# Patient Record
Sex: FEMALE | Race: BLACK OR AFRICAN AMERICAN | Employment: OTHER | URBAN - NONMETROPOLITAN AREA
[De-identification: names, ages, dates, MRNs, and addresses within clinical notes are randomized per-mention and may not be internally consistent; named-entity substitution may affect disease eponyms.]

---

## 2018-06-12 ENCOUNTER — TELEPHONE (OUTPATIENT)
Dept: FAMILY MEDICINE CLINIC | Age: 83
End: 2018-06-12

## 2018-06-18 ENCOUNTER — NURSE TRIAGE (OUTPATIENT)
Dept: ADMINISTRATIVE | Age: 83
End: 2018-06-18

## 2018-06-18 ENCOUNTER — OFFICE VISIT (OUTPATIENT)
Dept: FAMILY MEDICINE CLINIC | Age: 83
End: 2018-06-18
Payer: MEDICARE

## 2018-06-18 ENCOUNTER — TELEPHONE (OUTPATIENT)
Dept: FAMILY MEDICINE CLINIC | Age: 83
End: 2018-06-18

## 2018-06-18 VITALS
HEIGHT: 66 IN | RESPIRATION RATE: 12 BRPM | SYSTOLIC BLOOD PRESSURE: 134 MMHG | DIASTOLIC BLOOD PRESSURE: 78 MMHG | TEMPERATURE: 98.1 F | WEIGHT: 203 LBS | BODY MASS INDEX: 32.62 KG/M2 | HEART RATE: 88 BPM

## 2018-06-18 DIAGNOSIS — R60.0 PEDAL EDEMA: ICD-10-CM

## 2018-06-18 DIAGNOSIS — I48.20 CHRONIC ATRIAL FIBRILLATION (HCC): Primary | ICD-10-CM

## 2018-06-18 DIAGNOSIS — J30.1 CHRONIC ALLERGIC RHINITIS DUE TO POLLEN, UNSPECIFIED SEASONALITY: ICD-10-CM

## 2018-06-18 PROCEDURE — 99203 OFFICE O/P NEW LOW 30 MIN: CPT | Performed by: NURSE PRACTITIONER

## 2018-06-18 RX ORDER — LORATADINE 10 MG/1
10 TABLET ORAL DAILY
COMMUNITY
End: 2019-07-24

## 2018-06-18 RX ORDER — WARFARIN SODIUM 5 MG/1
TABLET ORAL EVERY EVENING
COMMUNITY
End: 2019-07-08 | Stop reason: SDUPTHER

## 2018-06-18 RX ORDER — FLUTICASONE PROPIONATE 50 MCG
1 SPRAY, SUSPENSION (ML) NASAL DAILY
COMMUNITY
End: 2019-03-19

## 2018-06-18 RX ORDER — AMLODIPINE BESYLATE 10 MG/1
10 TABLET ORAL DAILY
COMMUNITY
End: 2019-07-08 | Stop reason: SDUPTHER

## 2018-06-18 RX ORDER — MECLIZINE HCL 12.5 MG/1
12.5 TABLET ORAL 3 TIMES DAILY PRN
COMMUNITY
End: 2019-12-03 | Stop reason: SDUPTHER

## 2018-06-18 RX ORDER — LOSARTAN POTASSIUM AND HYDROCHLOROTHIAZIDE 12.5; 5 MG/1; MG/1
1 TABLET ORAL DAILY
COMMUNITY
End: 2019-01-07 | Stop reason: SDUPTHER

## 2018-06-18 ASSESSMENT — PATIENT HEALTH QUESTIONNAIRE - PHQ9
1. LITTLE INTEREST OR PLEASURE IN DOING THINGS: 0
2. FEELING DOWN, DEPRESSED OR HOPELESS: 0
SUM OF ALL RESPONSES TO PHQ9 QUESTIONS 1 & 2: 0
SUM OF ALL RESPONSES TO PHQ QUESTIONS 1-9: 0

## 2018-06-18 ASSESSMENT — ENCOUNTER SYMPTOMS
ABDOMINAL DISTENTION: 0
BACK PAIN: 0
SINUS PRESSURE: 0
ABDOMINAL PAIN: 0
SINUS PAIN: 0
RESPIRATORY NEGATIVE: 1
RHINORRHEA: 0

## 2018-06-19 ENCOUNTER — HOSPITAL ENCOUNTER (OUTPATIENT)
Age: 83
Discharge: HOME | End: 2018-06-19
Payer: MEDICARE

## 2018-06-19 ENCOUNTER — TELEPHONE (OUTPATIENT)
Dept: FAMILY MEDICINE CLINIC | Age: 83
End: 2018-06-19

## 2018-06-19 DIAGNOSIS — R71.8 MICROCYTIC ERYTHROCYTES: Primary | ICD-10-CM

## 2018-06-19 DIAGNOSIS — I48.20 CHRONIC ATRIAL FIBRILLATION (HCC): ICD-10-CM

## 2018-06-19 DIAGNOSIS — R73.01 ELEVATED FASTING BLOOD SUGAR: ICD-10-CM

## 2018-06-19 LAB
ALBUMIN SERPL-MCNC: 4 G/DL (ref 3.5–5.1)
ALP BLD-CCNC: 107 U/L (ref 38–126)
ALT SERPL-CCNC: 16 U/L (ref 11–66)
ANION GAP SERPL CALCULATED.3IONS-SCNC: 16 MEQ/L (ref 8–16)
ANISOCYTOSIS: ABNORMAL
AST SERPL-CCNC: 21 U/L (ref 5–40)
BASOPHILS # BLD: 0.4 %
BASOPHILS ABSOLUTE: 0 THOU/MM3 (ref 0–0.1)
BILIRUB SERPL-MCNC: 0.5 MG/DL (ref 0.3–1.2)
BUN BLDV-MCNC: 11 MG/DL (ref 7–22)
CALCIUM SERPL-MCNC: 10 MG/DL (ref 8.5–10.5)
CHLORIDE BLD-SCNC: 100 MEQ/L (ref 98–111)
CO2: 27 MEQ/L (ref 23–33)
CREAT SERPL-MCNC: 0.7 MG/DL (ref 0.4–1.2)
EOSINOPHIL # BLD: 4 %
EOSINOPHILS ABSOLUTE: 0.2 THOU/MM3 (ref 0–0.4)
GFR SERPL CREATININE-BSD FRML MDRD: > 90 ML/MIN/1.73M2
GLUCOSE BLD-MCNC: 158 MG/DL (ref 70–108)
HCT VFR BLD CALC: 38.9 % (ref 37–47)
HEMOGLOBIN: 12.4 GM/DL (ref 12–16)
HYPOCHROMIA: ABNORMAL
INR BLD: 3.41 (ref 0.85–1.13)
LYMPHOCYTES # BLD: 55.8 %
LYMPHOCYTES ABSOLUTE: 2.4 THOU/MM3 (ref 1–4.8)
MCH RBC QN AUTO: 23.1 PG (ref 27–31)
MCHC RBC AUTO-ENTMCNC: 32 GM/DL (ref 33–37)
MCV RBC AUTO: 72.2 FL (ref 81–99)
MICROCYTES: ABNORMAL
MONOCYTES # BLD: 8.5 %
MONOCYTES ABSOLUTE: 0.4 THOU/MM3 (ref 0.4–1.3)
NUCLEATED RED BLOOD CELLS: 0 /100 WBC
PDW BLD-RTO: 14.9 % (ref 11.5–14.5)
PLATELET # BLD: 229 THOU/MM3 (ref 130–400)
PLATELET ESTIMATE: ADEQUATE
PMV BLD AUTO: 8.2 FL (ref 7.4–10.4)
POTASSIUM SERPL-SCNC: 3.5 MEQ/L (ref 3.5–5.2)
RBC # BLD: 5.39 MILL/MM3 (ref 4.2–5.4)
SCAN OF BLOOD SMEAR: NORMAL
SEG NEUTROPHILS: 31.3 %
SEGMENTED NEUTROPHILS ABSOLUTE COUNT: 1.3 THOU/MM3 (ref 1.8–7.7)
SODIUM BLD-SCNC: 143 MEQ/L (ref 135–145)
TOTAL PROTEIN: 7.5 G/DL (ref 6.1–8)
WBC # BLD: 4.3 THOU/MM3 (ref 4.8–10.8)

## 2018-06-19 PROCEDURE — 85610 PROTHROMBIN TIME: CPT

## 2018-06-19 PROCEDURE — 36415 COLL VENOUS BLD VENIPUNCTURE: CPT

## 2018-06-19 PROCEDURE — 80053 COMPREHEN METABOLIC PANEL: CPT

## 2018-06-19 PROCEDURE — 85025 COMPLETE CBC W/AUTO DIFF WBC: CPT

## 2018-06-21 ENCOUNTER — TELEPHONE (OUTPATIENT)
Dept: FAMILY MEDICINE CLINIC | Age: 83
End: 2018-06-21

## 2018-06-21 ENCOUNTER — TELEPHONE (OUTPATIENT)
Dept: PHARMACY | Age: 83
End: 2018-06-21

## 2018-06-21 ENCOUNTER — HOSPITAL ENCOUNTER (OUTPATIENT)
Age: 83
Setting detail: SPECIMEN
Discharge: HOME | End: 2018-06-21
Payer: MEDICARE

## 2018-06-21 DIAGNOSIS — R71.8 MICROCYTIC ERYTHROCYTES: ICD-10-CM

## 2018-06-21 LAB
AVERAGE GLUCOSE: 165 MG/DL (ref 70–126)
HBA1C MFR BLD: 7.5 % (ref 4.4–6.4)
HEMOCCULT STL QL: NEGATIVE

## 2018-06-21 PROCEDURE — 82272 OCCULT BLD FECES 1-3 TESTS: CPT

## 2018-06-22 ENCOUNTER — HOSPITAL ENCOUNTER (OUTPATIENT)
Dept: PHARMACY | Age: 83
Setting detail: THERAPIES SERIES
Discharge: HOME | End: 2018-06-22
Payer: MEDICARE

## 2018-06-22 DIAGNOSIS — I48.20 CHRONIC ATRIAL FIBRILLATION (HCC): ICD-10-CM

## 2018-06-22 LAB — POC INR: 4.3 (ref 0.8–1.2)

## 2018-06-22 PROCEDURE — 85610 PROTHROMBIN TIME: CPT | Performed by: PHARMACIST

## 2018-06-22 PROCEDURE — 36416 COLLJ CAPILLARY BLOOD SPEC: CPT | Performed by: PHARMACIST

## 2018-06-28 ENCOUNTER — HOSPITAL ENCOUNTER (OUTPATIENT)
Dept: PHARMACY | Age: 83
Setting detail: THERAPIES SERIES
Discharge: HOME | End: 2018-06-28
Payer: MEDICARE

## 2018-06-28 DIAGNOSIS — I48.19 PERSISTENT ATRIAL FIBRILLATION (HCC): ICD-10-CM

## 2018-06-28 DIAGNOSIS — I48.20 CHRONIC ATRIAL FIBRILLATION (HCC): ICD-10-CM

## 2018-06-28 LAB — POC INR: 3.3 (ref 0.8–1.2)

## 2018-06-28 PROCEDURE — 99211 OFF/OP EST MAY X REQ PHY/QHP: CPT

## 2018-06-28 PROCEDURE — 85610 PROTHROMBIN TIME: CPT

## 2018-06-28 PROCEDURE — 36416 COLLJ CAPILLARY BLOOD SPEC: CPT

## 2018-07-05 PROBLEM — Z79.01 ANTICOAGULATED ON COUMADIN: Status: ACTIVE | Noted: 2018-07-05

## 2018-07-09 ENCOUNTER — HOSPITAL ENCOUNTER (OUTPATIENT)
Dept: PHARMACY | Age: 83
Setting detail: THERAPIES SERIES
Discharge: HOME | End: 2018-07-09
Payer: MEDICARE

## 2018-07-09 DIAGNOSIS — I48.19 PERSISTENT ATRIAL FIBRILLATION (HCC): ICD-10-CM

## 2018-07-09 LAB — POC INR: 2.7 (ref 0.8–1.2)

## 2018-07-09 PROCEDURE — 99211 OFF/OP EST MAY X REQ PHY/QHP: CPT

## 2018-07-09 PROCEDURE — 36416 COLLJ CAPILLARY BLOOD SPEC: CPT

## 2018-07-09 PROCEDURE — 85610 PROTHROMBIN TIME: CPT

## 2018-07-28 ENCOUNTER — NURSE TRIAGE (OUTPATIENT)
Dept: ADMINISTRATIVE | Age: 83
End: 2018-07-28

## 2018-07-28 NOTE — TELEPHONE ENCOUNTER
Yury is wondering about eating kidney beans in chili with atrial fib. Actually she is on Coumadin that is limiting her vitamin K intake. Information that I found on clinical reference and [place] clinc. I see nothing about beans. She was reading from a brochure that she got in the hospital about limiting amounts of equal value each week and included beans. Discussed that that would not involve that many in a bowl of chili, so should be fine to eat with good sense. Voiced understanding.

## 2018-07-30 ENCOUNTER — HOSPITAL ENCOUNTER (OUTPATIENT)
Dept: PHARMACY | Age: 83
Setting detail: THERAPIES SERIES
Discharge: HOME | End: 2018-07-30
Payer: MEDICARE

## 2018-07-30 DIAGNOSIS — I48.19 PERSISTENT ATRIAL FIBRILLATION (HCC): ICD-10-CM

## 2018-07-30 LAB — POC INR: 2.4 (ref 0.8–1.2)

## 2018-07-30 PROCEDURE — 99211 OFF/OP EST MAY X REQ PHY/QHP: CPT

## 2018-07-30 PROCEDURE — 36416 COLLJ CAPILLARY BLOOD SPEC: CPT

## 2018-07-30 PROCEDURE — 85610 PROTHROMBIN TIME: CPT

## 2018-07-30 NOTE — PROGRESS NOTES
Medication Management [facility]  Anticoagulation Clinic  [phone] (phone)  [phone] (fax)      Ms. Abbey Tyler is a 82 y.o.  female with history of persistent atrial fib. , per Dr. Katarina Santos referral, who presents today for Warfarin monitoring and adjustment (3 week visit). Patient verifies current tablet strength. Followed printed instructions for dose. No missed or extra doses. Patient denies bleeding/bruising/SOB/chest pain. Has usual slight bilateral ankle swelling. No blood in urine or stool. No dietary changes. No changes in medication/OTC agents/herbals. No change in alcohol use or tobacco use. No change in activity level. Patient denies headaches/dizziness/lightheadedness/falls. No vomiting/diarrhea or acute illness. No procedures scheduled in the future at this time. Assessment:   Lab Results   Component Value Date    INR 2.40 (H) 07/30/2018    INR 2.70 (H) 07/09/2018    INR 3.30 (H) 06/28/2018     INR therapeutic  - goal 2-3. Recent Labs      07/30/18   1043   INR  2.40*       Plan:  POCT INR ordered/performed/result reviewed. Continue PO Coumadin 2.5 mg MWF, 5 mg TThSS. Recheck INR in 4 weeks. Patient reminded to call the Anticoagulation Clinic with any signs or symptoms of bleeding or with any medication changes. Patient given instructions utilizing the teach back method. Discharged ambulatory in no apparent distress, with male significant other. After visit summary printed and reviewed with patient. Medications reviewed and updated on home medication list.     Influenza vaccine: does not take.     [] given    [] declined   [] received previously   [] plans to receive at a later time   [] refused    [] documented in [facility]

## 2018-08-03 ENCOUNTER — TELEPHONE (OUTPATIENT)
Dept: PHARMACY | Age: 83
End: 2018-08-03

## 2018-08-03 NOTE — TELEPHONE ENCOUNTER
Myrna Mosquera called stating that you a bruise about the size of a 50 cent piece just below the knee on bottom side. Not painful or warm to the touch. Instructed pt per Lynn to go to the ER if the bruise grows, becomes painful or warm to the touch. I also let the pt know that some bruising can be expected with Coumadin but to call the clinic with new symptoms. Pt voice understanding.

## 2018-08-27 ENCOUNTER — HOSPITAL ENCOUNTER (OUTPATIENT)
Dept: PHARMACY | Age: 83
Setting detail: THERAPIES SERIES
Discharge: HOME | End: 2018-08-27
Payer: MEDICARE

## 2018-08-27 DIAGNOSIS — I48.19 PERSISTENT ATRIAL FIBRILLATION (HCC): ICD-10-CM

## 2018-08-27 LAB — POC INR: 2.8 (ref 0.8–1.2)

## 2018-08-27 PROCEDURE — 99211 OFF/OP EST MAY X REQ PHY/QHP: CPT

## 2018-08-27 PROCEDURE — 36416 COLLJ CAPILLARY BLOOD SPEC: CPT

## 2018-08-27 PROCEDURE — 85610 PROTHROMBIN TIME: CPT

## 2018-09-18 ENCOUNTER — OFFICE VISIT (OUTPATIENT)
Dept: FAMILY MEDICINE CLINIC | Age: 83
End: 2018-09-18
Payer: MEDICARE

## 2018-09-18 VITALS
HEIGHT: 66 IN | TEMPERATURE: 98.1 F | BODY MASS INDEX: 33.37 KG/M2 | SYSTOLIC BLOOD PRESSURE: 136 MMHG | WEIGHT: 207.6 LBS | DIASTOLIC BLOOD PRESSURE: 88 MMHG | RESPIRATION RATE: 14 BRPM | HEART RATE: 76 BPM

## 2018-09-18 DIAGNOSIS — L91.8 SKIN TAG: ICD-10-CM

## 2018-09-18 DIAGNOSIS — I48.19 PERSISTENT ATRIAL FIBRILLATION (HCC): ICD-10-CM

## 2018-09-18 DIAGNOSIS — I10 ESSENTIAL HYPERTENSION: ICD-10-CM

## 2018-09-18 DIAGNOSIS — Z79.01 ANTICOAGULATED ON COUMADIN: Primary | ICD-10-CM

## 2018-09-18 PROCEDURE — 99213 OFFICE O/P EST LOW 20 MIN: CPT | Performed by: NURSE PRACTITIONER

## 2018-09-18 ASSESSMENT — ENCOUNTER SYMPTOMS: RESPIRATORY NEGATIVE: 1

## 2018-09-18 NOTE — PROGRESS NOTES
[address] MESSINA II.[facility] [address]  Dept: [phone]  Dept Fax: [phone]  Loc: [phone]    Darby Martinez is a 82 y.o. female who presents today for her medical conditions/complaints as noted below. Darby Martinez is c/o of Follow-up (Pt presents for a 3 month f/u. Pt states that she is doing well. ) and Nevus (Pt is c/o a nevus on her back. She states that it has been there for a long time and isn't bothering her, but she feels like it might get snaged on her bra. )      HPI:     Pt here for a check up. Pt continues to take her coumadin, she is current with coumadin clinic. I have reviewed her chart. She takes her allergy medications when needed. She continues to take her bp meds. She denies chest pain or sob or pedal edema. She denies a rapid heart rate. She has a skin tag she would like taken off. It is located on her posterior  Lateral back. It gets caught on her bra. She is afraid she is going to tear it off. Current Outpatient Prescriptions   Medication Sig Dispense Refill    fluticasone (FLONASE) 50 MCG/ACT nasal spray 1 spray by Nasal route daily       amLODIPine (NORVASC) 10 MG tablet Take 10 mg by mouth daily       warfarin (COUMADIN) 5 MG tablet Take by mouth every evening Dosed by [facility].  losartan-hydrochlorothiazide (HYZAAR) 50-12.5 MG per tablet Take 1 tablet by mouth daily       metoprolol tartrate (LOPRESSOR) 25 MG tablet Take 25 mg by mouth 2 times daily       meclizine (ANTIVERT) 12.5 MG tablet Take 12.5 mg by mouth 3 times daily as needed for Dizziness or Nausea       loratadine (CLARITIN) 10 MG tablet Take 10 mg by mouth daily        No current facility-administered medications for this visit. Past Medical History:   Diagnosis Date    Atrial fibrillation ([facility] [address]     Hypertension       History reviewed. No pertinent surgical history.   Family History

## 2018-09-20 ENCOUNTER — OFFICE VISIT (OUTPATIENT)
Dept: FAMILY MEDICINE CLINIC | Age: 83
End: 2018-09-20
Payer: MEDICARE

## 2018-09-20 VITALS
HEART RATE: 79 BPM | HEIGHT: 65 IN | SYSTOLIC BLOOD PRESSURE: 116 MMHG | TEMPERATURE: 98.7 F | DIASTOLIC BLOOD PRESSURE: 70 MMHG | BODY MASS INDEX: 34.66 KG/M2 | RESPIRATION RATE: 18 BRPM | WEIGHT: 208 LBS

## 2018-09-20 DIAGNOSIS — L91.8 SKIN TAG: Primary | ICD-10-CM

## 2018-09-20 DIAGNOSIS — Z79.01 ANTICOAGULATED ON COUMADIN: ICD-10-CM

## 2018-09-20 PROCEDURE — 99213 OFFICE O/P EST LOW 20 MIN: CPT | Performed by: NURSE PRACTITIONER

## 2018-09-20 PROCEDURE — 11200 RMVL SKIN TAGS UP TO&INC 15: CPT | Performed by: NURSE PRACTITIONER

## 2018-09-20 RX ORDER — TRAMADOL HYDROCHLORIDE 50 MG/1
50 TABLET ORAL 2 TIMES DAILY
Qty: 10 TABLET | Refills: 0 | Status: SHIPPED | OUTPATIENT
Start: 2018-09-20 | End: 2018-09-25

## 2018-09-20 ASSESSMENT — ENCOUNTER SYMPTOMS: RESPIRATORY NEGATIVE: 1

## 2018-09-20 NOTE — PROGRESS NOTES
Visit Information    Have you changed or started any medications since your last visit including any over-the-counter medicines, vitamins, or herbal medicines? no   Are you having any side effects from any of your medications? -  no  Have you stopped taking any of your medications? Is so, why? -  no    Have you seen any other physician or provider since your last visit? No  Have you had any other diagnostic tests since your last visit? No  Have you been seen in the emergency room and/or had an admission to a hospital since we last saw you? No  Have you had your routine dental cleaning in the past 6 months? no    Have you activated your [employer] account? If not, what are your barriers?  No     Patient Care Team:  JOSH Mckeon CNP as PCP - General (Family Nurse Practitioner)  JOSH Mckeon CNP as PCP - [facility] Attributed Provider        Health Maintenance   Topic Date Due    DTaP/Tdap/Td vaccine (1 - Tdap) 01/23/1954    Shingles Vaccine (1 of 2 - 2 Dose Series) 01/23/1987    Pneumococcal low/med risk (1 of 2 - PCV13) 01/23/2002    Flu vaccine (1) 09/01/2018    Potassium monitoring  06/19/2019    Creatinine monitoring  06/19/2019    DEXA (modify frequency per FRAX score)  Completed

## 2018-09-20 NOTE — PATIENT INSTRUCTIONS
Keep pressure dressing on for 24 hours. Take off tomorrow and apply topical atb ointment and gauze with band-aid. May shower in 3 days. If bleeding occurs through the gauze contact the office  May take tylenol or ultram for pain but not both within four hours of each other. If increased pain or swelling or foul drainage develops contact the office.

## 2018-09-24 ENCOUNTER — TELEPHONE (OUTPATIENT)
Dept: FAMILY MEDICINE CLINIC | Age: 83
End: 2018-09-24

## 2018-09-24 ENCOUNTER — HOSPITAL ENCOUNTER (OUTPATIENT)
Dept: PHARMACY | Age: 83
Setting detail: THERAPIES SERIES
Discharge: HOME | End: 2018-09-24
Payer: MEDICARE

## 2018-09-24 DIAGNOSIS — I48.19 PERSISTENT ATRIAL FIBRILLATION (HCC): ICD-10-CM

## 2018-09-24 LAB — POC INR: 2.5 (ref 0.8–1.2)

## 2018-09-24 PROCEDURE — 36416 COLLJ CAPILLARY BLOOD SPEC: CPT

## 2018-09-24 PROCEDURE — 99211 OFF/OP EST MAY X REQ PHY/QHP: CPT

## 2018-09-24 PROCEDURE — 85610 PROTHROMBIN TIME: CPT

## 2018-10-05 ENCOUNTER — CARE COORDINATION (OUTPATIENT)
Dept: CARE COORDINATION | Age: 83
End: 2018-10-05

## 2018-10-22 ENCOUNTER — HOSPITAL ENCOUNTER (OUTPATIENT)
Dept: PHARMACY | Age: 83
Setting detail: THERAPIES SERIES
Discharge: HOME | End: 2018-10-22
Payer: MEDICARE

## 2018-10-22 DIAGNOSIS — I48.19 PERSISTENT ATRIAL FIBRILLATION (HCC): ICD-10-CM

## 2018-10-22 LAB — POC INR: 2.2 (ref 0.8–1.2)

## 2018-10-22 PROCEDURE — 36416 COLLJ CAPILLARY BLOOD SPEC: CPT

## 2018-10-22 PROCEDURE — 85610 PROTHROMBIN TIME: CPT

## 2018-10-22 PROCEDURE — 99211 OFF/OP EST MAY X REQ PHY/QHP: CPT

## 2018-10-22 NOTE — PROGRESS NOTES
Medication Management [facility]  Anticoagulation Clinic  [phone] (phone)  [phone] (fax)    Ms. Samia Dowling is a 82 y.o.  female with history of persistent Afib who presents today for anticoagulation monitoring and adjustment. Son verifies current tablet strength. Neither son nor patient can recite coumadin dose, but follows dosing calendar. No missed or extra doses. Patient denies s/s bleeding/swelling/SOB/chest pain. Has a fading bruise on right calf. No blood in urine or stool. No dietary changes. No changes in medication/OTC agents/Herbals. No change in alcohol use or tobacco use. No change in activity level. Patient denies headaches/dizziness/lightheadedness/falls. No vomiting/diarrhea or acute illness. No procedures scheduled in the future at this time. Needs new dentures. May need to have bone removed from roof of her mouth. No date set yet. Using teach back method instructed to call when has a date and if needs to be off coumadin. Patient and son voice understanding. Assessment:   Lab Results   Component Value Date    INR 2.20 (H) 10/22/2018    INR 2.50 (H) 09/24/2018    INR 2.80 (H) 08/27/2018     INR therapeutic   Recent Labs      10/22/18   1110   INR  2.20*     Plan: POCT INR ordered and result reviewed. Continue Coumadin 2.5 mg po MWF, 5 mg po TTHSS. Recheck INR in 5 weeks. Patient reminded to call the Anticoagulation Clinic with any signs or symptoms of bleeding or with any medication changes. Patient given instructions utilizing the teach back method. Discharged ambulatory in no apparent distress. After visit summary printed and reviewed with patient.       Medications reviewed and updated on home medication list Yes    Influenza vaccine:     [] given    [x] declined   [] received previously   [] plans to receive at a later time   [] refused    [x] documented in EPIC

## 2018-11-06 ENCOUNTER — TELEPHONE (OUTPATIENT)
Dept: PHARMACY | Age: 83
End: 2018-11-06

## 2018-11-08 ENCOUNTER — HOSPITAL ENCOUNTER (OUTPATIENT)
Dept: PHARMACY | Age: 83
Setting detail: THERAPIES SERIES
Discharge: HOME | End: 2018-11-08
Payer: MEDICARE

## 2018-11-08 DIAGNOSIS — I48.19 PERSISTENT ATRIAL FIBRILLATION (HCC): ICD-10-CM

## 2018-11-08 LAB — POC INR: 2.1 (ref 0.8–1.2)

## 2018-11-08 PROCEDURE — 85610 PROTHROMBIN TIME: CPT

## 2018-11-08 PROCEDURE — 36416 COLLJ CAPILLARY BLOOD SPEC: CPT

## 2018-11-08 PROCEDURE — 99211 OFF/OP EST MAY X REQ PHY/QHP: CPT

## 2018-11-08 NOTE — PROGRESS NOTES
summary printed and reviewed with patient.       Medications reviewed and updated on home medication list Yes    Influenza vaccine:     [] given    [x] declined   [] received previously   [] plans to receive at a later time   [x] refused    [] documented in EPIC

## 2018-11-12 ENCOUNTER — NURSE TRIAGE (OUTPATIENT)
Dept: ADMINISTRATIVE | Age: 83
End: 2018-11-12

## 2018-11-14 ENCOUNTER — TELEPHONE (OUTPATIENT)
Dept: FAMILY MEDICINE CLINIC | Age: 83
End: 2018-11-14

## 2018-11-14 NOTE — TELEPHONE ENCOUNTER
The oral surgeon wants med records before he can precede with tx. The lady I spoke to is faxing a SHOBHA here. I informed Amena Rutledge we are taking care of this. SHOBHA received and requested info was faxed.

## 2018-12-05 ENCOUNTER — HOSPITAL ENCOUNTER (OUTPATIENT)
Dept: PHARMACY | Age: 83
Setting detail: THERAPIES SERIES
Discharge: HOME | End: 2018-12-05
Payer: MEDICARE

## 2018-12-05 DIAGNOSIS — I48.19 PERSISTENT ATRIAL FIBRILLATION (HCC): ICD-10-CM

## 2018-12-05 LAB — POC INR: 2.4 (ref 0.8–1.2)

## 2018-12-05 PROCEDURE — 85610 PROTHROMBIN TIME: CPT

## 2018-12-05 PROCEDURE — 36416 COLLJ CAPILLARY BLOOD SPEC: CPT

## 2018-12-05 PROCEDURE — 99211 OFF/OP EST MAY X REQ PHY/QHP: CPT

## 2018-12-05 RX ORDER — ALBUTEROL SULFATE 90 UG/1
AEROSOL, METERED RESPIRATORY (INHALATION) EVERY 6 HOURS PRN
COMMUNITY
End: 2019-07-24

## 2018-12-06 ENCOUNTER — TELEPHONE (OUTPATIENT)
Dept: PHARMACY | Age: 83
End: 2018-12-06

## 2018-12-09 ENCOUNTER — APPOINTMENT (OUTPATIENT)
Dept: CT IMAGING | Age: 83
End: 2018-12-09
Payer: MEDICARE

## 2018-12-09 ENCOUNTER — APPOINTMENT (OUTPATIENT)
Dept: GENERAL RADIOLOGY | Age: 83
End: 2018-12-09
Payer: MEDICARE

## 2018-12-09 ENCOUNTER — HOSPITAL ENCOUNTER (EMERGENCY)
Age: 83
Discharge: HOME | End: 2018-12-09
Attending: FAMILY MEDICINE
Payer: MEDICARE

## 2018-12-09 VITALS
OXYGEN SATURATION: 98 % | RESPIRATION RATE: 17 BRPM | HEART RATE: 86 BPM | TEMPERATURE: 97.3 F | SYSTOLIC BLOOD PRESSURE: 140 MMHG | WEIGHT: 198 LBS | BODY MASS INDEX: 31.82 KG/M2 | DIASTOLIC BLOOD PRESSURE: 88 MMHG | HEIGHT: 66 IN

## 2018-12-09 DIAGNOSIS — S43.491A OTHER SPRAIN OF RIGHT SHOULDER JOINT, INITIAL ENCOUNTER: ICD-10-CM

## 2018-12-09 DIAGNOSIS — S09.90XA CLOSED HEAD INJURY, INITIAL ENCOUNTER: Primary | ICD-10-CM
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
## 2018-12-09 PROCEDURE — 73030 X-RAY EXAM OF SHOULDER: CPT

## 2018-12-09 PROCEDURE — 6370000000 HC RX 637 (ALT 250 FOR IP): Performed by: FAMILY MEDICINE

## 2018-12-09 PROCEDURE — 70450 CT HEAD/BRAIN W/O DYE: CPT

## 2018-12-09 PROCEDURE — 6360000002 HC RX W HCPCS: Performed by: FAMILY MEDICINE

## 2018-12-09 PROCEDURE — 99284 EMERGENCY DEPT VISIT MOD MDM: CPT

## 2018-12-09 PROCEDURE — 72125 CT NECK SPINE W/O DYE: CPT

## 2018-12-09 PROCEDURE — 96372 THER/PROPH/DIAG INJ SC/IM: CPT

## 2018-12-09 RX ORDER — KETOROLAC TROMETHAMINE 30 MG/ML
15 INJECTION, SOLUTION INTRAMUSCULAR; INTRAVENOUS ONCE
Status: COMPLETED | OUTPATIENT
Start: 2018-12-09 | End: 2018-12-09

## 2018-12-09 RX ORDER — ACETAMINOPHEN AND CODEINE PHOSPHATE 300; 30 MG/1; MG/1
1 TABLET ORAL
Qty: 18 TABLET | Refills: 0 | Status: SHIPPED | OUTPATIENT
Start: 2018-12-09 | End: 2018-12-12

## 2018-12-09 RX ORDER — HYDROCODONE BITARTRATE AND ACETAMINOPHEN 5; 325 MG/1; MG/1
1 TABLET ORAL ONCE
Status: COMPLETED | OUTPATIENT
Start: 2018-12-09 | End: 2018-12-09

## 2018-12-09 RX ORDER — KETOROLAC TROMETHAMINE 30 MG/ML
15 INJECTION, SOLUTION INTRAMUSCULAR; INTRAVENOUS ONCE
Status: DISCONTINUED | OUTPATIENT
Start: 2018-12-09 | End: 2018-12-09

## 2018-12-09 RX ORDER — ALBUTEROL SULFATE 90 UG/1
2 AEROSOL, METERED RESPIRATORY (INHALATION) EVERY 6 HOURS PRN
Status: DISCONTINUED | OUTPATIENT
Start: 2018-12-09 | End: 2018-12-10 | Stop reason: HOSPADM

## 2018-12-09 RX ADMIN — HYDROCODONE BITARTRATE AND ACETAMINOPHEN 1 TABLET: 5; 325 TABLET ORAL at 21:35

## 2018-12-09 RX ADMIN — KETOROLAC TROMETHAMINE 15 MG: 30 INJECTION, SOLUTION INTRAMUSCULAR; INTRAVENOUS at 21:41

## 2018-12-09 RX ADMIN — ALBUTEROL SULFATE 2 PUFF: 90 AEROSOL, METERED RESPIRATORY (INHALATION) at 21:51

## 2018-12-09 ASSESSMENT — ENCOUNTER SYMPTOMS
VOMITING: 0
WHEEZING: 0
DIARRHEA: 0
EYE PAIN: 0
BACK PAIN: 0
ABDOMINAL PAIN: 0
EYE DISCHARGE: 0
NAUSEA: 0
SORE THROAT: 0
SHORTNESS OF BREATH: 0
COUGH: 0
RHINORRHEA: 0

## 2018-12-09 ASSESSMENT — PAIN DESCRIPTION - ORIENTATION
ORIENTATION: RIGHT
ORIENTATION: RIGHT

## 2018-12-09 ASSESSMENT — PAIN SCALES - GENERAL
PAINLEVEL_OUTOF10: 8
PAINLEVEL_OUTOF10: 7

## 2018-12-09 ASSESSMENT — PAIN DESCRIPTION - PAIN TYPE
TYPE: ACUTE PAIN
TYPE: ACUTE PAIN

## 2018-12-09 ASSESSMENT — PAIN DESCRIPTION - DESCRIPTORS: DESCRIPTORS: THROBBING

## 2019-01-02 ENCOUNTER — HOSPITAL ENCOUNTER (OUTPATIENT)
Dept: PHARMACY | Age: 84
Setting detail: THERAPIES SERIES
Discharge: HOME | End: 2019-01-02
Payer: MEDICARE

## 2019-01-02 DIAGNOSIS — I48.19 PERSISTENT ATRIAL FIBRILLATION (HCC): ICD-10-CM

## 2019-01-02 LAB — POC INR: 2 (ref 0.8–1.2)

## 2019-01-02 PROCEDURE — 99211 OFF/OP EST MAY X REQ PHY/QHP: CPT

## 2019-01-02 PROCEDURE — 36416 COLLJ CAPILLARY BLOOD SPEC: CPT

## 2019-01-02 PROCEDURE — 85610 PROTHROMBIN TIME: CPT

## 2019-01-02 RX ORDER — MONTELUKAST SODIUM 10 MG/1
10 TABLET ORAL NIGHTLY
COMMUNITY
End: 2019-03-19

## 2019-01-07 ENCOUNTER — TELEPHONE (OUTPATIENT)
Dept: PHARMACY | Age: 84
End: 2019-01-07

## 2019-01-30 ENCOUNTER — APPOINTMENT (OUTPATIENT)
Dept: PHARMACY | Age: 84
End: 2019-01-30
Payer: MEDICARE

## 2019-02-04 ENCOUNTER — HOSPITAL ENCOUNTER (OUTPATIENT)
Dept: PHARMACY | Age: 84
Setting detail: THERAPIES SERIES
Discharge: HOME | End: 2019-02-04
Payer: MEDICARE

## 2019-02-04 ENCOUNTER — HOSPITAL ENCOUNTER (OUTPATIENT)
Age: 84
Discharge: HOME | End: 2019-02-04
Payer: MEDICARE

## 2019-02-04 DIAGNOSIS — I48.19 PERSISTENT ATRIAL FIBRILLATION (HCC): ICD-10-CM

## 2019-02-04 DIAGNOSIS — Z79.01 ANTICOAGULATED ON COUMADIN: ICD-10-CM

## 2019-02-04 DIAGNOSIS — Z79.01 ANTICOAGULATED ON COUMADIN: Primary | ICD-10-CM

## 2019-02-04 LAB
HCT VFR BLD CALC: 39 % (ref 37–47)
HEMOGLOBIN: 12.2 GM/DL (ref 12–16)
POC INR: 1.8 (ref 0.8–1.2)

## 2019-02-04 PROCEDURE — 36415 COLL VENOUS BLD VENIPUNCTURE: CPT

## 2019-02-04 PROCEDURE — 36416 COLLJ CAPILLARY BLOOD SPEC: CPT

## 2019-02-04 PROCEDURE — 85018 HEMOGLOBIN: CPT

## 2019-02-04 PROCEDURE — 85610 PROTHROMBIN TIME: CPT

## 2019-02-04 PROCEDURE — 85014 HEMATOCRIT: CPT

## 2019-02-04 PROCEDURE — 99211 OFF/OP EST MAY X REQ PHY/QHP: CPT

## 2019-02-25 ENCOUNTER — HOSPITAL ENCOUNTER (OUTPATIENT)
Age: 84
Discharge: HOME | End: 2019-02-25
Payer: MEDICARE

## 2019-02-25 ENCOUNTER — HOSPITAL ENCOUNTER (OUTPATIENT)
Dept: PHARMACY | Age: 84
Setting detail: THERAPIES SERIES
Discharge: HOME | End: 2019-02-25
Payer: MEDICARE

## 2019-02-25 DIAGNOSIS — I48.19 PERSISTENT ATRIAL FIBRILLATION (HCC): ICD-10-CM

## 2019-02-25 LAB — POC INR: 2.8 (ref 0.8–1.2)

## 2019-02-25 PROCEDURE — 36416 COLLJ CAPILLARY BLOOD SPEC: CPT

## 2019-02-25 PROCEDURE — 86005 ALLG SPEC IGE MULTIALLG SCR: CPT

## 2019-02-25 PROCEDURE — 99211 OFF/OP EST MAY X REQ PHY/QHP: CPT

## 2019-02-25 PROCEDURE — 36415 COLL VENOUS BLD VENIPUNCTURE: CPT

## 2019-02-25 PROCEDURE — 86003 ALLG SPEC IGE CRUDE XTRC EA: CPT

## 2019-02-25 PROCEDURE — 85610 PROTHROMBIN TIME: CPT

## 2019-02-28 LAB — RAST MISCELLANEOUS: NORMAL

## 2019-03-19 ENCOUNTER — OFFICE VISIT (OUTPATIENT)
Dept: FAMILY MEDICINE CLINIC | Age: 84
End: 2019-03-19
Payer: MEDICARE

## 2019-03-19 VITALS
HEART RATE: 80 BPM | RESPIRATION RATE: 14 BRPM | HEIGHT: 65 IN | DIASTOLIC BLOOD PRESSURE: 70 MMHG | WEIGHT: 210 LBS | TEMPERATURE: 98.5 F | SYSTOLIC BLOOD PRESSURE: 114 MMHG | BODY MASS INDEX: 34.99 KG/M2

## 2019-03-19 DIAGNOSIS — Z79.01 CHRONIC ANTICOAGULATION: ICD-10-CM

## 2019-03-19 DIAGNOSIS — I48.19 PERSISTENT ATRIAL FIBRILLATION (HCC): Primary | ICD-10-CM

## 2019-03-19 PROCEDURE — 99214 OFFICE O/P EST MOD 30 MIN: CPT | Performed by: NURSE PRACTITIONER

## 2019-03-19 RX ORDER — BUDESONIDE AND FORMOTEROL FUMARATE DIHYDRATE 160; 4.5 UG/1; UG/1
2 AEROSOL RESPIRATORY (INHALATION) 2 TIMES DAILY
COMMUNITY
End: 2020-01-06

## 2019-03-19 ASSESSMENT — ENCOUNTER SYMPTOMS
ABDOMINAL DISTENTION: 0
BACK PAIN: 0
RESPIRATORY NEGATIVE: 1
ABDOMINAL PAIN: 0

## 2019-03-19 ASSESSMENT — PATIENT HEALTH QUESTIONNAIRE - PHQ9
SUM OF ALL RESPONSES TO PHQ QUESTIONS 1-9: 0
SUM OF ALL RESPONSES TO PHQ QUESTIONS 1-9: 0
2. FEELING DOWN, DEPRESSED OR HOPELESS: 0
SUM OF ALL RESPONSES TO PHQ9 QUESTIONS 1 & 2: 0
1. LITTLE INTEREST OR PLEASURE IN DOING THINGS: 0

## 2019-03-26 ENCOUNTER — APPOINTMENT (OUTPATIENT)
Dept: PHARMACY | Age: 84
End: 2019-03-26
Payer: MEDICARE

## 2019-03-28 ENCOUNTER — HOSPITAL ENCOUNTER (OUTPATIENT)
Dept: PHARMACY | Age: 84
Setting detail: THERAPIES SERIES
Discharge: HOME | End: 2019-03-28
Payer: MEDICARE

## 2019-03-28 DIAGNOSIS — I48.19 PERSISTENT ATRIAL FIBRILLATION (HCC): ICD-10-CM

## 2019-03-28 LAB — POC INR: 2.7 (ref 0.8–1.2)

## 2019-03-28 PROCEDURE — 36416 COLLJ CAPILLARY BLOOD SPEC: CPT | Performed by: PHARMACIST

## 2019-03-28 PROCEDURE — 85610 PROTHROMBIN TIME: CPT | Performed by: PHARMACIST

## 2019-03-28 PROCEDURE — 99211 OFF/OP EST MAY X REQ PHY/QHP: CPT | Performed by: PHARMACIST

## 2019-03-28 RX ORDER — MONTELUKAST SODIUM 10 MG/1
10 TABLET ORAL NIGHTLY
COMMUNITY
End: 2019-05-22 | Stop reason: ALTCHOICE

## 2019-04-24 ENCOUNTER — HOSPITAL ENCOUNTER (OUTPATIENT)
Dept: PHARMACY | Age: 84
Setting detail: THERAPIES SERIES
Discharge: HOME | End: 2019-04-24
Payer: MEDICARE

## 2019-04-24 DIAGNOSIS — I48.19 PERSISTENT ATRIAL FIBRILLATION (HCC): ICD-10-CM

## 2019-04-24 LAB — POC INR: 2.2 (ref 0.8–1.2)

## 2019-04-24 PROCEDURE — 85610 PROTHROMBIN TIME: CPT

## 2019-04-24 PROCEDURE — 99211 OFF/OP EST MAY X REQ PHY/QHP: CPT

## 2019-04-24 PROCEDURE — 36416 COLLJ CAPILLARY BLOOD SPEC: CPT

## 2019-05-22 ENCOUNTER — HOSPITAL ENCOUNTER (OUTPATIENT)
Dept: PHARMACY | Age: 84
Setting detail: THERAPIES SERIES
Discharge: HOME | End: 2019-05-22
Payer: MEDICARE

## 2019-05-22 DIAGNOSIS — I48.19 PERSISTENT ATRIAL FIBRILLATION (HCC): ICD-10-CM

## 2019-05-22 LAB — POC INR: 2.4 (ref 0.8–1.2)

## 2019-05-22 PROCEDURE — 36416 COLLJ CAPILLARY BLOOD SPEC: CPT | Performed by: PHARMACIST

## 2019-05-22 PROCEDURE — 85610 PROTHROMBIN TIME: CPT | Performed by: PHARMACIST

## 2019-05-22 PROCEDURE — 99211 OFF/OP EST MAY X REQ PHY/QHP: CPT | Performed by: PHARMACIST

## 2019-05-22 RX ORDER — MONTELUKAST SODIUM 10 MG/1
10 TABLET ORAL NIGHTLY
COMMUNITY
End: 2020-01-06

## 2019-06-13 ENCOUNTER — TELEPHONE (OUTPATIENT)
Dept: PHARMACY | Age: 84
End: 2019-06-13

## 2019-06-13 ENCOUNTER — TELEPHONE (OUTPATIENT)
Dept: FAMILY MEDICINE CLINIC | Age: 84
End: 2019-06-13

## 2019-06-13 DIAGNOSIS — I48.19 PERSISTENT ATRIAL FIBRILLATION (HCC): Primary | ICD-10-CM

## 2019-06-13 NOTE — TELEPHONE ENCOUNTER
Left message on voicemail at PCP's office asking for electronic referral to be entered by physician.

## 2019-06-14 NOTE — TELEPHONE ENCOUNTER
[facility] can you please order an electronic referral for this pt for coumadin clinnc. She has the diagnosis of persistent atrial fibrillation. Thank you!  this is per CC request.

## 2019-06-14 NOTE — TELEPHONE ENCOUNTER
[id number] [facility] Coumadin clinic and left detailed msg on their answering mach that referral placed.

## 2019-06-19 ENCOUNTER — HOSPITAL ENCOUNTER (OUTPATIENT)
Dept: PHARMACY | Age: 84
Setting detail: THERAPIES SERIES
Discharge: HOME | End: 2019-06-19
Payer: MEDICARE

## 2019-06-19 LAB — POC INR: 2.3 (ref 0.8–1.2)

## 2019-06-19 PROCEDURE — 99211 OFF/OP EST MAY X REQ PHY/QHP: CPT

## 2019-06-19 PROCEDURE — 36416 COLLJ CAPILLARY BLOOD SPEC: CPT

## 2019-06-19 PROCEDURE — 85610 PROTHROMBIN TIME: CPT

## 2019-06-19 NOTE — PROGRESS NOTES
Medication Management [facility]  Anticoagulation Clinic  [phone] (phone)  [phone] (fax)      Ms. Melinda Ortiz is a 82 y.o.  female with history of Afib who presents today for anticoagulation monitoring and adjustment. Patient verifies current dosing regimen and tablet strength. No missed or extra doses. Patient denies s/s bleeding/bruising/SOB/chest pain. Patient is having swelling in her ankles in the morning that resolves by nighttime. No blood in urine or stool. No dietary changes. No changes in medication/OTC agents/Herbals. No change in alcohol use or tobacco use. No change in activity level. Patient denies headaches/dizziness/lightheadedness/falls. No vomiting/diarrhea or acute illness. No Procedures scheduled in the future at this time. Assessment:   Lab Results   Component Value Date    INR 2.30 (H) 06/19/2019    INR 2.40 (H) 05/22/2019    INR 2.20 (H) 04/24/2019     INR therapeutic   Recent Labs     06/19/19  1125   INR 2.30*         Plan:  Continue Coumadin 2.5 mg MWF, 5 mg TThSS. Recheck INR in 5 weeks. Patient reminded to call the Anticoagulation Clinic with any signs or symptoms of bleeding or with any medication changes. Patient given instructions utilizing the teach back method. Discharged ambulatory in no apparent distress. After visit summary printed and reviewed with patient.       Medications reviewed and updated on home medication list Yes    Influenza vaccine:     [] given    [x] declined   [] received previously   [] plans to receive at a later time   [] refused    [] documented in Colby Coto, PharmD, BCPS  6/19/2019 11:30 AM

## 2019-07-05 RX ORDER — LOSARTAN POTASSIUM AND HYDROCHLOROTHIAZIDE 12.5; 5 MG/1; MG/1
TABLET ORAL
Qty: 30 TABLET | Refills: 5 | Status: SHIPPED | OUTPATIENT
Start: 2019-07-05 | End: 2020-02-10

## 2019-07-08 ENCOUNTER — TELEPHONE (OUTPATIENT)
Dept: PHARMACY | Age: 84
End: 2019-07-08

## 2019-07-08 RX ORDER — AMLODIPINE BESYLATE 10 MG/1
10 TABLET ORAL DAILY
Qty: 90 TABLET | Refills: 3 | Status: SHIPPED | OUTPATIENT
Start: 2019-07-08 | End: 2020-06-29

## 2019-07-08 RX ORDER — WARFARIN SODIUM 5 MG/1
TABLET ORAL
Qty: 30 TABLET | Refills: 5 | Status: SHIPPED | OUTPATIENT
Start: 2019-07-08 | End: 2019-12-17 | Stop reason: SDUPTHER

## 2019-07-22 DIAGNOSIS — Z79.01 ANTICOAGULATED ON COUMADIN: Primary | ICD-10-CM

## 2019-07-22 DIAGNOSIS — I48.19 PERSISTENT ATRIAL FIBRILLATION (HCC): ICD-10-CM

## 2019-07-24 ENCOUNTER — HOSPITAL ENCOUNTER (OUTPATIENT)
Dept: PHARMACY | Age: 84
Setting detail: THERAPIES SERIES
Discharge: HOME | End: 2019-07-24
Payer: MEDICARE

## 2019-07-24 DIAGNOSIS — I48.19 PERSISTENT ATRIAL FIBRILLATION (HCC): ICD-10-CM

## 2019-07-24 LAB — POC INR: 2.4 (ref 0.8–1.2)

## 2019-07-24 PROCEDURE — 99211 OFF/OP EST MAY X REQ PHY/QHP: CPT

## 2019-07-24 PROCEDURE — 36416 COLLJ CAPILLARY BLOOD SPEC: CPT

## 2019-07-24 PROCEDURE — 85610 PROTHROMBIN TIME: CPT

## 2019-07-24 RX ORDER — LEVOCETIRIZINE DIHYDROCHLORIDE 5 MG/1
TABLET, FILM COATED ORAL
Refills: 12 | COMMUNITY
Start: 2019-06-30 | End: 2020-07-06

## 2019-07-24 RX ORDER — AZELASTINE 1 MG/ML
SPRAY, METERED NASAL
Refills: 12 | COMMUNITY
Start: 2019-06-12 | End: 2019-09-30

## 2019-07-24 NOTE — PROGRESS NOTES
Medication Management [facility]  Anticoagulation Clinic  [phone] (phone)  [phone] (fax)      Ms. Brenna Funes is a 82 y.o.  female with history of persistent atrial fib. , per Dr. Cassie Rodgers referral, who presents today for Warfarin monitoring and adjustment (5 week visit). Followed printed instructions for dose. No missed or extra doses. Patient denies bleeding/bruising/SOB/chest pain. Having more swelling of ankles, but usually down by morning. No blood in urine or stool. No dietary changes. Changes in medication/OTC agents/herbals: now on Xyzal (brought pills to visit). Has also been taking OTC Claritin. Advised they are in same family of medicines - doctor would probably not want both taken. Goes to allergist's office in 2 days for shot - she will check with them. No change in alcohol use or tobacco use. No change in activity level. Patient denies dizziness/lightheadedness/falls. Sometimes gets headache from coughing a lot - takes  nothing. No vomiting/diarrhea or acute illness. Can't get rid of cough/sneezing. No procedures scheduled in the future at this time. Assessment:   Lab Results   Component Value Date    INR 2.40 (H) 07/24/2019    INR 2.30 (H) 06/19/2019    INR 2.40 (H) 05/22/2019     INR therapeutic - goal 2-3. Recent Labs     07/24/19  1120   INR 2.40*       Plan:  POCT INR ordered/performed/result reviewed. Continue PO Coumadin 2.5 mg MWF, 5 mg TThSS. Recheck INR in 5 weeks. Patient reminded to call the Anticoagulation Clinic with any signs or symptoms of bleeding or with any medication changes. Patient given instructions utilizing the teach back method. Given routine H&H order - due early August.  Discharged ambulatory in no apparent distress. After visit summary printed and reviewed with patient.       Medications reviewed and updated on home medication list.    Influenza vaccine:     [] given    [] declined   [] received

## 2019-08-29 ENCOUNTER — HOSPITAL ENCOUNTER (OUTPATIENT)
Age: 84
Discharge: HOME | End: 2019-08-29
Payer: MEDICARE

## 2019-08-29 ENCOUNTER — HOSPITAL ENCOUNTER (OUTPATIENT)
Dept: PHARMACY | Age: 84
Setting detail: THERAPIES SERIES
Discharge: HOME | End: 2019-08-29
Payer: MEDICARE

## 2019-08-29 DIAGNOSIS — I48.19 PERSISTENT ATRIAL FIBRILLATION (HCC): ICD-10-CM

## 2019-08-29 DIAGNOSIS — Z79.01 ANTICOAGULATED ON COUMADIN: ICD-10-CM

## 2019-08-29 LAB
HCT VFR BLD CALC: 42 % (ref 37–47)
HEMOGLOBIN: 12.7 GM/DL (ref 12–16)
POC INR: 2.7 (ref 0.8–1.2)

## 2019-08-29 PROCEDURE — 36415 COLL VENOUS BLD VENIPUNCTURE: CPT

## 2019-08-29 PROCEDURE — 99211 OFF/OP EST MAY X REQ PHY/QHP: CPT

## 2019-08-29 PROCEDURE — 36416 COLLJ CAPILLARY BLOOD SPEC: CPT

## 2019-08-29 PROCEDURE — 85018 HEMOGLOBIN: CPT

## 2019-08-29 PROCEDURE — 85014 HEMATOCRIT: CPT

## 2019-08-29 PROCEDURE — 85610 PROTHROMBIN TIME: CPT

## 2019-08-29 NOTE — PROGRESS NOTES
Medication Management [facility]  Anticoagulation Clinic  [phone] (phone)  [phone] (fax)      Ms. Bhavna Patterson is a 82 y.o.  female with history of persistent Afib who presents today for anticoagulation monitoring and adjustment. Patient verifies current dosing regimen and tablet strength. No extra doses. Missed coumadin on 8-14-19. She thought she had to take it at the exact same time every day and got home 3 hours later than her usual time to take coumadin. Advised her it's ok to take later than usual time as long as it's the same day. Voices understanding. Patient denies s/s bleeding/bruising/swelling/SOB/chest pain. Has a little ankle swelling. States is better in am. Advised to discuss with her PCP. Has appt with Jasbir Prior 9-16-19. No blood in urine or stool. No dietary changes. No changes in medication/OTC agents/Herbals. No change in alcohol use or tobacco use. No change in activity level. Patient denies headaches/dizziness/lightheadedness/falls. No vomiting/diarrhea or acute illness. No procedures scheduled in the future at this time. Assessment:   Lab Results   Component Value Date    INR 2.70 (H) 08/29/2019    INR 2.40 (H) 07/24/2019    INR 2.30 (H) 06/19/2019     INR therapeutic   Recent Labs     08/29/19  1143   INR 2.70*     Plan: POCT INR ordered and result reviewed. Continue Coumadin 2.5 mg po MWF, 5 mg po TTHSS. Recheck INR in 6 weeks. Patient reminded to call the Anticoagulation Clinic with any signs or symptoms of bleeding or with any medication changes. Patient given instructions utilizing the teach back method. Discharged ambulatory in no apparent distress. After visit summary printed and reviewed with patient.       Medications reviewed and updated on home medication list Yes    Influenza vaccine:     [] given    [x] declined   [] received previously   [] plans to receive at a later time   [x] refused    [x] documented in EPIC

## 2019-09-16 ENCOUNTER — NURSE ONLY (OUTPATIENT)
Dept: LAB | Age: 84
End: 2019-09-16

## 2019-09-16 ENCOUNTER — OFFICE VISIT (OUTPATIENT)
Dept: FAMILY MEDICINE CLINIC | Age: 84
End: 2019-09-16
Payer: MEDICARE

## 2019-09-16 VITALS
DIASTOLIC BLOOD PRESSURE: 76 MMHG | HEIGHT: 65 IN | TEMPERATURE: 98.7 F | SYSTOLIC BLOOD PRESSURE: 128 MMHG | WEIGHT: 207.2 LBS | RESPIRATION RATE: 14 BRPM | HEART RATE: 90 BPM | BODY MASS INDEX: 34.52 KG/M2

## 2019-09-16 DIAGNOSIS — Z71.89 ACP (ADVANCE CARE PLANNING): ICD-10-CM

## 2019-09-16 DIAGNOSIS — Z13.220 SCREENING FOR HYPERLIPIDEMIA: ICD-10-CM

## 2019-09-16 DIAGNOSIS — Z13.6 SCREENING FOR CARDIOVASCULAR CONDITION: ICD-10-CM

## 2019-09-16 DIAGNOSIS — Z00.00 ROUTINE GENERAL MEDICAL EXAMINATION AT A HEALTH CARE FACILITY: ICD-10-CM

## 2019-09-16 DIAGNOSIS — R60.0 PEDAL EDEMA: ICD-10-CM

## 2019-09-16 DIAGNOSIS — Z12.39 SCREENING FOR BREAST CANCER: ICD-10-CM

## 2019-09-16 DIAGNOSIS — R73.01 ELEVATED FASTING BLOOD SUGAR: ICD-10-CM

## 2019-09-16 DIAGNOSIS — Z78.0 ASYMPTOMATIC MENOPAUSAL STATE: ICD-10-CM

## 2019-09-16 DIAGNOSIS — R73.01 ELEVATED FASTING BLOOD SUGAR: Primary | ICD-10-CM

## 2019-09-16 LAB
ANION GAP SERPL CALCULATED.3IONS-SCNC: 17 MEQ/L (ref 8–16)
AVERAGE GLUCOSE: 249 MG/DL (ref 70–126)
BASOPHILS # BLD: 0.2 %
BASOPHILS ABSOLUTE: 0 THOU/MM3 (ref 0–0.1)
BUN BLDV-MCNC: 8 MG/DL (ref 7–22)
CALCIUM SERPL-MCNC: 10.1 MG/DL (ref 8.5–10.5)
CHLORIDE BLD-SCNC: 96 MEQ/L (ref 98–111)
CO2: 27 MEQ/L (ref 23–33)
CREAT SERPL-MCNC: 0.7 MG/DL (ref 0.4–1.2)
EOSINOPHIL # BLD: 2.8 %
EOSINOPHILS ABSOLUTE: 0.1 THOU/MM3 (ref 0–0.4)
ERYTHROCYTE [DISTWIDTH] IN BLOOD BY AUTOMATED COUNT: 15.4 % (ref 11.5–14.5)
ERYTHROCYTE [DISTWIDTH] IN BLOOD BY AUTOMATED COUNT: 40.9 FL (ref 35–45)
GLUCOSE BLD-MCNC: 267 MG/DL (ref 70–108)
HBA1C MFR BLD: 10.3 % (ref 4.4–6.4)
HCT VFR BLD CALC: 41.8 % (ref 37–47)
HEMOGLOBIN: 12.3 GM/DL (ref 12–16)
IMMATURE GRANS (ABS): 0.01 THOU/MM3 (ref 0–0.07)
IMMATURE GRANULOCYTES: 0 %
LYMPHOCYTES # BLD: 55.6 %
LYMPHOCYTES ABSOLUTE: 2.6 THOU/MM3 (ref 1–4.8)
MCH RBC QN AUTO: 22.4 PG (ref 26–33)
MCHC RBC AUTO-ENTMCNC: 29.4 GM/DL (ref 32.2–35.5)
MCV RBC AUTO: 76.3 FL (ref 81–99)
MONOCYTES # BLD: 9.2 %
MONOCYTES ABSOLUTE: 0.4 THOU/MM3 (ref 0.4–1.3)
NUCLEATED RED BLOOD CELLS: 0 /100 WBC
PLATELET # BLD: 218 THOU/MM3 (ref 130–400)
PMV BLD AUTO: 10.1 FL (ref 9.4–12.4)
POTASSIUM SERPL-SCNC: 3.2 MEQ/L (ref 3.5–5.2)
RBC # BLD: 5.48 MILL/MM3 (ref 4.2–5.4)
SEG NEUTROPHILS: 32 %
SEGMENTED NEUTROPHILS ABSOLUTE COUNT: 1.5 THOU/MM3 (ref 1.8–7.7)
SODIUM BLD-SCNC: 140 MEQ/L (ref 135–145)
WBC # BLD: 4.7 THOU/MM3 (ref 4.8–10.8)

## 2019-09-16 PROCEDURE — 99497 ADVNCD CARE PLAN 30 MIN: CPT | Performed by: NURSE PRACTITIONER

## 2019-09-16 PROCEDURE — G0438 PPPS, INITIAL VISIT: HCPCS | Performed by: NURSE PRACTITIONER

## 2019-09-16 RX ORDER — HYDROCHLOROTHIAZIDE 12.5 MG/1
12.5 CAPSULE, GELATIN COATED ORAL EVERY MORNING
Qty: 90 CAPSULE | Refills: 1 | Status: SHIPPED | OUTPATIENT
Start: 2019-09-16 | End: 2020-06-09

## 2019-09-16 ASSESSMENT — LIFESTYLE VARIABLES
HOW MANY STANDARD DRINKS CONTAINING ALCOHOL DO YOU HAVE ON A TYPICAL DAY: 0
HOW OFTEN DO YOU HAVE SIX OR MORE DRINKS ON ONE OCCASION: 0
HOW OFTEN DO YOU HAVE A DRINK CONTAINING ALCOHOL: 2
AUDIT-C TOTAL SCORE: 2

## 2019-09-16 ASSESSMENT — PATIENT HEALTH QUESTIONNAIRE - PHQ9
SUM OF ALL RESPONSES TO PHQ QUESTIONS 1-9: 0
SUM OF ALL RESPONSES TO PHQ QUESTIONS 1-9: 0

## 2019-09-16 NOTE — PATIENT INSTRUCTIONS
Advance Directives: Care Instructions  Your Care Instructions  An advance directive is a legal way to state your wishes at the end of your life. It tells your family and your doctor what to do if you can no longer say what you want. There are two main types of advance directives. You can change them any time that your wishes change. · A living will tells your family and your doctor your wishes about life support and other treatment. · A durable power of  for health care lets you name a person to make treatment decisions for you when you can't speak for yourself. This person is called a health care agent. If you do not have an advance directive, decisions about your medical care may be made by a doctor or a  who doesn't know you. It may help to think of an advance directive as a gift to the people who care for you. If you have one, they won't have to make tough decisions by themselves. Follow-up care is a key part of your treatment and safety. Be sure to make and go to all appointments, and call your doctor if you are having problems. It's also a good idea to know your test results and keep a list of the medicines you take. How can you care for yourself at home? · Discuss your wishes with your loved ones and your doctor. This way, there are no surprises. · Many states have a unique form. Or you might use a universal form that has been approved by many states. This kind of form can sometimes be completed and stored online. Your electronic copy will then be available wherever you have a connection to the Internet. In most cases, doctors will respect your wishes even if you have a form from a different state. · You don't need a  to do an advance directive. But you may want to get legal advice. · Think about these questions when you prepare an advance directive:  ? Who do you want to make decisions about your medical care if you are not able to?  Many people choose a family member or See if you can find some ways to change your diet to make it more healthy. Start small  · Do not try to make dramatic changes to your diet all at once. You might feel that you are missing out on your favorite foods and then be more likely to fail. · Start slowly, and gradually change your habits. Try some of the following:  ? Use whole wheat bread instead of white bread. ? Use nonfat or low-fat milk instead of whole milk. ? Eat brown rice instead of white rice, and eat whole wheat pasta instead of white-flour pasta. ? Try low-fat cheeses and low-fat yogurt. ? Add more fruits and vegetables to meals and have them for snacks. ? Add lettuce, tomato, cucumber, and onion to sandwiches. ? Add fruit to yogurt and cereal.  Enjoy food  · You can still eat your favorite foods. You just may need to eat less of them. If your favorite foods are high in fat, salt, and sugar, limit how often you eat them, but do not cut them out entirely. · Eat a wide variety of foods. Make healthy choices when eating out  · The type of restaurant you choose can help you make healthy choices. Even fast-food chains are now offering more low-fat or healthier choices on the menu. · Choose smaller portions, or take half of your meal home. · When eating out, try:  ? A veggie pizza with a whole wheat crust or grilled chicken (instead of sausage or pepperoni). ? Pasta with roasted vegetables, grilled chicken, or marinara sauce instead of cream sauce. ? A vegetable wrap or grilled chicken wrap. ? Broiled or poached food instead of fried or breaded items. Make healthy choices easy  · Buy packaged, prewashed, ready-to-eat fresh vegetables and fruits, such as baby carrots, salad mixes, and chopped or shredded broccoli and cauliflower. · Buy packaged, presliced fruits, such as melon or pineapple. · Choose 100% fruit or vegetable juice instead of soda. Limit juice intake to 4 to 6 oz (½ to ¾ cup) a day.   · Blend low-fat yogurt, fruit juice, and canned or frozen fruit to make a smoothie for breakfast or a snack. Where can you learn more? Go to https://chpepiceweb.[employer]. org and sign in to your Peek account. Enter Y047 in the [facility] box to learn more about \"Eating Healthy Foods: Care Instructions. \"     If you do not have an account, please click on the \"Sign Up Now\" link. Current as of: November 7, 2018  Content Version: 12.1  © 3232-9119 Healthwise, [employer]. Care instructions adapted under license by [facility] ([facility]). If you have questions about a medical condition or this instruction, always ask your healthcare professional. [address] any warranty or liability for your use of this information. Personalized Preventive Plan for Candido Meckel - 9/16/2019  Medicare offers a range of preventive health benefits. Some of the tests and screenings are paid in full while other may be subject to a deductible, co-insurance, and/or copay. Some of these benefits include a comprehensive review of your medical history including lifestyle, illnesses that may run in your family, and various assessments and screenings as appropriate. After reviewing your medical record and screening and assessments performed today your provider may have ordered immunizations, labs, imaging, and/or referrals for you. A list of these orders (if applicable) as well as your Preventive Care list are included within your After Visit Summary for your review. Other Preventive Recommendations:    · A preventive eye exam performed by an eye specialist is recommended every 1-2 years to screen for glaucoma; cataracts, macular degeneration, and other eye disorders. · A preventive dental visit is recommended every 6 months. · Try to get at least 150 minutes of exercise per week or 10,000 steps per day on a pedometer .   · Order or download the FREE \"Exercise & Physical Activity: Your Everyday Guide\" from [facility]

## 2019-09-16 NOTE — PROGRESS NOTES
mg by mouth 3 times daily as needed for Dizziness or Nausea  Yes Historical Provider, MD       Past Medical History:   Diagnosis Date    Atrial fibrillation ([address]     Hypertension      No past surgical history on file. Family History   Problem Relation Age of Onset    Cancer Mother         breast cancer    Diabetes Mother        CareTeam (Including outside providers/suppliers regularly involved in providing care):   Patient Care Team:  Shelvia Cogan, APRN - CNP as PCP - General (Family Nurse Practitioner)  Shelvia Cogan, APRN - CNP as PCP - [facility] Empaneled Provider    Wt Readings from Last 3 Encounters:   09/16/19 207 lb 3.2 oz (94 kg)   03/19/19 210 lb (95.3 kg)   12/09/18 198 lb (89.8 kg)     Vitals:    09/16/19 1053   BP: 128/76   Pulse: 90   Resp: 14   Temp: 98.7 °F (37.1 °C)   TempSrc: Oral   Weight: 207 lb 3.2 oz (94 kg)   Height: 5' 5.35\" (1.66 m)     Body mass index is 34.11 kg/m². Based upon direct observation of the patient, evaluation of cognition reveals recent and remote memory intact. Neck: neck supple and non tender without mass, no thyromegaly or thyroid nodules, no cervical lymphadenopathy   Pulmonary/Chest: clear to auscultation bilaterally- no wheezes, rales or rhonchi, normal air movement, no respiratory distress  Cardiovascular: normal rate, normal S1 and S2, no gallops, intact distal pulses and no carotid bruits  Abdomen: soft, non-tender, non-distended, normal bowel sounds, no masses or organomegaly  Extremities: no cyanosis, no clubbing and 1 + edema-  bilateral     Patient's complete Health Risk Assessment and screening values have been reviewed and are found in Flowsheets. The following problems were reviewed today and where indicated follow up appointments were made and/or referrals ordered.     Positive Risk Factor Screenings with Interventions:     General Health:  General  In general, how would you say your health is?: Good  In the past 7 days, have you experienced any of the morning  Increase hctz to 25 mg daily    Welcome to [facility] wellness visit          Advance Care Planning   Advanced Care Planning: Discussed the patients choices for care and treatment in case of a health event that adversely affects decision-making abilities. Also discussed the patients long-term treatment options. Reviewed with the patient the [address] [place] [address] Declaration forms  Reviewed the process of designating a competent adult as an Agent (or -in-fact) that could take make health care decisions for the patient if incompetent. Patient was asked to complete the declaration forms, either acknowledge the forms by a public notary or an eligible witness and provide a signed copy to the practice office.   Time spent (minutes):  20 minutes

## 2019-09-17 ENCOUNTER — TELEPHONE (OUTPATIENT)
Dept: FAMILY MEDICINE CLINIC | Age: 84
End: 2019-09-17

## 2019-09-17 DIAGNOSIS — E11.59 TYPE 2 DIABETES MELLITUS WITH OTHER CIRCULATORY COMPLICATION, WITHOUT LONG-TERM CURRENT USE OF INSULIN (HCC): ICD-10-CM

## 2019-09-17 DIAGNOSIS — E87.6 HYPOKALEMIA: Primary | ICD-10-CM

## 2019-09-17 RX ORDER — POTASSIUM CHLORIDE 750 MG/1
10 TABLET, EXTENDED RELEASE ORAL DAILY
Qty: 90 TABLET | Refills: 1 | Status: SHIPPED | OUTPATIENT
Start: 2019-09-17 | End: 2020-03-18 | Stop reason: SDUPTHER

## 2019-09-18 ENCOUNTER — TELEPHONE (OUTPATIENT)
Dept: FAMILY MEDICINE CLINIC | Age: 84
End: 2019-09-18

## 2019-09-18 DIAGNOSIS — E11.59 TYPE 2 DIABETES MELLITUS WITH OTHER CIRCULATORY COMPLICATION, WITHOUT LONG-TERM CURRENT USE OF INSULIN (HCC): ICD-10-CM

## 2019-09-19 ENCOUNTER — TELEPHONE (OUTPATIENT)
Dept: FAMILY MEDICINE CLINIC | Age: 84
End: 2019-09-19

## 2019-09-19 DIAGNOSIS — E11.59 TYPE 2 DIABETES MELLITUS WITH OTHER CIRCULATORY COMPLICATION, WITHOUT LONG-TERM CURRENT USE OF INSULIN (HCC): Primary | ICD-10-CM

## 2019-09-19 RX ORDER — LANCETS 30 GAUGE
1 EACH MISCELLANEOUS DAILY
Qty: 90 EACH | Refills: 3 | Status: SHIPPED | OUTPATIENT
Start: 2019-09-19 | End: 2020-07-21 | Stop reason: SDUPTHER

## 2019-09-19 RX ORDER — GLUCOSAMINE HCL/CHONDROITIN SU 500-400 MG
CAPSULE ORAL
Qty: 90 STRIP | Refills: 3 | Status: SHIPPED | OUTPATIENT
Start: 2019-09-19 | End: 2020-07-21 | Stop reason: SDUPTHER

## 2019-09-24 ENCOUNTER — HOSPITAL ENCOUNTER (OUTPATIENT)
Dept: WOMENS IMAGING | Age: 84
Discharge: HOME | End: 2019-09-24
Payer: MEDICARE

## 2019-09-24 DIAGNOSIS — Z12.39 SCREENING FOR BREAST CANCER: ICD-10-CM

## 2019-09-24 PROCEDURE — 77063 BREAST TOMOSYNTHESIS BI: CPT

## 2019-09-30 ENCOUNTER — OFFICE VISIT (OUTPATIENT)
Dept: FAMILY MEDICINE CLINIC | Age: 84
End: 2019-09-30
Payer: MEDICARE

## 2019-09-30 VITALS
SYSTOLIC BLOOD PRESSURE: 124 MMHG | HEIGHT: 65 IN | HEART RATE: 80 BPM | BODY MASS INDEX: 33.99 KG/M2 | TEMPERATURE: 98.4 F | DIASTOLIC BLOOD PRESSURE: 68 MMHG | WEIGHT: 204 LBS | RESPIRATION RATE: 12 BRPM

## 2019-09-30 DIAGNOSIS — R73.9 HYPERGLYCEMIA: ICD-10-CM

## 2019-09-30 DIAGNOSIS — E11.59 TYPE 2 DIABETES MELLITUS WITH OTHER CIRCULATORY COMPLICATION, WITHOUT LONG-TERM CURRENT USE OF INSULIN (HCC): Primary | ICD-10-CM

## 2019-09-30 DIAGNOSIS — R60.0 PEDAL EDEMA: ICD-10-CM

## 2019-09-30 PROCEDURE — 99214 OFFICE O/P EST MOD 30 MIN: CPT | Performed by: NURSE PRACTITIONER

## 2019-09-30 ASSESSMENT — ENCOUNTER SYMPTOMS: RESPIRATORY NEGATIVE: 1

## 2019-10-01 ENCOUNTER — TELEPHONE (OUTPATIENT)
Dept: FAMILY MEDICINE CLINIC | Age: 84
End: 2019-10-01

## 2019-10-04 ENCOUNTER — TELEPHONE (OUTPATIENT)
Dept: FAMILY MEDICINE CLINIC | Age: 84
End: 2019-10-04

## 2019-10-04 DIAGNOSIS — I10 ESSENTIAL HYPERTENSION: Primary | ICD-10-CM

## 2019-10-04 RX ORDER — HYDROCHLOROTHIAZIDE 12.5 MG/1
12.5 CAPSULE, GELATIN COATED ORAL EVERY MORNING
Qty: 90 CAPSULE | Refills: 1 | Status: SHIPPED | OUTPATIENT
Start: 2019-10-04 | End: 2020-01-06 | Stop reason: SDUPTHER

## 2019-10-04 RX ORDER — LOSARTAN POTASSIUM 50 MG/1
50 TABLET ORAL DAILY
Qty: 90 TABLET | Refills: 1 | Status: SHIPPED | OUTPATIENT
Start: 2019-10-04 | End: 2020-04-03

## 2019-10-08 ENCOUNTER — TELEPHONE (OUTPATIENT)
Dept: FAMILY MEDICINE CLINIC | Age: 84
End: 2019-10-08

## 2019-10-10 ENCOUNTER — APPOINTMENT (OUTPATIENT)
Dept: PHARMACY | Age: 84
End: 2019-10-10
Payer: MEDICARE

## 2019-10-14 ENCOUNTER — HOSPITAL ENCOUNTER (OUTPATIENT)
Dept: PHARMACY | Age: 84
Setting detail: THERAPIES SERIES
Discharge: HOME | End: 2019-10-14
Payer: MEDICARE

## 2019-10-14 DIAGNOSIS — I48.19 PERSISTENT ATRIAL FIBRILLATION (HCC): ICD-10-CM

## 2019-10-14 LAB
GFR SERPL CREATININE-BSD FRML MDRD: > 90 ML/MIN/1.73M2
POC INR: 2.1 (ref 0.8–1.2)

## 2019-10-14 PROCEDURE — 99211 OFF/OP EST MAY X REQ PHY/QHP: CPT

## 2019-10-14 PROCEDURE — 85610 PROTHROMBIN TIME: CPT

## 2019-10-14 PROCEDURE — 36416 COLLJ CAPILLARY BLOOD SPEC: CPT

## 2019-10-14 RX ORDER — AZELASTINE 1 MG/ML
1 SPRAY, METERED NASAL 2 TIMES DAILY
COMMUNITY
Start: 2019-06-12 | End: 2020-05-13

## 2019-10-28 ENCOUNTER — OFFICE VISIT (OUTPATIENT)
Dept: INTERNAL MEDICINE CLINIC | Age: 84
End: 2019-10-28
Payer: MEDICARE

## 2019-10-28 VITALS — BODY MASS INDEX: 33.36 KG/M2 | WEIGHT: 202 LBS

## 2019-10-28 DIAGNOSIS — E11.59 TYPE 2 DIABETES MELLITUS WITH OTHER CIRCULATORY COMPLICATION, WITHOUT LONG-TERM CURRENT USE OF INSULIN (HCC): ICD-10-CM

## 2019-10-28 DIAGNOSIS — R73.9 HYPERGLYCEMIA: Primary | ICD-10-CM

## 2019-10-28 PROCEDURE — 97802 MEDICAL NUTRITION INDIV IN: CPT | Performed by: DIETITIAN, REGISTERED

## 2019-11-11 ENCOUNTER — OFFICE VISIT (OUTPATIENT)
Dept: INTERNAL MEDICINE CLINIC | Age: 84
End: 2019-11-11
Payer: MEDICARE

## 2019-11-11 VITALS — WEIGHT: 204.8 LBS | BODY MASS INDEX: 33.82 KG/M2

## 2019-11-11 DIAGNOSIS — E11.59 TYPE 2 DIABETES MELLITUS WITH OTHER CIRCULATORY COMPLICATION, WITHOUT LONG-TERM CURRENT USE OF INSULIN (HCC): Primary | ICD-10-CM

## 2019-11-11 DIAGNOSIS — R73.9 HYPERGLYCEMIA: ICD-10-CM

## 2019-11-11 PROCEDURE — 97803 MED NUTRITION INDIV SUBSEQ: CPT | Performed by: DIETITIAN, REGISTERED

## 2019-11-18 ENCOUNTER — TELEPHONE (OUTPATIENT)
Dept: INTERNAL MEDICINE CLINIC | Age: 84
End: 2019-11-18

## 2019-11-18 NOTE — TELEPHONE ENCOUNTER
Returning call to Leggettmyra Jorgensen from message left for RDs on 11-14-19. No answer. Left RD contact information to call RD back.

## 2019-11-25 ENCOUNTER — HOSPITAL ENCOUNTER (OUTPATIENT)
Dept: PHARMACY | Age: 84
Setting detail: THERAPIES SERIES
Discharge: HOME | End: 2019-11-25
Payer: MEDICARE

## 2019-11-25 ENCOUNTER — OFFICE VISIT (OUTPATIENT)
Dept: INTERNAL MEDICINE CLINIC | Age: 84
End: 2019-11-25
Payer: MEDICARE

## 2019-11-25 DIAGNOSIS — I48.19 PERSISTENT ATRIAL FIBRILLATION (HCC): ICD-10-CM

## 2019-11-25 DIAGNOSIS — E11.59 TYPE 2 DIABETES MELLITUS WITH OTHER CIRCULATORY COMPLICATION, WITHOUT LONG-TERM CURRENT USE OF INSULIN (HCC): Primary | ICD-10-CM

## 2019-11-25 LAB — POC INR: 2.1 (ref 0.8–1.2)

## 2019-11-25 PROCEDURE — 85610 PROTHROMBIN TIME: CPT

## 2019-11-25 PROCEDURE — 99211 OFF/OP EST MAY X REQ PHY/QHP: CPT

## 2019-11-25 PROCEDURE — 36416 COLLJ CAPILLARY BLOOD SPEC: CPT

## 2019-11-25 PROCEDURE — 97803 MED NUTRITION INDIV SUBSEQ: CPT | Performed by: DIETITIAN, REGISTERED

## 2019-11-30 ENCOUNTER — HOSPITAL ENCOUNTER (EMERGENCY)
Age: 84
Discharge: HOME | End: 2019-11-30
Payer: MEDICARE

## 2019-11-30 VITALS
TEMPERATURE: 98.3 F | OXYGEN SATURATION: 100 % | HEART RATE: 77 BPM | DIASTOLIC BLOOD PRESSURE: 84 MMHG | WEIGHT: 206 LBS | SYSTOLIC BLOOD PRESSURE: 144 MMHG | BODY MASS INDEX: 33.11 KG/M2 | RESPIRATION RATE: 18 BRPM | HEIGHT: 66 IN

## 2019-11-30 DIAGNOSIS — H65.03 BILATERAL ACUTE SEROUS OTITIS MEDIA, RECURRENCE NOT SPECIFIED: Primary | ICD-10-CM

## 2019-11-30 PROCEDURE — 99282 EMERGENCY DEPT VISIT SF MDM: CPT

## 2019-11-30 PROCEDURE — 6370000000 HC RX 637 (ALT 250 FOR IP): Performed by: STUDENT IN AN ORGANIZED HEALTH CARE EDUCATION/TRAINING PROGRAM

## 2019-11-30 RX ORDER — CETIRIZINE HYDROCHLORIDE 10 MG/1
5 TABLET ORAL DAILY
Status: DISCONTINUED | OUTPATIENT
Start: 2019-11-30 | End: 2019-11-30 | Stop reason: HOSPADM

## 2019-11-30 RX ADMIN — CETIRIZINE HYDROCHLORIDE 5 MG: 10 TABLET, FILM COATED ORAL at 12:45

## 2019-11-30 ASSESSMENT — PAIN DESCRIPTION - ONSET: ONSET: GRADUAL

## 2019-11-30 ASSESSMENT — ENCOUNTER SYMPTOMS
ABDOMINAL PAIN: 0
WHEEZING: 0
RHINORRHEA: 0
DIARRHEA: 0
EYE DISCHARGE: 0
NAUSEA: 0
VOMITING: 0
BACK PAIN: 0
SORE THROAT: 0
COUGH: 0
EYE PAIN: 0
SHORTNESS OF BREATH: 0

## 2019-11-30 ASSESSMENT — PAIN DESCRIPTION - ORIENTATION: ORIENTATION: RIGHT;LEFT

## 2019-11-30 ASSESSMENT — PAIN DESCRIPTION - PAIN TYPE: TYPE: ACUTE PAIN

## 2019-11-30 ASSESSMENT — PAIN DESCRIPTION - PROGRESSION: CLINICAL_PROGRESSION: NOT CHANGED

## 2019-11-30 ASSESSMENT — PAIN DESCRIPTION - FREQUENCY: FREQUENCY: INTERMITTENT

## 2019-11-30 ASSESSMENT — PAIN DESCRIPTION - LOCATION: LOCATION: EAR

## 2019-11-30 ASSESSMENT — PAIN SCALES - GENERAL: PAINLEVEL_OUTOF10: 8

## 2019-11-30 ASSESSMENT — PAIN DESCRIPTION - DESCRIPTORS: DESCRIPTORS: ACHING;THROBBING

## 2019-12-02 ENCOUNTER — CARE COORDINATION (OUTPATIENT)
Dept: CARE COORDINATION | Age: 84
End: 2019-12-02

## 2019-12-03 RX ORDER — MECLIZINE HCL 12.5 MG/1
12.5 TABLET ORAL 3 TIMES DAILY PRN
Qty: 60 TABLET | Refills: 0 | Status: SHIPPED | OUTPATIENT
Start: 2019-12-03 | End: 2020-04-21

## 2019-12-12 ENCOUNTER — CARE COORDINATION (OUTPATIENT)
Dept: CARE COORDINATION | Age: 84
End: 2019-12-12

## 2019-12-17 DIAGNOSIS — I48.19 PERSISTENT ATRIAL FIBRILLATION (HCC): Primary | ICD-10-CM

## 2019-12-17 DIAGNOSIS — Z79.01 ANTICOAGULATED ON COUMADIN: ICD-10-CM

## 2019-12-19 ENCOUNTER — CARE COORDINATION (OUTPATIENT)
Dept: CARE COORDINATION | Age: 84
End: 2019-12-19

## 2019-12-19 ENCOUNTER — OFFICE VISIT (OUTPATIENT)
Dept: FAMILY MEDICINE CLINIC | Age: 84
End: 2019-12-19
Payer: MEDICARE

## 2019-12-19 VITALS
SYSTOLIC BLOOD PRESSURE: 101 MMHG | WEIGHT: 206 LBS | RESPIRATION RATE: 14 BRPM | DIASTOLIC BLOOD PRESSURE: 75 MMHG | TEMPERATURE: 97.8 F | BODY MASS INDEX: 34.32 KG/M2 | HEART RATE: 73 BPM | HEIGHT: 65 IN | OXYGEN SATURATION: 98 %

## 2019-12-19 DIAGNOSIS — R42 VERTIGO: ICD-10-CM

## 2019-12-19 DIAGNOSIS — L85.3 DRY SKIN: ICD-10-CM

## 2019-12-19 DIAGNOSIS — H93.8X1 SENSATION OF FULLNESS IN RIGHT EAR: Primary | ICD-10-CM

## 2019-12-19 PROCEDURE — 99213 OFFICE O/P EST LOW 20 MIN: CPT | Performed by: NURSE PRACTITIONER

## 2019-12-19 RX ORDER — PETROLATUM 42 G/100G
OINTMENT TOPICAL
Qty: 454 G | Refills: 1 | Status: SHIPPED | OUTPATIENT
Start: 2019-12-19 | End: 2020-03-10

## 2019-12-19 RX ORDER — WARFARIN SODIUM 5 MG/1
TABLET ORAL
Qty: 90 TABLET | Refills: 1 | Status: SHIPPED | OUTPATIENT
Start: 2019-12-19 | End: 2020-07-21 | Stop reason: SDUPTHER

## 2019-12-19 SDOH — SOCIAL STABILITY: SOCIAL NETWORK: HOW OFTEN DO YOU ATTEND CHURCH OR RELIGIOUS SERVICES?: MORE THAN 4 TIMES PER YEAR

## 2019-12-19 SDOH — ECONOMIC STABILITY: FOOD INSECURITY: WITHIN THE PAST 12 MONTHS, YOU WORRIED THAT YOUR FOOD WOULD RUN OUT BEFORE YOU GOT MONEY TO BUY MORE.: NEVER TRUE

## 2019-12-19 SDOH — HEALTH STABILITY: PHYSICAL HEALTH: ON AVERAGE, HOW MANY MINUTES DO YOU ENGAGE IN EXERCISE AT THIS LEVEL?: 0 MIN

## 2019-12-19 SDOH — SOCIAL STABILITY: SOCIAL NETWORK: HOW OFTEN DO YOU ATTENT MEETINGS OF THE CLUB OR ORGANIZATION YOU BELONG TO?: NEVER

## 2019-12-19 SDOH — ECONOMIC STABILITY: INCOME INSECURITY: HOW HARD IS IT FOR YOU TO PAY FOR THE VERY BASICS LIKE FOOD, HOUSING, MEDICAL CARE, AND HEATING?: NOT HARD AT ALL

## 2019-12-19 SDOH — ECONOMIC STABILITY: TRANSPORTATION INSECURITY
IN THE PAST 12 MONTHS, HAS LACK OF TRANSPORTATION KEPT YOU FROM MEETINGS, WORK, OR FROM GETTING THINGS NEEDED FOR DAILY LIVING?: NO

## 2019-12-19 SDOH — ECONOMIC STABILITY: FOOD INSECURITY: WITHIN THE PAST 12 MONTHS, THE FOOD YOU BOUGHT JUST DIDN'T LAST AND YOU DIDN'T HAVE MONEY TO GET MORE.: NEVER TRUE

## 2019-12-19 SDOH — SOCIAL STABILITY: SOCIAL NETWORK
DO YOU BELONG TO ANY CLUBS OR ORGANIZATIONS SUCH AS CHURCH GROUPS UNIONS, FRATERNAL OR ATHLETIC GROUPS, OR SCHOOL GROUPS?: NO

## 2019-12-19 SDOH — SOCIAL STABILITY: SOCIAL NETWORK
IN A TYPICAL WEEK, HOW MANY TIMES DO YOU TALK ON THE PHONE WITH FAMILY, FRIENDS, OR NEIGHBORS?: MORE THAN THREE TIMES A WEEK

## 2019-12-19 SDOH — HEALTH STABILITY: PHYSICAL HEALTH: ON AVERAGE, HOW MANY DAYS PER WEEK DO YOU ENGAGE IN MODERATE TO STRENUOUS EXERCISE (LIKE A BRISK WALK)?: 0 DAYS

## 2019-12-19 SDOH — SOCIAL STABILITY: SOCIAL NETWORK: HOW OFTEN DO YOU GET TOGETHER WITH FRIENDS OR RELATIVES?: MORE THAN THREE TIMES A WEEK

## 2019-12-19 SDOH — ECONOMIC STABILITY: TRANSPORTATION INSECURITY
IN THE PAST 12 MONTHS, HAS THE LACK OF TRANSPORTATION KEPT YOU FROM MEDICAL APPOINTMENTS OR FROM GETTING MEDICATIONS?: NO

## 2019-12-19 SDOH — HEALTH STABILITY: MENTAL HEALTH
STRESS IS WHEN SOMEONE FEELS TENSE, NERVOUS, ANXIOUS, OR CAN'T SLEEP AT NIGHT BECAUSE THEIR MIND IS TROUBLED. HOW STRESSED ARE YOU?: NOT AT ALL

## 2019-12-19 ASSESSMENT — ENCOUNTER SYMPTOMS
COLOR CHANGE: 1
RHINORRHEA: 0
RESPIRATORY NEGATIVE: 1
TROUBLE SWALLOWING: 0
SORE THROAT: 0

## 2019-12-24 ENCOUNTER — TELEPHONE (OUTPATIENT)
Dept: FAMILY MEDICINE CLINIC | Age: 84
End: 2019-12-24

## 2019-12-24 RX ORDER — PREDNISONE 20 MG/1
20 TABLET ORAL 2 TIMES DAILY
Qty: 10 TABLET | Refills: 0 | Status: SHIPPED | OUTPATIENT
Start: 2019-12-24 | End: 2020-01-02 | Stop reason: SDUPTHER

## 2019-12-27 ENCOUNTER — CARE COORDINATION (OUTPATIENT)
Dept: CARE COORDINATION | Age: 84
End: 2019-12-27

## 2020-01-02 ENCOUNTER — TELEPHONE (OUTPATIENT)
Dept: FAMILY MEDICINE CLINIC | Age: 85
End: 2020-01-02

## 2020-01-02 ENCOUNTER — CARE COORDINATION (OUTPATIENT)
Dept: CARE COORDINATION | Age: 85
End: 2020-01-02

## 2020-01-02 RX ORDER — PREDNISONE 20 MG/1
20 TABLET ORAL 2 TIMES DAILY
Qty: 10 TABLET | Refills: 0 | Status: SHIPPED | OUTPATIENT
Start: 2020-01-02 | End: 2020-01-07

## 2020-01-02 NOTE — TELEPHONE ENCOUNTER
Patient is calling in wanting to know if you can prescribe her some more Prednisone. She is continuing to have itching and a non visible rash from her neck down. It did help her before but now if has came back.   Pharmacy is [facility]

## 2020-01-06 ENCOUNTER — HOSPITAL ENCOUNTER (OUTPATIENT)
Dept: PHARMACY | Age: 85
Setting detail: THERAPIES SERIES
Discharge: HOME | End: 2020-01-06
Payer: MEDICARE

## 2020-01-06 ENCOUNTER — TELEPHONE (OUTPATIENT)
Dept: FAMILY MEDICINE CLINIC | Age: 85
End: 2020-01-06

## 2020-01-06 ENCOUNTER — OFFICE VISIT (OUTPATIENT)
Dept: FAMILY MEDICINE CLINIC | Age: 85
End: 2020-01-06
Payer: MEDICARE

## 2020-01-06 VITALS
RESPIRATION RATE: 12 BRPM | HEIGHT: 65 IN | DIASTOLIC BLOOD PRESSURE: 64 MMHG | BODY MASS INDEX: 33.82 KG/M2 | SYSTOLIC BLOOD PRESSURE: 122 MMHG | WEIGHT: 203 LBS | HEART RATE: 80 BPM | TEMPERATURE: 97.7 F

## 2020-01-06 PROBLEM — E11.9 DIABETES MELLITUS (HCC): Status: ACTIVE | Noted: 2020-01-06

## 2020-01-06 LAB
HBA1C MFR BLD: 7.4 %
POC INR: 1.9 (ref 0.8–1.2)

## 2020-01-06 PROCEDURE — 99214 OFFICE O/P EST MOD 30 MIN: CPT | Performed by: NURSE PRACTITIONER

## 2020-01-06 PROCEDURE — 99211 OFF/OP EST MAY X REQ PHY/QHP: CPT

## 2020-01-06 PROCEDURE — G8510 SCR DEP NEG, NO PLAN REQD: HCPCS | Performed by: NURSE PRACTITIONER

## 2020-01-06 PROCEDURE — 83036 HEMOGLOBIN GLYCOSYLATED A1C: CPT | Performed by: NURSE PRACTITIONER

## 2020-01-06 PROCEDURE — 36416 COLLJ CAPILLARY BLOOD SPEC: CPT

## 2020-01-06 PROCEDURE — 85610 PROTHROMBIN TIME: CPT

## 2020-01-06 ASSESSMENT — PATIENT HEALTH QUESTIONNAIRE - PHQ9
1. LITTLE INTEREST OR PLEASURE IN DOING THINGS: 0
2. FEELING DOWN, DEPRESSED OR HOPELESS: 0
SUM OF ALL RESPONSES TO PHQ QUESTIONS 1-9: 0
SUM OF ALL RESPONSES TO PHQ9 QUESTIONS 1 & 2: 0
SUM OF ALL RESPONSES TO PHQ QUESTIONS 1-9: 0

## 2020-01-06 NOTE — PROGRESS NOTES
Kasey Samuel is a 82 y.o. female for Diabetes (6  month f/u)    Pt requesting a prescription for [place] Stairlift device. She states her legs are weak and hurt an dit is hard for her to go up and down stairs, her knee also will hurt and ache form time to time. She has had a shoulder x-ray which identifies degenerative changes. Diabetes Type 2    Glucose control:   Does patient check blood glucoses at home? No  Report of hypoglycemia: no  Lab Results   Component Value Date    LABA1C 7.4 01/06/2020     No results found for: EAG    Symptoms  Polyuria, Polydipsia or Polyphagia? No  Chest Pain, SOB, or Palpitations? -  No  New Vision complaints? No  Paresthesias of the extremities? No    Medications  Current medication were reviewed. Compliant with medications? yes  Medication side effects? No  On ACE-I or ARB? Yes  On antiplatelet therapy? Yes  On Statin? No    Last Diabetic Eye Exam: unknown. She is also being treated with coumadin for atrial fibrillation. She denies any chest pain or heart pounding. Exercise  Exercise? Yes - she is active  Wt Readings from Last 3 Encounters:   01/06/20 203 lb (92.1 kg)   12/19/19 206 lb (93.4 kg)   11/30/19 206 lb (93.4 kg)       Diet discipline?:  Low salt, fat, sugar diet? No    Will recheck labs, she had some microcytosis of her RBC's, she denies fatigue. Her potassium had been low, will recheck she has also been having some issues with itching, her mars and legs have been itching, she has bene scratching them and does have a red rash, she is on her second round of prednisone, states this does help. Denies nay new soaps or detergents.       Blood pressure control:  BP Readings from Last 3 Encounters:   01/06/20 122/64   12/19/19 101/75   11/30/19 (!) 144/84       No results found for: LABMICR, [id number]    No results found for: LDLCALC, LDLCHOLESTEROL, LDLDIRECT    Physical Exam    /64   Pulse 80   Temp 97.7 °F (36.5 °C) (Oral)   Resp 12   Ht 5' 5\" (1.651 m)   Wt 203 lb (92.1 kg)   BMI 33.78 kg/m²   Appearance: alert, well appearing, and in no distress, normal appearing weight and well hydrated. Neck exam - supple, no significant adenopathy. CVS exam: normal rate, regular rhythm, normal S1, S2, no murmurs, rubs, clicks or gallops. Lungs: clear to auscultation, no wheezes, rales or rhonchi, symmetric air entry. Abdomen:  BS normal, soft, non tender, non distended, no rebound or guarding  Mental Status: normal mood, affect behavior, speech, dress,  and thought processes. Neuro:  Alert, muscle tone grossly normal  Skin exam: normal coloration and turgor, no rashes, no suspicious skin lesions noted. Does have a light red flat rash on bilateral lower arms. Pt with gait alteration, bilateral pedal push and pull equal and weak. Foot exam:  No pedal edema, no erythematous lesions noted b/l, sensation wnl b/l, PT and TP pulses wnl b/l    ASSESSMENT & PLAN  Anil Hampton was seen today for diabetes. Diagnoses and all orders for this visit:    Type 2 diabetes mellitus with other circulatory complication, without long-term current use of insulin ([facility] [address]  -     Basic Metabolic Panel; Future  -     POCT glycosylated hemoglobin (Hb A1C)-7.3 improved from over 10. Microcytic anemia  -     CBC Auto Differential; Future    Hyperlipidemia, unspecified hyperlipidemia type  -     Lipid Panel; Future    Hx of skin pruritus    Contact office if itching comes back  May need to consider cutting out HCTZ. Return in about 6 months (around 7/6/2020).

## 2020-01-06 NOTE — PROGRESS NOTES
Medication Management [facility]  Anticoagulation Clinic  [phone] (phone)  [phone] (fax)      Ms. Jessika Pham is a 82 y.o.  female with history of persistent Afib who presents today for anticoagulation monitoring and adjustment. Patient verifies current dosing regimen and tablet strength. No missed or extra doses. Patient denies s/s bleeding/bruising/SOB/chest pain. Usual ankle swelling. No blood in urine or stool. No dietary changes. No changes in OTC agents/Herbals. Had 2 rounds of prednisone 20 mg twice a day from 12/24-1/2/20, and 1/2-1/7/20 for itching. No change in alcohol use or tobacco use. No change in activity level. Patient denies headaches/dizziness/lightheadedness/falls. No vomiting/diarrhea or acute illness. No procedures scheduled in the future at this time. Assessment:   Lab Results   Component Value Date    INR 1.90 (H) 01/06/2020    INR 2.10 (H) 11/25/2019    INR 2.10 (H) 10/14/2019     INR subtherapeutic   Recent Labs     01/06/20  1104   INR 1.90*     Plan: POCT INR ordered and result reviewed with Fuad, Eliud. D. Order received and verified to take coumadin 5 mg po today, then continue Coumadin 2.5 mg po MWF, 5 mg po TTHSS. Recheck INR in 5 weeks. Patient reminded to call the Anticoagulation Clinic with any signs or symptoms of bleeding or with any medication changes. Patient given instructions utilizing the teach back method. Discharged ambulatory in no apparent distress. After visit summary printed and reviewed with patient.       Medications reviewed and updated on home medication list Yes    Influenza vaccine:     [] given    [x] declined   [] received previously   [] plans to receive at a later time   [x] refused    [x] documented in EPIC

## 2020-01-08 ENCOUNTER — TELEPHONE (OUTPATIENT)
Dept: FAMILY MEDICINE CLINIC | Age: 85
End: 2020-01-08

## 2020-01-12 ENCOUNTER — HOSPITAL ENCOUNTER (EMERGENCY)
Age: 85
Discharge: HOME | End: 2020-01-12
Payer: MEDICARE

## 2020-01-12 VITALS
TEMPERATURE: 97.6 F | BODY MASS INDEX: 34.28 KG/M2 | OXYGEN SATURATION: 100 % | DIASTOLIC BLOOD PRESSURE: 88 MMHG | SYSTOLIC BLOOD PRESSURE: 157 MMHG | WEIGHT: 206 LBS | RESPIRATION RATE: 20 BRPM | HEART RATE: 99 BPM

## 2020-01-12 LAB
ANION GAP SERPL CALCULATED.3IONS-SCNC: 15 MEQ/L (ref 8–16)
BASOPHILS # BLD: 0.2 %
BASOPHILS ABSOLUTE: 0 THOU/MM3 (ref 0–0.1)
BUN BLDV-MCNC: 10 MG/DL (ref 7–22)
CALCIUM SERPL-MCNC: 9.8 MG/DL (ref 8.5–10.5)
CHLORIDE BLD-SCNC: 98 MEQ/L (ref 98–111)
CO2: 24 MEQ/L (ref 23–33)
CREAT SERPL-MCNC: 0.6 MG/DL (ref 0.4–1.2)
EOSINOPHIL # BLD: 10.2 %
EOSINOPHILS ABSOLUTE: 0.5 THOU/MM3 (ref 0–0.4)
ERYTHROCYTE [DISTWIDTH] IN BLOOD BY AUTOMATED COUNT: 15.4 % (ref 11.5–14.5)
ERYTHROCYTE [DISTWIDTH] IN BLOOD BY AUTOMATED COUNT: 39.4 FL (ref 35–45)
GFR SERPL CREATININE-BSD FRML MDRD: > 90 ML/MIN/1.73M2
GLUCOSE BLD-MCNC: 164 MG/DL (ref 70–108)
GLUCOSE BLD-MCNC: 179 MG/DL (ref 70–108)
HCT VFR BLD CALC: 40.4 % (ref 37–47)
HEMOGLOBIN: 12.7 GM/DL (ref 12–16)
IMMATURE GRANS (ABS): 0.02 THOU/MM3 (ref 0–0.07)
IMMATURE GRANULOCYTES: 0.4 %
INR BLD: 1.81 (ref 0.85–1.13)
LYMPHOCYTES # BLD: 44.2 %
LYMPHOCYTES ABSOLUTE: 2.1 THOU/MM3 (ref 1–4.8)
MCH RBC QN AUTO: 23.3 PG (ref 26–33)
MCHC RBC AUTO-ENTMCNC: 31.4 GM/DL (ref 32.2–35.5)
MCV RBC AUTO: 74 FL (ref 81–99)
MONOCYTES # BLD: 9 %
MONOCYTES ABSOLUTE: 0.4 THOU/MM3 (ref 0.4–1.3)
NUCLEATED RED BLOOD CELLS: 0 /100 WBC
OSMOLALITY CALCULATION: 277.3 MOSMOL/KG (ref 275–300)
PLATELET # BLD: 246 THOU/MM3 (ref 130–400)
PMV BLD AUTO: 9.2 FL (ref 9.4–12.4)
POTASSIUM SERPL-SCNC: 3.4 MEQ/L (ref 3.5–5.2)
RBC # BLD: 5.46 MILL/MM3 (ref 4.2–5.4)
SEG NEUTROPHILS: 36 %
SEGMENTED NEUTROPHILS ABSOLUTE COUNT: 1.7 THOU/MM3 (ref 1.8–7.7)
SODIUM BLD-SCNC: 137 MEQ/L (ref 135–145)
WBC # BLD: 4.8 THOU/MM3 (ref 4.8–10.8)

## 2020-01-12 PROCEDURE — 80048 BASIC METABOLIC PNL TOTAL CA: CPT

## 2020-01-12 PROCEDURE — 36415 COLL VENOUS BLD VENIPUNCTURE: CPT

## 2020-01-12 PROCEDURE — 6370000000 HC RX 637 (ALT 250 FOR IP): Performed by: PHYSICIAN ASSISTANT

## 2020-01-12 PROCEDURE — 82948 REAGENT STRIP/BLOOD GLUCOSE: CPT

## 2020-01-12 PROCEDURE — 85610 PROTHROMBIN TIME: CPT

## 2020-01-12 PROCEDURE — 99283 EMERGENCY DEPT VISIT LOW MDM: CPT

## 2020-01-12 PROCEDURE — 85025 COMPLETE CBC W/AUTO DIFF WBC: CPT

## 2020-01-12 RX ORDER — PREDNISONE 20 MG/1
60 TABLET ORAL ONCE
Status: COMPLETED | OUTPATIENT
Start: 2020-01-12 | End: 2020-01-12

## 2020-01-12 RX ORDER — CETIRIZINE HYDROCHLORIDE 10 MG/1
10 TABLET ORAL DAILY
Qty: 30 TABLET | Refills: 0 | Status: SHIPPED | OUTPATIENT
Start: 2020-01-12 | End: 2020-02-11

## 2020-01-12 RX ORDER — PREDNISONE 20 MG/1
TABLET ORAL
Qty: 19 TABLET | Refills: 0 | Status: SHIPPED | OUTPATIENT
Start: 2020-01-12 | End: 2020-02-10 | Stop reason: ALTCHOICE

## 2020-01-12 RX ORDER — FAMOTIDINE 20 MG/1
20 TABLET, FILM COATED ORAL 2 TIMES DAILY
Qty: 60 TABLET | Refills: 0 | Status: SHIPPED | OUTPATIENT
Start: 2020-01-12 | End: 2020-07-06

## 2020-01-12 RX ADMIN — PREDNISONE 60 MG: 20 TABLET ORAL at 10:03

## 2020-01-12 ASSESSMENT — ENCOUNTER SYMPTOMS
TROUBLE SWALLOWING: 0
SHORTNESS OF BREATH: 0
WHEEZING: 0

## 2020-01-12 NOTE — ED PROVIDER NOTES
[facility]  eMERGENCY dEPARTMENT eNCOUnter          [address] [facility]       Chief Complaint   Patient presents with    Rash       Nurses Notes reviewed and I agree except as notedin the HPI. HISTORY OF PRESENT ILLNESS    Aidan Portillo is a 82 y.o. femalewith a past medical history of a-fib, HTN, and DM. The patient presents to the ED for evaluation of a rash. Patient states that she has had the rash for one week. Patient initially completed a 5 day course of steroids for the rash per her PCP which improved but returned once she finished the course of steroids. Patient states that the rash is present to her arms, legs, and abdomen. She denies any known exposure or new contacts. No additional complaints or concerns at the time of initial evaluation. Sera Sharp REVIEW OF SYSTEMS     Review of Systems   Constitutional: Negative for chills and fever. HENT: Negative for trouble swallowing. Respiratory: Negative for shortness of breath and wheezing. Cardiovascular: Negative for leg swelling. Musculoskeletal: Negative for joint swelling. Skin: Positive for rash. PAST MEDICAL HISTORY    has a past medical history of Atrial fibrillation ([address] Diabetes mellitus ([facility] [address] and Hypertension. SURGICAL HISTORY      has no past surgical history on file. CURRENT MEDICATIONS     Discharge Medication List as of 1/12/2020  9:30 AM      CONTINUE these medications which have NOT CHANGED    Details   warfarin (COUMADIN) 5 MG tablet TAKE AS DIRECTED BY  [facility] COUM[facility] #30 TABS = 30 DAY SUPPLY, Disp-90 tablet, R-1Normal      mineral oil-hydrophilic petrolatum (HYDROPHOR) ointment Apply topically as needed. , Disp-454 g, R-1, Normal      meclizine (ANTIVERT) 12.5 MG tablet Take 1 tablet by mouth 3 times daily as needed for Dizziness or Nausea, Disp-60 tablet, R-0Normal      azelastine (ASTELIN) 0.1 % nasal spray 1 spray by Nasal route 2 times daily Indications: Seasonal Allergy Historical Med STEPHANIA.    Signed by: Chun Herrmann,01/12/20 11:14 AM    Provider:  I personally performed the services described in the documentation, reviewed and edited the documentation which wasdictated to the scribe in my presence, and it accurately records my wordsand actions.     Blanka Wang PA-C 1/12/20 11:14 AM             TANGELA Tan  01/12/20 [id number]

## 2020-01-12 NOTE — ED NOTES
Pt presents to ED for a rash. Pt states she has a rash on her arms, abdomen, and legs. Pt states the rash has been presents for about a week now.       Shayna Ag  01/12/20 0813

## 2020-01-13 ENCOUNTER — TELEPHONE (OUTPATIENT)
Dept: FAMILY MEDICINE CLINIC | Age: 85
End: 2020-01-13

## 2020-01-13 ENCOUNTER — OFFICE VISIT (OUTPATIENT)
Dept: INTERNAL MEDICINE CLINIC | Age: 85
End: 2020-01-13
Payer: MEDICARE

## 2020-01-13 ENCOUNTER — CARE COORDINATION (OUTPATIENT)
Dept: CARE COORDINATION | Age: 85
End: 2020-01-13

## 2020-01-13 VITALS — WEIGHT: 202 LBS | BODY MASS INDEX: 33.61 KG/M2

## 2020-01-13 PROCEDURE — 97803 MED NUTRITION INDIV SUBSEQ: CPT | Performed by: DIETITIAN, REGISTERED

## 2020-01-13 NOTE — PROGRESS NOTES
glucose monitor kit and supplies Test one time a day . Dispense 30 strips a month with 5 refills Please dispense meter and suppliesthat insurance covers . 1 kit 0    potassium chloride (KLOR-CON M) 10 MEQ extended release tablet Take 1 tablet by mouth daily 90 tablet 1    metFORMIN (GLUCOPHAGE) 500 MG tablet Take 1 tablet by mouth 2 times daily (with meals) 180 tablet 1    hydrochlorothiazide (MICROZIDE) 12.5 MG capsule Take 1 capsule by mouth every morning 90 capsule 1    metoprolol tartrate (LOPRESSOR) 25 MG tablet TAKE 1 TABLET BY MOUTH TWICE A  tablet 2    levocetirizine (XYZAL) 5 MG tablet TAKE 1 TABLET BY MOUTH EVERYDAY AT BEDTIME  12    UNABLE TO FIND Allergy shot every Friday at allergist's office.  amLODIPine (NORVASC) 10 MG tablet Take 1 tablet by mouth daily Indications: Raised Blood Pressure 90 tablet 3    losartan-hydrochlorothiazide (HYZAAR) 50-12.5 MG per tablet TAKE 1 TABLET BY MOUTH EVERY DAY (Patient not taking: Reported on 1/6/2020) 30 tablet 5     No current facility-administered medications on file prior to visit. Vitals from current and previous visits: Wt 202 lb (91.6 kg)   BMI 33.61 kg/m²     -Body mass index is 33.61 kg/m². 30-34.9 - Obesity Grade I.      Nutrition Diagnosis:   Food and nutrition-related knowledge deficit related to Lack of previous MNT/currently undergoing MNT as evidenced by New diagnosis of Diabetes. Intervention:  -Impression:  Dietary Intake: appetite has been poor per pt. Raman Crowell has not been feeling well with vertigo and ear pain/itching and all over body itching (started Predisone yesterday). Blood sugar was 300mg/dL 6 hr pp breakfast. Raman Crowell called Justyn's office at time of visit to inform them of elevated blood sugar. Discussed eating low carb food to assist in controlling bld sugars.      - Pt instruction were copied from last appt with RD since illness kept her from working on dietary changes.    Patient Instructions   1.) No sugars cereals at breakfast.  · Try a low sugar and higher fiber cereal: plain cheerios, bran flakes, unfrosted mini wheat's, plain chex,  · Nathan with 1/2 pkt Splenda      2.) Encourage an afternoon snack and very small snack in the evening!      3.) Healthy snacks in the evening: cheese and crackers, humus and veggies, dried fruit and nuts, fruit and cottage cheese, humus and veggies.      4.) Meal Ideas: soup, salad and small amount crackers.     5.) [place] patties and Salad     6.) Loaded [place] Baked or Sweet potato (top with, cheese, sour cream, grd meat, black beans, lettuce)     7.) Omelet and Toast      8.) Oven Baked Fish      9.) Continuing checking your blood sugars! Call your family Doctor (Dimas Alejandra's office) inform them that your blood sugar was 300 and the last you had ate was this morning. Comprehension verified using teachback method. Monitoring/Evaluation:   -Followup visit: 2 weeks with dietitian.   -Receptiveness to education/goals: Agreeable.  -Evaluation of education: Indicates understanding.  -Readiness to change: action - ready to set action plan and implement dietary changes. -Expected compliance: fair. Thank you for your referral of this patient. Total time involved in direct patient education: 30 minutes for follow-up MNT visit.

## 2020-01-13 NOTE — PATIENT INSTRUCTIONS
1.) No sugars cereals at breakfast.  · Try a low sugar and higher fiber cereal: plain cheerios, bran flakes, unfrosted mini wheat's, plain chex,  · Nathan with 1/2 pkt Splenda      2.) Encourage an afternoon snack and very small snack in the evening!      3.) Healthy snacks in the evening: cheese and crackers, humus and veggies, dried fruit and nuts, fruit and cottage cheese, humus and veggies.      4.) Meal Ideas: soup, salad and small amount crackers.     5.) [place] patties and Salad     6.) Loaded [place] Baked or Sweet potato (top with, cheese, sour cream, grd meat, black beans, lettuce)     7.) Omelet and Toast      8.) Oven Baked Fish      9.) Continuing checking your blood sugars! Call your family Doctor (Dimas Alejandra's office) inform them that your blood sugar was 300 and the last you had ate was this morning.

## 2020-01-13 NOTE — TELEPHONE ENCOUNTER
It is going to be high while she is on prednisone. She can take an extra metformin while she is still on prednisone. Have her call us in  2 days with her new sugar numbers.

## 2020-01-14 ENCOUNTER — OFFICE VISIT (OUTPATIENT)
Dept: ENT CLINIC | Age: 85
End: 2020-01-14
Payer: MEDICARE

## 2020-01-14 VITALS
SYSTOLIC BLOOD PRESSURE: 136 MMHG | HEART RATE: 78 BPM | BODY MASS INDEX: 33.15 KG/M2 | HEIGHT: 65 IN | WEIGHT: 199 LBS | RESPIRATION RATE: 16 BRPM | DIASTOLIC BLOOD PRESSURE: 84 MMHG

## 2020-01-14 PROCEDURE — 99203 OFFICE O/P NEW LOW 30 MIN: CPT | Performed by: PHYSICIAN ASSISTANT

## 2020-01-14 RX ORDER — MONTELUKAST SODIUM 10 MG/1
10 TABLET ORAL DAILY
COMMUNITY
Start: 2020-01-06 | End: 2021-01-26 | Stop reason: SDUPTHER

## 2020-01-14 ASSESSMENT — ENCOUNTER SYMPTOMS
ABDOMINAL PAIN: 0
SINUS PRESSURE: 0
STRIDOR: 0
CHOKING: 0
VOMITING: 0
VOICE CHANGE: 0
SHORTNESS OF BREATH: 0
SORE THROAT: 0
WHEEZING: 0
COUGH: 0
CHEST TIGHTNESS: 0
NAUSEA: 0
APNEA: 0
COLOR CHANGE: 0
FACIAL SWELLING: 0
DIARRHEA: 0
TROUBLE SWALLOWING: 0
RHINORRHEA: 0

## 2020-01-14 NOTE — PROGRESS NOTES
[facility]  [address] [place] [address]  [address] [facility] [place] [address]  Dept: [phone]  Dept Fax: [phone]  Loc: [phone]    Ana Irving is a 82 y.o. female who was referred by JOSH Castañeda - * for:  Chief Complaint   Patient presents with    New Patient     New Pt. presents for sensation of fullness in the R-ear. Apryl Patel HPI:     The patient presents for evaluation of right ear fullness and tinnitus. The patient reports that about 2-3 weeks ago she started to hear a clock ticking and/or crickets chirping in her right ear. The patient reports that she had left ear pain around that time as well, but that resolved. The patient reports that she had an episode of vertigo with the pain and tinnitus. The patient reports that she was otherwise feeling normal when the symptoms started. She denies fevers, chills, otorrhea, acute changes to hearing. The tinnitus is constant, but it waxes and wanes in severity. A few days ago she was brushing her teeth and she noticed that tinnitus was gradually increasing in severity to the point that it seemed like it was roaring and the patient immediately became dizzy and the room was spinning. The patient reports that her hearing remains stable. The patient is being treated for seasonal allergies with Xyzal and Singulair. The patient was also using Astelin, but reports it didn't seem to do anything so she never requested a refill. The patient denies a history of recurrent ear infections and tubes. Subjective:        Review of Systems   Constitutional: Negative for activity change, appetite change, chills, diaphoresis, fatigue, fever and unexpected weight change. HENT: Positive for tinnitus (Clock ticking noise).  Negative for congestion, dental problem, ear discharge, ear pain, facial swelling, hearing loss, mouth sores, nosebleeds, postnasal drip, rhinorrhea, sinus pressure, sneezing, sore throat, trouble swallowing and voice change. Eyes: Negative for visual disturbance. Respiratory: Negative for apnea, cough, choking, chest tightness, shortness of breath, wheezing and stridor. Cardiovascular: Negative for chest pain, palpitations and leg swelling. Gastrointestinal: Negative for abdominal pain, diarrhea, nausea and vomiting. Endocrine: Negative for cold intolerance, heat intolerance, polydipsia and polyuria. Genitourinary: Negative for difficulty urinating, dysuria, enuresis, hematuria and urgency. Musculoskeletal: Negative for arthralgias, gait problem, neck pain and neck stiffness. Skin: Negative for color change and rash. Allergic/Immunologic: Negative for environmental allergies and immunocompromised state. Neurological: Positive for dizziness. Negative for syncope, facial asymmetry, speech difficulty, light-headedness and headaches. Hematological: Negative for adenopathy. Does not bruise/bleed easily. Psychiatric/Behavioral: Negative for confusion and sleep disturbance. The patient is not nervous/anxious. Social History:    TOBACCO:   reports that she quit smoking about 41 years ago. Her smoking use included cigarettes. She smoked 0.50 packs per day. She has never used smokeless tobacco.  ETOH:   reports current alcohol use of about 1.0 standard drinks of alcohol per week. DRUGS:   reports no history of drug use. Family History:       Problem Relation Age of Onset    Cancer Mother         breast cancer    Diabetes Mother        Surgical History:  History reviewed. No pertinent surgical history. Objective: This is a 82 y.o. female. Patient is alert and oriented to person, place and time. Patient appears well developed, well nourished. Mood is happy with normal affect. Not obviously hearing impaired.     /84 (Site: Left Upper Arm, Position: Sitting)   Pulse 78   Resp 16   Ht 5' 5\" (1.651 m)   Wt 199 lb (90.3 kg)   BMI 33.12 kg/m²

## 2020-01-22 ENCOUNTER — CARE COORDINATION (OUTPATIENT)
Dept: CARE COORDINATION | Age: 85
End: 2020-01-22

## 2020-01-22 NOTE — CARE COORDINATION
Ambulatory Care Coordination Note  1/22/2020  CM Risk Score: 5  Charlson 10 Year Mortality Risk Score: 79%     ACC: Amanda Lombardi, RN    Summary Note: Spoke with Ashley Drake. States she seen ENT and there is a plan in place for ticking noise in ear. States she has some follow up testing per ENT that she is waiting for ENT office to schedule. Discussed her blood sugars which are trending higher right now because she is taking a short term course of steroids. States her blood sugars are running 189-200 which is higher for her. Her last dose of steroids is tomorrow and I encouraged her to keep track of them and let me know if they continue to trend higher once off steroids. She states her rash is almost healed and does not itch anymore. No new barriers noted at this time. Plan  -reinforce education completed and complete additional education to help manage chronic conditions  -encouraged blood sugar monitoring and tracking for higher trends after steroid completed  -encouraged follow up with ENT and testing ordered to help with ticking in ear  -reinforce low carb diet to help manage blood sugars  Diabetes Assessment    Medic Alert ID:  No  Meal Planning:  Avoidance of concentrated sweets   How often do you test your blood sugar?:  Daily   Do you have barriers with adherence to non-pharmacologic self-management interventions?  (Nutrition/Exercise/Self-Monitoring):  No   Have you ever had to go to the ED for symptoms of low blood sugar?:  No       Do you have hyperglycemia symptoms?:  No   Do you have hypoglycemia symptoms?:  No   Last Blood Sugar Value:  189   Blood Sugar Monitoring Regimen:  Once a Day   Blood Sugar Trends:  Fluctuating (Comment: fluctuating because of steroid use for rash)                Care Coordination Interventions    Program Enrollment:  Rising Risk  Referral from Primary Care Provider:  No  Suggested Interventions and Community Resources  Diabetes Education:  Completed (Comment: working with tablets by mouth once daily for 3 days, 2 tabs daily for 3 days, 1 tab daily for 3 days, then 1/2 tab daily for 2 days. Then stop 1/12/20  Yes TANGELA Trevino   cetirizine (ZYRTEC) 10 MG tablet Take 1 tablet by mouth daily 1/12/20 2/11/20 Yes TANGELA Trevino   famotidine (PEPCID) 20 MG tablet Take 1 tablet by mouth 2 times daily 1/12/20 2/11/20 Yes TANGELA Trevino   warfarin (COUMADIN) 5 MG tablet TAKE AS DIRECTED BY [facility] COUMAD[facility] #30 TABS = 30 DAY SUPPLY 12/19/19  Yes Daniel Davis MD   mineral oil-hydrophilic petrolatum (HYDROPHOR) ointment Apply topically as needed. 12/19/19  Yes JOSH Jean CNP   meclizine (ANTIVERT) 12.5 MG tablet Take 1 tablet by mouth 3 times daily as needed for Dizziness or Nausea 12/3/19  Yes JOSH Jean CNP   azelastine (ASTELIN) 0.1 % nasal spray 1 spray by Nasal route 2 times daily Indications: Seasonal Allergy  6/12/19  Yes Historical Provider, MD   losartan (COZAAR) 50 MG tablet Take 1 tablet by mouth daily 10/4/19  Yes JOSH Jean CNP   blood glucose monitor strips Test one time a day dispense whatever stirps insurance covers. 9/19/19  Yes JOSH Jean CNP   Lancets MISC 1 each by Does not apply route daily Test once daily dispense what insurance ocvers 9/19/19  Yes JOSH Jean CNP   blood glucose monitor kit and supplies Test one time a day . Dispense 30 strips a month with 5 refills Please dispense meter and suppliesthat insurance covers .  9/18/19  Yes JOSH Jean CNP   potassium chloride (KLOR-CON M) 10 MEQ extended release tablet Take 1 tablet by mouth daily 9/17/19  Yes JOSH Jean CNP   metFORMIN (GLUCOPHAGE) 500 MG tablet Take 1 tablet by mouth 2 times daily (with meals) 9/17/19  Yes JOSH Jean CNP   hydrochlorothiazide (MICROZIDE) 12.5 MG capsule Take 1 capsule by mouth every morning 9/16/19  Yes Wendy Durant, APRN - CNP   metoprolol tartrate (LOPRESSOR) 25 MG tablet

## 2020-01-27 ENCOUNTER — OFFICE VISIT (OUTPATIENT)
Dept: INTERNAL MEDICINE CLINIC | Age: 85
End: 2020-01-27
Payer: MEDICARE

## 2020-01-27 VITALS — BODY MASS INDEX: 33.61 KG/M2 | WEIGHT: 202 LBS

## 2020-01-27 PROCEDURE — 97803 MED NUTRITION INDIV SUBSEQ: CPT | Performed by: DIETITIAN, REGISTERED

## 2020-01-27 NOTE — PROGRESS NOTES
regarding high blood sugars. Read off your blood sugars to them. Tell them you are on 500 mg Metformin twice a day. Comprehension verified using teachback method. Monitoring/Evaluation:   -Followup visit: 6-8 weeks with dietitian.   -Receptiveness to education/goals: Agreeable.  -Evaluation of education: Indicates understanding.  -Readiness to change: maintenance - has made change and is trying and/or practicing different alternative behaviors diet is fair to good an overall controled. -Expected compliance: fair. Thank you for your referral of this patient. Total time involved in direct patient education: 30 minutes for follow-up MNT visit.

## 2020-01-27 NOTE — PATIENT INSTRUCTIONS
1. ) Call your doctor regarding high blood sugars. Read off your blood sugars to them. Tell them you are on 500 mg Metformin twice a day.

## 2020-01-28 ENCOUNTER — HOSPITAL ENCOUNTER (OUTPATIENT)
Age: 85
Discharge: HOME | End: 2020-01-28
Payer: MEDICARE

## 2020-01-28 ENCOUNTER — TELEPHONE (OUTPATIENT)
Dept: FAMILY MEDICINE CLINIC | Age: 85
End: 2020-01-28

## 2020-01-28 LAB
ANION GAP SERPL CALCULATED.3IONS-SCNC: 15 MEQ/L (ref 8–16)
BASOPHILS # BLD: 0.2 %
BASOPHILS ABSOLUTE: 0 THOU/MM3 (ref 0–0.1)
BUN BLDV-MCNC: 9 MG/DL (ref 7–22)
CALCIUM SERPL-MCNC: 9.6 MG/DL (ref 8.5–10.5)
CHLORIDE BLD-SCNC: 100 MEQ/L (ref 98–111)
CHOLESTEROL, TOTAL: 162 MG/DL (ref 100–199)
CO2: 26 MEQ/L (ref 23–33)
CREAT SERPL-MCNC: 0.6 MG/DL (ref 0.4–1.2)
EOSINOPHIL # BLD: 2.9 %
EOSINOPHILS ABSOLUTE: 0.2 THOU/MM3 (ref 0–0.4)
ERYTHROCYTE [DISTWIDTH] IN BLOOD BY AUTOMATED COUNT: 15.9 % (ref 11.5–14.5)
ERYTHROCYTE [DISTWIDTH] IN BLOOD BY AUTOMATED COUNT: 41.1 FL (ref 35–45)
GFR SERPL CREATININE-BSD FRML MDRD: > 90 ML/MIN/1.73M2
GLUCOSE BLD-MCNC: 205 MG/DL (ref 70–108)
HCT VFR BLD CALC: 39.7 % (ref 37–47)
HDLC SERPL-MCNC: 74 MG/DL
HEMOGLOBIN: 12.3 GM/DL (ref 12–16)
IMMATURE GRANS (ABS): 0.01 THOU/MM3 (ref 0–0.07)
IMMATURE GRANULOCYTES: 0.2 %
LDL CHOLESTEROL CALCULATED: 54 MG/DL
LYMPHOCYTES # BLD: 52.2 %
LYMPHOCYTES ABSOLUTE: 2.7 THOU/MM3 (ref 1–4.8)
MCH RBC QN AUTO: 23 PG (ref 26–33)
MCHC RBC AUTO-ENTMCNC: 31 GM/DL (ref 32.2–35.5)
MCV RBC AUTO: 74.3 FL (ref 81–99)
MONOCYTES # BLD: 11 %
MONOCYTES ABSOLUTE: 0.6 THOU/MM3 (ref 0.4–1.3)
NUCLEATED RED BLOOD CELLS: 0 /100 WBC
PLATELET # BLD: 201 THOU/MM3 (ref 130–400)
PMV BLD AUTO: 9.3 FL (ref 9.4–12.4)
POTASSIUM SERPL-SCNC: 3.5 MEQ/L (ref 3.5–5.2)
RBC # BLD: 5.34 MILL/MM3 (ref 4.2–5.4)
SEG NEUTROPHILS: 33.5 %
SEGMENTED NEUTROPHILS ABSOLUTE COUNT: 1.7 THOU/MM3 (ref 1.8–7.7)
SODIUM BLD-SCNC: 141 MEQ/L (ref 135–145)
TRIGL SERPL-MCNC: 168 MG/DL (ref 0–199)
WBC # BLD: 5.2 THOU/MM3 (ref 4.8–10.8)

## 2020-01-28 PROCEDURE — 80048 BASIC METABOLIC PNL TOTAL CA: CPT

## 2020-01-28 PROCEDURE — 36415 COLL VENOUS BLD VENIPUNCTURE: CPT

## 2020-01-28 PROCEDURE — 85025 COMPLETE CBC W/AUTO DIFF WBC: CPT

## 2020-01-28 PROCEDURE — 80061 LIPID PANEL: CPT

## 2020-01-31 ENCOUNTER — CARE COORDINATION (OUTPATIENT)
Dept: CARE COORDINATION | Age: 85
End: 2020-01-31

## 2020-01-31 NOTE — CARE COORDINATION
Attempted to reach patient for continued Care Coordination follow up and education. Patient was unavailable at the time of my call, and a generic voicemail message was left asking patient to return my call at [phone].

## 2020-02-05 ENCOUNTER — TELEPHONE (OUTPATIENT)
Dept: PHARMACY | Age: 85
End: 2020-02-05

## 2020-02-07 NOTE — CARE COORDINATION
I am unable to communicate with the patient. Will refer back to [facility] at this time.      Future Appointments   Date Time Provider Brandy Mederos   2/10/2020 11:20 AM Arliss Buerger, RN [facility] [place]   2/24/2020 11:00 AM Haleigh Moran AUDIOLOGY [facility]   3/16/2020  1:30 PM Jemal Paz RD, LD [facility] Physic [facility] [place]   7/6/2020 12:00 PM JOSH Flores

## 2020-02-10 ENCOUNTER — HOSPITAL ENCOUNTER (OUTPATIENT)
Dept: PHARMACY | Age: 85
Setting detail: THERAPIES SERIES
Discharge: HOME | End: 2020-02-10
Payer: MEDICARE

## 2020-02-10 LAB — POC INR: 1.6 (ref 0.8–1.2)

## 2020-02-10 PROCEDURE — 36416 COLLJ CAPILLARY BLOOD SPEC: CPT

## 2020-02-10 PROCEDURE — 99211 OFF/OP EST MAY X REQ PHY/QHP: CPT

## 2020-02-10 PROCEDURE — 85610 PROTHROMBIN TIME: CPT

## 2020-02-10 RX ORDER — HYDROXYZINE HYDROCHLORIDE 25 MG/1
TABLET, FILM COATED ORAL
COMMUNITY
Start: 2020-02-03 | End: 2020-02-11 | Stop reason: SDUPTHER

## 2020-02-10 NOTE — PROGRESS NOTES
[facility] Medication Management  Anticoagulation Clinic  [phone] (phone)  [phone] (fax)      Ms. Esther Mcguire is a 82 y.o.  female with history of persistent atrial fib. , per Dr. Tata Ro referral, who presents today for Warfarin monitoring and adjustment (5 week visit). Patient verifies tablet strength. Followed printed instructions for dose. No missed or extra doses, except as ordered last visit. Patient denies bleeding/bruising/SOB/chest pain. Has usual swelling of ankles. No blood in urine or stool. Dietary changes: had 2 salads and 2 servings of broccoli that she doesn't normally have. Changes in medication/OTC agents/herbals: Prednisone taper ordered in ER 1/12 for dermatitis (INR there 1.81). Stressed importance of calling this clinic. Also has Atarax. Metformin was increased. States is having tests to determine cause of dermatitis. No change in alcohol use or tobacco use. No change in activity level. Patient denies headaches/falls. Has usual occasional dizziness. No vomiting/diarrhea or acute illness. No procedures scheduled in the future at this time. Assessment:   Lab Results   Component Value Date    INR 1.60 (H) 02/10/2020    INR 1.81 (H) 01/12/2020    INR 1.90 (H) 01/06/2020     INR subtherapeutic - goal 2-3. Recent Labs     02/10/20  1131   INR 1.60*       Plan:  POCT INR ordered/performed/result reviewed. 5 mg today, M, then increase PO Coumadin to 2.5 mg MF, 5 mg TWThSS (from 2.5 mg MWF, 5 mg TThSS=9.1% increase). Recheck INR in 2 weeks. (Report given - orders entered by Jason Barger., PharmD.)  Patient reminded to call the Anticoagulation Clinic with signs or symptoms of bleeding or with any medication changes. Patient given instructions utilizing the teach back method. Discharged ambulatory in no apparent distress. After visit summary printed and reviewed with patient.       Medications reviewed and updated on home medication list.    Influenza vaccine: doesn't take.     [] given    [] declined   [] received previously   [] plans to receive at a later time   [] refused    [x] documented in [facility]

## 2020-02-11 ENCOUNTER — OFFICE VISIT (OUTPATIENT)
Dept: FAMILY MEDICINE CLINIC | Age: 85
End: 2020-02-11
Payer: MEDICARE

## 2020-02-11 VITALS
SYSTOLIC BLOOD PRESSURE: 110 MMHG | RESPIRATION RATE: 12 BRPM | HEIGHT: 65 IN | HEART RATE: 88 BPM | BODY MASS INDEX: 34.16 KG/M2 | DIASTOLIC BLOOD PRESSURE: 62 MMHG | TEMPERATURE: 97.6 F | WEIGHT: 205 LBS

## 2020-02-11 PROCEDURE — 99213 OFFICE O/P EST LOW 20 MIN: CPT | Performed by: NURSE PRACTITIONER

## 2020-02-11 RX ORDER — HYDROXYZINE HYDROCHLORIDE 25 MG/1
TABLET, FILM COATED ORAL
Qty: 60 TABLET | Refills: 1 | Status: SHIPPED | OUTPATIENT
Start: 2020-02-11 | End: 2020-03-24

## 2020-02-11 ASSESSMENT — ENCOUNTER SYMPTOMS
COLOR CHANGE: 1
RESPIRATORY NEGATIVE: 1

## 2020-02-11 NOTE — PROGRESS NOTES
Discussed use, benefit, and side effects of prescribedmedications. All patient questions answered. Pt voiced understanding. Reviewedhealth maintenance. Instructed to continue current medications, diet and exercise. Patient agreed with treatment plan. Follow up as directed.        Electronicallysigned by Jazmin Alatorre, APRN - CNP on 2/11/2020 at 12:14 PM

## 2020-02-20 ENCOUNTER — CARE COORDINATION (OUTPATIENT)
Dept: CARE COORDINATION | Age: 85
End: 2020-02-20

## 2020-02-20 RX ORDER — DOXEPIN HYDROCHLORIDE 10 MG/1
10 CAPSULE ORAL NIGHTLY
COMMUNITY
End: 2021-04-07

## 2020-02-20 NOTE — CARE COORDINATION
Declined  Medication Assistance Program:  Declined  Registered Dietician:  Completed  Social Work:  Declined  Transportation Support:  Declined  Zone Management Tools:  Completed         Goals Addressed                 This Visit's Progress     Conditions and Symptoms   On track     I will schedule office visits, as directed by my provider. I will keep my appointment or reschedule if I have to cancel. I will notify my provider of any barriers to my plan of care. I will follow my Zone Management tool to seek urgent or emergent care. I will notify my provider of any symptoms that indicate a worsening of my condition. Diabetes  Barriers: overwhelmed by complexity of regimen  Plan for overcoming my barriers: family and ACM support  Confidence: 7/10  Anticipated Goal Completion Date: 2-2-20 update 2-20-20    Update: Same Day appt made per Mariela request due to increased \"ticking\" in her ear with no improvement and states it is getting worse. 12-19-19  Update: Yaneli had appt with ENT and additional testing is ordered to try to resolve \"ticking\" in ear. 1-22-20  Update: appt scheduled with dermatology, Dr. Gabriel Marin for continued skin rash. Has appt with audiology to address ticking in ear which continues. 2-20-20         Self Monitoring   On track     Self-Monitored Blood Glucose - I will notify my provider if I have any blood sugar readings less than 70 more than 2 times a month. Diabetes  None Recently Recorded    Barriers: overwhelmed by complexity of regimen  Plan for overcoming my barriers: family, ACM and Diabetic Clinic staff support  Confidence: 8/10  Anticipated Goal Completion Date: 2-2-20 Update 3-20-20    Update: Currently with Diabetic Clinic staff for management, including dietician for nutrition education 12-2-19  Update: Max Singh states her blood sugars are fluctuating right now with some higher readings around 189 due to short term steroid use for rash. Last does of steroids is 1-23-19. 12.5 MG capsule Take 1 capsule by mouth every morning 9/16/19  Yes JOSH Rae - CNP   metoprolol tartrate (LOPRESSOR) 25 MG tablet TAKE 1 TABLET BY MOUTH TWICE A DAY 8/6/19  Yes JOSH Rae - CNP   levocetirizine (XYZAL) 5 MG tablet TAKE 1 TABLET BY MOUTH EVERYDAY AT BEDTIME 6/30/19  Yes Historical Provider, MD   UNABLE TO FIND Allergy shot every Friday at allergist's office.    Yes Historical Provider, MD   amLODIPine (NORVASC) 10 MG tablet Take 1 tablet by mouth daily Indications: Raised Blood Pressure 7/8/19  Yes JOSH Rea - CNP   famotidine (PEPCID) 20 MG tablet Take 1 tablet by mouth 2 times daily 1/12/20 2/11/20  TANGELA Sánchez       Future Appointments   Date Time Provider Brandy Mederos   2/24/2020 11:00 AM [facility]   2/25/2020 11:00 AM VIOLET [facility] [place] [facility] [place]   3/16/2020  1:30 PM Cipriano Almanzar RD, LD SRPX Physic [facility] [place]   7/6/2020 12:00 PM JOSH Rae

## 2020-02-24 ENCOUNTER — HOSPITAL ENCOUNTER (OUTPATIENT)
Dept: AUDIOLOGY | Age: 85
Discharge: HOME | End: 2020-02-24
Payer: MEDICARE

## 2020-02-24 PROCEDURE — 92567 TYMPANOMETRY: CPT | Performed by: AUDIOLOGIST

## 2020-02-24 PROCEDURE — 92557 COMPREHENSIVE HEARING TEST: CPT | Performed by: AUDIOLOGIST

## 2020-02-25 ENCOUNTER — HOSPITAL ENCOUNTER (OUTPATIENT)
Age: 85
Discharge: HOME | End: 2020-02-25
Payer: MEDICARE

## 2020-02-25 ENCOUNTER — HOSPITAL ENCOUNTER (OUTPATIENT)
Dept: PHARMACY | Age: 85
Setting detail: THERAPIES SERIES
Discharge: HOME | End: 2020-02-25
Payer: MEDICARE

## 2020-02-25 PROBLEM — I10 HYPERTENSION: Status: ACTIVE | Noted: 2020-02-25

## 2020-02-25 LAB
ALBUMIN SERPL-MCNC: 4.3 G/DL (ref 3.5–5.1)
ALP BLD-CCNC: 66 U/L (ref 38–126)
ALT SERPL-CCNC: 19 U/L (ref 11–66)
ANION GAP SERPL CALCULATED.3IONS-SCNC: 15 MEQ/L (ref 8–16)
AST SERPL-CCNC: 19 U/L (ref 5–40)
AVERAGE GLUCOSE: 174 MG/DL (ref 70–126)
BASOPHILS # BLD: 0.6 %
BASOPHILS ABSOLUTE: 0 THOU/MM3 (ref 0–0.1)
BILIRUB SERPL-MCNC: 0.6 MG/DL (ref 0.3–1.2)
BUN BLDV-MCNC: 9 MG/DL (ref 7–22)
CALCIUM SERPL-MCNC: 10.1 MG/DL (ref 8.5–10.5)
CHLORIDE BLD-SCNC: 101 MEQ/L (ref 98–111)
CO2: 27 MEQ/L (ref 23–33)
CREAT SERPL-MCNC: 0.8 MG/DL (ref 0.4–1.2)
EOSINOPHIL # BLD: 6.8 %
EOSINOPHILS ABSOLUTE: 0.3 THOU/MM3 (ref 0–0.4)
ERYTHROCYTE [DISTWIDTH] IN BLOOD BY AUTOMATED COUNT: 15.1 % (ref 11.5–14.5)
ERYTHROCYTE [DISTWIDTH] IN BLOOD BY AUTOMATED COUNT: 41.1 FL (ref 35–45)
GFR SERPL CREATININE-BSD FRML MDRD: 83 ML/MIN/1.73M2
GLUCOSE BLD-MCNC: 141 MG/DL (ref 70–108)
HBA1C MFR BLD: 7.8 % (ref 4.4–6.4)
HCT VFR BLD CALC: 39.8 % (ref 37–47)
HEMOGLOBIN: 12.2 GM/DL (ref 12–16)
IMMATURE GRANS (ABS): 0.01 THOU/MM3 (ref 0–0.07)
IMMATURE GRANULOCYTES: 0.2 %
LYMPHOCYTES # BLD: 45.7 %
LYMPHOCYTES ABSOLUTE: 2.3 THOU/MM3 (ref 1–4.8)
MCH RBC QN AUTO: 23.1 PG (ref 26–33)
MCHC RBC AUTO-ENTMCNC: 30.7 GM/DL (ref 32.2–35.5)
MCV RBC AUTO: 75.5 FL (ref 81–99)
MONOCYTES # BLD: 9.4 %
MONOCYTES ABSOLUTE: 0.5 THOU/MM3 (ref 0.4–1.3)
NUCLEATED RED BLOOD CELLS: 0 /100 WBC
PLATELET # BLD: 243 THOU/MM3 (ref 130–400)
PMV BLD AUTO: 9.3 FL (ref 9.4–12.4)
POC INR: 1.7 (ref 0.8–1.2)
POTASSIUM SERPL-SCNC: 3.4 MEQ/L (ref 3.5–5.2)
RBC # BLD: 5.27 MILL/MM3 (ref 4.2–5.4)
SEG NEUTROPHILS: 37.3 %
SEGMENTED NEUTROPHILS ABSOLUTE COUNT: 1.9 THOU/MM3 (ref 1.8–7.7)
SODIUM BLD-SCNC: 143 MEQ/L (ref 135–145)
TOTAL PROTEIN: 7.3 G/DL (ref 6.1–8)
WBC # BLD: 5 THOU/MM3 (ref 4.8–10.8)

## 2020-02-25 PROCEDURE — 85610 PROTHROMBIN TIME: CPT | Performed by: PHARMACIST

## 2020-02-25 PROCEDURE — 36416 COLLJ CAPILLARY BLOOD SPEC: CPT | Performed by: PHARMACIST

## 2020-02-25 PROCEDURE — 36415 COLL VENOUS BLD VENIPUNCTURE: CPT

## 2020-02-25 PROCEDURE — 80053 COMPREHEN METABOLIC PANEL: CPT

## 2020-02-25 PROCEDURE — 85025 COMPLETE CBC W/AUTO DIFF WBC: CPT

## 2020-02-25 PROCEDURE — 83036 HEMOGLOBIN GLYCOSYLATED A1C: CPT

## 2020-02-25 PROCEDURE — 99211 OFF/OP EST MAY X REQ PHY/QHP: CPT | Performed by: PHARMACIST

## 2020-02-26 ENCOUNTER — TELEPHONE (OUTPATIENT)
Dept: ENT CLINIC | Age: 85
End: 2020-02-26

## 2020-02-26 ENCOUNTER — TELEPHONE (OUTPATIENT)
Dept: FAMILY MEDICINE CLINIC | Age: 85
End: 2020-02-26

## 2020-02-26 NOTE — TELEPHONE ENCOUNTER
Pt stated yes  She is taking metformin 1000mg bid . Pt was notified of new medication and agreeable.

## 2020-03-05 ENCOUNTER — CARE COORDINATION (OUTPATIENT)
Dept: CARE COORDINATION | Age: 85
End: 2020-03-05

## 2020-03-10 ENCOUNTER — HOSPITAL ENCOUNTER (OUTPATIENT)
Dept: PHARMACY | Age: 85
Setting detail: THERAPIES SERIES
Discharge: HOME | End: 2020-03-10
Payer: MEDICARE

## 2020-03-10 LAB — POC INR: 2.4 (ref 0.8–1.2)

## 2020-03-10 PROCEDURE — 99211 OFF/OP EST MAY X REQ PHY/QHP: CPT

## 2020-03-10 PROCEDURE — 36416 COLLJ CAPILLARY BLOOD SPEC: CPT

## 2020-03-10 PROCEDURE — 85610 PROTHROMBIN TIME: CPT

## 2020-03-10 RX ORDER — PETROLATUM 420 MG/G
OINTMENT TOPICAL 2 TIMES DAILY
COMMUNITY
Start: 2020-02-21 | End: 2021-04-07

## 2020-03-18 RX ORDER — POTASSIUM CHLORIDE 750 MG/1
10 TABLET, EXTENDED RELEASE ORAL DAILY
Qty: 90 TABLET | Refills: 1 | Status: SHIPPED | OUTPATIENT
Start: 2020-03-18 | End: 2020-07-06 | Stop reason: SDUPTHER

## 2020-03-18 NOTE — TELEPHONE ENCOUNTER
Prudence Grimm called requesting a refill on the following medications:  Requested Prescriptions     Pending Prescriptions Disp Refills    potassium chloride (KLOR-CON M) 10 MEQ extended release tablet 90 tablet 1     Sig: Take 1 tablet by mouth daily     Pharmacy verified: [address] [place] [address]  .       Date of last visit: 2/11/2020  Date of next visit (if applicable): 4/4/9133

## 2020-03-20 ENCOUNTER — CARE COORDINATION (OUTPATIENT)
Dept: CARE COORDINATION | Age: 85
End: 2020-03-20

## 2020-03-20 NOTE — LETTER
3/20/2020    Pretty Mena  [address] [facility] [place] [address]      Pretty Mena     Congratulations on the progress you have made improving and taking charge of your health! Your recent follow up with JOSH Khanna CNP finds you doing well and are no longer in need of Care Coordination services. I know you will continue to use the knowledge and tools you have gained to continue down a healthy path. As you have demonstrated that you are able to successfully manage your health and wellness, I will no longer contact you on a regular basis. Again, congratulations and please know if there are any changes or you have a need for my services in the future, you may always contact me for questions or concerns.   In good health,       Marquez Lei RN

## 2020-03-23 NOTE — CARE COORDINATION
Dimas, I have been working with [place] on Care Coordination program to provide support and education to help manage chronic conditions. She has met those goals and all education has been completed with no new barriers noted. I would like to graduate her  from Care Coordination at this time pending PCP approval.  Do you approve of this discharge? Please advise. Thank you.
You may discharge her from this.
confirmed date and time for both Heaphy and Safadi appts and confirmed with Yaneli.  3-5-20         Self Monitoring   On track     Self-Monitored Blood Glucose - I will notify my provider if I have any blood sugar readings less than 70 more than 2 times a month. Diabetes  None Recently Recorded    Barriers: overwhelmed by complexity of regimen  Plan for overcoming my barriers: family, ACM and Diabetic Clinic staff support  Confidence: 8/10  Anticipated Goal Completion Date: 2-2-20 Update 3-20-20    Update: Currently with Diabetic Clinic staff for management, including dietician for nutrition education 12-2-19  Update: Gayle Ovalles states her blood sugars are fluctuating right now with some higher readings around 189 due to short term steroid use for rash. Last does of steroids is 1-23-19.  1-22-19  Update: Yaneli states blood sugars are running 140-180. Following up with dietician and DM clinic.  2-20-20              Prior to Admission medications    Medication Sig Start Date End Date Taking?  Authorizing Provider   potassium chloride (KLOR-CON M) 10 MEQ extended release tablet Take 1 tablet by mouth daily 3/18/20   Bull Delay, APRN - CNP   Petrolatum 42 % OINT Apply topically 2 times daily  2/21/20   Historical Provider, MD   SITagliptin (JANUVIA) 50 MG tablet Take 1 tablet by mouth daily 2/26/20   Bull Delay, APRN - CNP   doxepin (SINEQUAN) 10 MG capsule Take 10 mg by mouth nightly    Historical Provider, MD   hydrOXYzine (ATARAX) 25 MG tablet TAKE 1 TABLET BY MOUTH EVERY 6 HOURS AS NEEDED  Patient not taking: Reported on 3/10/2020 2/11/20   Bull Delay, APRN - CNP   metFORMIN (GLUCOPHAGE) 1000 MG tablet Take 1 tablet by mouth 2 times daily (with meals) 1/28/20   Bull Delay, APRN - CNP   montelukast (SINGULAIR) 10 MG tablet Take 10 mg by mouth daily  1/6/20   Historical Provider, MD   famotidine (PEPCID) 20 MG tablet Take 1 tablet by mouth 2 times daily 1/12/20 2/11/20  TANGELA Mahmood   warfarin (COUMADIN)

## 2020-03-24 RX ORDER — HYDROXYZINE HYDROCHLORIDE 25 MG/1
TABLET, FILM COATED ORAL
Qty: 60 TABLET | Refills: 1 | Status: SHIPPED | OUTPATIENT
Start: 2020-03-24 | End: 2020-04-03

## 2020-03-25 ENCOUNTER — TELEPHONE (OUTPATIENT)
Dept: PHARMACY | Age: 85
End: 2020-03-25

## 2020-03-25 NOTE — TELEPHONE ENCOUNTER
Called patient to explain Coumadin monitory process with COVID-Aleja. Advised what lab to go to 3/31, then will do phone visit. Received permission to leave messages on voicemail.

## 2020-03-31 ENCOUNTER — HOSPITAL ENCOUNTER (OUTPATIENT)
Dept: PHARMACY | Age: 85
Setting detail: THERAPIES SERIES
Discharge: HOME | End: 2020-03-31
Payer: MEDICARE

## 2020-03-31 ENCOUNTER — NURSE ONLY (OUTPATIENT)
Dept: LAB | Age: 85
End: 2020-03-31

## 2020-03-31 LAB — INR BLD: 2.22 (ref 0.85–1.13)

## 2020-03-31 PROCEDURE — 99211 OFF/OP EST MAY X REQ PHY/QHP: CPT

## 2020-04-08 ENCOUNTER — TELEPHONE (OUTPATIENT)
Dept: FAMILY MEDICINE CLINIC | Age: 85
End: 2020-04-08

## 2020-04-08 NOTE — TELEPHONE ENCOUNTER
Key: [id number] - PA Case ID: [id number] - Rx #: [id number] Need help? Call us at [phone]   Outcome   Approved today   PA Case: [id number], Status: Approved, Coverage Starts on: 12/30/2019, Coverage Ends on: 12/31/2020. Questions? Contact [phone]. Drug hydrOXYzine HCl 25MG tablets  [facility] [address] notified via detailed VM. LM for patient to call the office. On return call please relay that a prior authorization has been approved for Hydroxyzine 25 mg tablets. Patient can  medication at [address] [place] [address].

## 2020-04-09 ENCOUNTER — TELEPHONE (OUTPATIENT)
Dept: ENT CLINIC | Age: 85
End: 2020-04-09

## 2020-04-13 ENCOUNTER — TELEPHONE (OUTPATIENT)
Dept: ENT CLINIC | Age: 85
End: 2020-04-13

## 2020-04-13 NOTE — TELEPHONE ENCOUNTER
I will discuss the case with Dr. Sarah Diaz tomorrow.  I am considering trying an oral medication, but it is similar to a medication she is on already so I would like to talk to Dr. Sarah Diaz first.

## 2020-04-14 RX ORDER — TRIAMTERENE AND HYDROCHLOROTHIAZIDE 37.5; 25 MG/1; MG/1
1 CAPSULE ORAL DAILY
Qty: 10 CAPSULE | Refills: 0 | Status: SHIPPED | OUTPATIENT
Start: 2020-04-14 | End: 2020-04-28

## 2020-04-14 NOTE — TELEPHONE ENCOUNTER
Isela Reyes we can trial your recommendations. We can hold the HCTZ that I currently have her on and start your dose. I do see in February her potassium was 3.4. I would recommend her electrolytes be repeated after the meds are changed. We can go from there and see how she does. Also I would like  A repeat blood presusre, is she going to be seeing you back in the office anytime soon? Keep me posted!

## 2020-04-14 NOTE — TELEPHONE ENCOUNTER
Thanks for your help and input! If you could have your office coordinate the doxy. me or video visit that would be helpful!

## 2020-04-16 NOTE — TELEPHONE ENCOUNTER
Ronaldo Carlton, it looks like Marbella Mcarthur has been involved with this patient. Can you check with him.

## 2020-04-16 NOTE — TELEPHONE ENCOUNTER
I called the patient to discuss her symptoms. She reports a low grade tinnitus (clock ticking) that she is used to and then every 5-10 minutes she will get a flare up where the ticking becomes significantly louder for a few seconds up to a few minutes then resolves. She states during the flare up she feels slightly off balance and like her ear is full. She states that her ear doesn't hurt, but it is uncomfortable when it flares up. She has continued low sodium (says she never used much salt anyway) and is on day 2 of dyazide. Her blood pressure this morning was reportedly 136/90. I explained to the patient that I believe she may have Meniere's disease and unfortunately we have limited options of treatment. I recommended she give the Dyazide more time because she has just started using it. She agreed and thanked me. She will call again if symptoms worsen. We also discussed noise cancellation for the tinnitus.

## 2020-04-20 RX ORDER — HYDROXYZINE HYDROCHLORIDE 25 MG/1
TABLET, FILM COATED ORAL
Qty: 60 TABLET | Refills: 3 | Status: SHIPPED | OUTPATIENT
Start: 2020-04-20 | End: 2020-05-01

## 2020-04-21 RX ORDER — MECLIZINE HCL 12.5 MG/1
12.5 TABLET ORAL 3 TIMES DAILY PRN
Qty: 60 TABLET | Refills: 0 | Status: SHIPPED | OUTPATIENT
Start: 2020-04-21 | End: 2020-06-15

## 2020-04-23 ENCOUNTER — TELEPHONE (OUTPATIENT)
Dept: ENT CLINIC | Age: 85
End: 2020-04-23

## 2020-04-24 ENCOUNTER — TELEPHONE (OUTPATIENT)
Dept: ENT CLINIC | Age: 85
End: 2020-04-24

## 2020-04-24 RX ORDER — DIAZEPAM 2 MG/1
2 TABLET ORAL EVERY 12 HOURS PRN
Qty: 6 TABLET | Refills: 0 | Status: SHIPPED | OUTPATIENT
Start: 2020-04-24 | End: 2020-04-27

## 2020-04-24 NOTE — TELEPHONE ENCOUNTER
The patient called the office once again stating that she took the 10 days of Dyazide and she is not any better. In fact, she feels like she is getting worse. She states that the tinnitus is getting louder and bothering her more and more. She reports about every 5 minutes she will have a \"flare up\" of the ringing, for a brief second get dizzy, and the ear becomes painful/full feeling. It lasts for up to 1-2 minutes then goes away until it begins to flare up a few minutes later. She states she cannot sleep. She has tried using background noise as a distraction and it has not helped. She denies any other symptoms at this time. No fevers, chills, otorrhea.

## 2020-04-27 ENCOUNTER — TELEPHONE (OUTPATIENT)
Dept: FAMILY MEDICINE CLINIC | Age: 85
End: 2020-04-27

## 2020-04-27 NOTE — TELEPHONE ENCOUNTER
Pt states she was stopped from taking hydrochlorothiazide 12.5 mg and started taking triamteren-  took for 10 days. Pt states she doesn't know now which pills she should be taking. Please advise.

## 2020-04-28 ENCOUNTER — NURSE ONLY (OUTPATIENT)
Dept: LAB | Age: 85
End: 2020-04-28

## 2020-04-28 ENCOUNTER — HOSPITAL ENCOUNTER (OUTPATIENT)
Dept: PHARMACY | Age: 85
Setting detail: THERAPIES SERIES
Discharge: HOME | End: 2020-04-28
Payer: MEDICARE

## 2020-04-28 ENCOUNTER — TELEPHONE (OUTPATIENT)
Dept: ENT CLINIC | Age: 85
End: 2020-04-28

## 2020-04-28 ENCOUNTER — OFFICE VISIT (OUTPATIENT)
Dept: ENT CLINIC | Age: 85
End: 2020-04-28
Payer: MEDICARE

## 2020-04-28 VITALS
RESPIRATION RATE: 14 BRPM | BODY MASS INDEX: 31.1 KG/M2 | WEIGHT: 193.5 LBS | TEMPERATURE: 97.6 F | DIASTOLIC BLOOD PRESSURE: 74 MMHG | HEIGHT: 66 IN | HEART RATE: 86 BPM | SYSTOLIC BLOOD PRESSURE: 116 MMHG

## 2020-04-28 LAB — INR BLD: 3.42 (ref 0.85–1.13)

## 2020-04-28 PROCEDURE — 99211 OFF/OP EST MAY X REQ PHY/QHP: CPT

## 2020-04-28 PROCEDURE — 99213 OFFICE O/P EST LOW 20 MIN: CPT | Performed by: OTOLARYNGOLOGY

## 2020-04-28 ASSESSMENT — ENCOUNTER SYMPTOMS
VOICE CHANGE: 0
STRIDOR: 0
COLOR CHANGE: 0
FACIAL SWELLING: 0
COUGH: 0
SHORTNESS OF BREATH: 0
TROUBLE SWALLOWING: 0
DIARRHEA: 0
RHINORRHEA: 0
APNEA: 0
SINUS PRESSURE: 0
ABDOMINAL PAIN: 0
CHEST TIGHTNESS: 0
NAUSEA: 0
VOMITING: 0
WHEEZING: 0
SORE THROAT: 0
CHOKING: 0

## 2020-04-28 NOTE — TELEPHONE ENCOUNTER
Spoke with [employer], Ashia Mario, to obtain preauthorization. CPT code 33819 is approved. Case #[id number], authorization #[id number] approved from 4/28/20-10/25/20. Patient is scheduled 5/5/20 @2:30P. M., arrival 2P. M.. Patient verified understanding.

## 2020-04-28 NOTE — PROGRESS NOTES
[facility] EAR, NOSE AND THROAT  [address] [address]  Dept: [phone]  Dept Fax: [phone]  Loc: [phone]    Bryce Gao is a 82 y.o. female who was referred byNo ref. provider found for:  Chief Complaint   Patient presents with    Tinnitus     Patient is here for tinnitus in the right ear. every 5-10 mins in the morning medications doesnt seem to help    . HPI:     Bryce Gao is a 82 y.o. female who presents today for clicking, followed by onset of pressure rotational vertigo and loud tinnitus without change in hearing level. Worse lying down or with a sudden turn of the head. Dyazide was not beneficial, but the meclizine seems to have helped somewhat. She was taking it 3 times a day and has not taken it for a couple days.     History:     No Known Allergies  Current Outpatient Medications   Medication Sig Dispense Refill    meclizine (ANTIVERT) 12.5 MG tablet TAKE 1 TABLET BY MOUTH 3 TIMES DAILY AS NEEDED FOR DIZZINESS OR NAUSEA 60 tablet 0    hydrOXYzine (ATARAX) 25 MG tablet TAKE 1 TABLET BY MOUTH EVERY 6 HOURS AS NEEDED 60 tablet 3    losartan (COZAAR) 50 MG tablet take 1 tablet by mouth once daily 90 tablet 1    potassium chloride (KLOR-CON M) 10 MEQ extended release tablet Take 1 tablet by mouth daily 90 tablet 1    Petrolatum 42 % OINT Apply topically 2 times daily       SITagliptin (JANUVIA) 50 MG tablet Take 1 tablet by mouth daily 90 tablet 1    doxepin (SINEQUAN) 10 MG capsule Take 10 mg by mouth nightly      metFORMIN (GLUCOPHAGE) 1000 MG tablet Take 1 tablet by mouth 2 times daily (with meals) 180 tablet 1    montelukast (SINGULAIR) 10 MG tablet Take 10 mg by mouth daily       warfarin (COUMADIN) 5 MG tablet TAKE AS DIRECTED BY [facility]30 TABS = 30 DAY SUPPLY 90 tablet 1    azelastine (ASTELIN) 0.1 % nasal spray 1 spray by Nasal route 2 times daily Indications: Seasonal

## 2020-05-01 RX ORDER — HYDROXYZINE HYDROCHLORIDE 25 MG/1
TABLET, FILM COATED ORAL
Qty: 60 TABLET | Refills: 3 | Status: SHIPPED | OUTPATIENT
Start: 2020-05-01 | End: 2021-06-02 | Stop reason: ALTCHOICE

## 2020-05-05 ENCOUNTER — HOSPITAL ENCOUNTER (OUTPATIENT)
Dept: MRI IMAGING | Age: 85
Discharge: HOME | End: 2020-05-05
Payer: MEDICARE

## 2020-05-05 PROCEDURE — 6360000004 HC RX CONTRAST MEDICATION: Performed by: OTOLARYNGOLOGY

## 2020-05-05 PROCEDURE — 70553 MRI BRAIN STEM W/O & W/DYE: CPT

## 2020-05-05 PROCEDURE — A9579 GAD-BASE MR CONTRAST NOS,1ML: HCPCS | Performed by: OTOLARYNGOLOGY

## 2020-05-05 RX ADMIN — GADOTERIDOL 15 ML: 279.3 INJECTION, SOLUTION INTRAVENOUS at 15:35

## 2020-05-11 ENCOUNTER — TELEPHONE (OUTPATIENT)
Dept: ENT CLINIC | Age: 85
End: 2020-05-11

## 2020-05-11 RX ORDER — HYDROXYZINE HYDROCHLORIDE 25 MG/1
TABLET, FILM COATED ORAL
Qty: 60 TABLET | Refills: 3 | OUTPATIENT
Start: 2020-05-11

## 2020-05-11 NOTE — TELEPHONE ENCOUNTER
The MRI was negative for the source of the ringing. She will need to get the electrocochleography and follow up with Dr. Amaya Frank. The next test may show us the source of her symptoms.

## 2020-05-12 ENCOUNTER — NURSE ONLY (OUTPATIENT)
Dept: LAB | Age: 85
End: 2020-05-12

## 2020-05-12 LAB — INR BLD: 2.78 (ref 0.85–1.13)

## 2020-05-12 NOTE — TELEPHONE ENCOUNTER
She can stop the meclizine, but I do not have any alternate medication. She will need the ECOG testing and FU appt with Dr Chiara Flores. Please check with [facility] to see her appt can be moved up at all. They have limited scheduling right now.

## 2020-05-13 ENCOUNTER — HOSPITAL ENCOUNTER (OUTPATIENT)
Dept: PHARMACY | Age: 85
Setting detail: THERAPIES SERIES
Discharge: HOME | End: 2020-05-13
Payer: MEDICARE

## 2020-05-13 PROCEDURE — 99211 OFF/OP EST MAY X REQ PHY/QHP: CPT

## 2020-05-13 NOTE — PROGRESS NOTES
Yes    Verbal Consent for Consult Agreement participation during COVID-19 Pandemic  Verbiage for CPA Consent:  Discussed with patient the Pharmacist Collaborative Practice Agreement. Patient provided verbal and/or electronic (exSerena Abt) consent to be managed by a pharmacist per collaborative practice agreement between the pharmacist and referring physician. This is in lieu of paper consent due to COVID-19 precautions and the use of remote/virtual visits. CLINICAL PHARMACY CONSULT: MED RECONCILIATION/REVIEW ADDENDUM    For Pharmacy Admin Tracking Only    PHSO: Yes  Total # of Interventions Recommended: 0  - Updated Order #: 1 Updated Order Reason(s):  Other updated home medication list  - Maintenance Safety Lab Monitoring #: 1  Total Interventions Accepted: 0  Time Spent (min): [address], PharmD

## 2020-05-19 ENCOUNTER — TELEPHONE (OUTPATIENT)
Dept: ENT CLINIC | Age: 85
End: 2020-05-19

## 2020-05-19 ENCOUNTER — TELEPHONE (OUTPATIENT)
Dept: FAMILY MEDICINE CLINIC | Age: 85
End: 2020-05-19

## 2020-05-19 NOTE — TELEPHONE ENCOUNTER
HI Tad Self, I never gave the OK that she could have the valium. I did state she needed to contact your office if needing a refill. In light of your description of her feelings of syncope ,  I would recommend the ER to her.  Thank You dyspnea on exertion

## 2020-05-19 NOTE — TELEPHONE ENCOUNTER
Patient would like a refill for Diazepam 2 MG. In medications script has ended and has zero refills left.

## 2020-05-19 NOTE — TELEPHONE ENCOUNTER
Noted. I will wait and see if the PCP has any recommendations for her pre-syncope before deciding the next step/refilling medication.

## 2020-05-19 NOTE — TELEPHONE ENCOUNTER
Patient is requesting a refill of Diazepam 2 mg from her ENT. Patient's blood pressure today was 111/68 and ENT specialist requested she call Dimas with her BP before prescribing the medication. Patient stated she had an MRI 05/05/20.

## 2020-05-21 ENCOUNTER — TELEPHONE (OUTPATIENT)
Dept: PHARMACY | Facility: CLINIC | Age: 85
End: 2020-05-21

## 2020-05-21 RX ORDER — HYDROXYZINE HYDROCHLORIDE 25 MG/1
TABLET, FILM COATED ORAL
Qty: 60 TABLET | Refills: 3 | OUTPATIENT
Start: 2020-05-21

## 2020-05-21 NOTE — TELEPHONE ENCOUNTER
CLINICAL PHARMACY: ADHERENCE REVIEW & 90 DAY SUPPLY  Identified care gap per [employer]; fills at [facility]: ACE/ARB and Diabetes adherence    Last Office Visit: 2/11/20    Patient identified as LIS = 4, therefore member has a 15% insurance - in cases where the plan's co-pays are less, the lesser applies    ASSESSMENT  ACE/ARB ADHERENCE  PDC: 95%    Per Reconciled Dispense Report   Losartan 50mg last filled on 4/27/20 for a 90 day supply;     BP Readings from Last 3 Encounters:   04/28/20 116/74   02/11/20 110/62   01/14/20 136/84     Estimated Creatinine Clearance: 59 mL/min (based on SCr of 0.8 mg/dL). DIABETES ADHERENCE  PDC: 100%    Per Reconciled Dispense Report   Metformin 1000mg last filled on 4/25/20 for a 90 day supply;     Per Lucia Torre at  [facility]  sitagliptin 50mg has never been filled. Put on hold in March. Lab Results   Component Value Date    LABA1C 7.8 (H) 02/25/2020    LABA1C 7.4 01/06/2020    LABA1C 10.3 (H) 09/16/2019       STATIN ADHERENCE  None noted    Lab Results   Component Value Date    CHOL 162 01/28/2020    TRIG 168 01/28/2020    HDL 74 01/28/2020    LDLCALC 54 01/28/2020     ALT   Date Value Ref Range Status   02/25/2020 19 11 - 66 U/L Final     Comment:     Performed at [facility], [address]     AST   Date Value Ref Range Status   02/25/2020 19 5 - 40 U/L Final     The ASCVD Risk score (Zulay Vera., et al., 2013) failed to calculate for the following reasons: The 2013 ASCVD risk score is only valid for ages 36 to 78     PLAN  Reached patient to review. Barrier(s) identified: cost  Januvia. Patient vaguely recalls this added to her metformin. Thinks it was cost prohibitive. Reports her daily readings run 140-180 pre prandial.  She will ask about this at her next appt. Offered medication assistance but none needed at this time. Informed her marcozijack Collins was pending.

## 2020-05-26 ENCOUNTER — HOSPITAL ENCOUNTER (OUTPATIENT)
Dept: AUDIOLOGY | Age: 85
Discharge: HOME | End: 2020-05-26
Payer: MEDICARE

## 2020-05-26 ENCOUNTER — OFFICE VISIT (OUTPATIENT)
Dept: ENT CLINIC | Age: 85
End: 2020-05-26
Payer: MEDICARE

## 2020-05-26 VITALS
TEMPERATURE: 97.9 F | SYSTOLIC BLOOD PRESSURE: 120 MMHG | RESPIRATION RATE: 14 BRPM | BODY MASS INDEX: 31.8 KG/M2 | DIASTOLIC BLOOD PRESSURE: 78 MMHG | WEIGHT: 197 LBS | HEART RATE: 66 BPM

## 2020-05-26 PROCEDURE — 92557 COMPREHENSIVE HEARING TEST: CPT | Performed by: AUDIOLOGIST

## 2020-05-26 PROCEDURE — 99213 OFFICE O/P EST LOW 20 MIN: CPT | Performed by: OTOLARYNGOLOGY

## 2020-05-26 PROCEDURE — 92584 ELECTROCOCHLEOGRAPHY: CPT | Performed by: AUDIOLOGIST

## 2020-05-26 PROCEDURE — 92567 TYMPANOMETRY: CPT | Performed by: AUDIOLOGIST

## 2020-05-26 ASSESSMENT — ENCOUNTER SYMPTOMS
SHORTNESS OF BREATH: 0
COLOR CHANGE: 0
NAUSEA: 0
ABDOMINAL PAIN: 0
VOICE CHANGE: 0
RHINORRHEA: 0
FACIAL SWELLING: 0
SINUS PRESSURE: 0
DIARRHEA: 0
CHEST TIGHTNESS: 0
TROUBLE SWALLOWING: 0
COUGH: 0
APNEA: 0
SORE THROAT: 0
WHEEZING: 0
STRIDOR: 0
CHOKING: 0
VOMITING: 0

## 2020-05-26 NOTE — PROGRESS NOTES
chest pain, palpitations and leg swelling. Gastrointestinal: Negative for abdominal pain, diarrhea, nausea and vomiting. Endocrine: Negative for cold intolerance, heat intolerance, polydipsia and polyuria. Genitourinary: Negative for dysuria, enuresis and hematuria. Musculoskeletal: Negative for arthralgias, gait problem, neck pain and neck stiffness. Skin: Negative for color change and rash. Allergic/Immunologic: Negative for environmental allergies, food allergies and immunocompromised state. Neurological: Negative for dizziness, syncope, facial asymmetry, speech difficulty, light-headedness and headaches. Hematological: Negative for adenopathy. Does not bruise/bleed easily. Psychiatric/Behavioral: Negative for confusion and sleep disturbance. The patient is not nervous/anxious. Objective:   /78 (Site: Left Upper Arm, Position: Sitting)   Pulse 66   Temp 97.9 °F (36.6 °C)   Resp 14   Wt 197 lb (89.4 kg)   BMI 31.80 kg/m²     Physical Exam   Ears: Normal  Romberg stance intact  Hallpike testing negative    Data:  All of the past medical history, past surgical history, family history,social history, allergies and current medications were reviewed with the patient. Assessment & Plan   Diagnoses and all orders for this visit:     Diagnosis Orders   1. Tinnitus, right  Ambulatory referral to Physical Therapy   2. Asymmetrical sensorineural hearing loss  Ambulatory referral to Physical Therapy   3. Dizziness  Ambulatory referral to Physical Therapy    Orthostatic component. History compatible with vestibular neuronitis to years ago       The findings were explained and her questions were answered. X  Disequilibrium when walking, some sensation of vertigo for a few moments on standing up. Negative Hallpike's and MRI negative for retrocochlear lesion. Minimal small vessel disease on MRI. Meclizine seems to help but should be used sparingly.     I suggested making a request for

## 2020-05-26 NOTE — PROGRESS NOTES
AUDIOLOGICAL EVALUATION with EcoG      REASON FOR TESTING:  Repeat audiometry and tympanometry with Electrocochleography due to episodic vertigo that coincides with a ticking sound in the patient's right ear. The ticking sound gets worse at times and has become very bothersome to her. The patient notes vertigo upon standing and also when rolling to her right side in bed. Patient recently had MRI of IACs. OTOSCOPY: WNL for both ears. AUDIOGRAM        Reliability: Good  Audiometer Used:  GSI-61    COMMENTS: Normal hearing sensitivity, sloping to moderate high frequency sensorineural hearing loss for the right ear. Thresholds for the left ear are on the borderline of normal, sloping to moderate high frequency conductive hearing loss. A conductive component is noted at 250Hz for both ears and at [address] in the left ear. Thresholds are slightly improved, relative to 2/24/20 audiogram. Speech discrimination ability is excellent, bilaterally. Tympanometry revealed normal peak pressure and normal middle ear compliance for the right ear and normal peak pressure with high middle ear compliance for the left ear. ECOCHG RESULTS:  Transducer: TM electrode    RIGHT EAR: SP/AP area ratio is less than 2, which is within normal limits. SP/AP amplitude ratio is less than 35%, which is within normal limits. LEFT EAR: SP/AP area ratio is less than 2, which is within normal limits. SP/AP amplitude ratio is less than 35%, which is within normal limits. IMPRESSIONS:   Normal EcochG examination for both ears. There is no electrophysiologic evidence of increased labyrinthine pressure. RECOMMENDATIONS:   1- Continue care with Dr. Albert Raymundo today, as scheduled  to review EcoG and audiogram/tympanogram results. 2- Given the patient's report of vertigo when rolling to her right side in bed, a Hallpike maneuver/VNG testing would be beneficial to rule out BPPV. 3- A tinnitus masking device could be considered.

## 2020-06-01 ENCOUNTER — OFFICE VISIT (OUTPATIENT)
Dept: INTERNAL MEDICINE CLINIC | Age: 85
End: 2020-06-01
Payer: MEDICARE

## 2020-06-01 VITALS — BODY MASS INDEX: 30.99 KG/M2 | WEIGHT: 192 LBS

## 2020-06-01 PROCEDURE — 97803 MED NUTRITION INDIV SUBSEQ: CPT | Performed by: DIETITIAN, REGISTERED

## 2020-06-01 NOTE — PROGRESS NOTES
[address] [place] [address]. [address] [place] [address] [place], [facility], [address]  [phone] (phone)  [phone] (fax)    Patient Name: Ledy Rouse. Date of Birth: [id number]. MRN: [id number]      Assessment: Patient is a 82 y.o. female seen for follow-up MNT visit for Diabetes.      -Nutritionally relevant labs:   Lab Results   Component Value Date/Time    LABA1C 7.8 (H) 02/25/2020 12:04 PM    LABA1C 7.4 01/06/2020 11:47 AM    LABA1C 10.3 (H) 09/16/2019 11:35 AM    GLUCOSE 141 (H) 02/25/2020 12:04 PM    GLUCOSE 205 (H) 01/28/2020 09:35 AM    CHOL 162 01/28/2020 09:35 AM    HDL 74 01/28/2020 09:35 AM    LDLCALC 54 01/28/2020 09:35 AM    TRIG 168 01/28/2020 09:35 AM     -Blood sugar trends: pt states she has not had bld sugar strips for the last month due to running out of pre paid strips and not wanting to purchase more strips     May readings: 235 (1/2 hour after breakfast), 137(before bed), 188 (1/2 hour after breakfast), 118 (before bed), 139 (before bed)   Kasey Beckwith states supper is 9-9:30 pm     -Food recall:   Breakfast: toast/ oatmeal and toast  Lunch: fruit   Snack: cookie, icecream   Dinner: meat, potato, veggies      -Main Beverages: water  -  Current Outpatient Medications on File Prior to Visit   Medication Sig Dispense Refill    metFORMIN (GLUCOPHAGE) 1000 MG tablet Take 1 tablet by mouth 2 times daily (with meals) 180 tablet 1    metoprolol tartrate (LOPRESSOR) 25 MG tablet TAKE 1 TABLET BY MOUTH TWICE A  tablet 2    hydrOXYzine (ATARAX) 25 MG tablet TAKE 1 TABLET BY MOUTH EVERY 6 HOURS AS NEEDED 60 tablet 3    meclizine (ANTIVERT) 12.5 MG tablet TAKE 1 TABLET BY MOUTH 3 TIMES DAILY AS NEEDED FOR DIZZINESS OR NAUSEA 60 tablet 0    losartan (COZAAR) 50 MG tablet take 1 tablet by mouth once daily 90 tablet 1    potassium chloride (KLOR-CON M) 10 MEQ extended release tablet Take 1 tablet by mouth daily 90 tablet 1    Petrolatum 42 % OINT Apply topically 2 times daily       SITagliptin (JANUVIA) 50 MG tablet Take 1 tablet by mouth daily (Patient not taking: Reported on 6/1/2020) 90 tablet 1    doxepin (SINEQUAN) 10 MG capsule Take 10 mg by mouth nightly      montelukast (SINGULAIR) 10 MG tablet Take 10 mg by mouth daily       famotidine (PEPCID) 20 MG tablet Take 1 tablet by mouth 2 times daily 60 tablet 0    warfarin (COUMADIN) 5 MG tablet TAKE AS DIRECTED BY [facility] TABS = 30 DAY SUPPLY 90 tablet 1    blood glucose monitor strips Test one time a day dispense whatever stirps insurance covers. 90 strip 3    Lancets MISC 1 each by Does not apply route daily Test once daily dispense what insurance ocvers 90 each 3    blood glucose monitor kit and supplies Test one time a day . Dispense 30 strips a month with 5 refills Please dispense meter and suppliesthat insurance covers . 1 kit 0    hydrochlorothiazide (MICROZIDE) 12.5 MG capsule Take 1 capsule by mouth every morning 90 capsule 1    levocetirizine (XYZAL) 5 MG tablet TAKE 1 TABLET BY MOUTH EVERYDAY AT BEDTIME  12    UNABLE TO FIND Allergy shot every Friday at allergist's office.  amLODIPine (NORVASC) 10 MG tablet Take 1 tablet by mouth daily Indications: Raised Blood Pressure 90 tablet 3     No current facility-administered medications on file prior to visit. Vitals from current and previous visits: Wt 192 lb (87.1 kg)   BMI 30.99 kg/m²     -Body mass index is 30.99 kg/m². 30-34.9 - Obesity Grade I.   - Patient lost 9 # in the last 4 months    Nutrition Diagnosis:   Food and nutrition-related knowledge deficit related to Lack of previous MNT/currently undergoing MNT as evidenced by Food recall. Intervention:  -Impression: Johny Mcadams is having headaches and ringing in the ear that is effecting her daily life and desire to prepare food. She is eating three times a day and snacks but some meals just fruit.  Bld sugars elevated in am but pt is checking 1/2

## 2020-06-01 NOTE — PATIENT INSTRUCTIONS
1.) For lunch add a protein source and whole grain     Protein: HB egg, cheese, tuna, left over meat from supper, beans, salmon     Whole grains: crackers, wheat wraps, quinoa, beans, brown rice, whole wheat bread    2.) Check blood sugar before eating breakfast: goal is < 140 -160

## 2020-06-02 ENCOUNTER — NURSE ONLY (OUTPATIENT)
Dept: LAB | Age: 85
End: 2020-06-02

## 2020-06-02 ENCOUNTER — HOSPITAL ENCOUNTER (OUTPATIENT)
Dept: PHARMACY | Age: 85
Setting detail: THERAPIES SERIES
Discharge: HOME | End: 2020-06-02
Payer: MEDICARE

## 2020-06-02 LAB — INR BLD: 2.51 (ref 0.85–1.13)

## 2020-06-02 PROCEDURE — 99211 OFF/OP EST MAY X REQ PHY/QHP: CPT

## 2020-06-03 ENCOUNTER — HOSPITAL ENCOUNTER (OUTPATIENT)
Dept: PHYSICAL THERAPY | Age: 85
Setting detail: THERAPIES SERIES
Discharge: HOME | End: 2020-06-03
Payer: MEDICARE

## 2020-06-03 PROCEDURE — 97112 NEUROMUSCULAR REEDUCATION: CPT

## 2020-06-03 PROCEDURE — 97162 PT EVAL MOD COMPLEX 30 MIN: CPT

## 2020-06-03 NOTE — PROGRESS NOTES
** PLEASE SIGN, DATE AND TIME CERTIFICATION BELOW AND RETURN TO [facility] OUTPATIENT REHABILITATION (FAX #: [phone]). ATTEST/CO-SIGN IF ACCESSING VIA [employer]Vide[employer]ot. THANK YOU.**    I certify that I have examined the patient below and determined that Physical Medicine and Rehabilitation service is necessary and that I approve the established plan of care for up to 90 days or as specifically noted. Attestation, signature or co-signature of physician indicates approval of certification requirements.    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 Aquatics and modalities are covered except ionto and phono. Total # of Visits to Date: 1  Plan of Care/Certification Expiration Date: 20  Progress Note Counter: 1/10 for PN                        See Medical History Questionnaire for information related to allergies and medications. Subjective:  Chart Reviewed: Yes  Patient assessed for rehabilitation services?: Yes  Family / Caregiver Present: No  Comments: Follow up with doctor as needed  Other (Comment): Request for vestibular therapy.   Disequilibrium when walking, some sensation of vertigo for a few moments when standing up. Negative Hallpike's and MRI negative for retrocochlear lesion. Minimal small vessel disease on MRI. Meclizine seems to help but should be used sparingly. Subjective: Patient reports she has had dizziness for several years. A couple months ago she began having hearing loss, headaches, ringing in the ear. Reports the dizziness primarily is in the mornings and sometimes wakes her up. Dizziness goes away as the day progresses. Patient reports did have some dizziness when getting out of bed this morning. Reports has had 2 hearing tests. Pain:  Patient Currently in Pain: No        Social/Functional History:    Type of Home: House  Home Layout: Two level, Bed/Bath upstairs(Has a chair lift to get upstairs)  Home Access: Stairs to enter without rails  Entrance Stairs - Number of Steps: 1 step up from garage                   Homemaking Responsibilities: Yes  Meal Prep Responsibility: Secondary  Laundry Responsibility: Secondary  Ambulation Assistance: Independent  Transfer Assistance: Independent    Active : Yes  Mode of Transportation: Car  Occupation: Retired    Objective  Overall Orientation Status: Within Normal Limits             Vision: Within Functional Limits    Hearing: Exceptions to [facility]  Hearing Exceptions: Other (comment)(Ringing in ear nad hearing loss)             Exercises  Exercise 1: Modified Vertebral Artery=negative  Exercise 2: Difficulty with tracking pen with head movements. Reports it feels uncomfortable. Exercise 3: [place] Hallpike=negative. Roll test=negative. Exercise 4: Vestibular exercises with patient sitting with feet on floor. Saccades, focusing x10 each  Exercise 5: Next session: tracking, targets  Exercise 6: Next session: ambulation with vertical and horizontal head turns  Exercise 7: Next session: advanced gait               Dizziness Handicap Inventory   1. Does looking up increase your problem? 0 - No   2.  Because of your problem, do you feel frustrated? 0 - No   3. Because of your problem, do you restrict your travel for business or pleasure? 0 - No   4. Does walking down the aisle of a supermarket increase your problem? 0 - No   5. Because of your problem, do you have difficulty getting into or out of bed? 0 - No   6. Does your problem significantly restrict your participation in social activities, such as going out to dinner, going to movies, dancing or to parties? 0 - No   7. Because of your problem, do you have difficulty reading? 0 - No   8. Does performing more ambitious activities like sports, dancing, and household chores, such as sweeping or putting dishes away increase your problem? 0 - No   9. Because of your problem, are you afraid to leave your home without having someone accompany you? 2 - Sometimes   10. Because of your problem, have you been embarrassed in front of others? 0 - No   11. Do quick head movements of your head increase your problem? 0 - No   12. Because of your problem, do you avoid heights? 0 - No   13. Does turning over in bed increase your problem? 2 - Sometimes   14. Because of your problem, is it difficult for you to do strenuous housework or yard work? 4 - Always   15. Because of your problem, are you afraid people may think that you are intoxicated? 0 - No   16. Because of your problem, is it difficult for you to go for a walk by yourself? 0 - No   17. Does walking down a sidewalk increase your problem? 0 - No   18. Because of your problem, is it difficult for you to concentrate? 0 - No   19. Because of your problem, is it difficult for you to walk around your house in the dark? 0 - No   20. Because of your problem, are you afraid to stay home alone? 0 - No   21. Because of your problem, do you feel handicapped? 0 - No   22. Has your problem placed stress on your relationship with members of your family or friends? 0 - No   23. Because of your problem, are you depressed?  2 - Sometimes assessment and decision making process in determining plan of care and establishing reasonable expectations for measurable functional outcomes. Evaluation Complexity: Based on the findings of patient history, examination, clinical presentation, and decision making during this evaluation, the evaluation of Jah Oleary  is of medium complexity. Goals:  Patient goals : \"I want to feel better. \"    Short term goals  Time Frame for Short term goals: 4 weeks  Short term goal 1: Improve DHI to 8 or less to assist with safety at home. Short term goal 2: Patient to report decreased dizziness to assist with getting in and out of bed. Long term goals  Time Frame for Long term goals : 12 weeks  Long term goal 1: Independent with HEP and with progression to assist with improving balance.     Anthony Ramírez

## 2020-06-08 ENCOUNTER — HOSPITAL ENCOUNTER (OUTPATIENT)
Dept: PHYSICAL THERAPY | Age: 85
Setting detail: THERAPIES SERIES
Discharge: HOME | End: 2020-06-08
Payer: MEDICARE

## 2020-06-08 PROCEDURE — 97112 NEUROMUSCULAR REEDUCATION: CPT

## 2020-06-08 PROCEDURE — 97110 THERAPEUTIC EXERCISES: CPT

## 2020-06-08 RX ORDER — HYDROXYZINE HYDROCHLORIDE 25 MG/1
TABLET, FILM COATED ORAL
Qty: 60 TABLET | Refills: 3 | OUTPATIENT
Start: 2020-06-08

## 2020-06-08 NOTE — PROGRESS NOTES
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 Aquatics and modalities are covered except ionto and phono. Total # of Visits to Date: 2  Progress Note Counter: 2/10 for PN               Subjective:  Family / Caregiver Present: No  Comments: Follow up with doctor as needed     Subjective: Patient reports she started the exercises but is not noticing much difference yet. Pain:  Patient Currently in Pain: No         Objective    Exercises  Exercise 4: Vestibular exercises with patient sitting with feet on floor. Saccades, focusing, tracking, targets x10 each  Exercise 5: Balancing on foam: feet side by side, step stance x30 seconds each  Exercise 6: Ambulation with vertical and horizontal head turns x2 lap each in parallel bars  Exercise 7: advanced gait in parallel bars: heel to toe forward and backward x2 laps each  Exercise 8: Rockerboard x10 front to back and side to side x30 seconds each         Activity Tolerance:  Activity Tolerance: Patient Tolerated treatment well    Assessment:   Body structures, Functions, Activity limitations: Decreased functional mobility , Decreased ADL status, Increased pain, Decreased balance, Vestibular Impairment  Assessment: Yaneli needed to use handrails when completing ambulation activities in therapy. Prognosis: Good  REQUIRES PT FOLLOW UP: Yes  Discharge Recommendations: Continue to assess pending progress    Patient Education:  PT Education: Home Exercise Program, Goals, Plan of Care  Patient Education: Progressed balance exercises and included standing exercises. Plan:  Times per week: 1-2 times per week  Plan weeks: 12 weeks  Specific instructions for Next Treatment: Vestibular recheck, Gaze stabilization, balance exercises  Current Treatment Recommendations: Functional Mobility Training, Home Exercise Program, Modalities, Gait Training, Balance Training, Vestibular Rehab, Positioning, Stair training, Endurance Training  Plan Comment: Continue per POC    Goals:  Patient goals : \"I want to feel better. \"    Short term goals  Time Frame for Short term goals: 4 weeks  Short term goal 1: Improve DHI to 8 or less to assist with safety at home. Short term goal 2: Patient to report decreased dizziness to assist with getting in and out of bed. Long term goals  Time Frame for Long term goals : 12 weeks  Long term goal 1: Independent with HEP and with progression to assist with improving balance.     Anthony Huertas

## 2020-06-09 RX ORDER — HYDROCHLOROTHIAZIDE 12.5 MG/1
CAPSULE, GELATIN COATED ORAL
Qty: 90 CAPSULE | Refills: 1 | Status: SHIPPED | OUTPATIENT
Start: 2020-06-09 | End: 2020-08-11

## 2020-06-11 ENCOUNTER — HOSPITAL ENCOUNTER (OUTPATIENT)
Dept: PHYSICAL THERAPY | Age: 85
Setting detail: THERAPIES SERIES
Discharge: HOME | End: 2020-06-11
Payer: MEDICARE

## 2020-06-11 PROCEDURE — 97112 NEUROMUSCULAR REEDUCATION: CPT

## 2020-06-11 PROCEDURE — 97110 THERAPEUTIC EXERCISES: CPT

## 2020-06-11 NOTE — PROGRESS NOTES
[place]     Time In: 4511  Time Out: 1250  Minutes: 30  Timed Code Treatment Minutes: 30 Minutes     Date: 2020  Patient Name: Maylon Lesch,  Gender:  female        CSN: [id number]   : 1935  (82 y.o.)  Referral Date : 20    Referring Practitioner: Leta Degroot MD      Diagnosis: H93.11 (ICD-10-CM) - Tinnitus, right, H90.5 (ICD-10-CM) - Asymmetrical sensorineural hearing loss, R42 (ICD-10-CM) - Dizziness  Treatment Diagnosis: Dizziness   Additional Pertinent Hx: High Blood Pressure, Vertigo, Diabetes       General:  PT Visit Information  Onset Date: 20  PT Insurance Information: MEDICARE-$40 copay per visit. Aquatics and modalities are covered except ionto and phono. Total # of Visits to Date: 3  Plan of Care/Certification Expiration Date: 20  Progress Note Counter: 3/10 for PN             Subjective:  Family / Caregiver Present: No  Comments: Follow up with doctor as needed     Subjective: Patient reports she is having a slight headache with dizziness. States she is working on [employer]. Denies falls. Pain:  Patient Currently in Pain: No     Objective    Exercises  Exercise 4: Vestibular exercises with patient sitting with feet on floor. Saccades, focusing, tracking, targets x10 each  Exercise 5: Balancing on foam: feet side by side, step stance x1 minute each with 1 UE support  Exercise 6: Ambulation with vertical and horizontal head turns x2 lap each in parallel bars  Exercise 7: Advanced gait in parallel bars: heel to toe forward and backward x2 laps each  Exercise 8: Rockerboard x15 each front to back and side to side x30 seconds each  Exercise 9: Tandem stance on floor x30 seconds each way       Activity Tolerance:  Activity Tolerance: Patient Tolerated treatment well    Assessment:   Body structures, Functions, Activity limitations: Decreased functional mobility , Decreased ADL status, Increased pain, Decreased balance, Vestibular Impairment  Assessment: Patient needed 1-2 handrails with most activities. Encouraged patient to attempt using only 1 UE for support but still had occasional touches with opposite hand. Patient had mild nauseous feeling with seated vesticular exercises. Prognosis: Good  REQUIRES PT FOLLOW UP: Yes  Discharge Recommendations: Continue to assess pending progress    Patient Education:  PT Education: Home Exercise Program, Goals, Plan of Care    Plan:  Times per week: 1-2 times per week  Plan weeks: 12 weeks  Specific instructions for Next Treatment: Vestibular recheck, Gaze stabilization, balance exercises  Current Treatment Recommendations: Functional Mobility Training, Home Exercise Program, Modalities, Gait Training, Balance Training, Vestibular Rehab, Positioning, Stair training, Endurance Training  Plan Comment: Continue per POC    Goals:  Patient goals : \"I want to feel better. \"    Short term goals  Time Frame for Short term goals: 4 weeks  Short term goal 1: Improve DHI to 8 or less to assist with safety at home. Short term goal 2: Patient to report decreased dizziness to assist with getting in and out of bed. Long term goals  Time Frame for Long term goals : 12 weeks  Long term goal 1: Independent with HEP and with progression to assist with improving balance. [facility].  Jose Alejandro, [address], 6/11/2020

## 2020-06-15 RX ORDER — MECLIZINE HCL 12.5 MG/1
12.5 TABLET ORAL 3 TIMES DAILY PRN
Qty: 60 TABLET | Refills: 0 | Status: SHIPPED | OUTPATIENT
Start: 2020-06-15 | End: 2020-07-06

## 2020-06-18 ENCOUNTER — TELEPHONE (OUTPATIENT)
Dept: ENT CLINIC | Age: 85
End: 2020-06-18
Payer: MEDICARE

## 2020-06-18 PROCEDURE — 92557 COMPREHENSIVE HEARING TEST: CPT | Performed by: PHYSICIAN ASSISTANT

## 2020-06-23 ENCOUNTER — NURSE ONLY (OUTPATIENT)
Dept: LAB | Age: 85
End: 2020-06-23

## 2020-06-23 ENCOUNTER — HOSPITAL ENCOUNTER (OUTPATIENT)
Dept: PHARMACY | Age: 85
Setting detail: THERAPIES SERIES
Discharge: HOME | End: 2020-06-23
Payer: MEDICARE

## 2020-06-23 LAB — INR BLD: 2.72 (ref 0.85–1.13)

## 2020-06-23 PROCEDURE — 99211 OFF/OP EST MAY X REQ PHY/QHP: CPT

## 2020-06-29 RX ORDER — AMLODIPINE BESYLATE 10 MG/1
TABLET ORAL
Qty: 90 TABLET | Refills: 3 | Status: SHIPPED | OUTPATIENT
Start: 2020-06-29 | End: 2020-08-11

## 2020-07-02 ENCOUNTER — HOSPITAL ENCOUNTER (EMERGENCY)
Age: 85
Discharge: HOME | End: 2020-07-02
Attending: EMERGENCY MEDICINE
Payer: MEDICARE

## 2020-07-02 VITALS
HEIGHT: 66 IN | SYSTOLIC BLOOD PRESSURE: 133 MMHG | TEMPERATURE: 98.8 F | WEIGHT: 200 LBS | HEART RATE: 78 BPM | OXYGEN SATURATION: 98 % | DIASTOLIC BLOOD PRESSURE: 104 MMHG | BODY MASS INDEX: 32.14 KG/M2 | RESPIRATION RATE: 18 BRPM

## 2020-07-02 LAB
APTT: 49.2 SECONDS (ref 22–38)
BASOPHILS # BLD: 0.4 %
BASOPHILS ABSOLUTE: 0 THOU/MM3 (ref 0–0.1)
EOSINOPHIL # BLD: 2.9 %
EOSINOPHILS ABSOLUTE: 0.2 THOU/MM3 (ref 0–0.4)
ERYTHROCYTE [DISTWIDTH] IN BLOOD BY AUTOMATED COUNT: 15.6 % (ref 11.5–14.5)
ERYTHROCYTE [DISTWIDTH] IN BLOOD BY AUTOMATED COUNT: 40.3 FL (ref 35–45)
HCT VFR BLD CALC: 35.4 % (ref 37–47)
HEMOGLOBIN: 11 GM/DL (ref 12–16)
IMMATURE GRANS (ABS): 0.01 THOU/MM3 (ref 0–0.07)
IMMATURE GRANULOCYTES: 0.2 %
INR BLD: 2.57 (ref 0.85–1.13)
LYMPHOCYTES # BLD: 52.1 %
LYMPHOCYTES ABSOLUTE: 2.7 THOU/MM3 (ref 1–4.8)
MCH RBC QN AUTO: 22.9 PG (ref 26–33)
MCHC RBC AUTO-ENTMCNC: 31.1 GM/DL (ref 32.2–35.5)
MCV RBC AUTO: 73.6 FL (ref 81–99)
MONOCYTES # BLD: 9.1 %
MONOCYTES ABSOLUTE: 0.5 THOU/MM3 (ref 0.4–1.3)
NUCLEATED RED BLOOD CELLS: 0 /100 WBC
PLATELET # BLD: 217 THOU/MM3 (ref 130–400)
PMV BLD AUTO: 9.5 FL (ref 9.4–12.4)
RBC # BLD: 4.81 MILL/MM3 (ref 4.2–5.4)
SEG NEUTROPHILS: 35.3 %
SEGMENTED NEUTROPHILS ABSOLUTE COUNT: 1.8 THOU/MM3 (ref 1.8–7.7)
WBC # BLD: 5.2 THOU/MM3 (ref 4.8–10.8)

## 2020-07-02 PROCEDURE — 85730 THROMBOPLASTIN TIME PARTIAL: CPT

## 2020-07-02 PROCEDURE — 85025 COMPLETE CBC W/AUTO DIFF WBC: CPT

## 2020-07-02 PROCEDURE — 36415 COLL VENOUS BLD VENIPUNCTURE: CPT

## 2020-07-02 PROCEDURE — 85610 PROTHROMBIN TIME: CPT

## 2020-07-02 PROCEDURE — 99285 EMERGENCY DEPT VISIT HI MDM: CPT

## 2020-07-02 ASSESSMENT — ENCOUNTER SYMPTOMS
STRIDOR: 0
CONSTIPATION: 0
EYE DISCHARGE: 0
WHEEZING: 0
EYE PAIN: 0
ABDOMINAL PAIN: 0
SHORTNESS OF BREATH: 0
VOMITING: 0
SORE THROAT: 0
NAUSEA: 0
ABDOMINAL DISTENTION: 0
COUGH: 0
CHEST TIGHTNESS: 0
DIARRHEA: 0
EYE ITCHING: 0
RHINORRHEA: 0
PHOTOPHOBIA: 0
BACK PAIN: 0
EYE REDNESS: 0

## 2020-07-02 NOTE — ED NOTES
Pt stable A&O x 3 given discharge and follow up info. Pt voiced no concerns and discharged from ER to self to home. Pt assisted out of ER via wheelchair with no complications .        Tona Yung RN  07/02/20 [id number]

## 2020-07-02 NOTE — ED PROVIDER NOTES
[address]          CHIEF COMPLAINT       Chief Complaint   Patient presents with    Epistaxis       Nurses Notes reviewed and I agreeexcept as noted in the HPI. HISTORY OF PRESENT ILLNESS    Andrea Alonso is a 82 y.o. female who presents to Emergency Department with Epistaxis    26-year-old female with past medical history of atrial fibrillation on Coumadin presents to ED by EMS complaining of right nose bleeding after she rubbed her nose about one hour ago. Patient plucked her right nose with some tissue and bleeding stopped on arrival.  No other complaints. REVIEW OF SYSTEMS     Review of Systems   Constitutional: Negative for activity change, appetite change, chills, fatigue, fever and unexpected weight change. HENT: Positive for nosebleeds. Negative for congestion, ear discharge, ear pain, hearing loss, rhinorrhea and sore throat. Eyes: Negative for photophobia, pain, discharge, redness and itching. Respiratory: Negative for cough, chest tightness, shortness of breath, wheezing and stridor. Cardiovascular: Negative for chest pain, palpitations and leg swelling. Gastrointestinal: Negative for abdominal distention, abdominal pain, constipation, diarrhea, nausea and vomiting. Endocrine: Negative for cold intolerance, heat intolerance, polydipsia and polyphagia. Genitourinary: Negative for dysuria, flank pain, frequency and hematuria. Musculoskeletal: Negative for arthralgias, back pain, gait problem, myalgias, neck pain and neck stiffness. Skin: Negative for pallor, rash and wound. Allergic/Immunologic: Negative for environmental allergies and food allergies. Neurological: Negative for dizziness, tremors, syncope, weakness and headaches. Psychiatric/Behavioral: Negative for agitation, behavioral problems, confusion, self-injury, sleep disturbance and suicidal ideas.           PAST MEDICAL HISTORY    has a past medical history of Atrial fibrillation ([facility] [address] Diabetes mellitus ([facility] [address] and Hypertension. SURGICAL HISTORY      has no past surgical history on file. CURRENT MEDICATIONS       Previous Medications    AMLODIPINE (NORVASC) 10 MG TABLET    TAKE 1 TABLET BY MOUTH EVERY DAY    BLOOD GLUCOSE MONITOR KIT AND SUPPLIES    Test one time a day . Dispense 30 strips a month with 5 refills Please dispense meter and suppliesthat insurance covers . BLOOD GLUCOSE MONITOR STRIPS    Test one time a day dispense whatever stirps insurance covers. DOXEPIN (SINEQUAN) 10 MG CAPSULE    Take 10 mg by mouth nightly    FAMOTIDINE (PEPCID) 20 MG TABLET    Take 1 tablet by mouth 2 times daily    HYDROCHLOROTHIAZIDE (MICROZIDE) 12.5 MG CAPSULE    TAKE 1 CAPSULE BY MOUTH EVERY DAY IN THE MORNING    HYDROXYZINE (ATARAX) 25 MG TABLET    TAKE 1 TABLET BY MOUTH EVERY 6 HOURS AS NEEDED    LANCETS MISC    1 each by Does not apply route daily Test once daily dispense what insurance ocvers    LEVOCETIRIZINE (XYZAL) 5 MG TABLET    TAKE 1 TABLET BY MOUTH EVERYDAY AT BEDTIME    LOSARTAN (COZAAR) 50 MG TABLET    take 1 tablet by mouth once daily    MECLIZINE (ANTIVERT) 12.5 MG TABLET    TAKE 1 TABLET BY MOUTH 3 TIMES DAILY AS NEEDED FOR DIZZINESS OR NAUSEA    METFORMIN (GLUCOPHAGE) 1000 MG TABLET    Take 1 tablet by mouth 2 times daily (with meals)    METOPROLOL TARTRATE (LOPRESSOR) 25 MG TABLET    TAKE 1 TABLET BY MOUTH TWICE A DAY    MONTELUKAST (SINGULAIR) 10 MG TABLET    Take 10 mg by mouth daily     PETROLATUM 42 % OINT    Apply topically 2 times daily     POTASSIUM CHLORIDE (KLOR-CON M) 10 MEQ EXTENDED RELEASE TABLET    Take 1 tablet by mouth daily    SITAGLIPTIN (JANUVIA) 50 MG TABLET    Take 1 tablet by mouth daily    UNABLE TO FIND    Allergy shot every Friday at allergist's office. WARFARIN (COUMADIN) 5 MG TABLET    TAKE AS DIRECTED BY [facility] TABS = 30 DAY SUPPLY       ALLERGIES     has No Known Allergies.     FAMILY present. Musculoskeletal:         General: No tenderness or deformity. Lymphadenopathy:      Cervical: No cervical adenopathy. Skin:     General: Skin is warm and dry. Capillary Refill: Capillary refill takes less than 2 seconds. Coloration: Skin is not pale. Findings: No erythema or rash. Neurological:      Mental Status: She is alert and oriented to person, place, and time. Cranial Nerves: No cranial nerve deficit. Sensory: No sensory deficit. Motor: No abnormal muscle tone. Coordination: Coordination normal.      Deep Tendon Reflexes: Reflexes normal.   Psychiatric:         Behavior: Behavior normal.         Thought Content:  Thought content normal.         Judgment: Judgment normal.           DIFFERENTIAL DIAGNOSIS:   Traumatic epistaxis, excessive anticoagulation    DIAGNOSTIC RESULTS     EKG: All EKG's are interpreted by the Emergency Department Physician who either signs or Co-signsthis chart in the absence of a cardiologist.  Interpreted by me  Not indicated    RADIOLOGY: non-plain film images(s) such as CT, Ultrasound and MRI are read by the radiologist.    No orders to display       []Visualized and interpreted by me   [] Radiologist's Wet Read Report Reviewed   [] Discussed with Radiologist.    LABS:   Results for orders placed or performed during the hospital encounter of 07/02/20   APTT   Result Value Ref Range    aPTT 49.2 (H) 22.0 - 38.0 seconds   Protime-INR   Result Value Ref Range    INR 2.57 (H) 0.85 - 1.13   CBC Auto Differential   Result Value Ref Range    WBC 5.2 4.8 - 10.8 thou/mm3    RBC 4.81 4.20 - 5.40 mill/mm3    Hemoglobin 11.0 (L) 12.0 - 16.0 gm/dl    Hematocrit 35.4 (L) 37.0 - 47.0 %    MCV 73.6 (L) 81.0 - 99.0 fL    MCH 22.9 (L) 26.0 - 33.0 pg    MCHC 31.1 (L) 32.2 - 35.5 gm/dl    RDW-CV 15.6 (H) 11.5 - 14.5 %    RDW-SD 40.3 35.0 - 45.0 fL    Platelets 635 110 - 088 thou/mm3    MPV 9.5 9.4 - 12.4 fL    Seg Neutrophils 35.3 %    Lymphocytes 52.1 %    Monocytes 9.1 %    Eosinophils 2.9 %    Basophils 0.4 %    Immature Granulocytes 0.2 %    Segs Absolute 1.8 1.8 - 7.7 thou/mm3    Lymphocytes Absolute 2.7 1.0 - 4.8 thou/mm3    Monocytes Absolute 0.5 0.4 - 1.3 thou/mm3    Eosinophils Absolute 0.2 0.0 - 0.4 thou/mm3    Basophils Absolute 0.0 0.0 - 0.1 thou/mm3    Immature Grans (Abs) 0.01 0.00 - 0.07 thou/mm3    nRBC 0 /100 wbc       EMERGENCY DEPARTMENT COURSE:   Vitals:    Vitals:    07/02/20 0016 07/02/20 0059   BP: (!) 156/88 (!) 133/104   Pulse: 78    Resp: 18    Temp: 98.8 °F (37.1 °C)    TempSrc: Oral    SpO2: 99% 98%   Weight: 200 lb (90.7 kg)    Height: 5' 6\" (1.676 m)        12:30    Patient is seen and evaluated in a timely fashion. Action:     Direct pressure by pinching nose  labs    MedicalDecision Making    Reassessment: stable. Patient is medicated with following medications during ED stay. Medications - No data to display    Nose clamp is applied for 20 minutes. No more epistaxis in ED. Labs are reassuring. INR 2.57. Patient is discharged with PCP follow-up in next 2 to 3 days. No nose blowing or rubbing nose for the next 5 days. CRITICAL CARE:   None    CONSULTS:  None    PROCEDURES:  None    FINAL IMPRESSION      1. Epistaxis    2.  Current use of long term anticoagulation          DISPOSITION/PLAN   Home    PATIENT REFERRED TO:  Ankit Kwong, APRN - CNP  [address] [address]  [phone]    In 2 days  ED visit follow-up      DISCHARGE MEDICATIONS:  New Prescriptions    No medications on file       (Please note that portions of this note were completed with a voice recognition program.  Efforts were made to edit the dictations but occasionally words aremis-transcribed.)    MD Kneny Sesay MD  07/02/20 [id number]

## 2020-07-06 ENCOUNTER — OFFICE VISIT (OUTPATIENT)
Dept: FAMILY MEDICINE CLINIC | Age: 85
End: 2020-07-06
Payer: MEDICARE

## 2020-07-06 VITALS
TEMPERATURE: 98 F | BODY MASS INDEX: 31.69 KG/M2 | RESPIRATION RATE: 10 BRPM | SYSTOLIC BLOOD PRESSURE: 122 MMHG | HEIGHT: 65 IN | WEIGHT: 190.2 LBS | HEART RATE: 92 BPM | DIASTOLIC BLOOD PRESSURE: 82 MMHG | OXYGEN SATURATION: 100 %

## 2020-07-06 PROBLEM — I48.11 LONGSTANDING PERSISTENT ATRIAL FIBRILLATION (HCC): Status: ACTIVE | Noted: 2018-06-28

## 2020-07-06 LAB — HBA1C MFR BLD: 6.5 % (ref 4.3–5.7)

## 2020-07-06 PROCEDURE — 99214 OFFICE O/P EST MOD 30 MIN: CPT | Performed by: NURSE PRACTITIONER

## 2020-07-06 PROCEDURE — 83036 HEMOGLOBIN GLYCOSYLATED A1C: CPT | Performed by: NURSE PRACTITIONER

## 2020-07-06 PROCEDURE — 93000 ELECTROCARDIOGRAM COMPLETE: CPT | Performed by: NURSE PRACTITIONER

## 2020-07-06 RX ORDER — POTASSIUM CHLORIDE 750 MG/1
10 TABLET, EXTENDED RELEASE ORAL DAILY
Qty: 90 TABLET | Refills: 4 | Status: SHIPPED | OUTPATIENT
Start: 2020-07-06 | End: 2020-10-15

## 2020-07-06 ASSESSMENT — ENCOUNTER SYMPTOMS
SINUS PRESSURE: 0
ABDOMINAL DISTENTION: 0
RESPIRATORY NEGATIVE: 1
RHINORRHEA: 1
SINUS PAIN: 0
ABDOMINAL PAIN: 0
SORE THROAT: 0

## 2020-07-06 NOTE — PROGRESS NOTES
Paul Marcelo [address]  [address] [place] [address]  Dept: [phone]  Dept Fax: [phone]  Loc: [phone]    Deuce Dorsey is a 82 y.o. femalewho presents today for her medical conditions/complaints as noted below. Hortensia Lazaro c/o of Follow-up (DM -2)      HPI:      Pt here to f/u on chronic health concerns. Diabetes Type 2    Glucose control:   Does patient check blood glucoses at home? Yes  Report of hypoglycemia: no  Lab Results       Component                Value               Date                       LABA1C                   6.5 (H)             07/06/2020            No results found for: EAG  Lab Results       Component                Value               Date                       LABA1C                   6.5 (H)             07/06/2020            No results found for: EAG    Symptoms  Polyuria, Polydipsia or Polyphagia? No  Chest Pain, SOB, or Palpitations? -  No  New Vision complaints? No  Paresthesias of the extremities? No    Medications  Current medication were reviewed. Compliant with medications? yes  Medication side effects? No  On ACE-I or ARB? Yes  On antiplatelet therapy? Yes  On Statin? Yes    Last Diabetic Eye Exam: a year ago    Exercise  Exercise? No  Wt Readings from Last 3 Encounters:  07/06/20 : 190 lb 3.2 oz (86.3 kg)  07/02/20 : 200 lb (90.7 kg)  06/01/20 : 192 lb (87.1 kg)      Diet discipline?:  Low salt, fat, sugar diet? yes    Blood pressure control:  BP Readings from Last 3 Encounters:  07/06/20 : 122/82  07/02/20 : (!) 133/104  05/26/20 : 120/78      No results found for: Marce Bass    Lab Results       Component                Value               Date                       LDLCALC                  54                  01/28/2020    A1c controlled, pt has not been taking [place] and [place].            Pt has a longstanding history of persistent atrial fibrillation, she is on chronic anticoagulation, does take bp meds. she doesexperience some dizziness from time has been working with ENT for this without results. Has not fallen down, due to dizziness, had had  Normal ct of head and normal MRI of brain. Did review EKG today with persistent a fib. Will refer to cardiology, denies chest pain or pedal edema or sob. History of hypokalemia, does take daily potassium for this. Take singulair and allergy pills for routine allergies. The meds do work well for her. Control drainage and cough. Current Outpatient Medications   Medication Sig Dispense Refill    SITagliptin (JANUVIA) 50 MG tablet Take 1 tablet by mouth daily 90 tablet 4    potassium chloride (KLOR-CON M) 10 MEQ extended release tablet Take 1 tablet by mouth daily 90 tablet 4    amLODIPine (NORVASC) 10 MG tablet TAKE 1 TABLET BY MOUTH EVERY DAY 90 tablet 3    hydroCHLOROthiazide (MICROZIDE) 12.5 MG capsule TAKE 1 CAPSULE BY MOUTH EVERY DAY IN THE MORNING 90 capsule 1    metoprolol tartrate (LOPRESSOR) 25 MG tablet TAKE 1 TABLET BY MOUTH TWICE A  tablet 2    hydrOXYzine (ATARAX) 25 MG tablet TAKE 1 TABLET BY MOUTH EVERY 6 HOURS AS NEEDED 60 tablet 3    losartan (COZAAR) 50 MG tablet take 1 tablet by mouth once daily 90 tablet 1    Petrolatum 42 % OINT Apply topically 2 times daily       metFORMIN (GLUCOPHAGE) 1000 MG tablet Take 1 tablet by mouth 2 times daily (with meals) 180 tablet 1    montelukast (SINGULAIR) 10 MG tablet Take 10 mg by mouth daily       warfarin (COUMADIN) 5 MG tablet TAKE AS DIRECTED BY [facility] #[facility] TABS = 30 DAY SUPPLY 90 tablet 1    blood glucose monitor strips Test one time a day dispense whatever stirps insurance covers. 90 strip 3    Lancets MISC 1 each by Does not apply route daily Test once daily dispense what insurance ocvers 90 each 3    blood glucose monitor kit and supplies Test one time a day .  Dispense 30 strips a month with 5 refills Please dispense meter and suppliesthat Neurological: Positive for dizziness. Negative for facial asymmetry, weakness, light-headedness and numbness. Psychiatric/Behavioral: Positive for confusion and decreased concentration. Negative for agitation, behavioral problems, self-injury, sleep disturbance and suicidal ideas. Objective:      /82   Pulse 92   Temp 98 °F (36.7 °C) (Oral)   Resp 10   Ht 5' 4.96\" (1.65 m)   Wt 190 lb 3.2 oz (86.3 kg)   SpO2 100%   BMI 31.69 kg/m²      Physical Exam  Vitals signs and nursing note reviewed. Constitutional:       Appearance: She is not ill-appearing. HENT:      Right Ear: Tympanic membrane, ear canal and external ear normal.      Left Ear: Tympanic membrane, ear canal and external ear normal.      Nose: Nose normal.      Mouth/Throat:      Mouth: Mucous membranes are moist.   Cardiovascular:      Rate and Rhythm: Normal rate and regular rhythm. Pulses: Normal pulses. Heart sounds: Normal heart sounds. No murmur. Pulmonary:      Effort: Pulmonary effort is normal. No respiratory distress. Breath sounds: Normal breath sounds. No wheezing. Abdominal:      General: Abdomen is flat. Bowel sounds are normal. There is no distension. Palpations: Abdomen is soft. Musculoskeletal:      Right lower leg: No edema. Left lower leg: No edema. Skin:     General: Skin is warm and dry. Capillary Refill: Capillary refill takes less than 2 seconds. Neurological:      General: No focal deficit present. Mental Status: She is alert. Cranial Nerves: Cranial nerves are intact. Sensory: Sensation is intact. Motor: Motor function is intact. Psychiatric:         Attention and Perception: Attention normal.         Mood and Affect: Mood is anxious. Speech: Speech is delayed. Behavior: Behavior normal.         Thought Content: Thought content normal.         Cognition and Memory: Memory is impaired.          Judgment: Judgment normal. Assessment/Plan:           1. Type 2 diabetes mellitus without complication, without long-term current use of insulin (HCC)    - Basic Metabolic Panel; Future  - POCT glycosylated hemoglobin (Hb A1C)-6.5  Diabetes stable  Continue current meds    2. Longstanding persistent atrial fibrillation    - Basic Metabolic Panel; Future  - EKG 12 Lead-persistent a fib. - Alexandra Greco MD, Cardiology, [facility]    3. Hypokalemia  Await bmp  - potassium chloride (KLOR-CON M) 10 MEQ extended release tablet; Take 1 tablet by mouth daily  Dispense: 90 tablet; Refill: 4  - Basic Metabolic Panel; Future    4. Chronic anticoagulation    Continue with CC   5. Seasonal allergic rhinitis due to other allergic trigger  Continue current meds  stable    6. Essential hypertension  Continue current meds  stable      Return in about 6 months (around 1/6/2021) for check up. Reccommended tobaccocessation options including pharmacologic methods, counseled great than 3 minutesduring this visit:  Yes[]  No  []       Patient given educational materials -see patient instructions. Discussed use, benefit, and side effects of prescribedmedications. All patient questions answered. Pt voiced understanding. Reviewedhealth maintenance. Instructed to continue current medications, diet and exercise. Patient agreed with treatment plan. Follow up as directed.        Electronicallysigned by JOSH Roberson CNP on 7/6/2020 at 2:42 PM

## 2020-07-20 ENCOUNTER — TELEPHONE (OUTPATIENT)
Dept: FAMILY MEDICINE CLINIC | Age: 85
End: 2020-07-20

## 2020-07-20 ENCOUNTER — OFFICE VISIT (OUTPATIENT)
Dept: INTERNAL MEDICINE CLINIC | Age: 85
End: 2020-07-20
Payer: MEDICARE

## 2020-07-20 VITALS — TEMPERATURE: 97.3 F | BODY MASS INDEX: 31.82 KG/M2 | WEIGHT: 191 LBS

## 2020-07-20 PROCEDURE — 97803 MED NUTRITION INDIV SUBSEQ: CPT | Performed by: DIETITIAN, REGISTERED

## 2020-07-20 NOTE — TELEPHONE ENCOUNTER
Leta Wellington, JOSH - CNP    Patient is out of refills for metformin. I have pended a refill request for your convenience. Last OV on 7/6/2020. Next OV on 1/6/2021. Thank you,   Jose Prieto PharmD, [facility]  Direct: [phone]  Department, toll free [phone], option 7      =======================================================================  Mo,   Please follow pended refill request and outreach to the patient if indicated.      Jose Prieto PharmD, [facility]  Direct: [phone]  Department, toll free [phone], option 7

## 2020-07-20 NOTE — TELEPHONE ENCOUNTER
CLINICAL PHARMACY: ADHERENCE REVIEW  Identified care gap per [employer]; fills at [facility]: ACE/ARB and Diabetes adherence    Last Office Visit: 7/6/20    Patient also appears to be taking: Amlodipine 10mg, Metformin 500mg and Losartan 50mg    ASSESSMENT  ACE/ARB ADHERENCE    Per [facility]   Amlodipine last filled on 7/6/20 for a 90 day supply. 2 refills remaining. Billed through a Medicare plan     Losartan last filled on 4/27/20 for a 90 day supply. 5 refills remaining. Billed through [employer] (per naomi at [employer])      BP Readings from Last 3 Encounters:   07/06/20 122/82   07/02/20 (!) 133/104   05/26/20 120/78     CrCl cannot be calculated (Patient's most recent lab result is older than the maximum 120 days allowed. ). DIABETES ADHERENCE  PDC: 100%     metformin last filled on 4/25/20 for a 90 day supply. 0 refills remaining. Billed through a medicare plan    Lab Results   Component Value Date    LABA1C 6.5 (H) 07/06/2020    LABA1C 7.8 (H) 02/25/2020    LABA1C 7.4 01/06/2020       PLAN  No patient out reach planned at this time. Patient will need a new rx for metformin due to no refill remaining     CLINICAL PHARMACY CONSULT: MED RECONCILIATION/REVIEW ADDENDUM    For Pharmacy Admin Tracking Only    PHSO: Yes  Total # of Interventions Recommended: 3  - New Order #: 0 New Medication Order Reason(s):  Adherence  - Maintenance Safety Lab Monitoring #: 1  - New Therapy Lab Monitoring #: 0  Recommended intervention potential cost savings: 0  Total Interventions Accepted: 1  Time Spent (min): Jamal Obregon

## 2020-07-20 NOTE — PROGRESS NOTES
[address] [place] [address]. [address] [place] [address] Eligio., [facility], [facility]  [phone] (phone)  [phone] (fax)    Patient Name: Shama Serna. Date of Birth: [id number]. MRN: [id number]      Assessment: Patient is a 82 y.o. female seen for follow-up MNT visit for Diabetes. -Nutritionally relevant labs:   Lab Results   Component Value Date/Time    LABA1C 6.5 (H) 07/06/2020 12:38 PM    LABA1C 7.8 (H) 02/25/2020 12:04 PM    LABA1C 7.4 01/06/2020 11:47 AM    LABA1C 10.3 (H) 09/16/2019 11:35 AM    GLUCOSE 141 (H) 02/25/2020 12:04 PM    GLUCOSE 205 (H) 01/28/2020 09:35 AM    CHOL 162 01/28/2020 09:35 AM    HDL 74 01/28/2020 09:35 AM    LDLCALC 54 01/28/2020 09:35 AM    TRIG 168 01/28/2020 09:35 AM     -Blood sugar trends: Checks 2 x/day. June and July per written log FBS: 106 -125. Before bed 119 -214 (most 140 and below). Denies low blood sugars. Discussed treating low blood sugars.     -Food recall:   Breakfast: eggs, toast, apple sauce/ oatmeal/ sometimes baccon. Lunch: crackers and pb and fruit. Dinner: pork chop/broiled chicken/ mash potatoes, beands.      -Main Beverages: water  -  Current Outpatient Medications on File Prior to Visit   Medication Sig Dispense Refill    potassium chloride (KLOR-CON M) 10 MEQ extended release tablet Take 1 tablet by mouth daily 90 tablet 4    amLODIPine (NORVASC) 10 MG tablet TAKE 1 TABLET BY MOUTH EVERY DAY 90 tablet 3    hydroCHLOROthiazide (MICROZIDE) 12.5 MG capsule TAKE 1 CAPSULE BY MOUTH EVERY DAY IN THE MORNING 90 capsule 1    metoprolol tartrate (LOPRESSOR) 25 MG tablet TAKE 1 TABLET BY MOUTH TWICE A  tablet 2    hydrOXYzine (ATARAX) 25 MG tablet TAKE 1 TABLET BY MOUTH EVERY 6 HOURS AS NEEDED 60 tablet 3    losartan (COZAAR) 50 MG tablet take 1 tablet by mouth once daily 90 tablet 1    Petrolatum 42 % OINT Apply topically 2 times daily       doxepin (SINEQUAN) 10 MG capsule Take 10 mg by mouth nightly  montelukast (SINGULAIR) 10 MG tablet Take 10 mg by mouth daily       warfarin (COUMADIN) 5 MG tablet TAKE AS DIRECTED BY [facility] #30 TABS = 30 DAY SUPPLY 90 tablet 1    blood glucose monitor strips Test one time a day dispense whatever stirps insurance covers. 90 strip 3    Lancets MISC 1 each by Does not apply route daily Test once daily dispense what insurance ocvers 90 each 3    blood glucose monitor kit and supplies Test one time a day . Dispense 30 strips a month with 5 refills Please dispense meter and suppliesthat insurance covers . 1 kit 0    UNABLE TO FIND Allergy shot every Monday at allergist's office. No current facility-administered medications on file prior to visit. Vitals from current and previous visits:  Temp 97.3 °F (36.3 °C)   Wt 191 lb (86.6 kg)   BMI 31.82 kg/m²     -Body mass index is 31.82 kg/m². 30-34.9 - Obesity Grade I.     Nutrition Diagnosis:   Food and nutrition-related knowledge deficit related to Lack of previous MNT/currently undergoing MNT as evidenced by New diagnosis of Diabetes. Intervention:  -Impression: Munas blood sugars seemed to have improved. Less reading in the 200s. Her diet is fair to good. Discussed lunch ideas for protein vs pb crackers. Encouraged more exercise it assist in weight loss. Reviewed target blood sugars before and after meals.     -  Patient Instructions   1.) Lunch Ideas: Deviled Egg/ Tuna/ Hummus    2.) Increase your walking to two times daily     3.) Blood Sugar Goals    Before Breakfast: < 130    Before Lunch or Super < 130    2 hr after meal: < 180      Comprehension verified using teachback method.     Monitoring/Evaluation:   -Followup visit: 8 weeks with dietitian.   -Receptiveness to education/goals: Agreeable.  -Evaluation of education: Indicates understanding.  -Readiness to change: action - ready to set action plan and implement dietary changes at lunch and possibly exercise and maintenance - has made change and is trying and/or practicing different alternative behaviors limiting portions. -Expected compliance: good. Thank you for your referral of this patient. Total time involved in direct patient education: 30 minutes for follow-up MNT visit.

## 2020-07-20 NOTE — PATIENT INSTRUCTIONS
1.) Lunch Ideas: Deviled Egg/ Tuna/ Hummus    2.) Increase your walking to two times daily     3.) Blood Sugar Goals    Before Breakfast: < 130    Before Lunch or Super < 130    2 hr after meal: < 180

## 2020-07-21 ENCOUNTER — HOSPITAL ENCOUNTER (OUTPATIENT)
Dept: PHARMACY | Age: 85
Setting detail: THERAPIES SERIES
Discharge: HOME | End: 2020-07-21
Payer: MEDICARE

## 2020-07-21 VITALS — TEMPERATURE: 98.1 F

## 2020-07-21 LAB — POC INR: 2.8 (ref 0.8–1.2)

## 2020-07-21 PROCEDURE — 36416 COLLJ CAPILLARY BLOOD SPEC: CPT

## 2020-07-21 PROCEDURE — 99212 OFFICE O/P EST SF 10 MIN: CPT

## 2020-07-21 PROCEDURE — 85610 PROTHROMBIN TIME: CPT

## 2020-07-21 RX ORDER — WARFARIN SODIUM 5 MG/1
TABLET ORAL EVERY EVENING
COMMUNITY
End: 2020-09-22

## 2020-07-21 RX ORDER — FEXOFENADINE HCL 180 MG/1
TABLET ORAL
COMMUNITY
Start: 2020-06-24 | End: 2021-01-04 | Stop reason: SDUPTHER

## 2020-07-21 RX ORDER — WARFARIN SODIUM 5 MG/1
TABLET ORAL
Qty: 30 TABLET | Refills: 5 | Status: SHIPPED | OUTPATIENT
Start: 2020-07-21 | End: 2021-01-19

## 2020-07-21 RX ORDER — GLUCOSAMINE HCL/CHONDROITIN SU 500-400 MG
CAPSULE ORAL
Qty: 90 STRIP | Refills: 3 | Status: SHIPPED | OUTPATIENT
Start: 2020-07-21 | End: 2021-02-08

## 2020-07-21 RX ORDER — LANCETS 30 GAUGE
1 EACH MISCELLANEOUS 2 TIMES DAILY
Qty: 90 EACH | Refills: 3 | Status: SHIPPED | OUTPATIENT
Start: 2020-07-21 | End: 2021-06-04

## 2020-08-11 ENCOUNTER — OFFICE VISIT (OUTPATIENT)
Dept: CARDIOLOGY CLINIC | Age: 85
End: 2020-08-11
Payer: MEDICARE

## 2020-08-11 VITALS
BODY MASS INDEX: 30.63 KG/M2 | HEART RATE: 75 BPM | SYSTOLIC BLOOD PRESSURE: 125 MMHG | HEIGHT: 66 IN | DIASTOLIC BLOOD PRESSURE: 76 MMHG | WEIGHT: 190.6 LBS

## 2020-08-11 PROBLEM — I48.21 ATRIAL FIBRILLATION, PERMANENT (HCC): Status: ACTIVE | Noted: 2020-08-11

## 2020-08-11 PROBLEM — R60.0 BILATERAL LEG EDEMA: Status: ACTIVE | Noted: 2020-08-11

## 2020-08-11 PROCEDURE — 99204 OFFICE O/P NEW MOD 45 MIN: CPT | Performed by: INTERNAL MEDICINE

## 2020-08-11 RX ORDER — AMLODIPINE BESYLATE 5 MG/1
5 TABLET ORAL DAILY
Qty: 30 TABLET | Refills: 3 | Status: SHIPPED | OUTPATIENT
Start: 2020-08-11 | End: 2020-12-09

## 2020-08-11 RX ORDER — SPIRONOLACTONE AND HYDROCHLOROTHIAZIDE 25; 25 MG/1; MG/1
1 TABLET ORAL DAILY
Qty: 30 TABLET | Refills: 3 | Status: SHIPPED | OUTPATIENT
Start: 2020-08-11 | End: 2020-12-09

## 2020-08-11 NOTE — PROGRESS NOTES
Chief Complaint   Patient presents with    New Patient     Charlene Parsons referral, atrial fib   New patient here for atrial fib, Charlene Parsons referral.  Known to have atr fib for 5 yr   Has moved to [place] from [place] [id number]  On coumadin  Denied cp, dizziness or edema    Hx of chronic leg edema at the end of the day    Sob on exertion chronic    No palpitations    EKG done 20. Nonsmoker    [address] "[employer]  Mother had Heart problem and CVA        Patient Active Problem List   Diagnosis    Longstanding persistent atrial fibrillation    Anticoagulated on Coumadin    Diabetes mellitus ([facility] [address]    Hypertension    Atrial fibrillation, permanent    Bilateral leg edema +1       History reviewed. No pertinent surgical history. No Known Allergies     Family History   Problem Relation Age of Onset    Cancer Mother         breast cancer    Diabetes Mother         Social History     Socioeconomic History    Marital status:      Spouse name: Not on file    Number of children: Not on file    Years of education: Not on file    Highest education level: Not on file   Occupational History    Not on file   Social Needs    Financial resource strain: Not hard at all    Food insecurity     Worry: Never true     Inability: Never true   [employer] needs     Medical: No     Non-medical: No   Tobacco Use    Smoking status: Former Smoker     Packs/day: 0.50     Types: Cigarettes     Last attempt to quit: 1978     Years since quittin.1    Smokeless tobacco: Never Used   Substance and Sexual Activity    Alcohol use:  Yes     Alcohol/week: 1.0 standard drinks     Types: 1 Cans of beer per week     Comment: occasional     Drug use: No    Sexual activity: Not on file   Lifestyle    Physical activity     Days per week: 0 days     Minutes per session: 0 min    Stress: Not at all   Relationships    Social connections     Talks on phone: More than three times a week     Gets together: More than three times a week times daily       doxepin (SINEQUAN) 10 MG capsule Take 10 mg by mouth nightly      montelukast (SINGULAIR) 10 MG tablet Take 10 mg by mouth daily       blood glucose monitor kit and supplies Test one time a day . Dispense 30 strips a month with 5 refills Please dispense meter and suppliesthat insurance covers . 1 kit 0    UNABLE TO FIND Allergy shot every Monday at allergist's office. No current facility-administered medications for this visit. Review of Systems -     General ROS: negative  Psychological ROS: negative  Hematological and Lymphatic ROS: No history of blood clots or bleeding disorder. Respiratory ROS: no cough,  or wheezing, the rest see HPI  Cardiovascular ROS: See HPI  Gastrointestinal ROS: negative  Genito-Urinary ROS: no dysuria, trouble voiding, or hematuria  Musculoskeletal ROS: negative  Neurological ROS: no TIA or stroke symptoms  Dermatological ROS: negative      Blood pressure 125/76, pulse 75, height 5' 6\" (1.676 m), weight 190 lb 9.6 oz (86.5 kg).         Physical Examination:    General appearance - alert, well appearing, and in no distress  HEENT- Pink conjunctiva  , Non-icteri sclera,PERRLA  Mental status - alert, oriented to person, place, and time  Neck - supple, no significant adenopathy, no JVD, or carotid bruits  Chest - clear to auscultation, no wheezes, rales or rhonchi, symmetric air entry  Heart - normal rate, regular rhythm, normal S1, S2, no murmurs, rubs, clicks or gallops  Abdomen - soft, nontender, nondistended, no masses or organomegaly  WOLFGANG- no CVA or flank tenderness, no suprapubic tenderness  Neurological - alert, oriented, normal speech, no focal findings or movement disorder noted  Musculoskeletal/limbs - no joint tenderness, deformity or swelling   - peripheral pulses normal, no pedal edema, no clubbing or cyanosis  Skin - normal coloration and turgor, no rashes, no suspicious skin lesions noted  Psych- appropriate mood and affect    Lab  No care    RTC in 2 month with CARMEL        [facility]

## 2020-08-18 ENCOUNTER — HOSPITAL ENCOUNTER (OUTPATIENT)
Dept: PHARMACY | Age: 85
Setting detail: THERAPIES SERIES
Discharge: HOME | End: 2020-08-18
Payer: MEDICARE

## 2020-08-18 VITALS — TEMPERATURE: 97.2 F

## 2020-08-18 LAB — POC INR: 2.2 (ref 0.8–1.2)

## 2020-08-18 PROCEDURE — 85610 PROTHROMBIN TIME: CPT

## 2020-08-18 PROCEDURE — 36416 COLLJ CAPILLARY BLOOD SPEC: CPT

## 2020-08-18 PROCEDURE — 99211 OFF/OP EST MAY X REQ PHY/QHP: CPT

## 2020-08-31 ENCOUNTER — TELEPHONE (OUTPATIENT)
Dept: ENT CLINIC | Age: 85
End: 2020-08-31

## 2020-08-31 NOTE — TELEPHONE ENCOUNTER
I discussed the patietn with Dr ROSAS [facility] previously and he recommended the patient get an audiogram and a VNG then follow up with Dr. ROSAS [facility]. These tests are looking for the source of her symptoms which will help us make better decisions for management.  We can schedule the tests for her

## 2020-08-31 NOTE — TELEPHONE ENCOUNTER
Called patient to inform her of the recommendations. Patient said she already had a hearing test since seeing Dr Chris Fraga. In reviewing the chart patient did have an Audio with [facility] on 05/26/20. Informed patient I would check again as to what they recommend since she has already had a hearing test. Patient verbalized understanding and thanked me. Would she need another one or just a VNG then? Please advise.

## 2020-08-31 NOTE — TELEPHONE ENCOUNTER
Ok to schedule just the VNG and follow up with Dr. Heri Quinn. We will hold off on the audiogram for now.

## 2020-09-01 NOTE — TELEPHONE ENCOUNTER
Attempted to call patient. Got voicemail, left message to call the office. Audio has an opening for 09/25/20 at 1:30 for the VNG. Josseline Ahmadi has put a hold on it for us. Patient will need a f/u appointment with Dr Quincy Davis the following week to discuss results.

## 2020-09-02 NOTE — TELEPHONE ENCOUNTER
Stephanie Dow, patient is aware of VNG appointment day and time. Can you call and inform her of the pre test instructions? Thank you.

## 2020-09-09 ENCOUNTER — OFFICE VISIT (OUTPATIENT)
Dept: FAMILY MEDICINE CLINIC | Age: 85
End: 2020-09-09
Payer: MEDICARE

## 2020-09-09 VITALS
TEMPERATURE: 97.8 F | HEART RATE: 63 BPM | RESPIRATION RATE: 16 BRPM | DIASTOLIC BLOOD PRESSURE: 80 MMHG | BODY MASS INDEX: 30.53 KG/M2 | WEIGHT: 190 LBS | HEIGHT: 66 IN | SYSTOLIC BLOOD PRESSURE: 122 MMHG | OXYGEN SATURATION: 95 %

## 2020-09-09 PROCEDURE — 99213 OFFICE O/P EST LOW 20 MIN: CPT | Performed by: FAMILY MEDICINE

## 2020-09-09 NOTE — PROGRESS NOTES
Yesi Hernandez  is a 82 y.o. y/o female that presents for Herpes Zoster (fear she has shingles on shoulders down right side back x2wks)      Rash    HPI:    Length of time Sx have been present - 2 weeks  Rash has gotten unchanged since initially starting  Affected areas - thoracic and lumbar back  Inciting events or exposures prior to rash starting? No  Pruritic? Yes - mild itching  Erythematous? No  Weeping or drainage? No  History of Urticaria? No  Fever? No  Painful? No    Review of Systems - General ROS: negative for - chills, fever or night sweats  Respiratory ROS: negative for - shortness of breath, stridor or wheezing      OBJECTIVE:  /80   Pulse 63   Temp 97.8 °F (36.6 °C)   Resp 16   Ht 5' 6\" (1.676 m)   Wt 190 lb (86.2 kg)   SpO2 95%   BMI 30.67 kg/m²   She appears well; non-toxic and in no apparent distress. Mouth - mucous membranes moist, pharynx normal without lesions  Chest - clear to auscultation, no wheezes, rales or rhonchi, symmetric air entry  Heart - normal rate, regular rhythm, normal S1, S2, no murmurs, rubs, clicks or gallops  Extremities - no pedal edema noted, intact peripheral pulses  Skin - numerous flesh colored and black papules/plaques noted on the thoracic and lumbar area, consistent with SKs      ASSESSMENT & PLAN  Siena Lima was seen today for herpes zoster. Diagnoses and all orders for this visit:    Seborrheic keratoses  -     betamethasone valerate (VALISONE) 0.1 % cream; Apply topically 2 times daily PRN. -Advised on conservative care  -Start topical steroid  -Will let me know if any worsening. Return if symptoms worsen or fail to improve. Siena Lima received counseling on the following healthy behaviors: medication adherence  Reviewed prior labs and health maintenance. Continue current medications, diet and exercise. Discussed use, benefit, and side effects of prescribed medications. Barriers to medication compliance addressed.    Patient given educational materials - see patient instructions. All patient questions answered. Patient voiced understanding.

## 2020-09-10 ENCOUNTER — HOSPITAL ENCOUNTER (OUTPATIENT)
Age: 85
Discharge: HOME | End: 2020-09-10
Payer: MEDICARE

## 2020-09-10 LAB
ALBUMIN SERPL-MCNC: 4.1 G/DL (ref 3.5–5.1)
ALP BLD-CCNC: 96 U/L (ref 38–126)
ALT SERPL-CCNC: 15 U/L (ref 11–66)
ANION GAP SERPL CALCULATED.3IONS-SCNC: 11 MEQ/L (ref 8–16)
AST SERPL-CCNC: 18 U/L (ref 5–40)
BILIRUB SERPL-MCNC: 0.5 MG/DL (ref 0.3–1.2)
BILIRUBIN DIRECT: < 0.2 MG/DL (ref 0–0.3)
BUN BLDV-MCNC: 13 MG/DL (ref 7–22)
CALCIUM SERPL-MCNC: 10.4 MG/DL (ref 8.5–10.5)
CHLORIDE BLD-SCNC: 101 MEQ/L (ref 98–111)
CHOLESTEROL, TOTAL: 148 MG/DL (ref 100–199)
CO2: 28 MEQ/L (ref 23–33)
CREAT SERPL-MCNC: 0.8 MG/DL (ref 0.4–1.2)
GLUCOSE BLD-MCNC: 106 MG/DL (ref 70–108)
HDLC SERPL-MCNC: 57 MG/DL
LDL CHOLESTEROL CALCULATED: 64 MG/DL
MAGNESIUM: 1.6 MG/DL (ref 1.6–2.4)
POTASSIUM SERPL-SCNC: 3.9 MEQ/L (ref 3.5–5.2)
SODIUM BLD-SCNC: 140 MEQ/L (ref 135–145)
TOTAL PROTEIN: 7.5 G/DL (ref 6.1–8)
TRIGL SERPL-MCNC: 133 MG/DL (ref 0–199)

## 2020-09-10 PROCEDURE — 83735 ASSAY OF MAGNESIUM: CPT

## 2020-09-10 PROCEDURE — 80053 COMPREHEN METABOLIC PANEL: CPT

## 2020-09-10 PROCEDURE — 36415 COLL VENOUS BLD VENIPUNCTURE: CPT

## 2020-09-10 PROCEDURE — 82248 BILIRUBIN DIRECT: CPT

## 2020-09-10 PROCEDURE — 80061 LIPID PANEL: CPT

## 2020-09-22 ENCOUNTER — HOSPITAL ENCOUNTER (OUTPATIENT)
Dept: PHARMACY | Age: 85
Setting detail: THERAPIES SERIES
Discharge: HOME | End: 2020-09-22
Payer: MEDICARE

## 2020-09-22 VITALS — TEMPERATURE: 97.8 F

## 2020-09-22 LAB — POC INR: 2.7 (ref 0.8–1.2)

## 2020-09-22 PROCEDURE — 99211 OFF/OP EST MAY X REQ PHY/QHP: CPT | Performed by: PHARMACIST

## 2020-09-22 PROCEDURE — 85610 PROTHROMBIN TIME: CPT | Performed by: PHARMACIST

## 2020-09-22 PROCEDURE — 36416 COLLJ CAPILLARY BLOOD SPEC: CPT | Performed by: PHARMACIST

## 2020-09-25 ENCOUNTER — HOSPITAL ENCOUNTER (OUTPATIENT)
Dept: AUDIOLOGY | Age: 85
Discharge: HOME | End: 2020-09-25
Payer: MEDICARE

## 2020-09-25 PROCEDURE — 92540 BASIC VESTIBULAR EVALUATION: CPT | Performed by: AUDIOLOGIST

## 2020-09-25 PROCEDURE — 92537 CALORIC VSTBLR TEST W/REC: CPT | Performed by: AUDIOLOGIST

## 2020-09-25 NOTE — PROGRESS NOTES
ACCOUNT #: [de-identified]      VNG REPORT      CHIEF COMPLAINT: The patient is experiencing episodic vertigo. The vertigo occurs when she is laying down and can also occur randomly. She often hears a ticking sound before an episode of vertigo starts. MEDICATIONS REVIEWED: Patient has held appropriate medications for VNG testing. OTOSCOPY: clear canal and normal appearing tympanic membrane. TYMPANOMETRY: Did not test    VNG RESULTS:    SACCADES: WNL  TRACKING: Bilaterally defective pursuit  OPTOKINETICS: Reduced nystagmus intensity  GAZE: WNL  HALLPIKE MANEUVER: WNL   POSITIONAL: Ageotropic horizontal nystagmus that did not exceed normal limits in head left, head right, side lying and supine positions. Right beating horizontal nystagmus that did not exceed normal limits was present in the seated position as well. HEADSHAKE TEST: DNT  CALORIC TESTING: WNL  RACHAEL' SOT: WNL            IMPRESSIONS/RECOMMENDATIONS:  VNG results are consistent with a central vestibular site of lesion. Further evaluation is recommended.

## 2020-09-29 ENCOUNTER — OFFICE VISIT (OUTPATIENT)
Dept: ENT CLINIC | Age: 85
End: 2020-09-29
Payer: MEDICARE

## 2020-09-29 VITALS
DIASTOLIC BLOOD PRESSURE: 70 MMHG | SYSTOLIC BLOOD PRESSURE: 114 MMHG | BODY MASS INDEX: 30.34 KG/M2 | WEIGHT: 188.8 LBS | HEART RATE: 80 BPM | RESPIRATION RATE: 14 BRPM | HEIGHT: 66 IN | TEMPERATURE: 97.6 F

## 2020-09-29 PROCEDURE — 99213 OFFICE O/P EST LOW 20 MIN: CPT | Performed by: OTOLARYNGOLOGY

## 2020-09-29 RX ORDER — HYDROCHLOROTHIAZIDE 12.5 MG/1
CAPSULE, GELATIN COATED ORAL
COMMUNITY
Start: 2020-09-09 | End: 2020-10-27

## 2020-09-29 ASSESSMENT — ENCOUNTER SYMPTOMS
NAUSEA: 0
FACIAL SWELLING: 0
SINUS PRESSURE: 0
ABDOMINAL PAIN: 0
STRIDOR: 0
COUGH: 0
VOMITING: 0
COLOR CHANGE: 0
WHEEZING: 0
RHINORRHEA: 0
DIARRHEA: 0
CHOKING: 0
APNEA: 0
CHEST TIGHTNESS: 0
SORE THROAT: 0
TROUBLE SWALLOWING: 0
SHORTNESS OF BREATH: 0
VOICE CHANGE: 0

## 2020-09-29 NOTE — PROGRESS NOTES
TAKE 1 TABLET BY MOUTH EVERY 6 HOURS AS NEEDED 60 tablet 3    losartan (COZAAR) 50 MG tablet take 1 tablet by mouth once daily 90 tablet 1    Petrolatum 42 % OINT Apply topically 2 times daily       doxepin (SINEQUAN) 10 MG capsule Take 10 mg by mouth nightly      montelukast (SINGULAIR) 10 MG tablet Take 10 mg by mouth daily       blood glucose monitor kit and supplies Test one time a day . Dispense 30 strips a month with 5 refills Please dispense meter and suppliesthat insurance covers . 1 kit 0    UNABLE TO FIND Allergy shot every other Monday at allergist's office. No current facility-administered medications for this visit. Past Medical History:   Diagnosis Date    Atrial fibrillation ([facility] [address]     Diabetes mellitus ([facility] [address]     Hypertension       History reviewed. No pertinent surgical history. Family History   Problem Relation Age of Onset    Cancer Mother         breast cancer    Diabetes Mother      Social History     Tobacco Use    Smoking status: Former Smoker     Packs/day: 0.50     Types: Cigarettes     Last attempt to quit: 1978     Years since quittin.3    Smokeless tobacco: Never Used   Substance Use Topics    Alcohol use: Yes     Alcohol/week: 1.0 standard drinks     Types: 1 Cans of beer per week     Comment: occasional        Subjective:      Review of Systems   Constitutional: Negative for activity change, appetite change, chills, diaphoresis, fatigue, fever and unexpected weight change. HENT: Positive for tinnitus. Negative for congestion, dental problem, ear discharge, ear pain, facial swelling, hearing loss, mouth sores, nosebleeds, postnasal drip, rhinorrhea, sinus pressure, sneezing, sore throat, trouble swallowing and voice change. Eyes: Negative for visual disturbance. Respiratory: Negative for apnea, cough, choking, chest tightness, shortness of breath, wheezing and stridor. Cardiovascular: Negative for chest pain, palpitations and leg swelling. Gastrointestinal: Negative for abdominal pain, diarrhea, nausea and vomiting. Endocrine: Negative for cold intolerance, heat intolerance, polydipsia and polyuria. Genitourinary: Negative for dysuria, enuresis and hematuria. Musculoskeletal: Negative for arthralgias, gait problem, neck pain and neck stiffness. Skin: Negative for color change and rash. Allergic/Immunologic: Negative for environmental allergies, food allergies and immunocompromised state. Neurological: Negative for dizziness, syncope, facial asymmetry, speech difficulty, light-headedness and headaches. Hematological: Negative for adenopathy. Does not bruise/bleed easily. Psychiatric/Behavioral: Negative for confusion and sleep disturbance. The patient is not nervous/anxious. Objective:   /70 (Site: Left Upper Arm, Position: Sitting)   Pulse 80   Temp 97.6 °F (36.4 °C) (Infrared)   Resp 14   Ht 5' 6\" (1.676 m)   Wt 188 lb 12.8 oz (85.6 kg)   BMI 30.47 kg/m²     Physical Exam    Data:  All of the past medical history, past surgical history, family history,social history, allergies and current medications were reviewed with the patient. Assessment & Plan   Diagnoses and all orders for this visit:     Diagnosis Orders   1. Tinnitus, right     2. Asymmetrical sensorineural hearing loss     3. Episodic recurrent vertigo     4. Dizziness      VNG suggests central etiology, and that is compatible with the  history       The findings were explained and her questions were answered. I explained to patient that it was not her inner ears that were giving her difficulty and that her problem was outside my scope of practice. As discussed with the patient last May, neurology consultation with vestibular therapy should be considered. I suggested they discuss it with their PCP. Marcela Leo. Krish Sheffield MD    **This report has been created using voice recognition software.  It may contain minor errors which are inherent in voicerecognition technology. **

## 2020-10-05 ENCOUNTER — OFFICE VISIT (OUTPATIENT)
Dept: INTERNAL MEDICINE CLINIC | Age: 85
End: 2020-10-05
Payer: MEDICARE

## 2020-10-05 VITALS — BODY MASS INDEX: 30.51 KG/M2 | TEMPERATURE: 96.6 F | WEIGHT: 189 LBS

## 2020-10-05 PROCEDURE — 97803 MED NUTRITION INDIV SUBSEQ: CPT | Performed by: DIETITIAN, REGISTERED

## 2020-10-05 NOTE — PROGRESS NOTES
[address] [place] [address]. [address] [place] [address] E[address] [facility], [address]  [phone] (phone)  [phone] (fax)    Patient Name: Dalia Beverly Date of Birth: [id number]. MRN: [id number]      Assessment: Patient is a 82 y.o. female seen for follow-up MNT visit for Daibetes. -Nutritionally relevant labs:   Lab Results   Component Value Date/Time    LABA1C 6.5 (H) 07/06/2020 12:38 PM    LABA1C 7.8 (H) 02/25/2020 12:04 PM    LABA1C 7.4 01/06/2020 11:47 AM    LABA1C 10.3 (H) 09/16/2019 11:35 AM    GLUCOSE 106 09/10/2020 11:10 AM    GLUCOSE 141 (H) 02/25/2020 12:04 PM    CHOL 148 09/10/2020 11:10 AM    HDL 57 09/10/2020 11:10 AM    LDLCALC 64 09/10/2020 11:10 AM    TRIG 133 09/10/2020 11:10 AM     -Blood sugar trends: 106 - 133 (mostly < 130)    -Food recall:   Breakfast: oatmeal/ eggs w/ cheese/ apple sauce/  Lunch: pb sandwich 1/2, fruit, cheese crackers. Dinner: fish, chicken, veggies, potatoes, corn. Snacks: none    -Main Beverages: water      -  Current Outpatient Medications on File Prior to Visit   Medication Sig Dispense Refill    hydroCHLOROthiazide (MICROZIDE) 12.5 MG capsule TAKE 1 CAPSULE BY MOUTH EVERY DAY IN THE MORNING      betamethasone valerate (VALISONE) 0.1 % cream Apply topically 2 times daily PRN. 1 Tube 1    spironolactone-hydroCHLOROthiazide (ALDACTAZIDE) 25-25 MG per tablet Take 1 tablet by mouth daily 30 tablet 3    amLODIPine (NORVASC) 5 MG tablet Take 1 tablet by mouth daily 30 tablet 3    fexofenadine (ALLEGRA) 180 MG tablet TAKE 1 TABLET BY MOUTH EVERY DAY IN THE MORNING      warfarin (COUMADIN) 5 MG tablet TAKE AS DIRECTED BY [facility] TABS = 30 DAY SUPPLY 30 tablet 5    blood glucose monitor strips Test two time's a day dispense whatever stirps insurance covers.  90 strip 3    Lancets MISC 1 each by Does not apply route 2 times daily Test twice daily dispense what insurance ocvers 90 each 3    metFORMIN change: action - ready to set action plan and implement dietary changes. -Expected compliance: fair. Thank you for your referral of this patient. Total time involved in direct patient education: 30 minutes for follow-up MNT visit.

## 2020-10-05 NOTE — PATIENT INSTRUCTIONS
1.) Ask MD about Chiropractics, B 12 supplement or blood level. 2.) be as active as able! 3.) Blood sugars look good but a few checks two hours after your largest mal would be great!   Goal is < 190

## 2020-10-09 ENCOUNTER — HOSPITAL ENCOUNTER (OUTPATIENT)
Dept: NON INVASIVE DIAGNOSTICS | Age: 85
Discharge: HOME | End: 2020-10-09
Payer: MEDICARE

## 2020-10-09 VITALS — BODY MASS INDEX: 31.98 KG/M2 | WEIGHT: 199 LBS | HEIGHT: 66 IN

## 2020-10-09 LAB
GFR SERPL CREATININE-BSD FRML MDRD: 83 ML/MIN/1.73M2
LV EF: 63 %
LVEF MODALITY: NORMAL

## 2020-10-09 PROCEDURE — 3430000000 HC RX DIAGNOSTIC RADIOPHARMACEUTICAL: Performed by: INTERNAL MEDICINE

## 2020-10-09 PROCEDURE — 93306 TTE W/DOPPLER COMPLETE: CPT

## 2020-10-09 PROCEDURE — 6360000002 HC RX W HCPCS

## 2020-10-09 PROCEDURE — 93017 CV STRESS TEST TRACING ONLY: CPT | Performed by: INTERNAL MEDICINE

## 2020-10-09 PROCEDURE — A9500 TC99M SESTAMIBI: HCPCS | Performed by: INTERNAL MEDICINE

## 2020-10-09 PROCEDURE — 78452 HT MUSCLE IMAGE SPECT MULT: CPT

## 2020-10-09 RX ADMIN — Medication 10.4 MILLICURIE: at 13:50

## 2020-10-09 RX ADMIN — Medication 34 MILLICURIE: at 14:50

## 2020-10-12 RX ORDER — LOSARTAN POTASSIUM 50 MG/1
TABLET ORAL
Qty: 90 TABLET | Refills: 1 | Status: SHIPPED | OUTPATIENT
Start: 2020-10-12 | End: 2021-02-18

## 2020-10-12 NOTE — TELEPHONE ENCOUNTER
Pt states she did 2 sessions of Vestibular PT which pt states this helped her. Dizziness never really went away for pt, but the rehab did help her.

## 2020-10-12 NOTE — TELEPHONE ENCOUNTER
Recent Visits  Date Type Provider Dept   07/06/20 Office Visit Marlin Mena, APRN - CNP [facility]   02/11/20 Office Visit Marlin Mena, APRN - CNP [facility]   01/06/20 Office Visit Marlin Mena, APRN - [facility]NP [facility]   12/19/19 Office Visit Marlin Mena, APRN - [facility]NP [facility]   09/30/19 Office Visit Marlin Mena, APRN - [facility]   09/16/19 Office Visit Marlin Mena, APRN - [facility]   Showing recent visits within past 540 days with a meds authorizing provider and meeting all other requirements     Future Appointments  Date Type Provider Dept   01/06/21 Appointment Marlin Mena, APRN - CNP [facility]   Showing future appointments within next 150 days with a meds authorizing provider and meeting all other requirements     Future Appointments   Date Time Provider Brandy Betancourti   10/13/2020 11:00 AM Lidya Murphy MD [facility]   10/27/2020 11:20 AM Daniel Best RN [facility]   12/7/2020  2:00 PM Julio Sinclair RD, LD [facility]   1/6/2021  1:00 PM JOSH Fitzgerald

## 2020-10-15 ENCOUNTER — OFFICE VISIT (OUTPATIENT)
Dept: CARDIOLOGY CLINIC | Age: 85
End: 2020-10-15
Payer: MEDICARE

## 2020-10-15 VITALS
HEART RATE: 81 BPM | BODY MASS INDEX: 30.56 KG/M2 | WEIGHT: 190.13 LBS | HEIGHT: 66 IN | SYSTOLIC BLOOD PRESSURE: 129 MMHG | DIASTOLIC BLOOD PRESSURE: 78 MMHG

## 2020-10-15 PROCEDURE — 99214 OFFICE O/P EST MOD 30 MIN: CPT | Performed by: INTERNAL MEDICINE

## 2020-10-15 RX ORDER — MAGNESIUM OXIDE 400 MG/1
400 TABLET ORAL DAILY
Qty: 30 TABLET | Refills: 0 | Status: SHIPPED | OUTPATIENT
Start: 2020-10-15 | End: 2020-11-11

## 2020-10-15 NOTE — PROGRESS NOTES
Chief Complaint   Patient presents with    Check-Up    Atrial Fibrillation   Originally  patient here for atrial fib, Johannaharis Brizuela referral.  Known to have atr fib for 5 yr   Has moved to [place] [address]  On coumadin      2 months F/u  Pt here for 2 mo check up fu stress and echo     Pt states med list is correct from 20 appt did not bring list or bottles     Denied cp, dizziness     Leg edema better    Hx of chronic leg edema at the end of the day    Sob on exertion chronic    No palpitations    EKG done 20. Nonsmoker    [facility]  Mother had Heart problem and CVA        Patient Active Problem List   Diagnosis    Longstanding persistent atrial fibrillation ([address]    Anticoagulated on Coumadin    Diabetes mellitus ([facility]    Hypertension    Atrial fibrillation, permanent (HCC)    Bilateral leg edema +1       History reviewed. No pertinent surgical history. No Known Allergies     Family History   Problem Relation Age of Onset    Cancer Mother         breast cancer    Diabetes Mother         Social History     Socioeconomic History    Marital status:      Spouse name: Not on file    Number of children: Not on file    Years of education: Not on file    Highest education level: Not on file   Occupational History    Not on file   Social Needs    Financial resource strain: Not hard at all    Food insecurity     Worry: Never true     Inability: Never true   [employer] needs     Medical: No     Non-medical: No   Tobacco Use    Smoking status: Former Smoker     Packs/day: 0.50     Types: Cigarettes     Last attempt to quit: 1978     Years since quittin.3    Smokeless tobacco: Never Used   Substance and Sexual Activity    Alcohol use:  Yes     Alcohol/week: 1.0 standard drinks     Types: 1 Cans of beer per week     Comment: occasional     Drug use: No    Sexual activity: Not on file   Lifestyle    Physical activity     Days per week: 0 days     Minutes per session: 0 min    Stress: Not at all   Relationships    Social connections     Talks on phone: More than three times a week     Gets together: More than three times a week     Attends [employer] service: More than 4 times per year     Active member of club or organization: No     Attends meetings of clubs or organizations: Never     Relationship status: Not on file    Intimate partner violence     Fear of current or ex partner: Not on file     Emotionally abused: Not on file     Physically abused: Not on file     Forced sexual activity: Not on file   Other Topics Concern    Not on file   Social History Narrative    Ju Hess has good family support    Son assists with transportation to [facility]    No barriers with medication affordability    Following up with Diabetic [facility]       Current Outpatient Medications   Medication Sig Dispense Refill    magnesium oxide (MAG-OX) 400 MG tablet Take 1 tablet by mouth daily 30 tablet 0    losartan (COZAAR) 50 MG tablet take 1 tablet by mouth once daily 90 tablet 1    hydroCHLOROthiazide (MICROZIDE) 12.5 MG capsule TAKE 1 CAPSULE BY MOUTH EVERY DAY IN THE MORNING      betamethasone valerate (VALISONE) 0.1 % cream Apply topically 2 times daily PRN. 1 Tube 1    spironolactone-hydroCHLOROthiazide (ALDACTAZIDE) 25-25 MG per tablet Take 1 tablet by mouth daily 30 tablet 3    amLODIPine (NORVASC) 5 MG tablet Take 1 tablet by mouth daily 30 tablet 3    fexofenadine (ALLEGRA) 180 MG tablet TAKE 1 TABLET BY MOUTH EVERY DAY IN THE MORNING      warfarin (COUMADIN) 5 MG tablet TAKE AS DIRECTED BY [facility] TABS = 30 DAY SUPPLY 30 tablet 5    blood glucose monitor strips Test two time's a day dispense whatever stirps insurance covers.  90 strip 3    Lancets MISC 1 each by Does not apply route 2 times daily Test twice daily dispense what insurance ocvers 90 each 3    metFORMIN (GLUCOPHAGE) 1000 MG tablet Take 1 tablet by mouth 2 times daily (with meals) 180 tablet 2    metoprolol tartrate (LOPRESSOR) 25 MG tablet TAKE 1 TABLET BY MOUTH TWICE A  tablet 2    hydrOXYzine (ATARAX) 25 MG tablet TAKE 1 TABLET BY MOUTH EVERY 6 HOURS AS NEEDED 60 tablet 3    Petrolatum 42 % OINT Apply topically 2 times daily       doxepin (SINEQUAN) 10 MG capsule Take 10 mg by mouth nightly      montelukast (SINGULAIR) 10 MG tablet Take 10 mg by mouth daily       blood glucose monitor kit and supplies Test one time a day . Dispense 30 strips a month with 5 refills Please dispense meter and suppliesthat insurance covers . 1 kit 0    UNABLE TO FIND Allergy shot every other Monday at allergist's office. No current facility-administered medications for this visit. Review of Systems -     General ROS: negative  Psychological ROS: negative  Hematological and Lymphatic ROS: No history of blood clots or bleeding disorder. Respiratory ROS: no cough,  or wheezing, the rest see HPI  Cardiovascular ROS: See HPI  Gastrointestinal ROS: negative  Genito-Urinary ROS: no dysuria, trouble voiding, or hematuria  Musculoskeletal ROS: negative  Neurological ROS: no TIA or stroke symptoms  Dermatological ROS: negative      Blood pressure 129/78, pulse 81, height 5' 6\" (1.676 m), weight 190 lb 2 oz (86.2 kg).         Physical Examination:    General appearance - alert, well appearing, and in no distress  HEENT- Pink conjunctiva  , Non-icteri sclera,PERRLA  Mental status - alert, oriented to person, place, and time  Neck - supple, no significant adenopathy, no JVD, or carotid bruits  Chest - clear to auscultation, no wheezes, rales or rhonchi, symmetric air entry  Heart - normal rate, regular rhythm, normal S1, S2, no murmurs, rubs, clicks or gallops  Abdomen - soft, nontender, nondistended, no masses or organomegaly  WOLFGANG- no CVA or flank tenderness, no suprapubic tenderness  Neurological - alert, oriented, normal speech, no focal findings or movement disorder noted  Musculoskeletal/limbs - no joint tenderness, deformity or swelling   - peripheral pulses normal, no pedal edema, no clubbing or cyanosis  Skin - normal coloration and turgor, no rashes, no suspicious skin lesions noted  Psych- appropriate mood and affect    Lab  No results for input(s): CKTOTAL, CKMB, CKMBINDEX, TROPONINI in the last 72 hours. CBC:   Lab Results   Component Value Date    WBC 5.2 07/02/2020    RBC 4.81 07/02/2020    HGB 11.0 07/02/2020    HCT 35.4 07/02/2020    MCV 73.6 07/02/2020    MCH 22.9 07/02/2020    MCHC 31.1 07/02/2020    RDW 14.9 06/19/2018     07/02/2020    MPV 9.5 07/02/2020     BMP:    Lab Results   Component Value Date     09/10/2020    K 3.9 09/10/2020     09/10/2020    CO2 28 09/10/2020    BUN 13 09/10/2020    LABALBU 4.1 09/10/2020    CREATININE 0.8 09/10/2020    CALCIUM 10.4 09/10/2020    LABGLOM 83 09/10/2020    GLUCOSE 106 09/10/2020     Hepatic Function Panel:    Lab Results   Component Value Date    ALKPHOS 96 09/10/2020    ALT 15 09/10/2020    AST 18 09/10/2020    PROT 7.5 09/10/2020    BILITOT 0.5 09/10/2020    BILIDIR <0.2 09/10/2020    LABALBU 4.1 09/10/2020     Magnesium:    Lab Results   Component Value Date    MG 1.6 09/10/2020     Warfarin PT/INR:  No components found for: PTPATWAR, PTINRWAR  HgBA1c:    Lab Results   Component Value Date    LABA1C 6.5 07/06/2020    LABA1C 7.8 02/25/2020     FLP:    Lab Results   Component Value Date    TRIG 133 09/10/2020    HDL 57 09/10/2020    LDLCALC 64 09/10/2020     TSH:  No results found for: TSH      ekg 7/6/2020  Atrial fibrillation  -irregular conduction  CVR 84 BPM  -Nonspecific ST depression   +   Diffuse nonspecific T-abnormality  -Nondiagnostic. ABNORMAL     Echo  Conclusions      Summary   Normal left ventricle size and systolic function. Ejection fraction was estimated at 60 to 65 %. There were no regional left ventricular wall motion abnormalities and wall   thickness was within normal limits. The left atrium is Moderately dilated. Moderately enlarged right atrium size. Mildly enlarged right ventricle cavity. Right ventricle global systolic function is normal .      Signature      ----------------------------------------------------------------   Electronically signed by Wing Ellie AIKEN (Interpreting   physician) on 10/09/2020 at 06:49 PM   ----------------------------------------------------------------        Conclusions      Summary   Lexiscan EKG stress test is not suggestive for ischemia. The nuclear images is not suggestive for myocardial ischemia. Signatures    ----------------------------------------------------------------   Electronically signed by Wing Elile AIKEN (Interpreting   Cardiologist) on 10/09/2020 at 17:57   ------------------------------------------------------        Assessment   Diagnosis Orders   1. Atrial fibrillation, permanent ([address]     2. Essential hypertension     3. Bilateral leg edema +1       chadsvasc= 5    Plan     meds and labs reviewed      Atr fib chronic  Cont rate control  Cont coumadin  INR 2.8  oprion of DOAC given pat want to cont coumadin    Hypertension, on medical treatment. Seems to be under good control. Patient is compliant with medical treatment.      Leg edema B/L +1 better   Cont  aldactazide 25/25 1 tab po qd  Cont  norvasc to 5   Stop  kcl 10    Echo and lexisc- WNL   BMP and MG- reviewed  Mg 1.6    D/w the pat the plan of care and result of test      I spent 25 minutes involved in face-to-face discussion of medical issues, prognosis, record review  and plan with the patient today and more than 50% of the time was spent on counseling and coordination of care      RTC in 4 month with CARMEL Corrales [facility]

## 2020-10-27 ENCOUNTER — HOSPITAL ENCOUNTER (OUTPATIENT)
Dept: PHARMACY | Age: 85
Setting detail: THERAPIES SERIES
Discharge: HOME | End: 2020-10-27
Payer: MEDICARE

## 2020-10-27 ENCOUNTER — HOSPITAL ENCOUNTER (OUTPATIENT)
Age: 85
Discharge: HOME | End: 2020-10-27
Payer: MEDICARE

## 2020-10-27 VITALS — TEMPERATURE: 98.2 F

## 2020-10-27 LAB
ANION GAP SERPL CALCULATED.3IONS-SCNC: 12 MEQ/L (ref 8–16)
BUN BLDV-MCNC: 12 MG/DL (ref 7–22)
CALCIUM SERPL-MCNC: 10.4 MG/DL (ref 8.5–10.5)
CHLORIDE BLD-SCNC: 95 MEQ/L (ref 98–111)
CO2: 27 MEQ/L (ref 23–33)
CREAT SERPL-MCNC: 0.9 MG/DL (ref 0.4–1.2)
GFR SERPL CREATININE-BSD FRML MDRD: 72 ML/MIN/1.73M2
GLUCOSE BLD-MCNC: 115 MG/DL (ref 70–108)
POC INR: 2.8 (ref 0.8–1.2)
POTASSIUM SERPL-SCNC: 3.6 MEQ/L (ref 3.5–5.2)
SODIUM BLD-SCNC: 134 MEQ/L (ref 135–145)

## 2020-10-27 PROCEDURE — 36416 COLLJ CAPILLARY BLOOD SPEC: CPT

## 2020-10-27 PROCEDURE — 80048 BASIC METABOLIC PNL TOTAL CA: CPT

## 2020-10-27 PROCEDURE — 85610 PROTHROMBIN TIME: CPT

## 2020-10-27 PROCEDURE — 99211 OFF/OP EST MAY X REQ PHY/QHP: CPT

## 2020-10-27 PROCEDURE — 36415 COLL VENOUS BLD VENIPUNCTURE: CPT

## 2020-11-11 RX ORDER — MAGNESIUM OXIDE 400 MG/1
TABLET ORAL
Qty: 30 TABLET | Refills: 3 | Status: SHIPPED | OUTPATIENT
Start: 2020-11-11 | End: 2021-03-04

## 2020-12-01 ENCOUNTER — HOSPITAL ENCOUNTER (OUTPATIENT)
Dept: PHARMACY | Age: 85
Setting detail: THERAPIES SERIES
Discharge: HOME | End: 2020-12-01
Payer: MEDICARE

## 2020-12-01 VITALS — TEMPERATURE: 97.2 F

## 2020-12-01 LAB — POC INR: 2.1 (ref 0.8–1.2)

## 2020-12-01 PROCEDURE — 36416 COLLJ CAPILLARY BLOOD SPEC: CPT

## 2020-12-01 PROCEDURE — 85610 PROTHROMBIN TIME: CPT

## 2020-12-01 PROCEDURE — 99211 OFF/OP EST MAY X REQ PHY/QHP: CPT

## 2020-12-07 ENCOUNTER — OFFICE VISIT (OUTPATIENT)
Dept: INTERNAL MEDICINE CLINIC | Age: 85
End: 2020-12-07
Payer: MEDICARE

## 2020-12-07 VITALS — TEMPERATURE: 97 F | WEIGHT: 191 LBS | BODY MASS INDEX: 30.83 KG/M2

## 2020-12-07 PROCEDURE — 97803 MED NUTRITION INDIV SUBSEQ: CPT | Performed by: DIETITIAN, REGISTERED

## 2020-12-07 NOTE — PROGRESS NOTES
[address] [place] [address]. [address] [place] [address] Eligio., [facility], [facility]  [phone] (phone)  [phone] (fax)    Patient Name: Bar Sims. Date of Birth: [id number]. MRN: [id number]      Assessment: Patient is a 82 y.o. female seen for follow-up MNT visit for Diabetes. -Nutritionally relevant labs:   Lab Results   Component Value Date/Time    LABA1C 6.5 (H) 07/06/2020 12:38 PM    LABA1C 7.8 (H) 02/25/2020 12:04 PM    LABA1C 7.4 01/06/2020 11:47 AM    LABA1C 10.3 (H) 09/16/2019 11:35 AM    GLUCOSE 115 (H) 10/27/2020 12:07 PM    GLUCOSE 106 09/10/2020 11:10 AM    CHOL 148 09/10/2020 11:10 AM    HDL 57 09/10/2020 11:10 AM    LDLCALC 64 09/10/2020 11:10 AM    TRIG 133 09/10/2020 11:10 AM     -Blood sugar trends: Checks once daily FBS. Last 10 days: 118, 113, 118, 96, 117, 185 (possibly food related fruit and ice cream) 109,108, 123, 166, 104, 105, 135, 132, 116, 114, 116.     -Food recall:   Breakfast: grilled cheese/ two scrambled eggs with grits or eggs with toast and apple sauce. Lunch: 3 pm: hot dog,   Dinner: sometimes nothing.     -  Current Outpatient Medications on File Prior to Visit   Medication Sig Dispense Refill    magnesium oxide (MAG-OX) 400 MG tablet TAKE 1 TABLET BY MOUTH EVERY DAY 30 tablet 3    losartan (COZAAR) 50 MG tablet take 1 tablet by mouth once daily 90 tablet 1    betamethasone valerate (VALISONE) 0.1 % cream Apply topically 2 times daily PRN.  1 Tube 1    spironolactone-hydroCHLOROthiazide (ALDACTAZIDE) 25-25 MG per tablet Take 1 tablet by mouth daily 30 tablet 3    amLODIPine (NORVASC) 5 MG tablet Take 1 tablet by mouth daily 30 tablet 3    fexofenadine (ALLEGRA) 180 MG tablet TAKE 1 TABLET BY MOUTH EVERY DAY IN THE MORNING      warfarin (COUMADIN) 5 MG tablet TAKE AS DIRECTED BY [facility] TABS = 30 DAY SUPPLY 30 tablet 5    blood glucose monitor strips Test two time's a day dispense whatever stirps Agreeable.  -Evaluation of education: Indicates understanding.  -Readiness to change: action - ready to set action plan and implement dietary changes. -Expected compliance: good. Thank you for your referral of this patient. Total time involved in direct patient education: 30 minutes for follow-up MNT visit.

## 2020-12-09 RX ORDER — AMLODIPINE BESYLATE 5 MG/1
TABLET ORAL
Qty: 90 TABLET | Refills: 0 | Status: SHIPPED | OUTPATIENT
Start: 2020-12-09 | End: 2021-02-18

## 2020-12-09 RX ORDER — SPIRONOLACTONE AND HYDROCHLOROTHIAZIDE 25; 25 MG/1; MG/1
TABLET ORAL
Qty: 90 TABLET | Refills: 0 | Status: SHIPPED | OUTPATIENT
Start: 2020-12-09 | End: 2021-03-03

## 2020-12-18 ENCOUNTER — TELEPHONE (OUTPATIENT)
Dept: FAMILY MEDICINE CLINIC | Age: 85
End: 2020-12-18

## 2020-12-18 NOTE — TELEPHONE ENCOUNTER
Pt called in with symptoms of coughing and sneezing for 1 week. Pt has not had any contact with anyone that has or had tested positive for COVID.     [facility], [place]      Please advise

## 2020-12-21 ENCOUNTER — TELEPHONE (OUTPATIENT)
Dept: PHARMACY | Age: 85
End: 2020-12-21

## 2020-12-21 ENCOUNTER — VIRTUAL VISIT (OUTPATIENT)
Dept: FAMILY MEDICINE CLINIC | Age: 85
End: 2020-12-21
Payer: MEDICARE

## 2020-12-21 PROCEDURE — 99213 OFFICE O/P EST LOW 20 MIN: CPT | Performed by: NURSE PRACTITIONER

## 2020-12-21 RX ORDER — GUAIFENESIN 600 MG/1
600 TABLET, EXTENDED RELEASE ORAL 2 TIMES DAILY
Qty: 30 TABLET | Refills: 0 | Status: SHIPPED | OUTPATIENT
Start: 2020-12-21 | End: 2021-01-04 | Stop reason: SDUPTHER

## 2020-12-21 RX ORDER — BENZONATATE 100 MG/1
100-200 CAPSULE ORAL 3 TIMES DAILY PRN
Qty: 60 CAPSULE | Refills: 0 | Status: SHIPPED | OUTPATIENT
Start: 2020-12-21 | End: 2021-01-04 | Stop reason: SDUPTHER

## 2020-12-21 RX ORDER — AZITHROMYCIN 250 MG/1
250 TABLET, FILM COATED ORAL SEE ADMIN INSTRUCTIONS
Qty: 6 TABLET | Refills: 0 | Status: SHIPPED | OUTPATIENT
Start: 2020-12-21 | End: 2020-12-26

## 2020-12-21 RX ORDER — HYDROCHLOROTHIAZIDE 12.5 MG/1
CAPSULE, GELATIN COATED ORAL
Qty: 30 CAPSULE | OUTPATIENT
Start: 2020-12-21

## 2020-12-21 ASSESSMENT — ENCOUNTER SYMPTOMS
CHEST TIGHTNESS: 0
CHOKING: 0
SHORTNESS OF BREATH: 0
SORE THROAT: 0
SINUS PRESSURE: 0
WHEEZING: 0
SINUS PAIN: 0
COUGH: 1
RHINORRHEA: 1

## 2020-12-21 NOTE — TELEPHONE ENCOUNTER
----- Message from [employer] sent at 12/21/2020  1:04 PM EST -----  Contact: [phone]  Kimberly Varner called to let us know that her Dr started her on 3 different meds, and she wants to know if she needs to change her coumadin dose. Z-pack;  Mucinex   mg  BID; Benzonatate 100 mg  1-2 tabs TID prn cough.

## 2020-12-21 NOTE — PROGRESS NOTES
2020    TELEHEALTH EVALUATION -- Audio/Visual (During [facility] public health emergency)    HPI:Visit conducted with pt at home and provider Daina Luo CNP in office. Juana Carlisle (:  1935) has requested an audio/video evaluation for the following concern(s):    URI Symptoms    HPI:      Symptoms have been present for 5 day(s). Symptoms are unchanged since they initially started. Fever? No  Runny nose or congestion? Yes   Cough? Yes  Sore throat? No  Headache, fatigue, joint pains, muscle aches? Yes - she does have a headache from all of her coughing,   Shortness of breath/Wheezing? No  Nausea/Vomiting/Diarrhea? No  Double Sickening? No  Sick contacts? No    Patient has tried otc meds with out improvement. Pt has been homebound, not out or about anywhere,  States she has been taking allegra for her runny nose, declines a covid test.     Review of Systems   Constitutional: Negative for chills, fatigue and fever. HENT: Positive for congestion, postnasal drip, rhinorrhea and sneezing. Negative for sinus pressure, sinus pain, sore throat and tinnitus. Respiratory: Positive for cough. Negative for choking, chest tightness, shortness of breath and wheezing. Musculoskeletal: Negative for arthralgias and myalgias. Skin: Negative. Neurological: Positive for headaches. Negative for dizziness, facial asymmetry, weakness and light-headedness. Psychiatric/Behavioral: Negative for self-injury, sleep disturbance and suicidal ideas. Pt had evaluation with cardiology 10/2020 for A Fib, on coumadin, rate control good at time of visit. Current meds were continued. Prior to Visit Medications    Medication Sig Taking?  Authorizing Provider   guaiFENesin (MUCINEX) 600 MG extended release tablet Take 1 tablet by mouth 2 times daily for 15 days Yes JOSH Csat CNP azithromycin (ZITHROMAX) 250 MG tablet Take 1 tablet by mouth See Admin Instructions for 5 days 500mg on day 1 followed by 250mg on days 2 - 5 Yes JOSH Palomares CNP   benzonatate (TESSALON) 100 MG capsule Take 1-2 capsules by mouth 3 times daily as needed for Cough Yes Corinne Raja, APRN - CNP   amLODIPine (NORVASC) 5 MG tablet TAKE 1 TABLET BY MOUTH EVERY DAY  JOSH Muir CNP   spironolactone-hydroCHLOROthiazide (ALDACTAZIDE) 25-25 MG per tablet TAKE 1 TABLET BY MOUTH EVERY DAY  [place] JOSH Squires CNP   magnesium oxide (MAG-OX) 400 MG tablet TAKE 1 TABLET BY MOUTH EVERY DAY  [place] JOSH Squires CNP   losartan (COZAAR) 50 MG tablet take 1 tablet by mouth once daily  Corinne Raja, APRN - CNP   betamethasone valerate (VALISONE) 0.1 % cream Apply topically 2 times daily PRN. Callum Guadarrama DO   fexofenadine (ALLEGRA) 180 MG tablet TAKE 1 TABLET BY MOUTH EVERY DAY IN THE MORNING  Historical Provider, MD   warfarin (COUMADIN) 5 MG tablet TAKE AS DIRECTED BY [facility] #30 TABS = 30 DAY SUPPLY  Carline Velasco MD   blood glucose monitor strips Test two time's a day dispense whatever stirps insurance covers.   Corinne Raja, APRN - CNP   Lancets MISC 1 each by Does not apply route 2 times daily Test twice daily dispense what insurance ocvers  Corinne Raja, APRN - CNP   metFORMIN (GLUCOPHAGE) 1000 MG tablet Take 1 tablet by mouth 2 times daily (with meals)  JOSH Sherwood CNP   metoprolol tartrate (LOPRESSOR) 25 MG tablet TAKE 1 TABLET BY MOUTH TWICE A DAY  Corinne Raja, APRN - CNP   hydrOXYzine (ATARAX) 25 MG tablet TAKE 1 TABLET BY MOUTH EVERY 6 HOURS AS NEEDED  Corinne Raja, APRN - CNP   Petrolatum 42 % OINT Apply topically 2 times daily   Historical Provider, MD   doxepin (SINEQUAN) 10 MG capsule Take 10 mg by mouth nightly  Historical Provider, MD   montelukast (SINGULAIR) 10 MG tablet Take 10 mg by mouth daily   Historical Provider, MD blood glucose monitor kit and supplies Test one time a day . Dispense 30 strips a month with 5 refills Please dispense meter and suppliesthat insurance covers . JOSH Vazquez - CNP   UNABLE TO FIND Allergy shot every other Monday at allergist's office. Historical Provider, MD       Social History     Tobacco Use    Smoking status: Former Smoker     Packs/day: 0.50     Types: Cigarettes     Quit date: 1978     Years since quittin.5    Smokeless tobacco: Never Used   Substance Use Topics    Alcohol use: Yes     Alcohol/week: 1.0 standard drinks     Types: 1 Cans of beer per week     Comment: occasional     Drug use: No        No Known Allergies,   Past Medical History:   Diagnosis Date    Atrial fibrillation ([facility]     Diabetes mellitus ([facility]     Hypertension    , History reviewed. No pertinent surgical history. ,   Social History     Tobacco Use    Smoking status: Former Smoker     Packs/day: 0.50     Types: Cigarettes     Quit date: 1978     Years since quittin.5    Smokeless tobacco: Never Used   Substance Use Topics    Alcohol use:  Yes     Alcohol/week: 1.0 standard drinks     Types: 1 Cans of beer per week     Comment: occasional     Drug use: No   ,   Family History   Problem Relation Age of Onset    Cancer Mother         breast cancer    Diabetes Mother    ,   There is no immunization history on file for this patient.,   Health Maintenance   Topic Date Due    DTaP/Tdap/Td vaccine (1 - Tdap) 1956    Shingles Vaccine (1 of 2) 1987    Annual Wellness Visit (AWV)  2019    Flu vaccine (1) 2020    Pneumococcal 65+ years Vaccine (1 of 1 - PPSV23) 10/30/2025 (Originally 2002)    Potassium monitoring  10/27/2021    Creatinine monitoring  10/27/2021    DEXA (modify frequency per FRAX score)  Completed    Hepatitis A vaccine  Aged Out    Hib vaccine  Aged Out    Meningococcal (ACWY) vaccine  Aged Out       PHYSICAL EXAMINATION: [ INSTRUCTIONS:  \"[x]\" Indicates a positive item  \"[]\" Indicates a negative item  -- DELETE ALL ITEMS NOT EXAMINED]  Vital Signs: (As obtained by patient/caregiver or practitioner observation)    Blood pressure-  Heart rate-    Respiratory rate-    Temperature-  Pulse oximetry-     Constitutional: [x] Appears well-developed and well-nourished [x] No apparent distress  , able to talk freely during visit, no coughing noted, able to walk  Around house without SOB. [] Abnormal-   Mental status  [] Alert and awake  [] Oriented to person/place/time []Able to follow commands      Eyes:  EOM    []  Normal  [] Abnormal-  Sclera  [x]  Normal  [] Abnormal -         Discharge [x]  None visible  [] Abnormal -    HENT:   [] Normocephalic, atraumatic. [] Abnormal   [] Mouth/Throat: Mucous membranes are moist.     External Ears [] Normal  [] Abnormal-     Neck: [] No visualized mass     Pulmonary/Chest: [x] Respiratory effort normal.  [x] No visualized signs of difficulty breathing or respiratory distress        [] Abnormal-      Musculoskeletal:   [x] Normal gait with no signs of ataxia         [] Normal range of motion of neck        [] Abnormal-       Neurological:        [] No Facial Asymmetry (Cranial nerve 7 motor function) (limited exam to video visit)          [] No gaze palsy        [] Abnormal-         Skin:        [x] No significant exanthematous lesions or discoloration noted on facial skin         [] Abnormal-            Psychiatric:       [] Normal Affect [] No Hallucinations        [] Abnormal-     Other pertinent observable physical exam findings-     ASSESSMENT/PLAN:  1. Atrial fibrillation, unspecified type ([address]  Stable and rate controlled continue current meds    2. Bronchitis  Increase fluid intake  Tessalon perles and mucinex  Call office if condition changes or worsens. - azithromycin (ZITHROMAX) 250 MG tablet; Take 1 tablet by mouth See Admin Instructions for 5 days 500mg on day 1 followed by 250mg on days 2 - 5  Dispense: 6 tablet; Refill: 0    Pt in agreement with plan. Return if symptoms worsen or fail to improve. Patricio Jalloh is a 82 y.o. female being evaluated by a Virtual Visit (video visit) encounter to address concerns as mentioned above. A caregiver was present when appropriate. Due to this being a TeleHealth encounter (During [place] [address] public health emergency), evaluation of the following organ systems was limited: Vitals/Constitutional/EENT/Resp/CV/GI//MS/Neuro/Skin/Heme-Lymph-Imm. Pursuant to the emergency declaration under the [address] [place] [address], [address] [place] [address] authority and the [employer] Act, this Virtual Visit was conducted with patient's (and/or legal guardian's) consent, to reduce the patient's risk of exposure to COVID-19 and provide necessary medical care. The patient (and/or legal guardian) has also been advised to contact this office for worsening conditions or problems, and seek emergency medical treatment and/or call 911 if deemed necessary. Patient identification was verified at the start of the visit: Yes    Total time spent on this encounter: Not billed by time    Services were provided through a video synchronous discussion virtually to substitute for in-person clinic visit. Patient and provider were located at their individual homes. --JOSH Orlando - CNP on 12/21/2020 at 11:50 AM    An electronic signature was used to authenticate this note.

## 2020-12-21 NOTE — TELEPHONE ENCOUNTER
Called and spoke with Sofia Solis and told her that the 3 new medications that the physician started her do not interact with her Coumadin.     Eliud HamptonD, BCPS  12/21/2020  1:15 PM

## 2021-01-04 ENCOUNTER — TELEPHONE (OUTPATIENT)
Dept: FAMILY MEDICINE CLINIC | Age: 86
End: 2021-01-04

## 2021-01-04 ENCOUNTER — VIRTUAL VISIT (OUTPATIENT)
Dept: FAMILY MEDICINE CLINIC | Age: 86
End: 2021-01-04
Payer: MEDICARE

## 2021-01-04 DIAGNOSIS — J30.89 NON-SEASONAL ALLERGIC RHINITIS DUE TO OTHER ALLERGIC TRIGGER: ICD-10-CM

## 2021-01-04 DIAGNOSIS — R05.9 COUGH: Primary | ICD-10-CM

## 2021-01-04 PROCEDURE — 99213 OFFICE O/P EST LOW 20 MIN: CPT | Performed by: NURSE PRACTITIONER

## 2021-01-04 RX ORDER — GUAIFENESIN 600 MG/1
600 TABLET, EXTENDED RELEASE ORAL 2 TIMES DAILY
Qty: 30 TABLET | Refills: 0 | Status: SHIPPED | OUTPATIENT
Start: 2021-01-04 | End: 2021-01-05 | Stop reason: SDUPTHER

## 2021-01-04 RX ORDER — FEXOFENADINE HCL 180 MG/1
TABLET ORAL
Qty: 30 TABLET | Refills: 2 | Status: SHIPPED | OUTPATIENT
Start: 2021-01-04 | End: 2021-09-20 | Stop reason: SDUPTHER

## 2021-01-04 RX ORDER — BENZONATATE 100 MG/1
100-200 CAPSULE ORAL 3 TIMES DAILY PRN
Qty: 60 CAPSULE | Refills: 0 | Status: SHIPPED | OUTPATIENT
Start: 2021-01-04 | End: 2021-01-05 | Stop reason: SDUPTHER

## 2021-01-04 ASSESSMENT — ENCOUNTER SYMPTOMS
CHEST TIGHTNESS: 0
WHEEZING: 0
TROUBLE SWALLOWING: 0
COUGH: 1
SORE THROAT: 0
SINUS PAIN: 0
CHOKING: 0
SHORTNESS OF BREATH: 0
RHINORRHEA: 1
SINUS PRESSURE: 0

## 2021-01-04 NOTE — TELEPHONE ENCOUNTER
Future Appointments   Date Time Provider Brandy Rosa M   1/4/2021 11:00 AM Romana Blazer, APRN - [facility]   1/6/2021  1:00 PM Romana Blazer, APRN - CNP Brynn Clore [facility]   1/12/2021 11:20 AM Zenaida Bucio RN [facility]   2/18/2021  1:15 PM Ivelisse Davis MD N [facility]   4/12/2021  2:00 PM Amee Neal RD, LD [facility]

## 2021-01-04 NOTE — PROGRESS NOTES
2021    TELEHEALTH EVALUATION -- Audio/Visual (During TSMTV-58 public health emergency)    HPI:  Visit conducted with pt at home and provider Ward Rodriguez CNP in office. Vlad Mckeon (:  1935) has requested an audio/video evaluation for the following concern(s):    URI Symptoms    HPI:      Symptoms have been present for 14 day(s). Symptoms are better since they initially started. Fever? No  Runny nose or congestion? Yes - she has had clear nasal drainage wiht sneezing since the beginning of December, used to take allergy shots but stopped , that is when the drainage and coughing and sneezing worsened. Cough? Yes - she was treated with zpak, mucinex and tessalon perles that helped but cough still lingers. Sore throat? No  Headache, fatigue, joint pains, muscle aches? No  Shortness of breath/Wheezing? No  Nausea/Vomiting/Diarrhea? No  Double Sickening? No  Sick contacts? No    Patient has tried as above  with some improvement. Review of Systems   Constitutional: Negative for chills, fatigue and fever. HENT: Positive for postnasal drip, rhinorrhea and sneezing. Negative for congestion, sinus pressure, sinus pain, sore throat and trouble swallowing. Respiratory: Positive for cough. Negative for choking, chest tightness, shortness of breath and wheezing. Cardiovascular: Negative. Genitourinary: Negative for difficulty urinating. Musculoskeletal: Negative for arthralgias and myalgias. Skin: Negative. Neurological: Negative for dizziness and headaches. Psychiatric/Behavioral: Negative for self-injury, sleep disturbance and suicidal ideas. Prior to Visit Medications    Medication Sig Taking?  Authorizing Provider   benzonatate (TESSALON) 100 MG capsule Take 1-2 capsules by mouth 3 times daily as needed for Cough Yes JOSH Miles CNP fexofenadine (ALLEGRA) 180 MG tablet TAKE 1 TABLET BY MOUTH EVERY DAY IN THE MORNING Yes JOSH Leonard CNP   guaiFENesin (MUCINEX) 600 MG extended release tablet Take 1 tablet by mouth 2 times daily for 15 days Yes JOSH Leonard CNP   amLODIPine (NORVASC) 5 MG tablet TAKE 1 TABLET BY MOUTH EVERY DAY  JOSH Valadez CNP   spironolactone-hydroCHLOROthiazide (ALDACTAZIDE) 25-25 MG per tablet TAKE 1 TABLET BY MOUTH EVERY DAY  JOSH Valadez CNP   magnesium oxide (MAG-OX) 400 MG tablet TAKE 1 TABLET BY MOUTH EVERY DAY  JOSH Valadez CNP   losartan (COZAAR) 50 MG tablet take 1 tablet by mouth once daily  JOSH Leonard CNP   betamethasone valerate (VALISONE) 0.1 % cream Apply topically 2 times daily PRN. Michael Akins DO   warfarin (COUMADIN) 5 MG tablet TAKE AS DIRECTED BY [facility] #30 TABS = 30 DAY SUPPLY  De Burger MD   blood glucose monitor strips Test two time's a day dispense whatever stirps insurance covers.   JOSH Leonard CNP   Lancets MISC 1 each by Does not apply route 2 times daily Test twice daily dispense what insurance ocvers  JOSH Leonard CNP   metFORMIN (GLUCOPHAGE) 1000 MG tablet Take 1 tablet by mouth 2 times daily (with meals)  NapoleJOSH Landin CNP   metoprolol tartrate (LOPRESSOR) 25 MG tablet TAKE 1 TABLET BY MOUTH TWICE A DAY  JOSH Leonard CNP   hydrOXYzine (ATARAX) 25 MG tablet TAKE 1 TABLET BY MOUTH EVERY 6 HOURS AS NEEDED  JOSH Leonard CNP   Petrolatum 42 % OINT Apply topically 2 times daily   Historical Provider, MD   doxepin (SINEQUAN) 10 MG capsule Take 10 mg by mouth nightly  Historical Provider, MD   montelukast (SINGULAIR) 10 MG tablet Take 10 mg by mouth daily   Historical Provider, MD blood glucose monitor kit and supplies Test one time a day . Dispense 30 strips a month with 5 refills Please dispense meter and suppliesthat insurance covers . JOSH Hawk CNP   UNABLE TO FIND Allergy shot every other Monday at allergist's office. Historical Provider, MD       Social History     Tobacco Use    Smoking status: Former Smoker     Packs/day: 0.50     Types: Cigarettes     Quit date: 1978     Years since quittin.5    Smokeless tobacco: Never Used   Substance Use Topics    Alcohol use: Yes     Alcohol/week: 1.0 standard drinks     Types: 1 Cans of beer per week     Comment: occasional     Drug use: No        No Known Allergies,   Past Medical History:   Diagnosis Date    Atrial fibrillation (HCC)     Diabetes mellitus ([facility] [address]     Hypertension    , No past surgical history on file.,   Social History     Tobacco Use    Smoking status: Former Smoker     Packs/day: 0.50     Types: Cigarettes     Quit date: 1978     Years since quittin.5    Smokeless tobacco: Never Used   Substance Use Topics    Alcohol use:  Yes     Alcohol/week: 1.0 standard drinks     Types: 1 Cans of beer per week     Comment: occasional     Drug use: No   ,   Family History   Problem Relation Age of Onset    Cancer Mother         breast cancer    Diabetes Mother    ,   There is no immunization history on file for this patient.,   Health Maintenance   Topic Date Due    DTaP/Tdap/Td vaccine (1 - Tdap) 1956    Shingles Vaccine (1 of 2) 1987    Annual Wellness Visit (AWV)  2019    Flu vaccine (1) 2020    Pneumococcal 65+ years Vaccine (1 of 1 - PPSV23) 10/30/2025 (Originally 2002)    Potassium monitoring  10/27/2021    Creatinine monitoring  10/27/2021    DEXA (modify frequency per FRAX score)  Completed    Hepatitis A vaccine  Aged Out    Hib vaccine  Aged Out    Meningococcal (ACWY) vaccine  Aged Out       PHYSICAL EXAMINATION: [ INSTRUCTIONS:  \"[x]\" Indicates a positive item  \"[]\" Indicates a negative item  -- DELETE ALL ITEMS NOT EXAMINED]  Vital Signs: (As obtained by patient/caregiver or practitioner observation)    Blood pressure-  Heart rate-    Respiratory rate-    Temperature-  Pulse oximetry-     Constitutional: [x] Appears well-developed and well-nourished [x] No apparent distress      [] Abnormal-   Mental status  [x] Alert and awake  [x] Oriented to person/place/time []Able to follow commands      Eyes:  EOM    []  Normal  [] Abnormal-  Sclera  [x]  Normal  [] Abnormal -         Discharge [x]  None visible  [] Abnormal -    HENT:   [] Normocephalic, atraumatic. [] Abnormal   [x] Mouth/Throat: Mucous membranes are moist.     External Ears [] Normal  [] Abnormal-     Neck: [] No visualized mass     Pulmonary/Chest: [x] Respiratory effort normal.  [x] No visualized signs of difficulty breathing or respiratory distress, pt walked freely around house and was not sob, pt did not cough during visit         [] Abnormal-      Musculoskeletal:   [] Normal gait with no signs of ataxia         [] Normal range of motion of neck        [] Abnormal-       Neurological:        [] No Facial Asymmetry (Cranial nerve 7 motor function) (limited exam to video visit)          [] No gaze palsy        [] Abnormal-         Skin:        [x] No significant exanthematous lesions or discoloration noted on facial skin         [] Abnormal-            Psychiatric:       [] Normal Affect [] No Hallucinations        [] Abnormal-     Other pertinent observable physical exam findings-     ASSESSMENT/PLAN:  1. Cough  Continue tessalon perles, cough has gotten worse since she ran out, take mucinex  - benzonatate (TESSALON) 100 MG capsule; Take 1-2 capsules by mouth 3 times daily as needed for Cough  Dispense: 60 capsule;  Refill: 0

## 2021-01-05 DIAGNOSIS — R05.9 COUGH: ICD-10-CM

## 2021-01-05 RX ORDER — BENZONATATE 100 MG/1
100-200 CAPSULE ORAL 3 TIMES DAILY PRN
Qty: 60 CAPSULE | Refills: 0 | Status: SHIPPED | OUTPATIENT
Start: 2021-01-05 | End: 2021-02-23 | Stop reason: SDUPTHER

## 2021-01-05 RX ORDER — GUAIFENESIN 600 MG/1
600 TABLET, EXTENDED RELEASE ORAL 2 TIMES DAILY
Qty: 30 TABLET | Refills: 0 | Status: SHIPPED | OUTPATIENT
Start: 2021-01-05 | End: 2021-01-20

## 2021-01-05 NOTE — TELEPHONE ENCOUNTER
Damon Olszewski called requesting a refill on the following medications:  Requested Prescriptions     Pending Prescriptions Disp Refills    benzonatate (TESSALON) 100 MG capsule 60 capsule 0     Sig: Take 1-2 capsules by mouth 3 times daily as needed for Cough    guaiFENesin (MUCINEX) 600 MG extended release tablet 30 tablet 0     Sig: Take 1 tablet by mouth 2 times daily for 15 days     Pharmacy verified:  .pv  [facility] on [address]    Date of last visit: 01/04/2021  Date of next visit (if applicable): 1/5/6530

## 2021-01-06 ENCOUNTER — OFFICE VISIT (OUTPATIENT)
Dept: FAMILY MEDICINE CLINIC | Age: 86
End: 2021-01-06
Payer: MEDICARE

## 2021-01-06 VITALS
DIASTOLIC BLOOD PRESSURE: 82 MMHG | SYSTOLIC BLOOD PRESSURE: 120 MMHG | HEIGHT: 66 IN | HEART RATE: 84 BPM | RESPIRATION RATE: 10 BRPM | BODY MASS INDEX: 30.98 KG/M2 | WEIGHT: 192.8 LBS | OXYGEN SATURATION: 99 % | TEMPERATURE: 98.3 F

## 2021-01-06 DIAGNOSIS — E11.59 TYPE 2 DIABETES MELLITUS WITH OTHER CIRCULATORY COMPLICATION, WITHOUT LONG-TERM CURRENT USE OF INSULIN (HCC): ICD-10-CM

## 2021-01-06 DIAGNOSIS — R05.9 COUGH: Primary | ICD-10-CM

## 2021-01-06 DIAGNOSIS — Z23 NEED FOR PROPHYLACTIC VACCINATION AND INOCULATION AGAINST VARICELLA: ICD-10-CM

## 2021-01-06 DIAGNOSIS — I48.91 ATRIAL FIBRILLATION, UNSPECIFIED TYPE (HCC): ICD-10-CM

## 2021-01-06 DIAGNOSIS — Z00.00 ROUTINE GENERAL MEDICAL EXAMINATION AT A HEALTH CARE FACILITY: ICD-10-CM

## 2021-01-06 LAB — HBA1C MFR BLD: 5.7 % (ref 4.3–5.7)

## 2021-01-06 PROCEDURE — G0439 PPPS, SUBSEQ VISIT: HCPCS | Performed by: NURSE PRACTITIONER

## 2021-01-06 ASSESSMENT — LIFESTYLE VARIABLES
HOW OFTEN DO YOU HAVE SIX OR MORE DRINKS ON ONE OCCASION: NEVER
HOW OFTEN DURING THE LAST YEAR HAVE YOU HAD A FEELING OF GUILT OR REMORSE AFTER DRINKING: 0
HAVE YOU OR SOMEONE ELSE BEEN INJURED AS A RESULT OF YOUR DRINKING: NO
HOW OFTEN DURING THE LAST YEAR HAVE YOU FOUND THAT YOU WERE NOT ABLE TO STOP DRINKING ONCE YOU HAD STARTED: 0
HOW OFTEN DO YOU HAVE A DRINK CONTAINING ALCOHOL: 1
AUDIT-C TOTAL SCORE: 0
HAVE YOU OR SOMEONE ELSE BEEN INJURED AS A RESULT OF YOUR DRINKING: 0
AUDIT TOTAL SCORE: 0
HOW MANY STANDARD DRINKS CONTAINING ALCOHOL DO YOU HAVE ON A TYPICAL DAY: ONE OR TWO
HOW OFTEN DURING THE LAST YEAR HAVE YOU FOUND THAT YOU WERE NOT ABLE TO STOP DRINKING ONCE YOU HAD STARTED: NEVER
HOW OFTEN DURING THE LAST YEAR HAVE YOU NEEDED AN ALCOHOLIC DRINK FIRST THING IN THE MORNING TO GET YOURSELF GOING AFTER A NIGHT OF HEAVY DRINKING: NEVER
HOW OFTEN DURING THE LAST YEAR HAVE YOU NEEDED AN ALCOHOLIC DRINK FIRST THING IN THE MORNING TO GET YOURSELF GOING AFTER A NIGHT OF HEAVY DRINKING: 0
HAS A RELATIVE, FRIEND, DOCTOR, OR ANOTHER HEALTH PROFESSIONAL EXPRESSED CONCERN ABOUT YOUR DRINKING OR SUGGESTED YOU CUT DOWN: NO
HOW OFTEN DURING THE LAST YEAR HAVE YOU FAILED TO DO WHAT WAS NORMALLY EXPECTED FROM YOU BECAUSE OF DRINKING: NEVER
HOW OFTEN DO YOU HAVE A DRINK CONTAINING ALCOHOL: MONTHLY OR LESS
HOW OFTEN DURING THE LAST YEAR HAVE YOU HAD A FEELING OF GUILT OR REMORSE AFTER DRINKING: NEVER
HAS A RELATIVE, FRIEND, DOCTOR, OR ANOTHER HEALTH PROFESSIONAL EXPRESSED CONCERN ABOUT YOUR DRINKING OR SUGGESTED YOU CUT DOWN: 0
HOW OFTEN DURING THE LAST YEAR HAVE YOU BEEN UNABLE TO REMEMBER WHAT HAPPENED THE NIGHT BEFORE BECAUSE YOU HAD BEEN DRINKING: 0
HOW OFTEN DURING THE LAST YEAR HAVE YOU BEEN UNABLE TO REMEMBER WHAT HAPPENED THE NIGHT BEFORE BECAUSE YOU HAD BEEN DRINKING: NEVER

## 2021-01-06 ASSESSMENT — PATIENT HEALTH QUESTIONNAIRE - PHQ9
SUM OF ALL RESPONSES TO PHQ QUESTIONS 1-9: 0
SUM OF ALL RESPONSES TO PHQ QUESTIONS 1-9: 0
SUM OF ALL RESPONSES TO PHQ9 QUESTIONS 1 & 2: 0
1. LITTLE INTEREST OR PLEASURE IN DOING THINGS: 0
SUM OF ALL RESPONSES TO PHQ QUESTIONS 1-9: 0
1. LITTLE INTEREST OR PLEASURE IN DOING THINGS: 0
SUM OF ALL RESPONSES TO PHQ9 QUESTIONS 1 & 2: 0

## 2021-01-06 NOTE — PROGRESS NOTES
Medicare Annual Wellness Visit  Name: Scarlet Stubbs Date: 2021   MRN: [id number] Sex: Female   Age: 82 y.o. Ethnicity: Non-/Non    : 1935 Race: Blaine Nguyen is here for Medicare AWV and Follow-up (6 MONTH F/U FOR CHRONIC CONDITIONS)    Screenings for behavioral, psychosocial and functional/safety risks, and cognitive dysfunction are all negative except as indicated below. These results, as well as other patient data from the [address] form, are documented in Flowsheets linked to this Encounter. No Known Allergies      Prior to Visit Medications    Medication Sig Taking? Authorizing Provider   zoster recombinant adjuvanted vaccine [facility]) 50 MCG/0.5ML SUSR injection Inject 0.5 mLs into the muscle once for 1 dose Yes JOSH Lilly CNP   benzonatate (TESSALON) 100 MG capsule Take 1-2 capsules by mouth 3 times daily as needed for Cough Yes JOSH Lilly CNP   guaiFENesin (MUCINEX) 600 MG extended release tablet Take 1 tablet by mouth 2 times daily for 15 days Yes JOSH Lilly CNP   fexofenadine (ALLEGRA) 180 MG tablet TAKE 1 TABLET BY MOUTH EVERY DAY IN THE MORNING Yes JOSH Lilly CNP   amLODIPine (NORVASC) 5 MG tablet TAKE 1 TABLET BY MOUTH EVERY DAY Yes JOSH Salas CNP   spironolactone-hydroCHLOROthiazide (ALDACTAZIDE) 25-25 MG per tablet TAKE 1 TABLET BY MOUTH EVERY DAY Yes JOSH Salas CNP   magnesium oxide (MAG-OX) 400 MG tablet TAKE 1 TABLET BY MOUTH EVERY DAY Yes JOSH Salas CNP   losartan (COZAAR) 50 MG tablet take 1 tablet by mouth once daily Yes JOSH Lilly CNP   betamethasone valerate (VALISONE) 0.1 % cream Apply topically 2 times daily PRN.  Yes Behzad Irvin,    warfarin (COUMADIN) 5 MG tablet TAKE AS DIRECTED BY [facility] COUMADIN CLIN[facility] #30 TABS = 30 DAY SUPPLY Yes Walter Abreu MD blood glucose monitor strips Test two time's a day dispense whatever stirps insurance covers. Yes JOSH Milian CNP   Lancets MISC 1 each by Does not apply route 2 times daily Test twice daily dispense what insurance ocvers Yes JOSH Milian CNP   metFORMIN (GLUCOPHAGE) 1000 MG tablet Take 1 tablet by mouth 2 times daily (with meals) Yes JOSH Moody CNP   metoprolol tartrate (LOPRESSOR) 25 MG tablet TAKE 1 TABLET BY MOUTH TWICE A DAY Yes JOSH Milian CNP   hydrOXYzine (ATARAX) 25 MG tablet TAKE 1 TABLET BY MOUTH EVERY 6 HOURS AS NEEDED Yes JOSH Milian CNP   Petrolatum 42 % OINT Apply topically 2 times daily  Yes Historical Provider, MD   doxepin (SINEQUAN) 10 MG capsule Take 10 mg by mouth nightly Yes Historical Provider, MD   montelukast (SINGULAIR) 10 MG tablet Take 10 mg by mouth daily  Yes Historical Provider, MD   blood glucose monitor kit and supplies Test one time a day . Dispense 30 strips a month with 5 refills Please dispense meter and suppliesthat insurance covers . Yes JOSH Milian CNP   UNABLE TO FIND Allergy shot every other Monday at allergist's office. Yes Historical Provider, MD         Past Medical History:   Diagnosis Date    Atrial fibrillation ([address]     Diabetes mellitus ([facility] [address]     Hypertension        History reviewed. No pertinent surgical history.       Family History   Problem Relation Age of Onset    Cancer Mother         breast cancer    Diabetes Mother        CareTeam (Including outside providers/suppliers regularly involved in providing care):   Patient Care Team:  JOSH Milian CNP as PCP - General (Family Nurse Practitioner)  JOSH Milian CNP as PCP - [facility] Empaneled Provider    Wt Readings from Last 3 Encounters:   01/06/21 192 lb 12.8 oz (87.5 kg)   12/07/20 191 lb (86.6 kg)   10/15/20 190 lb 2 oz (86.2 kg)     Vitals:    01/06/21 1301   BP: 120/82   Pulse: 84   Resp: 10   Temp: 98.3 °F (36.8 °C) TempSrc: Oral   SpO2: 99%   Weight: 192 lb 12.8 oz (87.5 kg)   Height: 5' 5.55\" (1.665 m)     Body mass index is 31.55 kg/m². Based upon direct observation of the patient, evaluation of cognition reveals recent and remote memory intact. General Appearance: alert and oriented to person, place and time, well-developed and well-nourished, in no acute distress  Skin: warm and dry, no rash or erythema  Head: normocephalic and atraumatic  Eyes: pupils equal, round, and reactive to light, extraocular eye movements intact, conjunctivae normal  ENT: tympanic membrane, external ear and ear canal normal bilaterally, oropharynx clear and moist with normal mucous membranes  Neck: neck supple and non tender without mass, no thyromegaly or thyroid nodules, no cervical lymphadenopathy   Pulmonary/Chest: clear to auscultation bilaterally- no wheezes, rales or rhonchi, normal air movement, no respiratory distress  Cardiovascular: normal rate, regular rhythm, intact distal pulses and no carotid bruits  Abdomen: soft, non-tender, non-distended, normal bowel sounds, no masses or organomegaly  Extremities: no cyanosis, no clubbing and 1 + edema-  bilateral     Patient's complete Health Risk Assessment and screening values have been reviewed and are found in Flowsheets. The following problems were reviewed today and where indicated follow up appointments were made and/or referrals ordered. Positive Risk Factor Screenings with Interventions:      Cognitive:   Words recalled: 0 Words Recalled  Clock Drawing Test (CDT) Score: Normal  Total Score Interpretation: Positive Mini-Cog  Did the patient refuse to take the cognition test?: No  Cognitive Impairment Interventions:  · Patient declines any further evaluation/treatment for cognitive impairment         General Health and ACP:  General  In general, how would you say your health is?: Good In the past 7 days, have you experienced any of the following? New or Increased Pain, New or Increased Fatigue, Loneliness, Social Isolation, Stress or Anger?: None of These  Do you get the social and emotional support that you need?: Yes  Do you have a Living Will?: (!) No  Advance Directives     Power of [facility] Will ACP-Advance Directive ACP-Power of     Not on File Not on File Not on File Not on File      General Health Risk Interventions:  · No Living Will: Advance Care Planning addressed with patient today    Health Habits/Nutrition:  Health Habits/Nutrition  Do you exercise for at least 20 minutes 2-3 times per week?: Yes  Have you lost any weight without trying in the past 3 months?: No  Do you eat fewer than 2 meals per day?: No  Have you seen a dentist within the past year?: (!) No  Body mass index: (!) 31.54  Health Habits/Nutrition Interventions:  · Dental exam overdue:  patient encouraged to make appointment with his/her dentist       Personalized Preventive Plan   Current Health Maintenance Status    There is no immunization history on file for this patient. Health Maintenance   Topic Date Due    Annual Wellness Visit (AWV)  05/29/2019    DTaP/Tdap/Td vaccine (1 - Tdap) 01/06/2022 (Originally 1[birth year]/1956)    Flu vaccine (1) 01/06/2022 (Originally 9/1/2020)    Shingles Vaccine (1 of 2) 01/06/2022 (Originally 1/23/1987)    Pneumococcal 65+ years Vaccine (1 of 1 - PPSV23) 10/30/2025 (Originally 1/23/2002)    Potassium monitoring  10/27/2021    Creatinine monitoring  10/27/2021    DEXA (modify frequency per FRAX score)  Completed    Hepatitis A vaccine  Aged Out    Hib vaccine  Aged Out    Meningococcal (ACWY) vaccine  Aged Out     Recommendations for [employer] Due: see orders and patient instructions/AVS.  .   Recommended screening schedule for the next 5-10 years is provided to the patient in written form: see Patient Instructions/AVS. · Being physically active and gradualy increasing activity levels   · Reduce weight and determine a healthy BMI goal  · Monitor blood pressure and treat if higher than 140/90 mmHg  · Maintain blood total cholesterol levels under 5 mmol/l or 190 mg/dl  · Maintain LDL cholesterol levels under 3.0 mmol/l or 115 mg/dl   · Control blood glucose levels  · Consider taking aspirin (75 mg daily), once blood pressure is controlled   Provided a follow up plan.   Time spent (minutes): 10

## 2021-01-06 NOTE — PROGRESS NOTES
Appearance: alert, well appearing, and in no distress, normal appearing weight and well hydrated. Neck exam - supple, no significant adenopathy. CVS exam: normal rate, regular rhythm, normal S1, S2, no murmurs, rubs, clicks or gallops. Lungs: clear to auscultation, no wheezes, rales or rhonchi, symmetric air entry. Abdomen:  BS normal, soft, non tender, non distended, no rebound or guarding  Mental Status: normal mood, affect behavior, speech, dress,  and thought processes. Skin exam: normal coloration and turgor, no rashes, no suspicious skin lesions noted. ASSESSMENT & PLAN  Marcie Carvajal was seen today for medicare awv and follow-up. Diagnoses and all orders for this visit:    Cough  -     XR CHEST STANDARD (2 VW); Future    Routine general medical examination at a health care facility    Need for prophylactic vaccination and inoculation against varicella  -     zoster recombinant adjuvanted vaccine [facility]) 50 MCG/0.5ML SUSR injection; Inject 0.5 mLs into the muscle once for 1 dose    Type 2 diabetes mellitus with other circulatory complication, without long-term current use of insulin (HCC)  Continue metformin at 1000 mg bid  a1c controlled  Atrial fibrillation, unspecified type ([facility] [address]  Stable  continue with cardiology and CC  Other orders  -     POCT glycosylated hemoglobin (Hb A1C)  Pt in agreement with plan       Return in 6 months (on 7/6/2021) for Medicare Annual Wellness Visit in 1 year, ME IN 6 MONTHS .

## 2021-01-06 NOTE — ACP (ADVANCE CARE PLANNING)
Advance Care Planning     General Advance Care Planning (ACP) Conversation    Date of Conversation: 1/6/2021  Conducted with: Patient with Decision Making Capacity    Healthcare Decision Maker:    HER SONS ARE ON FILE    Click here to complete [place] including selection of the Healthcare Decision Maker Relationship (ie \"Primary\")  Today we documented Decision Maker(s) consistent with ACP documents on file.     Content/Action Overview:  PT DECLINES A LIVING WILL  Reviewed DNR/DNI and patient elects Full Code (Attempt Resuscitation)  Length of Voluntary ACP Conversation in minutes:  16 minutes    Dimas Watts

## 2021-01-06 NOTE — PATIENT INSTRUCTIONS
Advance Directives: Care Instructions  Overview  An advance directive is a legal way to state your wishes at the end of your life. It tells your family and your doctor what to do if you can't say what you want. There are two main types of advance directives. You can change them any time your wishes change. Living will. This form tells your family and your doctor your wishes about life support and other treatment. The form is also called a declaration. Medical power of . This form lets you name a person to make treatment decisions for you when you can't speak for yourself. This person is called a health care agent (health care proxy, health care surrogate). The form is also called a durable power of  for health care. If you do not have an advance directive, decisions about your medical care may be made by a family member, or by a doctor or a  who doesn't know you. It may help to think of an advance directive as a gift to the people who care for you. If you have one, they won't have to make tough decisions by themselves. Follow-up care is a key part of your treatment and safety. Be sure to make and go to all appointments, and call your doctor if you are having problems. It's also a good idea to know your test results and keep a list of the medicines you take. What should you include in an advance directive? Many states have a unique advance directive form. (It may ask you to address specific issues.) Or you might use a universal form that's approved by many states. If your form doesn't tell you what to address, it may be hard to know what to include in your advance directive. Use the questions below to help you get started. · Who do you want to make decisions about your medical care if you are not able to? · What life-support measures do you want if you have a serious illness that gets worse over time or can't be cured? · What are you most afraid of that might happen? (Maybe you're afraid of having pain, losing your independence, or being kept alive by machines.)  · Where would you prefer to die? (Your home? A hospital? A nursing home?)  · Do you want to donate your organs when you die? · Do you want certain Confucianism practices performed before you die? When should you call for help? Be sure to contact your doctor if you have any questions. Where can you learn more? Go to [email or web address].[email or web address] org and sign in to your [employer] account. Enter R264 in the SugarSync box to learn more about \"Advance Directives: Care Instructions. \"     If you do not have an account, please click on the \"Sign Up Now\" link. Current as of: December 9, 2019               Content Version: 12.6  © 6092-8175 [employer]. Care instructions adapted under license by [facility] ([facility]). If you have questions about a medical condition or this instruction, always ask your healthcare professional. [address] any warranty or liability for your use of this information. Body Mass Index: Care Instructions  Your Care Instructions     Body mass index (BMI) can help you see if your weight is raising your risk for health problems. It uses a formula to compare how much you weigh with how tall you are. · A BMI lower than 18.5 is considered underweight. · A BMI between 18.5 and 24.9 is considered healthy. · A BMI between 25 and 29.9 is considered overweight. A BMI of 30 or higher is considered obese. If your BMI is in the normal range, it means that you have a lower risk for weight-related health problems. If your BMI is in the overweight or obese range, you may be at increased risk for weight-related health problems, such as high blood pressure, heart disease, stroke, arthritis or joint pain, and diabetes. If your BMI is in the underweight range, you may be at increased risk for health problems such as fatigue, lower protection (immunity) against illness, muscle loss, bone loss, hair loss, and hormone problems. BMI is just one measure of your risk for weight-related health problems. You may be at higher risk for health problems if you are not active, you eat an unhealthy diet, or you drink too much alcohol or use tobacco products. Follow-up care is a key part of your treatment and safety. Be sure to make and go to all appointments, and call your doctor if you are having problems. It's also a good idea to know your test results and keep a list of the medicines you take. How can you care for yourself at home? · Practice healthy eating habits. This includes eating plenty of fruits, vegetables, whole grains, lean protein, and low-fat dairy. · If your doctor recommends it, get more exercise. Walking is a good choice. Bit by bit, increase the amount you walk every day. Try for at least 30 minutes on most days of the week. · Do not smoke. Smoking can increase your risk for health problems. If you need help quitting, talk to your doctor about stop-smoking programs and medicines. These can increase your chances of quitting for good. · Limit alcohol to 2 drinks a day for men and 1 drink a day for women. Too much alcohol can cause health problems.   If you have a BMI higher than 25 · Your doctor may do other tests to check your risk for weight-related health problems. This may include measuring the distance around your waist. A waist measurement of more than 40 inches in men or 35 inches in women can increase the risk of weight-related health problems. · Talk with your doctor about steps you can take to stay healthy or improve your health. You may need to make lifestyle changes to lose weight and stay healthy, such as changing your diet and getting regular exercise. If you have a BMI lower than 18.5  · Your doctor may do other tests to check your risk for health problems. · Talk with your doctor about steps you can take to stay healthy or improve your health. You may need to make lifestyle changes to gain or maintain weight and stay healthy, such as getting more healthy foods in your diet and doing exercises to build muscle. Where can you learn more? Go to https://chdasha.healthGenera [email or web address] org and sign in to your [employer] account. Enter S176 in the [employer] box to learn more about \"Body Mass Index: Care Instructions. \"     If you do not have an account, please click on the \"Sign Up Now\" link. Current as of: December 11, 2019               Content Version: 12.6  © 5949-4609 [employer]. Care instructions adapted under license by [facility] ([facility]). If you have questions about a medical condition or this instruction, always ask your healthcare professional. [address] any warranty or liability for your use of this information. Personalized Preventive Plan for [facility] [place] - 1/6/2021  Medicare offers a range of preventive health benefits. Some of the tests and screenings are paid in full while other may be subject to a deductible, co-insurance, and/or copay. Some of these benefits include a comprehensive review of your medical history including lifestyle, illnesses that may run in your family, and various assessments and screenings as appropriate. After reviewing your medical record and screening and assessments performed today your provider may have ordered immunizations, labs, imaging, and/or referrals for you. A list of these orders (if applicable) as well as your Preventive Care list are included within your After Visit Summary for your review. Other Preventive Recommendations:    · A preventive eye exam performed by an eye specialist is recommended every 1-2 years to screen for glaucoma; cataracts, macular degeneration, and other eye disorders. · A preventive dental visit is recommended every 6 months. · Try to get at least 150 minutes of exercise per week or 10,000 steps per day on a pedometer . · Order or download the FREE \"Exercise & Physical Activity: Your Everyday Guide\" from [employer]. Call 0-936.736.1181 or search [employer] online. · You need 6533-4373 mg of calcium and 3747-8776 IU of vitamin D per day. It is possible to meet your calcium requirement with diet alone, but a vitamin D supplement is usually necessary to meet this goal.  · When exposed to the sun, use a sunscreen that protects against both UVA and UVB radiation with an SPF of 30 or greater. Reapply every 2 to 3 hours or after sweating, drying off with a towel, or swimming. · Always wear a seat belt when traveling in a car. Always wear a helmet when riding a bicycle or motorcycle.

## 2021-01-11 DIAGNOSIS — I48.21 ATRIAL FIBRILLATION, PERMANENT (HCC): ICD-10-CM

## 2021-01-11 DIAGNOSIS — Z79.01 ANTICOAGULATED ON COUMADIN: Primary | ICD-10-CM

## 2021-01-12 ENCOUNTER — HOSPITAL ENCOUNTER (OUTPATIENT)
Dept: PHARMACY | Age: 86
Setting detail: THERAPIES SERIES
Discharge: HOME | End: 2021-01-12
Payer: MEDICARE

## 2021-01-12 VITALS — TEMPERATURE: 97.3 F

## 2021-01-12 DIAGNOSIS — I48.11 LONGSTANDING PERSISTENT ATRIAL FIBRILLATION (HCC): ICD-10-CM

## 2021-01-12 LAB — POC INR: 1.5 (ref 0.8–1.2)

## 2021-01-12 PROCEDURE — 99211 OFF/OP EST MAY X REQ PHY/QHP: CPT

## 2021-01-12 PROCEDURE — 85610 PROTHROMBIN TIME: CPT

## 2021-01-12 PROCEDURE — 36416 COLLJ CAPILLARY BLOOD SPEC: CPT

## 2021-01-12 NOTE — PROGRESS NOTES
Medication Management [address]  [phone] (phone)  [phone] (fax)      Ms. Asha Leone is a 82 y.o.  female with history of persistent atrial fib. , per Dr. Mehrdad Reeves referral, who presents today for Warfarin monitoring and adjustment (6 week visit - late for today's visit). Patient verifies current dosing regimen and tablet strength. States has good supply. No missed or extra doses. Patient denies bleeding/bruising/SOB/chest pain. Has usual swelling of legs. No blood in urine or stool. No dietary changes. Patient had called 12/21 about Z-clayton, Tessalon, Mucinex; still using latter two. Also taking Allegra. Resumed allergy shots last week. Still has sneezing/cough/runny nose. No change in alcohol use or tobacco use. No change in activity level. Patient denies headaches/dizziness/lightheadedness/falls. No vomiting/diarrhea or acute illness. No procedures scheduled in the future at this time. Assessment:   Lab Results   Component Value Date    INR 1.50 (H) 01/12/2021    INR 2.10 (H) 12/01/2020    INR 2.80 (H) 10/27/2020     INR subtherapeutic - goal 2-3. Recent Labs     01/12/21  1147   INR 1.50*       Plan:  POCT INR ordered/performed/result reviewed. 10 mg today, T, then continue PO Coumadin 2.5 mg W, 5 mg MTThFSS. Recheck INR in 2 week(s). (Report given - orders entered by FANTA Tate, PharmD.)  Patient reminded to call the Anticoagulation Clinic with any signs or symptoms of bleeding or with any medication changes. Patient given instructions utilizing the teach back method. Given routine H&H order. Discharged ambulatory in no apparent distress, wearing mask. After visit summary printed and reviewed with patient. Medications reviewed and updated on home medication list.    Influenza vaccine: doesn't take.   [] given    [x] declined   [] received previously   [] plans to receive at a later time   [x] refused [x] documented in Epic

## 2021-01-19 DIAGNOSIS — I48.19 PERSISTENT ATRIAL FIBRILLATION (HCC): ICD-10-CM

## 2021-01-19 DIAGNOSIS — Z79.01 ANTICOAGULATED ON COUMADIN: ICD-10-CM

## 2021-01-19 RX ORDER — WARFARIN SODIUM 5 MG/1
TABLET ORAL
Qty: 90 TABLET | Refills: 3 | Status: SHIPPED | OUTPATIENT
Start: 2021-01-19 | End: 2022-01-07

## 2021-01-26 ENCOUNTER — TELEPHONE (OUTPATIENT)
Dept: FAMILY MEDICINE CLINIC | Age: 86
End: 2021-01-26

## 2021-01-26 ENCOUNTER — HOSPITAL ENCOUNTER (OUTPATIENT)
Dept: PHARMACY | Age: 86
Setting detail: THERAPIES SERIES
Discharge: HOME | End: 2021-01-26
Payer: MEDICARE

## 2021-01-26 ENCOUNTER — VIRTUAL VISIT (OUTPATIENT)
Dept: FAMILY MEDICINE CLINIC | Age: 86
End: 2021-01-26
Payer: MEDICARE

## 2021-01-26 ENCOUNTER — HOSPITAL ENCOUNTER (OUTPATIENT)
Dept: GENERAL RADIOLOGY | Age: 86
Discharge: HOME | End: 2021-01-26
Payer: MEDICARE

## 2021-01-26 ENCOUNTER — HOSPITAL ENCOUNTER (OUTPATIENT)
Age: 86
Discharge: HOME | End: 2021-01-26
Payer: MEDICARE

## 2021-01-26 VITALS — TEMPERATURE: 97.6 F

## 2021-01-26 DIAGNOSIS — Z79.01 ANTICOAGULATED ON COUMADIN: ICD-10-CM

## 2021-01-26 DIAGNOSIS — I48.11 LONGSTANDING PERSISTENT ATRIAL FIBRILLATION (HCC): ICD-10-CM

## 2021-01-26 DIAGNOSIS — R05.9 COUGH: ICD-10-CM

## 2021-01-26 DIAGNOSIS — I48.21 ATRIAL FIBRILLATION, PERMANENT (HCC): ICD-10-CM

## 2021-01-26 DIAGNOSIS — R05.9 COUGH: Primary | ICD-10-CM

## 2021-01-26 DIAGNOSIS — J30.89 NON-SEASONAL ALLERGIC RHINITIS DUE TO OTHER ALLERGIC TRIGGER: ICD-10-CM

## 2021-01-26 LAB
HCT VFR BLD CALC: 36.4 % (ref 37–47)
HEMOGLOBIN: 11.5 GM/DL (ref 12–16)
POC INR: 2.2 (ref 0.8–1.2)

## 2021-01-26 PROCEDURE — 71046 X-RAY EXAM CHEST 2 VIEWS: CPT

## 2021-01-26 PROCEDURE — 85014 HEMATOCRIT: CPT

## 2021-01-26 PROCEDURE — 36415 COLL VENOUS BLD VENIPUNCTURE: CPT

## 2021-01-26 PROCEDURE — 85018 HEMOGLOBIN: CPT

## 2021-01-26 PROCEDURE — 85610 PROTHROMBIN TIME: CPT | Performed by: PHARMACIST

## 2021-01-26 PROCEDURE — 36416 COLLJ CAPILLARY BLOOD SPEC: CPT | Performed by: PHARMACIST

## 2021-01-26 PROCEDURE — 99211 OFF/OP EST MAY X REQ PHY/QHP: CPT | Performed by: PHARMACIST

## 2021-01-26 PROCEDURE — 99213 OFFICE O/P EST LOW 20 MIN: CPT | Performed by: NURSE PRACTITIONER

## 2021-01-26 RX ORDER — GUAIFENESIN 100 MG/5ML
10 SYRUP ORAL EVERY 4 HOURS PRN
Qty: 236 ML | Refills: 2 | Status: SHIPPED | OUTPATIENT
Start: 2021-01-26 | End: 2021-02-23 | Stop reason: ALTCHOICE

## 2021-01-26 RX ORDER — FLUTICASONE PROPIONATE 50 MCG
1 SPRAY, SUSPENSION (ML) NASAL DAILY
Qty: 2 BOTTLE | Refills: 1 | Status: SHIPPED | OUTPATIENT
Start: 2021-01-26 | End: 2021-07-28

## 2021-01-26 RX ORDER — MONTELUKAST SODIUM 10 MG/1
10 TABLET ORAL DAILY
Qty: 90 TABLET | Refills: 3 | Status: SHIPPED | OUTPATIENT
Start: 2021-01-26 | End: 2021-06-01 | Stop reason: SDUPTHER

## 2021-01-26 ASSESSMENT — ENCOUNTER SYMPTOMS
CHEST TIGHTNESS: 0
VOICE CHANGE: 0
TROUBLE SWALLOWING: 0
SORE THROAT: 0
WHEEZING: 0
CHOKING: 0
SHORTNESS OF BREATH: 0
COUGH: 1
RHINORRHEA: 1

## 2021-01-26 NOTE — PROGRESS NOTES
Medication Management [address]   [phone] (phone)  [phone] (fax)      Ms. Jeremy Luna is a 82 y.o.  female with history of Afib who presents today for anticoagulation monitoring and adjustment. Patient verifies current dosing regimen and tablet strength. No missed or extra doses. Patient denies s/s bleeding/bruising/swelling/SOB/chest pain  No blood in urine or stool. No dietary changes. No changes in OTC agents/Herbals. Starting Singulair and guaifenesin today. No change in alcohol use or tobacco use. No change in activity level. Patient denies headaches/dizziness/lightheadedness/falls. No vomiting/diarrhea or acute illness. States struggling with allergies. No Procedures scheduled in the future at this time. Assessment:   Lab Results   Component Value Date    INR 2.20 (H) 01/26/2021    INR 1.50 (H) 01/12/2021    INR 2.10 (H) 12/01/2020     INR therapeutic   Recent Labs     01/26/21  1118   INR 2.20*         Plan:  Continue Coumadin 2.5mg W and 5mg all other days. Recheck INR in 3 week(s). Patient reminded to call the Anticoagulation Clinic with any signs or symptoms of bleeding or with any medication changes. Patient given instructions utilizing the teach back method. Discharged ambulatory in no apparent distress. After visit summary printed and reviewed with patient.       Medications reviewed and updated on home medication list Yes    Influenza vaccine:     [] given    [] declined   [] received previously   [] plans to receive at a later time   [] refused    [x] documented in [address] [place] [place] [address] [address]: [address]: Yes  Total # of Interventions Recommended: 0  - Maintenance Safety Lab Monitoring #: 1  Total Interventions Accepted: 0  Time Spent (min): 20    Deanna Liriano, PharmD

## 2021-01-26 NOTE — TELEPHONE ENCOUNTER
----- Message from JOSH Fontana CNP sent at 1/26/2021  3:02 PM EST -----  Let pt know her chest x-ray is negative for pneumonia, it does show some mild heart enlargemet  but no other lung pathology. Continue with plan discussed in office.

## 2021-01-26 NOTE — PROGRESS NOTES
2021    TELEHEALTH EVALUATION -- Audio/Visual (During SJOIJ-70 public health emergency)    HPI:  Visit conducted with pt at home and provider Quique Sidhu CNP in office. Tiana Boateng (:  1935) has requested an audio/video evaluation for the following concern(s):    Cough    HPI:  Symptoms have been present for 6 week(s). Cough Description - non-productive, nocturnal, waxing and waning over time  Symptoms preceded by URI? No  Provoking factors - she does have allergies and has a lot of sneezing and postnasal drainage, recently restarted her allergy shots. Alleviating factors - some of the meds but then the cough returns. SOB or wheezing? No  Nasal congestion, rhinitis? Yes  GERD Sx? No  Tobacco use? No  Voice changes or dysphagia? No  Treatments tried - tessalon perles, allegra and saline nasal spray, allergy shots    Review of Systems   Constitutional: Negative for chills, fatigue and fever. HENT: Positive for postnasal drip, rhinorrhea and sneezing. Negative for congestion, sore throat, tinnitus, trouble swallowing and voice change. Respiratory: Positive for cough. Negative for choking, chest tightness, shortness of breath and wheezing. Cardiovascular: Negative. Neurological: Positive for headaches (from coughing ). Negative for dizziness. Prior to Visit Medications    Medication Sig Taking?  Authorizing Provider   fluticasone (FLONASE) 50 MCG/ACT nasal spray 1 spray by Each Nostril route daily Yes JOSH Bright CNP   guaiFENesin (ALTARUSSIN) 100 MG/5ML syrup Take 10 mLs by mouth every 4 hours as needed for Cough Yes JOSH Bright CNP   montelukast (SINGULAIR) 10 MG tablet Take 1 tablet by mouth daily Yes JOSH Bright CNP   warfarin (COUMADIN) 5 MG tablet TAKE AS DIRECTED BY [facility] TABS = 30 DAY SUPPLY  Chris Delgado MD Nicol Melton is a 82 y.o. female being evaluated by a Virtual Visit (video visit) encounter to address concerns as mentioned above. A caregiver was present when appropriate. Due to this being a TeleHealth encounter (During UJAKZ-92 public [facility] emergency), evaluation of the following organ systems was limited: Vitals/Constitutional/EENT/Resp/CV/GI//MS/Neuro/Skin/Heme-Lymph-Imm. Pursuant to the emergency declaration under the [address] [place] [address] and the [employer] Act, this Virtual Visit was conducted with patient's (and/or legal guardian's) consent, to reduce the patient's risk of exposure to COVID-19 and provide necessary medical care. The patient (and/or legal guardian) has also been advised to contact this office for worsening conditions or problems, and seek emergency medical treatment and/or call 911 if deemed necessary. Patient identification was verified at the start of the visit: Yes    Total time spent on this encounter: Not billed by time    Services were provided through a video synchronous discussion virtually to substitute for in-person clinic visit. Patient and provider were located at their individual homes. --JOSH Milian CNP on 1/26/2021 at 9:30 AM    An electronic signature was used to authenticate this note.

## 2021-02-16 ENCOUNTER — APPOINTMENT (OUTPATIENT)
Dept: PHARMACY | Age: 86
End: 2021-02-16
Payer: MEDICARE

## 2021-02-18 ENCOUNTER — TELEPHONE (OUTPATIENT)
Dept: CARDIOLOGY CLINIC | Age: 86
End: 2021-02-18

## 2021-02-18 ENCOUNTER — OFFICE VISIT (OUTPATIENT)
Dept: CARDIOLOGY CLINIC | Age: 86
End: 2021-02-18
Payer: MEDICARE

## 2021-02-18 ENCOUNTER — HOSPITAL ENCOUNTER (OUTPATIENT)
Age: 86
Discharge: HOME | End: 2021-02-18
Payer: MEDICARE

## 2021-02-18 VITALS
WEIGHT: 187.8 LBS | HEART RATE: 74 BPM | BODY MASS INDEX: 30.18 KG/M2 | DIASTOLIC BLOOD PRESSURE: 74 MMHG | HEIGHT: 66 IN | SYSTOLIC BLOOD PRESSURE: 125 MMHG

## 2021-02-18 DIAGNOSIS — R60.0 BILATERAL LEG EDEMA: ICD-10-CM

## 2021-02-18 DIAGNOSIS — I10 ESSENTIAL HYPERTENSION: ICD-10-CM

## 2021-02-18 DIAGNOSIS — I48.21 ATRIAL FIBRILLATION, PERMANENT (HCC): Primary | ICD-10-CM

## 2021-02-18 LAB
ANION GAP SERPL CALCULATED.3IONS-SCNC: 10 MEQ/L (ref 8–16)
BUN BLDV-MCNC: 17 MG/DL (ref 7–22)
CALCIUM SERPL-MCNC: 10.5 MG/DL (ref 8.5–10.5)
CHLORIDE BLD-SCNC: 98 MEQ/L (ref 98–111)
CO2: 29 MEQ/L (ref 23–33)
CREAT SERPL-MCNC: 0.9 MG/DL (ref 0.4–1.2)
GLUCOSE BLD-MCNC: 76 MG/DL (ref 70–108)
MAGNESIUM: 1.6 MG/DL (ref 1.6–2.4)
POTASSIUM SERPL-SCNC: 3.5 MEQ/L (ref 3.5–5.2)
SODIUM BLD-SCNC: 137 MEQ/L (ref 135–145)

## 2021-02-18 PROCEDURE — 83735 ASSAY OF MAGNESIUM: CPT

## 2021-02-18 PROCEDURE — 80048 BASIC METABOLIC PNL TOTAL CA: CPT

## 2021-02-18 PROCEDURE — 99213 OFFICE O/P EST LOW 20 MIN: CPT | Performed by: INTERNAL MEDICINE

## 2021-02-18 PROCEDURE — 36415 COLL VENOUS BLD VENIPUNCTURE: CPT

## 2021-02-18 RX ORDER — AMLODIPINE BESYLATE 10 MG/1
10 TABLET ORAL DAILY
Qty: 90 TABLET | Refills: 1 | Status: SHIPPED | OUTPATIENT
Start: 2021-02-18 | End: 2021-04-15

## 2021-02-18 NOTE — PROGRESS NOTES
Chief Complaint   Patient presents with    Check-Up    Atrial Fibrillation   Originally  patient here for atrial fib, [facility] referral.  Known to have atr fib for 5 yr   Has moved to [place] from [place] [id number]  On coumadin    Pt here for a 4 month f/u    EKG done 20    Denied cp, dizziness  Or palpitations    Leg edema better    Hx of chronic leg edema at the end of the day    Sob on exertion chronic    Nonsmoker    FHX  Mother had Heart problem and CVA        Patient Active Problem List   Diagnosis    Longstanding persistent atrial fibrillation ([address]    Anticoagulated on Coumadin    Diabetes mellitus [facility]    Hypertension    Atrial fibrillation, permanent ([facility]    Bilateral leg edema +1       History reviewed. No pertinent surgical history. No Known Allergies     Family History   Problem Relation Age of Onset    Cancer Mother         breast cancer    Diabetes Mother         Social History     Socioeconomic History    Marital status:      Spouse name: Not on file    Number of children: Not on file    Years of education: Not on file    Highest education level: Not on file   Occupational History    Not on file   Social Needs    Financial resource strain: Not hard at all    Food insecurity     Worry: Never true     Inability: Never true   ERN needs     Medical: No     Non-medical: No   Tobacco Use    Smoking status: Former Smoker     Packs/day: 0.50     Types: Cigarettes     Quit date: 1978     Years since quittin.7    Smokeless tobacco: Never Used   Substance and Sexual Activity    Alcohol use:  Yes     Alcohol/week: 1.0 standard drinks     Types: 1 Cans of beer per week     Comment: occasional     Drug use: No    Sexual activity: Not on file   Lifestyle    Physical activity     Days per week: 0 days     Minutes per session: 0 min    Stress: Not at all   Relationships    Social connections     Talks on phone: More than three times a week     Gets together: More than three times a week     Attends Spiritism service: More than 4 times per year     Active member of club or organization: No     Attends meetings of clubs or organizations: Never     Relationship status: Not on file    Intimate partner violence     Fear of current or ex partner: Not on file     Emotionally abused: Not on file     Physically abused: Not on file     Forced sexual activity: Not on file   Other Topics Concern    Not on file   Social History Narrative    Livingston Favre has good family support    Son assists with transportation to [facility]    No barriers with medication affordability    Following up with Diabetic Clinic       Current Outpatient Medications   Medication Sig Dispense Refill    amLODIPine (NORVASC) 10 MG tablet Take 1 tablet by mouth daily 90 tablet 1    blood glucose test strips (ONETOUCH ULTRA) strip TEST 2 TIMES DAILY 100 strip 3    metoprolol tartrate (LOPRESSOR) 25 MG tablet TAKE 1 TABLET BY MOUTH TWICE A  tablet 3    fluticasone (FLONASE) 50 MCG/ACT nasal spray 1 spray by Each Nostril route daily 2 Bottle 1    guaiFENesin (ALTARUSSIN) 100 MG/5ML syrup Take 10 mLs by mouth every 4 hours as needed for Cough 236 mL 2    montelukast (SINGULAIR) 10 MG tablet Take 1 tablet by mouth daily 90 tablet 3    warfarin (COUMADIN) 5 MG tablet TAKE AS DIRECTED BY [facility] COUMADIN CLINIC #30 TABS = 30 DAY SUPPLY 90 tablet 3    fexofenadine (ALLEGRA) 180 MG tablet TAKE 1 TABLET BY MOUTH EVERY DAY IN THE MORNING 30 tablet 2    spironolactone-hydroCHLOROthiazide (ALDACTAZIDE) 25-25 MG per tablet TAKE 1 TABLET BY MOUTH EVERY DAY 90 tablet 0    magnesium oxide (MAG-OX) 400 MG tablet TAKE 1 TABLET BY MOUTH EVERY DAY 30 tablet 3    betamethasone valerate (VALISONE) 0.1 % cream Apply topically 2 times daily PRN.  1 Tube 1    Lancets MISC 1 each by Does not apply route 2 times daily Test twice daily dispense what insurance ocvers 90 each 3    metFORMIN (GLUCOPHAGE) 1000 MG tablet Take 1 tablet by mouth 2 times daily (with meals) 180 tablet 2    hydrOXYzine (ATARAX) 25 MG tablet TAKE 1 TABLET BY MOUTH EVERY 6 HOURS AS NEEDED 60 tablet 3    Petrolatum 42 % OINT Apply topically 2 times daily       doxepin (SINEQUAN) 10 MG capsule Take 10 mg by mouth nightly      blood glucose monitor kit and supplies Test one time a day . Dispense 30 strips a month with 5 refills Please dispense meter and suppliesthat insurance covers . 1 kit 0    UNABLE TO FIND Allergy shot every other Monday at allergist's office. No current facility-administered medications for this visit. Review of Systems -     General ROS: negative  Psychological ROS: negative  Hematological and Lymphatic ROS: No history of blood clots or bleeding disorder. Respiratory ROS: no cough,  or wheezing, the rest see HPI  Cardiovascular ROS: See HPI  Gastrointestinal ROS: negative  Genito-Urinary ROS: no dysuria, trouble voiding, or hematuria  Musculoskeletal ROS: negative  Neurological ROS: no TIA or stroke symptoms  Dermatological ROS: negative      Blood pressure 125/74, pulse 74, height 5' 6\" (1.676 m), weight 187 lb 12.8 oz (85.2 kg).         Physical Examination:    General appearance - alert, well appearing, and in no distress  HEENT- Pink conjunctiva  , Non-icteri sclera,PERRLA  Mental status - alert, oriented to person, place, and time  Neck - supple, no significant adenopathy, no JVD, or carotid bruits  Chest - clear to auscultation, no wheezes, rales or rhonchi, symmetric air entry  Heart - normal rate, regular rhythm, normal S1, S2, no murmurs, rubs, clicks or gallops  Abdomen - soft, nontender, nondistended, no masses or organomegaly  WOLFGANG- no CVA or flank tenderness, no suprapubic tenderness  Neurological - alert, oriented, normal speech, no focal findings or movement disorder noted  Musculoskeletal/limbs - no joint tenderness, deformity or swelling   - peripheral pulses normal, no pedal edema, no clubbing or cyanosis Skin - normal coloration and turgor, no rashes, no suspicious skin lesions noted  Psych- appropriate mood and affect    Lab  No results for input(s): CKTOTAL, CKMB, CKMBINDEX, TROPONINI in the last 72 hours. CBC:   Lab Results   Component Value Date    WBC 5.2 07/02/2020    RBC 4.81 07/02/2020    HGB 11.5 01/26/2021    HCT 36.4 01/26/2021    MCV 73.6 07/02/2020    MCH 22.9 07/02/2020    MCHC 31.1 07/02/2020    RDW 14.9 06/19/2018     07/02/2020    MPV 9.5 07/02/2020     BMP:    Lab Results   Component Value Date     02/18/2021    K 3.5 02/18/2021    CL 98 02/18/2021    CO2 29 02/18/2021    BUN 17 02/18/2021    LABALBU 4.1 09/10/2020    CREATININE 0.9 02/18/2021    CALCIUM 10.5 02/18/2021    LABGLOM 72 02/18/2021    GLUCOSE 76 02/18/2021     Hepatic Function Panel:    Lab Results   Component Value Date    ALKPHOS 96 09/10/2020    ALT 15 09/10/2020    AST 18 09/10/2020    PROT 7.5 09/10/2020    BILITOT 0.5 09/10/2020    BILIDIR <0.2 09/10/2020    LABALBU 4.1 09/10/2020     Magnesium:    Lab Results   Component Value Date    MG 1.6 02/18/2021     Warfarin PT/INR:  No components found for: PTPATWAR, PTINRWAR  HgBA1c:    Lab Results   Component Value Date    LABA1C 5.7 01/06/2021    LABA1C 7.8 02/25/2020     FLP:    Lab Results   Component Value Date    TRIG 133 09/10/2020    HDL 57 09/10/2020    LDLCALC 64 09/10/2020     TSH:  No results found for: TSH      ekg 7/6/2020  Atrial fibrillation  -irregular conduction  CVR 84 BPM  -Nonspecific ST depression   +   Diffuse nonspecific T-abnormality  -Nondiagnostic. ABNORMAL     Echo  Conclusions      Summary   Normal left ventricle size and systolic function. Ejection fraction was estimated at 60 to 65 %. There were no regional left ventricular wall motion abnormalities and wall   thickness was within normal limits. The left atrium is Moderately dilated. Moderately enlarged right atrium size. Mildly enlarged right ventricle cavity.    Right ventricle global systolic function is normal .      Signature      ----------------------------------------------------------------   Electronically signed by Elder Goddard MD (Interpreting   physician) on 10/09/2020 at 06:49 PM   ----------------------------------------------------------------        Conclusions      Summary   Lexiscan EKG stress test is not suggestive for ischemia. The nuclear images is not suggestive for myocardial ischemia. Signatures    ----------------------------------------------------------------   Electronically signed by Elder Goddard MD (Interpreting   Cardiologist) on 10/09/2020 at 17:57   ------------------------------------------------------        Assessment   Diagnosis Orders   1. Atrial fibrillation, permanent (HCC)  Basic Metabolic Panel    Magnesium   2. Essential hypertension  Basic Metabolic Panel    Magnesium   3. Bilateral leg edema +1  Basic Metabolic Panel    Magnesium     chadsvasc= 5    Plan     The current meds and labs reviewed    Cough dry one - at night  Running nose  Sneezing   \stop losartan  50 po qd and see if cough get better  Has been getting allergy shot and meds for few weeks 3 and  No response  Increase norvasc to 10 from 5 mg po qd    Atr fib chronic  Cont rate control  Cont coumadin  INR 2.8  oprion of DOAC given pat want to cont coumadin    Hypertension, on medical treatment. Seems to be under good control. Patient is compliant with medical treatment.      Leg edema B/L +1 better   Cont  aldactazide 25/25 1 tab po qd  BMP and MG    Echo and lexisc- WNL   BMP and MG- reviewed  Mg 1.6  Cont mg oxide    D/w the pat the plan of care and result of test    Addendum  BMP reviewed  K 3.5  Advised high K diet tomato, banana, avocado,orange potatoe    I spent 22 minutes involved in face-to-face discussion of medical issues, prognosis, record review  and plan with the patient today and more than 50% of the time was spent on counseling and coordination of care      RTC in 1 month  For BP and cough response to hold losartan        [facility]

## 2021-02-18 NOTE — TELEPHONE ENCOUNTER
Dr. Lakshmi Blanc received labs for pt. Potassium is still low. Dr. Lakshmi Blanc would like pt to be on a high potassium diet. Spoke with pt. Pt voiced understanding.

## 2021-02-23 ENCOUNTER — HOSPITAL ENCOUNTER (OUTPATIENT)
Dept: PHARMACY | Age: 86
Setting detail: THERAPIES SERIES
Discharge: HOME | End: 2021-02-23
Payer: MEDICARE

## 2021-02-23 VITALS — TEMPERATURE: 97.3 F

## 2021-02-23 DIAGNOSIS — R05.9 COUGH: ICD-10-CM

## 2021-02-23 DIAGNOSIS — I48.11 LONGSTANDING PERSISTENT ATRIAL FIBRILLATION (HCC): ICD-10-CM

## 2021-02-23 LAB — POC INR: 1.7 (ref 0.8–1.2)

## 2021-02-23 PROCEDURE — 85610 PROTHROMBIN TIME: CPT

## 2021-02-23 PROCEDURE — 99211 OFF/OP EST MAY X REQ PHY/QHP: CPT

## 2021-02-23 PROCEDURE — 36416 COLLJ CAPILLARY BLOOD SPEC: CPT

## 2021-02-23 RX ORDER — BENZONATATE 100 MG/1
100-200 CAPSULE ORAL 3 TIMES DAILY PRN
Qty: 60 CAPSULE | Refills: 0 | Status: SHIPPED | OUTPATIENT
Start: 2021-02-23 | End: 2021-03-15

## 2021-02-23 NOTE — PROGRESS NOTES
Medication Management [address]  [phone] (phone)  [phone] (fax)      Ms. Cassandra Lerma is a 82 y.o.  female with history of persistent atrial fib. , per Dr. Yessy Hicks referral, who presents today for Warfarin monitoring and adjustment (1 week late for 3 week visit due to bad weather). Patient verifies current dosing regimen and tablet strength. No missed or extra doses. Patient denies bleeding/bruising/swelling/SOB/chest pain. No blood in urine or stool. No dietary changes. No changes in medication/OTC agents/herbals. No change in alcohol use or tobacco use. Change in activity level: decreased. Patient denies headaches/dizziness/lightheadedness/falls. No vomiting/diarrhea or acute illness. Still taking Allegra and Tessalon for coughing and sneezing. No procedures scheduled in the future at this time. Assessment:   Lab Results   Component Value Date    INR 1.70 (H) 02/23/2021    INR 2.20 (H) 01/26/2021    INR 1.50 (H) 01/12/2021     INR subtherapeutic - goal 2-3. Recent Labs     02/23/21  1408   INR 1.70*      Did routine H&H 1/26:  11.5/36.4 (11/35.4 on 7/2/20). Plan:  POCT INR ordered/performed/result reviewed. 7.5 mg today, then increase Coumadin to 5 mg daily PO (from 2.5 mg W, 5 mg MTThFSS=7.7% increase). Recheck INR 3/11. (Report given - orders entered by L. Wayman Habermann., PharmD.)  Patient reminded to call the Anticoagulation Clinic with any signs or symptoms of bleeding or with any medication changes. Patient given instructions utilizing the teach back method. Discharged ambulatory in no apparent distress, wearing mask. After visit summary printed and reviewed with patient. Medications reviewed and updated on home medication list.    Influenza vaccine: doesn't take.   [] given    [x] declined   [] received previously   [] plans to receive at a later time   [x] refused    [x] documented in Epic

## 2021-03-03 RX ORDER — SPIRONOLACTONE AND HYDROCHLOROTHIAZIDE 25; 25 MG/1; MG/1
TABLET ORAL
Qty: 30 TABLET | Refills: 0 | Status: SHIPPED | OUTPATIENT
Start: 2021-03-03 | End: 2021-03-29

## 2021-03-03 RX ORDER — AMLODIPINE BESYLATE 5 MG/1
TABLET ORAL
Qty: 30 TABLET | Refills: 0 | Status: SHIPPED | OUTPATIENT
Start: 2021-03-03 | End: 2021-03-08 | Stop reason: SDUPTHER

## 2021-03-04 RX ORDER — MAGNESIUM OXIDE 400 MG/1
TABLET ORAL
Qty: 90 TABLET | Refills: 0 | Status: SHIPPED | OUTPATIENT
Start: 2021-03-04 | End: 2021-06-16

## 2021-03-08 RX ORDER — AMLODIPINE BESYLATE 10 MG/1
TABLET ORAL
Qty: 90 TABLET | Refills: 3 | Status: SHIPPED | OUTPATIENT
Start: 2021-03-08 | End: 2021-05-06 | Stop reason: SDUPTHER

## 2021-03-10 ENCOUNTER — HOSPITAL ENCOUNTER (OUTPATIENT)
Dept: PHARMACY | Age: 86
Setting detail: THERAPIES SERIES
Discharge: HOME | End: 2021-03-10
Payer: MEDICARE

## 2021-03-10 VITALS — TEMPERATURE: 97.6 F

## 2021-03-10 DIAGNOSIS — I48.11 LONGSTANDING PERSISTENT ATRIAL FIBRILLATION (HCC): ICD-10-CM

## 2021-03-10 LAB — POC INR: 2.3 (ref 0.8–1.2)

## 2021-03-10 PROCEDURE — 99211 OFF/OP EST MAY X REQ PHY/QHP: CPT

## 2021-03-10 NOTE — PROGRESS NOTES
Medication Management [address]   [phone] (phone)  [phone] (fax)      Ms. Gissel Schroeder is a 82 y.o.  female with history of Afib who presents today for anticoagulation monitoring and adjustment. Patient verifies current dosing regimen and tablet strength. No missed or extra doses. Patient denies s/s bleeding/bruising/swelling/SOB/chest pain  No blood in urine or stool. No dietary changes. No changes in medication/OTC agents/Herbals. No change in alcohol use or tobacco use. No change in activity level. Patient denies headaches/dizziness/lightheadedness/falls. No vomiting/diarrhea or acute illness. No Procedures scheduled in the future at this time. Assessment:   Lab Results   Component Value Date    INR 2.30 (H) 03/10/2021    INR 1.70 (H) 02/23/2021    INR 2.20 (H) 01/26/2021     INR therapeutic   Recent Labs     03/10/21  1113   INR 2.30*         Plan:  Continue Coumadin 5 mg daily. Recheck INR in 4 week(s). Patient reminded to call the Anticoagulation Clinic with any signs or symptoms of bleeding or with any medication changes. Patient given instructions utilizing the teach back method. Discharged ambulatory in no apparent distress. After visit summary printed and reviewed with patient.       Medications reviewed and updated on home medication list Yes    Influenza vaccine:     [] given    [x] declined   [x] received previously   [] plans to receive at a later time   [] refused    [x] documented in [facility] [address]: [address]: Yes  Total # of Interventions Recommended: 1  - Maintenance Safety Lab Monitoring #: 1  Total Interventions Accepted: 1  Time Spent (min): Sugar 109, [address]

## 2021-03-11 ENCOUNTER — APPOINTMENT (OUTPATIENT)
Dept: PHARMACY | Age: 86
End: 2021-03-11
Payer: MEDICARE

## 2021-03-17 ENCOUNTER — TELEPHONE (OUTPATIENT)
Dept: FAMILY MEDICINE CLINIC | Age: 86
End: 2021-03-17

## 2021-03-17 NOTE — TELEPHONE ENCOUNTER
[facility] received a call from:    Name of Caller: Reanna Isaacs    Relationship to patient: Self    Organization name: [phone]    Best contact number: [phone]    Reason for call: Pt is getting the first round of the pfizer vaccine tomorrow 3/18. Pt would like to know if the vaccine might interfere her A-fib. Please advise.

## 2021-03-18 ENCOUNTER — TELEPHONE (OUTPATIENT)
Dept: PRIMARY CARE CLINIC | Age: 86
End: 2021-03-18

## 2021-03-18 ENCOUNTER — IMMUNIZATION (OUTPATIENT)
Dept: PRIMARY CARE CLINIC | Age: 86
End: 2021-03-18
Payer: MEDICARE

## 2021-03-18 LAB — GFR SERPL CREATININE-BSD FRML MDRD: 60 ML/MIN/1.73M2

## 2021-03-18 PROCEDURE — 0001A COVID-19, PFIZER VACCINE 30MCG/0.3ML DOSE: CPT | Performed by: FAMILY MEDICINE

## 2021-03-18 PROCEDURE — 91300 COVID-19, PFIZER VACCINE 30MCG/0.3ML DOSE: CPT | Performed by: FAMILY MEDICINE

## 2021-03-29 RX ORDER — SPIRONOLACTONE AND HYDROCHLOROTHIAZIDE 25; 25 MG/1; MG/1
TABLET ORAL
Qty: 30 TABLET | Refills: 0 | Status: SHIPPED | OUTPATIENT
Start: 2021-03-29 | End: 2021-04-26

## 2021-03-29 RX ORDER — AMLODIPINE BESYLATE 5 MG/1
TABLET ORAL
Qty: 30 TABLET | Refills: 0 | OUTPATIENT
Start: 2021-03-29

## 2021-04-07 ENCOUNTER — HOSPITAL ENCOUNTER (OUTPATIENT)
Dept: PHARMACY | Age: 86
Setting detail: THERAPIES SERIES
Discharge: HOME | End: 2021-04-07
Payer: MEDICARE

## 2021-04-07 DIAGNOSIS — Z79.01 ANTICOAGULATED ON COUMADIN: ICD-10-CM

## 2021-04-07 DIAGNOSIS — I48.11 LONGSTANDING PERSISTENT ATRIAL FIBRILLATION (HCC): ICD-10-CM

## 2021-04-07 DIAGNOSIS — Z51.81 ENCOUNTER FOR THERAPEUTIC DRUG MONITORING: ICD-10-CM

## 2021-04-07 LAB — POC INR: 2.3 (ref 0.8–1.2)

## 2021-04-07 PROCEDURE — 36416 COLLJ CAPILLARY BLOOD SPEC: CPT

## 2021-04-07 PROCEDURE — 99211 OFF/OP EST MAY X REQ PHY/QHP: CPT

## 2021-04-07 PROCEDURE — 85610 PROTHROMBIN TIME: CPT

## 2021-04-07 NOTE — PROGRESS NOTES
Medication Management [address] [place] [address]  [phone] (phone)  [phone] (fax)      Ms. Mague Edwards is a 82 y.o.  female with history of Afib who presents today for anticoagulation monitoring and adjustment. Patient verifies current dosing regimen and tablet strength. No missed or extra doses. Patient denies s/s bleeding/bruising/swelling/SOB/chest pain  No blood in urine or stool. No dietary changes. No changes in medication/OTC agents/Herbals. No change in alcohol use or tobacco use. No change in activity level. Patient denies headaches/dizziness/lightheadedness/falls. No vomiting/diarrhea or acute illness. No Procedures scheduled in the future at this time. Assessment:   Lab Results   Component Value Date    INR 2.30 (H) 04/07/2021    INR 2.30 (H) 03/10/2021    INR 1.70 (H) 02/23/2021     INR therapeutic   Recent Labs     04/07/21  1109   INR 2.30*     Interview conducted by pharmacy student Ta Dyer    Patient presents with second consecutive therapeutic INR following a recent dose adjustment. Plan:  Continue Coumadin 5 mg daily. Recheck INR in 4 week(s). Patient reminded to call the Anticoagulation Clinic with any signs or symptoms of bleeding or with any medication changes. Patient given instructions utilizing the teach back method. Discharged ambulatory in no apparent distress. After visit summary printed and reviewed with patient.       Medications reviewed and updated on home medication list Yes    [] given    [x] declined   [] received previously   [] plans to receive at a later time   [x] refused    [] documented in [address]: [address]: Yes  Total # of Interventions Recommended: 0  - Maintenance Safety Lab Monitoring #: 1  Total Interventions Accepted: 0  Time Spent (min): 2858  [facility], Anisha, BCPS  4/7/2021  11:54 AM

## 2021-04-08 ENCOUNTER — IMMUNIZATION (OUTPATIENT)
Dept: PRIMARY CARE CLINIC | Age: 86
End: 2021-04-08
Payer: MEDICARE

## 2021-04-08 PROCEDURE — 0002A COVID-19, PFIZER VACCINE 30MCG/0.3ML DOSE: CPT | Performed by: FAMILY MEDICINE

## 2021-04-08 PROCEDURE — 91300 COVID-19, PFIZER VACCINE 30MCG/0.3ML DOSE: CPT | Performed by: FAMILY MEDICINE

## 2021-04-12 ENCOUNTER — OFFICE VISIT (OUTPATIENT)
Dept: INTERNAL MEDICINE CLINIC | Age: 86
End: 2021-04-12
Payer: MEDICARE

## 2021-04-12 VITALS — BODY MASS INDEX: 30.67 KG/M2 | WEIGHT: 190 LBS

## 2021-04-12 DIAGNOSIS — E11.59 TYPE 2 DIABETES MELLITUS WITH OTHER CIRCULATORY COMPLICATION, WITHOUT LONG-TERM CURRENT USE OF INSULIN (HCC): Primary | ICD-10-CM

## 2021-04-12 PROCEDURE — 97803 MED NUTRITION INDIV SUBSEQ: CPT | Performed by: DIETITIAN, REGISTERED

## 2021-04-12 NOTE — PROGRESS NOTES
[address] [place] [address]. [address] [place] [address] Eligio., [facility], [facility] [address]  [phone] (phone)  [phone] (fax)    Patient Name: Magali Mclain. Date of Birth: [id number]. MRN: [id number]      Assessment: Patient is a 82 y.o. female seen for follow-up MNT visit for Diabetes. -Nutritionally relevant labs:   Lab Results   Component Value Date/Time    LABA1C 5.7 01/06/2021 12:52 PM    LABA1C 6.5 (H) 07/06/2020 12:38 PM    LABA1C 7.8 (H) 02/25/2020 12:04 PM    LABA1C 7.4 01/06/2020 11:47 AM    LABA1C 10.3 (H) 09/16/2019 11:35 AM    GLUCOSE 76 02/18/2021 02:42 PM    GLUCOSE 115 (H) 10/27/2020 12:07 PM    CHOL 148 09/10/2020 11:10 AM    HDL 57 09/10/2020 11:10 AM    LDLCALC 64 09/10/2020 11:10 AM    TRIG 133 09/10/2020 11:10 AM     -Blood sugar trends: this am 120mg/dL. Satish Jacobs states FBS is normally not more then 120mg/dL.  Denies low blood sugar     -Food recall:   Breakfast: eats  Lunch: smaller portions because having cold cuts   Dinner: eats   Snacks: none (at times ice cream but makes her sugar go up)   -  Current Outpatient Medications on File Prior to Visit   Medication Sig Dispense Refill    benzonatate (TESSALON) 100 MG capsule TAKE 1 TO 2 CAPSULES BY MOUTH THREE TIMES A DAY AS NEEDED FOR COUGH 60 capsule 0    spironolactone-hydroCHLOROthiazide (ALDACTAZIDE) 25-25 MG per tablet TAKE 1 TABLET BY MOUTH EVERY DAY 30 tablet 0    amLODIPine (NORVASC) 10 MG tablet TAKE 1 TABLET BY MOUTH EVERY DAY 90 tablet 3    magnesium oxide (MAG-OX) 400 MG tablet TAKE 1 TABLET BY MOUTH EVERY DAY 90 tablet 0    amLODIPine (NORVASC) 10 MG tablet Take 1 tablet by mouth daily 90 tablet 1    blood glucose test strips (ONETOUCH ULTRA) strip TEST 2 TIMES DAILY 100 strip 3    metoprolol tartrate (LOPRESSOR) 25 MG tablet TAKE 1 TABLET BY MOUTH TWICE A  tablet 3    fluticasone (FLONASE) 50 MCG/ACT nasal spray 1 spray by Each Nostril route daily 2 Bottle 1    montelukast (SINGULAIR) 10 MG tablet Take 1 tablet by mouth daily 90 tablet 3    warfarin (COUMADIN) 5 MG tablet TAKE AS DIRECTED BY [facility] COUMADIN CLINIC #30 TABS = 30 DAY SUPPLY 90 tablet 3    fexofenadine (ALLEGRA) 180 MG tablet TAKE 1 TABLET BY MOUTH EVERY DAY IN THE MORNING 30 tablet 2    betamethasone valerate (VALISONE) 0.1 % cream Apply topically 2 times daily PRN. 1 Tube 1    Lancets MISC 1 each by Does not apply route 2 times daily Test twice daily dispense what insurance ocvers 90 each 3    metFORMIN (GLUCOPHAGE) 1000 MG tablet Take 1 tablet by mouth 2 times daily (with meals) 180 tablet 2    hydrOXYzine (ATARAX) 25 MG tablet TAKE 1 TABLET BY MOUTH EVERY 6 HOURS AS NEEDED 60 tablet 3    blood glucose monitor kit and supplies Test one time a day . Dispense 30 strips a month with 5 refills Please dispense meter and suppliesthat insurance covers . 1 kit 0    UNABLE TO FIND Allergy shot every other Monday at allergist's office. No current facility-administered medications on file prior to visit. Vitals from current and previous visits: Wt 190 lb (86.2 kg)   BMI 30.67 kg/m²     -There is no height or weight on file to calculate BMI. 30-34.9 - Obesity Grade I.   -Weight goal: maintain weight. Nutrition Diagnosis:   Overweight/Obesity related to Lack of value or competing values for behavior change as evidenced by Food recall. Intervention:  -Impression: Pt struggling with allergies. States negative COVID test. To see family doctor Thursday for further evaluation. Weight stable. Appetite fair.   - Exercise: not as much with allergy symptoms   -Instructed the patient on: Discussed alternative lunch ideas to deli meat. Provided Boot Glucose control supplement drink to sample. Encouraged her to continue to eat healthy and to increase her exercise.        -  Patient Instructions   1.) Alternatives to Deli meat sandwich:    Chicken, tuna or hard-boiled egg salad   legumes (like chickpeas) to fill your sandwich   Chickpea Egg Salad   1 can (14oz) chickpeas, drained and rinsed (or 1 ½ cup fresh cooked)    3 tablespoons red onion, finely diced    2 tablespoons celery, finely chopped    1  2 tablespoons diced pickles or relish (sweet or dill)    ¼ cup cup vegan chapman    1 tablespoon mikayla    ½ teaspoon EACH garlic + onion powder, can be optional    ¼  ½ teaspoon turmeric    ½  1 teaspoon fresh dill (or ¼  ½ tsp dried)    salt, to taste    Hummus sandwich   Peanut butter and honey (instead of jelly)   Veggie, bean or lentil burger  Green salad: Top lettuce, spinach or your favorite leafy greens with a variety of vegetables, cooked whole grains and beans (black beans, chick peas or kidney beans are great choices). Grain salads: Use quinoa, barley, wild rice, cracked wheat, farro, or another ingredient to make a cold salad you can prepare ahead, keep in the refrigerator for several days and eat throughout the week. Kong Skyla is the traditional Mediterranean cracked wheat and parsley salad many are familiar with or you could try this zesty quinoa salad . Plant-based soups: Minestrone, lentil, split pea (without ham), chili (vegetarian or cut back on the meat and increase the beans and veggies)   Leftovers: Make a couple extra portions of your healthy dinner and enjoy it for lunches during the rest of the week. Comprehension verified using teachback method. Monitoring/Evaluation:   -Followup visit: 12 weeks with dietitian.   -Receptiveness to education/goals: Agreeable.  -Evaluation of education: Indicates understanding.  -Readiness to change: action - ready to set action plan and implement dietary changes. -Expected compliance: fair. Thank you for your referral of this patient. Total time involved in direct patient education: 45 minutes for follow-up MNT visit.

## 2021-04-12 NOTE — PATIENT INSTRUCTIONS
1. ) Alternatives to Deli meat sandwich:    Chicken, tuna or hard-boiled egg salad   legumes (like chickpeas) to fill your sandwich   [employer] Products Salad   1 can (14oz) chickpeas, drained and rinsed (or 1 ½ cup fresh cooked)    3 tablespoons red onion, finely diced    2 tablespoons celery, finely chopped    1  2 tablespoons diced pickles or relish (sweet or dill)    ¼ cup cup vegan chapman    1 tablespoon mikayla    ½ teaspoon EACH garlic + onion powder, can be optional    ¼  ½ teaspoon turmeric    ½  1 teaspoon fresh dill (or ¼  ½ tsp dried)    salt, to taste    Hummus sandwich   Peanut butter and honey (instead of jelly)   Veggie, bean or lentil burger  Green salad: Top lettuce, spinach or your favorite leafy greens with a variety of vegetables, cooked whole grains and beans (black beans, chick peas or kidney beans are great choices). Grain salads: Use quinoa, barley, wild rice, cracked wheat, farro, or another ingredient to make a cold salad you can prepare ahead, keep in the refrigerator for several days and eat throughout the week. Chrisne Elvis is the traditional Mediterranean cracked wheat and parsley salad many are familiar with or you could try this zesty quinoa salad . Plant-based soups: Minestrone, lentil, split pea (without ham), chili (vegetarian or cut back on the meat and increase the beans and veggies)   Leftovers: Make a couple extra portions of your healthy dinner and enjoy it for lunches during the rest of the week.

## 2021-04-14 ENCOUNTER — TELEPHONE (OUTPATIENT)
Dept: FAMILY MEDICINE CLINIC | Age: 86
End: 2021-04-14

## 2021-04-14 NOTE — TELEPHONE ENCOUNTER
I sent the pt two links and called her and it went to voicemail. Sorry we couldn't get connected, She can reshcelule for Thursday.

## 2021-04-15 ENCOUNTER — OFFICE VISIT (OUTPATIENT)
Dept: CARDIOLOGY CLINIC | Age: 86
End: 2021-04-15
Payer: MEDICARE

## 2021-04-15 VITALS
DIASTOLIC BLOOD PRESSURE: 62 MMHG | BODY MASS INDEX: 30.05 KG/M2 | WEIGHT: 187 LBS | SYSTOLIC BLOOD PRESSURE: 128 MMHG | HEIGHT: 66 IN | HEART RATE: 72 BPM

## 2021-04-15 DIAGNOSIS — I10 ESSENTIAL HYPERTENSION: ICD-10-CM

## 2021-04-15 DIAGNOSIS — R60.0 BILATERAL LEG EDEMA: ICD-10-CM

## 2021-04-15 DIAGNOSIS — I48.21 ATRIAL FIBRILLATION, PERMANENT (HCC): Primary | ICD-10-CM

## 2021-04-15 PROCEDURE — 99213 OFFICE O/P EST LOW 20 MIN: CPT | Performed by: INTERNAL MEDICINE

## 2021-04-15 NOTE — PROGRESS NOTES
Chief Complaint   Patient presents with    1 Month Follow-Up    Atrial Fibrillation    Check-Up   Originally  patient here for atrial fib, [facility] referral.  Known to have atr fib for 5 yr   Has moved to [place] from [place] [id number]  On coumadin      Pt here for a 1 month f/u    EKG done 20    Denied cp, dizziness  Or palpitations    Leg edema better    Hx of chronic leg edema at the end of the day    Sob on exertion chronic    Nonsmoker    FHX    Mother had Heart problem and CVA        Patient Active Problem List   Diagnosis    Longstanding persistent atrial fibrillation ([facility]    Anticoagulated on Coumadin    Diabetes mellitus ([facility]    Hypertension    Atrial fibrillation, permanent ([facility]    Bilateral leg edema +1    Encounter for therapeutic drug monitoring       History reviewed. No pertinent surgical history. No Known Allergies     Family History   Problem Relation Age of Onset    Cancer Mother         breast cancer    Diabetes Mother         Social History     Socioeconomic History    Marital status:      Spouse name: Not on file    Number of children: Not on file    Years of education: Not on file    Highest education level: Not on file   Occupational History    Not on file   Social Needs    Financial resource strain: Not hard at all    Food insecurity     Worry: Never true     Inability: Never true   [employer] needs     Medical: No     Non-medical: No   Tobacco Use    Smoking status: Former Smoker     Packs/day: 0.50     Types: Cigarettes     Quit date: 1978     Years since quittin.8    Smokeless tobacco: Never Used   Substance and Sexual Activity    Alcohol use:  Yes     Alcohol/week: 1.0 standard drinks     Types: 1 Cans of beer per week     Comment: occasional     Drug use: No    Sexual activity: Not on file   Lifestyle    Physical activity     Days per week: 0 days     Minutes per session: 0 min    Stress: Not at all   Relationships    Social connections     Talks on phone: More than three times a week     Gets together: More than three times a week     Attends [employer] service: More than 4 times per year     Active member of club or organization: No     Attends meetings of clubs or organizations: Never     Relationship status: Not on file    Intimate partner violence     Fear of current or ex partner: Not on file     Emotionally abused: Not on file     Physically abused: Not on file     Forced sexual activity: Not on file   Other Topics Concern    Not on file   Social History Narrative    Siena Ng has good family support    Son assists with transportation to [facility]    No barriers with medication affordability    Following up with [facility]abetic [facility]       Current Outpatient Medications   Medication Sig Dispense Refill    benzonatate (TESSALON) 100 MG capsule TAKE 1 TO 2 CAPSULES BY MOUTH THREE TIMES A DAY AS NEEDED FOR COUGH 60 capsule 0    spironolactone-hydroCHLOROthiazide (ALDACTAZIDE) 25-25 MG per tablet TAKE 1 TABLET BY MOUTH EVERY DAY 30 tablet 0    amLODIPine (NORVASC) 10 MG tablet TAKE 1 TABLET BY MOUTH EVERY DAY 90 tablet 3    magnesium oxide (MAG-OX) 400 MG tablet TAKE 1 TABLET BY MOUTH EVERY DAY 90 tablet 0    blood glucose test strips (ONETOUCH ULTRA) strip TEST 2 TIMES DAILY 100 strip 3    metoprolol tartrate (LOPRESSOR) 25 MG tablet TAKE 1 TABLET BY MOUTH TWICE A  tablet 3    fluticasone (FLONASE) 50 MCG/ACT nasal spray 1 spray by Each Nostril route daily 2 Bottle 1    montelukast (SINGULAIR) 10 MG tablet Take 1 tablet by mouth daily 90 tablet 3    warfarin (COUMADIN) 5 MG tablet TAKE AS DIRECTED BY [facility] #30 TABS = 30 DAY SUPPLY 90 tablet 3    fexofenadine (ALLEGRA) 180 MG tablet TAKE 1 TABLET BY MOUTH EVERY DAY IN THE MORNING 30 tablet 2    betamethasone valerate (VALISONE) 0.1 % cream Apply topically 2 times daily PRN.  1 Tube 1    Lancets MISC 1 each by Does not apply route 2 times daily Test twice daily dispense what insurance ocvers 90 each 3    metFORMIN (GLUCOPHAGE) 1000 MG tablet Take 1 tablet by mouth 2 times daily (with meals) 180 tablet 2    hydrOXYzine (ATARAX) 25 MG tablet TAKE 1 TABLET BY MOUTH EVERY 6 HOURS AS NEEDED 60 tablet 3    blood glucose monitor kit and supplies Test one time a day . Dispense 30 strips a month with 5 refills Please dispense meter and suppliesthat insurance covers . 1 kit 0    UNABLE TO FIND Allergy shot every other Monday at allergist's office. No current facility-administered medications for this visit. Review of Systems -     General ROS: negative  Psychological ROS: negative  Hematological and Lymphatic ROS: No history of blood clots or bleeding disorder. Respiratory ROS: no cough,  or wheezing, the rest see HPI  Cardiovascular ROS: See HPI  Gastrointestinal ROS: negative  Genito-Urinary ROS: no dysuria, trouble voiding, or hematuria  Musculoskeletal ROS: negative  Neurological ROS: no TIA or stroke symptoms  Dermatological ROS: negative      Blood pressure 128/62, pulse 72, height 5' 6\" (1.676 m), weight 187 lb (84.8 kg).         Physical Examination:    General appearance - alert, well appearing, and in no distress  HEENT- Pink conjunctiva  , Non-icteri sclera,PERRLA  Mental status - alert, oriented to person, place, and time  Neck - supple, no significant adenopathy, no JVD, or carotid bruits  Chest - clear to auscultation, no wheezes, rales or rhonchi, symmetric air entry  Heart - normal rate, regular rhythm, normal S1, S2, no murmurs, rubs, clicks or gallops  Abdomen - soft, nontender, nondistended, no masses or organomegaly  WOLFGANG- no CVA or flank tenderness, no suprapubic tenderness  Neurological - alert, oriented, normal speech, no focal findings or movement disorder noted  Musculoskeletal/limbs - no joint tenderness, deformity or swelling   - peripheral pulses normal, no pedal edema, no clubbing or cyanosis  Skin - normal coloration and turgor, no rashes, no suspicious skin lesions noted  Psych- appropriate mood and affect    Lab  No results for input(s): CKTOTAL, CKMB, CKMBINDEX, TROPONINI in the last 72 hours. CBC:   Lab Results   Component Value Date    WBC 5.2 07/02/2020    RBC 4.81 07/02/2020    HGB 11.5 01/26/2021    HCT 36.4 01/26/2021    MCV 73.6 07/02/2020    MCH 22.9 07/02/2020    MCHC 31.1 07/02/2020    RDW 14.9 06/19/2018     07/02/2020    MPV 9.5 07/02/2020     BMP:    Lab Results   Component Value Date     02/18/2021    K 3.5 02/18/2021    CL 98 02/18/2021    CO2 29 02/18/2021    BUN 17 02/18/2021    LABALBU 4.1 09/10/2020    CREATININE 0.9 02/18/2021    CALCIUM 10.5 02/18/2021    LABGLOM 60 02/18/2021    GLUCOSE 76 02/18/2021     Hepatic Function Panel:    Lab Results   Component Value Date    ALKPHOS 96 09/10/2020    ALT 15 09/10/2020    AST 18 09/10/2020    PROT 7.5 09/10/2020    BILITOT 0.5 09/10/2020    BILIDIR <0.2 09/10/2020    LABALBU 4.1 09/10/2020     Magnesium:    Lab Results   Component Value Date    MG 1.6 02/18/2021     Warfarin PT/INR:  No components found for: PTPATWAR, PTINRWAR  HgBA1c:    Lab Results   Component Value Date    LABA1C 5.7 01/06/2021    LABA1C 7.8 02/25/2020     FLP:    Lab Results   Component Value Date    TRIG 133 09/10/2020    HDL 57 09/10/2020    LDLCALC 64 09/10/2020     TSH:  No results found for: TSH      ekg 7/6/2020  Atrial fibrillation  -irregular conduction  CVR 84 BPM  -Nonspecific ST depression   +   Diffuse nonspecific T-abnormality  -Nondiagnostic. ABNORMAL     Echo  Conclusions      Summary   Normal left ventricle size and systolic function. Ejection fraction was estimated at 60 to 65 %. There were no regional left ventricular wall motion abnormalities and wall   thickness was within normal limits. The left atrium is Moderately dilated. Moderately enlarged right atrium size. Mildly enlarged right ventricle cavity.    Right ventricle global systolic function is normal . Signature      ----------------------------------------------------------------   Electronically signed by Dimitris Lopez MD (Interpreting   physician) on 10/09/2020 at 06:49 PM   ----------------------------------------------------------------        Conclusions      Summary   Lexiscan EKG stress test is not suggestive for ischemia. The nuclear images is not suggestive for myocardial ischemia. Signatures    ----------------------------------------------------------------   Electronically signed by Dimitris Lopez MD (Interpreting   Cardiologist) on 10/09/2020 at 17:57   ------------------------------------------------------        Assessment   Diagnosis Orders   1. Atrial fibrillation, permanent ([address]     2. Essential hypertension     3. Bilateral leg edema +1       chadsvasc= 5    Plan     The current meds and labs reviewed    Cough dry one - at night  Running nose  Sneezing   Had been off  losartan  50 po qd  And no im[provement with cough  Has been getting allergy shot and meds for few weeks 3 and  No response      Atr fib chronic  Cont rate control  Cont coumadin  INR 2.8  oprion of DOAC given pat want to cont coumadin    Hypertension, on medical treatment. Seems to be under good control. Patient is compliant with medical treatment.    Cont  norvasc to 10  Mg po qd  Leg edema B/L +1 better   Cont  aldactazide 25/25 1 tab po qd  BMP and MG next time    Echo and lexisc- WNL   BMP and MG- reviewed  Mg 1.6  Cont mg oxide    D/w the pat the plan of care and result of test    Hx of K 3.5  Advised high K diet tomato, banana, avocado,orange potatoe    I spent 20 minutes involved in face-to-face discussion of medical issues, prognosis, record review  and plan with the patient today and more than 50% of the time was spent on counseling and coordination of care      RTC in 4  month  For BP      Emperatriz Perez

## 2021-04-16 NOTE — PROGRESS NOTES
Impression: Age-related nuclear cataract, bilateral: H25.13. Plan: Observe. ADDENDUM    For Pharmacy Admin Tracking Only    PHSO: Yes  Total # of Interventions Recommended: 1  - Maintenance Safety Lab Monitoring #: 1  Total Interventions Accepted: 1  Time Spent (min): 15    Eliud FerreraD, BCPS  6/23/2020  3:56 PM

## 2021-04-19 ENCOUNTER — TELEPHONE (OUTPATIENT)
Dept: FAMILY MEDICINE CLINIC | Age: 86
End: 2021-04-19

## 2021-04-19 NOTE — TELEPHONE ENCOUNTER
I have documented she sees an allergist, I would have her contact them and let them know they may need to adjust her shots.

## 2021-04-26 RX ORDER — SPIRONOLACTONE AND HYDROCHLOROTHIAZIDE 25; 25 MG/1; MG/1
TABLET ORAL
Qty: 30 TABLET | Refills: 3 | Status: SHIPPED | OUTPATIENT
Start: 2021-04-26 | End: 2021-07-22

## 2021-04-27 DIAGNOSIS — L82.1 SEBORRHEIC KERATOSES: ICD-10-CM

## 2021-05-03 ENCOUNTER — TELEPHONE (OUTPATIENT)
Dept: FAMILY MEDICINE CLINIC | Age: 86
End: 2021-05-03

## 2021-05-03 NOTE — TELEPHONE ENCOUNTER
----- Message from Francesca Osman sent at 5/3/2021  9:56 AM EDT -----  Subject: Referral Request    QUESTIONS   Reason for referral request? Right ear has started hurting again last   week. She has test done but not heard anything   Has the physician seen you for this condition before? No   Preferred Specialist (if applicable)? Do you already have an appointment scheduled? No  Additional Information for Provider? Patient would like a referral to an   ENT. As soon as possible. Please call patient to let her know when the   referral is in so she can schedule and appt.  ---------------------------------------------------------------------------  --------------  CALL BACK INFO  What is the best way for the office to contact you? OK to leave message on   voicemail  Preferred Call Back Phone Number?  [phone]

## 2021-05-03 NOTE — TELEPHONE ENCOUNTER
Those tests are over a year old,  Pt was to have a VV with me on 4/15? Or so and could not connect. If needign an ENT referral she will need a visit. Please have her come to the office as she has touble with VV visit Thanks!

## 2021-05-03 NOTE — TELEPHONE ENCOUNTER
----- Message from Merline Andrew sent at 5/3/2021  9:56 AM EDT -----  Subject: Referral Request    QUESTIONS   Reason for referral request? Right ear has started hurting again last   week. She has test done but not heard anything   Has the physician seen you for this condition before? No   Preferred Specialist (if applicable)? Do you already have an appointment scheduled? No  Additional Information for Provider? Patient would like a referral to an   ENT. As soon as possible. Please call patient to let her know when the   referral is in so she can schedule and appt.  ---------------------------------------------------------------------------  --------------  CALL BACK INFO  What is the best way for the office to contact you? OK to leave message on   voicemail  Preferred Call Back Phone Number?  [phone]

## 2021-05-05 ENCOUNTER — HOSPITAL ENCOUNTER (OUTPATIENT)
Age: 86
Discharge: HOME | End: 2021-05-05
Payer: MEDICARE

## 2021-05-05 ENCOUNTER — HOSPITAL ENCOUNTER (OUTPATIENT)
Dept: PHARMACY | Age: 86
Setting detail: THERAPIES SERIES
Discharge: HOME | End: 2021-05-05
Payer: MEDICARE

## 2021-05-05 ENCOUNTER — OFFICE VISIT (OUTPATIENT)
Dept: FAMILY MEDICINE CLINIC | Age: 86
End: 2021-05-05
Payer: MEDICARE

## 2021-05-05 VITALS
BODY MASS INDEX: 31.56 KG/M2 | SYSTOLIC BLOOD PRESSURE: 132 MMHG | HEART RATE: 76 BPM | TEMPERATURE: 97.6 F | OXYGEN SATURATION: 98 % | DIASTOLIC BLOOD PRESSURE: 77 MMHG | WEIGHT: 189.4 LBS | RESPIRATION RATE: 16 BRPM | HEIGHT: 65 IN

## 2021-05-05 DIAGNOSIS — Z79.01 ANTICOAGULATED ON COUMADIN: ICD-10-CM

## 2021-05-05 DIAGNOSIS — R42 VERTIGO: ICD-10-CM

## 2021-05-05 DIAGNOSIS — I48.11 LONGSTANDING PERSISTENT ATRIAL FIBRILLATION (HCC): ICD-10-CM

## 2021-05-05 DIAGNOSIS — H93.11 TINNITUS OF RIGHT EAR: Primary | ICD-10-CM

## 2021-05-05 DIAGNOSIS — Z51.81 ENCOUNTER FOR THERAPEUTIC DRUG MONITORING: ICD-10-CM

## 2021-05-05 DIAGNOSIS — R05.9 COUGH: ICD-10-CM

## 2021-05-05 DIAGNOSIS — I48.21 ATRIAL FIBRILLATION, PERMANENT (HCC): ICD-10-CM

## 2021-05-05 DIAGNOSIS — I10 ESSENTIAL HYPERTENSION: ICD-10-CM

## 2021-05-05 DIAGNOSIS — R60.0 BILATERAL LEG EDEMA: ICD-10-CM

## 2021-05-05 LAB
ANION GAP SERPL CALCULATED.3IONS-SCNC: 13 MEQ/L (ref 8–16)
BUN BLDV-MCNC: 16 MG/DL (ref 7–22)
CALCIUM SERPL-MCNC: 10.8 MG/DL (ref 8.5–10.5)
CHLORIDE BLD-SCNC: 95 MEQ/L (ref 98–111)
CO2: 29 MEQ/L (ref 23–33)
CREAT SERPL-MCNC: 0.8 MG/DL (ref 0.4–1.2)
GLUCOSE BLD-MCNC: 115 MG/DL (ref 70–108)
MAGNESIUM: 1.6 MG/DL (ref 1.6–2.4)
POC INR: 2.3 (ref 0.8–1.2)
POTASSIUM SERPL-SCNC: 3.5 MEQ/L (ref 3.5–5.2)
SODIUM BLD-SCNC: 137 MEQ/L (ref 135–145)

## 2021-05-05 PROCEDURE — 36416 COLLJ CAPILLARY BLOOD SPEC: CPT | Performed by: PHARMACIST

## 2021-05-05 PROCEDURE — 83735 ASSAY OF MAGNESIUM: CPT

## 2021-05-05 PROCEDURE — 80048 BASIC METABOLIC PNL TOTAL CA: CPT

## 2021-05-05 PROCEDURE — 85610 PROTHROMBIN TIME: CPT | Performed by: PHARMACIST

## 2021-05-05 PROCEDURE — 99211 OFF/OP EST MAY X REQ PHY/QHP: CPT | Performed by: PHARMACIST

## 2021-05-05 PROCEDURE — 99214 OFFICE O/P EST MOD 30 MIN: CPT | Performed by: NURSE PRACTITIONER

## 2021-05-05 PROCEDURE — 36415 COLL VENOUS BLD VENIPUNCTURE: CPT

## 2021-05-05 ASSESSMENT — ENCOUNTER SYMPTOMS
CHOKING: 0
WHEEZING: 0
TROUBLE SWALLOWING: 0
SORE THROAT: 0
CHEST TIGHTNESS: 0
RHINORRHEA: 0
SINUS PAIN: 0
SHORTNESS OF BREATH: 0
COUGH: 1
SINUS PRESSURE: 0

## 2021-05-05 NOTE — PROGRESS NOTES
Medication Management [address]  [phone] (phone)  [phone] (fax)    Ms. Elsi Boyle is a 82 y.o.  female with history of Afib who presents today for anticoagulation monitoring and adjustment. Patient verifies current dosing regimen and tablet strength. No missed or extra doses. Patient denies s/s bleeding/bruising/swelling/SOB/chest pain. 1-2+ edema in bilat LE  No blood in urine or stool. No dietary changes. No changes in medication/OTC agents/Herbals. No change in alcohol use or tobacco use. No change in activity level. Patient denies headaches/dizziness/lightheadedness/falls. No vomiting/diarrhea or acute illness. No Procedures scheduled in the future at this time. Assessment:   Lab Results   Component Value Date    INR 2.30 (H) 05/05/2021    INR 2.30 (H) 04/07/2021    INR 2.30 (H) 03/10/2021     INR therapeutic   Recent Labs     05/05/21  1114   INR 2.30*         Plan:  Continue Coumadin 5mg daily. Recheck INR in 4 week(s). Patient reminded to call the Anticoagulation Clinic with any signs or symptoms of bleeding or with any medication changes. Patient given instructions utilizing the teach back method. After visit summary printed and reviewed with patient. Discharged ambulatory in no apparent distress.       For Pharmacy Admin Tracking Only     Total # of Interventions Recommended: 0   Total # of Interventions Accepted: 0   Time Spent (min): 15

## 2021-05-05 NOTE — PROGRESS NOTES
[address] [place] [address]  [address] [place] [address]  Dept: [phone]  Dept Fax: [phone]  Loc: [phone]    Zachariah Mccormick is a 82 y.o. femalewho presents today for her medical conditions/complaints as noted below. Render Babinski c/o of Referral - General (ENT- right ear pain), Headache (gotten worse since ear pain), and Discuss Medications      HPI:      Ear Complaint    HPI:  Symptoms have been present for 3 month(s). But it is getting worse    Inciting incident or history of trauma? no  Decreased hearing? Yes, she has had a hearing test in past, also had a VNG ordered but did not do it. Ear tenderness? No  Ear drainage? No  Feeling of fullness? No  Radiation of the pain? Yes - thinks it radiates to her head, she has headaches but has also been coughing a lot. Dizziness or pre-syncope? Yes - she has had some dizziness, ENT wants her to have a VNG ordered last June but she did not do it. Affected by position or head movment? Yes - it can be  Sore throat, rhinorrhea or sinus congestion? No  Hx of Bruxism? No  Treatment tried and response - nothing, has taken tessalon perles for her cough had a normal chest x-ray     Pt continues with a dry cough, has stopped losartan but continues to cough. Offered pt PFT but she declines, has an appt with allergy tomorrow will discuss with them. Denies sob or wheezing.           Current Outpatient Medications   Medication Sig Dispense Refill    betamethasone valerate (VALISONE) 0.1 % cream APPLY TOPICALLY 2 TIMES DAILY AS NEEDED 15 g 1    spironolactone-hydroCHLOROthiazide (ALDACTAZIDE) 25-25 MG per tablet TAKE 1 TABLET BY MOUTH EVERY DAY 30 tablet 3    amLODIPine (NORVASC) 10 MG tablet TAKE 1 TABLET BY MOUTH EVERY DAY 90 tablet 3    magnesium oxide (MAG-OX) 400 MG tablet TAKE 1 TABLET BY MOUTH EVERY DAY 90 tablet 0    blood glucose test strips (ONETOUCH ULTRA) strip TEST 2 TIMES DAILY 100 strip 3    metoprolol tartrate (LOPRESSOR) 25 MG tablet TAKE 1 TABLET BY MOUTH TWICE A  tablet 3    fluticasone (FLONASE) 50 MCG/ACT nasal spray 1 spray by Each Nostril route daily 2 Bottle 1    montelukast (SINGULAIR) 10 MG tablet Take 1 tablet by mouth daily 90 tablet 3    warfarin (COUMADIN) 5 MG tablet TAKE AS DIRECTED BY [facility] COUMADIN [facility]IN[facility] #30 TABS = 30 DAY SUPPLY 90 tablet 3    fexofenadine (ALLEGRA) 180 MG tablet TAKE 1 TABLET BY MOUTH EVERY DAY IN THE MORNING 30 tablet 2    Lancets MISC 1 each by Does not apply route 2 times daily Test twice daily dispense what insurance ocvers 90 each 3    metFORMIN (GLUCOPHAGE) 1000 MG tablet Take 1 tablet by mouth 2 times daily (with meals) 180 tablet 2    hydrOXYzine (ATARAX) 25 MG tablet TAKE 1 TABLET BY MOUTH EVERY 6 HOURS AS NEEDED 60 tablet 3    blood glucose monitor kit and supplies Test one time a day . Dispense 30 strips a month with 5 refills Please dispense meter and suppliesthat insurance covers . 1 kit 0    UNABLE TO FIND Allergy shot every other Monday at allergist's office. No current facility-administered medications for this visit. Past Medical History:   Diagnosis Date    Atrial fibrillation ([facility]     Diabetes mellitus ([facility]     Hypertension       History reviewed. No pertinent surgical history. Family History   Problem Relation Age of Onset    Cancer Mother         breast cancer    Diabetes Mother      Social History     Tobacco Use    Smoking status: Former Smoker     Packs/day: 0.50     Types: Cigarettes     Quit date: 1978     Years since quittin.9    Smokeless tobacco: Never Used   Substance Use Topics    Alcohol use:  Yes     Alcohol/week: 1.0 standard drinks     Types: 1 Cans of beer per week     Comment: occasional         No Known Allergies    Health Maintenance   Topic Date Due    DTaP/Tdap/Td vaccine (1 - Tdap) 2022 (Originally 1954)    Flu vaccine (Season Ended) 2022 (Originally 9/1/2021)    Shingles Vaccine (1 of 2) 01/06/2022 (Originally 1/[birth year]/1987)    Pneumococcal 65+ years Vaccine (1 of 1 - PPSV23) 10/30/2025 (Originally 1/23/2002)   [facility] Annual Wellness Visit (AWV)  01/07/2022    Potassium monitoring  05/05/2022    Creatinine monitoring  05/05/2022    DEXA (modify frequency per FRAX score)  Completed    COVID-19 Vaccine  Completed    Hepatitis A vaccine  Aged Out    Hib vaccine  Aged Out    Meningococcal (ACWY) vaccine  Aged Out       Subjective:      Review of Systems   Constitutional: Negative for chills, fatigue and fever. HENT: Positive for hearing loss (right ear), postnasal drip and tinnitus. Negative for congestion, rhinorrhea, sinus pressure, sinus pain, sore throat and trouble swallowing. On allergy shots and pills and nasal spray    Respiratory: Positive for cough. Negative for choking, chest tightness, shortness of breath and wheezing. Skin: Negative. Neurological: Positive for headaches. Negative for dizziness, facial asymmetry, weakness and light-headedness. Objective:      /77   Pulse 76   Temp 97.6 °F (36.4 °C) (Temporal)   Resp 16   Ht 5' 5\" (1.651 m)   Wt 189 lb 6.4 oz (85.9 kg)   SpO2 98%   BMI 31.52 kg/m²      Physical Exam  Vitals signs and nursing note reviewed. Constitutional:       Appearance: She is not ill-appearing. HENT:      Right Ear: Ear canal and external ear normal. Decreased hearing noted. No drainage, swelling or tenderness. No middle ear effusion. There is no impacted cerumen. Tympanic membrane is not erythematous. Left Ear: Hearing, tympanic membrane, ear canal and external ear normal.   Eyes:      Extraocular Movements: Extraocular movements intact. Cardiovascular:      Rate and Rhythm: Normal rate and regular rhythm. Pulses: Normal pulses. Heart sounds: Normal heart sounds. No murmur. Skin:     General: Skin is warm and dry.       Capillary Refill: Capillary refill takes less than 2 seconds. Neurological:      General: No focal deficit present. Mental Status: She is alert. Cranial Nerves: Cranial nerves are intact. Sensory: Sensation is intact. Motor: No weakness, tremor, seizure activity or pronator drift. Gait: Gait is intact. Assessment/Plan:           1. Tinnitus of right ear    - Candice Cabrera MD, Otolaryngology, [facility]    2. Vertigo  Have VNG done prior to ENT appt  - Candice Cabrera MD, Otolaryngology, [facility]  Pt in agreement with plan     3. Cough  Question copd vs asthma vs allergy related. Pt to f/u with Allergy and ENT    Return if symptoms worsen or fail to improve. Reccommended tobaccocessation options including pharmacologic methods, counseled great than 3 minutesduring this visit:  Yes[]  No  []       Patient given educational materials -see patient instructions. Discussed use, benefit, and side effects of prescribedmedications. All patient questions answered. Pt voiced understanding. Reviewedhealth maintenance. Instructed to continue current medications, diet and exercise. Patient agreed with treatment plan. Follow up as directed.        Electronicallysigned by JOSH Porter CNP on 5/5/2021 at 3:35 PM

## 2021-05-06 ENCOUNTER — TELEPHONE (OUTPATIENT)
Dept: FAMILY MEDICINE CLINIC | Age: 86
End: 2021-05-06

## 2021-05-06 DIAGNOSIS — H81.10 BENIGN PAROXYSMAL POSITIONAL VERTIGO, UNSPECIFIED LATERALITY: ICD-10-CM

## 2021-05-06 DIAGNOSIS — H93.11 TINNITUS OF RIGHT EAR: Primary | ICD-10-CM

## 2021-05-06 RX ORDER — AMLODIPINE BESYLATE 10 MG/1
TABLET ORAL
Qty: 90 TABLET | Refills: 3 | Status: SHIPPED | OUTPATIENT
Start: 2021-05-06 | End: 2022-01-20 | Stop reason: SDUPTHER

## 2021-05-06 RX ORDER — MECLIZINE HCL 12.5 MG/1
12.5 TABLET ORAL 3 TIMES DAILY PRN
Qty: 60 TABLET | Refills: 0 | Status: SHIPPED | OUTPATIENT
Start: 2021-05-06 | End: 2021-07-14

## 2021-05-06 NOTE — TELEPHONE ENCOUNTER
----- Message from Bernadine Talbert sent at 5/6/2021  9:16 AM EDT -----  Subject: Refill Request    QUESTIONS  Name of Medication? amLODIPine (NORVASC) 10 MG tablet  Patient-reported dosage and instructions? one 10 mg Once a day  How many days do you have left? 6  Preferred Pharmacy? [id number] Pharmacy phone number (if available)? [phone]  ---------------------------------------------------------------------------  --------------,  Name of Medication? Other - Meclizine  Patient-reported dosage and instructions? 12.5 mg 3 times daily as needed  How many days do you have left? 0  Preferred Pharmacy? [id number] Pharmacy phone number (if available)? [phone]  ---------------------------------------------------------------------------  --------------  CALL BACK INFO  What is the best way for the office to contact you? OK to leave message on   voicemail  Preferred Call Back Phone Number?  [phone]

## 2021-05-06 NOTE — TELEPHONE ENCOUNTER
PT calling in asking juan pablo to change the referral to Physical therapy. Pt does not need OT right now, just PT. They will check referral in epic. Thanks!

## 2021-05-06 NOTE — TELEPHONE ENCOUNTER
Pt is saying she already have the videonystagmography done in September and does not want to redo this. [facility] called and said the results are in the notes of 9/25/2020. Please advise.

## 2021-05-06 NOTE — TELEPHONE ENCOUNTER
Noted, I did review that report and it was noted Dr. Kodi Ponce suggested vestibular therapy to his pt back in September for the dizziness and tinnitus. Please let pt know this Dr. Kodi Ponce wants her to have vestibular therapy. I will order the referral for vestibular therapy for her to see if it improves her ringing and dizziness. I recommend she wait on her appt with Dr. Kodi Ponce until after her therapy is completed. Patient seen and examined at bedside with no complaints. Pt is tolerating diet; no abdominal pain, no nausea/vomiting. Pt admits to BM overnight. Denies fever, chills, cp, sob. Pt OOB to ambulate as tolerated. Pt voiding without difficulty; denies any urinary symptoms.     Vital Signs Last 24 Hrs  T(F): 98, Max: 99 (01-30 @ 05:41)  HR: 83  BP: 140/82  RR: 14  SpO2: 99%    GENERAL: Alert, oriented, NAD  CHEST/LUNG: Clear to auscultation bilaterally, respirations nonlabored  HEART: S1S2, Regular rate and rhythm  ABDOMEN: + wound vac intact, functioning. +BS, soft, nontender  EXTREMITIES:  no calf tenderness, no edema b/l    LABS:                        10.8   8.7   )-----------( 280      ( 30 Jan 2017 06:16 )             30.4     30 Jan 2017 06:16    138    |  103    |  42     ----------------------------<  88     4.5     |  24     |  6.72     Ca    8.2        30 Jan 2017 06:16    TPro  6.3    /  Alb  2.0    /  TBili  0.4    /  DBili  x      /  AST  23     /  ALT  <6     /  AlkPhos  54     30 Jan 2017 06:16    Impression: 56 year old male PMH HTN, asthma, ETOH abuse, POD #7 s/p incision and drainage of infected hematoma with return to OR for hemostasis s/p wound debridement and wound vac placement now s/p wound vac exchange today, KANE with suspected ATN likely 2/2 vanco    Plan:  -continue wound vac   -continue antibiotics per ID, Rocephin  -nephro f/u appreciated  -continue pain management PRN  -follow up labs  -DVT ppx   -continue with diet  -OOB to ambulate as tolerated  -will discuss with surgical attending

## 2021-05-06 NOTE — TELEPHONE ENCOUNTER
Recent Visits  Date Type Provider Dept   05/05/21 Office Visit JOSH Godwin CNP [facility]   01/06/21 Office Visit JOSH Godwin CNP [facility]   07/06/20 Office Visit JOSH Godwin CNP [facility]   02/11/20 Office Visit JOSH Godwin CNP [facility]   01/06/20 Office Visit JOSH Godwin CNP [facility]   12/19/19 Office Visit JOSH Godwin CNP [facility]   Showing recent visits within past 540 days with a meds authorizing provider and meeting all other requirements     Future Appointments  Date Type Provider Dept   07/06/21 Appointment JOSH Godwin CNP [facility]   Showing future appointments within next 150 days with a meds authorizing provider and meeting all other requirements         Future Appointments   Date Time Provider Brandy Mederos   6/2/2021 11:00 AM VIOLET Byrd [facility] [place]   7/6/2021  1:00 PM Live Cota APRN - [id number] [address] [place] [address]   7/19/2021  1:00 PM Marycruz Almendarez RD, LD [facility] [place]   8/19/2021  3:15 PM MD JASBIR Penn [facility] [address]

## 2021-05-11 ENCOUNTER — TELEPHONE (OUTPATIENT)
Dept: FAMILY MEDICINE CLINIC | Age: 86
End: 2021-05-11

## 2021-05-11 RX ORDER — PROMETHAZINE HYDROCHLORIDE 12.5 MG/1
12.5 TABLET ORAL 4 TIMES DAILY PRN
Qty: 20 TABLET | Refills: 0 | Status: SHIPPED | OUTPATIENT
Start: 2021-05-11 | End: 2021-05-18

## 2021-05-11 NOTE — TELEPHONE ENCOUNTER
Pt states she has been feeling nauseas last 3 days. States she can keep food down. Pt has been having hiccups. Denies diarrhea or constipation. Pt is able to keep liquids down. No chest pain or burning pain.  Pt is wondering if she could get something for the nauseas

## 2021-05-11 NOTE — TELEPHONE ENCOUNTER
I sent phenergan into her pharmacy for her for the nausea. Have her keep us posted if it continues. Could e a virus she has.

## 2021-05-12 ENCOUNTER — HOSPITAL ENCOUNTER (OUTPATIENT)
Dept: PHYSICAL THERAPY | Age: 86
Setting detail: THERAPIES SERIES
Discharge: HOME | End: 2021-05-12
Payer: MEDICARE

## 2021-05-12 PROCEDURE — 97162 PT EVAL MOD COMPLEX 30 MIN: CPT

## 2021-05-12 NOTE — DISCHARGE SUMMARY
last week. Reports also has a cough and nasal drip. Did go to doctor last week and was given some medication, but does not feel like the medication has helped. Patient reports she is most dizzy in the morning and improves as the day progresses. Aldo Castellanos reports she needs to come to therapy first before she gets referred to ENT. SUBJECTIVE: Aldo Castellanos presents to therapy today with some dizziness. No room spinning, no dizziness when laying down. Reports she has had dizziness in the past, but it returned last week. Reports also has a cough, nasal drip, headaches, and nausea. Did go to doctor last week and was given some medication, but does not feel like the medication has helped. Aldo Castellanos reports she needs to come to therapy first before she gets referred to ENT. Patient reports she is most dizzy in the morning and improves as the day progresses. When she lays down in bed, her cough gets worse. Social/Functional History and Current Status:  Medications and Allergies have been reviewed and are listed on Medical History Questionnaire. Zuleika Abarca lives son in a single story home with a level surface to enter. Task Previous Current   ADLs  Independent Modified Independent   IADL's Independent Modified Independent   Ambulation Independent Independent   Transfers Independent Modified Independent   Recreation Independent Assistance Required   Community Integration Independent Assistance Required   Driving Active  Active    Work Retired  Retired     At this time in evaluation, patient reported she would rather stay for remainder of evaluation. She does not feel well with cough, headache, and nausea. Reports she has been here for the same vestibular issues in the past (was in therapy in June 2020). Did not complete any vestibular testing today, evaluation only, patient was not reporting dizziness with laying down in bed or getting up from bed.   Her cough and nausea seems to be her most

## 2021-05-15 DIAGNOSIS — E11.9 TYPE 2 DIABETES MELLITUS WITHOUT COMPLICATION, WITHOUT LONG-TERM CURRENT USE OF INSULIN (HCC): ICD-10-CM

## 2021-05-17 ENCOUNTER — TELEPHONE (OUTPATIENT)
Dept: FAMILY MEDICINE CLINIC | Age: 86
End: 2021-05-17

## 2021-05-17 DIAGNOSIS — E11.9 TYPE 2 DIABETES MELLITUS WITHOUT COMPLICATION, WITHOUT LONG-TERM CURRENT USE OF INSULIN (HCC): ICD-10-CM

## 2021-05-17 DIAGNOSIS — I48.20 CHRONIC ATRIAL FIBRILLATION (HCC): Primary | ICD-10-CM

## 2021-05-17 NOTE — TELEPHONE ENCOUNTER
Recent Visits  Date Type Provider Dept   05/05/21 Office Visit JOSH Beauchamp - CNP [facility]   01/06/21 Office Visit JOSH Beauchamp CNP [facility]   07/06/20 Office Visit JOSH Beauchamp - CNP [facility]   02/11/20 Office Visit JOSH Beauchamp CNP [facility]   01/06/20 Office Visit JOSH Beauchamp CNP [facility]   12/19/19 Office Visit Karson DimitriJOSH rey - CNP [facility]   Showing recent visits within past 540 days with a meds authorizing provider and meeting all other requirements  Future Appointments  Date Type Provider Dept   07/06/21 Appointment JOSH Beauchamp CNP [facility]   Showing future appointments within next 150 days with a meds authorizing provider and meeting all other requirements    Future Appointments   Date Time Provider Brandy Mederos   6/2/2021 11:00 AM Berna Cockayne, [address] [facility] [place]   7/6/2021  1:00 PM Karson Toro, APRN - [address] [place] [address] [address] [facility] [email or web address]   7/19/2021  1:00 PM Delfina Villafana RD, LD [facility] [place]   8/19/2021  3:15 PM MD JASBIR Moctezuma [facility] [address] [place] [address]

## 2021-05-17 NOTE — TELEPHONE ENCOUNTER
----- Message from Kenisha Lopez RN sent at 5/17/2021 11:09 AM EDT -----  Current referral for Anticoagulation Services expires in less than 30 days. Please enter new electronic referral - can now accept from PAs/CNPs. Thanks, FANTA Parkinson RN BSN, [facility]

## 2021-05-24 ENCOUNTER — TELEPHONE (OUTPATIENT)
Dept: INTERNAL MEDICINE CLINIC | Age: 86
End: 2021-05-24

## 2021-05-24 NOTE — TELEPHONE ENCOUNTER
Leeroy Lowe called and left a message concerned about her FBS of 165mg/dL. States she had drank coffee with splenda before taking the reading. RD returned phone call and advised Leeroy Lowe to eat breakfast and don't check her blood sugar for two hours with a goal of less then 180 mg/dL. Also encouraged increasing her activity level (walk) and water intake. Advised Leeroy Lowe not to be alarmed and to call back if fasting blood sugar continues to be elevated over three days or if it above 250s for three days or any other concern. Leeroy Lowe appreciative of phone call.

## 2021-06-01 ENCOUNTER — OFFICE VISIT (OUTPATIENT)
Dept: FAMILY MEDICINE CLINIC | Age: 86
End: 2021-06-01
Payer: MEDICARE

## 2021-06-01 VITALS
RESPIRATION RATE: 16 BRPM | DIASTOLIC BLOOD PRESSURE: 60 MMHG | BODY MASS INDEX: 29.35 KG/M2 | SYSTOLIC BLOOD PRESSURE: 116 MMHG | WEIGHT: 187 LBS | HEART RATE: 88 BPM | HEIGHT: 67 IN | TEMPERATURE: 97.9 F

## 2021-06-01 DIAGNOSIS — J30.89 NON-SEASONAL ALLERGIC RHINITIS DUE TO OTHER ALLERGIC TRIGGER: ICD-10-CM

## 2021-06-01 DIAGNOSIS — R05.9 COUGH: ICD-10-CM

## 2021-06-01 DIAGNOSIS — R06.7 SNEEZING: Primary | ICD-10-CM

## 2021-06-01 PROCEDURE — 99213 OFFICE O/P EST LOW 20 MIN: CPT | Performed by: NURSE PRACTITIONER

## 2021-06-01 RX ORDER — MONTELUKAST SODIUM 10 MG/1
10 TABLET ORAL DAILY
Qty: 90 TABLET | Refills: 4 | Status: SHIPPED | OUTPATIENT
Start: 2021-06-01 | End: 2021-08-19

## 2021-06-01 NOTE — PROGRESS NOTES
Oly Avila is a 82 y.o. female that presents for   Chief Complaint   Patient presents with    Cough     sneezing nose dripping    Gastroesophageal Reflux     pills helped that         Allergies    HPI:      Type of Home:  single family home  Bedroom Floors: has some rugs in the house  Basement:unsure if a basement  Pets:none  Bedding:cotton  Smoker: non-smoker  Other smokers in the home:No  Occupation: retired  Exposures: not that she knows of, does have bad allergies sees an allergist was receiving allergy shots twice a month and they recently increased them to weekly becuase pt has had an ongoing issue with cough and nasal drianage and sneezing. Is on allegra dn flonase is to be on singulair but has not been taking it. Denies wheezing or sob. Physical Exam    /60   Pulse 88   Temp 97.9 °F (36.6 °C)   Resp 16   Ht 5' 6.5\" (1.689 m)   Wt 187 lb (84.8 kg)   BMI 29.73 kg/m²   Appearance: alert, well appearing, and in no distress, normal appearing weight and well hydrated. Eye exam - conjunctiva wnl b/l  Nasal exam - normal and patent, no erythema, discharge or polyps. Oropharyngeal exam - mucous membranes moist, pharynx normal without lesions. Neck exam - supple, no significant adenopathy. CVS exam: normal rate, regular rhythm, normal S1, S2, no murmurs, rubs, clicks or gallops. Lungs: clear to auscultation, no wheezes, rales or rhonchi, symmetric air entry. Skin exam - normal coloration and turgor, no rashes, no suspicious skin lesions noted. ASSESSMENT & PLAN  Skylar Potts was seen today for cough and gastroesophageal reflux. Diagnoses and all orders for this visit:    Sneezing  Restart singulair  Continue flonase and allegra  Continue allergy shots  Cough  -     montelukast (SINGULAIR) 10 MG tablet; Take 1 tablet by mouth daily  As above   Non-seasonal allergic rhinitis due to other allergic trigger  -     montelukast (SINGULAIR) 10 MG tablet;  Take 1 tablet by mouth daily    As above worsening due to being out of sinugulair    Return if symptoms worsen or fail to improve.

## 2021-06-02 ENCOUNTER — HOSPITAL ENCOUNTER (OUTPATIENT)
Dept: PHARMACY | Age: 86
Setting detail: THERAPIES SERIES
Discharge: HOME | End: 2021-06-02
Payer: MEDICARE

## 2021-06-02 DIAGNOSIS — I48.11 LONGSTANDING PERSISTENT ATRIAL FIBRILLATION (HCC): Primary | ICD-10-CM

## 2021-06-02 DIAGNOSIS — Z79.01 ANTICOAGULATED ON COUMADIN: ICD-10-CM

## 2021-06-02 DIAGNOSIS — Z51.81 ENCOUNTER FOR THERAPEUTIC DRUG MONITORING: ICD-10-CM

## 2021-06-02 LAB
GFR SERPL CREATININE-BSD FRML MDRD: 83 ML/MIN/1.73M2
POC INR: 2.5 (ref 0.8–1.2)

## 2021-06-02 PROCEDURE — 99211 OFF/OP EST MAY X REQ PHY/QHP: CPT | Performed by: PHARMACIST

## 2021-06-02 PROCEDURE — 85610 PROTHROMBIN TIME: CPT | Performed by: PHARMACIST

## 2021-06-02 PROCEDURE — 36416 COLLJ CAPILLARY BLOOD SPEC: CPT | Performed by: PHARMACIST

## 2021-06-02 NOTE — PROGRESS NOTES
Medication Management [address]  [phone] (phone)  [phone] (fax)    Ms. Jenise Martinez is a 82 y.o.  female with history of Afib who presents today for anticoagulation monitoring and adjustment. Patient verifies current dosing regimen and tablet strength. No missed or extra doses. Patient denies s/s bleeding/bruising/swelling/SOB/chest pain  No blood in urine or stool. No dietary changes. No changes in medication/OTC agents/Herbals. No change in alcohol use or tobacco use. No change in activity level. Patient denies headaches/dizziness/lightheadedness/falls. No vomiting/diarrhea or acute illness. No Procedures scheduled in the future at this time. Assessment:   Lab Results   Component Value Date    INR 2.50 (H) 06/02/2021    INR 2.30 (H) 05/05/2021    INR 2.30 (H) 04/07/2021     INR therapeutic   Recent Labs     06/02/21  1126   INR 2.50*         Plan:  Continue Coumadin 5mg daily. Recheck INR in 4 week(s). Patient reminded to call the Anticoagulation Clinic with any signs or symptoms of bleeding or with any medication changes. Patient given instructions utilizing the teach back method. After visit summary printed and reviewed with patient. Discharged ambulatory in no apparent distress.       For Pharmacy Admin Tracking Only     Total # of Interventions Recommended: 0   Total # of Interventions Accepted: 0   Time Spent (min): 20

## 2021-06-17 RX ORDER — MAGNESIUM OXIDE 400 MG/1
TABLET ORAL
Qty: 90 TABLET | Refills: 3 | Status: SHIPPED | OUTPATIENT
Start: 2021-06-17 | End: 2022-01-20

## 2021-06-30 ENCOUNTER — HOSPITAL ENCOUNTER (OUTPATIENT)
Dept: PHARMACY | Age: 86
Setting detail: THERAPIES SERIES
Discharge: HOME | End: 2021-06-30
Payer: MEDICARE

## 2021-06-30 DIAGNOSIS — I48.11 LONGSTANDING PERSISTENT ATRIAL FIBRILLATION (HCC): Primary | ICD-10-CM

## 2021-06-30 DIAGNOSIS — Z51.81 ENCOUNTER FOR THERAPEUTIC DRUG MONITORING: ICD-10-CM

## 2021-06-30 DIAGNOSIS — Z79.01 ANTICOAGULATED ON COUMADIN: ICD-10-CM

## 2021-06-30 LAB — POC INR: 2 (ref 0.8–1.2)

## 2021-06-30 PROCEDURE — 36416 COLLJ CAPILLARY BLOOD SPEC: CPT

## 2021-06-30 PROCEDURE — 99211 OFF/OP EST MAY X REQ PHY/QHP: CPT

## 2021-06-30 PROCEDURE — 85610 PROTHROMBIN TIME: CPT

## 2021-07-05 ENCOUNTER — TELEPHONE (OUTPATIENT)
Dept: FAMILY MEDICINE CLINIC | Age: 86
End: 2021-07-05

## 2021-07-05 NOTE — TELEPHONE ENCOUNTER
----- Message from Jg Pierce sent at 7/2/2021  4:10 PM EDT -----  Subject: Appointment Request    Reason for Call: Routine Existing Condition Follow Up (Diabetes)    QUESTIONS  Type of Appointment? Established Patient  Reason for appointment request? Available appointments did not meet   patient need  Additional Information for Provider? Patient called to leave a message for   Toro Baldwin, she's been suffering with nausea, she's been spitting up   twice a day, having trouble sleeping because of it. Patient is concerned   it may be due to her medication but would like to make sure it's not   something else. Nausea started about 4 or 5 days ago. Patient would like   an appointment but the soonest was 07/14 which was too far out for the   patient. Patient says Dr prescribed nausea medication - promethazine last   time but she keeps throwing it up.   ---------------------------------------------------------------------------  --------------  [address]  What is the best way for the office to contact you? OK to leave message on   voicemail  Preferred Call Back Phone Number? [phone]  ---------------------------------------------------------------------------  --------------  SCRIPT ANSWERS  Relationship to Patient? Self  Appointment reason? Well Care/Follow Ups  Select a Well Care/Follow Ups appointment reason? Adult Existing Condition   Follow Up [Diabetes, CHF, COPD, Hypertension/Blood Pressure Check]  (Is the patient requesting to be seen urgently for their symptoms?)? No  Is this follow up request related to routine Diabetes Management? Yes  Are you having any new concerns about your existing condition? Yes  Have you been diagnosed with, awaiting test results for, or told that you   are suspected of having COVID-19 (Coronavirus)? (If patient has tested   negative or was tested as a requirement for work, school, or travel and   not based on symptoms, answer no)?  No  Do you currently have flu-like symptoms including fever or chills, cough,   shortness of breath, difficulty breathing, or new loss of taste or smell? No  Have you had close contact with someone with COVID-19 in the last 14 days? No  (Service Expert  click yes below to proceed with Sanguine As Usual   Scheduling)?  Yes

## 2021-07-06 NOTE — TELEPHONE ENCOUNTER
Offered video visit to pt. She states she would rather come into the office to be seen. She will come to walk in clinic with the absence of Dimas Watts this week.

## 2021-07-14 ENCOUNTER — OFFICE VISIT (OUTPATIENT)
Dept: FAMILY MEDICINE CLINIC | Age: 86
End: 2021-07-14
Payer: MEDICARE

## 2021-07-14 VITALS
DIASTOLIC BLOOD PRESSURE: 74 MMHG | HEIGHT: 63 IN | BODY MASS INDEX: 33.06 KG/M2 | WEIGHT: 186.6 LBS | HEART RATE: 64 BPM | RESPIRATION RATE: 18 BRPM | SYSTOLIC BLOOD PRESSURE: 122 MMHG

## 2021-07-14 DIAGNOSIS — E11.59 TYPE 2 DIABETES MELLITUS WITH OTHER CIRCULATORY COMPLICATION, WITHOUT LONG-TERM CURRENT USE OF INSULIN (HCC): ICD-10-CM

## 2021-07-14 DIAGNOSIS — I48.11 LONGSTANDING PERSISTENT ATRIAL FIBRILLATION (HCC): ICD-10-CM

## 2021-07-14 DIAGNOSIS — R11.0 NAUSEA: Primary | ICD-10-CM

## 2021-07-14 PROCEDURE — 99214 OFFICE O/P EST MOD 30 MIN: CPT | Performed by: NURSE PRACTITIONER

## 2021-07-14 RX ORDER — OMEPRAZOLE 40 MG/1
40 CAPSULE, DELAYED RELEASE ORAL DAILY
COMMUNITY
End: 2021-09-14 | Stop reason: SDUPTHER

## 2021-07-14 SDOH — ECONOMIC STABILITY: FOOD INSECURITY: WITHIN THE PAST 12 MONTHS, THE FOOD YOU BOUGHT JUST DIDN'T LAST AND YOU DIDN'T HAVE MONEY TO GET MORE.: NEVER TRUE

## 2021-07-14 SDOH — ECONOMIC STABILITY: FOOD INSECURITY: WITHIN THE PAST 12 MONTHS, YOU WORRIED THAT YOUR FOOD WOULD RUN OUT BEFORE YOU GOT MONEY TO BUY MORE.: NEVER TRUE

## 2021-07-14 ASSESSMENT — ENCOUNTER SYMPTOMS
VOMITING: 0
CONSTIPATION: 0
NAUSEA: 1
RESPIRATORY NEGATIVE: 1
DIARRHEA: 0

## 2021-07-14 ASSESSMENT — SOCIAL DETERMINANTS OF HEALTH (SDOH): HOW HARD IS IT FOR YOU TO PAY FOR THE VERY BASICS LIKE FOOD, HOUSING, MEDICAL CARE, AND HEATING?: NOT HARD AT ALL

## 2021-07-14 NOTE — PROGRESS NOTES
[facility] MEDICINE  [address] [place] [facility] [place] [address]  Dept: [phone]  Dept Fax: [phone]  Loc: [phone]    Preet Sewell is a 82 y.o. femalewho presents today for her medical conditions/complaints as noted below. Comfort Ramirez c/o of 6 Month Follow-Up and Discuss Medications (Patient states she has been experiencing nausea and headaches for the past 2 weeks and wants to discuss medications to see if they can be causing these issues.)      HPI:      Diabetes Type 2    Glucose control:   Does patient check blood glucoses at home? Yes  Report of hypoglycemia: no  Lab Results       Component                Value               Date                       LABA1C                   5.7                 01/06/2021            No results found for: EAG    Symptoms  Polyuria, Polydipsia or Polyphagia? No  Chest Pain, SOB, or Palpitations? -  No  New Vision complaints? No  Paresthesias of the extremities? No    Medications  Current medication were reviewed. Compliant with medications? yes  Medication side effects? No  On ACE-I or ARB? Yes  On antiplatelet therapy? No  On Statin? Yes    Last Diabetic Eye Exam: within the last year    Exercise  Exercise? No  Wt Readings from Last 3 Encounters:  07/14/21 : 186 lb 9.6 oz (84.6 kg)  06/01/21 : 187 lb (84.8 kg)  05/05/21 : 189 lb 6.4 oz (85.9 kg)      Diet discipline?:  Low salt, fat, sugar diet?   yes    Blood pressure control:  BP Readings from Last 3 Encounters:  07/14/21 : 122/74  06/01/21 : 116/60  05/05/21 : 132/77      No results found for: Pia Solid    Lab Results       Component                Value               Date                       LDLCALC                  64                  09/10/2020                Pt here for a 6 month check up, pt states her dizziness has resolved, she is feeling better this way but has had some nausea with spitting up for the last 2 months, has tired phenergan and zofran for this but it has not helped. She has also tried prilosec without any improvment. Garth any abdominal pain or diarrhea, question if an ulcer, advise a Gi referral pt will think about it States she does not have it everyday. Has a long standing history of atrial fibrillation and is on chronic anticoagulation through cardiology she denies chest pain or heart palpitations or sob or dizziness. Current Outpatient Medications   Medication Sig Dispense Refill    omeprazole (PRILOSEC) 40 MG delayed release capsule Take 40 mg by mouth daily      OneTouch Delica Lancets 30L MISC TEST 2 TIMES DAILY 180 each 4    metFORMIN (GLUCOPHAGE) 1000 MG tablet Take 1 tablet by mouth 2 times daily (with meals) 180 tablet 2    amLODIPine (NORVASC) 10 MG tablet TAKE 1 TABLET BY MOUTH EVERY DAY 90 tablet 3    spironolactone-hydroCHLOROthiazide (ALDACTAZIDE) 25-25 MG per tablet TAKE 1 TABLET BY MOUTH EVERY DAY 30 tablet 3    blood glucose test strips (ONETOUCH ULTRA) strip TEST 2 TIMES DAILY 100 strip 3    metoprolol tartrate (LOPRESSOR) 25 MG tablet TAKE 1 TABLET BY MOUTH TWICE A  tablet 3    warfarin (COUMADIN) 5 MG tablet TAKE AS DIRECTED BY [facility] COUMAD[facility] #30 TABS = 30 DAY SUPPLY 90 tablet 3    fexofenadine (ALLEGRA) 180 MG tablet TAKE 1 TABLET BY MOUTH EVERY DAY IN THE MORNING 30 tablet 2    blood glucose monitor kit and supplies Test one time a day . Dispense 30 strips a month with 5 refills Please dispense meter and suppliesthat insurance covers . 1 kit 0    UNABLE TO FIND Allergy shot every Friday at allergist's office.       magnesium oxide (MAG-OX) 400 MG tablet TAKE 1 TABLET BY MOUTH EVERY DAY (Patient not taking: Reported on 7/14/2021) 90 tablet 3    montelukast (SINGULAIR) 10 MG tablet Take 1 tablet by mouth daily (Patient not taking: Reported on 7/14/2021) 90 tablet 4    fluticasone (FLONASE) 50 MCG/ACT nasal spray 1 spray by Each Nostril route daily (Patient not taking: Reported on 2021) 2 Bottle 1     No current facility-administered medications for this visit. Past Medical History:   Diagnosis Date    Atrial fibrillation ([facility]     Diabetes mellitus ([facility]     Hypertension       History reviewed. No pertinent surgical history. Family History   Problem Relation Age of Onset    Cancer Mother         breast cancer    Diabetes Mother      Social History     Tobacco Use    Smoking status: Former Smoker     Packs/day: 0.50     Types: Cigarettes     Quit date: 1978     Years since quittin.1    Smokeless tobacco: Never Used   Substance Use Topics    Alcohol use: Yes     Alcohol/week: 1.0 standard drinks     Types: 1 Cans of beer per week     Comment: occasional         No Known Allergies    Health Maintenance   Topic Date Due    DTaP/Tdap/Td vaccine (1 - Tdap) 2022 (Originally 1954)    Shingles Vaccine (1 of 2) 2022 (Originally 1987)    Pneumococcal 65+ years Vaccine (1 of 1 - PPSV23) 10/30/2025 (Originally 2002)    Flu vaccine (1) 2021    Annual Wellness Visit (AWV)  2022    Potassium monitoring  2022    Creatinine monitoring  2022    DEXA (modify frequency per FRAX score)  Completed    COVID-19 Vaccine  Completed    Hepatitis A vaccine  Aged Out    Hib vaccine  Aged Out    Meningococcal (ACWY) vaccine  Aged Out       Subjective:      Review of Systems   Constitutional: Negative for chills, fatigue and fever. Respiratory: Negative. Cardiovascular: Negative. Gastrointestinal: Positive for nausea. Negative for constipation, diarrhea and vomiting. Genitourinary: Negative for difficulty urinating and dysuria. Skin: Negative. Neurological: Positive for headaches (off and on takes tylenol which helps ). Negative for dizziness, tremors, facial asymmetry and weakness. Psychiatric/Behavioral: Negative for self-injury, sleep disturbance and suicidal ideas.        Objective: /74 (Site: Right Upper Arm, Position: Sitting, Cuff Size: Medium Adult)   Pulse 64   Resp 18   Ht 5' 2.56\" (1.589 m)   Wt 186 lb 9.6 oz (84.6 kg)   BMI 33.52 kg/m²      Physical Exam  Vitals and nursing note reviewed. Constitutional:       Appearance: She is not ill-appearing. HENT:      Right Ear: Tympanic membrane, ear canal and external ear normal.      Left Ear: Tympanic membrane, ear canal and external ear normal.   Eyes:      Extraocular Movements: Extraocular movements intact. Cardiovascular:      Rate and Rhythm: Normal rate and regular rhythm. Pulses: Normal pulses. Heart sounds: Normal heart sounds. No murmur heard. Pulmonary:      Effort: Pulmonary effort is normal. No respiratory distress. Breath sounds: Normal breath sounds. Abdominal:      General: Abdomen is flat. Bowel sounds are normal.      Palpations: Abdomen is soft. Skin:     General: Skin is warm and dry. Capillary Refill: Capillary refill takes less than 2 seconds. Neurological:      General: No focal deficit present. Mental Status: She is alert and oriented to person, place, and time. Psychiatric:         Mood and Affect: Mood normal.         Behavior: Behavior normal.         Thought Content: Thought content normal.         Judgment: Judgment normal.          Assessment/Plan:           1. Nausea  advised a GI f/u for EGD she declines will monitor   Could be a gastric ulcer. 2. Longstanding persistent atrial fibrillation ([address]  Controlled through cardiology   3. Type 2 diabetes mellitus with other circulatory complication, without long-term current use of insulin (HCC)  Controlled A1C stable  Continue current meds      Pt in agreement with plan   Return in about 6 months (around 1/14/2022) for MAW.     Reccommended tobaccocessation options including pharmacologic methods, counseled great than 3 minutesduring this visit:  Yes[]  No  []       Patient given educational materials -see patient instructions. Discussed use, benefit, and side effects of prescribedmedications. All patient questions answered. Pt voiced understanding. Reviewedhealth maintenance. Instructed to continue current medications, diet and exercise. Patient agreed with treatment plan. Follow up as directed.        Electronicallysigned by JOSH Alvarez CNP on 7/14/2021 at 2:11 PM

## 2021-07-22 RX ORDER — SPIRONOLACTONE AND HYDROCHLOROTHIAZIDE 25; 25 MG/1; MG/1
TABLET ORAL
Qty: 90 TABLET | Refills: 0 | Status: SHIPPED | OUTPATIENT
Start: 2021-07-22 | End: 2021-08-19

## 2021-07-28 ENCOUNTER — HOSPITAL ENCOUNTER (OUTPATIENT)
Dept: PHARMACY | Age: 86
Setting detail: THERAPIES SERIES
Discharge: HOME | End: 2021-07-28
Payer: MEDICARE

## 2021-07-28 DIAGNOSIS — Z79.01 ANTICOAGULATED ON COUMADIN: ICD-10-CM

## 2021-07-28 DIAGNOSIS — Z51.81 ENCOUNTER FOR THERAPEUTIC DRUG MONITORING: ICD-10-CM

## 2021-07-28 DIAGNOSIS — I48.11 LONGSTANDING PERSISTENT ATRIAL FIBRILLATION (HCC): Primary | ICD-10-CM

## 2021-07-28 LAB — POC INR: 2 (ref 0.8–1.2)

## 2021-07-28 PROCEDURE — 36416 COLLJ CAPILLARY BLOOD SPEC: CPT

## 2021-07-28 PROCEDURE — 85610 PROTHROMBIN TIME: CPT

## 2021-07-28 PROCEDURE — 99211 OFF/OP EST MAY X REQ PHY/QHP: CPT

## 2021-07-28 NOTE — PROGRESS NOTES
Medication Management [address]  [phone] (phone)  [phone] (fax)     Ms. Tanya Jonas is a 82 y.o.  female with history of Afib who presents today for anticoagulation monitoring and adjustment. Patient verifies current dosing regimen and tablet strength. No missed or extra doses. Patient denies s/s bleeding/bruising/swelling/SOB/chest pain  No blood in urine or stool. No dietary changes. No changes in medication/OTC agents/Herbals. - Removed fluticasone nasal spray off med list  No change in alcohol use or tobacco use. No change in activity level. Patient denies headaches/dizziness/lightheadedness/falls. No vomiting/diarrhea or acute illness. - nauseous every once in a while  No Procedures scheduled in the future at this time. Assessment:   Lab Results   Component Value Date    INR 2.00 (H) 07/28/2021    INR 2.00 (H) 06/30/2021    INR 2.50 (H) 06/02/2021     INR therapeutic   Recent Labs     07/28/21  1123   INR 2.00*       Plan:  Continue Coumadin 5 mg daily. Recheck INR in 5 week(s). Patient reminded to call the Anticoagulation Clinic with any signs or symptoms of bleeding or with any medication changes. Patient given instructions utilizing the teach back method. After visit summary printed and reviewed with patient. Discharged ambulatory in no apparent distress.     For Pharmacy Admin Tracking Only   Time Spent (min): [address], PharmD, BCPS  7/28/2021  11:45 AM

## 2021-08-19 ENCOUNTER — TELEPHONE (OUTPATIENT)
Dept: FAMILY MEDICINE CLINIC | Age: 86
End: 2021-08-19

## 2021-08-19 ENCOUNTER — OFFICE VISIT (OUTPATIENT)
Dept: CARDIOLOGY CLINIC | Age: 86
End: 2021-08-19
Payer: MEDICARE

## 2021-08-19 ENCOUNTER — HOSPITAL ENCOUNTER (OUTPATIENT)
Age: 86
Discharge: HOME | End: 2021-08-19
Payer: MEDICARE

## 2021-08-19 VITALS
BODY MASS INDEX: 29.7 KG/M2 | DIASTOLIC BLOOD PRESSURE: 78 MMHG | SYSTOLIC BLOOD PRESSURE: 132 MMHG | WEIGHT: 184.8 LBS | HEIGHT: 66 IN | HEART RATE: 79 BPM

## 2021-08-19 DIAGNOSIS — Z79.01 ANTICOAGULATED ON COUMADIN: ICD-10-CM

## 2021-08-19 DIAGNOSIS — R60.0 BILATERAL LEG EDEMA: ICD-10-CM

## 2021-08-19 DIAGNOSIS — I10 ESSENTIAL HYPERTENSION: ICD-10-CM

## 2021-08-19 DIAGNOSIS — I48.21 ATRIAL FIBRILLATION, PERMANENT (HCC): ICD-10-CM

## 2021-08-19 DIAGNOSIS — I48.21 ATRIAL FIBRILLATION, PERMANENT (HCC): Primary | ICD-10-CM

## 2021-08-19 DIAGNOSIS — E11.59 TYPE 2 DIABETES MELLITUS WITH OTHER CIRCULATORY COMPLICATION, WITHOUT LONG-TERM CURRENT USE OF INSULIN (HCC): ICD-10-CM

## 2021-08-19 LAB
HCT VFR BLD CALC: 37.7 % (ref 37–47)
HEMOGLOBIN: 11.6 GM/DL (ref 12–16)

## 2021-08-19 PROCEDURE — 85014 HEMATOCRIT: CPT

## 2021-08-19 PROCEDURE — 99213 OFFICE O/P EST LOW 20 MIN: CPT | Performed by: INTERNAL MEDICINE

## 2021-08-19 PROCEDURE — 85018 HEMOGLOBIN: CPT

## 2021-08-19 PROCEDURE — 93000 ELECTROCARDIOGRAM COMPLETE: CPT | Performed by: INTERNAL MEDICINE

## 2021-08-19 PROCEDURE — 36415 COLL VENOUS BLD VENIPUNCTURE: CPT

## 2021-08-19 RX ORDER — BLOOD SUGAR DIAGNOSTIC
STRIP MISCELLANEOUS
Qty: 100 STRIP | Refills: 3 | Status: SHIPPED | OUTPATIENT
Start: 2021-08-19 | End: 2022-02-22 | Stop reason: SDUPTHER

## 2021-08-19 RX ORDER — SPIRONOLACTONE AND HYDROCHLOROTHIAZIDE 25; 25 MG/1; MG/1
TABLET ORAL
Qty: 90 TABLET | Refills: 0 | Status: SHIPPED | OUTPATIENT
Start: 2021-08-19 | End: 2021-10-28 | Stop reason: SDUPTHER

## 2021-08-19 RX ORDER — MECLIZINE HCL 12.5 MG/1
12.5 TABLET ORAL 3 TIMES DAILY PRN
Qty: 60 TABLET | Refills: 0 | Status: SHIPPED | OUTPATIENT
Start: 2021-08-19 | End: 2021-10-07

## 2021-08-19 RX ORDER — POTASSIUM CHLORIDE 750 MG/1
10 TABLET, EXTENDED RELEASE ORAL DAILY
Qty: 30 TABLET | Refills: 7 | Status: SHIPPED | OUTPATIENT
Start: 2021-08-19 | End: 2022-01-07

## 2021-08-19 NOTE — TELEPHONE ENCOUNTER
Recent Visits  Date Type Provider Dept   07/14/21 Office Visit Corinne Raja, JOSH - CNP [facility]   06/01/21 Office Visit Corinne Linnea, APRN - CNP [facility]   05/05/21 Office Visit Corinne Raja, APRN - CNP [facility]   01/06/21 Office Visit Corinne Raja, APRN - CNP [facility]   07/06/20 Office Visit Corinne Raja, JOSH - CNP [facility]   Showing recent visits within past 540 days with a meds authorizing provider and meeting all other requirements  Future Appointments  No visits were found meeting these conditions.   Showing future appointments within next 150 days with a meds authorizing provider and meeting all other requirements      Future Appointments   Date Time Provider Brandy Mederos   8/19/2021  3:15 PM Tristen Mcfarlane MD N [facility] [place]   8/23/2021  1:00 PM Ev Kraus RD, LD [facility] [place]   9/1/2021 11:00 AM VIOLET Montalvo [facility] [place]   1/17/2022  2:20 PM Corinne Raja, APRN - [address]

## 2021-08-19 NOTE — PROGRESS NOTES
Chief Complaint   Patient presents with    Follow-up    Atrial Fibrillation   Originally  patient here for atrial fib, Arina Knee referral.  Known to have atr fib for 5 yr   Has moved to [place] from [place] [address]  On coumadin      4 month fu    EKG today      Denied cp, dizziness  Or palpitations    Leg edema better    Hx of chronic leg edema at the end of the day    Sob on exertion chronic    Nonsmoker    FHX    Mother had Heart problem and CVA        Patient Active Problem List   Diagnosis    [facility] persistent [facility] ([facility]    Anticoagulated on Coumadin    Diabetes mellitus ([facility]    Hypertension    Atrial fibrillation, permanent ([facility]    Bilateral leg edema +1    Encounter for therapeutic drug monitoring       History reviewed. No pertinent surgical history. No Known Allergies     Family History   Problem Relation Age of Onset    Cancer Mother         breast cancer    Diabetes Mother         Social History     Socioeconomic History    Marital status:      Spouse name: Not on file    Number of children: Not on file    Years of education: Not on file    Highest education level: Not on file   Occupational History    Not on file   Tobacco Use    Smoking status: Former Smoker     Packs/day: 0.50     Types: Cigarettes     Quit date: 1978     Years since quittin.2    Smokeless tobacco: Never Used   Vaping Use    Vaping Use: Never used   Substance and Sexual Activity    Alcohol use:  Yes     Alcohol/week: 1.0 standard drinks     Types: 1 Cans of beer per week     Comment: occasional     Drug use: No    Sexual activity: Not on file   Other Topics Concern    Not on file   Social History Narrative    Reji Sol has good family support    Son assists with transportation to [facility]    No barriers with medication affordability    Following up with Diabetic Clinic     Social Determinants of Health     Financial Resource Strain: Low Risk     Difficulty of Paying Living Expenses: Not hard at all   Food Insecurity: No Food Insecurity    Worried About [facility] in the Last Year: Never true    Rosita of Food in the Last Year: Never true   Transportation Needs:     Lack of Transportation (Medical):      Lack of Transportation (Non-Medical):    Physical Activity:     Days of Exercise per Week:     Minutes of Exercise per Session:    Stress:     Feeling of Stress :    Social Connections:     Frequency of Communication with Friends and Family:     Frequency of Social Gatherings with Friends and Family:     Attends Mandaeism Services:     Active Member of Clubs or Organizations:     Attends Club or Organization Meetings:     Marital Status:    Intimate Partner Violence:     Fear of Current or Ex-Partner:     Emotionally Abused:     Physically Abused:     Sexually Abused:        Current Outpatient Medications   Medication Sig Dispense Refill    spironolactone-hydroCHLOROthiazide (ALDACTAZIDE) 25-25 MG per tablet TAKE 1 TABLET BY MOUTH EVERY DAY 90 tablet 0    meclizine (ANTIVERT) 12.5 MG tablet TAKE 1 TABLET BY MOUTH 3 TIMES DAILY AS NEEDED FOR DIZZINESS OR NAUSEA 60 tablet 0    blood glucose test strips (ONETOUCH ULTRA) strip TEST 2 TIMES DAILY 100 strip 3    omeprazole (PRILOSEC) 40 MG delayed release capsule Take 40 mg by mouth daily      magnesium oxide (MAG-OX) 400 MG tablet TAKE 1 TABLET BY MOUTH EVERY DAY 90 tablet 3    OneTouch Delica Lancets 08M MISC TEST 2 TIMES DAILY 180 each 4    metFORMIN (GLUCOPHAGE) 1000 MG tablet Take 1 tablet by mouth 2 times daily (with meals) 180 tablet 2    amLODIPine (NORVASC) 10 MG tablet TAKE 1 TABLET BY MOUTH EVERY DAY 90 tablet 3    metoprolol tartrate (LOPRESSOR) 25 MG tablet TAKE 1 TABLET BY MOUTH TWICE A  tablet 3    warfarin (COUMADIN) 5 MG tablet TAKE AS DIRECTED BY [facility] TABS = 30 DAY SUPPLY 90 tablet 3    fexofenadine (ALLEGRA) 180 MG tablet TAKE 1 TABLET BY MOUTH EVERY DAY IN THE MORNING 30 tablet 2    blood glucose monitor kit and supplies Test one time a day . Dispense 30 strips a month with 5 refills Please dispense meter and suppliesthat insurance covers . 1 kit 0    UNABLE TO FIND Allergy shot every Friday at allergist's office. No current facility-administered medications for this visit. Review of Systems -     General ROS: negative  Psychological ROS: negative  Hematological and Lymphatic ROS: No history of blood clots or bleeding disorder. Respiratory ROS: no cough,  or wheezing, the rest see HPI  Cardiovascular ROS: See HPI  Gastrointestinal ROS: negative  Genito-Urinary ROS: no dysuria, trouble voiding, or hematuria  Musculoskeletal ROS: negative  Neurological ROS: no TIA or stroke symptoms  Dermatological ROS: negative      Blood pressure 132/78, pulse 79, height 5' 6\" (1.676 m), weight 184 lb 12.8 oz (83.8 kg). Physical Examination:    General appearance - alert, well appearing, and in no distress  HEENT- Pink conjunctiva  , Non-icteri sclera,PERRLA  Mental status - alert, oriented to person, place, and time  Neck - supple, no significant adenopathy, no JVD, or carotid bruits  Chest - clear to auscultation, no wheezes, rales or rhonchi, symmetric air entry  Heart - normal rate, regular rhythm, normal S1, S2, no murmurs, rubs, clicks or gallops  Abdomen - soft, nontender, nondistended, no masses or organomegaly  WOLFGANG- no CVA or flank tenderness, no suprapubic tenderness  Neurological - alert, oriented, normal speech, no focal findings or movement disorder noted  Musculoskeletal/limbs - no joint tenderness, deformity or swelling   - peripheral pulses normal, no pedal edema, no clubbing or cyanosis  Skin - normal coloration and turgor, no rashes, no suspicious skin lesions noted  Psych- appropriate mood and affect    Lab  No results for input(s): CKTOTAL, CKMB, CKMBINDEX, TROPONINI in the last 72 hours.   CBC:   Lab Results   Component Value Date    WBC 5.2 07/02/2020    RBC 4.81 07/02/2020    HGB 11.5 01/26/2021    HCT 36.4 01/26/2021    MCV 73.6 07/02/2020    MCH 22.9 07/02/2020    MCHC 31.1 07/02/2020    RDW 14.9 06/19/2018     07/02/2020    MPV 9.5 07/02/2020     BMP:    Lab Results   Component Value Date     05/05/2021    K 3.5 05/05/2021    CL 95 05/05/2021    CO2 29 05/05/2021    BUN 16 05/05/2021    LABALBU 4.1 09/10/2020    CREATININE 0.8 05/05/2021    CALCIUM 10.8 05/05/2021    LABGLOM 83 05/05/2021    GLUCOSE 115 05/05/2021     Hepatic Function Panel:    Lab Results   Component Value Date    ALKPHOS 96 09/10/2020    ALT 15 09/10/2020    AST 18 09/10/2020    PROT 7.5 09/10/2020    BILITOT 0.5 09/10/2020    BILIDIR <0.2 09/10/2020    LABALBU 4.1 09/10/2020     Magnesium:    Lab Results   Component Value Date    MG 1.6 05/05/2021     Warfarin PT/INR:  No components found for: PTPATWAR, PTINRWAR  HgBA1c:    Lab Results   Component Value Date    LABA1C 5.7 01/06/2021    LABA1C 7.8 02/25/2020     FLP:    Lab Results   Component Value Date    TRIG 133 09/10/2020    HDL 57 09/10/2020    LDLCALC 64 09/10/2020     TSH:  No results found for: TSH      ekg 7/6/2020  Atrial fibrillation  -irregular conduction  CVR 84 BPM  -Nonspecific ST depression   +   Diffuse nonspecific T-abnormality  -Nondiagnostic. ABNORMAL     Echo  Conclusions      Summary   Normal left ventricle size and systolic function. Ejection fraction was estimated at 60 to 65 %. There were no regional left ventricular wall motion abnormalities and wall   thickness was within normal limits. The left atrium is Moderately dilated. Moderately enlarged right atrium size. Mildly enlarged right ventricle cavity.    Right ventricle global systolic function is normal .      Signature      ----------------------------------------------------------------   Electronically signed by Saurabh Agudelo MD (Interpreting   physician) on 10/09/2020 at 06:49 PM ----------------------------------------------------------------        Conclusions      Summary   Lexiscan EKG stress test is not suggestive for ischemia. The nuclear images is not suggestive for myocardial ischemia. Signatures    ----------------------------------------------------------------   Electronically signed by Mark Tirado MD (Interpreting   Cardiologist) on 10/09/2020 at 17:57   ------------------------------------------------------    ekg 8/19/21  Atrial fibrillation   Low voltage in precordial leads.    -Old anterior infarct.    -  Nonspecific T-abnormality. ABNORMAL     Assessment     Diagnosis Orders   1. Atrial fibrillation, permanent ([address]     2. Essential hypertension     3. Bilateral leg edema +1       chadsvasc= 5    Plan     The most   current meds and labs reviewed    Hx of allergy   Has been getting allergy shot       Atr fib chronic  Cont rate control  Cont coumadin  INR 2.8  option of DOAC given pat want to cont coumadin    Hypertension, on medical treatment. Seems to be under good control. Patient is compliant with medical treatment. Cont  norvasc to 10  Mg po qd  Leg edema B/L +1 better   Cont  aldactazide 25/25 1 tab po qd  BMP and MG next time    Echo and lexisc- WNL   BMP and MG- reviewed  Mg 1.6  Cont mg oxide    D/w the pat the plan of care and result of test    Hx of K 3.5 remain low despite high k diet  Advised high K diet tomato, banana, avocado,orange potatoe  Add kcl 10 meq po qd    No follow-up provider specified.       RTC in 5  Month      Nicki Morgan [facility]

## 2021-08-19 NOTE — TELEPHONE ENCOUNTER
Patient calling requesting the following      Please approve or refuse Rx request:  Requested Prescriptions     Pending Prescriptions Disp Refills    blood glucose test strips (ONETOUCH ULTRA) strip 100 strip 3     Sig: TEST 2 TIMES DAILY       Next appointment:  1/17/2022

## 2021-08-19 NOTE — TELEPHONE ENCOUNTER
blood glucose test strips (ONETOUCH ULTRA) strip on backorder . Need a new RX . Called  Pharmacy - they stated they have  rx ready fr pt to oick up and will notify her its ready.

## 2021-08-23 ENCOUNTER — OFFICE VISIT (OUTPATIENT)
Dept: INTERNAL MEDICINE CLINIC | Age: 86
End: 2021-08-23
Payer: MEDICARE

## 2021-08-23 VITALS — BODY MASS INDEX: 29.86 KG/M2 | WEIGHT: 185 LBS | TEMPERATURE: 97.7 F

## 2021-08-23 DIAGNOSIS — E11.59 TYPE 2 DIABETES MELLITUS WITH OTHER CIRCULATORY COMPLICATION, WITHOUT LONG-TERM CURRENT USE OF INSULIN (HCC): Primary | ICD-10-CM

## 2021-08-23 PROCEDURE — 97803 MED NUTRITION INDIV SUBSEQ: CPT | Performed by: DIETITIAN, REGISTERED

## 2021-08-23 NOTE — PROGRESS NOTES
[address] [place] [address]. [address] [place] [address] Eligio., [facility], [facility] [address]  [phone] (phone)  [phone] (fax)    Patient Name: Rosaura Maravilla. Date of Birth: [id number]. MRN: [id number]      Assessment: Patient is a 82 y.o. female seen for follow-up MNT visit for Diabetes. -Nutritionally relevant labs:   Lab Results   Component Value Date/Time    LABA1C 5.7 01/06/2021 12:52 PM    LABA1C 6.5 (H) 07/06/2020 12:38 PM    LABA1C 7.8 (H) 02/25/2020 12:04 PM    LABA1C 7.4 01/06/2020 11:47 AM    LABA1C 10.3 (H) 09/16/2019 11:35 AM    GLUCOSE 115 (H) 05/05/2021 11:41 AM    GLUCOSE 76 02/18/2021 02:42 PM    CHOL 148 09/10/2020 11:10 AM    HDL 57 09/10/2020 11:10 AM    LDLCALC 64 09/10/2020 11:10 AM    TRIG 133 09/10/2020 11:10 AM     -Blood sugar trends: FBS daily per personal records 104-133mg/dL (one reading 148mg/dl)    -Food recall: Pt states she sometimes eats as she should and then other times does not  Breakfast: balanced breakfast.   Lunch: fruit.    Dinner: sandwich.     -Main Beverages: water  -  Current Outpatient Medications on File Prior to Visit   Medication Sig Dispense Refill    spironolactone-hydroCHLOROthiazide (ALDACTAZIDE) 25-25 MG per tablet TAKE 1 TABLET BY MOUTH EVERY DAY 90 tablet 0    meclizine (ANTIVERT) 12.5 MG tablet TAKE 1 TABLET BY MOUTH 3 TIMES DAILY AS NEEDED FOR DIZZINESS OR NAUSEA 60 tablet 0    blood glucose test strips (ONETOUCH ULTRA) strip TEST 2 TIMES DAILY 100 strip 3    potassium chloride (KLOR-CON M) 10 MEQ extended release tablet Take 1 tablet by mouth daily 30 tablet 7    omeprazole (PRILOSEC) 40 MG delayed release capsule Take 40 mg by mouth daily      magnesium oxide (MAG-OX) 400 MG tablet TAKE 1 TABLET BY MOUTH EVERY DAY 90 tablet 3    OneTouch Delica Lancets 23L MISC TEST 2 TIMES DAILY 180 each 4    metFORMIN (GLUCOPHAGE) 1000 MG tablet Take 1 tablet by mouth 2 times daily (with meals) 180 tablet 2    amLODIPine (NORVASC) 10 MG tablet TAKE 1 TABLET BY MOUTH EVERY DAY 90 tablet 3    metoprolol tartrate (LOPRESSOR) 25 MG tablet TAKE 1 TABLET BY MOUTH TWICE A  tablet 3    warfarin (COUMADIN) 5 MG tablet TAKE AS DIRECTED BY [facility] TABS = 30 DAY SUPPLY 90 tablet 3    fexofenadine (ALLEGRA) 180 MG tablet TAKE 1 TABLET BY MOUTH EVERY DAY IN THE MORNING 30 tablet 2    blood glucose monitor kit and supplies Test one time a day . Dispense 30 strips a month with 5 refills Please dispense meter and suppliesthat insurance covers . 1 kit 0    UNABLE TO FIND Allergy shot every Friday at allergist's office. No current facility-administered medications on file prior to visit. Vitals from current and previous visits:  Temp 97.7 °F (36.5 °C)   Wt 185 lb (83.9 kg)   BMI 29.86 kg/m²     -Body mass index is 29.86 kg/m². 25-29.9 - Overweight. Nutrition Diagnosis:   Overweight/Obesity related to Lack of previous MNT/currently undergoing MNT as evidenced by Patient report of she needs to know what to eat. Intervention:  -Impression: Wilson Boateng states she needs to know what to eat. -Instructed the patient on: Meal Planning for Regular, Balanced Meals & Snacks and Plate Method. Dicussed batch cooking for the week or twice a week. -Handouts given for: plate method, sample meal plans/menus and Healthy meal planing handout.    -  Patient Instructions   1.) Check blood sugar 2 hours after your largest meal of the day. Goal is to have a blood sugar less than 180 mg/dL. Comprehension verified using teachback method. Monitoring/Evaluation:   -Followup visit: 12 weeks with dietitian.   -Receptiveness to education/goals: Agreeable.  -Evaluation of education: Indicates understanding.  -Readiness to change: action - ready to set action plan and implement dietary changes. -Expected compliance: good. Thank you for your referral of this patient.      Total time involved in direct patient education: 30 minutes for follow-up MNT visit.

## 2021-08-23 NOTE — PATIENT INSTRUCTIONS
1.) Check blood sugar 2 hours after your largest meal of the day. Goal is to have a blood sugar less than 180 mg/dL.

## 2021-09-01 ENCOUNTER — HOSPITAL ENCOUNTER (OUTPATIENT)
Dept: PHARMACY | Age: 86
Setting detail: THERAPIES SERIES
Discharge: HOME | End: 2021-09-01
Payer: MEDICARE

## 2021-09-01 DIAGNOSIS — I48.11 LONGSTANDING PERSISTENT ATRIAL FIBRILLATION (HCC): Primary | ICD-10-CM

## 2021-09-01 DIAGNOSIS — Z79.01 ANTICOAGULATED ON COUMADIN: ICD-10-CM

## 2021-09-01 DIAGNOSIS — Z51.81 ENCOUNTER FOR THERAPEUTIC DRUG MONITORING: ICD-10-CM

## 2021-09-01 LAB — POC INR: 1.7 (ref 0.8–1.2)

## 2021-09-01 PROCEDURE — 85610 PROTHROMBIN TIME: CPT

## 2021-09-01 PROCEDURE — 36416 COLLJ CAPILLARY BLOOD SPEC: CPT

## 2021-09-01 PROCEDURE — 99211 OFF/OP EST MAY X REQ PHY/QHP: CPT

## 2021-09-01 NOTE — PROGRESS NOTES
Medication Management [address]  [phone] (phone)  [phone] (fax)    Ms. Bar Sims is a 82 y.o.  female with history of Afib who presents today for anticoagulation monitoring and adjustment. Patient verifies current dosing regimen and tablet strength. No extra doses. Patient missed dose Monday 8/30/21 (5 mg). Patient denies s/s bleeding/bruising/SOB/chest pain. Patient reports baseline ankle swelling. No blood in urine or stool. No dietary changes. No changes in medication/OTC agents/Herbals. No change in alcohol use or tobacco use. No change in activity level. Patient denies headaches/dizziness/lightheadedness/falls. No vomiting/diarrhea or acute illness. No Procedures scheduled in the future at this time. Assessment:   Lab Results   Component Value Date    INR 1.70 (H) 09/01/2021    INR 2.00 (H) 07/28/2021    INR 2.00 (H) 06/30/2021     INR subtherapeutic   Recent Labs     09/01/21  1124   INR 1.70*     Patient presents with subtherapeutic INR today, which is likely due from a recent missed dose. Previously, patient has been stable on current regimen since March 2021. Hct 37.7 and Hgb 11.6 stable when compared to previous results. Plan:  Coumadin 7.5 mg x 1 dose today 9/1/21 then continue Coumadin 5 mg daily. Recheck INR in 4 week(s). Patient reminded to call the Anticoagulation Clinic with any signs or symptoms of bleeding or with any medication changes. Patient given instructions utilizing the teach back method. After visit summary printed and reviewed with patient. Discharged ambulatory in no apparent distress.     For Pharmacy Admin Tracking Only     Intervention Detail: Dose Adjustment: 1, reason: Therapy Optimization   Total # of Interventions Recommended: 1   Total # of Interventions Accepted: 1   Time Spent (min): [address], Anisha, BCPS  9/1/2021  11:31 AM

## 2021-09-14 RX ORDER — OMEPRAZOLE 40 MG/1
40 CAPSULE, DELAYED RELEASE ORAL DAILY
Qty: 90 CAPSULE | Refills: 3 | Status: SHIPPED | OUTPATIENT
Start: 2021-09-14 | End: 2022-04-13 | Stop reason: SDUPTHER

## 2021-09-20 DIAGNOSIS — J30.89 NON-SEASONAL ALLERGIC RHINITIS DUE TO OTHER ALLERGIC TRIGGER: ICD-10-CM

## 2021-09-20 RX ORDER — FEXOFENADINE HCL 180 MG/1
TABLET ORAL
Qty: 90 TABLET | Refills: 3 | Status: SHIPPED | OUTPATIENT
Start: 2021-09-20 | End: 2022-04-27

## 2021-09-29 ENCOUNTER — HOSPITAL ENCOUNTER (OUTPATIENT)
Dept: PHARMACY | Age: 86
Setting detail: THERAPIES SERIES
Discharge: HOME | End: 2021-09-29
Payer: MEDICARE

## 2021-09-29 DIAGNOSIS — Z79.01 ANTICOAGULATED ON COUMADIN: ICD-10-CM

## 2021-09-29 DIAGNOSIS — Z51.81 ENCOUNTER FOR THERAPEUTIC DRUG MONITORING: ICD-10-CM

## 2021-09-29 DIAGNOSIS — I48.11 LONGSTANDING PERSISTENT ATRIAL FIBRILLATION (HCC): Primary | ICD-10-CM

## 2021-09-29 LAB — POC INR: 2.6 (ref 0.8–1.2)

## 2021-09-29 PROCEDURE — 36416 COLLJ CAPILLARY BLOOD SPEC: CPT

## 2021-09-29 PROCEDURE — 85610 PROTHROMBIN TIME: CPT

## 2021-09-29 PROCEDURE — 99211 OFF/OP EST MAY X REQ PHY/QHP: CPT

## 2021-09-29 NOTE — TELEPHONE ENCOUNTER
Called patient to verify appointment time. Patient stated that she never received a call with the appointment information. She said she does not have transportation for today's appointment. She stated that her head hurts and she feels that she may pass out. The patient rescheduled her appointment- audiology and the follow up for 5/26/20. The patient stated that she would like a medication refill of diazepam, she stated that Yue To ordered the medication.  She stated that she is irritated that she does not feel better after having an MRI and does not know why she needs a hearing test. Monica

## 2021-10-04 ENCOUNTER — TELEPHONE (OUTPATIENT)
Dept: FAMILY MEDICINE CLINIC | Age: 86
End: 2021-10-04

## 2021-10-04 NOTE — TELEPHONE ENCOUNTER
----- Message from Donta Georges sent at 10/4/2021 11:07 AM EDT -----  Subject: Appointment Request    Reason for Call: Routine (Patient Request) No Script    QUESTIONS  Type of Appointment? Established Patient  Reason for appointment request? Available appointments did not meet   patient need  Additional Information for Provider? Pt is refusing to do a VV and would   like either a F2F visit or a phone call. Pt does not have the capability   to do a VV. Pt can be reached back at the number below to call and   schedule accordingly. Pt was promoted for a VV due to covid-19 questioner. ---------------------------------------------------------------------------  --------------  Brian PHILLIPS  What is the best way for the office to contact you? OK to leave message on   voicemail  Preferred Call Back Phone Number? [phone]  ---------------------------------------------------------------------------  --------------  SCRIPT ANSWERS  Relationship to Patient? Self  (Is the patient requesting to see the provider for a procedure?)? No  (Is the patient requesting to see the provider urgently  today or   tomorrow. )? No  Have you been diagnosed with, awaiting test results for, or told that you   are suspected of having COVID-19 (Coronavirus)? (If patient has tested   negative or was tested as a requirement for work, school, or travel and   not based on symptoms, answer no)? No  Within the past two weeks have you developed any of the following symptoms   (answer no if symptoms have been present longer than 2 weeks or began   more than 2 weeks ago)? Fever or Chills, Cough, Shortness of breath or   difficulty breathing, Loss of taste or smell, Sore throat, Nasal   congestion, Sneezing or runny nose, Fatigue or generalized body aches   (answer no if pain is specific to a body part e.g. back pain), Diarrhea,   Headache?  Yes

## 2021-10-05 NOTE — TELEPHONE ENCOUNTER
Pt's phone went straight to [facility], Located within [facility] requesting pt to call back at earliest convenience.

## 2021-10-06 NOTE — TELEPHONE ENCOUNTER
Tried [employer], okay per HIPAA, just rang busy. .     Called pt's number [facility] requesting pt to call back at earliest convenience.

## 2021-10-06 NOTE — TELEPHONE ENCOUNTER
Pt scheduled for an office visit on 10/11/21 at 1:20 PM  Pt denied any covid symptoms. Pt mentioned that she does not understand her diabetes and she would like to talk about it.

## 2021-10-07 RX ORDER — MECLIZINE HCL 12.5 MG/1
12.5 TABLET ORAL 3 TIMES DAILY PRN
Qty: 60 TABLET | Refills: 0 | Status: SHIPPED | OUTPATIENT
Start: 2021-10-07

## 2021-10-07 NOTE — TELEPHONE ENCOUNTER
Recent Visits  Date Type Provider Dept   07/14/21 Office Visit Jakub Moran, APRN - CNP [facility]   06/01/21 Office Visit Jakub Moran, APRN - CNP [facility]   05/05/21 Office Visit Jakub Moran, APRN - CNP [facility]   01/06/21 Office Visit Jakub Moran, APRN - CNP [facility]   07/06/20 Office Visit Jakub Moran, APRN - CNP [facility]   Showing recent visits within past 540 days with a meds authorizing provider and meeting all other requirements  Future Appointments  Date Type Provider Dept   10/11/21 Appointment Jakub Moran, APRN - CNP [facility]   01/17/22 Appointment Jakub Moran APRJASBIR - CNP [facility]   Showing future appointments within next 150 days with a meds authorizing provider and meeting all other requirements    Future Appointments   Date Time Provider \[facility] [place] [facility]\""   10/11/2021  1:20 PM Jakub Moran APRN - [id number] [address] [facility] [place]   11/3/2021 11:00 AM Spencer Landers RN [facility] [place]   11/15/2021  1:00 PM Debra Grant, [address],  [facility] [place]   1/17/2022  2:20 PM Jakub Moran APRN - [id number] [address] [facility] [place]   1/20/2022  2:15 PM MD JASBIR Saenz [facility] [place]

## 2021-10-11 ENCOUNTER — OFFICE VISIT (OUTPATIENT)
Dept: FAMILY MEDICINE CLINIC | Age: 86
End: 2021-10-11
Payer: MEDICARE

## 2021-10-11 VITALS
BODY MASS INDEX: 29.65 KG/M2 | RESPIRATION RATE: 16 BRPM | HEIGHT: 66 IN | DIASTOLIC BLOOD PRESSURE: 78 MMHG | OXYGEN SATURATION: 99 % | HEART RATE: 79 BPM | SYSTOLIC BLOOD PRESSURE: 122 MMHG | TEMPERATURE: 98.4 F | WEIGHT: 184.5 LBS

## 2021-10-11 DIAGNOSIS — I10 PRIMARY HYPERTENSION: ICD-10-CM

## 2021-10-11 DIAGNOSIS — R60.0 PEDAL EDEMA: ICD-10-CM

## 2021-10-11 DIAGNOSIS — E11.59 TYPE 2 DIABETES MELLITUS WITH OTHER CIRCULATORY COMPLICATION, WITHOUT LONG-TERM CURRENT USE OF INSULIN (HCC): Primary | ICD-10-CM

## 2021-10-11 LAB — HBA1C MFR BLD: 5.9 % (ref 4.3–5.7)

## 2021-10-11 PROCEDURE — 99214 OFFICE O/P EST MOD 30 MIN: CPT | Performed by: NURSE PRACTITIONER

## 2021-10-11 RX ORDER — ATORVASTATIN CALCIUM 10 MG/1
10 TABLET, FILM COATED ORAL DAILY
Qty: 90 TABLET | Refills: 3 | Status: SHIPPED | OUTPATIENT
Start: 2021-10-11 | End: 2022-01-20 | Stop reason: ALTCHOICE

## 2021-10-11 RX ORDER — ATORVASTATIN CALCIUM 10 MG/1
10 TABLET, FILM COATED ORAL DAILY
Qty: 90 TABLET | Refills: 3 | Status: SHIPPED | OUTPATIENT
Start: 2021-10-11 | End: 2022-04-27 | Stop reason: SDUPTHER

## 2021-10-11 ASSESSMENT — ENCOUNTER SYMPTOMS
RESPIRATORY NEGATIVE: 1
RHINORRHEA: 0
SINUS PAIN: 0

## 2021-10-11 NOTE — PROGRESS NOTES
[facility] MEDICINE  [address] [facility] [address]  Dept: [phone]  Dept Fax: [phone]  Loc: [phone]    Visit Date: 10/11/2021     Paresh Finley is a 82 y.o. female who presents today for:  Chief Complaint   Patient presents with    Diabetes     pt would like to discuss diabetes       HPI:     Pt here to discuss her diabetes. She does see the diabetic center. She is doing well Lab Results       Component                Value               Date                       LABA1C                   5.9 (H)             10/11/2021            No results found for: EAG controlled. We did review diabetes and how it affects the body and her current medication works to decrease her blood sugars. Reassurance given to her that her diabetes is currently controlled. Lab Results       Component                Value               Date                       NA                       137                 05/05/2021                 K                        3.5                 05/05/2021                 CL                       95                  05/05/2021                 CO2                      29                  05/05/2021                 BUN                      16                  05/05/2021                 CREATININE               0.8                 05/05/2021                 GLUCOSE                  115                 05/05/2021                 CALCIUM                  10.8                05/05/2021               current with cardiology, has chronic pedal edema, 1+ today does not wear her nelly hose. Denies any leg pain, meds reviewed. No Known Allergies       Prior to Admission medications    Medication Sig Start Date End Date Taking?  Authorizing Provider   atorvastatin (LIPITOR) 10 MG tablet Take 1 tablet by mouth daily 10/11/21  Yes JOSH Hansen - CNP   atorvastatin (LIPITOR) 10 MG tablet Take 1 tablet by mouth daily 10/11/21  Yes Dimas JOSH Mack CNP   meclizine (ANTIVERT) 12.5 MG tablet TAKE 1 TABLET BY MOUTH 3 TIMES DAILY AS NEEDED FOR DIZZINESS OR NAUSEA 10/7/21  Yes JOSH Kumar CNP   fexofenadine (ALLEGRA) 180 MG tablet TAKE 1 TABLET BY MOUTH EVERY DAY IN THE MORNING 9/20/21  Yes JOSH Kumar CNP   omeprazole (PRILOSEC) 40 MG delayed release capsule Take 1 capsule by mouth daily 9/14/21  Yes JOSH Kumar CNP   spironolactone-hydroCHLOROthiazide (ALDACTAZIDE) 25-25 MG per tablet TAKE 1 TABLET BY MOUTH EVERY DAY 8/19/21  Yes JOSH Knight CNP   blood glucose test strips (ONETOUCH ULTRA) strip TEST 2 TIMES DAILY 8/19/21  Yes JOSH Kumar CNP   potassium chloride (KLOR-CON M) 10 MEQ extended release tablet Take 1 tablet by mouth daily 8/19/21  Yes Edwar Santo MD   magnesium oxide (MAG-OX) 400 MG tablet TAKE 1 TABLET BY MOUTH EVERY DAY 6/17/21  Yes MD Yong Dietzuch Delica Lancets 05C MISC TEST 2 TIMES DAILY 6/4/21  Yes JOSH Kumar CNP   metFORMIN (GLUCOPHAGE) 1000 MG tablet Take 1 tablet by mouth 2 times daily (with meals) 5/17/21  Yes JOSH Kumar CNP   amLODIPine (NORVASC) 10 MG tablet TAKE 1 TABLET BY MOUTH EVERY DAY 5/6/21  Yes JOSH Kumar CNP   metoprolol tartrate (LOPRESSOR) 25 MG tablet TAKE 1 TABLET BY MOUTH TWICE A DAY 1/28/21  Yes JOSH Kumar CNP   warfarin (COUMADIN) 5 MG tablet [facility] TABS = 30 DAY SUPPLY 1/19/21  Yes Jairon Stringer MD   blood glucose monitor kit and supplies Test one time a day . Dispense 30 strips a month with 5 refills Please dispense meter and suppliesthat insurance covers . 9/18/19  Yes JOSH Kumar CNP   UNABLE TO FIND Allergy shot every Friday at allergist's office.    Yes Historical Provider, MD       Health Maintenance   Topic Date Due    Flu vaccine (1) Never done    DTaP/Tdap/Td vaccine (1 - Tdap) [birth year]/06[birth year]2022 (Originally 1/23/1954)   [facility] Shingles Vaccine (1 of 2) 2022 (Originally 1987)    Pneumococcal 65+ years Vaccine (1 of 1 - PPSV23) 10/30/2025 (Originally 2002)   Isatu Guerra Annual Wellness Visit (AWV)  2022    Potassium monitoring  2022    Creatinine monitoring  2022    DEXA (modify frequency per FRAX score)  Completed    COVID-19 Vaccine  Completed    Hepatitis A vaccine  Aged Out    Hib vaccine  Aged Out    Meningococcal (ACWY) vaccine  Aged Out       Subjective:      Review of Systems   Constitutional: Negative for fatigue and fever. HENT: Negative for congestion, postnasal drip, rhinorrhea and sinus pain. Respiratory: Negative. Cardiovascular: Positive for leg swelling. Negative for chest pain and palpitations. Endocrine: Negative for polydipsia, polyphagia and polyuria. Genitourinary: Negative for difficulty urinating and dysuria. Skin: Negative. Neurological: Negative for dizziness, facial asymmetry and headaches. Objective:     /78   Pulse 79   Temp 98.4 °F (36.9 °C) (Oral)   Resp 16   Ht 5' 6\" (1.676 m)   Wt 184 lb 8 oz (83.7 kg)   SpO2 99%   BMI 29.78 kg/m²     Physical Exam  Vitals and nursing note reviewed. Constitutional:       Appearance: She is not ill-appearing. Cardiovascular:      Rate and Rhythm: Normal rate and regular rhythm. Pulses: Normal pulses. Dorsalis pedis pulses are 2+ on the right side and 2+ on the left side. Posterior tibial pulses are 2+ on the right side and 2+ on the left side. Heart sounds: Normal heart sounds. No murmur heard. Pulmonary:      Effort: Pulmonary effort is normal. No respiratory distress. Breath sounds: Normal breath sounds. Musculoskeletal:      Right lower le+ Pitting Edema present. Left lower le+ Pitting Edema present. Skin:     General: Skin is warm and dry. Capillary Refill: Capillary refill takes less than 2 seconds.    Neurological:      Mental Status: She is alert. Lab Results   Component Value Date    LABA1C 5.9 (H) 10/11/2021    LABA1C 5.7 01/06/2021    LABA1C 6.5 (H) 07/06/2020     Lab Results   Component Value Date    LDLCALC 64 09/10/2020    CREATININE 0.8 05/05/2021       Assessment/Plan:     Diabetes Mellitus: well controlled    Issues reviewed withher: low cholesterol diet, weight control and daily exercise discussed, all medications, side effects and compliance discussed carefully, foot care discussed and Podiatry visits discussed and annual eye examinations at Ophthalmology discussed. 1. Type 2 diabetes mellitus with other circulatory complication, without long-term current use of insulin (HCC)  Controlled  Continue with current meds  Pt in understanding of her desease process  - Lipid Panel; Future  - atorvastatin (LIPITOR) 10 MG tablet; Take 1 tablet by mouth daily  Dispense: 90 tablet; Refill: 3  - atorvastatin (LIPITOR) 10 MG tablet; Take 1 tablet by mouth daily  Dispense: 90 tablet; Refill: 3    2. Primary hypertension  controlled  - atorvastatin (LIPITOR) 10 MG tablet; Take 1 tablet by mouth daily  Dispense: 90 tablet; Refill: 3  - atorvastatin (LIPITOR) 10 MG tablet; Take 1 tablet by mouth daily  Dispense: 90 tablet; Refill: 3    3. Pedal edema  wear Donaldo hose at night  Contact cardiology if no better in 3 days    Pt voiced understanding and in agreement with plan       Return if symptoms worsen or fail to improve.     Electronically signed by JOSH Ogden CNP on 10/11/2021 at 1:53 PM

## 2021-10-28 RX ORDER — SPIRONOLACTONE AND HYDROCHLOROTHIAZIDE 25; 25 MG/1; MG/1
TABLET ORAL
Qty: 90 TABLET | Refills: 0 | Status: SHIPPED | OUTPATIENT
Start: 2021-10-28 | End: 2021-11-30

## 2021-11-03 ENCOUNTER — HOSPITAL ENCOUNTER (OUTPATIENT)
Dept: PHARMACY | Age: 86
Setting detail: THERAPIES SERIES
Discharge: HOME | End: 2021-11-03
Payer: MEDICARE

## 2021-11-03 ENCOUNTER — HOSPITAL ENCOUNTER (OUTPATIENT)
Age: 86
Discharge: HOME | End: 2021-11-03
Payer: MEDICARE

## 2021-11-03 DIAGNOSIS — I10 ESSENTIAL HYPERTENSION: ICD-10-CM

## 2021-11-03 DIAGNOSIS — Z79.01 ANTICOAGULATED ON COUMADIN: ICD-10-CM

## 2021-11-03 DIAGNOSIS — I48.11 LONGSTANDING PERSISTENT ATRIAL FIBRILLATION (HCC): Primary | ICD-10-CM

## 2021-11-03 DIAGNOSIS — Z51.81 ENCOUNTER FOR THERAPEUTIC DRUG MONITORING: ICD-10-CM

## 2021-11-03 DIAGNOSIS — R60.0 BILATERAL LEG EDEMA: ICD-10-CM

## 2021-11-03 DIAGNOSIS — I48.21 ATRIAL FIBRILLATION, PERMANENT (HCC): ICD-10-CM

## 2021-11-03 DIAGNOSIS — E11.59 TYPE 2 DIABETES MELLITUS WITH OTHER CIRCULATORY COMPLICATION, WITHOUT LONG-TERM CURRENT USE OF INSULIN (HCC): ICD-10-CM

## 2021-11-03 LAB
ANION GAP SERPL CALCULATED.3IONS-SCNC: 16 MEQ/L (ref 8–16)
BUN BLDV-MCNC: 14 MG/DL (ref 7–22)
CALCIUM SERPL-MCNC: 10.2 MG/DL (ref 8.5–10.5)
CHLORIDE BLD-SCNC: 100 MEQ/L (ref 98–111)
CHOLESTEROL, TOTAL: 105 MG/DL (ref 100–199)
CO2: 24 MEQ/L (ref 23–33)
CREAT SERPL-MCNC: 0.9 MG/DL (ref 0.4–1.2)
GLUCOSE BLD-MCNC: 109 MG/DL (ref 70–108)
HDLC SERPL-MCNC: 59 MG/DL
LDL CHOLESTEROL CALCULATED: 26 MG/DL
MAGNESIUM: 1.3 MG/DL (ref 1.6–2.4)
POC INR: 2.8 (ref 0.8–1.2)
POTASSIUM SERPL-SCNC: 3.9 MEQ/L (ref 3.5–5.2)
SODIUM BLD-SCNC: 140 MEQ/L (ref 135–145)
TRIGL SERPL-MCNC: 98 MG/DL (ref 0–199)

## 2021-11-03 PROCEDURE — 99211 OFF/OP EST MAY X REQ PHY/QHP: CPT

## 2021-11-03 PROCEDURE — 80061 LIPID PANEL: CPT

## 2021-11-03 PROCEDURE — 83735 ASSAY OF MAGNESIUM: CPT

## 2021-11-03 PROCEDURE — 80048 BASIC METABOLIC PNL TOTAL CA: CPT

## 2021-11-03 PROCEDURE — 85610 PROTHROMBIN TIME: CPT

## 2021-11-03 PROCEDURE — 36415 COLL VENOUS BLD VENIPUNCTURE: CPT

## 2021-11-03 PROCEDURE — 36416 COLLJ CAPILLARY BLOOD SPEC: CPT

## 2021-11-04 ENCOUNTER — TELEPHONE (OUTPATIENT)
Dept: FAMILY MEDICINE CLINIC | Age: 86
End: 2021-11-04

## 2021-11-15 ENCOUNTER — OFFICE VISIT (OUTPATIENT)
Dept: INTERNAL MEDICINE CLINIC | Age: 86
End: 2021-11-15
Payer: MEDICARE

## 2021-11-15 VITALS — BODY MASS INDEX: 29.7 KG/M2 | WEIGHT: 184 LBS

## 2021-11-15 DIAGNOSIS — E11.59 TYPE 2 DIABETES MELLITUS WITH OTHER CIRCULATORY COMPLICATION, WITHOUT LONG-TERM CURRENT USE OF INSULIN (HCC): Primary | ICD-10-CM

## 2021-11-15 LAB — GFR SERPL CREATININE-BSD FRML MDRD: 60 ML/MIN/1.73M2

## 2021-11-15 PROCEDURE — 97803 MED NUTRITION INDIV SUBSEQ: CPT | Performed by: DIETITIAN, REGISTERED

## 2021-11-15 NOTE — PROGRESS NOTES
[address] [place] [address]. [address] [place] [address] Elig[place]., [facility], [address]  [phone] (phone)  [phone] (fax)    Patient Name: Brian Black. Date of Birth: [id number]. MRN: [id number]      Assessment: Patient is a 82 y.o. female seen for follow-up MNT visit for diabetes. -Nutritionally relevant labs:   Lab Results   Component Value Date/Time    LABA1C 5.9 (H) 10/11/2021 01:10 PM    LABA1C 5.7 01/06/2021 12:52 PM    LABA1C 6.5 (H) 07/06/2020 12:38 PM    LABA1C 7.8 (H) 02/25/2020 12:04 PM    LABA1C 7.4 01/06/2020 11:47 AM    LABA1C 10.3 (H) 09/16/2019 11:35 AM    GLUCOSE 109 (H) 11/03/2021 11:18 AM    GLUCOSE 115 (H) 05/05/2021 11:41 AM    CHOL 105 11/03/2021 11:18 AM    HDL 59 11/03/2021 11:18 AM    LDLCALC 26 11/03/2021 11:18 AM    TRIG 98 11/03/2021 11:18 AM     -Blood sugar trends: Per pt's personal records FBS: 101-  149 mg/dL and evening (2hrhpp supper) 137- 194 mg/dL. Pt had one reading 204 mg/dL (was drawn at 2 pm b/f lunch and 4 hr pp breakfast). Please see copy of pt personal records under media tab. -Food recall:   Breakfast: Oatmeal/toast, eggs, coffee/8 oz glass of milk. Lunch: Peanut butter sandwich/bologna sandwich, soup (chicken noodle). Dinner: Chicken/pork chops, boiled potato, green vegetable.    Snacks: No snack    -Main Beverages: coffee, water  -  Current Outpatient Medications on File Prior to Visit   Medication Sig Dispense Refill    atorvastatin (LIPITOR) 10 MG tablet Take 1 tablet by mouth daily 90 tablet 3    metFORMIN (GLUCOPHAGE) 1000 MG tablet Take 1 tablet by mouth 2 times daily (with meals) 180 tablet 2    spironolactone-hydroCHLOROthiazide (ALDACTAZIDE) 25-25 MG per tablet TAKE 1 TABLET BY MOUTH EVERY DAY 90 tablet 0    atorvastatin (LIPITOR) 10 MG tablet Take 1 tablet by mouth daily 90 tablet 3    meclizine (ANTIVERT) 12.5 MG tablet TAKE 1 TABLET BY MOUTH 3 TIMES DAILY AS NEEDED FOR DIZZINESS OR NAUSEA (Patient not taking: Reported on 11/3/2021) 60 tablet 0    fexofenadine (ALLEGRA) 180 MG tablet TAKE 1 TABLET BY MOUTH EVERY DAY IN THE MORNING 90 tablet 3    omeprazole (PRILOSEC) 40 MG delayed release capsule Take 1 capsule by mouth daily 90 capsule 3    blood glucose test strips (ONETOUCH ULTRA) strip TEST 2 TIMES DAILY 100 strip 3    potassium chloride (KLOR-CON M) 10 MEQ extended release tablet Take 1 tablet by mouth daily 30 tablet 7    magnesium oxide (MAG-OX) 400 MG tablet TAKE 1 TABLET BY MOUTH EVERY DAY 90 tablet 3    OneTouch Delica Lancets 69U MISC TEST 2 TIMES DAILY 180 each 4    amLODIPine (NORVASC) 10 MG tablet TAKE 1 TABLET BY MOUTH EVERY DAY 90 tablet 3    metoprolol tartrate (LOPRESSOR) 25 MG tablet TAKE 1 TABLET BY MOUTH TWICE A  tablet 3    warfarin (COUMADIN) 5 MG tablet TAKE AS DIRECTED BY [facility] COUMAD[facility] #30 TABS = 30 DAY SUPPLY 90 tablet 3    blood glucose monitor kit and supplies Test one time a day . Dispense 30 strips a month with 5 refills Please dispense meter and suppliesthat insurance covers . 1 kit 0    UNABLE TO FIND Allergy shot every Friday at allergist's office. No current facility-administered medications on file prior to visit. Vitals from current and previous visits: Wt 184 lb (83.5 kg)   BMI 29.70 kg/m²     -Body mass index is 29.7 kg/m². 25-29.9 - Overweight. - Patient's weight down from 190# in April 2021 but only down 1 # from August 2021. Nutrition Diagnosis:   Limited adherence to nutrition-related recommendations related to Lack of social support for implementing changes as evidenced by pt lives with family but seems on her own in meal preparation and cooking. Intervention:  -Impression: Jenny's main concern was having some nausea in the afternoon. During the time of visit pt was experiencing the nausea. Checked blood sugar and it was 133 mg/dL. Pt denies symptoms of hypoglycemia.  Question if this is related to medication or if pt just needs a snack/food.    -Instructed the patient on: discussed how to prepare her oatmeal so it is less then 60 gm carbs at breakfast and to check her blood sugar 2 days per week 2 hr after having oatmeal breakfast related to the 204 mg/dL. -  Patient Instructions   Check blood sugars 2 hours after breakfast (OATMEAL) 1-2 times per week. 3/4 cup of dry oats when making your oatmeal.  Add protein (nuts, eggs); avoid other carbohydrates such as toast or fruit when eating oatmeal for breakfast.      Comprehension verified using teachback method. Monitoring/Evaluation:   -Followup visit: 12 weeks with dietitian.   -Receptiveness to education/goals: Agreeable.  -Evaluation of education: Indicates understanding.  -Readiness to change: action - ready to set action plan and implement dietary goals. -Expected compliance: fair. Thank you for your referral of this patient. Total time involved in direct patient education: 30 minutes for follow-up MNT visit.

## 2021-11-15 NOTE — PATIENT INSTRUCTIONS
Check blood sugars 2 hours after breakfast (OATMEAL) 1-2 times per week.       3/4 cup of dry oats when making your oatmeal.  Add protein (nuts, eggs); avoid other carbohydrates such as toast or fruit when eating oatmeal for breakfast.

## 2021-11-22 ENCOUNTER — TELEPHONE (OUTPATIENT)
Dept: FAMILY MEDICINE CLINIC | Age: 86
End: 2021-11-22

## 2021-11-22 NOTE — TELEPHONE ENCOUNTER
----- Message from Spring Zayas sent at 11/22/2021 12:14 PM EST -----  Subject: Message to Provider    QUESTIONS  Information for Provider? For Dimas Watts Pt. called in regards to get   some questions answered about the booster shot. Wants answers from her   PCP. ([phone])   ---------------------------------------------------------------------------  --------------  [employer] INFO  What is the best way for the office to contact you? OK to leave message on   voicemail  Preferred Call Back Phone Number? [phone]  ---------------------------------------------------------------------------  --------------  SCRIPT ANSWERS  Relationship to Patient?  Self

## 2021-11-30 RX ORDER — SPIRONOLACTONE AND HYDROCHLOROTHIAZIDE 25; 25 MG/1; MG/1
TABLET ORAL
Qty: 90 TABLET | Refills: 0 | Status: SHIPPED | OUTPATIENT
Start: 2021-11-30 | End: 2021-12-03 | Stop reason: SDUPTHER

## 2021-12-03 RX ORDER — SPIRONOLACTONE AND HYDROCHLOROTHIAZIDE 25; 25 MG/1; MG/1
TABLET ORAL
Qty: 90 TABLET | Refills: 1 | Status: SHIPPED | OUTPATIENT
Start: 2021-12-03 | End: 2022-01-07

## 2021-12-08 ENCOUNTER — HOSPITAL ENCOUNTER (OUTPATIENT)
Dept: PHARMACY | Age: 86
Setting detail: THERAPIES SERIES
Discharge: HOME | End: 2021-12-08
Payer: MEDICARE

## 2021-12-08 DIAGNOSIS — Z79.01 ANTICOAGULATED ON COUMADIN: ICD-10-CM

## 2021-12-08 DIAGNOSIS — Z51.81 ENCOUNTER FOR THERAPEUTIC DRUG MONITORING: ICD-10-CM

## 2021-12-08 DIAGNOSIS — I48.11 LONGSTANDING PERSISTENT ATRIAL FIBRILLATION (HCC): Primary | ICD-10-CM

## 2021-12-08 LAB — POC INR: 2.5 (ref 0.8–1.2)

## 2021-12-08 PROCEDURE — 36416 COLLJ CAPILLARY BLOOD SPEC: CPT

## 2021-12-08 PROCEDURE — 85610 PROTHROMBIN TIME: CPT

## 2021-12-08 PROCEDURE — 99211 OFF/OP EST MAY X REQ PHY/QHP: CPT

## 2021-12-08 NOTE — PROGRESS NOTES
Medication Management [address]   [phone] (phone)  [phone] (fax)    Ms. Padma Cast is a 82 y.o.  female with history of persistent atrial fib. , per referral from DANNY Enamorado, who presents today for Warfarin monitoring and adjustment (5 week visit - 1/2 hour late for visit). Patient verifies current dosing regimen and tablet strength. No missed or extra doses. Patient denies  bleeding/bruising/SOB/chest pain. Has usual swelling of legs, left>right. No blood in urine or stool. No dietary changes. No changes in medication/OTC agents/herbals. No change in alcohol use or tobacco use. Change in activity level: slightly increased. Patient denies headaches/dizziness/lightheadedness/falls. No vomiting or acute illness. Takes nothing for usual intermittent diarrhea - blames on foods she eats. No procedures scheduled in the future at this time. Assessment:   Lab Results   Component Value Date    INR 2.50 (H) 12/08/2021    INR 2.80 (H) 11/03/2021    INR 2.60 (H) 09/29/2021     INR therapeutic - goal 2-3. Recent Labs     12/08/21  1143   INR 2.50*       Plan:  POCT INR ordered/performed/result reviewed. Continue Coumadin 5 mg daily PO. Recheck INR in 5 week(s). Patient reminded to call the Anticoagulation Clinic with any signs or symptoms of bleeding or with any medication changes. Patient given instructions utilizing the teach back method. After visit summary printed and reviewed with patient. Discharged ambulatory in no apparent distress, wearing mask.

## 2021-12-13 ENCOUNTER — TELEPHONE (OUTPATIENT)
Dept: INTERNAL MEDICINE CLINIC | Age: 86
End: 2021-12-13

## 2021-12-13 DIAGNOSIS — E11.59 TYPE 2 DIABETES MELLITUS WITH OTHER CIRCULATORY COMPLICATION, WITHOUT LONG-TERM CURRENT USE OF INSULIN (HCC): Primary | ICD-10-CM

## 2021-12-13 NOTE — TELEPHONE ENCOUNTER
ADÁN saw Chet Parisi 11-15-21 and blood sugars were elevated at that time. ADÁN contacted Allywild Parisi 12/13/21 to check on bld sugars. Jeanjennifer Parisi states she switched from checking her bld sugar before breakfast to after breakfast 1-2 hrs. Dec 8th 12:30 am Brunch  3pm  9pm dinner 11pm     Dec 9th  10 am Breakfast 11 am       Dec 10th  9:30 am Breakfast  12:30  Lunch 3pm Dinner 8pm 11pm    Dec 11th  Breakfast 10am 12pm 206      Dec 12th  Breakfast 10am 12 pm 189  Dinner 8pm 11pm      Advised her to continue her records and bring them into her next visit with you Dec 17th. Instructed her to limit carbs to 30 gm in the morning at breakfast, be active after breakfast and to drink a glass water in the morning. Pt to contact ADÁN Jan 3rd with bld sugar records.

## 2021-12-13 NOTE — TELEPHONE ENCOUNTER
Lab Results   Component Value Date    LABA1C 5.9 (H) 10/11/2021     No results found for: EAG Will continue to monitor blood sugars. NO changes at this time.

## 2022-01-07 DIAGNOSIS — I48.19 PERSISTENT ATRIAL FIBRILLATION (HCC): ICD-10-CM

## 2022-01-07 DIAGNOSIS — Z79.01 ANTICOAGULATED ON COUMADIN: ICD-10-CM

## 2022-01-07 RX ORDER — SPIRONOLACTONE AND HYDROCHLOROTHIAZIDE 25; 25 MG/1; MG/1
TABLET ORAL
Qty: 90 TABLET | Refills: 0 | Status: SHIPPED | OUTPATIENT
Start: 2022-01-07 | End: 2022-01-20 | Stop reason: SDUPTHER

## 2022-01-07 RX ORDER — POTASSIUM CHLORIDE 750 MG/1
TABLET, EXTENDED RELEASE ORAL
Qty: 90 TABLET | Refills: 0 | Status: SHIPPED | OUTPATIENT
Start: 2022-01-07 | End: 2022-01-20

## 2022-01-10 RX ORDER — WARFARIN SODIUM 5 MG/1
TABLET ORAL
Qty: 90 TABLET | Refills: 3 | Status: SHIPPED | OUTPATIENT
Start: 2022-01-10

## 2022-01-12 ENCOUNTER — HOSPITAL ENCOUNTER (OUTPATIENT)
Dept: PHARMACY | Age: 87
Setting detail: THERAPIES SERIES
Discharge: HOME | End: 2022-01-12
Payer: COMMERCIAL

## 2022-01-12 DIAGNOSIS — Z51.81 ENCOUNTER FOR THERAPEUTIC DRUG MONITORING: ICD-10-CM

## 2022-01-12 DIAGNOSIS — Z79.01 ANTICOAGULATED ON COUMADIN: ICD-10-CM

## 2022-01-12 DIAGNOSIS — I48.11 LONGSTANDING PERSISTENT ATRIAL FIBRILLATION (HCC): Primary | ICD-10-CM

## 2022-01-12 LAB — POC INR: 2.4 (ref 0.8–1.2)

## 2022-01-12 PROCEDURE — 85610 PROTHROMBIN TIME: CPT

## 2022-01-12 PROCEDURE — 36416 COLLJ CAPILLARY BLOOD SPEC: CPT

## 2022-01-12 PROCEDURE — 99211 OFF/OP EST MAY X REQ PHY/QHP: CPT

## 2022-01-20 ENCOUNTER — OFFICE VISIT (OUTPATIENT)
Dept: CARDIOLOGY CLINIC | Age: 87
End: 2022-01-20
Payer: COMMERCIAL

## 2022-01-20 VITALS
SYSTOLIC BLOOD PRESSURE: 129 MMHG | HEART RATE: 73 BPM | BODY MASS INDEX: 29.63 KG/M2 | WEIGHT: 184.4 LBS | DIASTOLIC BLOOD PRESSURE: 78 MMHG | HEIGHT: 66 IN

## 2022-01-20 DIAGNOSIS — I10 ESSENTIAL HYPERTENSION: ICD-10-CM

## 2022-01-20 DIAGNOSIS — R60.0 BILATERAL LEG EDEMA: ICD-10-CM

## 2022-01-20 DIAGNOSIS — I48.21 ATRIAL FIBRILLATION, PERMANENT (HCC): Primary | ICD-10-CM

## 2022-01-20 PROCEDURE — 99214 OFFICE O/P EST MOD 30 MIN: CPT | Performed by: INTERNAL MEDICINE

## 2022-01-20 RX ORDER — SPIRONOLACTONE AND HYDROCHLOROTHIAZIDE 25; 25 MG/1; MG/1
TABLET ORAL
Qty: 90 TABLET | Refills: 3 | Status: SHIPPED | OUTPATIENT
Start: 2022-01-20 | End: 2022-10-18

## 2022-01-20 RX ORDER — MAGNESIUM OXIDE 400 MG/1
400 TABLET ORAL DAILY
Qty: 30 TABLET | Refills: 6 | Status: SHIPPED | OUTPATIENT
Start: 2022-01-20 | End: 2022-01-20

## 2022-01-20 RX ORDER — AMLODIPINE BESYLATE 10 MG/1
TABLET ORAL
Qty: 90 TABLET | Refills: 3 | Status: SHIPPED | OUTPATIENT
Start: 2022-01-20

## 2022-01-20 NOTE — PROGRESS NOTES
Chief Complaint   Patient presents with    Follow-up    Atrial Fibrillation   Originally  patient here for atrial fib, Sendy Mireles referral.  Known to have atr fib for 5 yr   Has moved to [place] from [place] [id number]  On coumadin        5 month follow up. EKG done 2021. Patient is not taking K+ or magnesium as they make her nauseated. Denied cp, dizziness  Or palpitations    Leg edema better    Hx of chronic leg edema at the end of the day    Sob on exertion chronic    Nonsmoker    FHX    Mother had Heart problem and CVA        Patient Active Problem List   Diagnosis    Longstanding persistent atrial fibrillation ([address]    Anticoagulated on Coumadin    Diabetes mellitus ([address]    Hypertension    Atrial fibrillation, permanent ([address]    Bilateral leg edema +1    Encounter for therapeutic drug monitoring    Essential hypertension       History reviewed. No pertinent surgical history. No Known Allergies     Family History   Problem Relation Age of Onset    Cancer Mother         breast cancer    Diabetes Mother         Social History     Socioeconomic History    Marital status:      Spouse name: Not on file    Number of children: Not on file    Years of education: Not on file    Highest education level: Not on file   Occupational History    Not on file   Tobacco Use    Smoking status: Former Smoker     Packs/day: 0.50     Types: Cigarettes     Quit date: 1978     Years since quittin.6    Smokeless tobacco: Never Used   Vaping Use    Vaping Use: Never used   Substance and Sexual Activity    Alcohol use:  Yes     Alcohol/week: 1.0 standard drink     Types: 1 Cans of beer per week     Comment: occasional     Drug use: No    Sexual activity: Not on file   Other Topics Concern    Not on file   Social History Narrative    Aurther Alex has good family support    Son assists with transportation to [facility]    No barriers with medication affordability    Following up with Diabetic Clinic Social Determinants of Health     Financial Resource Strain: Low Risk     Difficulty of Paying Living Expenses: Not hard at all   Food Insecurity: No Food Insecurity    Worried About Running Out of Food in the Last Year: Never true    Rosita of Food in the Last Year: Never true   Transportation Needs:     Lack of Transportation (Medical): Not on file    Lack of Transportation (Non-Medical):  Not on file   Physical Activity:     Days of Exercise per Week: Not on file    Minutes of Exercise per Session: Not on file   Stress:     Feeling of Stress : Not on file   Social Connections:     Frequency of Communication with Friends and Family: Not on file    Frequency of Social Gatherings with Friends and Family: Not on file    Attends Anabaptist Services: Not on file    Active Member of [address] [place] [address] or Organizations: Not on file    Attends Club or Organization Meetings: Not on file    Marital Status: Not on file   Intimate Partner Violence:     Fear of Current or Ex-Partner: Not on file    Emotionally Abused: Not on file    Physically Abused: Not on file    Sexually Abused: Not on file   Housing Stability:     Unable to Pay for Housing in the Last Year: Not on file    Number of [place] in the Last Year: Not on file    Unstable Housing in the Last Year: Not on file       Current Outpatient Medications   Medication Sig Dispense Refill    spironolactone-hydroCHLOROthiazide (ALDACTAZIDE) 25-25 MG per tablet TAKE 1 TABLET BY MOUTH EVERY DAY 90 tablet 3    amLODIPine (NORVASC) 10 MG tablet TAKE 1 TABLET BY MOUTH EVERY DAY 90 tablet 3    metoprolol tartrate (LOPRESSOR) 25 MG tablet TAKE 1 TABLET BY MOUTH TWICE A  tablet 3    magnesium oxide (MAG-OX) 400 MG tablet Take 1 tablet by mouth daily 30 tablet 6    warfarin (COUMADIN) 5 MG tablet TAKE AS DIRECTED BY [facility] #30 TABS = 30 DAY SUPPLY 90 tablet 3    metFORMIN (GLUCOPHAGE) 1000 MG tablet TAKE 1 TABLET BY MOUTH TWICE A DAY WITH MEALS 180 tablet 3    atorvastatin (LIPITOR) 10 MG tablet Take 1 tablet by mouth daily 90 tablet 3    fexofenadine (ALLEGRA) 180 MG tablet TAKE 1 TABLET BY MOUTH EVERY DAY IN THE MORNING 90 tablet 3    omeprazole (PRILOSEC) 40 MG delayed release capsule Take 1 capsule by mouth daily 90 capsule 3    blood glucose test strips (ONETOUCH ULTRA) strip TEST 2 TIMES DAILY 100 strip 3    OneTouch Delica Lancets 56J MISC TEST 2 TIMES DAILY 180 each 4    blood glucose monitor kit and supplies Test one time a day . Dispense 30 strips a month with 5 refills Please dispense meter and suppliesthat insurance covers . 1 kit 0    UNABLE TO FIND Allergy shot every Friday at allergist's office.  meclizine (ANTIVERT) 12.5 MG tablet TAKE 1 TABLET BY MOUTH 3 TIMES DAILY AS NEEDED FOR DIZZINESS OR NAUSEA (Patient not taking: Reported on 11/3/2021) 60 tablet 0     No current facility-administered medications for this visit. Review of Systems -     General ROS: negative  Psychological ROS: negative  Hematological and Lymphatic ROS: No history of blood clots or bleeding disorder. Respiratory ROS: no cough,  or wheezing, the rest see HPI  Cardiovascular ROS: See HPI  Gastrointestinal ROS: negative  Genito-Urinary ROS: no dysuria, trouble voiding, or hematuria  Musculoskeletal ROS: negative  Neurological ROS: no TIA or stroke symptoms  Dermatological ROS: negative      Blood pressure 129/78, pulse 73, height 5' 6\" (1.676 m), weight 184 lb 6.4 oz (83.6 kg).         Physical Examination:    General appearance - alert, well appearing, and in no distress  HEENT- Pink conjunctiva  , Non-icteri sclera,PERRLA  Mental status - alert, oriented to person, place, and time  Neck - supple, no significant adenopathy, no JVD, or carotid bruits  Chest - clear to auscultation, no wheezes, rales or rhonchi, symmetric air entry  Heart - normal rate, regular rhythm, normal S1, S2, no murmurs, rubs, clicks or gallops  Abdomen - soft, ventricle size and systolic function. Ejection fraction was estimated at 60 to 65 %. There were no regional left ventricular wall motion abnormalities and wall   thickness was within normal limits. The left atrium is Moderately dilated. Moderately enlarged right atrium size. Mildly enlarged right ventricle cavity. Right ventricle global systolic function is normal .      Signature      ----------------------------------------------------------------   Electronically signed by Magdalena Byrd MD (Interpreting   physician) on 10/09/2020 at 06:49 PM   ----------------------------------------------------------------        Conclusions      Summary   Lexiscan EKG stress test is not suggestive for ischemia. The nuclear images is not suggestive for myocardial ischemia. Signatures    ----------------------------------------------------------------   Electronically signed by Magdalena Byrd MD (Interpreting   Cardiologist) on 10/09/2020 at 17:57   ------------------------------------------------------    ekg 8/19/21  Atrial fibrillation   Low voltage in precordial leads.    -Old anterior infarct.    -  Nonspecific T-abnormality. ABNORMAL     Assessment     Diagnosis Orders   1. Atrial fibrillation, permanent ([address]     2. Essential hypertension  metoprolol tartrate (LOPRESSOR) 25 MG tablet   3. Bilateral leg edema +1       chadsvasc= 5    Plan     The most  current meds and labs reviewed    Hx of allergy   Has been getting allergy shot     Atr fib chronic  Cont rate control  Cont coumadin  INR 2.8  option of DOAC given pat want to cont coumadin    Hypertension, on medical treatment. Seems to be under good control. Patient is compliant with medical treatment.    Cont  norvasc to 10  Mg po qd  Leg edema B/L +1 better   Cont  aldactazide 25/25 1 tab po qd      Echo and lexisc- WNL   BMP and MG- reviewed  Mg 1.6 and now 1.3  Re-advised to take mg oxide 400 mg po qd    D/w the pat the plan of care and result of test    Hx of K 3.5  And 3.9 remain low despite high k diet  Advised high K diet tomato, banana, avocado,orange potatoe  Could not tolerate kcl for nausea    RTC in 6 Months      Deniz Jane MD,MD,FACC

## 2022-02-21 DIAGNOSIS — Z79.01 ANTICOAGULATED ON COUMADIN: ICD-10-CM

## 2022-02-21 DIAGNOSIS — I48.21 ATRIAL FIBRILLATION, PERMANENT (HCC): Primary | ICD-10-CM

## 2022-02-22 DIAGNOSIS — E11.59 TYPE 2 DIABETES MELLITUS WITH OTHER CIRCULATORY COMPLICATION, WITHOUT LONG-TERM CURRENT USE OF INSULIN (HCC): ICD-10-CM

## 2022-02-22 RX ORDER — POTASSIUM CHLORIDE 750 MG/1
TABLET, EXTENDED RELEASE ORAL
Qty: 90 TABLET | Refills: 0 | OUTPATIENT
Start: 2022-02-22

## 2022-02-22 RX ORDER — BLOOD SUGAR DIAGNOSTIC
STRIP MISCELLANEOUS
Qty: 100 STRIP | Refills: 3 | Status: SHIPPED | OUTPATIENT
Start: 2022-02-22 | End: 2022-10-31

## 2022-02-22 NOTE — TELEPHONE ENCOUNTER
Patient requesting the following   Please approve or refuse Rx request:  Requested Prescriptions     Pending Prescriptions Disp Refills    blood glucose test strips (ONETOUCH ULTRA) strip 100 strip 3     Sig: TEST 2 TIMES DAILY       Next appointment:  Visit date not found

## 2022-02-23 ENCOUNTER — TELEPHONE (OUTPATIENT)
Dept: PHARMACY | Age: 87
End: 2022-02-23

## 2022-02-23 ENCOUNTER — HOSPITAL ENCOUNTER (OUTPATIENT)
Dept: PHARMACY | Age: 87
Setting detail: THERAPIES SERIES
Discharge: HOME | End: 2022-02-23
Payer: COMMERCIAL

## 2022-02-23 ENCOUNTER — HOSPITAL ENCOUNTER (OUTPATIENT)
Age: 87
Discharge: HOME | End: 2022-02-23
Payer: COMMERCIAL

## 2022-02-23 DIAGNOSIS — R60.0 BILATERAL LEG EDEMA: ICD-10-CM

## 2022-02-23 DIAGNOSIS — I10 ESSENTIAL HYPERTENSION: ICD-10-CM

## 2022-02-23 DIAGNOSIS — Z79.01 ANTICOAGULATED ON COUMADIN: ICD-10-CM

## 2022-02-23 DIAGNOSIS — I48.21 ATRIAL FIBRILLATION, PERMANENT (HCC): ICD-10-CM

## 2022-02-23 DIAGNOSIS — Z51.81 ENCOUNTER FOR THERAPEUTIC DRUG MONITORING: ICD-10-CM

## 2022-02-23 DIAGNOSIS — I48.11 LONGSTANDING PERSISTENT ATRIAL FIBRILLATION (HCC): Primary | ICD-10-CM

## 2022-02-23 LAB
ANION GAP SERPL CALCULATED.3IONS-SCNC: 14 MEQ/L (ref 8–16)
BUN BLDV-MCNC: 17 MG/DL (ref 7–22)
CALCIUM SERPL-MCNC: 10.1 MG/DL (ref 8.5–10.5)
CHLORIDE BLD-SCNC: 94 MEQ/L (ref 98–111)
CO2: 26 MEQ/L (ref 23–33)
CREAT SERPL-MCNC: 1 MG/DL (ref 0.4–1.2)
GFR SERPL CREATININE-BSD FRML MDRD: 64 ML/MIN/1.73M2
GLUCOSE BLD-MCNC: 106 MG/DL (ref 70–108)
HCT VFR BLD CALC: 36.5 % (ref 37–47)
HEMOGLOBIN: 11.4 GM/DL (ref 12–16)
MAGNESIUM: 1.5 MG/DL (ref 1.6–2.4)
POC INR: 2 (ref 0.8–1.2)
POTASSIUM SERPL-SCNC: 3.3 MEQ/L (ref 3.5–5.2)
SODIUM BLD-SCNC: 134 MEQ/L (ref 135–145)

## 2022-02-23 PROCEDURE — 80048 BASIC METABOLIC PNL TOTAL CA: CPT

## 2022-02-23 PROCEDURE — 85610 PROTHROMBIN TIME: CPT

## 2022-02-23 PROCEDURE — 85014 HEMATOCRIT: CPT

## 2022-02-23 PROCEDURE — 99211 OFF/OP EST MAY X REQ PHY/QHP: CPT

## 2022-02-23 PROCEDURE — 36415 COLL VENOUS BLD VENIPUNCTURE: CPT

## 2022-02-23 PROCEDURE — 85018 HEMOGLOBIN: CPT

## 2022-02-23 PROCEDURE — 36416 COLLJ CAPILLARY BLOOD SPEC: CPT

## 2022-02-23 PROCEDURE — 83735 ASSAY OF MAGNESIUM: CPT

## 2022-02-23 NOTE — PROGRESS NOTES
Medication Management [address]   [phone] (phone)  [phone] (fax)    Ms. Chloe Sy is a 82 y.o.  female with history of persistent atrial fib. , per referral from DANNY Alvarez, who presents today for Warfarin monitoring and adjustment (6 week visit - late for today's visit). Patient verifies current dosing regimen and tablet strength. No missed or extra doses. Patient denies bleeding/bruising/SOB/chest pain. Has usual swelling of ankles. No blood in urine or stool. No dietary changes. No changes in medication/OTC agents/herbals. No change in tobacco use. Normally only drinks beer, but did have 2 stronger drinks recently. No change in activity level. Had more stress. Patient denies headaches/dizziness/lightheadedness/falls. No vomiting/diarrhea or acute illness. States doctor knows about intermittent nausea/dry heaves. Has usual intermittent loose stool. No procedures scheduled in the future at this time. Assessment:   Lab Results   Component Value Date    INR 2.00 (H) 02/23/2022    INR 2.40 (H) 01/12/2022    INR 2.50 (H) 12/08/2021     INR therapeutic - goal 2-3. Recent Labs     02/23/22  1134   INR 2.00*       Plan:  POCT INR ordered/performed/result reviewed. Continue Coumadin 5 mg daily PO. Recheck INR in 6 week(s). Patient reminded to call the Anticoagulation Clinic with any signs or symptoms of bleeding or with any medication changes. Patient given instructions utilizing the teach back method. After visit summary printed and reviewed with patient. Given routine H&H order - plans to do today with other labs. Discharged ambulatory in no apparent distress, wearing mask.

## 2022-02-23 NOTE — TELEPHONE ENCOUNTER
Did routine H&H today: 11.4/36.5 (11.6/37.7 on 8/19/21). Left message on patient's voicemail that results are stable.

## 2022-03-07 ENCOUNTER — TELEPHONE (OUTPATIENT)
Dept: FAMILY MEDICINE CLINIC | Age: 87
End: 2022-03-07

## 2022-03-07 DIAGNOSIS — R06.7 SNEEZING: Primary | ICD-10-CM

## 2022-03-07 RX ORDER — LORATADINE 10 MG/1
10 TABLET ORAL DAILY
Qty: 30 TABLET | Refills: 1 | Status: SHIPPED | OUTPATIENT
Start: 2022-03-07 | End: 2022-03-29

## 2022-03-07 RX ORDER — BENZONATATE 100 MG/1
100-200 CAPSULE ORAL 3 TIMES DAILY PRN
Qty: 60 CAPSULE | Refills: 0 | Status: SHIPPED | OUTPATIENT
Start: 2022-03-07 | End: 2022-03-29

## 2022-03-07 NOTE — TELEPHONE ENCOUNTER
Patient reports that she called last week in regards to requesting something to be called in to the pharmacy for coughing, sneezing and runny nose and not sleeping x 3 weeks and had not heard back from the office and the pharmacy informed her that there was nothing called in for her    Please advise

## 2022-03-07 NOTE — TELEPHONE ENCOUNTER
Let pt know there was not any message regarding  this in her chart,  I can send in [address] for her cough and claritin for her sneezing. If no better she can let us know. Meds went to [facility] on [address].

## 2022-03-28 RX ORDER — FLUTICASONE PROPIONATE 50 MCG
SPRAY, SUSPENSION (ML) NASAL
Qty: 32 G | OUTPATIENT
Start: 2022-03-28

## 2022-03-29 DIAGNOSIS — R06.7 SNEEZING: ICD-10-CM

## 2022-03-29 RX ORDER — LORATADINE 10 MG/1
TABLET ORAL
Qty: 90 TABLET | Refills: 3 | Status: SHIPPED | OUTPATIENT
Start: 2022-03-29

## 2022-03-29 RX ORDER — POTASSIUM CHLORIDE 750 MG/1
TABLET, EXTENDED RELEASE ORAL
Qty: 90 TABLET | Refills: 0 | OUTPATIENT
Start: 2022-03-29

## 2022-03-29 RX ORDER — BENZONATATE 100 MG/1
100-200 CAPSULE ORAL 3 TIMES DAILY PRN
Qty: 60 CAPSULE | Refills: 0 | Status: SHIPPED | OUTPATIENT
Start: 2022-03-29 | End: 2022-04-22 | Stop reason: SDUPTHER

## 2022-03-29 NOTE — TELEPHONE ENCOUNTER
Recent Visits  Date Type Provider Dept   10/11/21 Office Visit JOSH Carrington - CNP [facility]   07/14/21 Office Visit JOSH Carrington CNP [facility]   06/01/21 Office Visit JOSH Carrington CNP [facility]   05/05/21 Office Visit JOSH Carrington CNP [facility]   01/06/21 Office Visit JOSH Carrington CNP [facility]   Showing recent visits within past 540 days with a meds authorizing provider and meeting all other requirements  Future Appointments  No visits were found meeting these conditions.   Showing future appointments within next 150 days with a meds authorizing provider and meeting all other requirements      Future Appointments   Date Time Provider Brandy Mederos   4/6/2022 11:00 AM VIOLET Burns [address]   4/25/2022  3:00 PM Mague Camacho RD, LD [facility]   7/21/2022  2:15 PM Lowanda Hatchet, MD N [facility] [address]

## 2022-03-30 DIAGNOSIS — L82.1 OTHER SEBORRHEIC KERATOSIS: ICD-10-CM

## 2022-04-05 RX ORDER — POTASSIUM CHLORIDE 750 MG/1
TABLET, EXTENDED RELEASE ORAL
Qty: 90 TABLET | Refills: 0 | OUTPATIENT
Start: 2022-04-05

## 2022-04-06 ENCOUNTER — HOSPITAL ENCOUNTER (OUTPATIENT)
Dept: PHARMACY | Age: 87
Setting detail: THERAPIES SERIES
Discharge: HOME | End: 2022-04-06
Payer: COMMERCIAL

## 2022-04-06 DIAGNOSIS — Z79.01 ANTICOAGULATED ON COUMADIN: ICD-10-CM

## 2022-04-06 DIAGNOSIS — I10 ESSENTIAL HYPERTENSION: ICD-10-CM

## 2022-04-06 DIAGNOSIS — I48.11 LONGSTANDING PERSISTENT ATRIAL FIBRILLATION (HCC): Primary | ICD-10-CM

## 2022-04-06 DIAGNOSIS — E11.9 TYPE 2 DIABETES MELLITUS WITHOUT COMPLICATION, WITHOUT LONG-TERM CURRENT USE OF INSULIN (HCC): ICD-10-CM

## 2022-04-06 DIAGNOSIS — I48.19 PERSISTENT ATRIAL FIBRILLATION (HCC): ICD-10-CM

## 2022-04-06 DIAGNOSIS — E11.59 TYPE 2 DIABETES MELLITUS WITH OTHER CIRCULATORY COMPLICATION, WITHOUT LONG-TERM CURRENT USE OF INSULIN (HCC): ICD-10-CM

## 2022-04-06 DIAGNOSIS — I10 PRIMARY HYPERTENSION: ICD-10-CM

## 2022-04-06 DIAGNOSIS — Z51.81 ENCOUNTER FOR THERAPEUTIC DRUG MONITORING: ICD-10-CM

## 2022-04-06 LAB — POC INR: 2.2 (ref 0.8–1.2)

## 2022-04-06 PROCEDURE — 85610 PROTHROMBIN TIME: CPT

## 2022-04-06 PROCEDURE — 99211 OFF/OP EST MAY X REQ PHY/QHP: CPT

## 2022-04-06 PROCEDURE — 36416 COLLJ CAPILLARY BLOOD SPEC: CPT

## 2022-04-06 NOTE — TELEPHONE ENCOUNTER
Pt in need of med refills, I see she missed her appt in [place] and does need an A1C, please see if she can come in for her MAW visit and we can do her A1C and then refill her meds Thanks!

## 2022-04-06 NOTE — PROGRESS NOTES
Medication Management [address]  [phone] (phone)  [phone] (fax)    Ms. Tanya Jonas is a 82 y.o.  female with history of Afib who presents today for anticoagulation monitoring and adjustment. Patient verifies current dosing regimen and tablet strength. No missed or extra doses. Patient denies s/s bleeding/bruising/SOB/chest pain  No blood in urine or stool. Swelling in ankles has been consistent. No dietary changes. No changes in medication/OTC agents/Herbals. No change in alcohol use or tobacco use. No change in activity level. Patient denies headaches/dizziness/lightheadedness/falls. No vomiting/diarrhea or acute illness. No Procedures scheduled in the future at this time. Assessment:   Lab Results   Component Value Date    INR 2.20 (H) 04/06/2022    INR 2.00 (H) 02/23/2022    INR 2.40 (H) 01/12/2022     INR therapeutic   Recent Labs     04/06/22  1137   INR 2.20*     Patient interview completed and discussed with pharmacist by Yadiel Burnett PharmD Candidate 2020. Plan:  Continue Coumadin 5 mg daily. Recheck INR in 6 week(s). Patient reminded to call the Anticoagulation Clinic with any signs or symptoms of bleeding or with any medication changes. Patient given instructions utilizing the teach back method. After visit summary printed and reviewed with patient. Discharged ambulatory in no apparent distress.       For Pharmacy Admin Tracking Only   Time Spent (min): [address], PharmD, BCPS  4/6/2022  12:07 PM

## 2022-04-11 ENCOUNTER — TELEPHONE (OUTPATIENT)
Dept: FAMILY MEDICINE CLINIC | Age: 87
End: 2022-04-11

## 2022-04-11 NOTE — TELEPHONE ENCOUNTER
Pt needs an appt prior to her med refills, I know we have left her a message, please reach out to her again to schedule thanks!

## 2022-04-12 RX ORDER — AMLODIPINE BESYLATE 10 MG/1
TABLET ORAL
Qty: 90 TABLET | Refills: 3 | OUTPATIENT
Start: 2022-04-12

## 2022-04-12 RX ORDER — ATORVASTATIN CALCIUM 10 MG/1
10 TABLET, FILM COATED ORAL DAILY
Qty: 90 TABLET | Refills: 3 | OUTPATIENT
Start: 2022-04-12

## 2022-04-12 RX ORDER — WARFARIN SODIUM 5 MG/1
TABLET ORAL
Qty: 90 TABLET | Refills: 3 | OUTPATIENT
Start: 2022-04-12

## 2022-04-12 RX ORDER — OMEPRAZOLE 40 MG/1
40 CAPSULE, DELAYED RELEASE ORAL DAILY
Qty: 90 CAPSULE | Refills: 3 | OUTPATIENT
Start: 2022-04-12

## 2022-04-13 DIAGNOSIS — I10 PRIMARY HYPERTENSION: ICD-10-CM

## 2022-04-13 DIAGNOSIS — I10 ESSENTIAL HYPERTENSION: ICD-10-CM

## 2022-04-13 DIAGNOSIS — I48.19 PERSISTENT ATRIAL FIBRILLATION (HCC): ICD-10-CM

## 2022-04-13 DIAGNOSIS — E11.59 TYPE 2 DIABETES MELLITUS WITH OTHER CIRCULATORY COMPLICATION, WITHOUT LONG-TERM CURRENT USE OF INSULIN (HCC): ICD-10-CM

## 2022-04-13 DIAGNOSIS — Z79.01 ANTICOAGULATED ON COUMADIN: ICD-10-CM

## 2022-04-13 DIAGNOSIS — E11.9 TYPE 2 DIABETES MELLITUS WITHOUT COMPLICATION, WITHOUT LONG-TERM CURRENT USE OF INSULIN (HCC): ICD-10-CM

## 2022-04-13 RX ORDER — ATORVASTATIN CALCIUM 10 MG/1
10 TABLET, FILM COATED ORAL DAILY
Qty: 90 TABLET | Refills: 3 | OUTPATIENT
Start: 2022-04-13

## 2022-04-13 RX ORDER — AMLODIPINE BESYLATE 10 MG/1
TABLET ORAL
Qty: 90 TABLET | Refills: 3 | OUTPATIENT
Start: 2022-04-13

## 2022-04-13 RX ORDER — OMEPRAZOLE 40 MG/1
40 CAPSULE, DELAYED RELEASE ORAL DAILY
Qty: 30 CAPSULE | Refills: 0 | Status: SHIPPED | OUTPATIENT
Start: 2022-04-13 | End: 2022-04-27 | Stop reason: SDUPTHER

## 2022-04-13 RX ORDER — WARFARIN SODIUM 5 MG/1
TABLET ORAL
Qty: 90 TABLET | Refills: 3 | OUTPATIENT
Start: 2022-04-13

## 2022-04-22 DIAGNOSIS — L82.1 OTHER SEBORRHEIC KERATOSIS: ICD-10-CM

## 2022-04-22 RX ORDER — BENZONATATE 100 MG/1
100-200 CAPSULE ORAL 3 TIMES DAILY PRN
Qty: 60 CAPSULE | Refills: 0 | Status: SHIPPED | OUTPATIENT
Start: 2022-04-22 | End: 2022-05-18

## 2022-04-22 RX ORDER — FLUTICASONE PROPIONATE 50 MCG
1 SPRAY, SUSPENSION (ML) NASAL DAILY
OUTPATIENT
Start: 2022-04-22

## 2022-04-22 NOTE — TELEPHONE ENCOUNTER
Patient requesting the following   Please approve or refuse Rx request:  Requested Prescriptions     Pending Prescriptions Disp Refills    betamethasone valerate (VALISONE) 0.1 % cream 15 g 1     Sig: Apply topically 2 times daily.     fluticasone (FLONASE) 50 MCG/ACT nasal spray       Si spray by Each Nostril route daily       Next appointment:  Visit date not found

## 2022-04-22 NOTE — TELEPHONE ENCOUNTER
Patient has been informed and voiced understanding   Sorry the rx should have been for the nasal spray and the tessalon capsules

## 2022-04-25 ENCOUNTER — OFFICE VISIT (OUTPATIENT)
Dept: INTERNAL MEDICINE CLINIC | Age: 87
End: 2022-04-25
Payer: COMMERCIAL

## 2022-04-25 VITALS — BODY MASS INDEX: 28.73 KG/M2 | WEIGHT: 178 LBS

## 2022-04-25 DIAGNOSIS — E11.59 TYPE 2 DIABETES MELLITUS WITH OTHER CIRCULATORY COMPLICATION, WITHOUT LONG-TERM CURRENT USE OF INSULIN (HCC): Primary | ICD-10-CM

## 2022-04-25 PROCEDURE — 97803 MED NUTRITION INDIV SUBSEQ: CPT | Performed by: DIETITIAN, REGISTERED

## 2022-04-25 NOTE — PROGRESS NOTES
[address] [place] [address]. [address] [place] [address] [place], [facility], [facility]  [phone] (phone)  [phone] (fax)    Patient Name: Serina Moore. Date of Birth: [id number]. MRN: [id number]      Assessment: Patient is a 82 y.o. female seen for follow-up MNT visit for Diabetes. -Nutritionally relevant labs:   Lab Results   Component Value Date/Time    LABA1C 5.9 (H) 10/11/2021 01:10 PM    LABA1C 5.7 01/06/2021 12:52 PM    LABA1C 6.5 (H) 07/06/2020 12:38 PM    LABA1C 7.8 (H) 02/25/2020 12:04 PM    LABA1C 7.4 01/06/2020 11:47 AM    LABA1C 10.3 (H) 09/16/2019 11:35 AM    GLUCOSE 106 02/23/2022 11:50 AM    GLUCOSE 109 (H) 11/03/2021 11:18 AM    CHOL 105 11/03/2021 11:18 AM    HDL 59 11/03/2021 11:18 AM    LDLCALC 26 11/03/2021 11:18 AM    TRIG 98 11/03/2021 11:18 AM     -Blood sugar trends: was checking in the am and at bedtime then switched to nightly.  Test at 11pm 1hr pp snack:     -  Current Outpatient Medications on File Prior to Visit   Medication Sig Dispense Refill    benzonatate (TESSALON) 100 MG capsule Take 1-2 capsules by mouth 3 times daily as needed for Cough 60 capsule 0    betamethasone valerate (VALISONE) 0.1 % cream APPLY TOPICALLY 2 TIMES DAILY AS NEEDED 15 g 1    metFORMIN (GLUCOPHAGE) 1000 MG tablet TAKE 1 TABLET BY MOUTH TWICE A DAY WITH MEALS 60 tablet 0    omeprazole (PRILOSEC) 40 MG delayed release capsule Take 1 capsule by mouth daily 30 capsule 0    loratadine (CLARITIN) 10 MG tablet TAKE 1 TABLET BY MOUTH EVERY DAY 90 tablet 3    blood glucose test strips (ONETOUCH ULTRA) strip TEST 2 TIMES DAILY 100 strip 3    magnesium oxide (MAG-OX) 400 (241.3 Mg) MG TABS tablet TAKE 1 TABLET BY MOUTH EVERY DAY 30 tablet 6    spironolactone-hydroCHLOROthiazide (ALDACTAZIDE) 25-25 MG per tablet TAKE 1 TABLET BY MOUTH EVERY DAY 90 tablet 3    amLODIPine (NORVASC) 10 MG tablet TAKE 1 TABLET BY MOUTH EVERY DAY 90 tablet 3    metoprolol tartrate (LOPRESSOR) 25 MG tablet TAKE 1 TABLET BY MOUTH TWICE A  tablet 3    warfarin (COUMADIN) 5 MG tablet TAKE AS DIRECTED BY [facility] COUMADIN CLINIC #30 TABS = 30 DAY SUPPLY 90 tablet 3    atorvastatin (LIPITOR) 10 MG tablet Take 1 tablet by mouth daily 90 tablet 3    meclizine (ANTIVERT) 12.5 MG tablet TAKE 1 TABLET BY MOUTH 3 TIMES DAILY AS NEEDED FOR DIZZINESS OR NAUSEA (Patient not taking: Reported on 11/3/2021) 60 tablet 0    fexofenadine (ALLEGRA) 180 MG tablet TAKE 1 TABLET BY MOUTH EVERY DAY IN THE MORNING 90 tablet 3    OneTouch Delica Lancets 68Q MISC TEST 2 TIMES DAILY 180 each 4    blood glucose monitor kit and supplies Test one time a day . Dispense 30 strips a month with 5 refills Please dispense meter and suppliesthat insurance covers . 1 kit 0    UNABLE TO FIND Allergy shot every other Friday at allergist's office. No current facility-administered medications on file prior to visit. Vitals from current and previous visits: Wt 178 lb (80.7 kg)   BMI 28.73 kg/m²     -Body mass index is 28.73 kg/m². 25-29.9 - Overweight. - Weight Hx: lost 6# since 11/15/21. Nutrition Diagnosis:   Food and nutrition-related knowledge deficit related to Lack of previous MNT/currently undergoing MNT as evidenced by Food recall and pt request for education. Intervention:  -Impression: Vikki Quiroz is still struggling with nausea in the afternoon almost daily. It is consistently in the afternoon. RD curious if related to inadequate food intake medications. Encouraged pt to review medications with family MD.   -Instructed the patient on:discussed when to take medications and encouraged her her in her healthy eating.     -  Patient Instructions   · First: Check blood every other day in morning before you eat. · Second: Eat Brunch     · Thirds: Take medication     · Fourth: Snack/Lunch if needed.      · Fifth: Dinner     · Sixth: Check blood sugars if you didn't check in the morning    · Seventh: snack    What should my blood sugars be? In the morning before you eat or if your have gone without   food for 4 or more hours blood sugar . In the evening after dinner or if its been 2 hr after you have   ate < 180. Today Weight: 178 lb        Comprehension verified using teachback method. Monitoring/Evaluation:   -Followup visit: 8-12 weeks with dietitian.   -Receptiveness to education/goals: Agreeable.  -Evaluation of education: Indicates understanding.  -Readiness to change: maintenance - has made change and is trying and/or practicing different alternative behaviors diet. -Expected compliance: good. Thank you for your referral of this patient. Total time involved in direct patient education: 30 minutes for follow-up MNT visit.

## 2022-04-25 NOTE — PATIENT INSTRUCTIONS
· First: Check blood every other day in morning before you eat. · Second: Eat Brunch     · Thirds: Take medication     · Fourth: Snack/Lunch if needed. · Fifth: Dinner     · Sixth: Check blood sugars if you didn't check in the morning    · Seventh: snack    What should my blood sugars be? In the morning before you eat or if your have gone without   food for 4 or more hours blood sugar . In the evening after dinner or if its been 2 hr after you have   ate < 180.     Today Weight: 178 lb

## 2022-04-27 ENCOUNTER — OFFICE VISIT (OUTPATIENT)
Dept: FAMILY MEDICINE CLINIC | Age: 87
End: 2022-04-27
Payer: COMMERCIAL

## 2022-04-27 VITALS
DIASTOLIC BLOOD PRESSURE: 78 MMHG | WEIGHT: 180.2 LBS | RESPIRATION RATE: 14 BRPM | BODY MASS INDEX: 28.96 KG/M2 | OXYGEN SATURATION: 99 % | HEART RATE: 86 BPM | TEMPERATURE: 98.8 F | HEIGHT: 66 IN | SYSTOLIC BLOOD PRESSURE: 116 MMHG

## 2022-04-27 DIAGNOSIS — E11.59 TYPE 2 DIABETES MELLITUS WITH OTHER CIRCULATORY COMPLICATION, WITHOUT LONG-TERM CURRENT USE OF INSULIN (HCC): Primary | ICD-10-CM

## 2022-04-27 DIAGNOSIS — J30.89 NON-SEASONAL ALLERGIC RHINITIS DUE TO OTHER ALLERGIC TRIGGER: ICD-10-CM

## 2022-04-27 DIAGNOSIS — I10 PRIMARY HYPERTENSION: ICD-10-CM

## 2022-04-27 DIAGNOSIS — K21.9 GASTROESOPHAGEAL REFLUX DISEASE, UNSPECIFIED WHETHER ESOPHAGITIS PRESENT: ICD-10-CM

## 2022-04-27 DIAGNOSIS — R60.0 BILATERAL LEG EDEMA: ICD-10-CM

## 2022-04-27 DIAGNOSIS — Z13.31 DEPRESSION SCREENING NEGATIVE: ICD-10-CM

## 2022-04-27 DIAGNOSIS — R05.9 COUGH: ICD-10-CM

## 2022-04-27 DIAGNOSIS — R11.0 NAUSEA: ICD-10-CM

## 2022-04-27 LAB — HBA1C MFR BLD: 6.1 % (ref 4.3–5.7)

## 2022-04-27 PROCEDURE — 99214 OFFICE O/P EST MOD 30 MIN: CPT | Performed by: NURSE PRACTITIONER

## 2022-04-27 PROCEDURE — 3044F HG A1C LEVEL LT 7.0%: CPT | Performed by: NURSE PRACTITIONER

## 2022-04-27 PROCEDURE — 83036 HEMOGLOBIN GLYCOSYLATED A1C: CPT | Performed by: NURSE PRACTITIONER

## 2022-04-27 RX ORDER — ATORVASTATIN CALCIUM 10 MG/1
10 TABLET, FILM COATED ORAL DAILY
Qty: 90 TABLET | Refills: 3 | Status: SHIPPED | OUTPATIENT
Start: 2022-04-27

## 2022-04-27 RX ORDER — OMEPRAZOLE 40 MG/1
40 CAPSULE, DELAYED RELEASE ORAL DAILY
Qty: 90 CAPSULE | Refills: 3 | Status: SHIPPED | OUTPATIENT
Start: 2022-04-27

## 2022-04-27 ASSESSMENT — ENCOUNTER SYMPTOMS
SINUS PRESSURE: 0
CHEST TIGHTNESS: 0
ABDOMINAL DISTENTION: 0
ABDOMINAL PAIN: 0
BACK PAIN: 0
COUGH: 1
NAUSEA: 1
DIARRHEA: 0
WHEEZING: 0
CHOKING: 0
SINUS PAIN: 0
SHORTNESS OF BREATH: 0
RHINORRHEA: 1
SORE THROAT: 0

## 2022-04-27 ASSESSMENT — PATIENT HEALTH QUESTIONNAIRE - PHQ9
1. LITTLE INTEREST OR PLEASURE IN DOING THINGS: 1
SUM OF ALL RESPONSES TO PHQ QUESTIONS 1-9: 2
SUM OF ALL RESPONSES TO PHQ9 QUESTIONS 1 & 2: 2
SUM OF ALL RESPONSES TO PHQ QUESTIONS 1-9: 2
SUM OF ALL RESPONSES TO PHQ QUESTIONS 1-9: 2
2. FEELING DOWN, DEPRESSED OR HOPELESS: 1
SUM OF ALL RESPONSES TO PHQ QUESTIONS 1-9: 2

## 2022-04-27 NOTE — PROGRESS NOTES
afternoon around 1 o clock, does not know why, states it could be form the metformin she does take it with food, no nighttime nausea. Garth abdominal pain or vomiting or diarrhea. We did discuss med causing it or an ulcer. declines GI referral will monitor it denies RUQ pain. Cough    HPI:  Symptoms have been present for 1 year(s). Cough Description - non-productive, dry hacky  Symptoms preceded by URI? Yes  Provoking factors - nothing in particular it comes and goes has had a normal chest x-ray has tried tessalon perles atb and steroid and sees an allergist gets allergy shots. Alleviating factors - nothing   SOB or wheezing? No  Nasal congestion, rhinitis? Yes  GERD Sx? No  Tobacco use? No  Voice changes or dysphagia?   No  Treatments tried - as above will order pft and r/o asthma vs copd     Lab Results       Component                Value               Date                       WBC                      5.2                 07/02/2020                 HGB                      11.4 (L)            02/23/2022                 HCT                      36.5 (L)            02/23/2022                 MCV                      73.6 (L)            07/02/2020                 PLT                      217                 07/02/2020            Lab Results       Component                Value               Date                       NA                       134                 02/23/2022                 K                        3.3                 02/23/2022                 CL                       94                  02/23/2022                 CO2                      26                  02/23/2022                 BUN                      17                  02/23/2022                 CREATININE               1.0                 02/23/2022                 GLUCOSE                  106                 02/23/2022                 CALCIUM                  10.1                02/23/2022             Lab Results Component                Value               Date                       CHOL                     105                 11/03/2021                 CHOL                     148                 09/10/2020                 CHOL                     162                 01/28/2020            Lab Results       Component                Value               Date                       TRIG                     98                  11/03/2021                 TRIG                     133                 09/10/2020                 TRIG                     168                 01/28/2020            Lab Results       Component                Value               Date                       HDL                      59                  11/03/2021                 HDL                      57                  09/10/2020                 HDL                      74                  01/28/2020            Lab Results       Component                Value               Date                       LDLCALC                  26                  11/03/2021                 LDLCALC                  64                  09/10/2020                 LDLCALC                  54                  01/28/2020            No results found for: LABVLDL, VLDL  No results found for: CHOLHDLRATIO           Current Outpatient Medications   Medication Sig Dispense Refill    atorvastatin (LIPITOR) 10 MG tablet Take 1 tablet by mouth daily 90 tablet 3    omeprazole (PRILOSEC) 40 MG delayed release capsule Take 1 capsule by mouth daily 90 capsule 3    benzonatate (TESSALON) 100 MG capsule Take 1-2 capsules by mouth 3 times daily as needed for Cough 60 capsule 0    betamethasone valerate (VALISONE) 0.1 % cream APPLY TOPICALLY 2 TIMES DAILY AS NEEDED 15 g 1    metFORMIN (GLUCOPHAGE) 1000 MG tablet TAKE 1 TABLET BY MOUTH TWICE A DAY WITH MEALS 60 tablet 0    loratadine (CLARITIN) 10 MG tablet TAKE 1 TABLET BY MOUTH EVERY DAY 90 tablet 3    blood glucose test strips (ONETOUCH ULTRA) strip TEST 2 TIMES DAILY 100 strip 3    magnesium oxide (MAG-OX) 400 (241.3 Mg) MG TABS tablet TAKE 1 TABLET BY MOUTH EVERY DAY 30 tablet 6    spironolactone-hydroCHLOROthiazide (ALDACTAZIDE) 25-25 MG per tablet TAKE 1 TABLET BY MOUTH EVERY DAY 90 tablet 3    amLODIPine (NORVASC) 10 MG tablet TAKE 1 TABLET BY MOUTH EVERY DAY 90 tablet 3    metoprolol tartrate (LOPRESSOR) 25 MG tablet TAKE 1 TABLET BY MOUTH TWICE A  tablet 3    warfarin (COUMADIN) 5 MG tablet TAKE AS DIRECTED BY [facility] COUMADIN CLINIC #30 TABS = 30 DAY SUPPLY 90 tablet 3    meclizine (ANTIVERT) 12.5 MG tablet TAKE 1 TABLET BY MOUTH 3 TIMES DAILY AS NEEDED FOR DIZZINESS OR NAUSEA 60 tablet 0    OneTouch Delica Lancets 46M MISC TEST 2 TIMES DAILY 180 each 4    blood glucose monitor kit and supplies Test one time a day . Dispense 30 strips a month with 5 refills Please dispense meter and suppliesthat insurance covers . 1 kit 0    UNABLE TO FIND Allergy shot every other Friday at allergist's office. No current facility-administered medications for this visit. Past Medical History:   Diagnosis Date    Atrial fibrillation ([facility] [address]     Diabetes mellitus ([facility] [address]     Hypertension       History reviewed. No pertinent surgical history. Family History   Problem Relation Age of Onset    Cancer Mother         breast cancer    Diabetes Mother      Social History     Tobacco Use    Smoking status: Former Smoker     Packs/day: 0.50     Types: Cigarettes     Quit date: 1978     Years since quittin.8    Smokeless tobacco: Never Used   Substance Use Topics    Alcohol use:  Yes     Alcohol/week: 1.0 standard drink     Types: 1 Cans of beer per week     Comment: occasional         No Known Allergies    Health Maintenance   Topic Date Due    DTaP/Tdap/Td vaccine (1 - Tdap) Never done    Shingles Vaccine (1 of 2) Never done    COVID-19 Vaccine (3 - Booster for Pfizer series) 2021    Depression Screen 01/06/2022    Pneumococcal 65+ years Vaccine (1 - PCV) 10/30/2025 (Originally 1/23/1941)    Flu vaccine (Season Ended) 09/01/2022    Lipids  11/03/2022    Potassium  02/23/2023    Creatinine  02/23/2023    DEXA (modify frequency per FRAX score)  Completed    Hepatitis A vaccine  Aged Out    Hib vaccine  Aged Out    Meningococcal (ACWY) vaccine  Aged Out       Subjective:      Review of Systems   Constitutional: Negative for chills, diaphoresis, fatigue and fever. HENT: Positive for congestion, postnasal drip and rhinorrhea. Negative for sinus pressure, sinus pain, sneezing and sore throat. Respiratory: Positive for cough. Negative for choking, chest tightness, shortness of breath and wheezing. Cardiovascular: Positive for leg swelling. Negative for chest pain and palpitations. Gastrointestinal: Positive for nausea. Negative for abdominal distention, abdominal pain and diarrhea. Endocrine: Negative for polydipsia, polyphagia and polyuria. Genitourinary: Negative for difficulty urinating and dysuria. Musculoskeletal: Negative for arthralgias, back pain and myalgias. Skin: Negative. Allergic/Immunologic: Positive for environmental allergies. Neurological: Negative for dizziness, facial asymmetry and headaches. Psychiatric/Behavioral: Negative for sleep disturbance and suicidal ideas. Objective:      /78   Pulse 86   Temp 98.8 °F (37.1 °C) (Oral)   Resp 14   Ht 5' 6\" (1.676 m)   Wt 180 lb 3.2 oz (81.7 kg)   SpO2 99%   BMI 29.09 kg/m²      Physical Exam  Vitals and nursing note reviewed. Constitutional:       Appearance: She is not ill-appearing. HENT:      Nose: Nose normal.   Cardiovascular:      Rate and Rhythm: Normal rate and regular rhythm. Pulses: Normal pulses. Dorsalis pedis pulses are 2+ on the right side and 2+ on the left side. Posterior tibial pulses are 2+ on the right side and 2+ on the left side.       Heart sounds: Normal heart sounds. No murmur heard. Pulmonary:      Effort: Pulmonary effort is normal. No respiratory distress. Breath sounds: Normal breath sounds. Abdominal:      General: Abdomen is flat. Bowel sounds are normal.      Palpations: Abdomen is soft. Tenderness: There is no right CVA tenderness, left CVA tenderness, guarding or rebound. Negative signs include Rome's sign, Rovsing's sign, McBurney's sign and psoas sign. Hernia: There is no hernia in the left inguinal area or left femoral area. Musculoskeletal:      Right lower le+ Edema present. Left lower leg: No edema. Neurological:      Mental Status: She is alert. Assessment/Plan:           1. Type 2 diabetes mellitus with other circulatory complication, without long-term current use of insulin (HCC)    - atorvastatin (LIPITOR) 10 MG tablet; Take 1 tablet by mouth daily  Dispense: 90 tablet; Refill: 3  - POCT glycosylated hemoglobin (Hb A1C)    2. Nausea  Declines gallbladder testing or Gi referral  Will monitor     3. Bilateral leg edema +1    Stable  Cardiology  Dain hose   Avoid extra salt  4. Non-seasonal allergic rhinitis due to other allergic trigger  Continue with allergy    5. Depression screening negative      6. Primary hypertension    - atorvastatin (LIPITOR) 10 MG tablet; Take 1 tablet by mouth daily  Dispense: 90 tablet; Refill: 3    7. Gastroesophageal reflux disease, unspecified whether esophagitis present  gerd diet   - omeprazole (PRILOSEC) 40 MG delayed release capsule; Take 1 capsule by mouth daily  Dispense: 90 capsule; Refill: 3    8. Cough  R/o asthma vs copd  Pt In agreement with plan   - Full PFT Study With Bronchodilator; Future      Return in about 6 months (around 10/27/2022) for 6 month f/u. Reccommended tobaccocessation options including pharmacologic methods, counseled great than 3 minutesduring this visit:  Yes[]  No  []       Patient given educational materials -see patient instructions. Discussed use, benefit, and side effects of prescribedmedications. All patient questions answered. Pt voiced understanding. Reviewedhealth maintenance. Instructed to continue current medications, diet and exercise. Patient agreed with treatment plan. Follow up as directed.        Electronicallysigned by JOSH Green CNP on 4/27/2022 at 4:50 PM

## 2022-04-28 ENCOUNTER — TELEPHONE (OUTPATIENT)
Dept: FAMILY MEDICINE CLINIC | Age: 87
End: 2022-04-28

## 2022-04-28 NOTE — TELEPHONE ENCOUNTER
Pt  States she  Does not want to take the breathing test at this time . Pt states she will let office know if she changes her mind. Is it okay to go ahead and cancel test at this time ?

## 2022-04-28 NOTE — TELEPHONE ENCOUNTER
scheduling PFT  06/03/22 @ [facility]   Arrive @ 8:30 to 2nd floor heart center   Wear mask , bring ID and Insurance     1. Light meal prior  2. No breathing meds 4-6 hr prior to test  3. Hold antihistamine 48 hr prior  4. No caffeine, smoking, alcohol day of test  5.  No exercising day of test.  6. Cover mouth to prevent breathing cold air day of test.

## 2022-05-05 RX ORDER — POTASSIUM CHLORIDE 750 MG/1
TABLET, EXTENDED RELEASE ORAL
Qty: 90 TABLET | Refills: 0 | OUTPATIENT
Start: 2022-05-05

## 2022-05-16 ENCOUNTER — TELEPHONE (OUTPATIENT)
Dept: FAMILY MEDICINE CLINIC | Age: 87
End: 2022-05-16

## 2022-05-16 DIAGNOSIS — I48.20 CHRONIC ATRIAL FIBRILLATION (HCC): Primary | ICD-10-CM

## 2022-05-16 NOTE — TELEPHONE ENCOUNTER
----- Message from Lorenzo Johnson RN sent at 5/16/2022  9:38 AM EDT -----  Please renew referral for Anticoagulation Monitoring. Thanks, FANTA Mccormick RN BSN

## 2022-05-18 ENCOUNTER — HOSPITAL ENCOUNTER (OUTPATIENT)
Dept: PHARMACY | Age: 87
Setting detail: THERAPIES SERIES
Discharge: HOME | End: 2022-05-18
Payer: COMMERCIAL

## 2022-05-18 DIAGNOSIS — Z51.81 ENCOUNTER FOR THERAPEUTIC DRUG MONITORING: ICD-10-CM

## 2022-05-18 DIAGNOSIS — I48.11 LONGSTANDING PERSISTENT ATRIAL FIBRILLATION (HCC): Primary | ICD-10-CM

## 2022-05-18 DIAGNOSIS — Z79.01 ANTICOAGULATED ON COUMADIN: ICD-10-CM

## 2022-05-18 LAB — POC INR: 2.7 (ref 0.8–1.2)

## 2022-05-18 PROCEDURE — 36416 COLLJ CAPILLARY BLOOD SPEC: CPT

## 2022-05-18 PROCEDURE — 85610 PROTHROMBIN TIME: CPT

## 2022-05-18 PROCEDURE — 99211 OFF/OP EST MAY X REQ PHY/QHP: CPT

## 2022-05-18 RX ORDER — BENZONATATE 100 MG/1
100-200 CAPSULE ORAL 3 TIMES DAILY PRN
Qty: 60 CAPSULE | Refills: 0 | Status: SHIPPED | OUTPATIENT
Start: 2022-05-18 | End: 2022-06-14 | Stop reason: SDUPTHER

## 2022-05-18 RX ORDER — POTASSIUM CHLORIDE 750 MG/1
TABLET, EXTENDED RELEASE ORAL
Qty: 90 TABLET | Refills: 0 | OUTPATIENT
Start: 2022-05-18

## 2022-05-18 NOTE — PROGRESS NOTES
Medication Management [address]   [phone] (phone)  [phone] (fax)    Ms. Eligio Bentley is a 82 y.o.  female with history of Afib who presents today for anticoagulation monitoring and adjustment. Patient verifies current dosing regimen and tablet strength. No missed or extra doses. Patient denies s/s bleeding/bruising/swelling/SOB/chest pain  No blood in urine or stool. No dietary changes. No changes in medication/OTC agents/Herbals. No change in alcohol use or tobacco use. - Will have a few beers every once and a while. May have had 2 beers since she has been here last.  No change in activity level. Patient denies headaches/dizziness/lightheadedness/falls. No vomiting/diarrhea or acute illness. - Few times was nauseous, but did not vomit. No Procedures scheduled in the future at this time. Assessment:   Lab Results   Component Value Date    INR 2.70 (H) 05/18/2022    INR 2.20 (H) 04/06/2022    INR 2.00 (H) 02/23/2022     INR therapeutic   Recent Labs     05/18/22  1121   INR 2.70*         Plan:  Continue Coumadin 5 mg daily. Recheck INR in 6 week(s). Patient reminded to call the Anticoagulation Clinic with any signs or symptoms of bleeding or with any medication changes. Patient given instructions utilizing the teach back method. After visit summary printed and reviewed with patient. Discharged ambulatory in no apparent distress.     For Pharmacy Admin Tracking Only   Time Spent (min): 20

## 2022-06-14 RX ORDER — BENZONATATE 100 MG/1
100-200 CAPSULE ORAL 3 TIMES DAILY PRN
Qty: 60 CAPSULE | Refills: 0 | Status: SHIPPED | OUTPATIENT
Start: 2022-06-14 | End: 2022-07-06

## 2022-06-29 ENCOUNTER — HOSPITAL ENCOUNTER (OUTPATIENT)
Dept: PHARMACY | Age: 87
Setting detail: THERAPIES SERIES
Discharge: HOME | End: 2022-06-29
Payer: COMMERCIAL

## 2022-06-29 DIAGNOSIS — I48.11 LONGSTANDING PERSISTENT ATRIAL FIBRILLATION (HCC): Primary | ICD-10-CM

## 2022-06-29 DIAGNOSIS — Z51.81 ENCOUNTER FOR THERAPEUTIC DRUG MONITORING: ICD-10-CM

## 2022-06-29 DIAGNOSIS — Z79.01 ANTICOAGULATED ON COUMADIN: ICD-10-CM

## 2022-06-29 LAB — POC INR: 2.5 (ref 0.8–1.2)

## 2022-06-29 PROCEDURE — 99211 OFF/OP EST MAY X REQ PHY/QHP: CPT

## 2022-06-29 PROCEDURE — 85610 PROTHROMBIN TIME: CPT

## 2022-06-29 PROCEDURE — 36416 COLLJ CAPILLARY BLOOD SPEC: CPT

## 2022-06-29 NOTE — PROGRESS NOTES
Medication Management [address]  [phone] (phone)  [phone] (fax)    Ms. Roel Watson is a 82 y.o.  female with history of Afib who presents today for anticoagulation monitoring and adjustment. Patient verifies current dosing regimen and tablet strength. No missed or extra doses. Patient denies s/s bleeding/bruising/swelling/SOB/chest pain  No blood in urine or stool. No dietary changes. No changes in medication/OTC agents/Herbals. No change in alcohol use or tobacco use. Trying to increase it, but it hasn't changed much as of now. Patient denies headaches/dizziness/lightheadedness/falls. No vomiting/diarrhea or acute illness. No Procedures scheduled in the future at this time. Nausea. Used to get every other day, now is having it less frequently. Patient interview completed and discussed with pharmacist by YEMI Larson PharmD Candidate. Assessment:   Lab Results   Component Value Date    INR 2.50 (H) 06/29/2022    INR 2.70 (H) 05/18/2022    INR 2.20 (H) 04/06/2022     INR therapeutic   Recent Labs     06/29/22  1114   INR 2.50*     Plan:  Continue Coumadin 5 mg daily. Recheck INR in 6 week(s). Patient reminded to call the Anticoagulation Clinic with any signs or symptoms of bleeding or with any medication changes. Patient given instructions utilizing the teach back method. After visit summary printed and reviewed with patient. Discharged ambulatory in no apparent distress.     For Pharmacy Admin Tracking Only   Time Spent (min): [address], PharmD, [facility]  6/29/2022  11:24 AM

## 2022-07-06 RX ORDER — BENZONATATE 100 MG/1
100-200 CAPSULE ORAL 3 TIMES DAILY PRN
Qty: 60 CAPSULE | Refills: 0 | Status: SHIPPED | OUTPATIENT
Start: 2022-07-06 | End: 2022-07-13

## 2022-07-21 ENCOUNTER — OFFICE VISIT (OUTPATIENT)
Dept: CARDIOLOGY CLINIC | Age: 87
End: 2022-07-21
Payer: COMMERCIAL

## 2022-07-21 VITALS
SYSTOLIC BLOOD PRESSURE: 113 MMHG | HEIGHT: 66 IN | HEART RATE: 79 BPM | WEIGHT: 174 LBS | DIASTOLIC BLOOD PRESSURE: 75 MMHG | BODY MASS INDEX: 27.97 KG/M2

## 2022-07-21 DIAGNOSIS — R94.31 ABNORMAL EKG: ICD-10-CM

## 2022-07-21 DIAGNOSIS — I10 ESSENTIAL HYPERTENSION: ICD-10-CM

## 2022-07-21 DIAGNOSIS — R60.0 BILATERAL LEG EDEMA: ICD-10-CM

## 2022-07-21 DIAGNOSIS — I48.21 ATRIAL FIBRILLATION, PERMANENT (HCC): Primary | ICD-10-CM

## 2022-07-21 PROCEDURE — 1123F ACP DISCUSS/DSCN MKR DOCD: CPT | Performed by: INTERNAL MEDICINE

## 2022-07-21 PROCEDURE — 99214 OFFICE O/P EST MOD 30 MIN: CPT | Performed by: INTERNAL MEDICINE

## 2022-07-21 PROCEDURE — 93000 ELECTROCARDIOGRAM COMPLETE: CPT | Performed by: INTERNAL MEDICINE

## 2022-07-21 NOTE — PROGRESS NOTES
Chief Complaint   Patient presents with    Follow-up     6 month follow up   Originally  patient here for atrial fib, [facility] referral.  Known to have atr fib for 5 yr   Has moved to [place] from [facility]  On coumadin        Pt is here for: 6 month follow up    EKG was done today:     Lost 10 lb    Denied cp, dizziness  Or palpitations    Leg edema better now Trace to +1    Hx of chronic leg edema at the end of the day    Sob on exertion chronic    Nonsmoker    FHX    Mother had Heart problem and CVA        Patient Active Problem List   Diagnosis    Longstanding persistent atrial fibrillation ([facility] [address]    Anticoagulated on Coumadin    Diabetes mellitus ([facility] [address]    Hypertension    Atrial fibrillation, permanent ([facility] [address]    Bilateral leg edema +1    Encounter for therapeutic drug monitoring    Essential hypertension    Abnormal EKG       History reviewed. No pertinent surgical history. No Known Allergies     Family History   Problem Relation Age of Onset    Cancer Mother         breast cancer    Diabetes Mother         Social History     Socioeconomic History    Marital status:      Spouse name: Not on file    Number of children: Not on file    Years of education: Not on file    Highest education level: Not on file   Occupational History    Not on file   Tobacco Use    Smoking status: Former     Packs/day: 0.50     Types: Cigarettes     Quit date: 1978     Years since quittin.1    Smokeless tobacco: Never   Vaping Use    Vaping Use: Never used   Substance and Sexual Activity    Alcohol use:  Yes     Alcohol/week: 1.0 standard drink     Types: 1 Cans of beer per week     Comment: occasional     Drug use: No    Sexual activity: Not on file   Other Topics Concern    Not on file   Social History Narrative    Rajendra Blackmon has good family support    Son assists with transportation to [facility]    No barriers with medication affordability    Following up with Diabetic Clinic     Social Determinants of Health Financial Resource Strain: Not on file   Food Insecurity: Not on file   Transportation Needs: Not on file   Physical Activity: Not on file   Stress: Not on file   Social Connections: Not on file   Intimate Partner Violence: Not on file   Housing Stability: Not on file       Current Outpatient Medications   Medication Sig Dispense Refill    atorvastatin (LIPITOR) 10 MG tablet Take 1 tablet by mouth daily 90 tablet 3    omeprazole (PRILOSEC) 40 MG delayed release capsule Take 1 capsule by mouth daily 90 capsule 3    betamethasone valerate (VALISONE) 0.1 % cream APPLY TOPICALLY 2 TIMES DAILY AS NEEDED 15 g 1    metFORMIN (GLUCOPHAGE) 1000 MG tablet TAKE 1 TABLET BY MOUTH TWICE A DAY WITH MEALS 60 tablet 0    loratadine (CLARITIN) 10 MG tablet TAKE 1 TABLET BY MOUTH EVERY DAY 90 tablet 3    blood glucose test strips (ONETOUCH ULTRA) strip TEST 2 TIMES DAILY 100 strip 3    magnesium oxide (MAG-OX) 400 (241.3 Mg) MG TABS tablet TAKE 1 TABLET BY MOUTH EVERY DAY 30 tablet 6    spironolactone-hydroCHLOROthiazide (ALDACTAZIDE) 25-25 MG per tablet TAKE 1 TABLET BY MOUTH EVERY DAY 90 tablet 3    amLODIPine (NORVASC) 10 MG tablet TAKE 1 TABLET BY MOUTH EVERY DAY 90 tablet 3    metoprolol tartrate (LOPRESSOR) 25 MG tablet TAKE 1 TABLET BY MOUTH TWICE A  tablet 3    warfarin (COUMADIN) 5 MG tablet TAKE AS DIRECTED BY [facility] #[facility] TABS = 30 DAY SUPPLY 90 tablet 3    meclizine (ANTIVERT) 12.5 MG tablet TAKE 1 TABLET BY MOUTH 3 TIMES DAILY AS NEEDED FOR DIZZINESS OR NAUSEA 60 tablet 0    OneTouch Delica Lancets 39A MISC TEST 2 TIMES DAILY 180 each 4    blood glucose monitor kit and supplies Test one time a day . Dispense 30 strips a month with 5 refills Please dispense meter and suppliesthat insurance covers . 1 kit 0    UNABLE TO FIND Allergy shot every other Friday at allergist's office. No current facility-administered medications for this visit.        Review of Systems -     General ROS: negative  Psychological ROS: negative  Hematological and Lymphatic ROS: No history of blood clots or bleeding disorder. Respiratory ROS: no cough,  or wheezing, the rest see HPI  Cardiovascular ROS: See HPI  Gastrointestinal ROS: negative  Genito-Urinary ROS: no dysuria, trouble voiding, or hematuria  Musculoskeletal ROS: negative  Neurological ROS: no TIA or stroke symptoms  Dermatological ROS: negative      Blood pressure 113/75, pulse 79, height 5' 6\" (1.676 m), weight 174 lb (78.9 kg). Physical Examination:    General appearance - alert, well appearing, and in no distress  HEENT- Pink conjunctiva  , Non-icteri sclera,PERRLA  Mental status - alert, oriented to person, place, and time  Neck - supple, no significant adenopathy, no JVD, or carotid bruits  Chest - clear to auscultation, no wheezes, rales or rhonchi, symmetric air entry  Heart - normal rate, regular rhythm, normal S1, S2, no murmurs, rubs, clicks or gallops  Abdomen - soft, nontender, nondistended, no masses or organomegaly  WOLFGANG- no CVA or flank tenderness, no suprapubic tenderness  Neurological - alert, oriented, normal speech, no focal findings or movement disorder noted  Musculoskeletal/limbs - no joint tenderness, deformity or swelling   - peripheral pulses normal, no pedal edema, no clubbing or cyanosis  Skin - normal coloration and turgor, no rashes, no suspicious skin lesions noted  Psych- appropriate mood and affect    Lab  No results for input(s): CKTOTAL, CKMB, CKMBINDEX, TROPONINI in the last 72 hours.   CBC:   Lab Results   Component Value Date/Time    WBC 5.2 07/02/2020 12:47 AM    RBC 4.81 07/02/2020 12:47 AM    HGB 11.4 02/23/2022 11:50 AM    HCT 36.5 02/23/2022 11:50 AM    MCV 73.6 07/02/2020 12:47 AM    MCH 22.9 07/02/2020 12:47 AM    MCHC 31.1 07/02/2020 12:47 AM    RDW 14.9 06/19/2018 08:35 AM     07/02/2020 12:47 AM    MPV 9.5 07/02/2020 12:47 AM     BMP:    Lab Results   Component Value Date/Time     02/23/2022 11:50 AM    K 3.3 02/23/2022 11:50 AM    CL 94 02/23/2022 11:50 AM    CO2 26 02/23/2022 11:50 AM    BUN 17 02/23/2022 11:50 AM    LABALBU 4.1 09/10/2020 11:10 AM    CREATININE 1.0 02/23/2022 11:50 AM    CALCIUM 10.1 02/23/2022 11:50 AM    LABGLOM 64 02/23/2022 11:50 AM    GLUCOSE 106 02/23/2022 11:50 AM     Hepatic Function Panel:    Lab Results   Component Value Date/Time    ALKPHOS 96 09/10/2020 11:10 AM    ALT 15 09/10/2020 11:10 AM    AST 18 09/10/2020 11:10 AM    PROT 7.5 09/10/2020 11:10 AM    BILITOT 0.5 09/10/2020 11:10 AM    BILIDIR <0.2 09/10/2020 11:10 AM    LABALBU 4.1 09/10/2020 11:10 AM     Magnesium:    Lab Results   Component Value Date/Time    MG 1.5 02/23/2022 11:50 AM     Warfarin PT/INR:  No components found for: PTPATWAR, PTINRWAR  HgBA1c:    Lab Results   Component Value Date/Time    LABA1C 6.1 04/27/2022 02:53 PM    LABA1C 7.8 02/25/2020 12:04 PM     FLP:    Lab Results   Component Value Date/Time    TRIG 98 11/03/2021 11:18 AM    HDL 59 11/03/2021 11:18 AM    LDLCALC 26 11/03/2021 11:18 AM     TSH:  No results found for: TSH      ekg 7/6/2020  Atrial fibrillation  -irregular conduction  CVR 84 BPM  -Nonspecific ST depression   +   Diffuse nonspecific T-abnormality  -Nondiagnostic. ABNORMAL     Echo  Conclusions      Summary   Normal left ventricle size and systolic function. Ejection fraction was estimated at 60 to 65 %. There were no regional left ventricular wall motion abnormalities and wall   thickness was within normal limits. The left atrium is Moderately dilated. Moderately enlarged right atrium size. Mildly enlarged right ventricle cavity.    Right ventricle global systolic function is normal .      Signature      ----------------------------------------------------------------   Electronically signed by Nicholas Brower   physician) on 10/09/2020 at 06:49 PM   ----------------------------------------------------------------        Conclusions Summary   Lexiscan EKG stress test is not suggestive for ischemia. The nuclear images is not suggestive for myocardial ischemia. Signatures    ----------------------------------------------------------------   Electronically signed by Heather Dixon MD (Interpreting   Cardiologist) on 10/09/2020 at 17:57   ------------------------------------------------------    ekg 8/19/21  Atrial fibrillation   Low voltage in precordial leads.    -Old anterior infarct.    -  Nonspecific T-abnormality. ABNORMAL     Ekg 7/21/22  Atrial fibrillation   Low voltage in precordial leads.    -Old anterior infarct.    -  Nonspecific T-abnormality. ABNORMAL       Assessment     Diagnosis Orders   1. Atrial fibrillation, permanent (HCC)  EKG 12 lead      2. Essential hypertension  EKG 12 lead      3. Bilateral leg edema  EKG 12 lead      4. Abnormal EKG          chadsvasc= 5    Plan     The current meds and labs reviewed    Hx of allergy   Has been getting allergy shot     Atr fib Chronic  Cont rate control  Cont coumadin  INR 2.8  option of DOAC given pat want to cont coumadin    Hypertension, on medical treatment. Seems to be under good control. Patient is compliant with medical treatment. Cont  norvasc to 10  Mg po qd  Leg edema B/L +1 better   Cont  aldactazide 25/25 1 tab po qd    Echo and lexisc- WNL   BMP and MG- reviewed  Mg 1.6 , 1.3 and now 1.5  Cont  mg oxide 400 mg po qd    D/w the pat the plan of care and result of test    Hx of K 3.5  And 3.9  and then 3.3 remain low despite high k diet  Re-Advised high K diet tomato, banana, avocado,orange potatoe  Could not tolerate kcl for nausea    Still nausea and advised to see pcp    Discussed use, benefit, and side effects of prescribed medications. All patient questions answered. Pt voiced understanding. Instructed to continue current medications, diet and exercise. Continue risk factor modification and medical management. Patient agreed with treatment plan.  Follow up as directed.       RTC in 6 Months      Edwar Santo, [facility]

## 2022-07-29 DIAGNOSIS — E11.59 TYPE 2 DIABETES MELLITUS WITH OTHER CIRCULATORY COMPLICATION, WITHOUT LONG-TERM CURRENT USE OF INSULIN (HCC): ICD-10-CM

## 2022-08-01 RX ORDER — LANCETS 33 GAUGE
EACH MISCELLANEOUS
Qty: 200 EACH | Refills: 4 | Status: SHIPPED | OUTPATIENT
Start: 2022-08-01

## 2022-08-02 ENCOUNTER — OFFICE VISIT (OUTPATIENT)
Dept: FAMILY MEDICINE CLINIC | Age: 87
End: 2022-08-02
Payer: COMMERCIAL

## 2022-08-02 VITALS
TEMPERATURE: 98.4 F | RESPIRATION RATE: 14 BRPM | WEIGHT: 177.8 LBS | BODY MASS INDEX: 28.57 KG/M2 | HEIGHT: 66 IN | SYSTOLIC BLOOD PRESSURE: 122 MMHG | OXYGEN SATURATION: 96 % | HEART RATE: 71 BPM | DIASTOLIC BLOOD PRESSURE: 64 MMHG

## 2022-08-02 DIAGNOSIS — M25.562 ACUTE PAIN OF LEFT KNEE: Primary | ICD-10-CM

## 2022-08-02 DIAGNOSIS — R60.0 PEDAL EDEMA: ICD-10-CM

## 2022-08-02 DIAGNOSIS — I48.91 ATRIAL FIBRILLATION, UNSPECIFIED TYPE (HCC): ICD-10-CM

## 2022-08-02 DIAGNOSIS — M79.605 ACUTE PAIN OF LEFT LOWER EXTREMITY: ICD-10-CM

## 2022-08-02 PROCEDURE — 99214 OFFICE O/P EST MOD 30 MIN: CPT | Performed by: NURSE PRACTITIONER

## 2022-08-02 PROCEDURE — 1123F ACP DISCUSS/DSCN MKR DOCD: CPT | Performed by: NURSE PRACTITIONER

## 2022-08-02 RX ORDER — HYDROCODONE BITARTRATE AND ACETAMINOPHEN 5; 325 MG/1; MG/1
1 TABLET ORAL EVERY 4 HOURS PRN
Qty: 18 TABLET | Refills: 0 | Status: SHIPPED | OUTPATIENT
Start: 2022-08-02 | End: 2022-08-05

## 2022-08-02 ASSESSMENT — ENCOUNTER SYMPTOMS
COLOR CHANGE: 0
RESPIRATORY NEGATIVE: 1

## 2022-08-02 NOTE — PROGRESS NOTES
[facility]  [address] [place] [address]  Dept: [phone]  Dept Fax: [phone]  Loc: [phone]    Antwan Ch is a 82 y.o. femalewho presents today for her medical conditions/complaints as noted below. Ca Harper c/o of Joint Pain (Left knee - started this morning)      HPI:      Pt presents today with new onset LLE pedal edema and left calf pain with left knee pain and swelling. Pt denies any injury to the left knee or leg. Has been immobile. Does have a history of atrial fibrillation and is anticoagulated with  coumadin. Denies sob or cp. Wells score of 2 with places pt at moderate risk. Pt states the left lower leg hurts to walk on it. Has not tired ice or medications. Pain is 6/10. Current Outpatient Medications   Medication Sig Dispense Refill    HYDROcodone-acetaminophen (NORCO) 5-325 MG per tablet Take 1 tablet by mouth every 4 hours as needed for Pain for up to 3 days. 0.5 to 1 full tablet BID Intended supply: 3 days.  Take lowest dose possible to manage pain 18 tablet 0    Lancets (ONETOUCH DELICA PLUS [id number]) MISC TEST 2 TIMES DAILY 200 each 4    atorvastatin (LIPITOR) 10 MG tablet Take 1 tablet by mouth daily 90 tablet 3    omeprazole (PRILOSEC) 40 MG delayed release capsule Take 1 capsule by mouth daily 90 capsule 3    betamethasone valerate (VALISONE) 0.1 % cream APPLY TOPICALLY 2 TIMES DAILY AS NEEDED 15 g 1    metFORMIN (GLUCOPHAGE) 1000 MG tablet TAKE 1 TABLET BY MOUTH TWICE A DAY WITH MEALS 60 tablet 0    loratadine (CLARITIN) 10 MG tablet TAKE 1 TABLET BY MOUTH EVERY DAY 90 tablet 3    blood glucose test strips (ONETOUCH ULTRA) strip TEST 2 TIMES DAILY 100 strip 3    magnesium oxide (MAG-OX) 400 (241.3 Mg) MG TABS tablet TAKE 1 TABLET BY MOUTH EVERY DAY 30 tablet 6    spironolactone-hydroCHLOROthiazide (ALDACTAZIDE) 25-25 MG per tablet TAKE 1 TABLET BY MOUTH EVERY DAY 90 tablet 3 amLODIPine (NORVASC) 10 MG tablet TAKE 1 TABLET BY MOUTH EVERY DAY 90 tablet 3    metoprolol tartrate (LOPRESSOR) 25 MG tablet TAKE 1 TABLET BY MOUTH TWICE A  tablet 3    warfarin (COUMADIN) 5 MG tablet TAKE AS DIRECTED BY [facility] TABS = 30 DAY SUPPLY 90 tablet 3    meclizine (ANTIVERT) 12.5 MG tablet TAKE 1 TABLET BY MOUTH 3 TIMES DAILY AS NEEDED FOR DIZZINESS OR NAUSEA 60 tablet 0    blood glucose monitor kit and supplies Test one time a day . Dispense 30 strips a month with 5 refills Please dispense meter and suppliesthat insurance covers . 1 kit 0    UNABLE TO FIND Allergy shot every other Friday at allergist's office. No current facility-administered medications for this visit. Past Medical History:   Diagnosis Date    Atrial fibrillation ([facility] [address]     Diabetes mellitus ([facility] [address]     Hypertension       History reviewed. No pertinent surgical history. Family History   Problem Relation Age of Onset    Cancer Mother         breast cancer    Diabetes Mother      Social History     Tobacco Use    Smoking status: Former     Packs/day: 0.50     Types: Cigarettes     Quit date: 1978     Years since quittin.1    Smokeless tobacco: Never   Substance Use Topics    Alcohol use:  Yes     Alcohol/week: 1.0 standard drink     Types: 1 Cans of beer per week     Comment: occasional         No Known Allergies    Health Maintenance   Topic Date Due    DTaP/Tdap/Td vaccine (1 - Tdap) Never done    Shingles vaccine (1 of 2) Never done    COVID-19 Vaccine (3 - Booster for Pfizer series) 2021    Pneumococcal 65+ years Vaccine (1 - PCV) 10/30/2025 (Originally 1941)    Flu vaccine (1) 2022    Lipids  2022    Depression Screen  2023    DEXA (modify frequency per FRAX score)  Completed    Hepatitis A vaccine  Aged Out    Hib vaccine  Aged Out    Meningococcal (ACWY) vaccine  Aged Out       Subjective:      Review of Systems   Constitutional:  Negative for chills, fatigue and fever. Respiratory: Negative. Cardiovascular:  Positive for leg swelling. Negative for chest pain and palpitations. Genitourinary:  Negative for difficulty urinating and dysuria. Musculoskeletal:  Positive for gait problem, joint swelling and myalgias. Skin:  Negative for color change. Neurological:  Negative for dizziness, facial asymmetry and headaches. Objective:      /64   Pulse 71   Temp 98.4 °F (36.9 °C) (Oral)   Resp 14   Ht 5' 6\" (1.676 m)   Wt 177 lb 12.8 oz (80.6 kg)   SpO2 96%   BMI 28.70 kg/m²      Physical Exam  Vitals and nursing note reviewed. Constitutional:       Appearance: She is not ill-appearing. Cardiovascular:      Rate and Rhythm: Normal rate and regular rhythm. Pulses: Normal pulses. Heart sounds: Normal heart sounds. No murmur heard. Pulmonary:      Effort: Pulmonary effort is normal. No respiratory distress. Breath sounds: Normal breath sounds. Musculoskeletal:         General: Swelling and tenderness present. Left knee: Swelling, effusion and bony tenderness present. Tenderness present over the medial joint line. Abnormal meniscus. Normal pulse. Left lower leg: Swelling and tenderness present. 2+ Pitting Edema present. Left ankle: Swelling present. Tenderness present. No lateral malleolus tenderness. Normal pulse. Left Achilles Tendon: Normal.      Comments: Negative homans on left. Skin:     General: Skin is warm and dry. Capillary Refill: Capillary refill takes less than 2 seconds. Findings: Erythema present. Neurological:      General: No focal deficit present. Mental Status: She is alert and oriented to person, place, and time. Assessment/Plan:           1. Acute pain of left knee  Ice, norco, no vigorous activity  Could be arthritis vs decreased joint space narrowing vs torn meniscus  If x-rays negative will consdier PT and/or MRI    - XR KNEE BILATERAL STANDING;  Future  - HYDROcodone-acetaminophen (NORCO) 5-325 MG per tablet; Take 1 tablet by mouth every 4 hours as needed for Pain for up to 3 days. 0.5 to 1 full tablet BID Intended supply: 3 days. Take lowest dose possible to manage pain  Dispense: 18 tablet; Refill: 0  - VL DUP LOWER EXTREMITY VENOUS LEFT; Future    2. Acute pain of left lower extremity  R/o DVT , pt with risk factors of immobilization, a fib but anticoagulated   - HYDROcodone-acetaminophen (NORCO) 5-325 MG per tablet; Take 1 tablet by mouth every 4 hours as needed for Pain for up to 3 days. 0.5 to 1 full tablet BID Intended supply: 3 days. Take lowest dose possible to manage pain  Dispense: 18 tablet; Refill: 0  - VL DUP LOWER EXTREMITY VENOUS LEFT; Future    3. Pedal edema  #2    4. Atrial fibrillation, unspecified type [facility])  Managed by cardiology   Pt in agreement with plan     Return if symptoms worsen or fail to improve. Reccommended tobaccocessation options including pharmacologic methods, counseled great than 3 minutesduring this visit:  Yes[]  No  []       Patient given educational materials -see patient instructions. Discussed use, benefit, and side effects of prescribedmedications. All patient questions answered. Pt voiced understanding. Reviewedhealth maintenance. Instructed to continue current medications, diet and exercise. Patient agreed with treatment plan. Follow up as directed.        Electronicallysigned by Iline Phalen, APRN - CNP on 8/2/2022 at 5:11 PM

## 2022-08-03 ENCOUNTER — HOSPITAL ENCOUNTER (OUTPATIENT)
Dept: GENERAL RADIOLOGY | Age: 87
Discharge: HOME | End: 2022-08-03
Payer: COMMERCIAL

## 2022-08-03 ENCOUNTER — HOSPITAL ENCOUNTER (OUTPATIENT)
Age: 87
Discharge: HOME | End: 2022-08-03
Payer: COMMERCIAL

## 2022-08-03 ENCOUNTER — HOSPITAL ENCOUNTER (OUTPATIENT)
Dept: INTERVENTIONAL RADIOLOGY/VASCULAR | Age: 87
Discharge: HOME | End: 2022-08-03
Payer: COMMERCIAL

## 2022-08-03 DIAGNOSIS — M25.562 ACUTE PAIN OF LEFT KNEE: ICD-10-CM

## 2022-08-03 DIAGNOSIS — M79.605 ACUTE PAIN OF LEFT LOWER EXTREMITY: ICD-10-CM

## 2022-08-03 PROCEDURE — 73565 X-RAY EXAM OF KNEES: CPT

## 2022-08-03 PROCEDURE — 93971 EXTREMITY STUDY: CPT

## 2022-08-04 ENCOUNTER — TELEPHONE (OUTPATIENT)
Dept: FAMILY MEDICINE CLINIC | Age: 87
End: 2022-08-04

## 2022-08-04 DIAGNOSIS — M17.11 PRIMARY OSTEOARTHRITIS OF RIGHT KNEE: Primary | ICD-10-CM

## 2022-08-04 NOTE — TELEPHONE ENCOUNTER
----- Message from JOSH Tillman CNP sent at 8/3/2022  4:32 PM EDT -----  Let pt know her u/s was negative for a blood clot in her leg, ask how her pain and swelling is today.  Thanks

## 2022-08-04 NOTE — TELEPHONE ENCOUNTER
Patient informed and verbalized understanding. She states she will like to have a referral to Ortho. She is not currently taking any medication for pain, states she is not having any pain. She is going to  her medication today to have if she needs it. She does state the swelling is better today.

## 2022-08-04 NOTE — TELEPHONE ENCOUNTER
Patient informed and verbalized understanding. She states there is no pain in her leg today, also the swelling is still there just not as bad. No questions or concerns right now.

## 2022-08-04 NOTE — TELEPHONE ENCOUNTER
----- Message from Tera Holter, APRN - CNP sent at 8/3/2022  4:36 PM EDT -----  Let pt know her knee x-ray was positive for bone spurs of her knee along with narrowing of the joint space which is called osteoarthritis and is causing her pain. And this could also be causing the right leg to swell. I recommend an ortho referral to see if she is a candidate for an injection to help relieve the pain. Is she taking anything for pain, is the swelling any better today?

## 2022-08-09 DIAGNOSIS — Z79.01 ANTICOAGULATED ON COUMADIN: Primary | ICD-10-CM

## 2022-08-09 DIAGNOSIS — I48.21 ATRIAL FIBRILLATION, PERMANENT (HCC): ICD-10-CM

## 2022-08-15 ENCOUNTER — OFFICE VISIT (OUTPATIENT)
Dept: INTERNAL MEDICINE CLINIC | Age: 87
End: 2022-08-15
Payer: COMMERCIAL

## 2022-08-15 VITALS — WEIGHT: 173 LBS | BODY MASS INDEX: 27.92 KG/M2

## 2022-08-15 DIAGNOSIS — E11.59 TYPE 2 DIABETES MELLITUS WITH OTHER CIRCULATORY COMPLICATION, WITHOUT LONG-TERM CURRENT USE OF INSULIN (HCC): Primary | ICD-10-CM

## 2022-08-15 PROCEDURE — 97803 MED NUTRITION INDIV SUBSEQ: CPT | Performed by: DIETITIAN, REGISTERED

## 2022-08-15 NOTE — PROGRESS NOTES
[address] [place] [address]. [address] [place] [address] [place] [facility], [facility]  [phone] (phone)  [phone] (fax)    Patient Name: Gypsy Gimenez. Date of Birth: [id number]. MRN: [id number]      Assessment: Patient is a 82 y.o. female seen for follow-up MNT visit for Diabetes. -Nutritionally relevant labs:   Lab Results   Component Value Date/Time    LABA1C 6.1 (H) 04/27/2022 02:53 PM    LABA1C 5.9 (H) 10/11/2021 01:10 PM    LABA1C 5.7 01/06/2021 12:52 PM    LABA1C 7.8 (H) 02/25/2020 12:04 PM    LABA1C 7.4 01/06/2020 11:47 AM    LABA1C 10.3 (H) 09/16/2019 11:35 AM    GLUCOSE 106 02/23/2022 11:50 AM    GLUCOSE 109 (H) 11/03/2021 11:18 AM    CHOL 105 11/03/2021 11:18 AM    HDL 59 11/03/2021 11:18 AM    LDLCALC 26 11/03/2021 11:18 AM    TRIG 98 11/03/2021 11:18 AM     -Blood sugar trends: Checking 3 x/ day. Per Beth Talley few bld sugars in the 200s 1-2 hrs after meals. After RD reviewed most of the elevated bld sugars correlate to high carb intake. Majority of bld sugars <180mg/dL. -Food recall: see detailed food and bld sugar records.      -  Current Outpatient Medications on File Prior to Visit   Medication Sig Dispense Refill    Lancets (ONETOUCH DELICA PLUS [id number]) MISC TEST 2 TIMES DAILY 200 each 4    atorvastatin (LIPITOR) 10 MG tablet Take 1 tablet by mouth daily 90 tablet 3    omeprazole (PRILOSEC) 40 MG delayed release capsule Take 1 capsule by mouth daily 90 capsule 3    betamethasone valerate (VALISONE) 0.1 % cream APPLY TOPICALLY 2 TIMES DAILY AS NEEDED 15 g 1    metFORMIN (GLUCOPHAGE) 1000 MG tablet TAKE 1 TABLET BY MOUTH TWICE A DAY WITH MEALS 60 tablet 0    loratadine (CLARITIN) 10 MG tablet TAKE 1 TABLET BY MOUTH EVERY DAY 90 tablet 3    blood glucose test strips (ONETOUCH ULTRA) strip TEST 2 TIMES DAILY 100 strip 3    magnesium oxide (MAG-OX) 400 (241.3 Mg) MG TABS tablet TAKE 1 TABLET BY MOUTH EVERY DAY 30 tablet 6 spironolactone-hydroCHLOROthiazide (ALDACTAZIDE) 25-25 MG per tablet TAKE 1 TABLET BY MOUTH EVERY DAY 90 tablet 3    amLODIPine (NORVASC) 10 MG tablet TAKE 1 TABLET BY MOUTH EVERY DAY 90 tablet 3    metoprolol tartrate (LOPRESSOR) 25 MG tablet TAKE 1 TABLET BY MOUTH TWICE A  tablet 3    warfarin (COUMADIN) 5 MG tablet TAKE AS DIRECTED BY [facility] #30 TABS = 30 DAY SUPPLY 90 tablet 3    meclizine (ANTIVERT) 12.5 MG tablet TAKE 1 TABLET BY MOUTH 3 TIMES DAILY AS NEEDED FOR DIZZINESS OR NAUSEA 60 tablet 0    blood glucose monitor kit and supplies Test one time a day . Dispense 30 strips a month with 5 refills Please dispense meter and suppliesthat insurance covers . 1 kit 0    UNABLE TO FIND Allergy shot every other Friday at allergist's office. No current facility-administered medications on file prior to visit. Vitals from current and previous visits: Wt 173 lb (78.5 kg)   BMI 27.92 kg/m²     -Body mass index is 27.92 kg/m². 25-29.9 - Overweight. - Wt down 5# from last appt. (4 months ago)    Nutrition Diagnosis:   Food and nutrition-related knowledge deficit related to Lack of previous MNT/currently undergoing MNT as evidenced by Patient report of wanting to continue to f/u with RD for further edu and accountability. Intervention:  -Impression: Detailed food and blood sugars records kept. See attached copies. Overall bld sugars within goal. Few noted in 200s but seemed to correlate to excess carb intake. Denies nausea. -Instructed the patient on: Reviewed food/bld sugar logs and discussed limiting carb intake to 30-60 gm to avoid 200 mg/dL bld sugars. Patient Instructions   No pop or maury aid. Limit beans to 1 cup when eating them. Overall great job!    1 month worth food records before next appt. Comprehension verified using teachback method.     Monitoring/Evaluation:   -Followup visit: 12 weeks with dietitian.   -Receptiveness to education/goals:

## 2022-08-15 NOTE — PATIENT INSTRUCTIONS
No pop or maury aid. Limit beans to 1 cup when eating them. Overall great job!    1 month worth food records before next appt.

## 2022-08-16 ENCOUNTER — HOSPITAL ENCOUNTER (OUTPATIENT)
Age: 87
Discharge: HOME | End: 2022-08-16
Payer: COMMERCIAL

## 2022-08-16 ENCOUNTER — HOSPITAL ENCOUNTER (OUTPATIENT)
Dept: PHARMACY | Age: 87
Setting detail: THERAPIES SERIES
Discharge: HOME | End: 2022-08-16
Payer: COMMERCIAL

## 2022-08-16 DIAGNOSIS — Z79.01 ANTICOAGULATED ON COUMADIN: ICD-10-CM

## 2022-08-16 DIAGNOSIS — R60.0 BILATERAL LEG EDEMA: ICD-10-CM

## 2022-08-16 DIAGNOSIS — I10 ESSENTIAL HYPERTENSION: ICD-10-CM

## 2022-08-16 DIAGNOSIS — R94.31 ABNORMAL EKG: ICD-10-CM

## 2022-08-16 DIAGNOSIS — Z51.81 ENCOUNTER FOR THERAPEUTIC DRUG MONITORING: ICD-10-CM

## 2022-08-16 DIAGNOSIS — I48.11 LONGSTANDING PERSISTENT ATRIAL FIBRILLATION (HCC): Primary | ICD-10-CM

## 2022-08-16 DIAGNOSIS — I48.21 ATRIAL FIBRILLATION, PERMANENT (HCC): ICD-10-CM

## 2022-08-16 LAB
ALBUMIN SERPL-MCNC: 4.4 G/DL (ref 3.5–5.1)
ALP BLD-CCNC: 124 U/L (ref 38–126)
ALT SERPL-CCNC: 18 U/L (ref 11–66)
ANION GAP SERPL CALCULATED.3IONS-SCNC: 13 MEQ/L (ref 8–16)
AST SERPL-CCNC: 18 U/L (ref 5–40)
BILIRUB SERPL-MCNC: 0.6 MG/DL (ref 0.3–1.2)
BILIRUBIN DIRECT: < 0.2 MG/DL (ref 0–0.3)
BUN BLDV-MCNC: 19 MG/DL (ref 7–22)
CALCIUM SERPL-MCNC: 10.2 MG/DL (ref 8.5–10.5)
CHLORIDE BLD-SCNC: 101 MEQ/L (ref 98–111)
CHOLESTEROL, TOTAL: 108 MG/DL (ref 100–199)
CO2: 26 MEQ/L (ref 23–33)
CREAT SERPL-MCNC: 1 MG/DL (ref 0.4–1.2)
ERYTHROCYTE [DISTWIDTH] IN BLOOD BY AUTOMATED COUNT: 14.9 % (ref 11.5–14.5)
ERYTHROCYTE [DISTWIDTH] IN BLOOD BY AUTOMATED COUNT: 39.1 FL (ref 35–45)
GFR SERPL CREATININE-BSD FRML MDRD: 64 ML/MIN/1.73M2
GLUCOSE BLD-MCNC: 114 MG/DL (ref 70–108)
HCT VFR BLD CALC: 37.2 % (ref 37–47)
HDLC SERPL-MCNC: 57 MG/DL
HEMOGLOBIN: 11.4 GM/DL (ref 12–16)
LDL CHOLESTEROL CALCULATED: 35 MG/DL
MAGNESIUM: 1.5 MG/DL (ref 1.6–2.4)
MCH RBC QN AUTO: 22.6 PG (ref 26–33)
MCHC RBC AUTO-ENTMCNC: 30.6 GM/DL (ref 32.2–35.5)
MCV RBC AUTO: 73.8 FL (ref 81–99)
PLATELET # BLD: 267 THOU/MM3 (ref 130–400)
PMV BLD AUTO: 9.2 FL (ref 9.4–12.4)
POC INR: 2.2 (ref 0.8–1.2)
POTASSIUM SERPL-SCNC: 3.9 MEQ/L (ref 3.5–5.2)
RBC # BLD: 5.04 MILL/MM3 (ref 4.2–5.4)
SODIUM BLD-SCNC: 140 MEQ/L (ref 135–145)
TOTAL PROTEIN: 7.6 G/DL (ref 6.1–8)
TRIGL SERPL-MCNC: 82 MG/DL (ref 0–199)
WBC # BLD: 4.6 THOU/MM3 (ref 4.8–10.8)

## 2022-08-16 PROCEDURE — 99211 OFF/OP EST MAY X REQ PHY/QHP: CPT

## 2022-08-16 PROCEDURE — 80053 COMPREHEN METABOLIC PANEL: CPT

## 2022-08-16 PROCEDURE — 85027 COMPLETE CBC AUTOMATED: CPT

## 2022-08-16 PROCEDURE — 82248 BILIRUBIN DIRECT: CPT

## 2022-08-16 PROCEDURE — 36416 COLLJ CAPILLARY BLOOD SPEC: CPT

## 2022-08-16 PROCEDURE — 80061 LIPID PANEL: CPT

## 2022-08-16 PROCEDURE — 85610 PROTHROMBIN TIME: CPT

## 2022-08-16 PROCEDURE — 83735 ASSAY OF MAGNESIUM: CPT

## 2022-08-16 PROCEDURE — 36415 COLL VENOUS BLD VENIPUNCTURE: CPT

## 2022-08-16 NOTE — PROGRESS NOTES
Medication Management [address]  [phone] (phone)  [phone] (fax)    Ms. Paresh Finley is a 82 y.o.  female with history of Afib who presents today for anticoagulation monitoring and adjustment. Patient verifies current dosing regimen and tablet strength. No missed or extra doses. Patient denies s/s bleeding/bruising/swelling/SOB/chest pain  No blood in urine or stool. No dietary changes. No changes in medication/OTC agents/Herbals. No change in alcohol use or tobacco use. No change in activity level. Patient denies headaches/dizziness/lightheadedness/falls. No vomiting/diarrhea or acute illness. Occasionally feeling nauseous last few weeks  No Procedures scheduled in the future at this time. Assessment:   Lab Results   Component Value Date    INR 2.20 (H) 08/16/2022    INR 2.50 (H) 06/29/2022    INR 2.70 (H) 05/18/2022     INR therapeutic   Recent Labs     08/16/22  1054   INR 2.20*       Plan:  Continue Coumadin 5 mg daily. Recheck INR in 6 week(s). Patient reminded to call the Anticoagulation Clinic with any signs or symptoms of bleeding or with any medication changes. Patient given instructions utilizing the teach back method. After visit summary printed and reviewed with patient. Discharged ambulatory in no apparent distress.     [facility] , PGY1 Pharmacy Resident 8/16/2022 11:06 AM    Resident supervised and precepted by Myles Cole, PharmD, BCPS, CACP, CTTS 8/16/2022 1:36 PM      For Pharmacy Admin Tracking Only    Intervention Detail: Lab(s) Ordered Annual CBC; hepatic order placed by cardiology   Total # of Interventions Recommended: 1  Total # of Interventions Accepted: 1  Time Spent (min): 20

## 2022-08-29 ENCOUNTER — TELEPHONE (OUTPATIENT)
Dept: FAMILY MEDICINE CLINIC | Age: 87
End: 2022-08-29

## 2022-08-29 NOTE — TELEPHONE ENCOUNTER
Patient calling needing a renewal of a medication  that she states is for the ticking/ringing in her ear but can not recall the name of the medication and states that it has been a while since she's needed it and not sure which provider prescribed it for her but sure that Dimas would be able to figure out the name of the medication and that it was a tablet that she takes  Patient did report that she is currently having loud ringing in her ears currently x 3 days and that the ringing gets louder when she has to speak loud or if anyone is speaking loud to her.     Please advise

## 2022-08-29 NOTE — TELEPHONE ENCOUNTER
There is medication to take for dizziness(antivert) that I can order,  but it is not necessarily for tinnitus. It looks as though her ENT discussed sending her to neurology for this a few years ago. Would she like the antivert for dizziness ?

## 2022-08-30 NOTE — TELEPHONE ENCOUNTER
Patient has been informed and voiced understanding and states that she is not having dizziness but she will contact ENT to get a refill of the medication needed [phone]

## 2022-09-15 DIAGNOSIS — L82.1 OTHER SEBORRHEIC KERATOSIS: ICD-10-CM

## 2022-09-21 ENCOUNTER — TELEPHONE (OUTPATIENT)
Dept: ALLERGY | Age: 87
End: 2022-09-21

## 2022-09-21 ENCOUNTER — TELEPHONE (OUTPATIENT)
Dept: FAMILY MEDICINE CLINIC | Age: 87
End: 2022-09-21

## 2022-09-21 ENCOUNTER — OFFICE VISIT (OUTPATIENT)
Dept: ENT CLINIC | Age: 87
End: 2022-09-21
Payer: COMMERCIAL

## 2022-09-21 VITALS
HEIGHT: 66 IN | WEIGHT: 174.2 LBS | DIASTOLIC BLOOD PRESSURE: 70 MMHG | BODY MASS INDEX: 28 KG/M2 | OXYGEN SATURATION: 99 % | HEART RATE: 84 BPM | RESPIRATION RATE: 16 BRPM | SYSTOLIC BLOOD PRESSURE: 124 MMHG | TEMPERATURE: 97 F

## 2022-09-21 DIAGNOSIS — H90.3 ASYMMETRICAL SENSORINEURAL HEARING LOSS: Primary | ICD-10-CM

## 2022-09-21 DIAGNOSIS — H93.11 TINNITUS, RIGHT: ICD-10-CM

## 2022-09-21 DIAGNOSIS — H81.90 EPISODIC RECURRENT VERTIGO: ICD-10-CM

## 2022-09-21 DIAGNOSIS — R42 DIZZINESS: ICD-10-CM

## 2022-09-21 DIAGNOSIS — H93.13 CLICKING TINNITUS OF BOTH EARS: ICD-10-CM

## 2022-09-21 PROCEDURE — 99213 OFFICE O/P EST LOW 20 MIN: CPT | Performed by: PHYSICIAN ASSISTANT

## 2022-09-21 PROCEDURE — 1123F ACP DISCUSS/DSCN MKR DOCD: CPT | Performed by: PHYSICIAN ASSISTANT

## 2022-09-21 NOTE — TELEPHONE ENCOUNTER
Patient calling stating that she was seen by ENT today and the provider who she saw was supposed to reach out to Dimas to discuss a medication that he wanted to put her on     I informed the patient that I saw where she was seen by ENT today but that there was nothing from the ENT provider sent to the office at this time nor has anyone from ENT made contact with the office as far as I could tell

## 2022-09-21 NOTE — PROGRESS NOTES
[facility] EAR, NOSE AND THROAT  [address] [place] [address] [place] [address]  Dept: [phone]  Dept Fax: [phone]  Loc: [phone]    Enrique Soriano is a 82 y.o. female who was referred by No ref. provider found for:  Chief Complaint   Patient presents with    Follow-up     Patient is here for tinnitus and ear cleaning. She said she is not currently having the ringing in the ear. When she woke up this morning it was good. Tatyana Cabrera HPI:     Current visit HPI- Patient presents for follow up regarding tinnitus and other complaints. She has been seen previously for tinnitus and ear fullness. She reports that it resolved for at least the last year, but in the last 2 weeks it seemed to recur. She states she has constant tinnitus which causes a headache and makes her dizzy with nausea. She states the headache is worse first thing in the morning and seems to improve as the day goes on. She has tried Tylenol but it hasn't helped. She states that her tinnitus initially with a \"tick-tick-tick\" sound. The tinnitus will gradually worsen and become louder and she begins to feel dizzy then. She denies any ear fullness during these episodes. She reports that if she turns her head to the right she will feels a \"snap\" and then the ringing will resolve for about 5-10 minutes. She states the same thing will happen occasionally when looking to the left, but not as consistent as looking to the right. She reports constant right otalgia these last few days, no left otalgia. She hasn't noticed any changes in her hearing. She states that her symptoms have improved today compared to the normal. Review of previous visit/phone encounter, patient did not feel the Dyazide helped her tinnitus. She did see relief from her flare ups from diazepam. The patient does have a history of Afib.     Previous visit HPI 5/26/20- Enrique Soriano is a 82 y.o. female who presents today for follow-up on her audiometry and ECOG. Hearing was slightly improved and slightly worse in the left ear. Tympanograms showed normal pressure. Previous visit HPI 4/28/20- Beverly Singh is a 82 y.o. female who presents today for clicking, followed by onset of pressure rotational vertigo and loud tinnitus without change in hearing level. Worse lying down or with a sudden turn of the head. Dyazide was not beneficial, but the meclizine seems to have helped somewhat. She was taking it 3 times a day and has not taken it for a couple days. Initial ENT HPI 1/14/20- The patient presents for evaluation of right ear fullness and tinnitus. The patient reports that about 2-3 weeks ago she started to hear a clock ticking and/or crickets chirping in her right ear. The patient reports that she had left ear pain around that time as well, but that resolved. The patient reports that she had an episode of vertigo with the pain and tinnitus. The patient reports that she was otherwise feeling normal when the symptoms started. She denies fevers, chills, otorrhea, acute changes to hearing. The tinnitus is constant, but it waxes and wanes in severity. A few days ago she was brushing her teeth and she noticed that tinnitus was gradually increasing in severity to the point that it seemed like it was roaring and the patient immediately became dizzy and the room was spinning. The patient reports that her hearing remains stable. The patient is being treated for seasonal allergies with Xyzal and Singulair. The patient was also using Astelin, but reports it didn't seem to do anything so she never requested a refill. The patient denies a history of recurrent ear infections and tubes. Subjective:      REVIEW OF SYSTEMS:    A complete multi-organ review of systems was performed using a new patient questionnaire, and reviewed by me.   ENT:  negative except as noted in HPI  CONSTITUTIONAL:  negative except as noted in HPI  EYES:  negative except as noted in HPI  RESPIRATORY:  negative except as noted in HPI  CARDIOVASCULAR:  negative except as noted in HPI  GASTROINTESTINAL:  negative except as noted in HPI  GENITOURINARY:  negative except as noted in HPI  MUSCULOSKELETAL:  negative except as noted in HPI  SKIN:  negative except as noted in HPI  ENDOCRINE/METABOLIC: negative except as noted in HPI  HEMATOLOGIC/LYMPHATIC:  negative except as noted in HPI  ALLERGY/IMMUN: negative except as noted in HPI  NEUROLOGICAL:  negative except as noted in HPI  BEHAVIOR/PSYCH:  negative except as noted in HPI    Past Medical History:  Past Medical History:   Diagnosis Date    [facility]     Diabetes mellitus ([facility]     Hypertension        Social History:    TOBACCO:   reports that she quit smoking about 44 years ago. Her smoking use included cigarettes. She smoked an average of .5 packs per day. She has never used smokeless tobacco.  ETOH:   reports current alcohol use of about 1.0 standard drink per week. DRUGS:   reports no history of drug use. Family History:       Problem Relation Age of Onset    Cancer Mother         breast cancer    Diabetes Mother        Surgical History:  History reviewed. No pertinent surgical history. Objective: This is a 82 y.o. female. Patient is alert and oriented to person, place and time. Patient appears well developed, well nourished. Mood is happy with normal affect. Not obviously hearing impaired. No abnormality in speech noted. /70   Pulse 84   Temp 97 °F (36.1 °C) (Infrared)   Resp 16   Ht 5' 6\" (1.676 m)   Wt 174 lb 3.2 oz (79 kg)   SpO2 99%   BMI 28.12 kg/m²     Head:   Normocephalic, atraumatic. No obvious masses or lesions noted. Ears:  External ears: Normal: no scars, lesions or masses. Mastoid process: No erythema noted. No tenderness to palpation.   R External auditory canal: clear and free of any pathology  L External auditory canal: clear and free of any pathology Tympanic membranes:  R intact, translucent                                                  L Intact, translucent with slight thickening. No middle ear pathology noted    Nose:               External nose: Appears midline. No obvious deformity or masses. Septum:  normal. No septal hematoma. No perforation. Mucosa:  clear  Turbinates: normal and pink            Discharge:  none     Mouth/Throat:  Lips, tongue and oral cavity: Normal. No masses or lesions noted   Dentition: upper and lower dentures, no malocclusion  Oral mucosa: moist  Tonsils: Unremarkable  Oropharynx: normal-appearing mucosa  Hard and soft palates: symmetrical and intact. Salivary glands: not enlarged and no tenderness to palpation. Uvula: midline, no obvious lesions              Gag reflex is present. Neck: Trachea midline. Thyroid not enlarged, no palpable masses or tenderness. Lymphatic: No cervical lymphadenopathy noted. Eyes: LOREE, EOM intact. Conjunctiva moist without discharge. Lungs: Normal effort of breathing, not obviously distressed. Neuro: Cranial nerves II-XII grossly intact. Extremities: No clubbing, edema, or cyanosis noted. Data:    AUDIOGRAM         Audiogram with ECOG 5/26/20-      AUDIOGRAM       COMMENTS: Normal hearing sensitivity, sloping to moderate high frequency sensorineural hearing loss for the right ear. Thresholds for the left ear are on the borderline of normal, sloping to moderate high frequency conductive hearing loss. A conductive component is noted at 250Hz for both ears and at [address] in the left ear. Thresholds are slightly improved, relative to 2/24/20 audiogram. Speech discrimination ability is excellent, bilaterally. Tympanometry revealed normal peak pressure and normal middle ear compliance for the right ear and normal peak pressure with high middle ear compliance for the left ear.         ECOCHG RESULTS:  Transducer: TM electrode  RIGHT EAR: SP/AP area ratio is less than 2, which is within normal limits. SP/AP amplitude ratio is less than 35%, which is within normal limits. LEFT EAR: SP/AP area ratio is less than 2, which is within normal limits. SP/AP amplitude ratio is less than 35%, which is within normal limits. IMPRESSIONS:   Normal EcochG examination for both ears. There is no electrophysiologic evidence of increased labyrinthine pressure. VNG 9/25/20-     SACCADES: WNL  TRACKING: Bilaterally defective pursuit  OPTOKINETICS: Reduced nystagmus intensity  GAZE: WNL  HALLPIKE MANEUVER: WNL             POSITIONAL: Ageotropic horizontal nystagmus that did not exceed normal limits in head left, head right, side lying and supine positions. Right beating horizontal nystagmus that did not exceed normal limits was present in the seated position as well. 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 Further evaluation is recommended. MRI brain w wo contrast 5/5/20-      FINDINGS:               The diffusion-weighted images are normal. The brain volume is normal.There are no intra-or extra-axial collections. There is no hydrocephalus, midline shift or mass effect. On the FLAIR and T2-weighted sequences, there is a minimal amount of signal hyperintensity in the white matter of the brain. This is most likely due to minimal severity chronic small vessel ischemic changes. On the gradient echo T2-weighted images, there is mineralization in the medial aspects of the basal ganglia. No other areas of susceptibility artifact are present. There is no abnormal enhancement in the brain. The major intracranial vascular flow voids are present. The midline craniocervical junction structures are normal.  There is a partially empty sella turcica. This is stable. The brainstem is within    acceptable limits.        Dedicated images through the IACs demonstrate a normal appearance to the cerebellum and cerebellopontine angles. The 7th and 8th cranial nerves appear normal. There is normal signal in the cochlea and semicircular canals. There is no abnormal    enhancement. Meckel's cave and the trigeminal nerves also appear normal.               Impression       1. Normal-appearing IACs. 2. Minimal severity chronic small vessel ischemic changes. CT cervical spine wo contrast 12/9/18-  FINDINGS:   There is mild motion artifact image degradation. Multilevel degenerative spondylosis changes are noted. There is overall preservation of alignment of the vertebral column with no acute appearing fracture changes. Chronic uncovertebral spurring produces    bilateral minimal foramen narrowing at C4-5. There is moderate severe right foramen narrowing at C5-6. The craniocervical and cervicothoracic junctions are maintained overall normal alignment. The regional soft tissues are intact. Airway appears patent. Impression   1. Negative exam for obvious fracture. 2. Degenerative spondylosis. Assessment/Plan:     Diagnosis Orders   1. Asymmetrical sensorineural hearing loss  Audiometry with tympanometry      2. Tinnitus, right  Audiometry with tympanometry    Electrocochleography      3. Episodic recurrent vertigo  Basic Metabolic Panel    Audiometry with tympanometry    Electrocochleography      4. Dizziness  Basic Metabolic Panel    Audiometry with tympanometry    Electrocochleography      5. Clicking tinnitus of both ears            -Patient's symptoms seem to be consistent with Meniere's  -Will plan to trial Dyazide again, but plan to review with patient's PCP and likely have patient hold Aldactazide.  BMP in a couple of weeks  -Audiogram and ECOG testing to reassess for Meniere's  -Follow up with Dr Carlee Jean to review testing results    (Please note that portions of this note may have been completed with a voice recognition program.  Efforts were made to edit the dictation but occasionally words are mis-transcribed.)    Electronically signed by TANGELA Marmolejo on 10/25/2022 at 2:58 PM

## 2022-09-21 NOTE — TELEPHONE ENCOUNTER
Patient left message on clinical voicemail asking about a medication that was to have been sent in for her. Patient was seen today by Cierra Stewart. Please advise.

## 2022-09-22 NOTE — TELEPHONE ENCOUNTER
Patient called in again to check on the medication. Informed her Hamzah Edouard sent her PCP a message and he is waiting to hear back from them. Once he hears back we will call patient. Patient verbalized understanding and thanked me.

## 2022-09-23 ENCOUNTER — TELEPHONE (OUTPATIENT)
Dept: ENT CLINIC | Age: 87
End: 2022-09-23

## 2022-09-23 RX ORDER — TRIAMTERENE AND HYDROCHLOROTHIAZIDE 37.5; 25 MG/1; MG/1
1 CAPSULE ORAL DAILY
Qty: 14 CAPSULE | Refills: 0 | Status: SHIPPED | OUTPATIENT
Start: 2022-09-23 | End: 2022-10-18 | Stop reason: ALTCHOICE

## 2022-09-23 NOTE — TELEPHONE ENCOUNTER
Yes, she needs scheduled for the audiogram with tympanometry and the ECOG that I ordered during her office visit.

## 2022-09-23 NOTE — TELEPHONE ENCOUNTER
Called patient to schedule f/u w/ Dr Roberta Stewart, she did not answer so I left message for her to call back.

## 2022-09-23 NOTE — TELEPHONE ENCOUNTER
Patient called back in and explained to her about the medication and to holed the spirolactone. She said that she would start the medication tomorrow since she already had it today. I explained to her about the blood work and she said she understood and that there was another test that was supposed to be scheduled for her as well. Do you know what test she is referring to?

## 2022-09-23 NOTE — TELEPHONE ENCOUNTER
Please call the patient and let her know that I am sending in a 14 day prescription of medication (dyazide) to see if it helps her symptoms. For the next 14 days while taking this medication I need her to hold her Aldactazide (spironolactone-hydrochlorothiazide) since the new prescription can interact with it. She should keep the prescription of Aldactazide because we may need her to resume it after this course of Dyazide. She should get that blood work after the 14 days of Dyazide.

## 2022-09-26 NOTE — TELEPHONE ENCOUNTER
Patient is scheduled for hearing test on 10/25/22 @ 2:30 and 10/527/22 @ 2:00 w/ Dr Asad De La Torre

## 2022-09-27 ENCOUNTER — HOSPITAL ENCOUNTER (OUTPATIENT)
Age: 87
Discharge: HOME | End: 2022-09-27
Payer: COMMERCIAL

## 2022-09-27 ENCOUNTER — HOSPITAL ENCOUNTER (OUTPATIENT)
Dept: PHARMACY | Age: 87
Setting detail: THERAPIES SERIES
Discharge: HOME | End: 2022-09-27
Payer: COMMERCIAL

## 2022-09-27 DIAGNOSIS — R42 DIZZINESS: ICD-10-CM

## 2022-09-27 DIAGNOSIS — Z51.81 ENCOUNTER FOR THERAPEUTIC DRUG MONITORING: ICD-10-CM

## 2022-09-27 DIAGNOSIS — H81.90 EPISODIC RECURRENT VERTIGO: ICD-10-CM

## 2022-09-27 DIAGNOSIS — H93.13 CLICKING TINNITUS OF BOTH EARS: ICD-10-CM

## 2022-09-27 DIAGNOSIS — Z79.01 ANTICOAGULATED ON COUMADIN: ICD-10-CM

## 2022-09-27 DIAGNOSIS — I48.11 LONGSTANDING PERSISTENT ATRIAL FIBRILLATION (HCC): Primary | ICD-10-CM

## 2022-09-27 LAB
ANION GAP SERPL CALCULATED.3IONS-SCNC: 12 MEQ/L (ref 8–16)
BUN BLDV-MCNC: 23 MG/DL (ref 7–22)
CALCIUM SERPL-MCNC: 10.2 MG/DL (ref 8.5–10.5)
CHLORIDE BLD-SCNC: 101 MEQ/L (ref 98–111)
CO2: 26 MEQ/L (ref 23–33)
CREAT SERPL-MCNC: 1.2 MG/DL (ref 0.4–1.2)
GFR SERPL CREATININE-BSD FRML MDRD: 52 ML/MIN/1.73M2
GLUCOSE BLD-MCNC: 95 MG/DL (ref 70–108)
POC INR: 2.9 (ref 0.8–1.2)
POTASSIUM SERPL-SCNC: 3.8 MEQ/L (ref 3.5–5.2)
SODIUM BLD-SCNC: 139 MEQ/L (ref 135–145)

## 2022-09-27 PROCEDURE — 99211 OFF/OP EST MAY X REQ PHY/QHP: CPT

## 2022-09-27 PROCEDURE — 80048 BASIC METABOLIC PNL TOTAL CA: CPT

## 2022-09-27 PROCEDURE — 85610 PROTHROMBIN TIME: CPT

## 2022-09-27 PROCEDURE — 36415 COLL VENOUS BLD VENIPUNCTURE: CPT

## 2022-09-27 PROCEDURE — 36416 COLLJ CAPILLARY BLOOD SPEC: CPT

## 2022-09-27 NOTE — PROGRESS NOTES
Medication Management [address]  [phone] (phone)  [phone] (fax)    Ms. Peggy Napier is a 82 y.o.  female with history of Afib who presents today for anticoagulation monitoring and adjustment. Patient verifies current dosing regimen and tablet strength. No missed or extra doses. Patient denies s/s bleeding/bruising/SOB/chest pain  Chronic welling in ankle  No blood in urine or stool. No dietary changes. Changes in medication/OTC agents/Herbals. Stopped taking spironolactone-HCTZ and started triamterene-HCTZ for 14 days. No change in alcohol use or tobacco use. No change in activity level. Patient denies headaches/lightheadedness/falls. Patient has episodes of dizzy when moving too quickly, provider aware. No vomiting/diarrhea or acute illness. No Procedures scheduled in the future at this time. CBC reviewed - stable    Assessment:   Lab Results   Component Value Date    INR 2.90 (H) 09/27/2022    INR 2.20 (H) 08/16/2022    INR 2.50 (H) 06/29/2022     INR therapeutic   Recent Labs     09/27/22  1035   INR 2.90*     Plan:  Continue Coumadin 5 mg daily. Recheck INR in 6 week(s). Patient reminded to call the Anticoagulation Clinic with any signs or symptoms of bleeding or with any medication changes. Patient given instructions utilizing the teach back method. Discussed plan with Charisma Gupta PharmD    After visit summary printed and reviewed with patient. Discharged ambulatory in no apparent distress.     Ricky Cheema RPh  9/27/2022 11:33 AM     For Pharmacy Admin Tracking Only    Time Spent (min): 20

## 2022-10-06 ENCOUNTER — TELEPHONE (OUTPATIENT)
Dept: ENT CLINIC | Age: 87
End: 2022-10-06

## 2022-10-17 NOTE — TELEPHONE ENCOUNTER
Patient states that she has been off of this prescription now for 3 days and hasn't been able to reach anyone regarding the refill. She is asking for a call back to advise.

## 2022-10-17 NOTE — TELEPHONE ENCOUNTER
Joel Syed called requesting a refill on the following medications:  Requested Prescriptions     Pending Prescriptions Disp Refills    spironolactone-hydroCHLOROthiazide (ALDACTAZIDE) 25-25 MG per tablet [Pharmacy Med Name: SPIRONOLACTONE-HCTZ 25-25 TAB] 90 tablet 1     Sig: TAKE 1 [address] verified:  .pv  [facility]     Date of last visit: 07/21/2022  Date of next visit (if applicable): 0/97/4833

## 2022-10-18 RX ORDER — SPIRONOLACTONE AND HYDROCHLOROTHIAZIDE 25; 25 MG/1; MG/1
TABLET ORAL
Qty: 90 TABLET | Refills: 1 | OUTPATIENT
Start: 2022-10-18

## 2022-10-18 RX ORDER — SPIRONOLACTONE AND HYDROCHLOROTHIAZIDE 25; 25 MG/1; MG/1
TABLET ORAL
Qty: 90 TABLET | Refills: 1 | Status: SHIPPED | OUTPATIENT
Start: 2022-10-18 | End: 2022-10-18 | Stop reason: SDUPTHER

## 2022-10-18 NOTE — TELEPHONE ENCOUNTER
Patient called and is out of medication. RX phoned in to [facility] on [address] per patient request.   Elodia Spurling received by Jonny Banks, pharmacist at [facility].

## 2022-10-18 NOTE — TELEPHONE ENCOUNTER
Patient is calling back about this refill request stating shes been calling all week and has been out of her med now for about a week. Patient was advised that it is still pending. Please f/u with patient if unable to refill.

## 2022-10-26 ENCOUNTER — TELEPHONE (OUTPATIENT)
Dept: ENT CLINIC | Age: 87
End: 2022-10-26

## 2022-10-26 NOTE — TELEPHONE ENCOUNTER
Patient called in this morning stating she had to take her son to the ER yesterday, which is why she missed her appointment for the Audio and VNG. Patient would like to r/s the testing. It looks like she has already been r/s for Dr Edelmira Arec but please advise if the testing cannot be scheduled before her f/u with Dr Edelmira Arce so we can adjust if necessary. Please call patient to r/s her Audio and VNG.

## 2022-10-29 DIAGNOSIS — E11.59 TYPE 2 DIABETES MELLITUS WITH OTHER CIRCULATORY COMPLICATION, WITHOUT LONG-TERM CURRENT USE OF INSULIN (HCC): ICD-10-CM

## 2022-10-31 RX ORDER — BLOOD SUGAR DIAGNOSTIC
STRIP MISCELLANEOUS
Qty: 100 STRIP | Refills: 3 | Status: SHIPPED | OUTPATIENT
Start: 2022-10-31

## 2022-11-08 ENCOUNTER — HOSPITAL ENCOUNTER (OUTPATIENT)
Dept: PHARMACY | Age: 87
Setting detail: THERAPIES SERIES
Discharge: HOME | End: 2022-11-08
Payer: COMMERCIAL

## 2022-11-08 DIAGNOSIS — Z79.01 ANTICOAGULATED ON COUMADIN: ICD-10-CM

## 2022-11-08 DIAGNOSIS — I48.11 LONGSTANDING PERSISTENT ATRIAL FIBRILLATION (HCC): Primary | ICD-10-CM

## 2022-11-08 DIAGNOSIS — Z51.81 ENCOUNTER FOR THERAPEUTIC DRUG MONITORING: ICD-10-CM

## 2022-11-08 LAB — POC INR: 2.1 (ref 0.8–1.2)

## 2022-11-08 PROCEDURE — 99211 OFF/OP EST MAY X REQ PHY/QHP: CPT | Performed by: PHARMACIST

## 2022-11-08 PROCEDURE — 36416 COLLJ CAPILLARY BLOOD SPEC: CPT | Performed by: PHARMACIST

## 2022-11-08 PROCEDURE — 85610 PROTHROMBIN TIME: CPT | Performed by: PHARMACIST

## 2022-11-08 NOTE — PROGRESS NOTES
Medication Management [address]  [phone] (phone)  [phone] (fax)    Ms. Ezequiel Swann is a 82 y.o.  female with history of Afib who presents today for anticoagulation monitoring and adjustment. Patient verifies current dosing regimen and tablet strength. No missed or extra doses. Patient denies s/s bleeding/bruising/swelling/SOB/chest pain  No blood in urine or stool. No dietary changes. No changes in medication/OTC agents/Herbals. No change in alcohol use or tobacco use. No change in activity level. Patient denies headaches/dizziness/lightheadedness/falls. No vomiting/diarrhea or acute illness. No Procedures scheduled in the future at this time. Assessment:   Lab Results   Component Value Date    INR 2.10 (H) 11/08/2022    INR 2.90 (H) 09/27/2022    INR 2.20 (H) 08/16/2022     INR therapeutic   Recent Labs     11/08/22  1057   INR 2.10*         Plan:  Continue Coumadin 5mg daily. Recheck INR in 6 week(s). Patient reminded to call the Anticoagulation Clinic with any signs or symptoms of bleeding or with any medication changes. Patient given instructions utilizing the teach back method. After visit summary printed and reviewed with patient. Discharged ambulatory in no apparent distress.     For Pharmacy Admin Tracking Only    Time Spent (min): 20

## 2022-11-14 ENCOUNTER — OFFICE VISIT (OUTPATIENT)
Dept: INTERNAL MEDICINE CLINIC | Age: 87
End: 2022-11-14

## 2022-11-14 VITALS — WEIGHT: 178 LBS | BODY MASS INDEX: 28.73 KG/M2

## 2022-11-14 DIAGNOSIS — E11.59 TYPE 2 DIABETES MELLITUS WITH OTHER CIRCULATORY COMPLICATION, WITHOUT LONG-TERM CURRENT USE OF INSULIN (HCC): Primary | ICD-10-CM

## 2022-11-14 NOTE — PATIENT INSTRUCTIONS
Breakfast     [place] [address] [facility], 23 oz, Broccoli Cheddar Quiche, [address] ( a piece of quiche and fruit)        [place] Protein-Packed Muffin Mix Blueberry Lemon - 14oz  (you could have a muffin and 1/3 cup cottage cheese)

## 2022-11-14 NOTE — PROGRESS NOTES
[address] [place] [address]. [address] [place] [address] [place], [facility], [facility]  [phone] (phone)  [phone] (fax)    Patient Name: Rebecca Hahn. Date of Birth: [id number]. MRN: [id number]      Assessment: Patient is a 82 y.o. female seen for follow-up MNT visit for Diabetes. -Nutritionally relevant labs:   Lab Results   Component Value Date/Time    LABA1C 6.1 (H) 04/27/2022 02:53 PM    LABA1C 5.9 (H) 10/11/2021 01:10 PM    LABA1C 5.7 01/06/2021 12:52 PM    LABA1C 7.8 (H) 02/25/2020 12:04 PM    LABA1C 7.4 01/06/2020 11:47 AM    LABA1C 10.3 (H) 09/16/2019 11:35 AM    GLUCOSE 95 09/27/2022 11:02 AM    GLUCOSE 114 (H) 08/16/2022 11:31 AM    CHOL 108 08/16/2022 11:31 AM    HDL 57 08/16/2022 11:31 AM    LDLCALC 35 08/16/2022 11:31 AM    TRIG 82 08/16/2022 11:31 AM     -Blood sugar trends: At times elevated in the evening.     -  Current Outpatient Medications on File Prior to Visit   Medication Sig Dispense Refill    ONETOUCH ULTRA strip TEST 2 TIMES DAILY 100 strip 3    spironolactone-hydroCHLOROthiazide (ALDACTAZIDE) 25-25 MG per tablet Take 1 tablet daily 90 tablet 1    betamethasone valerate (VALISONE) 0.1 % cream APPLY TOPICALLY 2 TIMES DAILY AS NEEDED.  15 g 1    Lancets (ONETOUCH DELICA PLUS [id number]) MISC TEST 2 TIMES DAILY 200 each 4    atorvastatin (LIPITOR) 10 MG tablet Take 1 tablet by mouth daily 90 tablet 3    omeprazole (PRILOSEC) 40 MG delayed release capsule Take 1 capsule by mouth daily 90 capsule 3    metFORMIN (GLUCOPHAGE) 1000 MG tablet TAKE 1 TABLET BY MOUTH TWICE A DAY WITH MEALS 60 tablet 0    loratadine (CLARITIN) 10 MG tablet TAKE 1 TABLET BY MOUTH EVERY DAY 90 tablet 3    magnesium oxide (MAG-OX) 400 (241.3 Mg) MG TABS tablet TAKE 1 TABLET BY MOUTH EVERY DAY 30 tablet 6    amLODIPine (NORVASC) 10 MG tablet TAKE 1 TABLET BY MOUTH EVERY DAY 90 tablet 3    metoprolol tartrate (LOPRESSOR) 25 MG tablet TAKE 1 TABLET BY MOUTH TWICE A  tablet 3 warfarin (COUMADIN) 5 MG tablet TAKE AS DIRECTED BY [facility] #30 TABS = 30 DAY SUPPLY 90 tablet 3    meclizine (ANTIVERT) 12.5 MG tablet TAKE 1 TABLET BY MOUTH 3 TIMES DAILY AS NEEDED FOR DIZZINESS OR NAUSEA (Patient not taking: Reported on 9/27/2022) 60 tablet 0    blood glucose monitor kit and supplies Test one time a day . Dispense 30 strips a month with 5 refills Please dispense meter and suppliesthat insurance covers . 1 kit 0    UNABLE TO FIND Allergy shot every other Friday at allergist's office. No current facility-administered medications on file prior to visit. Vitals from current and previous visits: Wt 178 lb (80.7 kg)   BMI 28.73 kg/m²     -Body mass index is 28.73 kg/m². 25-29.9 - Overweight. Nutrition Diagnosis:   Food and nutrition-related knowledge deficit related to Lack of previous MNT/currently undergoing MNT as evidenced by Food recall. Intervention:  -Impression: Jovita Chinchilla always keeps detailed food logs. Bld Sugars in goal except few highs noted in the evening. Jovita Chinchilla states she his having the ringing in the ear and dizzy spells again. Jovita Chinchilla to see Chiropractor this week. Elevated bld sugars could be related to stress and or increased portions at dinner.    -Instructed the patient on: Carbohydrate Counting, Plate Method, and including an evening snack if bld sugars 90 or less at bed time. Discussed breakfast ideas. Jovita Chinchilla expressed getting tired of current breakfast options. (Dislikes yogurt and avocado)   -Handouts given for: healthy snacks. -  Patient Instructions   Breakfast     [place] [employer] Judy, 23 oz, Broccoli Cheddar Quiche, Packaged Dish ( a piece of quiche and fruit)        [place] Protein-Packed Muffin Mix Blueberry Lemon - 14oz  (you could have a muffin and 1/3 cup cottage cheese)      -Nutrition prescription: 30-45 gm carb at meals and 15 gm at snacks. Comprehension verified using teachback method.     Monitoring/Evaluation: -Followup visit: 4 months with dietitian.   -Receptiveness to education/goals: Agreeable. -Readiness to change: action - ready to set action plan and implement dietary changes. -Expected compliance: fair. Thank you for your referral of this patient. Total time involved in direct patient education: 30 minutes for follow-up MNT visit.

## 2022-11-16 ENCOUNTER — OFFICE VISIT (OUTPATIENT)
Dept: FAMILY MEDICINE CLINIC | Age: 87
End: 2022-11-16
Payer: COMMERCIAL

## 2022-11-16 VITALS
WEIGHT: 179.4 LBS | RESPIRATION RATE: 16 BRPM | HEART RATE: 75 BPM | TEMPERATURE: 97.5 F | SYSTOLIC BLOOD PRESSURE: 128 MMHG | BODY MASS INDEX: 28.83 KG/M2 | HEIGHT: 66 IN | DIASTOLIC BLOOD PRESSURE: 74 MMHG | OXYGEN SATURATION: 98 %

## 2022-11-16 DIAGNOSIS — Z79.01 ANTICOAGULATED ON COUMADIN: ICD-10-CM

## 2022-11-16 DIAGNOSIS — I10 PRIMARY HYPERTENSION: ICD-10-CM

## 2022-11-16 DIAGNOSIS — E11.59 TYPE 2 DIABETES MELLITUS WITH OTHER CIRCULATORY COMPLICATION, WITHOUT LONG-TERM CURRENT USE OF INSULIN (HCC): Primary | ICD-10-CM

## 2022-11-16 LAB — HBA1C MFR BLD: 6 % (ref 4.3–5.7)

## 2022-11-16 PROCEDURE — 1123F ACP DISCUSS/DSCN MKR DOCD: CPT | Performed by: NURSE PRACTITIONER

## 2022-11-16 PROCEDURE — 99213 OFFICE O/P EST LOW 20 MIN: CPT | Performed by: NURSE PRACTITIONER

## 2022-11-16 PROCEDURE — 3074F SYST BP LT 130 MM HG: CPT | Performed by: NURSE PRACTITIONER

## 2022-11-16 PROCEDURE — 3044F HG A1C LEVEL LT 7.0%: CPT | Performed by: NURSE PRACTITIONER

## 2022-11-16 PROCEDURE — 3078F DIAST BP <80 MM HG: CPT | Performed by: NURSE PRACTITIONER

## 2022-11-16 SDOH — ECONOMIC STABILITY: FOOD INSECURITY: WITHIN THE PAST 12 MONTHS, THE FOOD YOU BOUGHT JUST DIDN'T LAST AND YOU DIDN'T HAVE MONEY TO GET MORE.: NEVER TRUE

## 2022-11-16 SDOH — ECONOMIC STABILITY: FOOD INSECURITY: WITHIN THE PAST 12 MONTHS, YOU WORRIED THAT YOUR FOOD WOULD RUN OUT BEFORE YOU GOT MONEY TO BUY MORE.: NEVER TRUE

## 2022-11-16 ASSESSMENT — PATIENT HEALTH QUESTIONNAIRE - PHQ9
SUM OF ALL RESPONSES TO PHQ9 QUESTIONS 1 & 2: 0
2. FEELING DOWN, DEPRESSED OR HOPELESS: 0
SUM OF ALL RESPONSES TO PHQ QUESTIONS 1-9: 0
SUM OF ALL RESPONSES TO PHQ QUESTIONS 1-9: 0
1. LITTLE INTEREST OR PLEASURE IN DOING THINGS: 0
SUM OF ALL RESPONSES TO PHQ QUESTIONS 1-9: 0
SUM OF ALL RESPONSES TO PHQ QUESTIONS 1-9: 0
DEPRESSION UNABLE TO ASSESS: FUNCTIONAL CAPACITY MOTIVATION LIMITS ACCURACY

## 2022-11-16 ASSESSMENT — SOCIAL DETERMINANTS OF HEALTH (SDOH): HOW HARD IS IT FOR YOU TO PAY FOR THE VERY BASICS LIKE FOOD, HOUSING, MEDICAL CARE, AND HEATING?: NOT HARD AT ALL

## 2022-11-16 NOTE — PROGRESS NOTES
Juana Maxwell is a 82 y.o. female for Follow-up and Diabetes (6 month follow up. Having right ear problems ( ongoing ) )      Diabetes Type 2    Glucose control:   Does patient check blood glucoses at home? Yes  Report of hypoglycemia: no  Lab Results   Component Value Date    LABA1C 6.0 (H) 11/16/2022     No results found for: EAG    Symptoms  Polyuria, Polydipsia or Polyphagia? No  Chest Pain, SOB, or Palpitations? -  No  New Vision complaints? No  Paresthesias of the extremities? No    Medications  Current medication were reviewed. Compliant with medications? yes  Medication side effects? No  On ACE-I or ARB? No  On antiplatelet therapy? Yes  On Statin? Yes    Last Diabetic Eye Exam: needs  a new optometrist, will refer    Exercise  Exercise? She is active no formal exercise program     Pt remains on anticoagulated per coumadin clinic. HTN    Does patient check BP regularly at home? - No  Current Medication regimen - norvasc 10 mg daily, lopressor 25 mg bid   Tolerating medications well? - yes    Shortness of breath or chest pain? No  Headache or visual complaints? No  Neurologic changes like confusion? No  Extremity edema? No    BP Readings from Last 3 Encounters:   11/16/22 128/74   09/21/22 124/70   08/02/22 122/64       Wt Readings from Last 3 Encounters:   11/16/22 179 lb 6.4 oz (81.4 kg)   11/14/22 178 lb (80.7 kg)   09/21/22 174 lb 3.2 oz (79 kg)       Diet discipline?:  Low salt, fat, sugar diet? yes    Blood pressure control:  BP Readings from Last 3 Encounters:   11/16/22 128/74   09/21/22 124/70   08/02/22 122/64       No results found for: LABMICR, [id number]    Lab Results   Component Value Date    LDLCALC 35 08/16/2022       Physical Exam    /74   Pulse 75   Temp 97.5 °F (36.4 °C)   Resp 16   Ht 5' 6\" (1.676 m)   Wt 179 lb 6.4 oz (81.4 kg)   SpO2 98%   BMI 28.96 kg/m²   Appearance: alert, well appearing, and in no distress, normal appearing weight and well hydrated.   Neck exam - supple, no significant adenopathy. CVS exam: normal rate, regular rhythm, normal S1, S2, no murmurs, rubs, clicks or gallops. Lungs: clear to auscultation, no wheezes, rales or rhonchi, symmetric air entry. Abdomen:  BS normal, soft, non tender, non distended, no rebound or guarding  Mental Status: normal mood, affect behavior, speech, dress,  and thought processes. Neuro:  Alert, muscle tone grossly normal  Skin exam: normal coloration and turgor, no rashes, no suspicious skin lesions noted. Foot exam:  No pedal edema, no erythematous lesions noted b/l, sensation wnl b/l, PT and TP pulses wnl b/l    ASSESSMENT & PLAN  Betty Dias was seen today for follow-up and diabetes. Diagnoses and all orders for this visit:    Type 2 diabetes mellitus with other circulatory complication, without long-term current use of insulin ([facility] [address]  -     Vasiliy Ramirez OELIGIO., Optometry, [place]  Controlled continue current meds  Anticoagulated on Coumadin    Primary hypertension  controlled  Other orders  -     POCT glycosylated hemoglobin (Hb A1C) 6.0    Pt in agreement with plan         Return in about 6 months (around 5/16/2023) for MAW and A1C.

## 2022-11-21 ENCOUNTER — HOSPITAL ENCOUNTER (OUTPATIENT)
Dept: AUDIOLOGY | Age: 87
Discharge: HOME | End: 2022-11-21
Payer: COMMERCIAL

## 2022-11-21 PROCEDURE — 92584 ELECTROCOCHLEOGRAPHY: CPT | Performed by: AUDIOLOGIST

## 2022-11-21 PROCEDURE — 92567 TYMPANOMETRY: CPT | Performed by: AUDIOLOGIST

## 2022-11-21 PROCEDURE — 92557 COMPREHENSIVE HEARING TEST: CPT | Performed by: AUDIOLOGIST

## 2022-11-21 NOTE — PROGRESS NOTES
ACCOUNT #: [de-identified]      AUDIOLOGICAL EVALUATION      REASON FOR TESTING:  Audiometric evaluation and Electrocochleography (Ecog) testing per the request of Justice Isaacs PA-C, due to the diagnoses of asymmetrical sensorineural hearing loss, tinnitus (right), episodic recurrent vertigo and dizziness. The patient reports tinnitus in the right ear that fluctuates in severity. She denies any vertigo, but experiences a lightheaded sensation. 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 Previous Ecog testing 5/26/2020 WNL for both ears. OTOSCOPY: WNL for both ears. AUDIOGRAM      Reliability: Good    ECOCHG RESULTS:    Transducer: TM electrode    RIGHT EAR: SP/AP area ratio is less than 2, which is within normal limits. SP/AP amplitude ratio is less than 40%, which is within normal limits. LEFT EAR: SP/AP area ratio is less than 2, which is within normal limits. SP/AP amplitude ratio is less than 40%, which is within normal limits. IMPRESSIONS:   1- Normal hearing sensitivity sloping to moderate high frequency sensorineural hearing loss for the right ear and mild sensorineural hearing loss at 250Hz, rising to normal hearing sensitivity and then falling to moderately-severe high frequency sensorineural hearing loss for the left ear. Word recognition ability is excellent at 96% for the left ear and excellent at 92% for the right ear. Thresholds are mostly stable for both ears relative to previous audiogram.  2- Tympanometry revealed normal peak pressure for both ears with normal middle ear compliance for the right ear and high middle ear compliance for the left ear (3.1 ml). 3- Normal EcochG examination for both ears. There is no electrophysiologic evidence of increased labyrinth pressure affecting either ear. RECOMMENDATIONS:   1- Continue care with Dr. Glenn Whitman  to review EcochG results.   2- Annual audiometry for monitoring purposes or sooner if any changes are noted in hearing ability. 3- Tinnitus masking device could be considered for the right ear pending medical clearance.

## 2022-11-25 NOTE — TELEPHONE ENCOUNTER
----- Message from JOSH Jean - CNP sent at 2/26/2020  8:58 AM EST -----  Pt was to call us at this time to let us know what her morning fasting sugars had been, so we can adjust her diabetic medicine if they are still high.  Can we ask her what her numbers have been? thanks Will wait on urine culture result since patient is currently asymptomatic

## 2022-12-20 ENCOUNTER — HOSPITAL ENCOUNTER (OUTPATIENT)
Dept: PHARMACY | Age: 87
Setting detail: THERAPIES SERIES
Discharge: HOME | End: 2022-12-20
Payer: COMMERCIAL

## 2022-12-20 ENCOUNTER — TELEPHONE (OUTPATIENT)
Dept: PHARMACY | Age: 87
End: 2022-12-20

## 2022-12-20 DIAGNOSIS — Z79.01 ANTICOAGULATED ON COUMADIN: ICD-10-CM

## 2022-12-20 DIAGNOSIS — I48.11 LONGSTANDING PERSISTENT ATRIAL FIBRILLATION (HCC): Primary | ICD-10-CM

## 2022-12-20 DIAGNOSIS — Z51.81 ENCOUNTER FOR THERAPEUTIC DRUG MONITORING: ICD-10-CM

## 2022-12-20 LAB — POC INR: 1.6 (ref 0.8–1.2)

## 2022-12-20 PROCEDURE — 85610 PROTHROMBIN TIME: CPT

## 2022-12-20 PROCEDURE — 99212 OFFICE O/P EST SF 10 MIN: CPT

## 2022-12-20 PROCEDURE — 36416 COLLJ CAPILLARY BLOOD SPEC: CPT

## 2022-12-20 NOTE — TELEPHONE ENCOUNTER
Noted. Patient was given boosted Coumadin doses, and is scheduled recheck next week. Will continue with current plan.

## 2022-12-20 NOTE — PROGRESS NOTES
Medication Management [address]  [phone] (phone)  [phone] (fax)    Ms. Gema Limon is a 82 y.o.  female with history of persistent atrial fib. , per referral from DANNY Casillas, who presents today for Warfarin monitoring and adjustment (6 week visit). Patient verifies current dosing regimen and tablet strength. No missed or extra doses. Patient denies bleeding/bruising/SOB/chest pain. Has usual swelling of feet. No blood in urine or stool. Dietary changes: had turnip greens 3 times last week - reminded of Vitamin K content. Changes in medication/OTC agents/herbals: started a new vitamin, but doesn't know what it is. No change in alcohol use or tobacco use. No change in activity level. Patient denies headaches/dizziness/lightheadedness/falls. No vomiting/diarrhea or acute illness. Had loose stool for a week recently - took nothing. No procedures scheduled in the future at this time. Assessment:   Lab Results   Component Value Date    INR 1.60 (H) 12/20/2022    INR 2.10 (H) 11/08/2022    INR 2.90 (H) 09/27/2022     INR subtherapeutic - goal 2-3. Recent Labs     12/20/22  1107   INR 1.60*        Plan:  POCT INR ordered/performed/result reviewed. 7.5 mg today and tomorrow, per policy, then continue Coumadin 5 mg daily PO. Recheck INR in 1 week(s), per policy. Patient to bring new vitamin to that visit or call today with the information. Patient reminded to call the Anticoagulation Clinic with any signs or symptoms of bleeding or with any medication changes. Patient given instructions utilizing the teach back method. After visit summary printed and reviewed with patient. Discharged ambulatory in no apparent distress, wearing mask.

## 2022-12-20 NOTE — TELEPHONE ENCOUNTER
Patient called back to let you know the vitamin that she is taking:  Centrum Silver for Women 1 tab daily    She said it is called a multi-mineral supplement.    Please call the patient back regarding Coumadin

## 2022-12-21 ENCOUNTER — TELEPHONE (OUTPATIENT)
Dept: PHARMACY | Age: 87
End: 2022-12-21

## 2022-12-27 ENCOUNTER — HOSPITAL ENCOUNTER (OUTPATIENT)
Dept: PHARMACY | Age: 87
Setting detail: THERAPIES SERIES
Discharge: HOME | End: 2022-12-27
Payer: COMMERCIAL

## 2022-12-27 DIAGNOSIS — Z79.01 ANTICOAGULATED ON COUMADIN: ICD-10-CM

## 2022-12-27 DIAGNOSIS — Z51.81 ENCOUNTER FOR THERAPEUTIC DRUG MONITORING: ICD-10-CM

## 2022-12-27 DIAGNOSIS — I48.11 LONGSTANDING PERSISTENT ATRIAL FIBRILLATION (HCC): Primary | ICD-10-CM

## 2022-12-27 LAB — POC INR: 2 (ref 0.8–1.2)

## 2022-12-27 PROCEDURE — 85610 PROTHROMBIN TIME: CPT

## 2022-12-27 PROCEDURE — 99211 OFF/OP EST MAY X REQ PHY/QHP: CPT

## 2022-12-27 PROCEDURE — 36416 COLLJ CAPILLARY BLOOD SPEC: CPT

## 2022-12-27 NOTE — PROGRESS NOTES
Adjustment: 1, reason: Interaction  Total # of Interventions Recommended: 1  Total # of Interventions Accepted: 1  Time Spent (min):  1.5

## 2022-12-30 ENCOUNTER — OFFICE VISIT (OUTPATIENT)
Dept: ENT CLINIC | Age: 87
End: 2022-12-30
Payer: COMMERCIAL

## 2022-12-30 VITALS
WEIGHT: 179 LBS | RESPIRATION RATE: 14 BRPM | TEMPERATURE: 97 F | OXYGEN SATURATION: 99 % | DIASTOLIC BLOOD PRESSURE: 68 MMHG | HEIGHT: 66 IN | BODY MASS INDEX: 28.77 KG/M2 | SYSTOLIC BLOOD PRESSURE: 114 MMHG | HEART RATE: 80 BPM

## 2022-12-30 DIAGNOSIS — H81.90 EPISODIC RECURRENT VERTIGO: ICD-10-CM

## 2022-12-30 DIAGNOSIS — R42 DIZZINESS: ICD-10-CM

## 2022-12-30 DIAGNOSIS — H93.13 CLICKING TINNITUS OF BOTH EARS: ICD-10-CM

## 2022-12-30 DIAGNOSIS — H93.11 TINNITUS, RIGHT: ICD-10-CM

## 2022-12-30 DIAGNOSIS — H90.3 ASYMMETRICAL SENSORINEURAL HEARING LOSS: Primary | ICD-10-CM

## 2022-12-30 PROCEDURE — 99213 OFFICE O/P EST LOW 20 MIN: CPT | Performed by: OTOLARYNGOLOGY

## 2022-12-30 PROCEDURE — 1123F ACP DISCUSS/DSCN MKR DOCD: CPT | Performed by: OTOLARYNGOLOGY

## 2022-12-30 PROCEDURE — 3074F SYST BP LT 130 MM HG: CPT | Performed by: OTOLARYNGOLOGY

## 2022-12-30 PROCEDURE — 3078F DIAST BP <80 MM HG: CPT | Performed by: OTOLARYNGOLOGY

## 2022-12-30 ASSESSMENT — ENCOUNTER SYMPTOMS
CHEST TIGHTNESS: 0
SORE THROAT: 0
NAUSEA: 0
RHINORRHEA: 0
ABDOMINAL PAIN: 0
CHOKING: 0
STRIDOR: 0
COLOR CHANGE: 0
TROUBLE SWALLOWING: 0
DIARRHEA: 0
SINUS PRESSURE: 0
SHORTNESS OF BREATH: 0
WHEEZING: 0
COUGH: 0
FACIAL SWELLING: 0
APNEA: 0
VOICE CHANGE: 0
VOMITING: 0

## 2022-12-30 NOTE — PROGRESS NOTES
[address] EAR, NOSE AND THROAT  [address] [place] [address] [place]  [address] [facility],[address]  Dept: [phone]  Dept Fax: [phone]  Loc: [phone]    Joel Syed is a 82 y.o. female who was referred byNo ref. provider found for:  Chief Complaint   Patient presents with    Follow-up     Patient is here for f/u after hearing test     .    HPI:     Joel Syed is a 82 y.o. female who presents today for follow up audiogram/tympanogram.  Results reviewed with patient. AUDIOLOGICAL EVALUATION        REASON FOR TESTING:  Audiometric evaluation and Electrocochleography (Ecog) testing per the request of Rosa Linares PA-C, due to the diagnoses of asymmetrical sensorineural hearing loss, tinnitus (right), episodic recurrent vertigo and dizziness. The patient reports tinnitus in the right ear that fluctuates in severity. She denies any vertigo, but experiences a lightheaded sensation. 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 Previous Ecog testing 5/26/2020 WNL for both ears. OTOSCOPY: WNL for both ears. AUDIOGRAM     Reliability: Good     ECOCHG RESULTS:  Transducer: TM electrode     RIGHT EAR: SP/AP area ratio is less than 2, which is within normal limits. SP/AP amplitude ratio is less than 40%, which is within normal limits. LEFT EAR: SP/AP area ratio is less than 2, which is within normal limits. SP/AP amplitude ratio is less than 40%, which is within normal limits. IMPRESSIONS:   1- Normal hearing sensitivity sloping to moderate high frequency sensorineural hearing loss for the right ear and mild sensorineural hearing loss at 250Hz, rising to normal hearing sensitivity and then falling to moderately-severe high frequency sensorineural hearing loss for the left ear. Word recognition ability is excellent at 96% for the left ear and excellent at 92% for the right ear.  Thresholds are mostly stable for both ears relative to previous audiogram.  2- Tympanometry revealed normal peak pressure for both ears with normal middle ear compliance for the right ear and high middle ear compliance for the left ear (3.1 ml). 3- Normal EcochG examination for both ears. There is no electrophysiologic evidence of increased labyrinth pressure affecting either ear. RECOMMENDATIONS:   1- Continue care with Dr. Elton Oleary  to review EcochG results. 2- Annual audiometry for monitoring purposes or sooner if any changes are noted in hearing ability. 3- Tinnitus masking device could be considered for the right ear pending medical clearance. Study shows some high frequency hearing loss. No trouble understanding people. Intermittent dizziness. States she's getting used to the dizziness, last episode was 2 weeks ago. She hasn't seen a neurologist.  States she didn't know why she is coming here.         History:     No Known Allergies  Current Outpatient Medications   Medication Sig Dispense Refill    Multiple Vitamins-Minerals (CENTRUM SILVER 50+WOMEN) TABS Take 1 tablet by mouth daily 42% of Vitamin K requirement      ONETOUCH ULTRA strip TEST 2 TIMES DAILY 100 strip 3    spironolactone-hydroCHLOROthiazide (ALDACTAZIDE) 25-25 MG per tablet Take 1 tablet daily 90 tablet 1    Lancets (ONETOUCH DELICA PLUS [id number]) MISC TEST 2 TIMES DAILY 200 each 4    atorvastatin (LIPITOR) 10 MG tablet Take 1 tablet by mouth daily 90 tablet 3    omeprazole (PRILOSEC) 40 MG delayed release capsule Take 1 capsule by mouth daily 90 capsule 3    magnesium oxide (MAG-OX) 400 (241.3 Mg) MG TABS tablet TAKE 1 TABLET BY MOUTH EVERY DAY 30 tablet 6    metoprolol tartrate (LOPRESSOR) 25 MG tablet TAKE 1 TABLET BY MOUTH TWICE A  tablet 3    meclizine (ANTIVERT) 12.5 MG tablet TAKE 1 TABLET BY MOUTH 3 TIMES DAILY AS NEEDED FOR DIZZINESS OR NAUSEA (Patient not taking: Reported on 1/11/2023) 60 tablet 0    blood glucose monitor kit and supplies Test one time a day . Dispense 30 strips a month with 5 refills Please dispense meter and suppliesthat insurance covers . 1 kit 0    UNABLE TO FIND Allergy shot every other Friday at allergist's office. betamethasone valerate (VALISONE) 0.1 % cream APPLY TOPICALLY 2 TIMES DAILY AS NEEDED. 15 g 1    amLODIPine (NORVASC) 10 MG tablet TAKE 1 TABLET BY MOUTH EVERY DAY 90 tablet 0    metFORMIN (GLUCOPHAGE) 1000 MG tablet TAKE 1 TABLET BY MOUTH TWICE A DAY WITH MEALS 180 tablet 3    loratadine (CLARITIN) 10 MG tablet TAKE 1 TABLET BY MOUTH EVERY DAY 90 tablet 3    warfarin (COUMADIN) 5 MG tablet TAKE AS DIRECTED BY [facility] #35 TABS = 30 DAY SUPPLY 105 tablet 3     No current facility-administered medications for this visit. Past Medical History:   Diagnosis Date    Atrial fibrillation ([facility] [address]     [facility]     Hypertension       History reviewed. No pertinent surgical history. Family History   Problem Relation Age of Onset    Cancer Mother         breast cancer    Diabetes Mother      Social History     Tobacco Use    Smoking status: Former     Packs/day: 0.50     Types: Cigarettes     Quit date: 1978     Years since quittin.6    Smokeless tobacco: Never   Substance Use Topics    Alcohol use: Yes     Alcohol/week: 1.0 standard drink     Types: 1 Cans of beer per week     Comment: occasional        Subjective:       Review of Systems   Constitutional:  Negative for activity change, appetite change, chills, diaphoresis, fatigue, fever and unexpected weight change. HENT:  Negative for congestion, dental problem, ear discharge, ear pain, facial swelling, hearing loss, mouth sores, nosebleeds, postnasal drip, rhinorrhea, sinus pressure, sneezing, sore throat, tinnitus, trouble swallowing and voice change. Eyes:  Negative for visual disturbance. Respiratory:  Negative for apnea, cough, choking, chest tightness, shortness of breath, wheezing and stridor.     Cardiovascular:  Negative for chest pain, palpitations and leg swelling. Gastrointestinal:  Negative for abdominal pain, diarrhea, nausea and vomiting. Endocrine: Negative for cold intolerance, heat intolerance, polydipsia and polyuria. Genitourinary:  Negative for dysuria, enuresis and hematuria. Musculoskeletal:  Negative for arthralgias, gait problem, neck pain and neck stiffness. Skin:  Negative for color change and rash. Allergic/Immunologic: Negative for environmental allergies, food allergies and immunocompromised state. Neurological:  Negative for dizziness, syncope, facial asymmetry, speech difficulty, light-headedness and headaches. Hematological:  Negative for adenopathy. Does not bruise/bleed easily. Psychiatric/Behavioral:  Negative for confusion and sleep disturbance. The patient is not nervous/anxious. Objective:     /68 (Site: Left Upper Arm, Position: Sitting)   Pulse 80   Temp 97 °F (36.1 °C) (Infrared)   Resp 14   Ht 5' 6\" (1.676 m)   Wt 179 lb (81.2 kg)   SpO2 99%   BMI 28.89 kg/m²     Physical Exam    Data:  All of the past medical history, past surgical history, family history,social history, allergies and current medications were reviewed with the patient. Assessment & Plan   Diagnoses and all orders for this visit:     Diagnosis Orders   1. Asymmetrical sensorineural hearing loss        2. Tinnitus, right        3. Episodic recurrent vertigo        4. Dizziness        5. Clicking tinnitus of both ears            The findings were explained and her questions were answered. Her spells do not seem to be related to her peripheral vestibular system. Options were discussed including referring her to Neurology and Vestibular Physical Therapy. States she wanted to wait because she didn't have any problems currently, but wanted to be able to call and have orders placed if she has problems.       Return as needed       Phil FORD CMA ([facility]), am scribing for, and in the presence of Dr. Phuong Campbell. Electronically signed by Attila Crane CMA ([facility]) on 12/30/22 at 2:42 PM EST. (Please note that portions of this note were completed with a voice recognition program. Efforts were made to edit the dictations butoccasionally words are mis-transcribed.)    I agree to the above documentation placed by my scribe. I have personally evaluated this patient. Additional findings are as noted. I reviewed the scribe's note and agree with the documented findings and plan of care. Any areas of disagreement are corrected. I agree with the chief complaint, past medical history, past surgical history, allergies, medications, social and family history as documented unless otherwise noted below.      Electronically signed by Jose Pate MD on 1/15/2023 at 11:59 PM

## 2023-01-01 DIAGNOSIS — Z79.01 ANTICOAGULATED ON COUMADIN: ICD-10-CM

## 2023-01-01 DIAGNOSIS — L82.1 OTHER SEBORRHEIC KERATOSIS: ICD-10-CM

## 2023-01-01 DIAGNOSIS — R06.7 SNEEZING: ICD-10-CM

## 2023-01-01 DIAGNOSIS — I48.19 PERSISTENT ATRIAL FIBRILLATION (HCC): ICD-10-CM

## 2023-01-01 DIAGNOSIS — E11.9 TYPE 2 DIABETES MELLITUS WITHOUT COMPLICATION, WITHOUT LONG-TERM CURRENT USE OF INSULIN (HCC): ICD-10-CM

## 2023-01-03 RX ORDER — WARFARIN SODIUM 5 MG/1
TABLET ORAL
Qty: 105 TABLET | Refills: 3 | Status: SHIPPED | OUTPATIENT
Start: 2023-01-03

## 2023-01-03 RX ORDER — LORATADINE 10 MG/1
TABLET ORAL
Qty: 90 TABLET | Refills: 3 | Status: SHIPPED | OUTPATIENT
Start: 2023-01-03

## 2023-01-03 RX ORDER — AMLODIPINE BESYLATE 10 MG/1
TABLET ORAL
Qty: 90 TABLET | Refills: 0 | Status: SHIPPED | OUTPATIENT
Start: 2023-01-03

## 2023-01-11 ENCOUNTER — HOSPITAL ENCOUNTER (OUTPATIENT)
Dept: PHARMACY | Age: 88
Setting detail: THERAPIES SERIES
Discharge: HOME | End: 2023-01-11
Payer: MEDICARE

## 2023-01-11 DIAGNOSIS — Z79.01 ANTICOAGULATED ON COUMADIN: ICD-10-CM

## 2023-01-11 DIAGNOSIS — I48.11 LONGSTANDING PERSISTENT ATRIAL FIBRILLATION (HCC): Primary | ICD-10-CM

## 2023-01-11 DIAGNOSIS — Z51.81 ENCOUNTER FOR THERAPEUTIC DRUG MONITORING: ICD-10-CM

## 2023-01-11 LAB — POC INR: 2.2 (ref 0.8–1.2)

## 2023-01-11 PROCEDURE — 85610 PROTHROMBIN TIME: CPT

## 2023-01-11 PROCEDURE — 99211 OFF/OP EST MAY X REQ PHY/QHP: CPT

## 2023-01-11 PROCEDURE — 36416 COLLJ CAPILLARY BLOOD SPEC: CPT

## 2023-01-11 NOTE — PROGRESS NOTES
Medication Management [address]  [phone] (phone)  [phone] (fax)    Ms. Ruben Mina is a 82 y.o.  female with history of Afib who presents today for anticoagulation monitoring and adjustment. Patient verifies current dosing regimen and tablet strength. No missed or extra doses. Patient denies s/s bleeding/bruising/swelling/SOB/chest pain  - Swelling in both ankles, but this has been ongoing. No blood in urine or stool. No dietary changes. No changes in medication/OTC agents/Herbals. No change in alcohol use or tobacco use. No change in activity level. Patient denies headaches/dizziness/lightheadedness/falls. No vomiting/diarrhea or acute illness.   - Depending on what she eats, may have some loose bowels sometimes. Reports she had this a couple times over the last week. No Procedures scheduled in the future at this time. Assessment:   Lab Results   Component Value Date    INR 2.20 (H) 01/11/2023    INR 2.00 (H) 12/27/2022    INR 1.60 (H) 12/20/2022     INR therapeutic   Recent Labs     01/11/23  1130   INR 2.20*       Plan:  Continue Coumadin 7.5 mg MF, 5 mg all other days. Recheck INR in 3 week(s). Patient reminded to call the Anticoagulation Clinic with any signs or symptoms of bleeding or with any medication changes. Patient given instructions utilizing the teach back method. After visit summary printed and reviewed with patient. Discharged ambulatory in no apparent distress.     For Pharmacy Admin Tracking Only    Time Spent (min): [address], PharmD, BCPS  1/11/2023  1:16 PM

## 2023-01-19 ENCOUNTER — OFFICE VISIT (OUTPATIENT)
Dept: CARDIOLOGY CLINIC | Age: 88
End: 2023-01-19
Payer: MEDICARE

## 2023-01-19 VITALS
DIASTOLIC BLOOD PRESSURE: 80 MMHG | BODY MASS INDEX: 28.19 KG/M2 | WEIGHT: 175.4 LBS | SYSTOLIC BLOOD PRESSURE: 124 MMHG | HEART RATE: 82 BPM | HEIGHT: 66 IN

## 2023-01-19 DIAGNOSIS — I10 ESSENTIAL HYPERTENSION: ICD-10-CM

## 2023-01-19 DIAGNOSIS — R60.0 BILATERAL LEG EDEMA: ICD-10-CM

## 2023-01-19 DIAGNOSIS — R94.31 ABNORMAL EKG: ICD-10-CM

## 2023-01-19 DIAGNOSIS — I48.21 ATRIAL FIBRILLATION, PERMANENT (HCC): Primary | ICD-10-CM

## 2023-01-19 PROBLEM — I48.11 LONGSTANDING PERSISTENT ATRIAL FIBRILLATION (HCC): Status: RESOLVED | Noted: 2018-06-28 | Resolved: 2023-01-19

## 2023-01-19 PROCEDURE — 99214 OFFICE O/P EST MOD 30 MIN: CPT | Performed by: INTERNAL MEDICINE

## 2023-01-19 PROCEDURE — 3079F DIAST BP 80-89 MM HG: CPT | Performed by: INTERNAL MEDICINE

## 2023-01-19 PROCEDURE — 1123F ACP DISCUSS/DSCN MKR DOCD: CPT | Performed by: INTERNAL MEDICINE

## 2023-01-19 PROCEDURE — G8417 CALC BMI ABV UP PARAM F/U: HCPCS | Performed by: INTERNAL MEDICINE

## 2023-01-19 PROCEDURE — 1090F PRES/ABSN URINE INCON ASSESS: CPT | Performed by: INTERNAL MEDICINE

## 2023-01-19 PROCEDURE — G8427 DOCREV CUR MEDS BY ELIG CLIN: HCPCS | Performed by: INTERNAL MEDICINE

## 2023-01-19 PROCEDURE — G8400 PT W/DXA NO RESULTS DOC: HCPCS | Performed by: INTERNAL MEDICINE

## 2023-01-19 PROCEDURE — 3074F SYST BP LT 130 MM HG: CPT | Performed by: INTERNAL MEDICINE

## 2023-01-19 PROCEDURE — G8484 FLU IMMUNIZE NO ADMIN: HCPCS | Performed by: INTERNAL MEDICINE

## 2023-01-19 PROCEDURE — 1036F TOBACCO NON-USER: CPT | Performed by: INTERNAL MEDICINE

## 2023-01-19 NOTE — PROGRESS NOTES
Chief Complaint   Patient presents with    6 Month Follow-Up   Originally  patient here for atrial fib, [facility] referral.  Known to have atr fib for 5 yr   Has moved to [facility] from [facility] [place]  On coumadin        Pt is here for: 6 month follow up    EKG done 2022  No wt changes    Denied cp, dizziness  Or palpitations    Leg edema better now Trace to +1    Hx of chronic leg edema at the end of the day    Sob on exertion chronic    Nonsmoker    FHX    Mother had Heart problem and CVA        Patient Active Problem List   Diagnosis    Anticoagulated on Coumadin    Diabetes mellitus ([address]    Atrial fibrillation, permanent ([address]    Bilateral leg edema +1    Encounter for therapeutic drug monitoring    Essential hypertension    Abnormal EKG    Clicking tinnitus of both ears       History reviewed. No pertinent surgical history. No Known Allergies     Family History   Problem Relation Age of Onset    Cancer Mother         breast cancer    Diabetes Mother         Social History     Socioeconomic History    Marital status:      Spouse name: Not on file    Number of children: Not on file    Years of education: Not on file    Highest education level: Not on file   Occupational History    Not on file   Tobacco Use    Smoking status: Former     Packs/day: 0.50     Types: Cigarettes     Quit date: 1978     Years since quittin.6    Smokeless tobacco: Never   Vaping Use    Vaping Use: Never used   Substance and Sexual Activity    Alcohol use:  Yes     Alcohol/week: 1.0 standard drink     Types: 1 Cans of beer per week     Comment: occasional     Drug use: No    Sexual activity: Not on file   Other Topics Concern    Not on file   Social History Narrative    Jim Casey has good family support    Son assists with transportation to [facility]    No barriers with medication affordability    Following up with Diabetic Clinic     Social Determinants of Health     Financial Resource Strain: Low Risk     Difficulty of Paying Living Expenses: Not hard at all   Food Insecurity: No Food Insecurity    Worried About Running Out of Food in the Last Year: Never true    Ran Out of Food in the Last Year: Never true   Transportation Needs: Not on file   Physical Activity: Not on file   Stress: Not on file   Social Connections: Not on file   Intimate Partner Violence: Not on file   Housing Stability: Not on file       Current Outpatient Medications   Medication Sig Dispense Refill    betamethasone valerate (VALISONE) 0.1 % cream APPLY TOPICALLY 2 TIMES DAILY AS NEEDED. 15 g 1    amLODIPine (NORVASC) 10 MG tablet TAKE 1 TABLET BY MOUTH EVERY DAY 90 tablet 0    metFORMIN (GLUCOPHAGE) 1000 MG tablet TAKE 1 TABLET BY MOUTH TWICE A DAY WITH MEALS 180 tablet 3    loratadine (CLARITIN) 10 MG tablet TAKE 1 TABLET BY MOUTH EVERY DAY 90 tablet 3    warfarin (COUMADIN) 5 MG tablet TAKE AS DIRECTED BY [facility] TABS = 30 DAY SUPPLY 105 tablet 3    Multiple Vitamins-Minerals (CENTRUM SILVER 50+WOMEN) TABS Take 1 tablet by mouth daily 42% of Vitamin K requirement      ONETOUCH ULTRA strip TEST 2 TIMES DAILY 100 strip 3    spironolactone-hydroCHLOROthiazide (ALDACTAZIDE) 25-25 MG per tablet Take 1 tablet daily 90 tablet 1    Lancets (ONETOUCH DELICA PLUS [id number]) MISC TEST 2 TIMES DAILY 200 each 4    atorvastatin (LIPITOR) 10 MG tablet Take 1 tablet by mouth daily 90 tablet 3    omeprazole (PRILOSEC) 40 MG delayed release capsule Take 1 capsule by mouth daily 90 capsule 3    magnesium oxide (MAG-OX) 400 (241.3 Mg) MG TABS tablet TAKE 1 TABLET BY MOUTH EVERY DAY 30 tablet 6    metoprolol tartrate (LOPRESSOR) 25 MG tablet TAKE 1 TABLET BY MOUTH TWICE A  tablet 3    blood glucose monitor kit and supplies Test one time a day . Dispense 30 strips a month with 5 refills Please dispense meter and suppliesthat insurance covers . 1 kit 0    UNABLE TO FIND Allergy shot every other Friday at allergist's office.       meclizine (ANTIVERT) 12.5 MG tablet TAKE 1 TABLET BY MOUTH 3 TIMES DAILY AS NEEDED FOR DIZZINESS OR NAUSEA (Patient not taking: No sig reported) 60 tablet 0     No current facility-administered medications for this visit. Review of Systems -     General ROS: negative  Psychological ROS: negative  Hematological and Lymphatic ROS: No history of blood clots or bleeding disorder. Respiratory ROS: no cough,  or wheezing, the rest see HPI  Cardiovascular ROS: See HPI  Gastrointestinal ROS: negative  Genito-Urinary ROS: no dysuria, trouble voiding, or hematuria  Musculoskeletal ROS: negative  Neurological ROS: no TIA or stroke symptoms  Dermatological ROS: negative      Blood pressure 124/80, pulse 82, height 5' 6\" (1.676 m), weight 175 lb 6.4 oz (79.6 kg). Physical Examination:    General appearance - alert, well appearing, and in no distress  HEENT- Pink conjunctiva  , Non-icteri sclera,PERRLA  Mental status - alert, oriented to person, place, and time  Neck - supple, no significant adenopathy, no JVD, or carotid bruits  Chest - clear to auscultation, no wheezes, rales or rhonchi, symmetric air entry  Heart - normal rate, regular rhythm, normal S1, S2, no murmurs, rubs, clicks or gallops  Abdomen - soft, nontender, nondistended, no masses or organomegaly  WOLFGANG- no CVA or flank tenderness, no suprapubic tenderness  Neurological - alert, oriented, normal speech, no focal findings or movement disorder noted  Musculoskeletal/limbs - no joint tenderness, deformity or swelling   - peripheral pulses normal, no pedal edema, no clubbing or cyanosis  Skin - normal coloration and turgor, no rashes, no suspicious skin lesions noted  Psych- appropriate mood and affect    Lab  No results for input(s): CKTOTAL, CKMB, CKMBINDEX, TROPONINI in the last 72 hours.   CBC:   Lab Results   Component Value Date/Time    WBC 4.6 08/16/2022 11:31 AM    RBC 5.04 08/16/2022 11:31 AM    HGB 11.4 08/16/2022 11:31 AM    HCT 37.2 08/16/2022 11:31 AM    MCV 73.8 08/16/2022 11:31 AM    MCH 22.6 08/16/2022 11:31 AM    MCHC 30.6 08/16/2022 11:31 AM    RDW 14.9 06/19/2018 08:35 AM     08/16/2022 11:31 AM    MPV 9.2 08/16/2022 11:31 AM     BMP:    Lab Results   Component Value Date/Time     09/27/2022 11:02 AM    K 3.8 09/27/2022 11:02 AM     09/27/2022 11:02 AM    CO2 26 09/27/2022 11:02 AM    BUN 23 09/27/2022 11:02 AM    LABALBU 4.4 08/16/2022 11:31 AM    CREATININE 1.2 09/27/2022 11:02 AM    CALCIUM 10.2 09/27/2022 11:02 AM    LABGLOM 52 09/27/2022 11:02 AM    GLUCOSE 95 09/27/2022 11:02 AM     Hepatic Function Panel:    Lab Results   Component Value Date/Time    ALKPHOS 124 08/16/2022 11:31 AM    ALT 18 08/16/2022 11:31 AM    AST 18 08/16/2022 11:31 AM    PROT 7.6 08/16/2022 11:31 AM    BILITOT 0.6 08/16/2022 11:31 AM    BILIDIR <0.2 08/16/2022 11:31 AM    LABALBU 4.4 08/16/2022 11:31 AM     Magnesium:    Lab Results   Component Value Date/Time    MG 1.5 08/16/2022 11:31 AM     Warfarin PT/INR:  No components found for: PTPATWAR, PTINRWAR  HgBA1c:    Lab Results   Component Value Date/Time    LABA1C 6.0 11/16/2022 01:59 PM    LABA1C 7.8 02/25/2020 12:04 PM     FLP:    Lab Results   Component Value Date/Time    TRIG 82 08/16/2022 11:31 AM    HDL 57 08/16/2022 11:31 AM    LDLCALC 35 08/16/2022 11:31 AM     TSH:  No results found for: TSH      ekg 7/6/2020  Atrial fibrillation  -irregular conduction  CVR 84 BPM  -Nonspecific ST depression   +   Diffuse nonspecific T-abnormality  -Nondiagnostic. ABNORMAL     Echo  Conclusions      Summary   Normal left ventricle size and systolic function. Ejection fraction was estimated at 60 to 65 %. There were no regional left ventricular wall motion abnormalities and wall   thickness was within normal limits. The left atrium is Moderately dilated. Moderately enlarged right atrium size. Mildly enlarged right ventricle cavity.    Right ventricle global systolic function is normal .      Signature ----------------------------------------------------------------   Electronically signed by Wing Ellie AIKEN (Interpreting   physician) on 10/09/2020 at 06:49 PM   ----------------------------------------------------------------        Conclusions      Summary   Lexiscan EKG stress test is not suggestive for ischemia. The nuclear images is not suggestive for myocardial ischemia. Signatures    ----------------------------------------------------------------   Electronically signed by Wing Ellie AIKEN (Interpreting   Cardiologist) on 10/09/2020 at 17:57   ------------------------------------------------------    ekg 8/19/21  Atrial fibrillation   Low voltage in precordial leads.    -Old anterior infarct.    -  Nonspecific T-abnormality. ABNORMAL     Ekg 7/21/22  Atrial fibrillation   Low voltage in precordial leads.    -Old anterior infarct.    -  Nonspecific T-abnormality. ABNORMAL       Assessment     Diagnosis Orders   1. Atrial fibrillation, permanent ([address]        2. Essential hypertension        3. Bilateral leg edema +1        4. Abnormal EKG            chadsvasc= 5    Plan     The most current meds and labs reviewed    Hx of allergy   Has been getting allergy shot     Atr fib Chronic- permanent  Cont rate control  Cont coumadin  INR 2.8  option of DOAC given pat want to cont coumadin    Hypertension, on medical treatment. Seems to be under good control. Patient is compliant with medical treatment. Cont  norvasc to 10  Mg po qd      Leg edema B/L +1 better   Cont  aldactazide 25/25 1 tab po qd    Echo and lexisc- WNL   BMP and MG- reviewed  Mg 1.6 , 1.3 and now 1.5  Cont  mg oxide 400 mg po qd    D/w the pat the plan of care and result of test    Hx of K 3.5  And 3.9  and then 3.3 the 3.9 and 3.8   Cont high K diet tomato, banana, avocado,orange potatoe  Could not tolerate kcl for nausea    Stable and doing well    Discussed use, benefit, and side effects of prescribed medications.  All patient questions answered. Pt voiced understanding. Instructed to continue current medications, diet and exercise. Continue risk factor modification and medical management. Patient agreed with treatment plan. Follow up as directed.       RTC in 6 Months      Nicki Morgan [facility]

## 2023-01-20 RX ORDER — AMLODIPINE BESYLATE 10 MG/1
TABLET ORAL
Qty: 90 TABLET | Refills: 1 | Status: SHIPPED | OUTPATIENT
Start: 2023-01-20

## 2023-01-23 DIAGNOSIS — I10 ESSENTIAL HYPERTENSION: ICD-10-CM

## 2023-01-24 ENCOUNTER — TELEPHONE (OUTPATIENT)
Dept: PHARMACY | Age: 88
End: 2023-01-24

## 2023-01-24 NOTE — TELEPHONE ENCOUNTER
Patient called to report that she thinks she missed her doses on 1/22 and 1/23. States she didn't jenny them off on her calendar, so she thinks she missed them. Gave instructions to take Coumadin 10mg today, and then return to usual regimen tomorrow, keep appt as scheduled for 2/1.

## 2023-01-25 ENCOUNTER — TELEPHONE (OUTPATIENT)
Dept: FAMILY MEDICINE CLINIC | Age: 88
End: 2023-01-25

## 2023-01-25 NOTE — TELEPHONE ENCOUNTER
Patient reports the following sxs x 4 days cough, chest hurts (when coughs) and sore throat  No fever  No N,V,D    Please advise

## 2023-01-25 NOTE — TELEPHONE ENCOUNTER
Appt scheduled   Future Appointments   Date Time Provider Brandy Mederos   1/25/2023 11:20 AM Carla Barber, APRN - [address] [place] [address] [address]   2/1/2023 11:00 AM Frankey Lei, [facility] [address]   2/6/2023  1:00 PM Dawit Amanda RD, LD [facility] [address]   7/20/2023  1:30 PM Igor Brooks MD N [facility] [address]

## 2023-02-01 ENCOUNTER — TELEPHONE (OUTPATIENT)
Dept: PHARMACY | Age: 88
End: 2023-02-01

## 2023-02-01 ENCOUNTER — HOSPITAL ENCOUNTER (OUTPATIENT)
Dept: PHARMACY | Age: 88
Setting detail: THERAPIES SERIES
Discharge: HOME | End: 2023-02-01
Payer: MEDICARE

## 2023-02-01 DIAGNOSIS — Z79.01 ANTICOAGULATED ON COUMADIN: Primary | ICD-10-CM

## 2023-02-01 DIAGNOSIS — Z51.81 ENCOUNTER FOR THERAPEUTIC DRUG MONITORING: ICD-10-CM

## 2023-02-01 LAB — POC INR: 3.8 (ref 0.8–1.2)

## 2023-02-01 PROCEDURE — 36415 COLL VENOUS BLD VENIPUNCTURE: CPT

## 2023-02-01 PROCEDURE — 36416 COLLJ CAPILLARY BLOOD SPEC: CPT

## 2023-02-01 PROCEDURE — 99212 OFFICE O/P EST SF 10 MIN: CPT

## 2023-02-01 NOTE — PROGRESS NOTES
Medication Management [address]  [phone] (phone)  [phone] (fax)    Ms. Martha Frey is a 82 y.o.  female with history of Afib who presents today for anticoagulation monitoring and adjustment. Patient verifies current dosing regimen and tablet strength. No missed or extra doses except called on 1/24 to say she thinks she missed (possibly?) 1/22 and 1/23 dose as they weren't marked off her calendar. Was advised to take 10 mg on 1/24/23 and then continue usual schedule for warfarin. Patient denies s/s bleeding/bruising/swelling/SOB/chest pain  No blood in urine or stool. No dietary changes. No changes in medication/OTC agents/Herbals. No change in alcohol use or tobacco use. No change in activity level. Patient denies headaches/lightheadedness/falls but has had some dizzy spells - feels she has had a inner ear issue and feels dizzy from time to time. No vomiting or acute illness but has noted loose stool for the past week. No other symptoms of illness. If it continues, she may contact doctor. No Procedures scheduled in the future at this time. Assessment:   Lab Results   Component Value Date    INR 3.80 (H) 02/01/2023    INR 2.20 (H) 01/11/2023    INR 2.00 (H) 12/27/2022     INR supratherapeutic   Recent Labs     02/01/23  1129   INR 3.80*           Plan:  Question whether doses on 1/22 and 1/23 were actually missed. .. Hold x1, then decrease Coumadin 7.5 mg W, 5 mg MTThFSaS. Recheck INR in 3 week(s). Patient reminded to call the Anticoagulation Clinic with signs or symptoms of bleeding or with any medication changes. Patient given instructions utilizing the teach back method. After visit summary printed and reviewed with patient. Discharged ambulatory in no apparent distress.     For Pharmacy Admin Tracking Only    Intervention Detail: Dose Adjustment: 1, reason: Therapy De-escalation and Lab(s) Ordered  Total # of Interventions Recommended: 2  Total # of Interventions Accepted: 2  Time Spent (min): 20

## 2023-02-04 DIAGNOSIS — K21.9 GASTROESOPHAGEAL REFLUX DISEASE, UNSPECIFIED WHETHER ESOPHAGITIS PRESENT: ICD-10-CM

## 2023-02-04 DIAGNOSIS — I10 PRIMARY HYPERTENSION: ICD-10-CM

## 2023-02-04 DIAGNOSIS — E11.59 TYPE 2 DIABETES MELLITUS WITH OTHER CIRCULATORY COMPLICATION, WITHOUT LONG-TERM CURRENT USE OF INSULIN (HCC): ICD-10-CM

## 2023-02-06 ENCOUNTER — OFFICE VISIT (OUTPATIENT)
Dept: INTERNAL MEDICINE CLINIC | Age: 88
End: 2023-02-06
Payer: MEDICARE

## 2023-02-06 VITALS — BODY MASS INDEX: 28.41 KG/M2 | WEIGHT: 176 LBS

## 2023-02-06 DIAGNOSIS — E11.59 TYPE 2 DIABETES MELLITUS WITH OTHER CIRCULATORY COMPLICATION, WITHOUT LONG-TERM CURRENT USE OF INSULIN (HCC): Primary | ICD-10-CM

## 2023-02-06 PROCEDURE — 97803 MED NUTRITION INDIV SUBSEQ: CPT | Performed by: DIETITIAN, REGISTERED

## 2023-02-06 PROCEDURE — 99999 PR OFFICE/OUTPT VISIT,PROCEDURE ONLY: CPT | Performed by: DIETITIAN, REGISTERED

## 2023-02-06 RX ORDER — OMEPRAZOLE 40 MG/1
CAPSULE, DELAYED RELEASE ORAL
Qty: 90 CAPSULE | Refills: 3 | Status: SHIPPED | OUTPATIENT
Start: 2023-02-06

## 2023-02-06 RX ORDER — ATORVASTATIN CALCIUM 10 MG/1
TABLET, FILM COATED ORAL
Qty: 90 TABLET | Refills: 3 | Status: SHIPPED | OUTPATIENT
Start: 2023-02-06

## 2023-02-06 RX ORDER — MAGNESIUM OXIDE 400 MG/1
TABLET ORAL
Qty: 30 TABLET | Refills: 6 | Status: SHIPPED | OUTPATIENT
Start: 2023-02-06

## 2023-02-06 NOTE — PATIENT INSTRUCTIONS
La Joyce Kim, 23 oz, Broccoli Cheddar Quiche, [facility] ( a piece of quiche and fruit)        [place] Protein-Packed Muffin Mix Blueberry Lemon - 14oz  (you could have a muffin and 1/3 cup cottage cheese)      Amys brand of food . ... canned or frozen

## 2023-02-06 NOTE — PROGRESS NOTES
1. Attention deficit disorder, unspecified hyperactivity presence  - Referral for Psychological Testing    [address] [place] [address]. [address] [place] [address] Eligio., [facility], [facility]  [phone] (phone)  [phone] (fax)    Patient Name: Norma Rascon. Date of Birth: [id number]. MRN: [id number]      Assessment: Patient is a 82 y.o. female seen for follow-up MNT visit for Diabetes. -Nutritionally relevant labs:   Lab Results   Component Value Date/Time    LABA1C 6.0 (H) 11/16/2022 01:59 PM    LABA1C 6.1 (H) 04/27/2022 02:53 PM    LABA1C 5.9 (H) 10/11/2021 01:10 PM    LABA1C 7.8 (H) 02/25/2020 12:04 PM    LABA1C 7.4 01/06/2020 11:47 AM    LABA1C 10.3 (H) 09/16/2019 11:35 AM    GLUCOSE 95 09/27/2022 11:02 AM    GLUCOSE 114 (H) 08/16/2022 11:31 AM    CHOL 108 08/16/2022 11:31 AM    HDL 57 08/16/2022 11:31 AM    LDLCALC 35 08/16/2022 11:31 AM    TRIG 82 08/16/2022 11:31 AM     -Blood sugar trends: per written logs BS < 180 except a few in the 200s. Seems to be related to increased carb intake. -  Current Outpatient Medications on File Prior to Visit   Medication Sig Dispense Refill    atorvastatin (LIPITOR) 10 MG tablet TAKE 1 TABLET BY MOUTH EVERY DAY 90 tablet 3    omeprazole (PRILOSEC) 40 MG delayed release capsule TAKE 1 CAPSULE BY MOUTH EVERY DAY 90 capsule 3    metoprolol tartrate (LOPRESSOR) 25 MG tablet TAKE 1 TABLET BY MOUTH TWICE A  tablet 2    amLODIPine (NORVASC) 10 MG tablet TAKE 1 TABLET BY MOUTH EVERY DAY 90 tablet 1    betamethasone valerate (VALISONE) 0.1 % cream APPLY TOPICALLY 2 TIMES DAILY AS NEEDED.  (Patient not taking: Reported on 2/1/2023) 15 g 1    metFORMIN (GLUCOPHAGE) 1000 MG tablet TAKE 1 TABLET BY MOUTH TWICE A DAY WITH MEALS 180 tablet 3    loratadine (CLARITIN) 10 MG tablet TAKE 1 TABLET BY MOUTH EVERY DAY 90 tablet 3    warfarin (COUMADIN) 5 MG tablet TAKE AS DIRECTED BY [facility] TABS = 30 DAY SUPPLY 105 tablet 3    Multiple Vitamins-Minerals (CENTRUM SILVER 50+WOMEN) TABS Take 1 tablet by mouth daily 42% of Vitamin K requirement      ONETOUCH ULTRA strip TEST 2 TIMES DAILY 100 strip 3    spironolactone-hydroCHLOROthiazide (ALDACTAZIDE) 25-25 MG per tablet Take 1 tablet daily 90 tablet 1    Lancets (ONETOUCH DELICA PLUS [id number]) MISC TEST 2 TIMES DAILY 200 each 4    magnesium oxide (MAG-OX) 400 (241.3 Mg) MG TABS tablet TAKE 1 TABLET BY MOUTH EVERY DAY 30 tablet 6    meclizine (ANTIVERT) 12.5 MG tablet TAKE 1 TABLET BY MOUTH 3 TIMES DAILY AS NEEDED FOR DIZZINESS OR NAUSEA (Patient not taking: No sig reported) 60 tablet 0    blood glucose monitor kit and supplies Test one time a day . Dispense 30 strips a month with 5 refills Please dispense meter and suppliesthat insurance covers . 1 kit 0    UNABLE TO FIND Allergy shot every other Friday at allergist's office. No current facility-administered medications on file prior to visit. Vitals from current and previous visits: Wt 176 lb (79.8 kg)   BMI 28.41 kg/m²     -Body mass index is 28.41 kg/m². 25-29.9 - Overweight. - Patient weight down 2# in 3 months. Nutrition Diagnosis:   Limited adherence to nutrition-related recommendations related to Lack of previous MNT/currently undergoing MNT as evidenced by Patient requesting further diet edu/f/u. Intervention:  -Impression: Jasmina's ringing of the ear has subsided. Pt appetite still lower in the morning but wt still stable at 176# and only 2# wt loss on 3 months. Per pt food records diet is fair. Pt tries to limit sweets and prefers home cooked meals vs convince foods/meals.    -Instructed the patient on: Discussed food/bs logs and how increased carb/sugar could be increasing evening glucose. Encouraged bone broth. -  Patient Instructions   [employer] [place] Crew, 23 oz, Broccoli Cheddar Quiche, Packaged Dish ( a piece of quiche and fruit)        [place] Protein-Packed Muffin Mix Blueberry Lemon - 14oz  (you could have a muffin and 1/3 cup cottage cheese)      Amys brand of food . ... canned or frozen      Comprehension verified using teachback method. Monitoring/Evaluation:   -Followup visit: 12 weeks with dietitian.   -Receptiveness to education/goals: Agreeable.  -Evaluation of education: Indicates understanding.  -Readiness to change:maintain   -Expected compliance: good. Thank you for your referral of this patient. Total time involved in direct patient education: 30 minutes for follow-up MNT visit.

## 2023-02-07 ENCOUNTER — TELEPHONE (OUTPATIENT)
Dept: FAMILY MEDICINE CLINIC | Age: 88
End: 2023-02-07

## 2023-02-07 NOTE — TELEPHONE ENCOUNTER
Patient states that she spoke with her insurance and was informed that a PA was needed     PA initiated here

## 2023-02-07 NOTE — TELEPHONE ENCOUNTER
Have her check around to see what would be cost efficient for her, she can contact her insurance or the pharmacist. What has she previously been using?

## 2023-02-07 NOTE — TELEPHONE ENCOUNTER
PA pending review  Summary: TEST 2 TIMES DAILY, Disp-100 strip, R-3  Normal   Start: 10/31/2022  Ord/Sold: 10/31/2022 (O)  Report  Taking:   Long-term:   Pharmacy: [facility]/pharmacy #[id number] [place], [place] Nikita LIZBET [phone]  Med Dose History  ChangeDiscontinue         Patient Sig: TEST 2 TIMES DAILY       Ordered on: 10/31/2022       Authorized by: Nai Qureshi       Dispense: 100 strip       Refills: 3 ordered       Admin Instructions: TEST 2 TIMES DAILY       Prior Authorization: Pending       Prior Authorization: Enter DetailsCancelChange Payer

## 2023-02-07 NOTE — TELEPHONE ENCOUNTER
----- Message from Lin Sarmiento DO sent at 2/7/2023  6:52 AM EST -----  Pt called over the weekend asking about her test strips  Can you f/u with her and see what the issue is. She wasn't real clear on the phone Saturday  Thanks!

## 2023-02-17 DIAGNOSIS — E11.59 TYPE 2 DIABETES MELLITUS WITH OTHER CIRCULATORY COMPLICATION, WITHOUT LONG-TERM CURRENT USE OF INSULIN (HCC): ICD-10-CM

## 2023-02-17 NOTE — TELEPHONE ENCOUNTER
Recent Visits  Date Type Provider Dept   11/16/22 Office Visit Julien Toth, APRN - CNP [facility]   08/02/22 Office Visit Julien Toth, APRN - [facility]   04/27/22 Office Visit Julien Toth, APRN - [facility]   10/11/21 Office Visit Julien Toth, APRN - [facility]   Showing recent visits within past 540 days with a meds authorizing provider and meeting all other requirements  Future Appointments  Date Type Provider Dept   02/22/23 Appointment Julien Toth, APRN - CNP [facility]   Showing future appointments within next 150 days with a meds authorizing provider and meeting all other requirements     Future Appointments   Date Time Provider Brandy Mederos   2/22/2023 11:00 AM VIOLET Raymond [facility] [place]   2/22/2023  2:40 PM Julien Toth, APRN - [id number] [address] [facility] [place]   5/8/2023  1:00 PM Jordan France RD, LD [facility] [place]   7/20/2023  1:30 PM MD JASBIR Elmore [facility] [place]

## 2023-02-20 RX ORDER — GLUCOSAMINE HCL/CHONDROITIN SU 500-400 MG
CAPSULE ORAL
Qty: 200 STRIP | Refills: 3 | Status: SHIPPED | OUTPATIENT
Start: 2023-02-20

## 2023-02-20 RX ORDER — LANCETS 33 GAUGE
EACH MISCELLANEOUS
Qty: 200 EACH | Refills: 4 | Status: SHIPPED | OUTPATIENT
Start: 2023-02-20

## 2023-02-22 ENCOUNTER — OFFICE VISIT (OUTPATIENT)
Dept: FAMILY MEDICINE CLINIC | Age: 88
End: 2023-02-22
Payer: MEDICARE

## 2023-02-22 ENCOUNTER — HOSPITAL ENCOUNTER (OUTPATIENT)
Dept: PHARMACY | Age: 88
Setting detail: THERAPIES SERIES
Discharge: HOME | End: 2023-02-22
Payer: MEDICARE

## 2023-02-22 VITALS
BODY MASS INDEX: 28.38 KG/M2 | RESPIRATION RATE: 16 BRPM | TEMPERATURE: 98 F | HEIGHT: 66 IN | DIASTOLIC BLOOD PRESSURE: 70 MMHG | HEART RATE: 81 BPM | SYSTOLIC BLOOD PRESSURE: 114 MMHG | WEIGHT: 176.6 LBS | OXYGEN SATURATION: 100 %

## 2023-02-22 DIAGNOSIS — Z00.00 MEDICARE ANNUAL WELLNESS VISIT, SUBSEQUENT: Primary | ICD-10-CM

## 2023-02-22 DIAGNOSIS — Z79.01 ANTICOAGULATED ON COUMADIN: Primary | ICD-10-CM

## 2023-02-22 DIAGNOSIS — Z13.31 DEPRESSION SCREEN: ICD-10-CM

## 2023-02-22 DIAGNOSIS — E11.9 TYPE 2 DIABETES MELLITUS WITHOUT COMPLICATION, WITHOUT LONG-TERM CURRENT USE OF INSULIN (HCC): ICD-10-CM

## 2023-02-22 DIAGNOSIS — I10 ESSENTIAL HYPERTENSION: ICD-10-CM

## 2023-02-22 DIAGNOSIS — Z51.81 ENCOUNTER FOR THERAPEUTIC DRUG MONITORING: ICD-10-CM

## 2023-02-22 LAB
HBA1C MFR BLD: 6.1 % (ref 4.3–5.7)
POC INR: 2 (ref 0.8–1.2)

## 2023-02-22 PROCEDURE — 1090F PRES/ABSN URINE INCON ASSESS: CPT | Performed by: NURSE PRACTITIONER

## 2023-02-22 PROCEDURE — G0439 PPPS, SUBSEQ VISIT: HCPCS | Performed by: NURSE PRACTITIONER

## 2023-02-22 PROCEDURE — 1036F TOBACCO NON-USER: CPT | Performed by: NURSE PRACTITIONER

## 2023-02-22 PROCEDURE — 85610 PROTHROMBIN TIME: CPT

## 2023-02-22 PROCEDURE — 99213 OFFICE O/P EST LOW 20 MIN: CPT | Performed by: NURSE PRACTITIONER

## 2023-02-22 PROCEDURE — 99211 OFF/OP EST MAY X REQ PHY/QHP: CPT

## 2023-02-22 PROCEDURE — G0444 DEPRESSION SCREEN ANNUAL: HCPCS | Performed by: NURSE PRACTITIONER

## 2023-02-22 PROCEDURE — G8417 CALC BMI ABV UP PARAM F/U: HCPCS | Performed by: NURSE PRACTITIONER

## 2023-02-22 PROCEDURE — 3044F HG A1C LEVEL LT 7.0%: CPT | Performed by: NURSE PRACTITIONER

## 2023-02-22 PROCEDURE — G8484 FLU IMMUNIZE NO ADMIN: HCPCS | Performed by: NURSE PRACTITIONER

## 2023-02-22 PROCEDURE — 36416 COLLJ CAPILLARY BLOOD SPEC: CPT

## 2023-02-22 PROCEDURE — G8427 DOCREV CUR MEDS BY ELIG CLIN: HCPCS | Performed by: NURSE PRACTITIONER

## 2023-02-22 PROCEDURE — 1123F ACP DISCUSS/DSCN MKR DOCD: CPT | Performed by: NURSE PRACTITIONER

## 2023-02-22 RX ORDER — BLOOD-GLUCOSE METER
1 EACH MISCELLANEOUS 2 TIMES DAILY
Qty: 1 KIT | Refills: 0 | Status: SHIPPED | OUTPATIENT
Start: 2023-02-22

## 2023-02-22 ASSESSMENT — PATIENT HEALTH QUESTIONNAIRE - PHQ9
SUM OF ALL RESPONSES TO PHQ QUESTIONS 1-9: 0
1. LITTLE INTEREST OR PLEASURE IN DOING THINGS: 0
SUM OF ALL RESPONSES TO PHQ QUESTIONS 1-9: 0
2. FEELING DOWN, DEPRESSED OR HOPELESS: 0
SUM OF ALL RESPONSES TO PHQ QUESTIONS 1-9: 0
SUM OF ALL RESPONSES TO PHQ9 QUESTIONS 1 & 2: 0
SUM OF ALL RESPONSES TO PHQ QUESTIONS 1-9: 0

## 2023-02-22 ASSESSMENT — LIFESTYLE VARIABLES
HOW OFTEN DO YOU HAVE A DRINK CONTAINING ALCOHOL: NEVER
HOW MANY STANDARD DRINKS CONTAINING ALCOHOL DO YOU HAVE ON A TYPICAL DAY: PATIENT DOES NOT DRINK

## 2023-02-22 NOTE — PATIENT INSTRUCTIONS
Learning About Being Active as an Older Adult  Why is being active important as you get older? Being active is one of the best things you can do for your health. And it's never too late to start. Being active--or getting active, if you aren't already--has definite benefits. It can:  Give you more energy,  Keep your mind sharp. Improve balance to reduce your risk of falls. Help you manage chronic illness with fewer medicines. No matter how old you are, how fit you are, or what health problems you have, there is a form of activity that will work for you. And the more physical activity you can do, the better your overall health will be. What kinds of activity can help you stay healthy? Being more active will make your daily activities easier. Physical activity includes planned exercise and things you do in daily life. There are four types of activity:  Aerobic. Doing aerobic activity makes your heart and lungs strong. Includes walking, dancing, and gardening. Aim for at least 2½ hours spread throughout the week. It improves your energy and can help you sleep better. Muscle-strengthening. This type of activity can help maintain muscle and strengthen bones. Includes climbing stairs, using resistance bands, and lifting or carrying heavy loads. Aim for at least twice a week. It can help protect the knees and other joints. Stretching. Stretching gives you better range of motion in joints and muscles. Includes upper arm stretches, calf stretches, and gentle yoga. Aim for at least twice a week, preferably after your muscles are warmed up from other activities. It can help you function better in daily life. Balancing. This helps you stay coordinated and have good posture. Includes heel-to-toe walking, ignacia chi, and certain types of yoga. Aim for at least 3 days a week. It can reduce your risk of falling.   Even if you have a hard time meeting the recommendations, it's better to be more active than less active. All activity done in each category counts toward your weekly total. You'd be surprised how daily things like carrying groceries, keeping up with grandchildren, and taking the stairs can add up. What keeps you from being active? If you've had a hard time being more active, you're not alone. Maybe you remember being able to do more. Or maybe you've never thought of yourself as being active. It's frustrating when you can't do the things you want. Being more active can help. What's holding you back? Getting started. Have a goal, but break it into easy tasks. Small steps build into big accomplishments. Staying motivated. If you feel like skipping your activity, remember your goal. Maybe you want to move better and stay independent. Every activity gets you one step closer. Not feeling your best.  Start with 5 minutes of an activity you enjoy. Prove to yourself you can do it. As you get comfortable, increase your time. You may not be where you want to be. But you're in the process of getting there. Everyone starts somewhere. How can you find safe ways to stay active? Talk with your doctor about any physical challenges you're facing. Make a plan with your doctor if you have a health problem or aren't sure how to get started with activity. If you're already active, ask your doctor if there is anything you should change to stay safe as your body and health change. If you tend to feel dizzy after you take medicine, avoid activity at that time. Try being active before you take your medicine. This will reduce your risk of falls. If you plan to be active at home, make sure to clear your space before you get started. Remove things like TV cords, coffee tables, and throw rugs. It's safest to have plenty of space to move freely. The key to getting more active is to take it slow and steady. Try to improve only a little bit at a time.  Pick just one area to improve on at first. And if an activity hurts, stop and talk to your doctor. Where can you learn more? Go to http://www.cr.com/ and enter P600 to learn more about \"Learning About Being Active as an Older Adult. \"  Current as of: October 10, 2022               Content Version: 13.5  © 8096-8350 Healthwise, Incorporated. Care instructions adapted under license by [facility] ([facility]). If you have questions about a medical condition or this instruction, always ask your healthcare professional. [place] [address] any warranty or liability for your use of this information. Learning About Dental Care for Older Adults  Dental care for older adults: Overview  Dental care for older people is much the same as for younger adults. But older adults do have concerns that younger adults do not. Older adults may have problems with gum disease and decay on the roots of their teeth. They may need missing teeth replaced or broken fillings fixed. Or they may have dentures that need to be cared for. Some older adults may have trouble holding a toothbrush. You can help remind the person you are caring for to brush and floss their teeth or to clean their dentures. In some cases, you may need to do the brushing and other dental care tasks. People who have trouble using their hands or who have dementia may need this extra help. How can you help with dental care? Normal dental care  To keep the teeth and gums healthy:  Brush the teeth with fluoride toothpaste twice a day--in the morning and at night--and floss at least once a day. Plaque can quickly build up on the teeth of older adults. Watch for the signs of gum disease. These signs include gums that bleed after brushing or after eating hard foods, such as apples. See a dentist regularly. Many experts recommend checkups every 6 months. Keep the dentist up to date on any new medications the person is taking.   Encourage a balanced diet that includes whole grains, vegetables, and fruits, and that is low in saturated fat and sodium. Encourage the person you're caring for not to use tobacco products. They can affect dental and general health. Many older adults have a fixed income and feel that they can't afford dental care. But most towns and [facility] have programs in which dentists help older adults by lowering fees. Contact your area's public health offices or  for information about dental care in your area. Using a toothbrush  Older adults with arthritis sometimes have trouble brushing their teeth because they can't easily hold the toothbrush. Their hands and fingers may be stiff, painful, or weak. If this is the case, you can: Offer an electric toothbrush. Enlarge the handle of a non-electric toothbrush by wrapping a sponge, an elastic bandage, or adhesive tape around it. Push the toothbrush handle through a ball made of rubber or soft foam.  Make the handle longer and thicker by taping Popsicle sticks or tongue depressors to it. You may also be able to buy special toothbrushes, toothpaste dispensers, and floss holders. Your doctor may recommend a soft-bristle toothbrush if the person you care for bleeds easily. Bleeding can happen because of a health problem or from certain medicines. A toothpaste for sensitive teeth may help if the person you care for has sensitive teeth. How do you brush and floss someone's teeth? If the person you are caring for has a hard time cleaning their teeth on their own, you may need to brush and floss their teeth for them. It may be easiest to have the person sit and face away from you, and to sit or stand behind them. That way you can steady their head against your arm as you reach around to floss and brush their teeth. Choose a place that has good lighting and is comfortable for both of you. Before you begin, gather your supplies. You will need gloves, floss, a toothbrush, and a container to hold water if you are not near a sink.  Wash and dry your hands well and put on gloves. Start by flossing:  Gently work a piece of floss between each of the teeth toward the gums. A plastic flossing tool may make this easier, and they are available at most [facility]. Curve the floss around each tooth into a U-shape and gently slide it under the gum line. Move the floss firmly up and down several times to scrape off the plaque. After you've finished flossing, throw away the used floss and begin brushing:  Wet the brush and apply toothpaste. Place the brush at a 45-degree angle where the teeth meet the gums. Press firmly, and move the brush in small circles over the surface of the teeth. Be careful not to brush too hard. Vigorous brushing can make the gums pull away from the teeth and can scratch the tooth enamel. Brush all surfaces of the teeth, on the tongue side and on the cheek side. Pay special attention to the front teeth and all surfaces of the back teeth. Brush chewing surfaces with short back-and-forth strokes. After you've finished, help the person rinse the remaining toothpaste from their mouth. Where can you learn more? Go to http://www.woods.com/ and enter F944 to learn more about \"Learning About Dental Care for Older Adults. \"  Current as of: June 16, 2022               Content Version: 13.5  © 9472-6017 Healthwise, Incorporated. Care instructions adapted under license by [facility] ([facility]). If you have questions about a medical condition or this instruction, always ask your healthcare professional. [place] [address] any warranty or liability for your use of this information. Advance Directives: Care Instructions  Overview  An advance directive is a legal way to state your wishes at the end of your life. It tells your family and your doctor what to do if you can't say what you want. There are two main types of advance directives. You can change them any time your wishes change. Living will.   This form tells your family and your doctor your wishes about life support and other treatment. The form is also called a declaration. Medical power of . This form lets you name a person to make treatment decisions for you when you can't speak for yourself. This person is called a health care agent (health care proxy, health care surrogate). The form is also called a durable power of  for health care. If you do not have an advance directive, decisions about your medical care may be made by a family member, or by a doctor or a  who doesn't know you. It may help to think of an advance directive as a gift to the people who care for you. If you have one, they won't have to make tough decisions by themselves. For more information, including forms for your state, see the [address] website (www.caringinfo.org/planning/advance-directives/). Follow-up care is a key part of your treatment and safety. Be sure to make and go to all appointments, and call your doctor if you are having problems. It's also a good idea to know your test results and keep a list of the medicines you take. What should you include in an advance directive? Many states have a unique advance directive form. (It may ask you to address specific issues.) Or you might use a universal form that's approved by many states. If your form doesn't tell you what to address, it may be hard to know what to include in your advance directive. Use the questions below to help you get started. Who do you want to make decisions about your medical care if you are not able to? What life-support measures do you want if you have a serious illness that gets worse over time or can't be cured? What are you most afraid of that might happen? (Maybe you're afraid of having pain, losing your independence, or being kept alive by machines.)  Where would you prefer to die? (Your home? A hospital? A nursing home?)  Do you want to donate your organs when you die?   Do you want certain Yazidi practices performed before you die? When should you call for help? Be sure to contact your doctor if you have any questions. Where can you learn more? Go to http://www.cr.com/ and enter R264 to learn more about \"Advance Directives: Care Instructions. \"  Current as of: June 16, 2022               Content Version: 13.5  © 2006-2022 [employer]. Care instructions adapted under license by [facility] ([facility]). If you have questions about a medical condition or this instruction, always ask your healthcare professional. [address] any warranty or liability for your use of this information. A Healthy Heart: Care Instructions  Your Care Instructions     Coronary artery disease, also called heart disease, occurs when a substance called plaque builds up in the vessels that supply oxygen-rich blood to your heart muscle. This can narrow the blood vessels and reduce blood flow. A heart attack happens when blood flow is completely blocked. A high-fat diet, smoking, and other factors increase the risk of heart disease. Your doctor has found that you have a chance of having heart disease. You can do lots of things to keep your heart healthy. It may not be easy, but you can change your diet, exercise more, and quit smoking. These steps really work to lower your chance of heart disease. Follow-up care is a key part of your treatment and safety. Be sure to make and go to all appointments, and call your doctor if you are having problems. It's also a good idea to know your test results and keep a list of the medicines you take. How can you care for yourself at home? Diet    Use less salt when you cook and eat. This helps lower your blood pressure. Taste food before salting. Add only a little salt when you think you need it.  With time, your taste buds will adjust to less salt.     Eat fewer snack items, fast foods, canned soups, and other high-salt, high-fat, processed foods.     Read food labels and try to avoid saturated and trans fats. They increase your risk of heart disease by raising cholesterol levels.     Limit the amount of solid fat-butter, margarine, and shortening-you eat. Use olive, peanut, or canola oil when you cook. Bake, broil, and steam foods instead of frying them.     Eat a variety of fruit and vegetables every day. Dark green, deep orange, red, or yellow fruits and vegetables are especially good for you. Examples include spinach, carrots, peaches, and berries.     Foods high in fiber can reduce your cholesterol and provide important vitamins and minerals. High-fiber foods include whole-grain cereals and breads, oatmeal, beans, brown rice, citrus fruits, and apples.     Eat lean proteins. Heart-healthy proteins include seafood, lean meats and poultry, eggs, beans, peas, nuts, seeds, and soy products.     Limit drinks and foods with added sugar. These include candy, desserts, and soda pop. Lifestyle changes    If your doctor recommends it, get more exercise. Walking is a good choice. Bit by bit, increase the amount you walk every day. Try for at least 30 minutes on most days of the week. You also may want to swim, bike, or do other activities.     Do not smoke. If you need help quitting, talk to your doctor about stop-smoking programs and medicines. These can increase your chances of quitting for good. Quitting smoking may be the most important step you can take to protect your heart. It is never too late to quit.     Limit alcohol to 2 drinks a day for men and 1 drink a day for women. Too much alcohol can cause health problems.     Manage other health problems such as diabetes, high blood pressure, and high cholesterol. If you think you may have a problem with alcohol or drug use, talk to your doctor. Medicines    Take your medicines exactly as prescribed.  Call your doctor if you think you are having a problem with your medicine.     If your doctor recommends aspirin, take the amount directed each day. Make sure you take aspirin and not another kind of pain reliever, such as acetaminophen (Tylenol). When should you call for help? Call 911 if you have symptoms of a heart attack. These may include:    Chest pain or pressure, or a strange feeling in the chest.     Sweating.     Shortness of breath.     Pain, pressure, or a strange feeling in the back, neck, jaw, or upper belly or in one or both shoulders or arms.     Lightheadedness or sudden weakness.     A fast or irregular heartbeat. After you call 911, the  may tell you to chew 1 adult-strength or 2 to 4 low-dose aspirin. Wait for an ambulance. Do not try to drive yourself. Watch closely for changes in your health, and be sure to contact your doctor if you have any problems. Where can you learn more? Go to http://www.cr.com/ and enter F075 to learn more about \"A Healthy Heart: Care Instructions. \"  Current as of: September 7, 2022               Content Version: 13.5  © 5037-6524 Healthwise, [employer]. Care instructions adapted under license by [facility] ([facility]). If you have questions about a medical condition or this instruction, always ask your healthcare professional. [place] [address] any warranty or liability for your use of this information. Personalized Preventive Plan for Joshua Solis - 2/22/2023  Medicare offers a range of preventive health benefits. Some of the tests and screenings are paid in full while other may be subject to a deductible, co-insurance, and/or copay. Some of these benefits include a comprehensive review of your medical history including lifestyle, illnesses that may run in your family, and various assessments and screenings as appropriate.     After reviewing your medical record and screening and assessments performed today your provider may have ordered immunizations, labs, imaging, and/or referrals for you.  A list of these orders (if applicable) as well as your Preventive Care list are included within your After Visit Summary for your review. Other Preventive Recommendations:    A preventive eye exam performed by an eye specialist is recommended every 1-2 years to screen for glaucoma; cataracts, macular degeneration, and other eye disorders. A preventive dental visit is recommended every 6 months. Try to get at least 150 minutes of exercise per week or 10,000 steps per day on a pedometer . Order or download the FREE \"Exercise & Physical Activity: Your Everyday Guide\" from [employer]. Call 9-325.311.1892 or search [employer] online. You need 6342-1872 mg of calcium and 2251-2434 IU of vitamin D per day. It is possible to meet your calcium requirement with diet alone, but a vitamin D supplement is usually necessary to meet this goal.  When exposed to the sun, use a sunscreen that protects against both UVA and UVB radiation with an SPF of 30 or greater. Reapply every 2 to 3 hours or after sweating, drying off with a towel, or swimming. Always wear a seat belt when traveling in a car. Always wear a helmet when riding a bicycle or motorcycle.

## 2023-02-22 NOTE — PROGRESS NOTES
Medication Management [address]  [phone] (phone)  [phone] (fax)    Ms. Bryan Francois is a 82 y.o.  female with history of Afib who presents today for anticoagulation monitoring and adjustment. Patient verifies current dosing regimen and tablet strength. No missed or extra doses. Patient denies s/s bleeding/bruising/swelling/SOB/chest pain  No blood in urine or stool. No dietary changes. - Baseline little greens. States that she mainly will eat broccoli and some cabbage. Counseled patient that cabbage has significant vitamin K and try to eat a small amount in moderation. No changes in medication/OTC agents/Herbals. No change in alcohol use or tobacco use. No change in activity level. Patient denies headaches/dizziness/lightheadedness/falls. No vomiting/diarrhea or acute illness  - Intermittent diarrhea last week. No issues this week. No Procedures scheduled in the future at this time. Assessment:   Lab Results   Component Value Date    INR 2.00 (H) 02/22/2023    INR 3.80 (H) 02/01/2023    INR 2.20 (H) 01/11/2023     INR therapeutic   Recent Labs     02/22/23  1118   INR 2.00*   Patient and INR discussed with pharmacist.  Gordy KlineD Candidate 2023     Plan:  Continue Coumadin 7.5 mg on Wednesdays, 5 mg all other days. Recheck INR in 3 week(s). Patient reminded to call the Anticoagulation Clinic with any signs or symptoms of bleeding or with any medication changes. Patient given instructions utilizing the teach back method. After visit summary printed and reviewed with patient. Discharged ambulatory in no apparent distress.     For Pharmacy Admin Tracking Only  Time Spent (min): [address], PharmD, BCPS  2/22/2023  11:46 AM

## 2023-03-03 ENCOUNTER — TELEPHONE (OUTPATIENT)
Dept: FAMILY MEDICINE CLINIC | Age: 88
End: 2023-03-03

## 2023-03-03 NOTE — TELEPHONE ENCOUNTER
Patient informed and verbalized understanding.      Future Appointments   Date Time Provider Brandy Mederos   3/6/2023 11:40 AM JOSH Whitaker - ISIDRO CHUNG [facility] [place]   3/15/2023 11:00 AM VIOLET Chung Mai [facility] [place]   5/8/2023  1:00 PM Mellisa Lopez RD, LD [facility] [place]   7/20/2023  1:30 PM Lis Simon MD N [facility] Heart [facility] [place]   8/23/2023  2:40 PM JOSH Whitaker - [address] [facility] [place]   2/26/2024  2:40 PM Sara Rene APRN - [id number] [facility] [place]

## 2023-03-03 NOTE — TELEPHONE ENCOUNTER
----- Message from Carpentermel Escobar sent at 3/3/2023  9:12 AM EST -----  Subject: Message to Provider    QUESTIONS  Information for Provider? Spoke with patient, she is needing to know if   she should make an appointment or should she go to the hospital for right   shoulder pain, bone in back of shoulder . She states that it has been   hurting for about a week. She would like a call back today if possible to   discuss this issue. Thank you  ---------------------------------------------------------------------------  --------------  Carlos PHILLIPS  [phone]; OK to leave message on voicemail  ---------------------------------------------------------------------------  --------------  SCRIPT ANSWERS  Relationship to Patient?  Self

## 2023-03-03 NOTE — TELEPHONE ENCOUNTER
I am happy to see the pt next week for her shoulder pain, if she can wait that long. She can take tylenol over the weekend or use a pain cream or heating pad if needed.

## 2023-03-06 ENCOUNTER — TELEPHONE (OUTPATIENT)
Dept: FAMILY MEDICINE CLINIC | Age: 88
End: 2023-03-06

## 2023-03-06 ENCOUNTER — OFFICE VISIT (OUTPATIENT)
Dept: FAMILY MEDICINE CLINIC | Age: 88
End: 2023-03-06
Payer: MEDICARE

## 2023-03-06 VITALS
OXYGEN SATURATION: 94 % | SYSTOLIC BLOOD PRESSURE: 118 MMHG | WEIGHT: 174.8 LBS | BODY MASS INDEX: 28.09 KG/M2 | HEIGHT: 66 IN | TEMPERATURE: 98 F | DIASTOLIC BLOOD PRESSURE: 76 MMHG | HEART RATE: 62 BPM | RESPIRATION RATE: 16 BRPM

## 2023-03-06 DIAGNOSIS — R05.1 ACUTE COUGH: ICD-10-CM

## 2023-03-06 DIAGNOSIS — M89.8X1 SHOULDER BLADE PAIN: ICD-10-CM

## 2023-03-06 DIAGNOSIS — M54.6 ACUTE RIGHT-SIDED THORACIC BACK PAIN: Primary | ICD-10-CM

## 2023-03-06 PROCEDURE — G8427 DOCREV CUR MEDS BY ELIG CLIN: HCPCS | Performed by: NURSE PRACTITIONER

## 2023-03-06 PROCEDURE — 1123F ACP DISCUSS/DSCN MKR DOCD: CPT | Performed by: NURSE PRACTITIONER

## 2023-03-06 PROCEDURE — 1090F PRES/ABSN URINE INCON ASSESS: CPT | Performed by: NURSE PRACTITIONER

## 2023-03-06 PROCEDURE — 99213 OFFICE O/P EST LOW 20 MIN: CPT | Performed by: NURSE PRACTITIONER

## 2023-03-06 PROCEDURE — 1036F TOBACCO NON-USER: CPT | Performed by: NURSE PRACTITIONER

## 2023-03-06 PROCEDURE — G8417 CALC BMI ABV UP PARAM F/U: HCPCS | Performed by: NURSE PRACTITIONER

## 2023-03-06 PROCEDURE — G8484 FLU IMMUNIZE NO ADMIN: HCPCS | Performed by: NURSE PRACTITIONER

## 2023-03-06 RX ORDER — BENZONATATE 100 MG/1
100-200 CAPSULE ORAL 3 TIMES DAILY PRN
Qty: 60 CAPSULE | Refills: 0 | Status: SHIPPED | OUTPATIENT
Start: 2023-03-06 | End: 2023-03-13

## 2023-03-06 SDOH — ECONOMIC STABILITY: INCOME INSECURITY: HOW HARD IS IT FOR YOU TO PAY FOR THE VERY BASICS LIKE FOOD, HOUSING, MEDICAL CARE, AND HEATING?: NOT HARD AT ALL

## 2023-03-06 SDOH — ECONOMIC STABILITY: FOOD INSECURITY: WITHIN THE PAST 12 MONTHS, YOU WORRIED THAT YOUR FOOD WOULD RUN OUT BEFORE YOU GOT MONEY TO BUY MORE.: NEVER TRUE

## 2023-03-06 SDOH — ECONOMIC STABILITY: HOUSING INSECURITY
IN THE LAST 12 MONTHS, WAS THERE A TIME WHEN YOU DID NOT HAVE A STEADY PLACE TO SLEEP OR SLEPT IN A SHELTER (INCLUDING NOW)?: NO

## 2023-03-06 SDOH — ECONOMIC STABILITY: FOOD INSECURITY: WITHIN THE PAST 12 MONTHS, THE FOOD YOU BOUGHT JUST DIDN'T LAST AND YOU DIDN'T HAVE MONEY TO GET MORE.: NEVER TRUE

## 2023-03-06 ASSESSMENT — PATIENT HEALTH QUESTIONNAIRE - PHQ9
2. FEELING DOWN, DEPRESSED OR HOPELESS: 0
SUM OF ALL RESPONSES TO PHQ QUESTIONS 1-9: 0
SUM OF ALL RESPONSES TO PHQ9 QUESTIONS 1 & 2: 0
1. LITTLE INTEREST OR PLEASURE IN DOING THINGS: 0
SUM OF ALL RESPONSES TO PHQ QUESTIONS 1-9: 0
DEPRESSION UNABLE TO ASSESS: FUNCTIONAL CAPACITY MOTIVATION LIMITS ACCURACY
SUM OF ALL RESPONSES TO PHQ QUESTIONS 1-9: 0
SUM OF ALL RESPONSES TO PHQ QUESTIONS 1-9: 0

## 2023-03-06 NOTE — PROGRESS NOTES
SUBJECTIVE:  Toya Polk is a 82 y.o. y/o female that presents with Shoulder Pain (Right shoulder pain started 3 weeks ago )  . HPI:  Pain is present in the right lateral thoracic and shoulder blade region. Symptoms have been present for 5 day(s). The pain is intermittent, moderate. The patient describes the pain as aching, throbbing, and she states it starts about 6 am and she will take a tylenol and it goes away, it also goes away if she gets up and exercises, it will be gone for the day. No injury did not get a new mattress. .    Inciting injury or history of trauma? No  Pain is aggravated by - laying in bed all night,   Pain is relieved by - movement, tylenol , she denies posterior neck pain   Radiation of the pain? No  Paresthesias of the extremities? No  Saddle anesthesia? No  Bowel or bladder incontinence? No  Treatments tried - Tylenol    Pt continues with her loose congested cough tessalon perles help to relieve it, no wheezing or sob or fever. Not on any ACE-I, the cough is not new. OBJECTIVE:  /76   Pulse 62   Temp 98 °F (36.7 °C)   Resp 16   Ht 5' 6\" (1.676 m)   Wt 174 lb 12.8 oz (79.3 kg)   SpO2 94%   BMI 28.21 kg/m²   Physical Examination: General appearance - alert, well appearing, and in no distress  Chest - clear to auscultation, no wheezes, rales or rhonchi, symmetric air entry  Heart - normal rate, regular rhythm, normal S1, S2, no murmurs, rubs, clicks or gallops  Back exam - full range of motion, no tenderness, palpable spasm or pain on motion,  5/5 strength globally and symmetrically in the LEs  Extremities - peripheral pulses normal, no pedal edema, no clubbing or cyanosis    5/5 strength in the UEs  ROM is normal bilaterally.   Palpitation of the clavicle, AC joint and shoulder joint revealed no tenderness  The patient has a negative standard lift off test.  Neer's test is negative  Empty Can test is negative  Richardson Test is negative   Skin -  Skin color, texture, turgor normal. No rashes or lesions.  Psych - Affect normal, no evidence of depression or anxiety  No right shoulder pain with internal or external rotation.    Pain with palpation of left subscapular area     ASSESSMENT & PLAN  Jasmina was seen today for shoulder pain.    Diagnoses and all orders for this visit:    Acute right-sided thoracic back pain  Could be due to over activity or arthritis sleeping on mattress all night  Exam and history not consistent with rotator cuff tear or thoracic compression fracture.   -     diclofenac sodium (VOLTAREN) 1 % GEL; Apply 2 g topically 4 times daily    Shoulder blade pain  Massage  Heating pad  tylenol  -     diclofenac sodium (VOLTAREN) 1 % GEL; Apply 2 g topically 4 times daily    Acute cough  Tessalon perles  Call if symptoms continue  History not consistent with pneumonia    Other orders  -     benzonatate (TESSALON) 100 MG capsule; Take 1-2 capsules by mouth 3 times daily as needed for Cough      Return if symptoms worsen or fail to improve.      -Presentation is consistent with muscle strain and/or arthritis   -Start above treatments  -Patient advised to contact our office immediately if symptoms worsen or persist  -Patient counseled on conservative care including activity modification and OTC meds    All patient questions answered.  Patient voiced understanding.

## 2023-03-06 NOTE — TELEPHONE ENCOUNTER
Pt interested in joining [employer].  Can you please contact her and give her local info on this thanks

## 2023-03-07 ENCOUNTER — CARE COORDINATION (OUTPATIENT)
Dept: CARE COORDINATION | Age: 88
End: 2023-03-07

## 2023-03-07 NOTE — CARE COORDINATION
SW called pt and left a vm introducing self and my role also left info, with my contact information and information regarding [employer] that is provided at the [facility] at [address] @ [phone] or at the [facility] at [facility]. @ [phone]. Encouraged pt to call me back with any questions or concerns.

## 2023-03-07 NOTE — CARE COORDINATION
LANA called [place] and [employer] to obtain information on [employer] for pt. Both places have the program and LANA will notify pt of this.

## 2023-03-15 ENCOUNTER — HOSPITAL ENCOUNTER (OUTPATIENT)
Dept: PHARMACY | Age: 88
Setting detail: THERAPIES SERIES
Discharge: HOME | End: 2023-03-15
Payer: MEDICARE

## 2023-03-15 DIAGNOSIS — Z51.81 ENCOUNTER FOR THERAPEUTIC DRUG MONITORING: ICD-10-CM

## 2023-03-15 DIAGNOSIS — Z79.01 ANTICOAGULATED ON COUMADIN: Primary | ICD-10-CM

## 2023-03-15 LAB — POC INR: 2.1 (ref 0.8–1.2)

## 2023-03-15 PROCEDURE — 85610 PROTHROMBIN TIME: CPT

## 2023-03-15 PROCEDURE — 99211 OFF/OP EST MAY X REQ PHY/QHP: CPT

## 2023-03-15 PROCEDURE — 36416 COLLJ CAPILLARY BLOOD SPEC: CPT

## 2023-03-15 RX ORDER — BENZONATATE 100 MG/1
100 CAPSULE ORAL 3 TIMES DAILY PRN
COMMUNITY

## 2023-03-15 NOTE — PROGRESS NOTES
Medication Management [address]  [phone] (phone)  [phone] (fax)    Ms. Chyna Meyers is a 82 y.o.  female with history of persistent atrial fib. , per referral from DANNY Alford, who presents today for Warfarin monitoring and adjustment (3 week visit). Patient verifies current dosing regimen and tablet strength. No missed or extra doses. Patient denies  bleeding/bruising/SOB/chest pain. Having less swelling of ankles. No blood in urine or stool. No dietary changes. No changes in medication/OTC agents/herbals. No change in alcohol use or tobacco use. Change in activity level: doing more stairs. Patient denies headaches/dizziness/lightheadedness/falls. No vomiting/diarrhea or acute illness. Having loose stool sometimes - discussed with PCP. Thinks it's after milk or milkshakes; advised dairy can upset digestive system. Advised she try eliminating some in order to help symptoms. Still using Tessalon for cough. No procedures scheduled in the future at this time. Assessment:   Lab Results   Component Value Date    INR 2.10 (H) 03/15/2023    INR 2.00 (H) 02/22/2023    INR 3.80 (H) 02/01/2023     INR therapeutic - goal 2-3. Recent Labs     03/15/23  1115   INR 2.10*        Plan:  POCT INR performed/result reviewed. Continue PO Coumadin 7.5 mg W, 5 mg MTThFSS. Recheck INR in 4 week(s). Patient reminded to call the Anticoagulation Clinic with any signs or symptoms of bleeding or with any medication changes. Patient given instructions utilizing the teach back method. After visit summary printed and reviewed with patient. Discharged ambulatory in no apparent distress.     For Pharmacy Admin Tracking Only    Time Spent (min): 20

## 2023-03-23 RX ORDER — MAGNESIUM OXIDE 400 MG/1
1 TABLET ORAL DAILY
Qty: 90 TABLET | Refills: 3 | Status: SHIPPED | OUTPATIENT
Start: 2023-03-23

## 2023-03-29 ENCOUNTER — TELEPHONE (OUTPATIENT)
Dept: FAMILY MEDICINE CLINIC | Age: 88
End: 2023-03-29

## 2023-03-29 DIAGNOSIS — R05.3 CHRONIC COUGH: Primary | ICD-10-CM

## 2023-03-29 RX ORDER — BENZONATATE 100 MG/1
100-200 CAPSULE ORAL 3 TIMES DAILY PRN
Qty: 60 CAPSULE | Refills: 0 | Status: SHIPPED | OUTPATIENT
Start: 2023-03-29 | End: 2023-04-05

## 2023-03-29 NOTE — TELEPHONE ENCOUNTER
Pt informed and verbalized understanding.    Pt transferred to [facility] @ SR scheduling  Pt will go to [place] for her chest x-ray

## 2023-03-29 NOTE — TELEPHONE ENCOUNTER
If pt having continued cough I recommend we f/u with PFT's to r/o asthma or COPD. I did order this for her last year but it foes not look like she had them done, also I recommend a chest x-ray. To r/o lung pathology causing coughing.  Is she taking any allergy meds for the sneezing, if not I recommend zyrtec for this and tessalon sent in

## 2023-03-30 ENCOUNTER — HOSPITAL ENCOUNTER (OUTPATIENT)
Age: 88
Discharge: HOME | End: 2023-03-30
Payer: MEDICARE

## 2023-03-30 ENCOUNTER — HOSPITAL ENCOUNTER (OUTPATIENT)
Dept: GENERAL RADIOLOGY | Age: 88
Discharge: HOME | End: 2023-03-30
Payer: MEDICARE

## 2023-03-30 DIAGNOSIS — R05.3 CHRONIC COUGH: ICD-10-CM

## 2023-03-30 PROCEDURE — 71046 X-RAY EXAM CHEST 2 VIEWS: CPT

## 2023-04-20 ENCOUNTER — TELEPHONE (OUTPATIENT)
Dept: FAMILY MEDICINE CLINIC | Age: 88
End: 2023-04-20

## 2023-04-20 DIAGNOSIS — I48.20 CHRONIC ATRIAL FIBRILLATION (HCC): Primary | ICD-10-CM

## 2023-04-28 ENCOUNTER — HOSPITAL ENCOUNTER (OUTPATIENT)
Dept: PULMONOLOGY | Age: 88
Discharge: HOME | End: 2023-04-28
Payer: MEDICARE

## 2023-04-28 DIAGNOSIS — R05.3 CHRONIC COUGH: ICD-10-CM

## 2023-04-28 PROCEDURE — 94060 EVALUATION OF WHEEZING: CPT

## 2023-04-28 PROCEDURE — 94729 DIFFUSING CAPACITY: CPT

## 2023-04-28 PROCEDURE — 94726 PLETHYSMOGRAPHY LUNG VOLUMES: CPT

## 2023-05-02 ENCOUNTER — TELEPHONE (OUTPATIENT)
Dept: FAMILY MEDICINE CLINIC | Age: 88
End: 2023-05-02

## 2023-05-02 DIAGNOSIS — I51.7 ATRIAL DILATATION: ICD-10-CM

## 2023-05-02 DIAGNOSIS — R05.1 ACUTE COUGH: ICD-10-CM

## 2023-05-02 DIAGNOSIS — R94.2 ABNORMAL PFT: Primary | ICD-10-CM

## 2023-05-02 NOTE — TELEPHONE ENCOUNTER
----- Message from JOSH Fuentes CNP sent at 5/2/2023  7:59 AM EDT -----  Let pt know her PFT results are negative for COPD/asthma that could be causing her cough. It does suggest some diminished function of her lung diffusion, which can be suggestive of anemia or pulmonary htn which could cause some fatigue. To f/u on this I would like her to get some labs for me, a CBC to r/o anemia and an echocardiogram to r/o pulmonary HTN, I will place these orders, we may also refer her to Pulmonary pending these results. Let me know if she has any questions.

## 2023-05-02 NOTE — TELEPHONE ENCOUNTER
Future Appointments   Date Time Provider Brandy Rosa M   5/8/2023  1:00 PM Milla Torres RD, LD SRPX Physic [facility] [place]   5/10/2023  1:45 PM STR ECHO RM2 STRZ ECHO [place] HOD   5/11/2023 11:00 AM VIOLET [place] STR MED MGMT [facility] [place]   7/20/2023  1:30 PM Sheryl Panchal MD N SRPX Heart [facility] [place]   8/23/2023  2:40 PM JOSH Steele - CNP [facility] [place]   2/26/2024  2:40 PM Yokasta Noriega APRN - [id number] [facility] [place]

## 2023-05-02 NOTE — TELEPHONE ENCOUNTER
Patient informed and verbalized understanding. Patient transferred to  to schedule testing.  Lab mailed to given address

## 2023-05-08 ENCOUNTER — OFFICE VISIT (OUTPATIENT)
Dept: INTERNAL MEDICINE CLINIC | Age: 88
End: 2023-05-08
Payer: MEDICARE

## 2023-05-08 VITALS — WEIGHT: 172 LBS | BODY MASS INDEX: 27.76 KG/M2

## 2023-05-08 DIAGNOSIS — E11.59 TYPE 2 DIABETES MELLITUS WITH OTHER CIRCULATORY COMPLICATION, WITHOUT LONG-TERM CURRENT USE OF INSULIN (HCC): Primary | ICD-10-CM

## 2023-05-08 PROCEDURE — 99999 PR OFFICE/OUTPT VISIT,PROCEDURE ONLY: CPT | Performed by: DIETITIAN, REGISTERED

## 2023-05-08 PROCEDURE — 97803 MED NUTRITION INDIV SUBSEQ: CPT | Performed by: DIETITIAN, REGISTERED

## 2023-05-08 NOTE — PATIENT INSTRUCTIONS
1.) Jemma Abelardo Job with weight loss. 2.) Try to get in routine exercise! 10 - 30 min walks    3.) Limit regular soda     4.) Bring your blood sugar logs to your next appt with Caleb Diaz. 5.) If blood sugar is over 200 try to get in a walk and glass of water.

## 2023-05-08 NOTE — PROGRESS NOTES
[address] [place] [address]. [address] [place] [place] [address] Eligio., [facility], [address]  [phone] (phone)  [phone] (fax)    Patient Name: Evelia Reid Date of Birth: [id number]. MRN: [id number]      Assessment: Patient is a 82 y.o. female seen for follow-up MNT visit for Diabetes.      -Nutritionally relevant labs:   Lab Results   Component Value Date/Time    LABA1C 6.1 (H) 02/22/2023 01:26 PM    LABA1C 6.0 (H) 11/16/2022 01:59 PM    LABA1C 6.1 (H) 04/27/2022 02:53 PM    LABA1C 7.8 (H) 02/25/2020 12:04 PM    LABA1C 7.4 01/06/2020 11:47 AM    LABA1C 10.3 (H) 09/16/2019 11:35 AM    GLUCOSE 95 09/27/2022 11:02 AM    GLUCOSE 114 (H) 08/16/2022 11:31 AM    CHOL 108 08/16/2022 11:31 AM    HDL 57 08/16/2022 11:31 AM    LDLCALC 35 08/16/2022 11:31 AM    TRIG 82 08/16/2022 11:31 AM     -Blood sugar trends: per written food records -149 and Bedtime 105-241.     -Food recall: B: Baccon, egg, waffle/ Fruit sausage/ grits, barrientos, sausage/bean and toast    Lunch: soup\/ beans/ leftovers    Dinner: Beans and starch/ meat starch/ tuna/ nunez    -Main Beverages: lemon aid, soda (1/2 can at a time) milk, water    -  Current Outpatient Medications on File Prior to Visit   Medication Sig Dispense Refill    spironolactone-hydroCHLOROthiazide (ALDACTAZIDE) 25-25 MG per tablet take 1 tablet by mouth once daily 90 tablet 1    budesonide (RINOCORT AQUA) 32 MCG/ACT nasal spray 1 spray by Each Nostril route daily 1 each 2    magnesium oxide (MAG-OX) 400 MG tablet Take 1 tablet by mouth daily 90 tablet 3    benzonatate (TESSALON) 100 MG capsule Take 1 capsule by mouth 3 times daily as needed for Cough      diclofenac sodium (VOLTAREN) 1 % GEL Apply 2 g topically 4 times daily 150 g 2    Blood Glucose Monitoring Suppl ([employer] BLOOD GLUCOSE METER) w/Device KIT 1 each by Does not apply route in the morning and at bedtime Dispense Accucheck meter 1 kit 0    blood glucose monitor strips Use to check

## 2023-05-10 ENCOUNTER — HOSPITAL ENCOUNTER (OUTPATIENT)
Dept: NON INVASIVE DIAGNOSTICS | Age: 88
Discharge: HOME | End: 2023-05-10
Payer: MEDICARE

## 2023-05-10 DIAGNOSIS — I51.7 ATRIAL DILATATION: ICD-10-CM

## 2023-05-10 DIAGNOSIS — R94.2 ABNORMAL PFT: ICD-10-CM

## 2023-05-10 DIAGNOSIS — R05.1 ACUTE COUGH: ICD-10-CM

## 2023-05-10 LAB
LV EF: 60 %
LVEF MODALITY: NORMAL

## 2023-05-10 PROCEDURE — 93306 TTE W/DOPPLER COMPLETE: CPT

## 2023-05-11 ENCOUNTER — HOSPITAL ENCOUNTER (OUTPATIENT)
Dept: PHARMACY | Age: 88
Setting detail: THERAPIES SERIES
Discharge: HOME | End: 2023-05-11
Payer: MEDICARE

## 2023-05-11 ENCOUNTER — TELEPHONE (OUTPATIENT)
Dept: FAMILY MEDICINE CLINIC | Age: 88
End: 2023-05-11

## 2023-05-11 DIAGNOSIS — R05.3 CHRONIC COUGH: Primary | ICD-10-CM

## 2023-05-11 DIAGNOSIS — Z51.81 ENCOUNTER FOR THERAPEUTIC DRUG MONITORING: ICD-10-CM

## 2023-05-11 DIAGNOSIS — Z79.01 ANTICOAGULATED ON COUMADIN: Primary | ICD-10-CM

## 2023-05-11 LAB — POC INR: 2 (ref 0.8–1.2)

## 2023-05-11 PROCEDURE — 36416 COLLJ CAPILLARY BLOOD SPEC: CPT

## 2023-05-11 PROCEDURE — 99211 OFF/OP EST MAY X REQ PHY/QHP: CPT

## 2023-05-11 PROCEDURE — 85610 PROTHROMBIN TIME: CPT

## 2023-05-11 RX ORDER — DEXTROMETHORPHAN POLISTIREX 30 MG/5ML
60 SUSPENSION ORAL 2 TIMES DAILY PRN
Qty: 148 ML | Refills: 2 | Status: SHIPPED | OUTPATIENT
Start: 2023-05-11 | End: 2023-05-21

## 2023-05-11 RX ORDER — BENZONATATE 100 MG/1
100 CAPSULE ORAL 3 TIMES DAILY PRN
Qty: 40 CAPSULE | Refills: 1 | Status: SHIPPED | OUTPATIENT
Start: 2023-05-11

## 2023-05-11 RX ORDER — ALBUTEROL SULFATE 90 UG/1
2 AEROSOL, METERED RESPIRATORY (INHALATION) EVERY 6 HOURS PRN
Qty: 18 G | Refills: 3 | Status: SHIPPED | OUTPATIENT
Start: 2023-05-11

## 2023-05-11 NOTE — TELEPHONE ENCOUNTER
Pt is not wanting to do another PFT (completed one on 4/28/23), pt is not wanting to do the tessalon perles or albuterol inhaler. Pt is asking for cough syrup to be sent to her pharmacy. Pt is scheduled tomorrow for an allergy shot and will speak to them also about her cough.     Please advise

## 2023-05-11 NOTE — TELEPHONE ENCOUNTER
Let pt know her echocardiogram identified some enlargement of the left atrium(top left portion of the heart) and her aortic valve has some narrowing and thickening of it that causes it to be a little stiffer than normal but her heart output is in normal range. Please let her know I did send the results to her Cardiologist for review, to see if they want to make any med changes but I suspect they will continue to monitor her  for now.  Thanks

## 2023-05-11 NOTE — TELEPHONE ENCOUNTER
Pt stated she still is having a cough along with nasal drainage. Pt has completed the tessalon perles for her cough that was prescribed in March. The referral to the allergist has been cancelled due to pt stating she already has an allergist, [address] [place] [address]. Pt's last appointment was on 4/21/23 in which pt was to start loratadine 10 mg and also montelukast 10 mg, they are also increasing her allergy shots to once a week. Pt states this has not helped. Please advise if pt should call her allergist for these symptoms.

## 2023-05-11 NOTE — PROGRESS NOTES
Medication Management [address]  [phone] (phone)  [phone] (fax)    Ms. Nadiya Hernandez is a 82 y.o.  female with history of Afib who presents today for anticoagulation monitoring and adjustment. Patient verifies current dosing regimen and tablet strength. No missed or extra doses. Patient denies s/s bleeding/bruising/swelling/SOB/chest pain  No blood in urine or stool. No dietary changes. No changes in medication/OTC agents/Herbals. No change in alcohol use or tobacco use. No change in activity level. Patient denies headaches/dizziness/lightheadedness/falls. No vomiting/diarrh. ea or acute illness. No Procedures scheduled in the future at this time. Assessment:   Lab Results   Component Value Date    INR 2.00 (H) 05/11/2023    INR 2.20 (H) 04/12/2023    INR 2.10 (H) 03/15/2023     INR therapeutic   Recent Labs     05/11/23  1131   INR 2.00*     Plan:  Continue Coumadin 7.5 mg Wed and 5 mg SuMoTuThFrSa. Recheck INR in 4 week(s). Patient reminded to call the Anticoagulation Clinic with any signs or symptoms of bleeding or with any medication changes. Patient given instructions utilizing the teach back method. After visit summary printed and reviewed with patient. Discharged ambulatory in no apparent distress.     For Pharmacy Admin Tracking Only    Time Spent (min): 15

## 2023-05-11 NOTE — TELEPHONE ENCOUNTER
I recommend she contact her allergist for the cough , if the perles helped I can reorder them along with an albuterol inhaler that she can use up to 4 times a day for coughing. also. I am going to place orders for PFT's to r/o asthma or COPD that could be causing her cough.

## 2023-06-08 ENCOUNTER — HOSPITAL ENCOUNTER (OUTPATIENT)
Dept: PHARMACY | Age: 88
Setting detail: THERAPIES SERIES
Discharge: HOME | End: 2023-06-08
Payer: MEDICARE

## 2023-06-08 DIAGNOSIS — Z51.81 ENCOUNTER FOR THERAPEUTIC DRUG MONITORING: ICD-10-CM

## 2023-06-08 DIAGNOSIS — Z79.01 ANTICOAGULATED ON COUMADIN: Primary | ICD-10-CM

## 2023-06-08 LAB — POC INR: 1.8 (ref 0.8–1.2)

## 2023-06-08 PROCEDURE — 99211 OFF/OP EST MAY X REQ PHY/QHP: CPT

## 2023-06-08 PROCEDURE — 85610 PROTHROMBIN TIME: CPT

## 2023-06-08 PROCEDURE — 36416 COLLJ CAPILLARY BLOOD SPEC: CPT

## 2023-06-08 NOTE — PROGRESS NOTES
Medication Management [address]  [phone] (phone)  [phone] (fax)    Ms. Mary Garner is a 82 y.o.  female with history of Afib who presents today for anticoagulation monitoring and adjustment. Patient verifies current dosing regimen and tablet strength. States she took the Coumadin dose a little late yesterday and thinks she may have only taken 5 mg instead of 7.5 mg. Patient denies s/s bleeding/bruising/swelling/SOB/chest pain  No blood in urine or stool. No changes in medication/OTC agents/Herbals. No change in tobacco use. States she's had more sandwiches and soup lately due to tooth pain. Has mayonnaise on sandwiches. States had 2 beers last week on Memorial Day  No change in activity level. Patient denies headaches/dizziness/lightheadedness/falls. No vomiting/diarrhea or acute illness. No Procedures scheduled in the future at this time. Assessment:   Lab Results   Component Value Date    INR 1.80 (H) 06/08/2023    INR 2.00 (H) 05/11/2023    INR 2.20 (H) 04/12/2023     INR subtherapeutic   Recent Labs     06/08/23  1115   INR 1.80*       Plan:  Take 7.5 mg today then continue Coumadin 7.5 mg Wed and 5 mg SuMoTuThFrSa. Recheck INR in 2 weeks (at patient's request since she was out of range). Patient reminded to call the Anticoagulation Clinic with any signs or symptoms of bleeding or with any medication changes. Patient given instructions utilizing the teach back method. After visit summary printed and reviewed with patient. Discharged ambulatory in no apparent distress.     For Pharmacy Admin Tracking Only    Intervention Detail: Dose Adjustment: 1, reason: Therapy Optimization  Total # of Interventions Recommended: 1  Total # of Interventions Accepted: 1  Time Spent (min): 20

## 2023-06-21 DIAGNOSIS — I10 ESSENTIAL HYPERTENSION: ICD-10-CM

## 2023-06-22 ENCOUNTER — HOSPITAL ENCOUNTER (OUTPATIENT)
Dept: PHARMACY | Age: 88
Setting detail: THERAPIES SERIES
Discharge: HOME | End: 2023-06-22
Payer: MEDICARE

## 2023-06-22 DIAGNOSIS — R06.7 SNEEZING: ICD-10-CM

## 2023-06-22 DIAGNOSIS — E11.59 TYPE 2 DIABETES MELLITUS WITH OTHER CIRCULATORY COMPLICATION, WITHOUT LONG-TERM CURRENT USE OF INSULIN (HCC): ICD-10-CM

## 2023-06-22 DIAGNOSIS — Z79.01 ANTICOAGULATED ON COUMADIN: ICD-10-CM

## 2023-06-22 DIAGNOSIS — Z51.81 ENCOUNTER FOR THERAPEUTIC DRUG MONITORING: ICD-10-CM

## 2023-06-22 DIAGNOSIS — I48.19 PERSISTENT ATRIAL FIBRILLATION (HCC): ICD-10-CM

## 2023-06-22 DIAGNOSIS — E11.9 TYPE 2 DIABETES MELLITUS WITHOUT COMPLICATION, WITHOUT LONG-TERM CURRENT USE OF INSULIN (HCC): ICD-10-CM

## 2023-06-22 DIAGNOSIS — I10 ESSENTIAL HYPERTENSION: ICD-10-CM

## 2023-06-22 DIAGNOSIS — Z79.01 ANTICOAGULATED ON COUMADIN: Primary | ICD-10-CM

## 2023-06-22 DIAGNOSIS — K21.9 GASTROESOPHAGEAL REFLUX DISEASE, UNSPECIFIED WHETHER ESOPHAGITIS PRESENT: ICD-10-CM

## 2023-06-22 DIAGNOSIS — I10 PRIMARY HYPERTENSION: ICD-10-CM

## 2023-06-22 LAB — POC INR: 2.4 (ref 0.8–1.2)

## 2023-06-22 PROCEDURE — 36416 COLLJ CAPILLARY BLOOD SPEC: CPT

## 2023-06-22 PROCEDURE — 99211 OFF/OP EST MAY X REQ PHY/QHP: CPT

## 2023-06-22 PROCEDURE — 85610 PROTHROMBIN TIME: CPT

## 2023-06-22 RX ORDER — LORATADINE 10 MG/1
10 TABLET ORAL DAILY
Qty: 90 TABLET | Refills: 3 | Status: SHIPPED | OUTPATIENT
Start: 2023-06-22

## 2023-06-22 RX ORDER — AMLODIPINE BESYLATE 10 MG/1
10 TABLET ORAL DAILY
Qty: 90 TABLET | Refills: 2 | Status: SHIPPED | OUTPATIENT
Start: 2023-06-22

## 2023-06-22 RX ORDER — ATORVASTATIN CALCIUM 10 MG/1
10 TABLET, FILM COATED ORAL DAILY
Qty: 90 TABLET | Refills: 3 | Status: SHIPPED | OUTPATIENT
Start: 2023-06-22

## 2023-06-22 RX ORDER — WARFARIN SODIUM 5 MG/1
TABLET ORAL
Qty: 105 TABLET | Refills: 3 | OUTPATIENT
Start: 2023-06-22

## 2023-06-22 RX ORDER — OMEPRAZOLE 40 MG/1
CAPSULE, DELAYED RELEASE ORAL
Qty: 90 CAPSULE | Refills: 3 | Status: SHIPPED | OUTPATIENT
Start: 2023-06-22

## 2023-06-22 NOTE — PROGRESS NOTES
Medication Management [address]   [phone] (phone)  [phone] (fax)    Ms. Brianna Nguyen is a 82 y.o.  female with history of chronic atrial fib. , per referral from DANNY Villa, who presents today for Warfarin monitoring and adjustment (2 week visit). Patient verifies current dosing regimen and tablet strength. No missed or extra doses, except for bolus ordered last visit. Patient denies bleeding/bruising/SOB/chest pain. Has usual swelling of ankles. No blood in urine or stool. No dietary changes. No changes in medication/OTC agents/herbals. No change in alcohol use or tobacco use. No change in activity level. Patient denies headaches/dizziness/lightheadedness/falls. No vomiting/diarrhea or acute illness. Has loose stool at times, depending on what she eats. Not new. No procedures scheduled in the future at this time, other than getting dentures 6/26, then sometime later a filling (?). States blood sugar was 121 this morning. Assessment:     Lab Results   Component Value Date    INR 2.40 (H) 06/22/2023    INR 1.80 (H) 06/08/2023    INR 2.00 (H) 05/11/2023     INR therapeutic - goal 2-3. Recent Labs     06/22/23  1115   INR 2.40*      Plan:  POCT INR performed/result reviewed. Continue PO Coumadin 7.5 mg W, 5 mg MTThFSS. Recheck INR in 4 week(s). Patient reminded to call the Anticoagulation Clinic with any signs or symptoms of bleeding or with any medication changes. Patient given instructions utilizing the teach back method. After visit summary printed and reviewed with patient. Discharged ambulatory in no apparent distress.     For Pharmacy Admin Tracking Only    Time Spent (min): 20

## 2023-06-22 NOTE — TELEPHONE ENCOUNTER
Future Appointments   Date Time Provider Brandy Betancourti   6/22/2023 11:00 AM Edilia Goodell, RN [facility] [place]   7/20/2023  1:30 PM Makayla Marcos MD N SRPX Heart [place]   8/23/2023  2:40 PM Pattie Salgado APRN - ISIDRO COLEMAN [facility] [place]   11/13/2023  1:00 PM Rizwana Steele RD, LD SRPX Physic [place]   2/26/2024  2:40 PM JOSH Orellana - Rm [place]

## 2023-07-11 ENCOUNTER — HOSPITAL ENCOUNTER (OUTPATIENT)
Age: 88
Discharge: HOME | End: 2023-07-11
Payer: MEDICARE

## 2023-07-11 DIAGNOSIS — I48.21 ATRIAL FIBRILLATION, PERMANENT (HCC): ICD-10-CM

## 2023-07-11 DIAGNOSIS — R60.0 BILATERAL LEG EDEMA: ICD-10-CM

## 2023-07-11 DIAGNOSIS — R94.31 ABNORMAL EKG: ICD-10-CM

## 2023-07-11 DIAGNOSIS — I10 ESSENTIAL HYPERTENSION: ICD-10-CM

## 2023-07-11 LAB
ALBUMIN SERPL BCG-MCNC: 4.3 G/DL (ref 3.5–5.1)
ALP SERPL-CCNC: 148 U/L (ref 38–126)
ALT SERPL W/O P-5'-P-CCNC: 86 U/L (ref 11–66)
ANION GAP SERPL CALC-SCNC: 16 MEQ/L (ref 8–16)
AST SERPL-CCNC: 33 U/L (ref 5–40)
BILIRUB CONJ SERPL-MCNC: < 0.2 MG/DL (ref 0–0.3)
BILIRUB SERPL-MCNC: 0.4 MG/DL (ref 0.3–1.2)
BUN SERPL-MCNC: 29 MG/DL (ref 7–22)
CALCIUM SERPL-MCNC: 10.1 MG/DL (ref 8.5–10.5)
CHLORIDE SERPL-SCNC: 98 MEQ/L (ref 98–111)
CHOLEST SERPL-MCNC: 128 MG/DL (ref 100–199)
CO2 SERPL-SCNC: 24 MEQ/L (ref 23–33)
CREAT SERPL-MCNC: 1 MG/DL (ref 0.4–1.2)
GFR SERPL CREATININE-BSD FRML MDRD: 55 ML/MIN/1.73M2
GLUCOSE SERPL-MCNC: 109 MG/DL (ref 70–108)
HDLC SERPL-MCNC: 84 MG/DL
LDLC SERPL CALC-MCNC: 26 MG/DL
MAGNESIUM SERPL-MCNC: 1.6 MG/DL (ref 1.6–2.4)
POTASSIUM SERPL-SCNC: 3.9 MEQ/L (ref 3.5–5.2)
PROT SERPL-MCNC: 7.7 G/DL (ref 6.1–8)
SODIUM SERPL-SCNC: 138 MEQ/L (ref 135–145)
TRIGL SERPL-MCNC: 89 MG/DL (ref 0–199)

## 2023-07-11 PROCEDURE — 82248 BILIRUBIN DIRECT: CPT

## 2023-07-11 PROCEDURE — 36415 COLL VENOUS BLD VENIPUNCTURE: CPT

## 2023-07-11 PROCEDURE — 80061 LIPID PANEL: CPT

## 2023-07-11 PROCEDURE — 80053 COMPREHEN METABOLIC PANEL: CPT

## 2023-07-11 PROCEDURE — 83735 ASSAY OF MAGNESIUM: CPT

## 2023-07-20 ENCOUNTER — HOSPITAL ENCOUNTER (OUTPATIENT)
Dept: PHARMACY | Age: 88
Setting detail: THERAPIES SERIES
Discharge: HOME | End: 2023-07-20
Payer: MEDICARE

## 2023-07-20 ENCOUNTER — OFFICE VISIT (OUTPATIENT)
Dept: CARDIOLOGY CLINIC | Age: 88
End: 2023-07-20
Payer: MEDICARE

## 2023-07-20 VITALS
BODY MASS INDEX: 27.48 KG/M2 | SYSTOLIC BLOOD PRESSURE: 124 MMHG | HEIGHT: 66 IN | WEIGHT: 171 LBS | HEART RATE: 82 BPM | DIASTOLIC BLOOD PRESSURE: 77 MMHG

## 2023-07-20 DIAGNOSIS — I48.21 ATRIAL FIBRILLATION, PERMANENT (HCC): Primary | ICD-10-CM

## 2023-07-20 DIAGNOSIS — R60.0 BILATERAL LEG EDEMA: ICD-10-CM

## 2023-07-20 DIAGNOSIS — R94.31 ABNORMAL EKG: ICD-10-CM

## 2023-07-20 DIAGNOSIS — Z79.01 ANTICOAGULATED ON COUMADIN: Primary | ICD-10-CM

## 2023-07-20 DIAGNOSIS — I10 ESSENTIAL HYPERTENSION: ICD-10-CM

## 2023-07-20 DIAGNOSIS — Z51.81 ENCOUNTER FOR THERAPEUTIC DRUG MONITORING: ICD-10-CM

## 2023-07-20 LAB — POC INR: 3 (ref 0.8–1.2)

## 2023-07-20 PROCEDURE — 99214 OFFICE O/P EST MOD 30 MIN: CPT | Performed by: INTERNAL MEDICINE

## 2023-07-20 PROCEDURE — 1123F ACP DISCUSS/DSCN MKR DOCD: CPT | Performed by: INTERNAL MEDICINE

## 2023-07-20 PROCEDURE — 85610 PROTHROMBIN TIME: CPT

## 2023-07-20 PROCEDURE — 1090F PRES/ABSN URINE INCON ASSESS: CPT | Performed by: INTERNAL MEDICINE

## 2023-07-20 PROCEDURE — G8427 DOCREV CUR MEDS BY ELIG CLIN: HCPCS | Performed by: INTERNAL MEDICINE

## 2023-07-20 PROCEDURE — 93000 ELECTROCARDIOGRAM COMPLETE: CPT | Performed by: INTERNAL MEDICINE

## 2023-07-20 PROCEDURE — 99211 OFF/OP EST MAY X REQ PHY/QHP: CPT

## 2023-07-20 PROCEDURE — G8417 CALC BMI ABV UP PARAM F/U: HCPCS | Performed by: INTERNAL MEDICINE

## 2023-07-20 PROCEDURE — 36416 COLLJ CAPILLARY BLOOD SPEC: CPT

## 2023-07-20 PROCEDURE — 1036F TOBACCO NON-USER: CPT | Performed by: INTERNAL MEDICINE

## 2023-07-20 RX ORDER — PREDNISONE 10 MG/1
TABLET ORAL
COMMUNITY
Start: 2023-06-30 | End: 2023-07-20

## 2023-07-20 RX ORDER — MONTELUKAST SODIUM 10 MG/1
10 TABLET ORAL NIGHTLY
COMMUNITY
Start: 2023-06-23

## 2023-07-20 NOTE — PROGRESS NOTES
Medication Management [address] [place] [address]  [phone] (phone)  [phone] (fax)    Ms. Elizabeth Valverde is a 82 y.o.  female with history of chronic atrial fib. , per referral from DANNY Lockhart, who presents today for Warfarin monitoring and adjustment (4 week visit). Patient verifies current dosing regimen and tablet strength. No missed or extra doses. Patient denies bleeding/bruising/swelling/SOB. No dietary changes. Still eating soup/soft foods. Will get dentures eventually. She asked about lamin greens, etc. (Not eating.)  Reminded they are high in Vitamin K. She will not eat them. Changes in medication/OTC agents/herbals: mentioned she just finished a steroid about 2 days ago for cough, from allergist.  Called her pharmacy: Prednisone 10 mg two BID for 5 days, then 1 BID for 7 days, then 1 daily for 7 days, ordered 6/30. Reminded to call this clinic if has again. No change in alcohol use or tobacco use. No change in activity level. Patient denies falls. No vomiting/diarrhea or acute illness. No procedures scheduled in the future at this time. Just came from seeing Dr. Kendra Otto. Assessment:   Lab Results   Component Value Date    INR 3.00 (H) 07/20/2023    INR 2.40 (H) 06/22/2023    INR 1.80 (H) 06/08/2023     INR therapeutic - goal 2-3. Recent Labs     07/20/23  1444   INR 3.00*        Plan:  POCT INR performed/result reviewed. Continue PO Coumadin 7.5 mg W, 5 mg MTThFSS. Recheck INR in 4 week(s). (Report given - orders received from/verified with FANTA Driscoll, PharmD.)  Patient reminded to call the Anticoagulation Clinic with any signs or symptoms of bleeding or with any medication changes. Patient given instructions utilizing the teach back method. After visit summary printed and reviewed with patient. Discharged ambulatory in no apparent distress.      For Pharmacy Admin Tracking Only    Time Spent (min): 24

## 2023-07-20 NOTE — PROGRESS NOTES
Chief Complaint   Patient presents with    6 Month Follow-Up   Originally  patient here for atrial fib, [place] referral.  Known to have atr fib for 5 yr   [place]  On coumadin          Patient here for a 6 month follow up    EKG done today    No wt changes    Denied cp, dizziness  Or palpitations    Sob on exertion chronic- better    Leg edema better now Trace to +1    Hx of chronic leg edema at the end of the day    Nonsmoker    [address] [place]    Mother had Heart problem and CVA        Patient Active Problem List   Diagnosis    Anticoagulated on Coumadin    Diabetes mellitus ([address])    Atrial fibrillation, permanent ([address])    Bilateral leg edema +1    Encounter for therapeutic drug monitoring    Essential hypertension    Abnormal EKG    Clicking tinnitus of both ears       History reviewed. No pertinent surgical history. No Known Allergies     Family History   Problem Relation Age of Onset    Cancer Mother         breast cancer    Diabetes Mother         Social History     Socioeconomic History    Marital status:      Spouse name: Not on file    Number of children: Not on file    Years of education: Not on file    Highest education level: Not on file   Occupational History    Not on file   Tobacco Use    Smoking status: Former     Packs/day: 0.50     Types: Cigarettes     Quit date: 1978     Years since quittin.1    Smokeless tobacco: Never   Vaping Use    Vaping Use: Never used   Substance and Sexual Activity    Alcohol use:  Yes     Alcohol/week: 1.0 standard drink     Types: 1 Cans of beer per week     Comment: occasional     Drug use: No    Sexual activity: Not on file   Other Topics Concern    Not on file   Social History Narrative    Jeanne Huffman has good family support    Son assists with transportation to [facility]    No barriers with medication affordability    Following up with Diabetic Clinic     Social Determinants of Health     Financial Resource Strain: Low Risk

## 2023-07-28 RX ORDER — MAGNESIUM OXIDE 400 MG/1
1 TABLET ORAL DAILY
Qty: 90 TABLET | Refills: 3 | Status: SHIPPED | OUTPATIENT
Start: 2023-07-28

## 2023-08-17 ENCOUNTER — HOSPITAL ENCOUNTER (OUTPATIENT)
Dept: PHARMACY | Age: 88
Setting detail: THERAPIES SERIES
Discharge: HOME | End: 2023-08-17
Payer: MEDICARE

## 2023-08-17 DIAGNOSIS — Z51.81 ENCOUNTER FOR THERAPEUTIC DRUG MONITORING: ICD-10-CM

## 2023-08-17 DIAGNOSIS — Z79.01 ANTICOAGULATED ON COUMADIN: Primary | ICD-10-CM

## 2023-08-17 LAB — POC INR: 2.6 (ref 0.8–1.2)

## 2023-08-17 PROCEDURE — 36416 COLLJ CAPILLARY BLOOD SPEC: CPT

## 2023-08-17 PROCEDURE — 99211 OFF/OP EST MAY X REQ PHY/QHP: CPT

## 2023-08-17 PROCEDURE — 85610 PROTHROMBIN TIME: CPT

## 2023-08-17 NOTE — PROGRESS NOTES
Medication Management [address] [place] [address]  [phone] (phone)  [phone] (fax)    Ms. Alejandra Cooney is a 82 y.o.  female with history of Afib who presents today for anticoagulation monitoring and adjustment. Patient verifies current dosing regimen and tablet strength. No missed or extra doses. Patient denies s/s bleeding/bruising/swelling/SOB. No blood in urine or stool. No dietary changes. Still eating soup/soft foods. Will get dentures adjusted and start using eventually. No changes in medication/OTC agents/Herbals. No change in alcohol use or tobacco use. No change in activity level. Patient denies falls. No vomiting/diarrhea or acute illness. No Procedures scheduled in the future at this time. Assessment:   Lab Results   Component Value Date    INR 2.60 (H) 08/17/2023    INR 3.00 (H) 07/20/2023    INR 2.40 (H) 06/22/2023     INR therapeutic   Recent Labs     08/17/23  1123   INR 2.60*       Plan:  Continue Coumadin 7.5 mg W, 5 mg MTThFSS. Recheck INR in 4 week(s). Patient reminded to call the Anticoagulation Clinic with any signs or symptoms of bleeding or with any medication changes. Patient given instructions utilizing the teach back method. After visit summary printed and reviewed with patient. Discharged ambulatory in no apparent distress.     Cleotis Rubinstein, RPh  8/17/2023 11:56 AM     For Pharmacy Admin Tracking Only    Time Spent (min): 20

## 2023-08-23 ENCOUNTER — OFFICE VISIT (OUTPATIENT)
Dept: FAMILY MEDICINE CLINIC | Age: 88
End: 2023-08-23
Payer: MEDICARE

## 2023-08-23 VITALS
OXYGEN SATURATION: 99 % | SYSTOLIC BLOOD PRESSURE: 128 MMHG | WEIGHT: 172.8 LBS | RESPIRATION RATE: 16 BRPM | HEART RATE: 88 BPM | DIASTOLIC BLOOD PRESSURE: 72 MMHG | BODY MASS INDEX: 27.77 KG/M2 | HEIGHT: 66 IN | TEMPERATURE: 97.8 F

## 2023-08-23 DIAGNOSIS — N18.31 STAGE 3A CHRONIC KIDNEY DISEASE (HCC): ICD-10-CM

## 2023-08-23 DIAGNOSIS — Z79.01 ANTICOAGULATED ON COUMADIN: ICD-10-CM

## 2023-08-23 DIAGNOSIS — D68.69 SECONDARY HYPERCOAGULABLE STATE (HCC): ICD-10-CM

## 2023-08-23 DIAGNOSIS — Z78.0 POSTMENOPAUSE: ICD-10-CM

## 2023-08-23 DIAGNOSIS — E11.22 TYPE 2 DIABETES MELLITUS WITH STAGE 3A CHRONIC KIDNEY DISEASE, WITHOUT LONG-TERM CURRENT USE OF INSULIN (HCC): Primary | ICD-10-CM

## 2023-08-23 DIAGNOSIS — I48.21 ATRIAL FIBRILLATION, PERMANENT (HCC): ICD-10-CM

## 2023-08-23 DIAGNOSIS — N18.31 TYPE 2 DIABETES MELLITUS WITH STAGE 3A CHRONIC KIDNEY DISEASE, WITHOUT LONG-TERM CURRENT USE OF INSULIN (HCC): Primary | ICD-10-CM

## 2023-08-23 DIAGNOSIS — E11.9 TYPE 2 DIABETES MELLITUS WITHOUT COMPLICATION, WITHOUT LONG-TERM CURRENT USE OF INSULIN (HCC): ICD-10-CM

## 2023-08-23 PROBLEM — N18.30 CHRONIC RENAL DISEASE, STAGE III (HCC): Status: ACTIVE | Noted: 2023-08-23

## 2023-08-23 LAB — HBA1C MFR BLD: 7 % (ref 4.3–5.7)

## 2023-08-23 PROCEDURE — 99214 OFFICE O/P EST MOD 30 MIN: CPT | Performed by: NURSE PRACTITIONER

## 2023-08-23 PROCEDURE — 1090F PRES/ABSN URINE INCON ASSESS: CPT | Performed by: NURSE PRACTITIONER

## 2023-08-23 PROCEDURE — 1036F TOBACCO NON-USER: CPT | Performed by: NURSE PRACTITIONER

## 2023-08-23 PROCEDURE — G8417 CALC BMI ABV UP PARAM F/U: HCPCS | Performed by: NURSE PRACTITIONER

## 2023-08-23 PROCEDURE — 1123F ACP DISCUSS/DSCN MKR DOCD: CPT | Performed by: NURSE PRACTITIONER

## 2023-08-23 PROCEDURE — G8427 DOCREV CUR MEDS BY ELIG CLIN: HCPCS | Performed by: NURSE PRACTITIONER

## 2023-08-23 PROCEDURE — 3051F HG A1C>EQUAL 7.0%<8.0%: CPT | Performed by: NURSE PRACTITIONER

## 2023-08-23 RX ORDER — MELATONIN
1000 DAILY
Qty: 90 TABLET | Refills: 3 | Status: SHIPPED | OUTPATIENT
Start: 2023-08-23

## 2023-08-23 NOTE — PROGRESS NOTES
Kortney Stallworth is a 82 y.o. female for Diabetes (6 month no concerns )      Diabetes Type 2    Glucose control:   Does patient check blood glucoses at home? Yes  Report of hypoglycemia: no  Lab Results   Component Value Date    LABA1C 7.0 (H) 08/23/2023     Hemoglobin A1C   Date Value Ref Range Status   08/23/2023 7.0 (H) 4.3 - 5.7 % Final     Comment:     Performed at [facility] under CLIA #  [id number]      No results found for: EAG    Symptoms  Polyuria, Polydipsia or Polyphagia? No  Chest Pain, SOB, or Palpitations? -  No  New Vision complaints? No  Paresthesias of the extremities? No    Medications  Current medication were reviewed. Compliant with medications? yes  Medication side effects? No  On ACE-I or ARB? No  On antiplatelet therapy? Yes  On Statin? Yes    Last Diabetic Eye Exam: needs  a new optometrist, will refer    Exercise  Exercise? She is active no formal exercise program     Pt remains on anticoagulated per coumadin clinic. HTN    Does patient check BP regularly at home? - No  Current Medication regimen - norvasc 10 mg daily, lopressor 25 mg bid   Tolerating medications well? - yes  Follows with Cardiology    Shortness of breath or chest pain? No  Headache or visual complaints? No  Neurologic changes like confusion? No  Extremity edema? No    BP Readings from Last 3 Encounters:   08/23/23 128/72   07/20/23 124/77   03/06/23 118/76       Wt Readings from Last 3 Encounters:   08/23/23 172 lb 12.8 oz (78.4 kg)   07/20/23 171 lb (77.6 kg)   05/08/23 172 lb (78 kg)       Diet discipline?:  Low salt, fat, sugar diet? yes    Blood pressure control:  BP Readings from Last 3 Encounters:   08/23/23 128/72   07/20/23 124/77   03/06/23 118/76   A. Fib  Managed by Cardiology   Chronic anitcoagulation through CC  Denies CP or SOB on exertion    Does have PE could be SE of norvasc  Denies CP or SOB  Wt Readings from Last 3 Encounters:   08/23/23 172 lb 12.8 oz (78.4 kg)   07/20/23 171 lb (77.6 kg)

## 2023-09-14 ENCOUNTER — HOSPITAL ENCOUNTER (OUTPATIENT)
Age: 88
Discharge: HOME | End: 2023-09-14
Payer: MEDICARE

## 2023-09-14 ENCOUNTER — TELEPHONE (OUTPATIENT)
Dept: FAMILY MEDICINE CLINIC | Age: 88
End: 2023-09-14

## 2023-09-14 ENCOUNTER — HOSPITAL ENCOUNTER (OUTPATIENT)
Dept: PHARMACY | Age: 88
Setting detail: THERAPIES SERIES
Discharge: HOME | End: 2023-09-14
Payer: MEDICARE

## 2023-09-14 DIAGNOSIS — Z51.81 ENCOUNTER FOR THERAPEUTIC DRUG MONITORING: ICD-10-CM

## 2023-09-14 DIAGNOSIS — N18.31 STAGE 3A CHRONIC KIDNEY DISEASE (HCC): ICD-10-CM

## 2023-09-14 DIAGNOSIS — E11.9 TYPE 2 DIABETES MELLITUS WITHOUT COMPLICATION, WITHOUT LONG-TERM CURRENT USE OF INSULIN (HCC): ICD-10-CM

## 2023-09-14 DIAGNOSIS — D68.69 SECONDARY HYPERCOAGULABLE STATE (HCC): ICD-10-CM

## 2023-09-14 DIAGNOSIS — Z79.01 ANTICOAGULATED ON COUMADIN: Primary | ICD-10-CM

## 2023-09-14 DIAGNOSIS — N18.31 TYPE 2 DIABETES MELLITUS WITH STAGE 3A CHRONIC KIDNEY DISEASE, WITHOUT LONG-TERM CURRENT USE OF INSULIN (HCC): ICD-10-CM

## 2023-09-14 DIAGNOSIS — Z78.0 POSTMENOPAUSE: ICD-10-CM

## 2023-09-14 DIAGNOSIS — Z79.01 ANTICOAGULATED ON COUMADIN: ICD-10-CM

## 2023-09-14 DIAGNOSIS — E11.22 TYPE 2 DIABETES MELLITUS WITH STAGE 3A CHRONIC KIDNEY DISEASE, WITHOUT LONG-TERM CURRENT USE OF INSULIN (HCC): ICD-10-CM

## 2023-09-14 LAB
BASOPHILS ABSOLUTE: 0 THOU/MM3 (ref 0–0.1)
BASOPHILS NFR BLD AUTO: 0.4 %
DEPRECATED RDW RBC AUTO: 39.8 FL (ref 35–45)
EOSINOPHIL NFR BLD AUTO: 2.3 %
EOSINOPHILS ABSOLUTE: 0.1 THOU/MM3 (ref 0–0.4)
ERYTHROCYTE [DISTWIDTH] IN BLOOD BY AUTOMATED COUNT: 15.2 % (ref 11.5–14.5)
HCT VFR BLD AUTO: 38.3 % (ref 37–47)
HGB BLD-MCNC: 11.8 GM/DL (ref 12–16)
IMM GRANULOCYTES # BLD AUTO: 0.01 THOU/MM3 (ref 0–0.07)
IMM GRANULOCYTES NFR BLD AUTO: 0.2 %
LYMPHOCYTES ABSOLUTE: 1.9 THOU/MM3 (ref 1–4.8)
LYMPHOCYTES NFR BLD AUTO: 38.9 %
MCH RBC QN AUTO: 22.8 PG (ref 26–33)
MCHC RBC AUTO-ENTMCNC: 30.8 GM/DL (ref 32.2–35.5)
MCV RBC AUTO: 73.9 FL (ref 81–99)
MONOCYTES ABSOLUTE: 0.5 THOU/MM3 (ref 0.4–1.3)
MONOCYTES NFR BLD AUTO: 9.7 %
NEUTROPHILS NFR BLD AUTO: 48.5 %
NRBC BLD AUTO-RTO: 0 /100 WBC
PLATELET # BLD AUTO: 294 THOU/MM3 (ref 130–400)
PMV BLD AUTO: 9.2 FL (ref 9.4–12.4)
POC INR: 2.3 (ref 0.8–1.2)
RBC # BLD AUTO: 5.18 MILL/MM3 (ref 4.2–5.4)
SEGMENTED NEUTROPHILS ABSOLUTE COUNT: 2.3 THOU/MM3 (ref 1.8–7.7)
TSH SERPL DL<=0.005 MIU/L-ACNC: 1.31 UIU/ML (ref 0.4–4.2)
WBC # BLD AUTO: 4.8 THOU/MM3 (ref 4.8–10.8)

## 2023-09-14 PROCEDURE — 85610 PROTHROMBIN TIME: CPT

## 2023-09-14 PROCEDURE — 84443 ASSAY THYROID STIM HORMONE: CPT

## 2023-09-14 PROCEDURE — 85025 COMPLETE CBC W/AUTO DIFF WBC: CPT

## 2023-09-14 PROCEDURE — 36416 COLLJ CAPILLARY BLOOD SPEC: CPT

## 2023-09-14 PROCEDURE — 99211 OFF/OP EST MAY X REQ PHY/QHP: CPT

## 2023-09-14 PROCEDURE — 36415 COLL VENOUS BLD VENIPUNCTURE: CPT

## 2023-09-14 RX ORDER — FERROUS SULFATE 325(65) MG
325 TABLET ORAL 2 TIMES DAILY
Qty: 180 TABLET | Refills: 1 | Status: SHIPPED | OUTPATIENT
Start: 2023-09-14

## 2023-09-14 RX ORDER — ASCORBIC ACID 500 MG
500 TABLET ORAL DAILY
Qty: 30 TABLET | Refills: 3 | Status: SHIPPED | OUTPATIENT
Start: 2023-09-14

## 2023-09-14 NOTE — PROGRESS NOTES
Medication Management [address] [place] [address]  [phone] (phone)  [phone] (fax)    Ms. Miguel Tony is a 82 y.o.  female with history of chronic atrial fib. , per referral from DANNY Segura, who presents today for Warfarin monitoring and adjustment (4 week visit - late for today's visit). Patient verifies current dosing regimen and tablet strength. No missed or extra doses. Patient denies bleeding/bruising/SOB. Has usual swelling of ankles. No blood in urine or stool. Dietary changes: had cabbage recently - first in a long time (reminded the Vitamin K could drop INR). Changes in medication/OTC agents/herbals: now taking Vitamin D. No change in alcohol use or tobacco use. No change in activity level. Patient denies falls. No vomiting/diarrhea or acute illness. No procedures scheduled in the future at this time. Assessment:   Lab Results   Component Value Date    INR 2.30 (H) 09/14/2023    INR 2.60 (H) 08/17/2023    INR 3.00 (H) 07/20/2023     INR therapeutic - goal 2-3. Recent Labs     09/14/23  1123   INR 2.30*        Plan:  POCT INR performed/result reviewed. Continue PO Coumadin 7.5 mg W, 5 mg MTThFSS. Recheck INR in 4 week(s). Patient reminded to call the Anticoagulation Clinic with any signs or symptoms of bleeding or with any medication changes. Patient given instructions utilizing the teach back method. After visit summary printed and reviewed with patient. Discharged ambulatory in no apparent distress.      For Pharmacy Admin Tracking Only    Time Spent (min): 20

## 2023-09-14 NOTE — TELEPHONE ENCOUNTER
----- Message from Zenaida Guadalupe, JOSH - CNP sent at 9/14/2023  1:00 PM EDT -----  Let pt know her cbc has shown some improvement , but still continues to be a little low, continue with daily ferrous sulfate bid , no further labs at this time.

## 2023-09-14 NOTE — TELEPHONE ENCOUNTER
I recommend she restart the ferrous sulfate to bring her hgb and hct levels up, she can also take it with vitamin c for better absorption, the iron may constipate her so I also recommend a high fiber diet and a stool softener. Let me know if any questions.

## 2023-10-12 ENCOUNTER — HOSPITAL ENCOUNTER (OUTPATIENT)
Dept: PHARMACY | Age: 88
Setting detail: THERAPIES SERIES
Discharge: HOME | End: 2023-10-12
Payer: MEDICARE

## 2023-10-12 DIAGNOSIS — Z51.81 ENCOUNTER FOR THERAPEUTIC DRUG MONITORING: ICD-10-CM

## 2023-10-12 DIAGNOSIS — Z79.01 ANTICOAGULATED ON COUMADIN: Primary | ICD-10-CM

## 2023-10-12 LAB — POC INR: 2.1 (ref 0.8–1.2)

## 2023-10-12 PROCEDURE — 99211 OFF/OP EST MAY X REQ PHY/QHP: CPT | Performed by: PHARMACIST

## 2023-10-12 PROCEDURE — 85610 PROTHROMBIN TIME: CPT | Performed by: PHARMACIST

## 2023-10-12 PROCEDURE — 36416 COLLJ CAPILLARY BLOOD SPEC: CPT | Performed by: PHARMACIST

## 2023-10-12 NOTE — PROGRESS NOTES
Medication Management [address] [place] [address]  [phone] (phone)  [phone] (fax)    Ms. Ana Gutierrez is a 82 y.o.  female with history of Afib who presents today for anticoagulation monitoring and adjustment. Patient verifies current dosing regimen and tablet strength. No missed or extra doses. Patient denies s/s bleeding/bruising/swelling/SOB  No blood in urine or stool. No dietary changes. No changes in medication/OTC agents/Herbals. No change in alcohol use or tobacco use. No change in activity level. Patient denies falls. No vomiting/diarrhea or acute illness. No Procedures scheduled in the future at this time. Assessment:   Lab Results   Component Value Date    INR 2.10 (H) 10/12/2023    INR 2.30 (H) 09/14/2023    INR 2.60 (H) 08/17/2023     INR therapeutic   Recent Labs     10/12/23  1113   INR 2.10*      Patient interview completed and discussed with pharmacist by Comfort Mendez PharmD Candidate       Plan:  Continue Coumadin 7.5mg W and 5mg all other days. Recheck INR in 4 week(s). Patient reminded to call the Anticoagulation Clinic with any signs or symptoms of bleeding or with any medication changes. Patient given instructions utilizing the teach back method. After visit summary printed and reviewed with patient. Discharged ambulatory in no apparent distress.      FTime Spent (min): 20

## 2023-10-17 ENCOUNTER — TELEPHONE (OUTPATIENT)
Dept: PHARMACY | Age: 88
End: 2023-10-17

## 2023-10-17 NOTE — TELEPHONE ENCOUNTER
Anticipate no interaction with Coumadin. Called patient, advised continue current regimen and keep appt as scheduled.

## 2023-10-17 NOTE — TELEPHONE ENCOUNTER
Patient called and states that she has been put on Trelegy Ellipta by Dr. Lina Urbano. She started this yesterday at noon for coughing and sneezing. Please call the patient with any Coumadin instructions. She said \"she noticed that her heart was beating fast but not sure if it's from new medication or not\". She is going to call Dr. Lina Urbano also.

## 2023-11-03 ENCOUNTER — TELEPHONE (OUTPATIENT)
Dept: FAMILY MEDICINE CLINIC | Age: 88
End: 2023-11-03

## 2023-11-03 DIAGNOSIS — E11.9 TYPE 2 DIABETES MELLITUS WITHOUT COMPLICATION, WITHOUT LONG-TERM CURRENT USE OF INSULIN (HCC): ICD-10-CM

## 2023-11-03 RX ORDER — AMLODIPINE BESYLATE 10 MG/1
10 TABLET ORAL DAILY
Qty: 30 TABLET | Refills: 0 | Status: SHIPPED | OUTPATIENT
Start: 2023-11-03

## 2023-11-03 NOTE — TELEPHONE ENCOUNTER
Pt has the metformin and amlopidine coming form her mail pharmacy, but will not have them till 11/16/23. Pt is currently out of her metformin and amlopidine. Pt is asking for a short term script for both medications to be sent to [facility] on [address].     Medications pending      Please advise

## 2023-11-09 ENCOUNTER — HOSPITAL ENCOUNTER (OUTPATIENT)
Dept: PHARMACY | Age: 88
Setting detail: THERAPIES SERIES
Discharge: HOME | End: 2023-11-09
Payer: MEDICARE

## 2023-11-09 DIAGNOSIS — Z79.01 ANTICOAGULATED ON COUMADIN: Primary | ICD-10-CM

## 2023-11-09 DIAGNOSIS — Z51.81 ENCOUNTER FOR THERAPEUTIC DRUG MONITORING: ICD-10-CM

## 2023-11-09 LAB — POC INR: 2.2 (ref 0.8–1.2)

## 2023-11-09 PROCEDURE — 99211 OFF/OP EST MAY X REQ PHY/QHP: CPT

## 2023-11-09 PROCEDURE — 85610 PROTHROMBIN TIME: CPT

## 2023-11-09 PROCEDURE — 36416 COLLJ CAPILLARY BLOOD SPEC: CPT

## 2023-11-09 RX ORDER — FLUTICASONE FUROATE, UMECLIDINIUM BROMIDE AND VILANTEROL TRIFENATATE 200; 62.5; 25 UG/1; UG/1; UG/1
1 POWDER RESPIRATORY (INHALATION) DAILY
COMMUNITY
Start: 2023-10-31

## 2023-11-09 NOTE — PROGRESS NOTES
Medication Management [address] [place] [address]  [phone] (phone)  [phone] (fax)    Ms. Ethan Jean-Baptiste is a 82 y.o.  female with history of  longstanding persistent atrial fibrillation  who presents today for anticoagulation monitoring and adjustment. Patient verifies current dosing regimen and tablet strength. No missed or extra doses. Patient denies s/s bleeding/bruising/swelling/SOB  No blood in urine or stool. No dietary changes. Changes in medication/OTC agents/Herbals. - Started Trelegy Ellipta 200-62.5-25 mcg inhaler once daily prescribed by Dr. Dave Verde. No change in alcohol use or tobacco use. No change in activity level. Patient denies falls. No vomiting/diarrhea or acute illness. - Dealing with a dry cough and clear mucus out of the nose \"that has been going on for a long time. \"   No Procedures scheduled in the future at this time. Assessment:   Lab Results   Component Value Date    INR 2.20 (H) 11/09/2023    INR 2.10 (H) 10/12/2023    INR 2.30 (H) 09/14/2023     INR therapeutic (Goal 2.0-3.0)  Recent Labs     11/09/23  1133   INR 2.20*     Patient interview completed and discussed with pharmacist by Mariya Hawkins, PharmD Candidate. Plan:  Continue Coumadin 7.5 mg Wed and 5 mg [id number]. Recheck INR in 4 week(s). Patient reminded to call the Anticoagulation Clinic with any signs or symptoms of bleeding or with any medication changes. Patient given instructions utilizing the teach back method. After visit summary printed and reviewed with patient. Discharged ambulatory in no apparent distress.     For Pharmacy Admin Tracking Only    Time Spent (min): 15

## 2023-11-13 ENCOUNTER — OFFICE VISIT (OUTPATIENT)
Dept: INTERNAL MEDICINE CLINIC | Age: 88
End: 2023-11-13

## 2023-11-13 VITALS — WEIGHT: 172 LBS | BODY MASS INDEX: 27.76 KG/M2

## 2023-11-13 DIAGNOSIS — E11.59 TYPE 2 DIABETES MELLITUS WITH OTHER CIRCULATORY COMPLICATION, WITHOUT LONG-TERM CURRENT USE OF INSULIN (HCC): Primary | ICD-10-CM

## 2023-11-13 NOTE — PATIENT INSTRUCTIONS
[email or web address]    Type in \"Stretching for Seniors\"    [employer] based milks - almond, soy, oat, cashew, rice (unsweetened but still vanilla)

## 2023-11-13 NOTE — PROGRESS NOTES
(CHOLECALCIFEROL) 25 MCG (1000 UT) TABS tablet Take 1 tablet by mouth daily 90 tablet 3    magnesium oxide (MAG-OX) 400 MG tablet Take 1 tablet by mouth daily 90 tablet 3    montelukast (SINGULAIR) 10 MG tablet Take 1 tablet by mouth nightly      metoprolol tartrate (LOPRESSOR) 25 MG tablet Take 1 tablet by mouth 2 times daily 180 tablet 2    loratadine (CLARITIN) 10 MG tablet Take 1 tablet by mouth daily 90 tablet 3    atorvastatin (LIPITOR) 10 MG tablet Take 1 tablet by mouth daily 90 tablet 3    omeprazole (PRILOSEC) 40 MG delayed release capsule TAKE 1 CAPSULE BY MOUTH EVERY DAY 90 capsule 3    albuterol sulfate HFA (PROVENTIL HFA) 108 (90 Base) MCG/ACT inhaler Inhale 2 puffs into the lungs every 6 hours as needed for Wheezing 18 g 3    spironolactone-hydroCHLOROthiazide (ALDACTAZIDE) 25-25 MG per tablet take 1 tablet by mouth once daily 90 tablet 1    budesonide (RINOCORT AQUA) 32 MCG/ACT nasal spray 1 spray by Each Nostril route daily (Patient not taking: Reported on 10/12/2023) 1 each 2    diclofenac sodium (VOLTAREN) 1 % GEL Apply 2 g topically 4 times daily 150 g 2    Blood Glucose Monitoring Suppl ([employer] BLOOD GLUCOSE METER) w/Device KIT 1 each by Does not apply route in the morning and at bedtime Dispense Accucheck meter 1 kit 0    blood glucose monitor strips Use to check sugars twice daily. 200 strip 3    blood glucose monitor kit and supplies Test one time a day . Dispense 30 strips a month with 5 refills Please dispense meter and suppliesthat insurance covers . 1 kit 0    Lancets (ONETOUCH DELICA PLUS [id number]) MISC TEST 2 TIMES DAILY 200 each 4    betamethasone valerate (VALISONE) 0.1 % cream APPLY TOPICALLY 2 TIMES DAILY AS NEEDED.  (Patient not taking: Reported on 10/12/2023) 15 g 1    warfarin (COUMADIN) 5 MG tablet TAKE AS DIRECTED BY [facility] TABS = 30 DAY SUPPLY 105 tablet 3    Multiple Vitamins-Minerals (CENTRUM SILVER 50+WOMEN) TABS Take 1 tablet by mouth daily 42% of

## 2023-11-27 RX ORDER — ASCORBIC ACID 500 MG
500 TABLET ORAL DAILY
Qty: 90 TABLET | Refills: 3 | Status: SHIPPED | OUTPATIENT
Start: 2023-11-27

## 2023-11-27 RX ORDER — SPIRONOLACTONE AND HYDROCHLOROTHIAZIDE 25; 25 MG/1; MG/1
TABLET ORAL
Qty: 90 TABLET | Refills: 0 | Status: SHIPPED | OUTPATIENT
Start: 2023-11-27

## 2023-11-27 NOTE — TELEPHONE ENCOUNTER
Recent Visits  Date Type Provider Dept   08/23/23 Office Visit [place], APRN - CNP [facility]   03/06/23 Office Visit [place] [facility], APRN - CNP [facility]   02/22/23 Office Visit [place], APRN - CNP [facility]   11/16/22 Office Visit [place] Sep, APRJASBIR - CNP [facility]   08/02/22 Office Visit Dimas Gallagher, JOSH - CNP [facility]   Showing recent visits within past 540 days with a meds authorizing provider and meeting all other requirements  Future Appointments  Date Type Provider Dept   02/26/24 Appointment Dimas Gallagher, APRN - CNP [facility]   Showing future appointments within next 150 days with a meds authorizing provider and meeting all other requirements

## 2023-12-07 ENCOUNTER — HOSPITAL ENCOUNTER (OUTPATIENT)
Dept: PHARMACY | Age: 88
Setting detail: THERAPIES SERIES
Discharge: HOME | End: 2023-12-07
Payer: MEDICARE

## 2023-12-07 DIAGNOSIS — Z79.01 ANTICOAGULATED ON COUMADIN: Primary | ICD-10-CM

## 2023-12-07 DIAGNOSIS — Z51.81 ENCOUNTER FOR THERAPEUTIC DRUG MONITORING: ICD-10-CM

## 2023-12-07 LAB — POC INR: 1.9 (ref 0.8–1.2)

## 2023-12-07 PROCEDURE — 85610 PROTHROMBIN TIME: CPT

## 2023-12-07 PROCEDURE — 99211 OFF/OP EST MAY X REQ PHY/QHP: CPT

## 2023-12-07 PROCEDURE — 36416 COLLJ CAPILLARY BLOOD SPEC: CPT

## 2023-12-07 NOTE — PROGRESS NOTES
Medication Management [address] [place] [address]  [phone] (phone)  [phone] (fax)    Ms. Meek Fabian is a 82 y.o.  female with history of chronic atrial fib. , per referral from DANNY Pickett, who presents today for Warfarin monitoring and adjustment (4 week visit). Patient verifies current dosing regimen and tablet strength. No missed or extra doses. Patient denies bleeding/bruising/SOB. Has usual swelling of legs. No blood in urine or stool. States BM has been dark green for 2-3 weeks; advised to notify doctor. No dietary changes. Noted Ensure in PCP's last note. Patient states she's been drinking 1 can of Boost once/week for approx. 2 months; reminded of need to be consistent, due to Vitamin K content. If that would change, should notify this clinic for earlier INR check. No changes in medication/OTC agents/herbals. No change in alcohol use or tobacco use. No change in activity level. Patient denies falls. No vomiting/diarrhea or acute illness. No procedures scheduled in the future at this time. Assessment:     Lab Results   Component Value Date    INR 1.90 (H) 12/07/2023    INR 2.20 (H) 11/09/2023    INR 2.10 (H) 10/12/2023     INR subtherapeutic - goal 2-3. Recent Labs     12/07/23  1114   INR 1.90*      Plan:  POCT INR performed/result reviewed. 7.5 mg today, Th (per policy), then continue PO Coumadin 7.5 mg W, 5 mg MTThFSS. Recheck INR in 3 week(s), per policy. Patient reminded to call the Anticoagulation Clinic with any signs or symptoms of bleeding or with any medication changes. Patient given instructions utilizing the teach back method. After visit summary printed and reviewed with patient. Discharged ambulatory in no apparent distress.     For Pharmacy Admin Tracking Only    Intervention Detail: Dose Adjustment: 1, reason: Therapy Optimization  Total # of Interventions Recommended: 1  Total # of Interventions Accepted:

## 2023-12-28 ENCOUNTER — HOSPITAL ENCOUNTER (OUTPATIENT)
Dept: PHARMACY | Age: 88
Setting detail: THERAPIES SERIES
Discharge: HOME | End: 2023-12-28
Payer: MEDICARE

## 2023-12-28 DIAGNOSIS — Z79.01 ANTICOAGULATED ON COUMADIN: Primary | ICD-10-CM

## 2023-12-28 DIAGNOSIS — Z51.81 ENCOUNTER FOR THERAPEUTIC DRUG MONITORING: ICD-10-CM

## 2023-12-28 LAB — POC INR: 1.8 (ref 0.8–1.2)

## 2023-12-28 PROCEDURE — 85610 PROTHROMBIN TIME: CPT | Performed by: PHARMACIST

## 2023-12-28 PROCEDURE — 36416 COLLJ CAPILLARY BLOOD SPEC: CPT | Performed by: PHARMACIST

## 2023-12-28 PROCEDURE — 99211 OFF/OP EST MAY X REQ PHY/QHP: CPT | Performed by: PHARMACIST

## 2023-12-28 NOTE — PROGRESS NOTES
Medication Management [address] [place] [address]  [phone] (phone)  [phone] (fax)    Ms. Sonia Sepulveda is a 82 y.o.  female with history of Afib who presents today for anticoagulation monitoring and adjustment. Patient verifies current dosing regimen and tablet strength. No missed or extra doses. Patient denies s/s bleeding/bruising/swelling/SOB  No blood in urine or stool. Spinach over the holiday, doesn't usually eat this. Still drinks Boost once weekly. No changes in medication/OTC agents/Herbals. Difficult to verify med list, patient states there have been no changes. No change in alcohol use or tobacco use. No change in activity level. Patient denies falls. No vomiting/diarrhea or acute illness. No Procedures scheduled in the future at this time. Assessment:   Lab Results   Component Value Date    INR 1.80 (H) 12/28/2023    INR 1.90 (H) 12/07/2023    INR 2.20 (H) 11/09/2023     INR subtherapeutic   Recent Labs     12/28/23  1121   INR 1.80*       Plan:  Coumadin 7.5mg today, then continue Coumadin 7.5mg W and 5mg all other days. Recheck INR in 3 week(s). Patient reminded to call the Anticoagulation Clinic with any signs or symptoms of bleeding or with any medication changes. Patient given instructions utilizing the teach back method. After visit summary printed and reviewed with patient. Discharged ambulatory in no apparent distress.     For Pharmacy Admin Tracking Only    Intervention Detail: Dose Adjustment: 1, reason: Therapy Optimization  Total # of Interventions Recommended: 1  Total # of Interventions Accepted: 1  Time Spent (min): 20

## 2024-01-08 ENCOUNTER — TELEPHONE (OUTPATIENT)
Dept: PHARMACY | Age: 89
End: 2024-01-08

## 2024-01-08 NOTE — TELEPHONE ENCOUNTER
Jasmina called to let us know that she started taking Doxycycline 100 mg QD x 14 days.  She started this on Friday.

## 2024-01-08 NOTE — TELEPHONE ENCOUNTER
Called and spoke with patient. Rescheduled appointment from 1/18 to 1/11 to check INR while on doxycycline to see if there is an interaction. Patient voiced understanding.    Maris Hanna, PharmD, BCPS  1/8/2024  10:31 AM

## 2024-01-11 ENCOUNTER — HOSPITAL ENCOUNTER (OUTPATIENT)
Dept: PHARMACY | Age: 89
Setting detail: THERAPIES SERIES
Discharge: HOME | End: 2024-01-11
Payer: MEDICARE

## 2024-01-11 DIAGNOSIS — Z79.01 ANTICOAGULATED ON COUMADIN: Primary | ICD-10-CM

## 2024-01-11 DIAGNOSIS — Z51.81 ENCOUNTER FOR THERAPEUTIC DRUG MONITORING: ICD-10-CM

## 2024-01-11 LAB — POC INR: 1.8 (ref 0.8–1.2)

## 2024-01-11 PROCEDURE — 99211 OFF/OP EST MAY X REQ PHY/QHP: CPT | Performed by: PHARMACIST

## 2024-01-11 PROCEDURE — 36416 COLLJ CAPILLARY BLOOD SPEC: CPT | Performed by: PHARMACIST

## 2024-01-11 PROCEDURE — 85610 PROTHROMBIN TIME: CPT | Performed by: PHARMACIST

## 2024-01-11 NOTE — PROGRESS NOTES
Medication Management Clinic  [facility]  Anticoagulation Clinic  [phone] (phone)  [phone] (fax)    Ms. Jasmina Powers is a 88 y.o.  female with history of Afib who presents today for anticoagulation monitoring and adjustment.    Patient verifies current dosing regimen and tablet strength.  No missed or extra doses.  Patient denies s/s bleeding/bruising/swelling/SOB.  No blood in urine or stool.  Appetite a little poor.  No changes in OTC agents/Herbals. On Doxycycline 100mg BID x 14 days, started 1/5.  No change in alcohol use or tobacco use.  No change in activity level.  Patient denies falls.  No vomiting/diarrhea or acute illness.   No Procedures scheduled in the future at this time.    Assessment:   INR Goal 2-3.  Lab Results   Component Value Date    INR 1.80 (H) 01/11/2024    INR 1.80 (H) 12/28/2023    INR 1.90 (H) 12/07/2023     INR therapeutic   Recent Labs     01/11/24  1459   INR 1.80*        Patient interview completed and discussed with pharmacist by Mari Ny, EliudD Candidate 2024    Plan:  Increase Coumadin 7.5mg MF and 5mg TWThSaS.  Recheck INR in 2 week(s).  Patient reminded to call the Anticoagulation Clinic with any signs or symptoms of bleeding or with any medication changes.  Patient given instructions utilizing the teach back method.      After visit summary printed and reviewed with patient.      Discharged ambulatory in no apparent distress.    For Pharmacy Admin Tracking Only    Intervention Detail: Dose Adjustment: 1, reason: Therapy Optimization  Total # of Interventions Recommended: 1  Total # of Interventions Accepted: 1  Time Spent (min): 20

## 2024-01-12 DIAGNOSIS — Z79.01 ANTICOAGULATED ON COUMADIN: ICD-10-CM

## 2024-01-12 DIAGNOSIS — I48.19 PERSISTENT ATRIAL FIBRILLATION (HCC): ICD-10-CM

## 2024-01-12 RX ORDER — WARFARIN SODIUM 5 MG/1
TABLET ORAL
Qty: 105 TABLET | Refills: 3 | Status: SHIPPED | OUTPATIENT
Start: 2024-01-12

## 2024-01-12 NOTE — TELEPHONE ENCOUNTER
Recent Visits  Date Type Provider Dept   08/23/23 Office Visit Dimas Watts APRN - CNP [facility]   03/06/23 Office Visit Dimas Watts APRN - CNP [facility]   02/22/23 Office Visit Dimas Watts APRN - CNP [facility]   11/16/22 Office Visit Dimas Watts APRN - CNP [facility]   08/02/22 Office Visit Dimas Watts APRN - CNP [facility]   Showing recent visits within past 540 days with a meds authorizing provider and meeting all other requirements  Future Appointments  Date Type Provider Dept   02/26/24 Appointment Dimas Watts APRN - CNP [facility]   Showing future appointments within next 150 days with a meds authorizing provider and meeting all other requirements

## 2024-01-18 ENCOUNTER — APPOINTMENT (OUTPATIENT)
Dept: PHARMACY | Age: 89
End: 2024-01-18
Payer: MEDICARE

## 2024-01-25 ENCOUNTER — OFFICE VISIT (OUTPATIENT)
Dept: CARDIOLOGY CLINIC | Age: 89
End: 2024-01-25

## 2024-01-25 ENCOUNTER — APPOINTMENT (OUTPATIENT)
Dept: PHARMACY | Age: 89
End: 2024-01-25
Payer: MEDICARE

## 2024-01-25 VITALS
HEIGHT: 66 IN | SYSTOLIC BLOOD PRESSURE: 132 MMHG | BODY MASS INDEX: 26.72 KG/M2 | DIASTOLIC BLOOD PRESSURE: 79 MMHG | WEIGHT: 166.25 LBS | HEART RATE: 69 BPM

## 2024-01-25 DIAGNOSIS — Z79.01 ANTICOAGULATED ON COUMADIN: Primary | ICD-10-CM

## 2024-01-25 DIAGNOSIS — R60.0 BILATERAL LEG EDEMA: ICD-10-CM

## 2024-01-25 DIAGNOSIS — I10 ESSENTIAL HYPERTENSION: ICD-10-CM

## 2024-01-25 DIAGNOSIS — R94.31 ABNORMAL EKG: ICD-10-CM

## 2024-01-25 DIAGNOSIS — I48.21 ATRIAL FIBRILLATION, PERMANENT (HCC): Primary | ICD-10-CM

## 2024-01-25 DIAGNOSIS — N18.31 STAGE 3A CHRONIC KIDNEY DISEASE (HCC): ICD-10-CM

## 2024-01-25 DIAGNOSIS — Z51.81 ENCOUNTER FOR THERAPEUTIC DRUG MONITORING: ICD-10-CM

## 2024-01-25 LAB — POC INR: 3.2 (ref 0.8–1.2)

## 2024-01-25 PROCEDURE — 99211 OFF/OP EST MAY X REQ PHY/QHP: CPT | Performed by: PHARMACIST

## 2024-01-25 PROCEDURE — 36416 COLLJ CAPILLARY BLOOD SPEC: CPT | Performed by: PHARMACIST

## 2024-01-25 PROCEDURE — 85610 PROTHROMBIN TIME: CPT | Performed by: PHARMACIST

## 2024-01-25 RX ORDER — DOXYCYCLINE HYCLATE 100 MG/1
CAPSULE ORAL
COMMUNITY
Start: 2024-01-05 | End: 2024-01-25 | Stop reason: ALTCHOICE

## 2024-01-25 NOTE — PROGRESS NOTES
Medication Management Clinic  [facility]  Anticoagulation Clinic  [phone] (phone)  [phone] (fax)    Ms. Jasmina Powers is a 89 y.o.  female with history of Afib who presents today for anticoagulation monitoring and adjustment.    Patient verifies current dosing regimen and tablet strength.  No missed or extra doses.  Patient denies s/s bleeding/bruising/swelling/SOB  No blood in urine or stool.  Appetite still low.   No changes in medication/OTC agents/Herbals.  No change in alcohol use or tobacco use.  No change in activity level.  Patient denies falls.  No vomiting/diarrhea or acute illness.   No Procedures scheduled in the future at this time.    Assessment:   Lab Results   Component Value Date    INR 3.20 (H) 01/25/2024    INR 1.80 (H) 01/11/2024    INR 1.80 (H) 12/28/2023     INR supratherapeutic   Recent Labs     01/25/24  1325   INR 3.20*         Plan:  Continue Coumadin 5mg today and tomorrow, then continue Coumadin 7.5mg MF and 5mg TWThSaS.  Recheck INR in 3 week(s).  Patient reminded to call the Anticoagulation Clinic with any signs or symptoms of bleeding or with any medication changes.  Patient given instructions utilizing the teach back method.        After visit summary printed and reviewed with patient.      Discharged ambulatory in no apparent distress.      For Pharmacy Admin Tracking Only    Intervention Detail: Dose Adjustment: 1, reason: Therapy De-escalation  Total # of Interventions Recommended: 1  Total # of Interventions Accepted: 1  Time Spent (min): 20

## 2024-01-25 NOTE — PROGRESS NOTES
oxide 400 mg po qd    D/w the pat the plan of care and result of test    Hx of K 3.5  And 3.9  and then 3.3 the 3.9 and 3.8   Cont high K diet tomato, banana, avocado,orange potatoe  Could not tolerate kcl for nausea    The pat Stable and doing well    Discussed use, benefit, and side effects of prescribed medications. All patient questions answered. Pt voiced understanding. Instructed to continue current medications, diet and exercise. Continue risk factor modification and medical management. Patient agreed with treatment plan. Follow up as directed.      The patient is advised to attempt to improve diet and exercise patterns to aid in medical management of this problem.  Advised more plant based nutrition/meditarrean diet   Advised patient to call office or seek immediate medical attention if there is any new onset of  any chest pain, sob, palpitations, lightheadedness, dizziness, orthopnea, PND or pedal edema.   All medication side effects were discussed in details.  .      RTC in 6 Months      Emperatriz Onofre MD,MD,FACC

## 2024-02-05 DIAGNOSIS — E11.59 TYPE 2 DIABETES MELLITUS WITH OTHER CIRCULATORY COMPLICATION, WITHOUT LONG-TERM CURRENT USE OF INSULIN (HCC): ICD-10-CM

## 2024-02-05 RX ORDER — BLOOD SUGAR DIAGNOSTIC
STRIP MISCELLANEOUS
Qty: 200 STRIP | Refills: 3 | Status: SHIPPED | OUTPATIENT
Start: 2024-02-05

## 2024-02-05 RX ORDER — SPIRONOLACTONE AND HYDROCHLOROTHIAZIDE 25; 25 MG/1; MG/1
TABLET ORAL
Qty: 90 TABLET | Refills: 1 | Status: SHIPPED | OUTPATIENT
Start: 2024-02-05

## 2024-02-05 NOTE — TELEPHONE ENCOUNTER
Future Appointments   Date Time Provider Department Center   2/12/2024  1:30 PM Asia Mao RD, LD [facility] [place]   2/15/2024  1:00 PM Erinn King RPH [facility] [place]   2/26/2024  3:00 PM Dimas Watts APRN - CNP [facility] [place]   7/25/2024  1:00 PM Emperatriz Onofre MD N [facility] [place]

## 2024-02-07 RX ORDER — FERROUS SULFATE 325(65) MG
1 TABLET ORAL 2 TIMES DAILY
Qty: 180 TABLET | Refills: 3 | Status: SHIPPED | OUTPATIENT
Start: 2024-02-07

## 2024-02-12 ENCOUNTER — OFFICE VISIT (OUTPATIENT)
Dept: INTERNAL MEDICINE CLINIC | Age: 89
End: 2024-02-12

## 2024-02-12 VITALS — WEIGHT: 165.4 LBS | BODY MASS INDEX: 26.7 KG/M2

## 2024-02-12 DIAGNOSIS — E11.59 TYPE 2 DIABETES MELLITUS WITH OTHER CIRCULATORY COMPLICATION, WITHOUT LONG-TERM CURRENT USE OF INSULIN (HCC): Primary | ICD-10-CM

## 2024-02-12 NOTE — PATIENT INSTRUCTIONS
Overview    Warfarin is a pill that you take regularly to help prevent blood clots or to keep a clot from getting bigger.  To make sure that warfarin is effectively thinning your blood, it's important to eat about the same amount of vitamin K every day.  Here's why:  Vitamin K normally helps your blood clot so wounds don't bleed too much.  Warfarin works against vitamin K. It makes your blood clot more slowly.  So warfarin and vitamin K work against each other in your body, and it's important to keep them in balance. That is why, when you take warfarin, it's important that you not suddenly eat a lot more or a lot less vitamin K-rich food than you usually do. If you do, your warfarin dose may need to be adjusted.  It's up to you to decide how much vitamin K you choose to eat. For example, if you already eat a lot of leafy green vegetables, that's fine. Just keep it about the same amount each day. And if you take a multivitamin that contains vitamin K, be sure to take it every day.  Check with your doctor before you make big changes in what you eat, such as starting a diet to lose weight. And if you want to start eating more of a food that's rich in vitamin K, talk to your doctor about how to add it safely.  Also check with your doctor or dietitian before you add or change any natural health products. Some of these may contain vitamin K. If you already take a product that contains vitamin K, don't stop taking it without talking with your doctor first.    Foods that are sources of vitamin K  The following are lists of some foods that are high, moderate, and low in vitamin K.    Examples of foods that are high in vitamin K (more than 100 mcg per serving)    Food Serving Size Vitamin K (mcg)   Kale, cooked ½ cup (125 mL) 561     Kale, raw 1 cup (250 mL) 499     Spinach, cooked ½ cup (125 mL) 469   Raphael greens, cooked ½ cup (125 mL) 408   Swiss chard, cooked ½ cup (125 mL) 302     Cabbage, cooked 1 cupo (250 mL) 172

## 2024-02-12 NOTE — PROGRESS NOTES
[facility]  [facility]  [address] [place] [address]  [phone] (phone)  [phone] (fax)    Patient Name: Jasmina Powers. Date of Birth: [birth year]. MRN: [id number]      Assessment: Patient is a 89 y.o. female seen for follow-up MNT visit for Diabetes.     -Nutritionally relevant labs:   Lab Results   Component Value Date/Time    LABA1C 7.0 (H) 08/23/2023 01:44 PM    LABA1C 6.1 (H) 02/22/2023 01:26 PM    LABA1C 6.0 (H) 11/16/2022 01:59 PM    LABA1C 7.8 (H) 02/25/2020 12:04 PM    LABA1C 7.4 01/06/2020 11:47 AM    LABA1C 10.3 (H) 09/16/2019 11:35 AM    GLUCOSE 109 (H) 07/11/2023 12:12 PM    GLUCOSE 95 09/27/2022 11:02 AM    CHOL 128 07/11/2023 12:12 PM    HDL 84 07/11/2023 12:12 PM    LDLCALC 26 07/11/2023 12:12 PM    TRIG 89 07/11/2023 12:12 PM     -Blood sugar trends: blood sugars w/in range    - Appetites fair to poor per pt. States she will sit down to eat and then just pick at her food.   -Food recall: B: Baccon, egg, waffle/ Fruit sausage/ grits, barrientos, sausage/bean and toast                          Lunch: soup/beans/wings/ beef and onions/ lamb chops/chicken pot pie.                           Dinner: Beans and starch/ meat starch/ tuna/ nunez     -Main Beverages: lemon aid, soda (1/2 can at a time) milk, water    -  Current Outpatient Medications on File Prior to Visit   Medication Sig Dispense Refill    FEROSUL 325 (65 Fe) MG tablet TAKE 1 TABLET TWICE DAILY 180 tablet 3    blood glucose test strips (ACCU-CHEK GUIDE) strip TEST BLOOD SUGAR TWICE DAILY 200 strip 3    spironolactone-hydroCHLOROthiazide (ALDACTAZIDE) 25-25 MG per tablet TAKE 1 TABLET EVERY DAY 90 tablet 1    warfarin (COUMADIN) 5 MG tablet TAKE AS DIRECTED BY [facility] COUM[facility] (35 TABLETS EQUAL 30 DAY SUPPLY) 105 tablet 3    vitamin C (ASCORBIC ACID) 500 MG tablet TAKE 1 TABLET EVERY DAY 90 tablet 3    fluticasone-umeclidin-vilant (TRELEGY ELLIPTA) 200-62.5-25 MCG/ACT

## 2024-02-15 ENCOUNTER — HOSPITAL ENCOUNTER (OUTPATIENT)
Dept: PHARMACY | Age: 89
Setting detail: THERAPIES SERIES
Discharge: HOME | End: 2024-02-15
Payer: MEDICARE

## 2024-02-15 DIAGNOSIS — Z51.81 ENCOUNTER FOR THERAPEUTIC DRUG MONITORING: ICD-10-CM

## 2024-02-15 DIAGNOSIS — Z79.01 ANTICOAGULATED ON COUMADIN: Primary | ICD-10-CM

## 2024-02-15 LAB — POC INR: 2.4 (ref 0.8–1.2)

## 2024-02-15 PROCEDURE — 99211 OFF/OP EST MAY X REQ PHY/QHP: CPT

## 2024-02-15 PROCEDURE — 85610 PROTHROMBIN TIME: CPT

## 2024-02-15 PROCEDURE — 36416 COLLJ CAPILLARY BLOOD SPEC: CPT

## 2024-02-15 NOTE — PROGRESS NOTES
Medication Management Clinic  [facility]  Anticoagulation Clinic  [phone] (phone)  [phone] (fax)    Ms. Jasmina Powers is a 89 y.o.  female with history of Afib who presents today for anticoagulation monitoring and adjustment.    Patient verifies current dosing regimen and tablet strength.  No missed or extra doses.  Patient denies s/s bleeding/bruising/swelling/SOB  No blood in urine or stool.  No dietary changes.  No changes in medication/OTC agents/Herbals.  No change in alcohol use or tobacco use.  No change in activity level.  Patient denies falls.  No vomiting/diarrhea or acute illness.   No Procedures scheduled in the future at this time.    Assessment:   Lab Results   Component Value Date    INR 2.40 (H) 02/15/2024    INR 3.20 (H) 01/25/2024    INR 1.80 (H) 01/11/2024     INR therapeutic   Recent Labs     02/15/24  1315   INR 2.40*     Plan:  Continue Coumadin 7.5 mg MonFri and 5 mg SuTuWeThSa.  Recheck INR in 3 week(s).  Patient reminded to call the Anticoagulation Clinic with any signs or symptoms of bleeding or with any medication changes.  Patient given instructions utilizing the teach back method.    After visit summary printed and reviewed with patient.      Discharged ambulatory in no apparent distress.    For Pharmacy Admin Tracking Only  Time Spent (min): 15

## 2024-02-23 ENCOUNTER — HOSPITAL ENCOUNTER (OUTPATIENT)
Age: 89
Discharge: HOME | End: 2024-02-23
Payer: MEDICARE

## 2024-02-23 DIAGNOSIS — I10 ESSENTIAL HYPERTENSION: ICD-10-CM

## 2024-02-23 DIAGNOSIS — R94.31 ABNORMAL EKG: ICD-10-CM

## 2024-02-23 DIAGNOSIS — R60.0 BILATERAL LEG EDEMA: ICD-10-CM

## 2024-02-23 DIAGNOSIS — I48.21 ATRIAL FIBRILLATION, PERMANENT (HCC): ICD-10-CM

## 2024-02-23 DIAGNOSIS — N18.31 STAGE 3A CHRONIC KIDNEY DISEASE (HCC): ICD-10-CM

## 2024-02-23 LAB
ALBUMIN SERPL BCG-MCNC: 4.4 G/DL (ref 3.5–5.1)
ALP SERPL-CCNC: 111 U/L (ref 38–126)
ALT SERPL W/O P-5'-P-CCNC: 28 U/L (ref 11–66)
ANION GAP SERPL CALC-SCNC: 11 MEQ/L (ref 8–16)
AST SERPL-CCNC: 25 U/L (ref 5–40)
BILIRUB CONJ SERPL-MCNC: < 0.2 MG/DL (ref 0–0.3)
BILIRUB SERPL-MCNC: 0.5 MG/DL (ref 0.3–1.2)
BUN SERPL-MCNC: 16 MG/DL (ref 7–22)
CALCIUM SERPL-MCNC: 10.4 MG/DL (ref 8.5–10.5)
CHLORIDE SERPL-SCNC: 100 MEQ/L (ref 98–111)
CHOLEST SERPL-MCNC: 120 MG/DL (ref 100–199)
CO2 SERPL-SCNC: 27 MEQ/L (ref 23–33)
CREAT SERPL-MCNC: 0.9 MG/DL (ref 0.4–1.2)
GFR SERPL CREATININE-BSD FRML MDRD: > 60 ML/MIN/1.73M2
GLUCOSE SERPL-MCNC: 99 MG/DL (ref 70–108)
HDLC SERPL-MCNC: 73 MG/DL
LDLC SERPL CALC-MCNC: 34 MG/DL
MAGNESIUM SERPL-MCNC: 1.5 MG/DL (ref 1.6–2.4)
POTASSIUM SERPL-SCNC: 4 MEQ/L (ref 3.5–5.2)
PROT SERPL-MCNC: 7.4 G/DL (ref 6.1–8)
SODIUM SERPL-SCNC: 138 MEQ/L (ref 135–145)
TRIGL SERPL-MCNC: 65 MG/DL (ref 0–199)

## 2024-02-23 PROCEDURE — 80053 COMPREHEN METABOLIC PANEL: CPT

## 2024-02-23 PROCEDURE — 83735 ASSAY OF MAGNESIUM: CPT

## 2024-02-23 PROCEDURE — 36415 COLL VENOUS BLD VENIPUNCTURE: CPT

## 2024-02-23 PROCEDURE — 82248 BILIRUBIN DIRECT: CPT

## 2024-02-23 PROCEDURE — 80061 LIPID PANEL: CPT

## 2024-02-26 ENCOUNTER — OFFICE VISIT (OUTPATIENT)
Dept: FAMILY MEDICINE CLINIC | Age: 89
End: 2024-02-26
Payer: MEDICARE

## 2024-02-26 VITALS
HEIGHT: 66 IN | OXYGEN SATURATION: 99 % | SYSTOLIC BLOOD PRESSURE: 134 MMHG | BODY MASS INDEX: 26.84 KG/M2 | HEART RATE: 70 BPM | DIASTOLIC BLOOD PRESSURE: 76 MMHG | WEIGHT: 167 LBS | RESPIRATION RATE: 18 BRPM | TEMPERATURE: 98.1 F

## 2024-02-26 DIAGNOSIS — I10 PRIMARY HYPERTENSION: ICD-10-CM

## 2024-02-26 DIAGNOSIS — N18.31 TYPE 2 DIABETES MELLITUS WITH STAGE 3A CHRONIC KIDNEY DISEASE, WITHOUT LONG-TERM CURRENT USE OF INSULIN (HCC): ICD-10-CM

## 2024-02-26 DIAGNOSIS — L60.2 LONG TOENAIL: ICD-10-CM

## 2024-02-26 DIAGNOSIS — Z79.01 ANTICOAGULATED ON COUMADIN: ICD-10-CM

## 2024-02-26 DIAGNOSIS — E11.22 TYPE 2 DIABETES MELLITUS WITH STAGE 3A CHRONIC KIDNEY DISEASE, WITHOUT LONG-TERM CURRENT USE OF INSULIN (HCC): ICD-10-CM

## 2024-02-26 DIAGNOSIS — Z53.20 MAMMOGRAM DECLINED: ICD-10-CM

## 2024-02-26 DIAGNOSIS — Z00.00 MEDICARE ANNUAL WELLNESS VISIT, SUBSEQUENT: Primary | ICD-10-CM

## 2024-02-26 DIAGNOSIS — D68.69 SECONDARY HYPERCOAGULABLE STATE (HCC): ICD-10-CM

## 2024-02-26 DIAGNOSIS — Z00.01 ENCOUNTER FOR GENERAL ADULT MEDICAL EXAMINATION WITH ABNORMAL FINDINGS: ICD-10-CM

## 2024-02-26 DIAGNOSIS — I10 ESSENTIAL HYPERTENSION: ICD-10-CM

## 2024-02-26 LAB — HBA1C MFR BLD: 6.2 % (ref 4.3–5.7)

## 2024-02-26 PROCEDURE — G0439 PPPS, SUBSEQ VISIT: HCPCS | Performed by: NURSE PRACTITIONER

## 2024-02-26 PROCEDURE — G8417 CALC BMI ABV UP PARAM F/U: HCPCS | Performed by: NURSE PRACTITIONER

## 2024-02-26 PROCEDURE — 3044F HG A1C LEVEL LT 7.0%: CPT | Performed by: NURSE PRACTITIONER

## 2024-02-26 PROCEDURE — 99214 OFFICE O/P EST MOD 30 MIN: CPT | Performed by: NURSE PRACTITIONER

## 2024-02-26 PROCEDURE — G8427 DOCREV CUR MEDS BY ELIG CLIN: HCPCS | Performed by: NURSE PRACTITIONER

## 2024-02-26 PROCEDURE — 1123F ACP DISCUSS/DSCN MKR DOCD: CPT | Performed by: NURSE PRACTITIONER

## 2024-02-26 PROCEDURE — 1090F PRES/ABSN URINE INCON ASSESS: CPT | Performed by: NURSE PRACTITIONER

## 2024-02-26 PROCEDURE — 1036F TOBACCO NON-USER: CPT | Performed by: NURSE PRACTITIONER

## 2024-02-26 PROCEDURE — G8484 FLU IMMUNIZE NO ADMIN: HCPCS | Performed by: NURSE PRACTITIONER

## 2024-02-26 RX ORDER — AMLODIPINE BESYLATE 10 MG/1
10 TABLET ORAL DAILY
Qty: 90 TABLET | Refills: 4 | Status: SHIPPED | OUTPATIENT
Start: 2024-02-26

## 2024-02-26 ASSESSMENT — PATIENT HEALTH QUESTIONNAIRE - PHQ9
SUM OF ALL RESPONSES TO PHQ QUESTIONS 1-9: 0
SUM OF ALL RESPONSES TO PHQ QUESTIONS 1-9: 0
SUM OF ALL RESPONSES TO PHQ9 QUESTIONS 1 & 2: 0
2. FEELING DOWN, DEPRESSED OR HOPELESS: 0
1. LITTLE INTEREST OR PLEASURE IN DOING THINGS: 0
SUM OF ALL RESPONSES TO PHQ QUESTIONS 1-9: 0
SUM OF ALL RESPONSES TO PHQ QUESTIONS 1-9: 0

## 2024-02-26 ASSESSMENT — LIFESTYLE VARIABLES
HOW MANY STANDARD DRINKS CONTAINING ALCOHOL DO YOU HAVE ON A TYPICAL DAY: PATIENT DOES NOT DRINK
HOW OFTEN DO YOU HAVE A DRINK CONTAINING ALCOHOL: NEVER

## 2024-02-26 NOTE — PATIENT INSTRUCTIONS
Learning About Being Active as an Older Adult  Why is being active important as you get older?     Being active is one of the best things you can do for your health. And it's never too late to start. Being active--or getting active, if you aren't already--has definite benefits. It can:  Give you more energy,  Keep your mind sharp.  Improve balance to reduce your risk of falls.  Help you manage chronic illness with fewer medicines.  No matter how old you are, how fit you are, or what health problems you have, there is a form of activity that will work for you. And the more physical activity you can do, the better your overall health will be.  What kinds of activity can help you stay healthy?  Being more active will make your daily activities easier. Physical activity includes planned exercise and things you do in daily life. There are four types of activity:  Aerobic.  Doing aerobic activity makes your heart and lungs strong.  Includes walking, dancing, and gardening.  Aim for at least 2½ hours spread throughout the week.  It improves your energy and can help you sleep better.  Muscle-strengthening.  This type of activity can help maintain muscle and strengthen bones.  Includes climbing stairs, using resistance bands, and lifting or carrying heavy loads.  Aim for at least twice a week.  It can help protect the knees and other joints.  Stretching.  Stretching gives you better range of motion in joints and muscles.  Includes upper arm stretches, calf stretches, and gentle yoga.  Aim for at least twice a week, preferably after your muscles are warmed up from other activities.  It can help you function better in daily life.  Balancing.  This helps you stay coordinated and have good posture.  Includes heel-to-toe walking, ignacia chi, and certain types of yoga.  Aim for at least 3 days a week.  It can reduce your risk of falling.  Even if you have a hard time meeting the recommendations, it's better to be more active

## 2024-02-26 NOTE — ASSESSMENT & PLAN NOTE
Well-controlled, continue current medications   Hemoglobin A1C POC   Date Value Ref Range Status   08/23/2023 7.0 (H) 4.3 - 5.7 % Final     Comment:     Performed at [facility] Office under CLIA #  [id number]

## 2024-02-26 NOTE — PROGRESS NOTES
Medicare Annual Wellness Visit    Jasmina Powers is here for Medicare AWV    Assessment & Plan   Medicare annual wellness visit, subsequent  Anticoagulated on Coumadin  Secondary hypercoagulable state (HCC)  Controlled by cardiology, stable continue anticoagulation   Assessment & Plan:   Well-controlled, continue current medications anticoagulated follows with coumadin clinic   Type 2 diabetes mellitus with stage 3a chronic kidney disease, without long-term current use of insulin (HCC)  Controlled continue with metformin 1000 mg bid   Assessment & Plan:   Well-controlled, continue current medications   Hemoglobin A1C POC   Date Value Ref Range Status   08/23/2023 7.0 (H) 4.3 - 5.7 % Final     Comment:     Performed at [facility] Office under CLIA #  [id number]      Orders:  -     To Mai DPM, Podiatry, [place]  Essential hypertension  Primary hypertension  Controlled with current meds  -     amLODIPine (NORVASC) 10 MG tablet; Take 1 tablet by mouth daily, Disp-90 tablet, R-4Normal  Mammogram declined  Long toenail  -     To Mai DPM, Podiatry, [place]  Encounter for general adult medical examination with abnormal findings    Orders Placed This Encounter   Medications    amLODIPine (NORVASC) 10 MG tablet     Sig: Take 1 tablet by mouth daily     Dispense:  90 tablet     Refill:  4        Orders Placed This Encounter   Procedures    To Mai DPM, Podiatry, [place]    POCT glycosylated hemoglobin (Hb A1C)        Recommendations for Preventive Services Due: see orders and patient instructions/AVS.  Recommended screening schedule for the next 5-10 years is provided to the patient in written form: see Patient Instructions/AVS.     No follow-ups on file.     Subjective   The following acute and/or chronic problems were also addressed today:  PMH: T2DM, anticoagulation due to a fib and hypercoagulable state, HTN,, CKD stage 3      T2DM  -taking metformin 1000 mg bid  Tries to follow a diabetic diet  No

## 2024-02-28 DIAGNOSIS — E11.9 TYPE 2 DIABETES MELLITUS WITHOUT COMPLICATION, WITHOUT LONG-TERM CURRENT USE OF INSULIN (HCC): ICD-10-CM

## 2024-03-07 ENCOUNTER — HOSPITAL ENCOUNTER (OUTPATIENT)
Dept: PHARMACY | Age: 89
Setting detail: THERAPIES SERIES
Discharge: HOME | End: 2024-03-07
Payer: MEDICARE

## 2024-03-07 DIAGNOSIS — Z51.81 ENCOUNTER FOR THERAPEUTIC DRUG MONITORING: ICD-10-CM

## 2024-03-07 DIAGNOSIS — Z79.01 ANTICOAGULATED ON COUMADIN: Primary | ICD-10-CM

## 2024-03-07 DIAGNOSIS — I48.20 CHRONIC ATRIAL FIBRILLATION (HCC): ICD-10-CM

## 2024-03-07 PROBLEM — I48.21 ATRIAL FIBRILLATION, PERMANENT (HCC): Status: RESOLVED | Noted: 2020-08-11 | Resolved: 2024-03-07

## 2024-03-07 LAB — POC INR: 2.2 (ref 0.8–1.2)

## 2024-03-07 PROCEDURE — 99211 OFF/OP EST MAY X REQ PHY/QHP: CPT

## 2024-03-07 PROCEDURE — 85610 PROTHROMBIN TIME: CPT

## 2024-03-07 PROCEDURE — 36416 COLLJ CAPILLARY BLOOD SPEC: CPT

## 2024-03-07 NOTE — PROGRESS NOTES
Medication Management Clinic  [facility]  Anticoagulation Clinic  [phone] (phone)  [phone] (fax)    Ms. Jasmina Powers is a 89 y.o.  female with history of chronic atrial fib., per referral from DANNY Ivan, who presents today for Warfarin monitoring and adjustment (3 week visit).    Patient verifies current dosing regimen and tablet strength.  No missed or extra doses.  Patient denies bleeding/bruising/SOB.  Has usual swelling of ankles.  No blood in urine or stool.  No dietary changes.  No changes in medication/OTC agents/herbals.  No change in alcohol use or tobacco use.  No change in activity level.  Patient denies falls. States this morning she stood up, knees were weak, and had to sit down.  No vomiting/diarrhea or acute illness.   No procedures scheduled in the future at this time.    Assessment:     Lab Results   Component Value Date    INR 2.20 (H) 03/07/2024    INR 2.40 (H) 02/15/2024    INR 3.20 (H) 01/25/2024     INR therapeutic - goal 2-3.  Recent Labs     03/07/24  1328   INR 2.20*      Plan:  POCT INR performed/result reviewed.  Continue PO Coumadin 7.5 mg MF, 5 mg TWThSS.  Recheck INR in 4 week(s).  Patient reminded to call the Anticoagulation Clinic with any signs or symptoms of bleeding or with any medication changes.  Patient given instructions utilizing the teach back method.       After visit summary printed and reviewed with patient.      Discharged ambulatory in no apparent distress, with cane.     For Pharmacy Admin Tracking Only    Time Spent (min): 20

## 2024-03-20 ENCOUNTER — TELEPHONE (OUTPATIENT)
Dept: FAMILY MEDICINE CLINIC | Age: 89
End: 2024-03-20

## 2024-03-25 ENCOUNTER — OFFICE VISIT (OUTPATIENT)
Dept: INTERNAL MEDICINE CLINIC | Age: 89
End: 2024-03-25
Payer: MEDICARE

## 2024-03-25 VITALS — WEIGHT: 168.8 LBS | BODY MASS INDEX: 27.25 KG/M2

## 2024-03-25 DIAGNOSIS — E11.59 TYPE 2 DIABETES MELLITUS WITH OTHER CIRCULATORY COMPLICATION, WITHOUT LONG-TERM CURRENT USE OF INSULIN (HCC): Primary | ICD-10-CM

## 2024-03-25 PROCEDURE — NBSRV NON-BILLABLE SERVICE: Performed by: DIETITIAN, REGISTERED

## 2024-03-25 PROCEDURE — 97803 MED NUTRITION INDIV SUBSEQ: CPT | Performed by: DIETITIAN, REGISTERED

## 2024-03-25 NOTE — PROGRESS NOTES
Discussed with Jasmina that if she is pleased at this wt it is ok but she would not have to gain more.     Nutrition Diagnosis:   Food and nutrition-related knowledge deficit related to Lack of previous MNT/currently undergoing MNT as evidenced by Food recall.         Intervention:  -Impression: Jasmina's evening blood sugars are a little elevated but overall blood sugars look good. Discussed treating low blood sugars as needed due to her felling symptomatic at 95 mg/dL one day. Dicussed having a snack if blood sugars < 110 - 120 in the evening and gave suggestions. Dicussed healthier bread options with 3 gm fiber or more per slice.   - At time of visit bld sugars was 85 mg/dL and Jasmina seemed tired. Provided juice and crackers b/f she left office.   -Instructed the patient on: evening snacks as needed, healthier bread options, treating low bld sugars    -  Patient Instructions   Soft Dairy Snacks    Cottage cheese with soft fruit mixed in  Cheese slices  Pudding made with high fat milk  Cream cheese spread on bread  Tzatziki with soft crackers  Ricotta cheese on bread with tomatoes  Sour cream on a baked potato  Ice cream  Soft Protein Snacks  Boiled eggs  Cheese and soft crackers  Smooth nut butters on bread  Egg salad sandwich  Tuna salad sandwich  Grilled cheese  Chicken salad sandwich  Black bean brownies       BREAD 100% WHOLE WHEAT (3 gm per slice)      Comprehension verified using teachback method.    Monitoring/Evaluation:   -Followup visit: 3 months with dietitian.   -Receptiveness to education/goals: Agreeable.  -Evaluation of education: Indicates understanding.  -Readiness to change: action - ready to set action plan and implement dietary changes.  -Expected compliance: good.    Thank you for your referral of this patient.     Total time involved in direct patient education: 30 minutes for follow-up MNT visit.

## 2024-03-25 NOTE — PATIENT INSTRUCTIONS
Soft Dairy Snacks    Cottage cheese with soft fruit mixed in  Cheese slices  Pudding made with high fat milk  Cream cheese spread on bread  Tzatziki with soft crackers  Ricotta cheese on bread with tomatoes  Sour cream on a baked potato  Ice cream  Soft Protein Snacks  Boiled eggs  Cheese and soft crackers  Smooth nut butters on bread  Egg salad sandwich  Tuna salad sandwich  Grilled cheese  Chicken salad sandwich  Black bean brownies       BREAD 100% WHOLE WHEAT (3 gm per slice)

## 2024-03-31 DIAGNOSIS — I10 ESSENTIAL HYPERTENSION: ICD-10-CM

## 2024-04-04 ENCOUNTER — HOSPITAL ENCOUNTER (OUTPATIENT)
Dept: PHARMACY | Age: 89
Setting detail: THERAPIES SERIES
Discharge: HOME | End: 2024-04-04
Payer: MEDICARE

## 2024-04-04 DIAGNOSIS — I48.20 CHRONIC ATRIAL FIBRILLATION (HCC): ICD-10-CM

## 2024-04-04 DIAGNOSIS — Z51.81 ENCOUNTER FOR THERAPEUTIC DRUG MONITORING: ICD-10-CM

## 2024-04-04 DIAGNOSIS — Z79.01 ANTICOAGULATED ON COUMADIN: Primary | ICD-10-CM

## 2024-04-04 LAB — POC INR: 2.7 (ref 0.8–1.2)

## 2024-04-04 PROCEDURE — 85610 PROTHROMBIN TIME: CPT | Performed by: PHARMACIST

## 2024-04-04 PROCEDURE — 99211 OFF/OP EST MAY X REQ PHY/QHP: CPT | Performed by: PHARMACIST

## 2024-04-04 PROCEDURE — 36416 COLLJ CAPILLARY BLOOD SPEC: CPT | Performed by: PHARMACIST

## 2024-04-04 NOTE — PROGRESS NOTES
Medication Management Clinic  [facility]  Anticoagulation Clinic  [phone] (phone)  [phone] (fax)    Ms. Jasmina Powers is a 89 y.o.  female with history of Afib who presents today for anticoagulation monitoring and adjustment.    Patient verifies current dosing regimen and tablet strength.  No missed or extra doses.  Patient denies s/s bleeding/bruising/SOB. + typical swelling in both ankles  No blood in urine or stool.  No dietary changes.  No changes in medication/OTC agents/Herbals.  No change in alcohol use or tobacco use.  No change in activity level.  Patient denies falls.  No vomiting/diarrhea or acute illness.   No Procedures scheduled in the future at this time.    Assessment:   Lab Results   Component Value Date    INR 2.70 (H) 04/04/2024    INR 2.20 (H) 03/07/2024    INR 2.40 (H) 02/15/2024     INR therapeutic   Recent Labs     04/04/24  1342   INR 2.70*       Plan:  Continue Coumadin 7.5 mg MF, 5 mg TWThSS.  Recheck INR in 4 week(s).  Patient reminded to call the Anticoagulation Clinic with any signs or symptoms of bleeding or with any medication changes.  Patient given instructions utilizing the teach back method.        After visit summary printed and reviewed with patient.      Discharged ambulatory in no apparent distress.     For Pharmacy Admin Tracking Only  Time Spent (min): 20    Sonya Mary, EliudD, BCPS  4/4/2024  1:52 PM

## 2024-04-15 ENCOUNTER — TELEPHONE (OUTPATIENT)
Dept: FAMILY MEDICINE CLINIC | Age: 89
End: 2024-04-15

## 2024-04-15 DIAGNOSIS — K21.9 GASTROESOPHAGEAL REFLUX DISEASE, UNSPECIFIED WHETHER ESOPHAGITIS PRESENT: ICD-10-CM

## 2024-04-15 DIAGNOSIS — E11.59 TYPE 2 DIABETES MELLITUS WITH OTHER CIRCULATORY COMPLICATION, WITHOUT LONG-TERM CURRENT USE OF INSULIN (HCC): ICD-10-CM

## 2024-04-15 DIAGNOSIS — I48.20 CHRONIC ATRIAL FIBRILLATION (HCC): Primary | ICD-10-CM

## 2024-04-15 DIAGNOSIS — I10 PRIMARY HYPERTENSION: ICD-10-CM

## 2024-04-15 RX ORDER — ATORVASTATIN CALCIUM 10 MG/1
10 TABLET, FILM COATED ORAL DAILY
Qty: 90 TABLET | Refills: 3 | Status: SHIPPED | OUTPATIENT
Start: 2024-04-15

## 2024-04-15 RX ORDER — LANCETS
EACH MISCELLANEOUS
Qty: 200 EACH | Refills: 3 | Status: SHIPPED | OUTPATIENT
Start: 2024-04-15

## 2024-04-15 RX ORDER — OMEPRAZOLE 40 MG/1
CAPSULE, DELAYED RELEASE ORAL
Qty: 90 CAPSULE | Refills: 3 | Status: SHIPPED | OUTPATIENT
Start: 2024-04-15

## 2024-04-15 NOTE — TELEPHONE ENCOUNTER
----- Message from Mago Parkinson RN sent at 4/15/2024 11:13 AM EDT -----  Please renew annual referral for Anticoagulation Monitoring, #[phone].  Thanks, FANTA Parkinson RN BSN

## 2024-04-18 ENCOUNTER — OFFICE VISIT (OUTPATIENT)
Dept: FAMILY MEDICINE CLINIC | Age: 89
End: 2024-04-18
Payer: MEDICARE

## 2024-04-18 VITALS
RESPIRATION RATE: 18 BRPM | HEIGHT: 66 IN | HEART RATE: 80 BPM | SYSTOLIC BLOOD PRESSURE: 132 MMHG | OXYGEN SATURATION: 99 % | WEIGHT: 169.8 LBS | TEMPERATURE: 97.2 F | BODY MASS INDEX: 27.29 KG/M2 | DIASTOLIC BLOOD PRESSURE: 76 MMHG

## 2024-04-18 DIAGNOSIS — R94.2 ABNORMAL PFT: ICD-10-CM

## 2024-04-18 DIAGNOSIS — J34.89 NASAL DRAINAGE: ICD-10-CM

## 2024-04-18 DIAGNOSIS — R06.7 SNEEZING: ICD-10-CM

## 2024-04-18 DIAGNOSIS — J30.89 NON-SEASONAL ALLERGIC RHINITIS DUE TO OTHER ALLERGIC TRIGGER: ICD-10-CM

## 2024-04-18 DIAGNOSIS — R05.1 ACUTE COUGH: Primary | ICD-10-CM

## 2024-04-18 DIAGNOSIS — J40 BRONCHITIS: ICD-10-CM

## 2024-04-18 PROCEDURE — 1123F ACP DISCUSS/DSCN MKR DOCD: CPT | Performed by: NURSE PRACTITIONER

## 2024-04-18 PROCEDURE — G8417 CALC BMI ABV UP PARAM F/U: HCPCS | Performed by: NURSE PRACTITIONER

## 2024-04-18 PROCEDURE — 1036F TOBACCO NON-USER: CPT | Performed by: NURSE PRACTITIONER

## 2024-04-18 PROCEDURE — 1090F PRES/ABSN URINE INCON ASSESS: CPT | Performed by: NURSE PRACTITIONER

## 2024-04-18 PROCEDURE — 99214 OFFICE O/P EST MOD 30 MIN: CPT | Performed by: NURSE PRACTITIONER

## 2024-04-18 PROCEDURE — G8427 DOCREV CUR MEDS BY ELIG CLIN: HCPCS | Performed by: NURSE PRACTITIONER

## 2024-04-18 RX ORDER — AMOXICILLIN 500 MG/1
500 CAPSULE ORAL 3 TIMES DAILY
Qty: 30 CAPSULE | Refills: 0 | Status: SHIPPED | OUTPATIENT
Start: 2024-04-18 | End: 2024-04-28

## 2024-04-18 RX ORDER — PREDNISONE 20 MG/1
20 TABLET ORAL 2 TIMES DAILY
Qty: 10 TABLET | Refills: 0 | Status: SHIPPED | OUTPATIENT
Start: 2024-04-18 | End: 2024-04-23

## 2024-04-18 RX ORDER — FLUTICASONE PROPIONATE 50 MCG
1 SPRAY, SUSPENSION (ML) NASAL DAILY
Qty: 32 G | Refills: 1 | Status: SHIPPED | OUTPATIENT
Start: 2024-04-18

## 2024-04-18 RX ORDER — BENZONATATE 100 MG/1
100-200 CAPSULE ORAL 3 TIMES DAILY PRN
Qty: 60 CAPSULE | Refills: 0 | Status: SHIPPED | OUTPATIENT
Start: 2024-04-18 | End: 2024-04-25

## 2024-04-18 RX ORDER — LORATADINE 10 MG/1
10 TABLET ORAL DAILY
Qty: 90 TABLET | Refills: 3 | Status: SHIPPED | OUTPATIENT
Start: 2024-04-18

## 2024-04-18 RX ORDER — ALBUTEROL SULFATE 90 UG/1
2 AEROSOL, METERED RESPIRATORY (INHALATION) EVERY 6 HOURS PRN
Qty: 18 G | Refills: 3 | Status: SHIPPED | OUTPATIENT
Start: 2024-04-18

## 2024-04-18 SDOH — ECONOMIC STABILITY: FOOD INSECURITY: WITHIN THE PAST 12 MONTHS, THE FOOD YOU BOUGHT JUST DIDN'T LAST AND YOU DIDN'T HAVE MONEY TO GET MORE.: NEVER TRUE

## 2024-04-18 SDOH — ECONOMIC STABILITY: FOOD INSECURITY: WITHIN THE PAST 12 MONTHS, YOU WORRIED THAT YOUR FOOD WOULD RUN OUT BEFORE YOU GOT MONEY TO BUY MORE.: NEVER TRUE

## 2024-04-18 SDOH — ECONOMIC STABILITY: INCOME INSECURITY: HOW HARD IS IT FOR YOU TO PAY FOR THE VERY BASICS LIKE FOOD, HOUSING, MEDICAL CARE, AND HEATING?: NOT HARD AT ALL

## 2024-04-18 ASSESSMENT — ENCOUNTER SYMPTOMS
RHINORRHEA: 1
SORE THROAT: 0
CHEST TIGHTNESS: 0
COUGH: 1
WHEEZING: 0
VOICE CHANGE: 0
SINUS PRESSURE: 0
CHOKING: 0
SHORTNESS OF BREATH: 0
TROUBLE SWALLOWING: 0
SINUS PAIN: 0

## 2024-04-18 NOTE — PROGRESS NOTES
predniSONE (DELTASONE) 20 MG tablet; Take 1 tablet by mouth 2 times daily for 5 days  Dispense: 10 tablet; Refill: 0  - Lev Yang MD, Pulmonary Disease, [place]    2. Non-seasonal allergic rhinitis due to other allergic trigger  -#1    3. Nasal drainage  -#1  Flonase     4. Sneezing    - loratadine (CLARITIN) 10 MG tablet; Take 1 tablet by mouth daily  Dispense: 90 tablet; Refill: 3  - predniSONE (DELTASONE) 20 MG tablet; Take 1 tablet by mouth 2 times daily for 5 days  Dispense: 10 tablet; Refill: 0    5. Abnormal PFT  - Lev Yang MD, Pulmonary Disease, [place]    6. Bronchitis  -could be due to PFT abnormality and/or trelegy inhaler   - amoxicillin (AMOXIL) 500 MG capsule; Take 1 capsule by mouth 3 times daily for 10 days  Dispense: 30 capsule; Refill: 0    Pt in agreement with plan   Orders Placed This Encounter    Lev Yang MD, Pulmonary Disease, [place]     Referral Priority:   Routine     Referral Type:   Eval and Treat     Referral Reason:   Specialty Services Required     Referred to Provider:   Lev Carolina MD     Requested Specialty:   Pulmonology     Number of Visits Requested:   1    fluticasone (FLONASE) 50 MCG/ACT nasal spray     Si spray by Each Nostril route daily     Dispense:  32 g     Refill:  1    loratadine (CLARITIN) 10 MG tablet     Sig: Take 1 tablet by mouth daily     Dispense:  90 tablet     Refill:  3    benzonatate (TESSALON) 100 MG capsule     Sig: Take 1-2 capsules by mouth 3 times daily as needed for Cough     Dispense:  60 capsule     Refill:  0    predniSONE (DELTASONE) 20 MG tablet     Sig: Take 1 tablet by mouth 2 times daily for 5 days     Dispense:  10 tablet     Refill:  0    amoxicillin (AMOXIL) 500 MG capsule     Sig: Take 1 capsule by mouth 3 times daily for 10 days     Dispense:  30 capsule     Refill:  0    albuterol sulfate HFA (PROVENTIL HFA) 108 (90 Base) MCG/ACT inhaler     Sig: Inhale 2 puffs into

## 2024-05-02 ENCOUNTER — HOSPITAL ENCOUNTER (OUTPATIENT)
Dept: PHARMACY | Age: 89
Setting detail: THERAPIES SERIES
Discharge: HOME | End: 2024-05-02
Payer: MEDICARE

## 2024-05-02 DIAGNOSIS — Z51.81 ENCOUNTER FOR THERAPEUTIC DRUG MONITORING: ICD-10-CM

## 2024-05-02 DIAGNOSIS — Z79.01 ANTICOAGULATED ON COUMADIN: Primary | ICD-10-CM

## 2024-05-02 DIAGNOSIS — I48.20 CHRONIC ATRIAL FIBRILLATION (HCC): ICD-10-CM

## 2024-05-02 LAB — POC INR: 2.1 (ref 0.8–1.2)

## 2024-05-02 PROCEDURE — 36416 COLLJ CAPILLARY BLOOD SPEC: CPT

## 2024-05-02 PROCEDURE — 99211 OFF/OP EST MAY X REQ PHY/QHP: CPT

## 2024-05-02 PROCEDURE — 85610 PROTHROMBIN TIME: CPT

## 2024-05-02 NOTE — PROGRESS NOTES
Medication Management Clinic  [facility]  Anticoagulation Clinic  [phone] (phone)  [phone] (fax)    Ms. Jasmina Powers is a 89 y.o.  female with history of Afib who presents today for anticoagulation monitoring and adjustment.    Patient verifies current dosing regimen and tablet strength.  No missed or extra doses.  Patient denies s/s bleeding/bruising/swelling/SOB.  No blood in urine or stool.  Dietary changes: Appetite returned over the last two or three days, wasn't eating as much for about a week or so per patient.  No changes in Herbals. Patient went to [facility] on 4/18 for acute cough. Patient was started on benzonatate three times daily as needed, prednisone 20mg BID for 5 days, and amoxicillin 500mg capsule, three times daily for 3 days. Finished all of these medications. Reminded patient to call clinic if patient starts any new medications. Patient not taking Voltaren gel.  No change in alcohol use or tobacco use.  No change in activity level.  Patient denies falls.  No vomiting. Little bit of diarrhea after the antibiotic but mainly resolved. Did have a looser stool earlier this morning. Had a cough middle of April and was prescribed the medications listed above.   No Procedures scheduled in the future at this time.    Assessment:   Lab Results   Component Value Date    INR 2.10 (H) 05/02/2024    INR 2.70 (H) 04/04/2024    INR 2.20 (H) 03/07/2024     INR therapeutic   Recent Labs     05/02/24  1314   INR 2.10*     Plan:  Continue Coumadin 7.5mg MF, 5mg all other days. Recheck INR in 5 week(s). Patient reminded to call the Anticoagulation Clinic with any signs or symptoms of bleeding or with any medication changes. Patient given instructions utilizing the teach back method.    After visit summary printed and reviewed with patient.      Discharged ambulatory in no apparent distress.    Jazlyn Casillas, PharmD   Pharmacy Resident  5/2/2024 1:24 PM    For Pharmacy Admin

## 2024-05-08 DIAGNOSIS — E11.59 TYPE 2 DIABETES MELLITUS WITH OTHER CIRCULATORY COMPLICATION, WITHOUT LONG-TERM CURRENT USE OF INSULIN (HCC): ICD-10-CM

## 2024-05-08 RX ORDER — BLOOD-GLUCOSE METER
EACH MISCELLANEOUS
Qty: 1 KIT | Refills: 3 | Status: SHIPPED | OUTPATIENT
Start: 2024-05-08

## 2024-05-08 NOTE — TELEPHONE ENCOUNTER
Recent Visits  Date Type Provider Dept   04/18/24 Office Visit Dimas Watts APRN - CNP [facility]   02/26/24 Office Visit Dimas Watts APRN - CNP [facility]   08/23/23 Office Visit Dimas Watts APRN - CNP [facility]   03/06/23 Office Visit Dimas Watts APRN - CNP [facility]   02/22/23 Office Visit Dimas Watts APRN - CNP [facility]   11/16/22 Office Visit Dimas Watts APRN - CNP [facility]   Showing recent visits within past 540 days with a meds authorizing provider and meeting all other requirements  Future Appointments  Date Type Provider Dept   08/26/24 Appointment Dimas Watts APRN - CNP [facility]   Showing future appointments within next 150 days with a meds authorizing provider and meeting all other requirements

## 2024-05-15 ENCOUNTER — TELEPHONE (OUTPATIENT)
Dept: PHARMACY | Age: 89
End: 2024-05-15

## 2024-05-15 NOTE — TELEPHONE ENCOUNTER
Spoke with Jasmina and instructed her to only take 5 mg of Coumadin this Friday instead of 7.5 mg. She voiced understanding.    Maris Hanna, PharmD, BCPS  5/15/2024  2:59 PM

## 2024-05-15 NOTE — TELEPHONE ENCOUNTER
Jasmina called and stated that she normally takes Coumadin 7.5mg MF, 5mg all other days. She made a mistake and took Coumadin 7.5 mg on Monday and Tuesday this week and is questioning what she should do. Attempted to call patient, but unable to get through on number provided. Will try again later this afternoon.    Maris Hanna, EliudD, BCPS  5/15/2024  12:59 PM

## 2024-05-18 ENCOUNTER — APPOINTMENT (OUTPATIENT)
Dept: GENERAL RADIOLOGY | Age: 89
End: 2024-05-18
Payer: MEDICARE

## 2024-05-18 ENCOUNTER — HOSPITAL ENCOUNTER (OUTPATIENT)
Age: 89
Setting detail: OBSERVATION
Discharge: HOME | End: 2024-05-19
Attending: STUDENT IN AN ORGANIZED HEALTH CARE EDUCATION/TRAINING PROGRAM | Admitting: INTERNAL MEDICINE
Payer: MEDICARE

## 2024-05-18 ENCOUNTER — APPOINTMENT (OUTPATIENT)
Dept: CT IMAGING | Age: 89
End: 2024-05-18
Payer: MEDICARE

## 2024-05-18 DIAGNOSIS — S80.212A ABRASION OF LEFT KNEE, INITIAL ENCOUNTER: ICD-10-CM

## 2024-05-18 DIAGNOSIS — D50.9 MICROCYTIC ANEMIA: ICD-10-CM
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
## 2024-05-18 DIAGNOSIS — R55 SYNCOPE AND COLLAPSE: Primary | ICD-10-CM

## 2024-05-18 DIAGNOSIS — E87.20 METABOLIC ACIDOSIS: ICD-10-CM

## 2024-05-18 PROBLEM — R42 DIZZINESS: Status: ACTIVE | Noted: 2024-05-18

## 2024-05-18 LAB
ALBUMIN SERPL BCG-MCNC: 4.1 G/DL (ref 3.5–5.1)
ALP SERPL-CCNC: 112 U/L (ref 38–126)
ALT SERPL W/O P-5'-P-CCNC: 26 U/L (ref 11–66)
ANION GAP SERPL CALC-SCNC: 15 MEQ/L (ref 8–16)
APTT PPP: 46.1 SECONDS (ref 22–38)
AST SERPL-CCNC: 29 U/L (ref 5–40)
BASOPHILS ABSOLUTE: 0 THOU/MM3 (ref 0–0.1)
BASOPHILS NFR BLD AUTO: 0.2 %
BILIRUB SERPL-MCNC: 0.5 MG/DL (ref 0.3–1.2)
BUN SERPL-MCNC: 21 MG/DL (ref 7–22)
CALCIUM SERPL-MCNC: 10.4 MG/DL (ref 8.5–10.5)
CHLORIDE SERPL-SCNC: 95 MEQ/L (ref 98–111)
CO2 SERPL-SCNC: 20 MEQ/L (ref 23–33)
CREAT SERPL-MCNC: 0.8 MG/DL (ref 0.4–1.2)
DEPRECATED RDW RBC AUTO: 39.8 FL (ref 35–45)
EKG Q-T INTERVAL: 384 MS
EKG QRS DURATION: 72 MS
EKG QTC CALCULATION (BAZETT): 389 MS
EKG R AXIS: -9 DEGREES
EKG T AXIS: 37 DEGREES
EKG VENTRICULAR RATE: 62 BPM
EOSINOPHIL NFR BLD AUTO: 0.8 %
EOSINOPHILS ABSOLUTE: 0 THOU/MM3 (ref 0–0.4)
ERYTHROCYTE [DISTWIDTH] IN BLOOD BY AUTOMATED COUNT: 14.8 % (ref 11.5–14.5)
GFR SERPL CREATININE-BSD FRML MDRD: 71 ML/MIN/1.73M2
GLUCOSE BLD STRIP.AUTO-MCNC: 155 MG/DL (ref 70–108)
GLUCOSE SERPL-MCNC: 114 MG/DL (ref 70–108)
HCT VFR BLD AUTO: 38.8 % (ref 37–47)
HGB BLD-MCNC: 12.2 GM/DL (ref 12–16)
IMM GRANULOCYTES # BLD AUTO: 0.03 THOU/MM3 (ref 0–0.07)
IMM GRANULOCYTES NFR BLD AUTO: 0.6 %
INR PPP: 1.93 (ref 0.85–1.13)
LYMPHOCYTES ABSOLUTE: 1.1 THOU/MM3 (ref 1–4.8)
LYMPHOCYTES NFR BLD AUTO: 22.5 %
MCH RBC QN AUTO: 23.6 PG (ref 26–33)
MCHC RBC AUTO-ENTMCNC: 31.4 GM/DL (ref 32.2–35.5)
MCV RBC AUTO: 75.2 FL (ref 81–99)
MONOCYTES ABSOLUTE: 0.4 THOU/MM3 (ref 0.4–1.3)
MONOCYTES NFR BLD AUTO: 8.6 %
NEUTROPHILS ABSOLUTE: 3.4 THOU/MM3 (ref 1.8–7.7)
NEUTROPHILS NFR BLD AUTO: 67.3 %
NRBC BLD AUTO-RTO: 0 /100 WBC
OSMOLALITY SERPL CALC.SUM OF ELEC: 264.6 MOSMOL/KG (ref 275–300)
PLATELET # BLD AUTO: 309 THOU/MM3 (ref 130–400)
PMV BLD AUTO: 8.9 FL (ref 9.4–12.4)
POTASSIUM SERPL-SCNC: 3.8 MEQ/L (ref 3.5–5.2)
PROT SERPL-MCNC: 7.6 G/DL (ref 6.1–8)
RBC # BLD AUTO: 5.16 MILL/MM3 (ref 4.2–5.4)
SODIUM SERPL-SCNC: 130 MEQ/L (ref 135–145)
TROPONIN, HIGH SENSITIVITY: 13 NG/L (ref 0–12)
TROPONIN, HIGH SENSITIVITY: 15 NG/L (ref 0–12)
WBC # BLD AUTO: 5 THOU/MM3 (ref 4.8–10.8)

## 2024-05-18 PROCEDURE — 93005 ELECTROCARDIOGRAM TRACING: CPT | Performed by: STUDENT IN AN ORGANIZED HEALTH CARE EDUCATION/TRAINING PROGRAM

## 2024-05-18 PROCEDURE — 85025 COMPLETE CBC W/AUTO DIFF WBC: CPT

## 2024-05-18 PROCEDURE — 99222 1ST HOSP IP/OBS MODERATE 55: CPT | Performed by: INTERNAL MEDICINE

## 2024-05-18 PROCEDURE — 96361 HYDRATE IV INFUSION ADD-ON: CPT

## 2024-05-18 PROCEDURE — 6370000000 HC RX 637 (ALT 250 FOR IP)

## 2024-05-18 PROCEDURE — 82948 REAGENT STRIP/BLOOD GLUCOSE: CPT

## 2024-05-18 PROCEDURE — 99285 EMERGENCY DEPT VISIT HI MDM: CPT

## 2024-05-18 PROCEDURE — 71046 X-RAY EXAM CHEST 2 VIEWS: CPT

## 2024-05-18 PROCEDURE — 85610 PROTHROMBIN TIME: CPT

## 2024-05-18 PROCEDURE — 73564 X-RAY EXAM KNEE 4 OR MORE: CPT

## 2024-05-18 PROCEDURE — 6370000000 HC RX 637 (ALT 250 FOR IP): Performed by: STUDENT IN AN ORGANIZED HEALTH CARE EDUCATION/TRAINING PROGRAM

## 2024-05-18 PROCEDURE — G0378 HOSPITAL OBSERVATION PER HR: HCPCS

## 2024-05-18 PROCEDURE — 72125 CT NECK SPINE W/O DYE: CPT

## 2024-05-18 PROCEDURE — 85730 THROMBOPLASTIN TIME PARTIAL: CPT

## 2024-05-18 PROCEDURE — 84484 ASSAY OF TROPONIN QUANT: CPT

## 2024-05-18 PROCEDURE — 36415 COLL VENOUS BLD VENIPUNCTURE: CPT

## 2024-05-18 PROCEDURE — 93010 ELECTROCARDIOGRAM REPORT: CPT | Performed by: INTERNAL MEDICINE

## 2024-05-18 PROCEDURE — 2580000003 HC RX 258

## 2024-05-18 PROCEDURE — 80053 COMPREHEN METABOLIC PANEL: CPT

## 2024-05-18 PROCEDURE — 70450 CT HEAD/BRAIN W/O DYE: CPT

## 2024-05-18 PROCEDURE — 96360 HYDRATION IV INFUSION INIT: CPT

## 2024-05-18 RX ORDER — METOPROLOL TARTRATE 50 MG/1
25 TABLET, FILM COATED ORAL 2 TIMES DAILY
Status: DISCONTINUED | OUTPATIENT
Start: 2024-05-18 | End: 2024-05-19 | Stop reason: HOSPADM

## 2024-05-18 RX ORDER — SPIRONOLACTONE AND HYDROCHLOROTHIAZIDE 25; 25 MG/1; MG/1
1 TABLET ORAL DAILY
Status: DISCONTINUED | OUTPATIENT
Start: 2024-05-18 | End: 2024-05-18 | Stop reason: SDUPTHER

## 2024-05-18 RX ORDER — ONDANSETRON 4 MG/1
4 TABLET, ORALLY DISINTEGRATING ORAL EVERY 8 HOURS PRN
Status: DISCONTINUED | OUTPATIENT
Start: 2024-05-18 | End: 2024-05-19 | Stop reason: HOSPADM

## 2024-05-18 RX ORDER — WARFARIN SODIUM 7.5 MG/1
7.5 TABLET ORAL
Status: COMPLETED | OUTPATIENT
Start: 2024-05-18 | End: 2024-05-18

## 2024-05-18 RX ORDER — SODIUM CHLORIDE 9 MG/ML
INJECTION, SOLUTION INTRAVENOUS CONTINUOUS
Status: ACTIVE | OUTPATIENT
Start: 2024-05-18 | End: 2024-05-19

## 2024-05-18 RX ORDER — SODIUM CHLORIDE 0.9 % (FLUSH) 0.9 %
5-40 SYRINGE (ML) INJECTION EVERY 12 HOURS SCHEDULED
Status: DISCONTINUED | OUTPATIENT
Start: 2024-05-18 | End: 2024-05-19 | Stop reason: HOSPADM

## 2024-05-18 RX ORDER — MAGNESIUM SULFATE IN WATER 40 MG/ML
2000 INJECTION, SOLUTION INTRAVENOUS PRN
Status: DISCONTINUED | OUTPATIENT
Start: 2024-05-18 | End: 2024-05-19 | Stop reason: HOSPADM

## 2024-05-18 RX ORDER — ACETAMINOPHEN 650 MG/1
650 SUPPOSITORY RECTAL EVERY 6 HOURS PRN
Status: DISCONTINUED | OUTPATIENT
Start: 2024-05-18 | End: 2024-05-19 | Stop reason: HOSPADM

## 2024-05-18 RX ORDER — HYDROCHLOROTHIAZIDE 25 MG/1
25 TABLET ORAL DAILY
Status: DISCONTINUED | OUTPATIENT
Start: 2024-05-19 | End: 2024-05-19 | Stop reason: HOSPADM

## 2024-05-18 RX ORDER — ONDANSETRON 2 MG/ML
4 INJECTION INTRAMUSCULAR; INTRAVENOUS EVERY 6 HOURS PRN
Status: DISCONTINUED | OUTPATIENT
Start: 2024-05-18 | End: 2024-05-19 | Stop reason: HOSPADM

## 2024-05-18 RX ORDER — VITAMIN B COMPLEX
1000 TABLET ORAL DAILY
Status: DISCONTINUED | OUTPATIENT
Start: 2024-05-19 | End: 2024-05-19 | Stop reason: HOSPADM

## 2024-05-18 RX ORDER — FLUTICASONE PROPIONATE 50 MCG
1 SPRAY, SUSPENSION (ML) NASAL DAILY
Status: DISCONTINUED | OUTPATIENT
Start: 2024-05-19 | End: 2024-05-19 | Stop reason: HOSPADM

## 2024-05-18 RX ORDER — POTASSIUM CHLORIDE 7.45 MG/ML
10 INJECTION INTRAVENOUS PRN
Status: DISCONTINUED | OUTPATIENT
Start: 2024-05-18 | End: 2024-05-19 | Stop reason: HOSPADM

## 2024-05-18 RX ORDER — LANOLIN ALCOHOL/MO/W.PET/CERES
400 CREAM (GRAM) TOPICAL DAILY
Status: DISCONTINUED | OUTPATIENT
Start: 2024-05-19 | End: 2024-05-19 | Stop reason: HOSPADM

## 2024-05-18 RX ORDER — POTASSIUM CHLORIDE 20 MEQ/1
40 TABLET, EXTENDED RELEASE ORAL PRN
Status: DISCONTINUED | OUTPATIENT
Start: 2024-05-18 | End: 2024-05-19 | Stop reason: HOSPADM

## 2024-05-18 RX ORDER — GLUCAGON 1 MG/ML
1 KIT INJECTION PRN
Status: DISCONTINUED | OUTPATIENT
Start: 2024-05-18 | End: 2024-05-19 | Stop reason: HOSPADM

## 2024-05-18 RX ORDER — ASCORBIC ACID 500 MG
500 TABLET ORAL DAILY
Status: DISCONTINUED | OUTPATIENT
Start: 2024-05-19 | End: 2024-05-19 | Stop reason: HOSPADM

## 2024-05-18 RX ORDER — INSULIN LISPRO 100 [IU]/ML
0-4 INJECTION, SOLUTION INTRAVENOUS; SUBCUTANEOUS NIGHTLY
Status: DISCONTINUED | OUTPATIENT
Start: 2024-05-18 | End: 2024-05-19 | Stop reason: HOSPADM

## 2024-05-18 RX ORDER — SPIRONOLACTONE 25 MG/1
25 TABLET ORAL DAILY
Status: DISCONTINUED | OUTPATIENT
Start: 2024-05-19 | End: 2024-05-19 | Stop reason: HOSPADM

## 2024-05-18 RX ORDER — POLYETHYLENE GLYCOL 3350 17 G/17G
17 POWDER, FOR SOLUTION ORAL DAILY PRN
Status: DISCONTINUED | OUTPATIENT
Start: 2024-05-18 | End: 2024-05-19 | Stop reason: HOSPADM

## 2024-05-18 RX ORDER — ACETAMINOPHEN 500 MG
1000 TABLET ORAL ONCE
Status: COMPLETED | OUTPATIENT
Start: 2024-05-18 | End: 2024-05-18

## 2024-05-18 RX ORDER — ATORVASTATIN CALCIUM 10 MG/1
10 TABLET, FILM COATED ORAL DAILY
Status: DISCONTINUED | OUTPATIENT
Start: 2024-05-19 | End: 2024-05-19 | Stop reason: HOSPADM

## 2024-05-18 RX ORDER — SODIUM CHLORIDE 0.9 % (FLUSH) 0.9 %
5-40 SYRINGE (ML) INJECTION PRN
Status: DISCONTINUED | OUTPATIENT
Start: 2024-05-18 | End: 2024-05-19 | Stop reason: HOSPADM

## 2024-05-18 RX ORDER — ACETAMINOPHEN 325 MG/1
650 TABLET ORAL EVERY 6 HOURS PRN
Status: DISCONTINUED | OUTPATIENT
Start: 2024-05-18 | End: 2024-05-19 | Stop reason: HOSPADM

## 2024-05-18 RX ORDER — MECLIZINE HCL 12.5 MG/1
12.5 TABLET ORAL 3 TIMES DAILY PRN
Qty: 60 TABLET | Refills: 0 | Status: SHIPPED | OUTPATIENT
Start: 2024-05-18

## 2024-05-18 RX ORDER — PANTOPRAZOLE SODIUM 40 MG/1
40 TABLET, DELAYED RELEASE ORAL
Status: DISCONTINUED | OUTPATIENT
Start: 2024-05-19 | End: 2024-05-19 | Stop reason: HOSPADM

## 2024-05-18 RX ORDER — AMLODIPINE BESYLATE 10 MG/1
10 TABLET ORAL DAILY
Status: DISCONTINUED | OUTPATIENT
Start: 2024-05-19 | End: 2024-05-19 | Stop reason: HOSPADM

## 2024-05-18 RX ORDER — ALBUTEROL SULFATE 90 UG/1
2 AEROSOL, METERED RESPIRATORY (INHALATION) EVERY 6 HOURS PRN
Status: DISCONTINUED | OUTPATIENT
Start: 2024-05-18 | End: 2024-05-19 | Stop reason: HOSPADM

## 2024-05-18 RX ORDER — MECLIZINE HCL 12.5 MG/1
12.5 TABLET ORAL 3 TIMES DAILY PRN
Status: DISCONTINUED | OUTPATIENT
Start: 2024-05-18 | End: 2024-05-19 | Stop reason: HOSPADM

## 2024-05-18 RX ORDER — DEXTROSE MONOHYDRATE 100 MG/ML
INJECTION, SOLUTION INTRAVENOUS CONTINUOUS PRN
Status: DISCONTINUED | OUTPATIENT
Start: 2024-05-18 | End: 2024-05-19 | Stop reason: HOSPADM

## 2024-05-18 RX ORDER — FERROUS SULFATE 325(65) MG
325 TABLET ORAL 2 TIMES DAILY
Status: DISCONTINUED | OUTPATIENT
Start: 2024-05-19 | End: 2024-05-19 | Stop reason: HOSPADM

## 2024-05-18 RX ORDER — SODIUM CHLORIDE 9 MG/ML
INJECTION, SOLUTION INTRAVENOUS PRN
Status: DISCONTINUED | OUTPATIENT
Start: 2024-05-18 | End: 2024-05-19 | Stop reason: HOSPADM

## 2024-05-18 RX ORDER — MAGNESIUM OXIDE 400 MG/1
400 TABLET ORAL DAILY
Status: DISCONTINUED | OUTPATIENT
Start: 2024-05-18 | End: 2024-05-18

## 2024-05-18 RX ORDER — MONTELUKAST SODIUM 10 MG/1
10 TABLET ORAL NIGHTLY
Status: DISCONTINUED | OUTPATIENT
Start: 2024-05-18 | End: 2024-05-19 | Stop reason: HOSPADM

## 2024-05-18 RX ORDER — INSULIN LISPRO 100 [IU]/ML
0-4 INJECTION, SOLUTION INTRAVENOUS; SUBCUTANEOUS
Status: DISCONTINUED | OUTPATIENT
Start: 2024-05-19 | End: 2024-05-19 | Stop reason: HOSPADM

## 2024-05-18 RX ORDER — MECLIZINE HCL 25MG 25 MG/1
25 TABLET, CHEWABLE ORAL ONCE
Status: COMPLETED | OUTPATIENT
Start: 2024-05-18 | End: 2024-05-18

## 2024-05-18 RX ADMIN — WARFARIN SODIUM 7.5 MG: 7.5 TABLET ORAL at 22:06

## 2024-05-18 RX ADMIN — ACETAMINOPHEN 1000 MG: 500 TABLET ORAL at 15:51

## 2024-05-18 RX ADMIN — MONTELUKAST SODIUM 10 MG: 10 TABLET ORAL at 22:06

## 2024-05-18 RX ADMIN — MECLIZINE HYDROCHLORIDE 25 MG: 25 TABLET, CHEWABLE ORAL at 15:51

## 2024-05-18 RX ADMIN — METOPROLOL TARTRATE 25 MG: 50 TABLET, FILM COATED ORAL at 22:06

## 2024-05-18 RX ADMIN — SODIUM CHLORIDE: 9 INJECTION, SOLUTION INTRAVENOUS at 20:00

## 2024-05-18 ASSESSMENT — PAIN - FUNCTIONAL ASSESSMENT
PAIN_FUNCTIONAL_ASSESSMENT: NONE - DENIES PAIN

## 2024-05-18 NOTE — ED NOTES
ED to inpatient nurses report      Chief Complaint:  Chief Complaint   Patient presents with    Fall     Present to ED from: [facility]    MOA:     LOC: alert and orientated to name, place, date  Mobility: Independent  Oxygen Baseline: 97% ra    Current needs required: RA     Code Status:   Full Code    What abnormal results were found and what did you give/do to treat them? See results/mar  Any procedures or intervention occur? MAR    Mental Status:  Level of Consciousness: Alert (0)    Psych Assessment:        Vitals:  Patient Vitals for the past 24 hrs:   BP Temp Temp src Pulse Resp SpO2 Height Weight   05/18/24 1810 -- -- -- 71 19 100 % -- --   05/18/24 1654 108/66 -- -- 62 21 97 % -- --   05/18/24 1557 -- -- -- 82 18 98 % -- --   05/18/24 1451 111/79 -- -- 77 20 97 % -- --   05/18/24 1408 (!) 142/88 97.8 °F (36.6 °C) Oral 85 18 98 % 1.676 m (5' 6\") 74.8 kg (165 lb)        LDAs:   Peripheral IV 05/18/24 Left Antecubital (Active)   Site Assessment Clean, dry & intact 05/18/24 1811       Ambulatory Status:  No data recorded    Diagnosis:  DISPOSITION Admitted 05/18/2024 05:48:34 PM   Final diagnoses:   Syncope and collapse   Fall from standing, initial encounter   Abrasion of left knee, initial encounter        Consults:  IP CONSULT TO PHARMACY     Pain Score:  Pain Assessment  Pain Assessment: None - Denies Pain    C-SSRS:   Risk of Suicide: No Risk    Sepsis Screening:  Sepsis Risk Score: 1.78    [place] Fall Risk:       Swallow Screening        Preferred Language:   English      ALLERGIES     Patient has no known allergies.    SURGICAL HISTORY     History reviewed. No pertinent surgical history.    PAST MEDICAL HISTORY       Past Medical History:   Diagnosis Date    Atrial fibrillation (HCC)     Diabetes mellitus (HCC)     Hypertension            Electronically signed by Lynn Rodríguez RN on 5/18/2024 at 6:11 PM

## 2024-05-18 NOTE — ED TRIAGE NOTES
Patient presents to ED from [facility] via [facility] with report of a fall today at [facility] after losing balance. Patient denies any LOC or hitting her head but states she has felt somewhat dizzy the past 3 days due to an ear infection. Patient denies any pain on arrival and states she fell forward from standing height to her knees. Patient is A/O x4 on arrival and respirations even and unlabored at this time.

## 2024-05-18 NOTE — H&P
non-focal 2-12.     Psychiatric: Alert and oriented, normal insight and thought content.   Capillary Refill: Brisk,< 3 seconds.           Peripheral Pulses: +2 palpable, equal bilaterally.       EKG:  I have reviewed the EKG with the following interpretation: Atrial fibrillation, rate 62 bpm, no acute ischemic changes seen, QTc 389 ms      Labs:     Recent Labs     05/18/24  1600   WBC 5.0   HGB 12.2   HCT 38.8        Recent Labs     05/18/24  1600   *   K 3.8   CL 95*   CO2 20*   BUN 21   CREATININE 0.8   CALCIUM 10.4     Recent Labs     05/18/24  1600   AST 29   ALT 26   BILITOT 0.5   ALKPHOS 112     Recent Labs     05/18/24  1600   INR 1.93*     No results for input(s): \"TROPONINT\" in the last 72 hours.  No results for input(s): \"PROCAL\" in the last 72 hours.   No results found for: \"NITRU\", \"WBCUA\", \"BACTERIA\", \"RBCUA\", \"BLOODU\", \"SPECGRAV\", \"GLUCOSEU\"      Radiology:   CT HEAD WO CONTRAST   Final Result      No mass effect or acute hemorrhage.            **This report has been created using voice recognition software. It may contain   minor errors which are inherent in voice recognition technology.**            CT CERVICAL SPINE WO CONTRAST   Final Result   1. No fracture or spondylolisthesis of the cervical spine.   2. Multilevel degenerative disc disease, neural foraminal narrowing and central   canal stenosis.      XR KNEE LEFT (MIN 4 VIEWS)   Final Result      No fracture or dislocation.            XR CHEST (2 VW)   Final Result   1. No acute intrathoracic process.   2. Mild stable cardiomegaly.               **This report has been created using voice recognition software. It may contain   minor errors which are inherent in voice recognition technology.**                Past Social History  The patient currently lives at home.   Tobacco use:   reports that she quit smoking about 45 years ago. Her smoking use included cigarettes. She has never used smokeless tobacco.  Alcohol use:   reports

## 2024-05-18 NOTE — PROGRESS NOTES
Warfarin Pharmacy Consult Note    Jasmina Powers is a 89 y.o. female for whom pharmacy has been asked to manage warfarin therapy.     Consulting Provider: Dr. Rose Gonzáles  Warfarin Indication: Atrial fibrillation or Atrial flutter  Target INR range: 2.0-3.0   Warfarin dose prior to admission: 7.5 mg MF, 5 mg all other days of the week.   Outpatient warfarin provider: [facility]    Recent Labs     05/18/24  1600   INR 1.93*     Recent Labs     05/18/24  1600   HGB 12.2        Concurrent anticoagulants/antiplatelets: none  Significant warfarin drug-drug interactions: none    Date INR Warfarin Dose   5/18/24 1.93 7.5 mg                    INR slightly subtherapeutic. Will order boost dose warfarin 7.5 mg x1 for this evening.    INR will be monitored routinely until therapeutic INR is achieved.    Pharmacy will continue to follow. Thank you for the consult,   Sushma Coello, PharmD

## 2024-05-18 NOTE — ED NOTES
Spoke to LAVONNE Plasencia to approve pt transport to [facility] in stable condition.

## 2024-05-18 NOTE — ED NOTES
Patient resting in bed and updated on plan of care at this time. Family at bedside and patient repositioned for comfort.

## 2024-05-18 NOTE — ED PROVIDER NOTES
[facility] EMERGENCY DEPARTMENT - VISIT NOTE    Patient Name: Jasmina Powers  MRN: [id number]  YOB: 1935  Date of Evaluation: 5/18/2024  Treating Resident Physician: Quirino Rios MD  Supervising Physician: Jose Malik DO    CHIEF COMPLAINT       Chief Complaint   Patient presents with    Fall       HISTORY OF PRESENT ILLNESS    HPI    History obtained from child, chart review, and the patient.    Jasmina is a 89 y.o. old female who presents to the emergency department by Walk In for evaluation of fall.  Patient reports that for the past few days she has been dizzy this initially related to a right-sided ear pain her pain is resolved but she still dizzy.  She was on her way to the pharmacy to  meclizine.  While in the pharmacy fell hitting her knee.  She reports that she does not know what happened but does were waking up on the floor.  Fall was not witnessed by son who is also at bedside.  She reports that she still has the dizziness as well as right knee pain.  Denies any chest pain or shortness of breath before or after the fall.  Denies hitting her head however she is anticoagulated with warfarin for chronic atrial fibrillation.    Chart reviewed, relevant history summarized in HPI above.      REVIEW OF SYSTEMS   Review of Systems  Negative unless documented in HPI    PAST MEDICAL AND SURGICAL HISTORY   Jasmina  has a past medical history of Atrial fibrillation (HCC), Diabetes mellitus (HCC), and Hypertension.    Jasmina  has no past surgical history on file.    CURRENT MEDICATIONS   Jasmina has a current medication list which includes the following long-term medication(s): meclizine, accu-chek guide, fluticasone, albuterol sulfate hfa, atorvastatin, omeprazole, accu-chek softclix lancets, metoprolol tartrate, metformin, amlodipine, ferosul, accu-chek guide, spironolactone-hydrochlorothiazide, warfarin, vitamin c, vitamin d3, diclofenac sodium, cvs blood glucose meter, and centrum

## 2024-05-19 VITALS
SYSTOLIC BLOOD PRESSURE: 119 MMHG | RESPIRATION RATE: 18 BRPM | DIASTOLIC BLOOD PRESSURE: 75 MMHG | HEIGHT: 66 IN | WEIGHT: 168 LBS | OXYGEN SATURATION: 99 % | BODY MASS INDEX: 27 KG/M2 | HEART RATE: 74 BPM | TEMPERATURE: 97.7 F

## 2024-05-19 LAB
ANION GAP SERPL CALC-SCNC: 13 MEQ/L (ref 8–16)
BUN SERPL-MCNC: 17 MG/DL (ref 7–22)
CALCIUM SERPL-MCNC: 9.7 MG/DL (ref 8.5–10.5)
CHLORIDE SERPL-SCNC: 100 MEQ/L (ref 98–111)
CO2 SERPL-SCNC: 25 MEQ/L (ref 23–33)
CREAT SERPL-MCNC: 0.7 MG/DL (ref 0.4–1.2)
DEPRECATED RDW RBC AUTO: 39 FL (ref 35–45)
ERYTHROCYTE [DISTWIDTH] IN BLOOD BY AUTOMATED COUNT: 14.6 % (ref 11.5–14.5)
GFR SERPL CREATININE-BSD FRML MDRD: 83 ML/MIN/1.73M2
GLUCOSE BLD STRIP.AUTO-MCNC: 104 MG/DL (ref 70–108)
GLUCOSE SERPL-MCNC: 101 MG/DL (ref 70–108)
HCT VFR BLD AUTO: 33.6 % (ref 37–47)
HGB BLD-MCNC: 10.8 GM/DL (ref 12–16)
INR PPP: 2 (ref 0.85–1.13)
MCH RBC QN AUTO: 24 PG (ref 26–33)
MCHC RBC AUTO-ENTMCNC: 32.1 GM/DL (ref 32.2–35.5)
MCV RBC AUTO: 74.7 FL (ref 81–99)
OSMOLALITY SERPL CALC.SUM OF ELEC: 277.4 MOSMOL/KG (ref 275–300)
PLATELET # BLD AUTO: 266 THOU/MM3 (ref 130–400)
PMV BLD AUTO: 8.8 FL (ref 9.4–12.4)
POTASSIUM SERPL-SCNC: 3.7 MEQ/L (ref 3.5–5.2)
RBC # BLD AUTO: 4.5 MILL/MM3 (ref 4.2–5.4)
SODIUM SERPL-SCNC: 138 MEQ/L (ref 135–145)
WBC # BLD AUTO: 5.8 THOU/MM3 (ref 4.8–10.8)

## 2024-05-19 PROCEDURE — 94640 AIRWAY INHALATION TREATMENT: CPT

## 2024-05-19 PROCEDURE — 96361 HYDRATE IV INFUSION ADD-ON: CPT

## 2024-05-19 PROCEDURE — 6370000000 HC RX 637 (ALT 250 FOR IP)

## 2024-05-19 PROCEDURE — 94761 N-INVAS EAR/PLS OXIMETRY MLT: CPT

## 2024-05-19 PROCEDURE — G0378 HOSPITAL OBSERVATION PER HR: HCPCS

## 2024-05-19 PROCEDURE — 2580000003 HC RX 258

## 2024-05-19 PROCEDURE — 36415 COLL VENOUS BLD VENIPUNCTURE: CPT

## 2024-05-19 PROCEDURE — 80048 BASIC METABOLIC PNL TOTAL CA: CPT

## 2024-05-19 PROCEDURE — 82948 REAGENT STRIP/BLOOD GLUCOSE: CPT

## 2024-05-19 PROCEDURE — 85610 PROTHROMBIN TIME: CPT

## 2024-05-19 PROCEDURE — 85027 COMPLETE CBC AUTOMATED: CPT

## 2024-05-19 RX ADMIN — MOMETASONE FUROATE AND FORMOTEROL FUMARATE DIHYDRATE 2 PUFF: 200; 5 AEROSOL RESPIRATORY (INHALATION) at 07:54

## 2024-05-19 RX ADMIN — FERROUS SULFATE TAB 325 MG (65 MG ELEMENTAL FE) 325 MG: 325 (65 FE) TAB at 07:54

## 2024-05-19 RX ADMIN — TIOTROPIUM BROMIDE INHALATION SPRAY 2 PUFF: 3.12 SPRAY, METERED RESPIRATORY (INHALATION) at 07:54

## 2024-05-19 RX ADMIN — SODIUM CHLORIDE, PRESERVATIVE FREE 10 ML: 5 INJECTION INTRAVENOUS at 07:55

## 2024-05-19 RX ADMIN — FLUTICASONE PROPIONATE 1 SPRAY: 50 SPRAY, METERED NASAL at 07:55

## 2024-05-19 RX ADMIN — ATORVASTATIN CALCIUM 10 MG: 10 TABLET, FILM COATED ORAL at 07:54

## 2024-05-19 RX ADMIN — SPIRONOLACTONE 25 MG: 25 TABLET ORAL at 07:54

## 2024-05-19 RX ADMIN — PANTOPRAZOLE SODIUM 40 MG: 40 TABLET, DELAYED RELEASE ORAL at 07:54

## 2024-05-19 RX ADMIN — MECLIZINE 12.5 MG: 12.5 TABLET ORAL at 07:54

## 2024-05-19 RX ADMIN — AMLODIPINE BESYLATE 10 MG: 10 TABLET ORAL at 07:54

## 2024-05-19 RX ADMIN — OXYCODONE HYDROCHLORIDE AND ACETAMINOPHEN 500 MG: 500 TABLET ORAL at 07:54

## 2024-05-19 RX ADMIN — Medication 400 MG: at 07:54

## 2024-05-19 RX ADMIN — Medication 1000 UNITS: at 07:54

## 2024-05-19 NOTE — PLAN OF CARE
Problem: Respiratory - Adult  Goal: Clear lung sounds  Outcome: Progressing   Continue inhalers to maintain open airways. Patient mutually agreed on goals.

## 2024-05-19 NOTE — DISCHARGE INSTRUCTIONS
Repeat labs in 1 week.     Take the Meclizine (anti-dizziness medication) as prescribed. When standing up, be sure to take it slow to prevent further falls. Recommend using your cane until your ear infection/dizziness clears up more.

## 2024-05-19 NOTE — PROGRESS NOTES
Discharge teaching and instructions for diagnosis/procedure of dizziness completed with patient using teachback method. AVS reviewed. Printed prescriptions given to patient. Patient voiced understanding regarding prescriptions, follow up appointments, and care of self at home. Discharged in a wheelchair to  home with support per family

## 2024-05-19 NOTE — RT PROTOCOL NOTE
breathing using Per Protocol order mode.        4-6 - enter or revise RT Bronchodilator order(s) to two equivalent RT bronchodilator orders with one order with BID Frequency and one order with Frequency of every 4 hours PRN wheezing or increased work of breathing using Per Protocol order mode.        7-10 - enter or revise RT Bronchodilator order(s) to two equivalent RT bronchodilator orders with one order with TID Frequency and one order with Frequency of every 4 hours PRN wheezing or increased work of breathing using Per Protocol order mode.       11-13 - enter or revise RT Bronchodilator order(s) to one equivalent RT bronchodilator order with QID Frequency and an Albuterol order with Frequency of every 4 hours PRN wheezing or increased work of breathing using Per Protocol order mode.      Greater than 13 - enter or revise RT Bronchodilator order(s) to one equivalent RT bronchodilator order with every 4 hours Frequency and an Albuterol order with Frequency of every 2 hours PRN wheezing or increased work of breathing using Per Protocol order mode.     RT to enter RT Home Evaluation for COPD & MDI Assessment order using Per Protocol order mode.    Electronically signed by Rubina Noe RCP on 5/19/2024 at 8:08 AM

## 2024-05-19 NOTE — DISCHARGE SUMMARY
XR CHEST (2 VW)   Final Result   1. No acute intrathoracic process.   2. Mild stable cardiomegaly.               **This report has been created using voice recognition software. It may contain   minor errors which are inherent in voice recognition technology.**                Consults:   IP CONSULT TO PHARMACY    Disposition: Home  Condition at Discharge: Stable    Code Status:  Full Code     Patient Instructions:    Discharge lab work: CBC, BMP  Activity: activity as tolerated  Diet: ADULT DIET; Regular; 5 carb choices (75 gm/meal)      Follow-up visits:   Dimas Watts, JOSH - CNP  [address]  [place] [address]  [phone]    Call      Dimas Watts, APRN - CNP  [address]  [facility] [id number] [phone]               Discharge Medications:      Medication List        CONTINUE taking these medications      Accu-Chek Guide strip  Generic drug: blood glucose test strips  TEST BLOOD SUGAR TWICE DAILY     Accu-Chek Softclix Lancets Misc  TEST BLOOD SUGAR TWICE DAILY     albuterol sulfate  (90 Base) MCG/ACT inhaler  Commonly known as: Proventil HFA  Inhale 2 puffs into the lungs every 6 hours as needed for Wheezing     amLODIPine 10 MG tablet  Commonly known as: NORVASC  Take 1 tablet by mouth daily     atorvastatin 10 MG tablet  Commonly known as: LIPITOR  TAKE 1 TABLET EVERY DAY     betamethasone valerate 0.1 % cream  Commonly known as: VALISONE  APPLY TOPICALLY 2 TIMES DAILY AS NEEDED.     Centrum Silver 50+Women Tabs     * [facility] Blood Glucose Meter w/Device Kit  1 each by Does not apply route in the morning and at bedtime Dispense Accucheck meter     * Accu-Chek Guide w/Device Kit  USE AS DIRECTED     FeroSul 325 (65 Fe) MG tablet  Generic drug: ferrous sulfate  TAKE 1 TABLET TWICE DAILY     fluticasone 50 MCG/ACT nasal spray  Commonly known as: FLONASE  1 spray by Each Nostril route daily     loratadine 10 MG tablet  Commonly known as: CLARITIN  Take 1 tablet by mouth daily     magnesium

## 2024-05-20 ENCOUNTER — CARE COORDINATION (OUTPATIENT)
Dept: CASE MANAGEMENT | Age: 89
End: 2024-05-20

## 2024-05-20 NOTE — CARE COORDINATION
Care Transitions Note    Initial Call - Call within 2 business days of discharge: Yes     Attempted to reach patient for transitions of care follow up. Unable to reach patient.    Outreach Attempts:   Unable to leave message.     Patient: Jasmina Powers    Patient : 1935   MRN: [id number]    Reason for Admission: Fall, Dizziness, Syncope/Collapse  Discharge Date: 24  RURS: Readmission Risk              Risk of Unplanned Readmission:  0          Last Discharge Facility       Date Complaint Diagnosis Description Type Department Provider    24 Fall Syncope and collapse ... ED to Hosp-Admission (Discharged) (ADMITTED) [facility] Avi Clark, DO; Mike Malik...            Was this an external facility discharge? No    Follow Up Appointment:   Patient does not have a follow up appointment scheduled at time of call.   Future Appointments         Provider Specialty Dept Phone    2024 1:00 PM Erinn King [facility] [phone]    2024 1:00 PM Asia Mao RD, LD Internal Medicine [phone]    2024 1:00 PM Emperatriz Onofre MD Cardiology [phone]    2024 2:40 PM Dimas Watts, APRN - CNP Family Medicine [phone]          1st attempt to contact pt for initial care transition follow up.  Unable to reach pt.  Unable to leave a message d/t mailbox being full.  Will try again at a later time.      Plan for follow-up on next business day.      Gaye Cid RN

## 2024-05-21 ENCOUNTER — CARE COORDINATION (OUTPATIENT)
Dept: CASE MANAGEMENT | Age: 89
End: 2024-05-21

## 2024-05-21 NOTE — CARE COORDINATION
Care Transitions Note    Initial Call - Call within 2 business days of discharge: Yes     Attempted to reach patient for transitions of care follow up. Unable to reach patient.    Outreach Attempts:   Unable to leave message.     Patient: Jasmina Powers    Patient : 1935   MRN: [id number]    Reason for Admission: Fall, Dizziness, Syncope/Collapse   Discharge Date: 24  RURS: Readmission Risk              Risk of Unplanned Readmission:  0          Last Discharge Facility       Date Complaint Diagnosis Description Type Department Provider    24 Fall Syncope and collapse ... ED to Hosp-Admission (Discharged) (ADMITTED) [facility] Avi Clark, DO; Mike Malik...            Was this an external facility discharge? No    Follow Up Appointment:   Patient does not have a follow up appointment scheduled at time of call.   Future Appointments         Provider Specialty Dept Phone    2024 1:00 PM Erinn King [facility] [phone]    2024 1:00 PM Asia Mao RD, LD Internal Medicine [phone]    2024 1:00 PM Emperatriz Onofre MD Cardiology [phone]    2024 2:40 PM Dimas Watts APRN - CNP Family Medicine [phone]          Contacted pt for initial care transition follow up. Spoke briefly with pt.  Jasmina states she is feeling alright this morning. Reports she felt woozy and had a headache yesterday morning. Unsure of how long the episode lasted but states it went away. Reports the dizziness started when she started having ear problems. Denies having any syncopal or near syncopal episodes since returning home. No c/o chest pain/discomfort, pressure, tightness, shortness of breath, nausea/vomiting. She informed CTN that she is feeling better and currently at [facility] by herself. She states she will be home in about one hour. CTN agreed to call her back later today.     2nd attempt to contact pt for initial care transition follow up.  Unable to reach pt.  Unable to leave

## 2024-05-22 ENCOUNTER — CARE COORDINATION (OUTPATIENT)
Dept: CASE MANAGEMENT | Age: 89
End: 2024-05-22

## 2024-05-22 NOTE — CARE COORDINATION
Care Transitions Note    Initial Call - Call within 2 business days of discharge: Yes     Attempted to reach patient for transitions of care follow up. Unable to reach patient.    Outreach Attempts:   Unable to leave message.     Patient: Jasmina Powers    Patient : 1935   MRN: [id number]    Reason for Admission:  Fall, Dizziness, Syncope/Collapse   Discharge Date: 24  RURS: Readmission Risk              Risk of Unplanned Readmission:  0          Last Discharge Facility       Date Complaint Diagnosis Description Type Department Provider    24 Fall Syncope and collapse ... ED to Hosp-Admission (Discharged) (ADMITTED) [facility] Avi Clark, DO; Mike Malik...            Was this an external facility discharge? No    Follow Up Appointment:   Patient does not have a follow up appointment scheduled at time of call.   Future Appointments         Provider Specialty Dept Phone    2024 1:00 PM Erinn King [facility] [phone]    2024 1:00 PM Asia Mao RD, LD Internal Medicine [phone]    2024 1:00 PM Emperatriz Onofre MD Cardiology [phone]    2024 2:40 PM Dimas Watts, APRN - CNP Family Medicine [phone]          3rd attempt to contact pt for initial care transition follow up.  Unable to reach pt.  Unable to leave a message d/t mailbox being full.      Program will be closed and CTN to sign off if return call is not received from the patient.      Sent staff message to Samantha Ewing,  to mail UTR letter to pt.      No further follow-up call indicated     Gaye Cid RN

## 2024-05-23 ENCOUNTER — CARE COORDINATION (OUTPATIENT)
Dept: CARE COORDINATION | Age: 89
End: 2024-05-23

## 2024-05-29 ENCOUNTER — HOSPITAL ENCOUNTER (OUTPATIENT)
Age: 89
Discharge: HOME | End: 2024-05-29
Payer: MEDICARE

## 2024-05-29 DIAGNOSIS — E87.20 METABOLIC ACIDOSIS: ICD-10-CM

## 2024-05-29 DIAGNOSIS — D50.9 MICROCYTIC ANEMIA: ICD-10-CM

## 2024-05-29 LAB
ANION GAP SERPL CALC-SCNC: 12 MEQ/L (ref 8–16)
BUN SERPL-MCNC: 22 MG/DL (ref 7–22)
CALCIUM SERPL-MCNC: 10 MG/DL (ref 8.5–10.5)
CHLORIDE SERPL-SCNC: 96 MEQ/L (ref 98–111)
CO2 SERPL-SCNC: 24 MEQ/L (ref 23–33)
CREAT SERPL-MCNC: 1 MG/DL (ref 0.4–1.2)
DEPRECATED RDW RBC AUTO: 38.8 FL (ref 35–45)
ERYTHROCYTE [DISTWIDTH] IN BLOOD BY AUTOMATED COUNT: 14.4 % (ref 11.5–14.5)
GFR SERPL CREATININE-BSD FRML MDRD: 54 ML/MIN/1.73M2
GLUCOSE SERPL-MCNC: 95 MG/DL (ref 70–108)
HCT VFR BLD AUTO: 36.8 % (ref 37–47)
HGB BLD-MCNC: 11.4 GM/DL (ref 12–16)
MCH RBC QN AUTO: 23.5 PG (ref 26–33)
MCHC RBC AUTO-ENTMCNC: 31 GM/DL (ref 32.2–35.5)
MCV RBC AUTO: 75.7 FL (ref 81–99)
PLATELET # BLD AUTO: 262 THOU/MM3 (ref 130–400)
PMV BLD AUTO: 8.7 FL (ref 9.4–12.4)
POTASSIUM SERPL-SCNC: 3.7 MEQ/L (ref 3.5–5.2)
RBC # BLD AUTO: 4.86 MILL/MM3 (ref 4.2–5.4)
SODIUM SERPL-SCNC: 132 MEQ/L (ref 135–145)
WBC # BLD AUTO: 4.6 THOU/MM3 (ref 4.8–10.8)

## 2024-05-29 PROCEDURE — 36415 COLL VENOUS BLD VENIPUNCTURE: CPT

## 2024-05-29 PROCEDURE — 85027 COMPLETE CBC AUTOMATED: CPT

## 2024-05-29 PROCEDURE — 80048 BASIC METABOLIC PNL TOTAL CA: CPT

## 2024-05-30 ENCOUNTER — OFFICE VISIT (OUTPATIENT)
Dept: FAMILY MEDICINE CLINIC | Age: 89
End: 2024-05-30

## 2024-05-30 VITALS
DIASTOLIC BLOOD PRESSURE: 72 MMHG | RESPIRATION RATE: 18 BRPM | TEMPERATURE: 97.6 F | HEIGHT: 66 IN | HEART RATE: 83 BPM | SYSTOLIC BLOOD PRESSURE: 118 MMHG | BODY MASS INDEX: 27.32 KG/M2 | WEIGHT: 170 LBS | OXYGEN SATURATION: 99 %

## 2024-05-30 DIAGNOSIS — Z09 HOSPITAL DISCHARGE FOLLOW-UP: Primary | ICD-10-CM

## 2024-05-30 DIAGNOSIS — H93.19 TINNITUS, UNSPECIFIED LATERALITY: ICD-10-CM

## 2024-05-30 DIAGNOSIS — M54.6 ACUTE RIGHT-SIDED THORACIC BACK PAIN: ICD-10-CM

## 2024-05-30 DIAGNOSIS — R60.0 PEDAL EDEMA: ICD-10-CM

## 2024-05-30 DIAGNOSIS — E87.1 HYPONATREMIA: ICD-10-CM

## 2024-05-30 RX ORDER — SODIUM BICARBONATE 325 MG/1
325 TABLET ORAL 2 TIMES DAILY
Qty: 180 TABLET | Refills: 1 | Status: SHIPPED | OUTPATIENT
Start: 2024-05-30

## 2024-05-30 NOTE — PROGRESS NOTES
Post-Discharge Transitional Care  Follow Up      Jasmina Powers   YOB: 1935    Date of Office Visit:  5/30/2024  Date of Hospital Admission: 5/18/24  Date of Hospital Discharge: 5/19/24  Risk of hospital readmission (high >=14%. Medium >=10%) :No data recorded    Care management risk score Rising risk (score 2-5) and Complex Care (Scores >=6): No Risk Score On File     Non face to face  following discharge, date last encounter closed (first attempt may have been earlier): 05/22/2024    Call initiated 2 business days of discharge: Yes    ASSESSMENT/PLAN:   Hospital discharge follow-up  -     SD DISCHARGE MEDS RECONCILED W/ CURRENT OUTPATIENT MED LIST  Acute right-sided thoracic back pain  -     diclofenac sodium (VOLTAREN) 1 % GEL; Apply 2 g topically 4 times daily, Topical, 4 TIMES DAILY Starting Thu 5/30/2024, Disp-150 g, R-2, Normal  Tinnitus, unspecified laterality  Unclear etiology f/u with ENT  Advised carotid doppler to r/o Carotid artery stenosis, pt declines   Hyponatremia  Could be due to diuretic use, declines alcohol use, start bicarb and monitor   -     sodium bicarbonate 325 MG tablet; Take 1 tablet by mouth 2 times daily, Disp-180 tablet, R-1Normal  -     Basic Metabolic Panel; Future  Pedal edema  Wear WILBER hose  Double dose of diuretic for for 2 days  F/u with cardiology if no better  Exam not suggestive of CHF exacerbation    No red flag symptoms       Medical Decision Making: high complexity  No follow-ups on file.    On this date 5/30/2024 I have spent 25 minutes reviewing previous notes, test results and face to face with the patient discussing the diagnosis and importance of compliance with the treatment plan as well as documenting on the day of the visit.       Subjective:   HPI:  Follow up of Hospital problems/diagnosis(es): syncope and collapse. Metabolic acidosis, microcytic anemia     Inpatient course: Discharge summary reviewed- see chart.    Admit Date: 5/18/2024

## 2024-06-06 ENCOUNTER — HOSPITAL ENCOUNTER (OUTPATIENT)
Dept: PHARMACY | Age: 89
Setting detail: THERAPIES SERIES
Discharge: HOME | End: 2024-06-06
Payer: MEDICARE

## 2024-06-06 DIAGNOSIS — Z51.81 ENCOUNTER FOR THERAPEUTIC DRUG MONITORING: ICD-10-CM

## 2024-06-06 DIAGNOSIS — Z79.01 ANTICOAGULATED ON COUMADIN: Primary | ICD-10-CM

## 2024-06-06 DIAGNOSIS — I48.20 CHRONIC ATRIAL FIBRILLATION (HCC): ICD-10-CM

## 2024-06-06 LAB — POC INR: 2.1 (ref 0.8–1.2)

## 2024-06-06 PROCEDURE — 36416 COLLJ CAPILLARY BLOOD SPEC: CPT

## 2024-06-06 PROCEDURE — 99211 OFF/OP EST MAY X REQ PHY/QHP: CPT

## 2024-06-06 PROCEDURE — 85610 PROTHROMBIN TIME: CPT

## 2024-06-06 NOTE — PROGRESS NOTES
Medication Management Clinic  [facility]  Anticoagulation Clinic  [phone] (phone)  [phone] (fax)    Ms. Jasmina Powers is a 89 y.o.  female with history of Afib who presents today for anticoagulation monitoring and adjustment.    Patient verifies current dosing regimen and tablet strength.  Patient accidentally took 7.5 mg on a 5 mg warfarin day, called this clinic and took as directed in 5/15/24 telephone call.  Patient denies s/s bleeding/bruising/swelling/SOB  No blood in urine or stool.  No dietary changes.  No changes in medication/OTC agents/Herbals.  No change in alcohol use or tobacco use.   No change in activity level.  Fell on 5/18 and was admitted to hospital overnight. Discharged the next day.  No vomiting/diarrhea or acute illness.   No Procedures scheduled in the future at this time.    Assessment:   Lab Results   Component Value Date    INR 2.10 (H) 06/06/2024    INR 2.00 (H) 05/19/2024    INR 1.93 (H) 05/18/2024     INR therapeutic   Recent Labs     06/06/24  1352   INR 2.10*     Patient interview completed and discussed with pharmacist by Miriam Daniel, PharmD candidate.     Plan:  Continue Coumadin 7.5 mg MoFr and 5 mg SuTuWeThSa.  Recheck INR in 5 week(s).  Patient reminded to call the Anticoagulation Clinic with any signs or symptoms of bleeding or with any medication changes.  Patient given instructions utilizing the teach back method.    After visit summary printed and reviewed with patient.      Discharged ambulatory in no apparent distress.    For Pharmacy Admin Tracking Only  Time Spent (min): 20

## 2024-06-11 DIAGNOSIS — Z78.0 POSTMENOPAUSE: ICD-10-CM

## 2024-06-11 DIAGNOSIS — R06.7 SNEEZING: ICD-10-CM

## 2024-06-11 DIAGNOSIS — E11.9 TYPE 2 DIABETES MELLITUS WITHOUT COMPLICATION, WITHOUT LONG-TERM CURRENT USE OF INSULIN (HCC): ICD-10-CM

## 2024-06-11 RX ORDER — MELATONIN
1 DAILY
Qty: 90 TABLET | Refills: 3 | Status: SHIPPED | OUTPATIENT
Start: 2024-06-11

## 2024-06-11 RX ORDER — LANOLIN ALCOHOL/MO/W.PET/CERES
400 CREAM (GRAM) TOPICAL DAILY
Qty: 90 TABLET | Refills: 0 | Status: SHIPPED | OUTPATIENT
Start: 2024-06-11

## 2024-06-11 RX ORDER — LORATADINE 10 MG/1
10 TABLET ORAL DAILY
Qty: 90 TABLET | Refills: 3 | Status: SHIPPED | OUTPATIENT
Start: 2024-06-11

## 2024-06-19 ENCOUNTER — TELEPHONE (OUTPATIENT)
Dept: FAMILY MEDICINE CLINIC | Age: 89
End: 2024-06-19

## 2024-06-19 ENCOUNTER — HOSPITAL ENCOUNTER (OUTPATIENT)
Age: 89
Discharge: HOME | End: 2024-06-19
Payer: MEDICARE

## 2024-06-19 DIAGNOSIS — E87.1 HYPONATREMIA: ICD-10-CM

## 2024-06-19 DIAGNOSIS — E87.1 HYPONATREMIA: Primary | ICD-10-CM

## 2024-06-19 LAB
ANION GAP SERPL CALC-SCNC: 11 MEQ/L (ref 8–16)
BUN SERPL-MCNC: 20 MG/DL (ref 7–22)
CALCIUM SERPL-MCNC: 10.3 MG/DL (ref 8.5–10.5)
CHLORIDE SERPL-SCNC: 95 MEQ/L (ref 98–111)
CO2 SERPL-SCNC: 27 MEQ/L (ref 23–33)
CREAT SERPL-MCNC: 0.9 MG/DL (ref 0.4–1.2)
GFR SERPL CREATININE-BSD FRML MDRD: 62 ML/MIN/1.73M2
GLUCOSE SERPL-MCNC: 101 MG/DL (ref 70–108)
POTASSIUM SERPL-SCNC: 4.1 MEQ/L (ref 3.5–5.2)
SODIUM SERPL-SCNC: 133 MEQ/L (ref 135–145)

## 2024-06-19 PROCEDURE — 80048 BASIC METABOLIC PNL TOTAL CA: CPT

## 2024-06-19 PROCEDURE — 36415 COLL VENOUS BLD VENIPUNCTURE: CPT

## 2024-06-19 RX ORDER — SODIUM BICARBONATE 325 MG/1
325 TABLET ORAL 2 TIMES DAILY
Qty: 180 TABLET | Refills: 3 | Status: SHIPPED | OUTPATIENT
Start: 2024-06-19 | End: 2025-06-19

## 2024-06-19 NOTE — TELEPHONE ENCOUNTER
Pt informed and understanding   Pt denies drinking any alcohol  Pt will  her medication and start taking it as directed

## 2024-06-19 NOTE — TELEPHONE ENCOUNTER
----- Message from JOSH Palomares - CNP sent at 6/19/2024  1:00 PM EDT -----  Let pt know sodium has improved by 1 oint still below normal range , her renal function has improved back to normal, I want her to start sodium bicarb 325 mg bid to bring up her sodium levels. She also needs to avoid excessive fluid intake, no more than 32 ounces a a day, does she drink alcohol as this can add to sodium losses.

## 2024-06-28 RX ORDER — SPIRONOLACTONE AND HYDROCHLOROTHIAZIDE 25; 25 MG/1; MG/1
TABLET ORAL
Qty: 90 TABLET | Refills: 3 | Status: SHIPPED | OUTPATIENT
Start: 2024-06-28

## 2024-06-30 PROBLEM — E87.20 METABOLIC ACIDOSIS: Status: ACTIVE | Noted: 2024-06-30
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
## 2024-07-03 ENCOUNTER — TELEPHONE (OUTPATIENT)
Dept: INTERNAL MEDICINE CLINIC | Age: 89
End: 2024-07-03

## 2024-07-03 NOTE — TELEPHONE ENCOUNTER
Jasmina called requesting a call back from Asia. Informed her that Asia will be in on Monday.     Recent B/26: before lunch: 160  : before lunch: 166  : fastin, night: 99  : fastin, night: 143  : fastin, night: 187  : fastin, night: 170  : fastin, night: 196  7/3: fastin      Hemoglobin A1C POC   Date Value Ref Range Status   2024 6.2 (H) 4.3 - 5.7 % Final     Comment:     Performed at [facility] Office under CLIA #  [id number]         Jasmina denies any changes in her medicine or eating patterns.       Jasmina's fasting AM glucose are running slightly lower than optimal. She requests phone call follow-up to assess if there are eating pattern changes to address these AM lows. If not, she may need a metformin decrease.     Maris Knight, PharmD, BCPS, BC-[facility]  Internal Medicine Clinical Pharmacist  [phone]

## 2024-07-11 ENCOUNTER — HOSPITAL ENCOUNTER (OUTPATIENT)
Dept: PHARMACY | Age: 89
Setting detail: THERAPIES SERIES
Discharge: HOME | End: 2024-07-11
Payer: MEDICARE

## 2024-07-11 DIAGNOSIS — Z79.01 ANTICOAGULATED ON COUMADIN: Primary | ICD-10-CM

## 2024-07-11 DIAGNOSIS — I48.20 CHRONIC ATRIAL FIBRILLATION (HCC): ICD-10-CM

## 2024-07-11 DIAGNOSIS — Z51.81 ENCOUNTER FOR THERAPEUTIC DRUG MONITORING: ICD-10-CM

## 2024-07-11 LAB — POC INR: 2.7 (ref 0.8–1.2)

## 2024-07-11 PROCEDURE — 36416 COLLJ CAPILLARY BLOOD SPEC: CPT

## 2024-07-11 PROCEDURE — 99211 OFF/OP EST MAY X REQ PHY/QHP: CPT

## 2024-07-11 PROCEDURE — 85610 PROTHROMBIN TIME: CPT

## 2024-07-11 NOTE — PROGRESS NOTES
Medication Management Clinic  [facility]  Anticoagulation Clinic  [phone] (phone)  [phone] (fax)    Ms. Jasmina Poewrs is a 89 y.o.  female with history of chronic atrial fib., per referral from JOSH Ivan-CNP, who presents today for Warfarin monitoring and adjustment (5 week visit - late for today's visit).    Patient verifies current dosing regimen and tablet strength.  No missed or extra doses.  Patient denies bleeding/bruising/SOB.  Has usual swelling of ankles.  No blood in urine or stool.  No dietary changes, except for greens 6/18 she normally doesn't have.  Changes in medication/OTC agents/herbals: started Sodium Bicarbonate tablets 3 weeks ago - verified with SAJI King [facility] PharmD., there would be no Coumadin interaction.  No change in alcohol use or tobacco use.  No change in activity level.  Patient denies falls.  No vomiting/diarrhea or acute illness.   No procedures scheduled in the future at this time.    Assessment:   Lab Results   Component Value Date    INR 2.70 (H) 07/11/2024    INR 2.10 (H) 06/06/2024    INR 2.00 (H) 05/19/2024     INR therapeutic - goal 2-3  Recent Labs     07/11/24  1356   INR 2.70*        Plan:  POCT INR performed/result reviewed.  Continue PO Coumadin 7.5 mg MF, 5 mg TWThSS.  Recheck INR in 5 week(s).  Patient reminded to call the Anticoagulation Clinic with any signs or symptoms of bleeding or with any medication changes.  Patient given instructions utilizing the teach back method.       After visit summary printed and reviewed with patient.      Discharged ambulatory in no apparent distress.     For Pharmacy Admin Tracking Only    Time Spent (min): 20

## 2024-07-15 ENCOUNTER — TELEPHONE (OUTPATIENT)
Dept: FAMILY MEDICINE CLINIC | Age: 89
End: 2024-07-15

## 2024-07-15 DIAGNOSIS — R05.1 ACUTE COUGH: Primary | ICD-10-CM

## 2024-07-15 RX ORDER — BENZONATATE 100 MG/1
200 CAPSULE ORAL 3 TIMES DAILY PRN
Qty: 60 CAPSULE | Refills: 0 | Status: SHIPPED | OUTPATIENT
Start: 2024-07-15 | End: 2024-07-22

## 2024-07-15 RX ORDER — FLUTICASONE PROPIONATE 50 MCG
1 SPRAY, SUSPENSION (ML) NASAL DAILY
Qty: 32 G | Refills: 1 | Status: SHIPPED | OUTPATIENT
Start: 2024-07-15

## 2024-07-15 NOTE — TELEPHONE ENCOUNTER
I ordered flonase for her for the sneezing and will help her cough along with tessalon perles a pill

## 2024-07-15 NOTE — TELEPHONE ENCOUNTER
Coughing has increased and has gotten worse, along with sneezing. Started 2-3 weeks ago. Said Dimas prescribed her medication that helped with this but is getting worse again. She was wondering if we could prescribe or if she needs to come in. The symptoms stopped when she was on the .

## 2024-07-22 ENCOUNTER — OFFICE VISIT (OUTPATIENT)
Dept: INTERNAL MEDICINE CLINIC | Age: 89
End: 2024-07-22

## 2024-07-22 VITALS — BODY MASS INDEX: 27.76 KG/M2 | WEIGHT: 172 LBS

## 2024-07-22 DIAGNOSIS — E11.59 TYPE 2 DIABETES MELLITUS WITH OTHER CIRCULATORY COMPLICATION, WITHOUT LONG-TERM CURRENT USE OF INSULIN (HCC): Primary | ICD-10-CM

## 2024-07-22 NOTE — PATIENT INSTRUCTIONS
Night time snack if blood sugar is less than 110 mg/dL.     1 small apple and tablespoon peanut butter  5 wheat thins and an ounce of cheese  4 oz cottage cheese and a small handful of nuts  Toast and pb   Fruit and deli meat and cheese rolled up

## 2024-07-22 NOTE — PROGRESS NOTES
[facility]  [facility]  [address] [place] [address]  [phone] (phone)  [phone] (fax)    Patient Name: Jasmina Powers. Date of Birth: [id number]. MRN: [id number]      Assessment: Patient is a 89 y.o. female seen for follow-up MNT visit for Diabetes.     -Nutritionally relevant labs:   Lab Results   Component Value Date/Time    LABA1C 6.2 (H) 02/26/2024 01:51 PM    LABA1C 7.0 (H) 08/23/2023 01:44 PM    LABA1C 6.1 (H) 02/22/2023 01:26 PM    LABA1C 7.8 (H) 02/25/2020 12:04 PM    LABA1C 7.4 01/06/2020 11:47 AM    LABA1C 10.3 (H) 09/16/2019 11:35 AM    GLUCOSE 101 06/19/2024 10:41 AM    GLUCOSE 95 05/29/2024 10:05 AM    CHOL 120 02/23/2024 12:42 PM    HDL 73 02/23/2024 12:42 PM    TRIG 65 02/23/2024 12:42 PM     -Blood sugar trends: Checks 2 /day. Fasting  and 2 hrr pp dinner 108-161 mg/dL.     -Food recall:   Breakfast omelet with cheese, biscuit w/ jam.   Lunch: fruit/baked beans/ sometimes balanced meals.   Dinner: fruit/baked beans/ sometimes balanced meals.   Snacks: ice cream at times  -  Current Outpatient Medications on File Prior to Visit   Medication Sig Dispense Refill    fluticasone (FLONASE) 50 MCG/ACT nasal spray 1 spray by Each Nostril route daily 32 g 1    benzonatate (TESSALON) 100 MG capsule Take 2 capsules by mouth 3 times daily as needed for Cough 60 capsule 0    spironolactone-hydroCHLOROthiazide (ALDACTAZIDE) 25-25 MG per tablet TAKE 1 TABLET EVERY DAY 90 tablet 3    sodium bicarbonate 325 MG tablet Take 1 tablet by mouth 2 times daily 180 tablet 3    metFORMIN (GLUCOPHAGE) 1000 MG tablet TAKE 1 TABLET TWICE DAILY WITH MEALS 180 tablet 3    magnesium oxide (MAG-OX) 400 (240 Mg) MG tablet TAKE 1 TABLET EVERY DAY 90 tablet 0    ALLERGY RELIEF 10 MG tablet TAKE 1 TABLET EVERY DAY 90 tablet 3    vitamin D3 (CHOLECALCIFEROL) 25 MCG (1000 UT) TABS tablet TAKE 1 TABLET EVERY DAY 90 tablet 3    diclofenac sodium (VOLTAREN) 1 %

## 2024-07-25 ENCOUNTER — OFFICE VISIT (OUTPATIENT)
Dept: CARDIOLOGY CLINIC | Age: 89
End: 2024-07-25
Payer: MEDICARE

## 2024-07-25 VITALS
HEART RATE: 81 BPM | WEIGHT: 171 LBS | HEIGHT: 66 IN | DIASTOLIC BLOOD PRESSURE: 75 MMHG | SYSTOLIC BLOOD PRESSURE: 128 MMHG | BODY MASS INDEX: 27.48 KG/M2

## 2024-07-25 DIAGNOSIS — E87.1 HYPONATREMIA: ICD-10-CM

## 2024-07-25 DIAGNOSIS — R60.0 BILATERAL LEG EDEMA: ICD-10-CM

## 2024-07-25 DIAGNOSIS — I10 ESSENTIAL HYPERTENSION: ICD-10-CM

## 2024-07-25 DIAGNOSIS — R94.31 ABNORMAL EKG: ICD-10-CM

## 2024-07-25 DIAGNOSIS — N18.31 STAGE 3A CHRONIC KIDNEY DISEASE (HCC): ICD-10-CM

## 2024-07-25 DIAGNOSIS — I48.20 CHRONIC ATRIAL FIBRILLATION (HCC): Primary | ICD-10-CM

## 2024-07-25 PROBLEM — R42 DIZZINESS: Status: RESOLVED | Noted: 2024-05-18 | Resolved: 2024-07-25

## 2024-07-25 PROCEDURE — 1123F ACP DISCUSS/DSCN MKR DOCD: CPT | Performed by: INTERNAL MEDICINE

## 2024-07-25 PROCEDURE — G8417 CALC BMI ABV UP PARAM F/U: HCPCS | Performed by: INTERNAL MEDICINE

## 2024-07-25 PROCEDURE — 99214 OFFICE O/P EST MOD 30 MIN: CPT | Performed by: INTERNAL MEDICINE

## 2024-07-25 PROCEDURE — 1090F PRES/ABSN URINE INCON ASSESS: CPT | Performed by: INTERNAL MEDICINE

## 2024-07-25 PROCEDURE — G8427 DOCREV CUR MEDS BY ELIG CLIN: HCPCS | Performed by: INTERNAL MEDICINE

## 2024-07-25 PROCEDURE — 1036F TOBACCO NON-USER: CPT | Performed by: INTERNAL MEDICINE

## 2024-07-25 RX ORDER — SPIRONOLACTONE 25 MG/1
25 TABLET ORAL DAILY
Qty: 30 TABLET | Refills: 0 | Status: SHIPPED | OUTPATIENT
Start: 2024-07-25

## 2024-07-25 RX ORDER — BUMETANIDE 0.5 MG/1
0.5 TABLET ORAL DAILY
Qty: 30 TABLET | Refills: 3 | Status: SHIPPED | OUTPATIENT
Start: 2024-07-25 | End: 2024-07-25 | Stop reason: SDUPTHER

## 2024-07-25 RX ORDER — SPIRONOLACTONE 25 MG/1
25 TABLET ORAL DAILY
Qty: 30 TABLET | Refills: 3 | Status: SHIPPED | OUTPATIENT
Start: 2024-07-25 | End: 2024-07-25 | Stop reason: SDUPTHER

## 2024-07-25 RX ORDER — BUMETANIDE 0.5 MG/1
0.5 TABLET ORAL DAILY
Qty: 30 TABLET | Refills: 0 | Status: SHIPPED | OUTPATIENT
Start: 2024-07-25

## 2024-07-25 RX ORDER — AMLODIPINE BESYLATE 10 MG/1
5 TABLET ORAL DAILY
Qty: 30 TABLET | Refills: 3
Start: 2024-07-25

## 2024-07-25 NOTE — TELEPHONE ENCOUNTER
Jasmina is requesting a refill of their   Requested Prescriptions     Pending Prescriptions Disp Refills    spironolactone (ALDACTONE) 25 MG tablet 30 tablet 0     Sig: Take 1 tablet by mouth daily    bumetanide (BUMEX) 0.5 MG tablet 30 tablet 0     Sig: Take 1 tablet by mouth daily   . Please advise.      Last Appt:  7/25/2024  Next Appt:   9/5/2024  Preferred pharmacy:   [id number] - [place], [place] - [address] - P [phone] - F [phone]     Patient currently at pharmacy and has been there over an hour waiting on medication refills.

## 2024-07-25 NOTE — PROGRESS NOTES
Chief Complaint   Patient presents with    6 Month Follow-Up    Atrial Fibrillation    Check-Up   Originally  patient here for atrial fib, Dimas Watts referral.  Known to have atr fib for 5 yr   Has moved to [place] from [place]  On coumadin          Patient here for a 6 month follow up      EKG done 24    Gained 5 lb    No wt changes    Denied cp, dizziness  Or palpitations    Sob on exertion chronic- better    Leg edema  worsened    Hx of chronic leg edema at the end of the day    Nonsmoker    FHX    Mother had Heart problem and CVA        Patient Active Problem List   Diagnosis    Anticoagulated on Coumadin    Diabetes mellitus (HCC)    Bilateral leg edema +1    Encounter for therapeutic drug monitoring    Essential hypertension    Abnormal EKG    Clicking tinnitus of both ears    Chronic renal disease, stage III (HCC) [800795]    Secondary hypercoagulable state (HCC)    Type 2 diabetes mellitus with chronic kidney disease    Mammogram declined    Chronic atrial fibrillation (HCC)    Syncope and collapse    Fall from standing    Metabolic acidosis    Hyponatremia       History reviewed. No pertinent surgical history.    No Known Allergies     Family History   Problem Relation Age of Onset    Cancer Mother         breast cancer    Diabetes Mother         Social History     Socioeconomic History    Marital status:      Spouse name: Not on file    Number of children: Not on file    Years of education: Not on file    Highest education level: Not on file   Occupational History    Not on file   Tobacco Use    Smoking status: Former     Current packs/day: 0.00     Types: Cigarettes     Quit date: 1978     Years since quittin.1    Smokeless tobacco: Never   Vaping Use    Vaping Use: Never used   Substance and Sexual Activity    Alcohol use: Yes     Alcohol/week: 1.0 standard drink of alcohol     Types: 1 Cans of beer per week     Comment: occasional     Drug use: No    Sexual activity: Not on

## 2024-08-05 DIAGNOSIS — R05.1 ACUTE COUGH: ICD-10-CM

## 2024-08-05 RX ORDER — BENZONATATE 100 MG/1
200 CAPSULE ORAL 3 TIMES DAILY PRN
Qty: 60 CAPSULE | Refills: 0 | Status: ON HOLD | OUTPATIENT
Start: 2024-08-05 | End: 2024-08-12

## 2024-08-05 NOTE — TELEPHONE ENCOUNTER
Recent Visits  Date Type Provider Dept   05/30/24 Office Visit Dimas Watts APRN - CNP [facility]   04/18/24 Office Visit Dimas Watts APRN - CNP [facility]   02/26/24 Office Visit Dimas Watts APRN - CNP [facility]   08/23/23 Office Visit Dimas Watts APRN - CNP [facility]   03/06/23 Office Visit Dimas Watts APRN - CNP [facility]   02/22/23 Office Visit Dimas Watts APRN - CNP [facility]   Showing recent visits within past 540 days with a meds authorizing provider and meeting all other requirements  Future Appointments  Date Type Provider Dept   08/26/24 Appointment Dimas Watts APRN - CNP [facility]   Showing future appointments within next 150 days with a meds authorizing provider and meeting all other requirements

## 2024-08-07 ENCOUNTER — APPOINTMENT (OUTPATIENT)
Dept: INTERVENTIONAL RADIOLOGY/VASCULAR | Age: 89
DRG: 603 | End: 2024-08-07
Payer: MEDICARE

## 2024-08-07 ENCOUNTER — APPOINTMENT (OUTPATIENT)
Dept: GENERAL RADIOLOGY | Age: 89
DRG: 603 | End: 2024-08-07
Payer: MEDICARE

## 2024-08-07 ENCOUNTER — HOSPITAL ENCOUNTER (EMERGENCY)
Age: 89
Discharge: HOME | DRG: 603 | End: 2024-08-07
Payer: MEDICARE

## 2024-08-07 VITALS
BODY MASS INDEX: 26.84 KG/M2 | OXYGEN SATURATION: 100 % | DIASTOLIC BLOOD PRESSURE: 63 MMHG | TEMPERATURE: 97.6 F | WEIGHT: 167 LBS | RESPIRATION RATE: 20 BRPM | HEART RATE: 83 BPM | HEIGHT: 66 IN | SYSTOLIC BLOOD PRESSURE: 113 MMHG

## 2024-08-07 DIAGNOSIS — L03.90 CELLULITIS, UNSPECIFIED CELLULITIS SITE: Primary | ICD-10-CM

## 2024-08-07 LAB
ANION GAP SERPL CALC-SCNC: 14 MEQ/L (ref 8–16)
BASOPHILS ABSOLUTE: 0 THOU/MM3 (ref 0–0.1)
BASOPHILS NFR BLD AUTO: 0.3 %
BUN SERPL-MCNC: 24 MG/DL (ref 7–22)
CALCIUM SERPL-MCNC: 9.9 MG/DL (ref 8.5–10.5)
CHLORIDE SERPL-SCNC: 98 MEQ/L (ref 98–111)
CO2 SERPL-SCNC: 25 MEQ/L (ref 23–33)
CREAT SERPL-MCNC: 1 MG/DL (ref 0.4–1.2)
CRP SERPL-MCNC: 0.33 MG/DL (ref 0–1)
DEPRECATED RDW RBC AUTO: 38 FL (ref 35–45)
ECHO BSA: 1.88 M2
EOSINOPHIL NFR BLD AUTO: 1 %
EOSINOPHILS ABSOLUTE: 0.1 THOU/MM3 (ref 0–0.4)
ERYTHROCYTE [DISTWIDTH] IN BLOOD BY AUTOMATED COUNT: 14.3 % (ref 11.5–14.5)
ERYTHROCYTE [SEDIMENTATION RATE] IN BLOOD BY WESTERGREN METHOD: 19 MM/HR (ref 0–20)
GFR SERPL CREATININE-BSD FRML MDRD: 54 ML/MIN/1.73M2
GLUCOSE SERPL-MCNC: 108 MG/DL (ref 70–108)
HCT VFR BLD AUTO: 35.1 % (ref 37–47)
HGB BLD-MCNC: 11 GM/DL (ref 12–16)
IMM GRANULOCYTES # BLD AUTO: 0.01 THOU/MM3 (ref 0–0.07)
IMM GRANULOCYTES NFR BLD AUTO: 0.1 %
LYMPHOCYTES ABSOLUTE: 1.3 THOU/MM3 (ref 1–4.8)
LYMPHOCYTES NFR BLD AUTO: 19.6 %
MCH RBC QN AUTO: 23.6 PG (ref 26–33)
MCHC RBC AUTO-ENTMCNC: 31.3 GM/DL (ref 32.2–35.5)
MCV RBC AUTO: 75.3 FL (ref 81–99)
MONOCYTES ABSOLUTE: 0.6 THOU/MM3 (ref 0.4–1.3)
MONOCYTES NFR BLD AUTO: 8.3 %
NEUTROPHILS ABSOLUTE: 4.7 THOU/MM3 (ref 1.8–7.7)
NEUTROPHILS NFR BLD AUTO: 70.7 %
NRBC BLD AUTO-RTO: 0 /100 WBC
OSMOLALITY SERPL CALC.SUM OF ELEC: 278.4 MOSMOL/KG (ref 275–300)
PLATELET # BLD AUTO: 232 THOU/MM3 (ref 130–400)
PMV BLD AUTO: 8.6 FL (ref 9.4–12.4)
POTASSIUM SERPL-SCNC: 3.8 MEQ/L (ref 3.5–5.2)
RBC # BLD AUTO: 4.66 MILL/MM3 (ref 4.2–5.4)
SODIUM SERPL-SCNC: 137 MEQ/L (ref 135–145)
WBC # BLD AUTO: 6.7 THOU/MM3 (ref 4.8–10.8)

## 2024-08-07 PROCEDURE — 73610 X-RAY EXAM OF ANKLE: CPT

## 2024-08-07 PROCEDURE — 85025 COMPLETE CBC W/AUTO DIFF WBC: CPT

## 2024-08-07 PROCEDURE — 80048 BASIC METABOLIC PNL TOTAL CA: CPT

## 2024-08-07 PROCEDURE — 86140 C-REACTIVE PROTEIN: CPT

## 2024-08-07 PROCEDURE — 85651 RBC SED RATE NONAUTOMATED: CPT

## 2024-08-07 PROCEDURE — 6370000000 HC RX 637 (ALT 250 FOR IP): Performed by: PHYSICIAN ASSISTANT

## 2024-08-07 PROCEDURE — 73630 X-RAY EXAM OF FOOT: CPT

## 2024-08-07 PROCEDURE — 93971 EXTREMITY STUDY: CPT

## 2024-08-07 PROCEDURE — 36415 COLL VENOUS BLD VENIPUNCTURE: CPT

## 2024-08-07 PROCEDURE — 99284 EMERGENCY DEPT VISIT MOD MDM: CPT

## 2024-08-07 RX ORDER — HYDROCODONE BITARTRATE AND ACETAMINOPHEN 5; 325 MG/1; MG/1
1 TABLET ORAL EVERY 6 HOURS PRN
Qty: 12 TABLET | Refills: 0 | Status: SHIPPED | OUTPATIENT
Start: 2024-08-07 | End: 2024-08-07

## 2024-08-07 RX ORDER — DOXYCYCLINE HYCLATE 100 MG
100 TABLET ORAL 2 TIMES DAILY
Qty: 20 TABLET | Refills: 0 | Status: ON HOLD | OUTPATIENT
Start: 2024-08-07 | End: 2024-08-20 | Stop reason: HOSPADM

## 2024-08-07 RX ORDER — HYDROCODONE BITARTRATE AND ACETAMINOPHEN 5; 325 MG/1; MG/1
1 TABLET ORAL ONCE
Status: COMPLETED | OUTPATIENT
Start: 2024-08-07 | End: 2024-08-07

## 2024-08-07 RX ORDER — HYDROCODONE BITARTRATE AND ACETAMINOPHEN 5; 325 MG/1; MG/1
1 TABLET ORAL EVERY 6 HOURS PRN
Qty: 12 TABLET | Refills: 0 | Status: ON HOLD | OUTPATIENT
Start: 2024-08-07 | End: 2024-08-20 | Stop reason: HOSPADM

## 2024-08-07 RX ORDER — DOXYCYCLINE HYCLATE 100 MG
100 TABLET ORAL 2 TIMES DAILY
Qty: 20 TABLET | Refills: 0 | Status: SHIPPED | OUTPATIENT
Start: 2024-08-07 | End: 2024-08-07

## 2024-08-07 RX ADMIN — HYDROCODONE BITARTRATE AND ACETAMINOPHEN 1 TABLET: 5; 325 TABLET ORAL at 14:09

## 2024-08-07 NOTE — ED PROVIDER NOTES
[facility] EMERGENCY DEPT      EMERGENCY MEDICINE     Pt Name: Jasmina Powers  MRN: [id number]  Birthdate 1935  Date of evaluation: 8/7/2024  Provider: TANGELA Pereira    CHIEF COMPLAINT       Chief Complaint   Patient presents with    Leg Pain     right     HISTORY OF PRESENT ILLNESS   Jasmina Powers is a pleasant 89 y.o. female who presents to the emergency department for right-sided ankle and foot pain that began this morning when she woke up. She had difficulty getting out of bed.  She denies having any issues with this leg before.  She took 2 Tylenol before leaving her house.  She is taking warfarin for her A-fib.  She denies numbness or tingling in her toes.  She denies fevers, chills, nausea, vomiting, back pain, or hip pain.  She denies injury.        PASTMEDICAL HISTORY     Past Medical History:   Diagnosis Date    Atrial fibrillation (HCC)     Diabetes mellitus (HCC)     Hypertension        Patient Active Problem List   Diagnosis Code    Anticoagulated on Coumadin Z79.01    Diabetes mellitus (HCC) E11.9    Bilateral leg edema +1 R60.0    Encounter for therapeutic drug monitoring Z51.81    Essential hypertension I10    Abnormal EKG R94.31    Clicking tinnitus of both ears H93.13    Chronic renal disease, stage III ([facility]) [255242] N18.30    Secondary hypercoagulable state ([facility]) D68.69    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 No pertinent surgical history.    CURRENT MEDICATIONS       Discharge Medication List as of 8/7/2024  2:21 PM        CONTINUE these medications which have NOT CHANGED    Details   benzonatate (TESSALON) 100 MG capsule Take 2 capsules by mouth 3 times daily as needed for Cough, Disp-60 capsule, R-0Normal      amLODIPine (NORVASC) 10  113/63   Pulse: 83   Resp: 20   Temp: 97.6 °F (36.4 °C)   TempSrc: Oral   SpO2: 100%   Weight: 75.8 kg (167 lb)   Height: 1.676 m (5' 6\")       The patient was seen and examined. Appropriate diagnostic testing was performed and results reviewed with the patient.      The results of pertinent diagnostic studies and exam findings were discussed.     ED Medications administered this visit:  (None if blank)  Medications   HYDROcodone-acetaminophen (NORCO) 5-325 MG per tablet 1 tablet (1 tablet Oral Given 8/7/24 1409)         PROCEDURES: (None if blank)      CRITICAL CARE: (None if blank)      DISCHARGE PRESCRIPTIONS: (None if blank)  Discharge Medication List as of 8/7/2024  2:21 PM        START taking these medications    Details   doxycycline hyclate (VIBRA-TABS) 100 MG tablet Take 1 tablet by mouth 2 times daily for 10 days, Disp-20 tablet, R-0Normal      HYDROcodone-acetaminophen (NORCO) 5-325 MG per tablet Take 1 tablet by mouth every 6 hours as needed for Pain for up to 3 days. Intended supply: 3 days. Take lowest dose possible to manage pain Max Daily Amount: 4 tablets, Disp-12 tablet, R-0Normal             FINAL IMPRESSION      1. Cellulitis, unspecified cellulitis site          DISPOSITION/PLAN   DISPOSITION Decision To Discharge 08/07/2024 02:15:44 PM      OUTPATIENT FOLLOW UP THE PATIENT:  Dimas Watts, APRN - CNP  [address] [place] [address]  [phone]    In 2 days        TANGELA Pereira Jeremy R, PA  08/07/24 0113

## 2024-08-07 NOTE — DISCHARGE INSTR - COC
Continuity of Care Form    Patient Name: Jasmina Powers   :  1935  MRN:  [id number]    Admit date:  2024  Discharge date:  ***    Code Status Order: Prior   Advance Directives:     Admitting Physician:  No admitting provider for patient encounter.  PCP: Dimas Watts APRN - CNP    Discharging Nurse: ***  Discharging Hospital Unit/Room#: D/D  Discharging Unit Phone Number: ***    Emergency Contact:   Extended Emergency Contact Information  Primary Emergency Contact: Jhonny Powers  Address: [address] [place] [address]  Home Phone: [phone]  Mobile Phone: [phone]  Relation: Child  Secondary Emergency Contact: Enrique Mathews   [facility]  Home Phone: [phone]  Mobile Phone: [phone]  Relation: Child    Past Surgical History:  History reviewed. No pertinent surgical history.    Immunization History:   Immunization History   Administered Date(s) Administered    COVID-19, PFIZER PURPLE top, DILUTE for use, (age 12 y+), 30mcg/0.3mL 2021, 2021    COVID-19, PFIZER, (- formula), (age 12y+), IM, 30mcg/0.3mL 2024       Active Problems:  Patient Active Problem List   Diagnosis Code    Anticoagulated on Coumadin Z79.01    Diabetes mellitus (HCC) E11.9    Bilateral leg edema +1 R60.0    Encounter for therapeutic drug monitoring Z51.81    Essential hypertension I10    Abnormal EKG R94.31    Clicking tinnitus of both ears H93.13    Chronic renal disease, stage III ([facility]) [063807] N18.30    Secondary hypercoagulable state ([facility]) D68.69    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

## 2024-08-08 ENCOUNTER — APPOINTMENT (OUTPATIENT)
Dept: CT IMAGING | Age: 89
DRG: 603 | End: 2024-08-08
Payer: MEDICARE

## 2024-08-08 ENCOUNTER — HOSPITAL ENCOUNTER (INPATIENT)
Age: 89
LOS: 12 days | Discharge: OTHER INSTITUTION | DRG: 603 | End: 2024-08-20
Attending: INTERNAL MEDICINE
Payer: MEDICARE

## 2024-08-08 DIAGNOSIS — L03.90 CELLULITIS, UNSPECIFIED CELLULITIS SITE: Primary | ICD-10-CM

## 2024-08-08 PROBLEM — L03.115 CELLULITIS OF RIGHT LOWER EXTREMITY: Status: ACTIVE | Noted: 2024-08-08

## 2024-08-08 LAB
ALBUMIN SERPL BCG-MCNC: 4.2 G/DL (ref 3.5–5.1)
ALP SERPL-CCNC: 101 U/L (ref 38–126)
ALT SERPL W/O P-5'-P-CCNC: 18 U/L (ref 11–66)
ANION GAP SERPL CALC-SCNC: 15 MEQ/L (ref 8–16)
ANION GAP SERPL CALC-SCNC: 21 MEQ/L (ref 8–16)
AST SERPL-CCNC: 23 U/L (ref 5–40)
BASOPHILS ABSOLUTE: 0 THOU/MM3 (ref 0–0.1)
BASOPHILS NFR BLD AUTO: 0.1 %
BILIRUB CONJ SERPL-MCNC: 0.5 MG/DL (ref 0.1–13.8)
BILIRUB SERPL-MCNC: 1.2 MG/DL (ref 0.3–1.2)
BUN SERPL-MCNC: 21 MG/DL (ref 7–22)
BUN SERPL-MCNC: 23 MG/DL (ref 7–22)
CALCIUM SERPL-MCNC: 10 MG/DL (ref 8.5–10.5)
CALCIUM SERPL-MCNC: 8.9 MG/DL (ref 8.5–10.5)
CHLORIDE SERPL-SCNC: 93 MEQ/L (ref 98–111)
CHLORIDE SERPL-SCNC: 94 MEQ/L (ref 98–111)
CO2 SERPL-SCNC: 21 MEQ/L (ref 23–33)
CO2 SERPL-SCNC: 22 MEQ/L (ref 23–33)
CREAT SERPL-MCNC: 1 MG/DL (ref 0.4–1.2)
CREAT SERPL-MCNC: 1 MG/DL (ref 0.4–1.2)
CRP SERPL-MCNC: 13.31 MG/DL (ref 0–1)
DEPRECATED RDW RBC AUTO: 36.4 FL (ref 35–45)
EOSINOPHIL NFR BLD AUTO: 0 %
EOSINOPHILS ABSOLUTE: 0 THOU/MM3 (ref 0–0.4)
ERYTHROCYTE [DISTWIDTH] IN BLOOD BY AUTOMATED COUNT: 13.9 % (ref 11.5–14.5)
ERYTHROCYTE [SEDIMENTATION RATE] IN BLOOD BY WESTERGREN METHOD: 39 MM/HR (ref 0–20)
GFR SERPL CREATININE-BSD FRML MDRD: 54 ML/MIN/1.73M2
GFR SERPL CREATININE-BSD FRML MDRD: 54 ML/MIN/1.73M2
GLUCOSE BLD STRIP.AUTO-MCNC: 140 MG/DL (ref 70–108)
GLUCOSE SERPL-MCNC: 145 MG/DL (ref 70–108)
GLUCOSE SERPL-MCNC: 186 MG/DL (ref 70–108)
HCT VFR BLD AUTO: 35.6 % (ref 37–47)
HGB BLD-MCNC: 11.5 GM/DL (ref 12–16)
IMM GRANULOCYTES # BLD AUTO: 0.17 THOU/MM3 (ref 0–0.07)
IMM GRANULOCYTES NFR BLD AUTO: 1.1 %
INR PPP: 1.92 (ref 0.85–1.13)
LACTIC ACID, SEPSIS: 1.9 MMOL/L (ref 0.5–1.9)
LACTIC ACID, SEPSIS: 2.4 MMOL/L (ref 0.5–1.9)
LYMPHOCYTES ABSOLUTE: 0.8 THOU/MM3 (ref 1–4.8)
LYMPHOCYTES NFR BLD AUTO: 4.9 %
MAGNESIUM SERPL-MCNC: 1.4 MG/DL (ref 1.6–2.4)
MAGNESIUM SERPL-MCNC: 2.1 MG/DL (ref 1.6–2.4)
MCH RBC QN AUTO: 23.8 PG (ref 26–33)
MCHC RBC AUTO-ENTMCNC: 32.3 GM/DL (ref 32.2–35.5)
MCV RBC AUTO: 73.6 FL (ref 81–99)
MONOCYTES ABSOLUTE: 0.9 THOU/MM3 (ref 0.4–1.3)
MONOCYTES NFR BLD AUTO: 5.7 %
NEUTROPHILS ABSOLUTE: 13.8 THOU/MM3 (ref 1.8–7.7)
NEUTROPHILS NFR BLD AUTO: 88.2 %
NRBC BLD AUTO-RTO: 0 /100 WBC
OSMOLALITY SERPL CALC.SUM OF ELEC: 278.6 MOSMOL/KG (ref 275–300)
PLATELET # BLD AUTO: 236 THOU/MM3 (ref 130–400)
PMV BLD AUTO: 9.4 FL (ref 9.4–12.4)
POTASSIUM SERPL-SCNC: 3.6 MEQ/L (ref 3.5–5.2)
POTASSIUM SERPL-SCNC: 3.9 MEQ/L (ref 3.5–5.2)
PROCALCITONIN SERPL IA-MCNC: 1.72 NG/ML (ref 0.01–0.09)
PROT SERPL-MCNC: 7.8 G/DL (ref 6.1–8)
RBC # BLD AUTO: 4.84 MILL/MM3 (ref 4.2–5.4)
SODIUM SERPL-SCNC: 131 MEQ/L (ref 135–145)
SODIUM SERPL-SCNC: 135 MEQ/L (ref 135–145)
TSH SERPL DL<=0.005 MIU/L-ACNC: 0.6 UIU/ML (ref 0.4–4.2)
WBC # BLD AUTO: 15.7 THOU/MM3 (ref 4.8–10.8)

## 2024-08-08 PROCEDURE — 83735 ASSAY OF MAGNESIUM: CPT

## 2024-08-08 PROCEDURE — 96365 THER/PROPH/DIAG IV INF INIT: CPT

## 2024-08-08 PROCEDURE — 6370000000 HC RX 637 (ALT 250 FOR IP)

## 2024-08-08 PROCEDURE — 87801 DETECT AGNT MULT DNA AMPLI: CPT

## 2024-08-08 PROCEDURE — 6360000004 HC RX CONTRAST MEDICATION: Performed by: PHYSICIAN ASSISTANT

## 2024-08-08 PROCEDURE — 85610 PROTHROMBIN TIME: CPT

## 2024-08-08 PROCEDURE — 87077 CULTURE AEROBIC IDENTIFY: CPT

## 2024-08-08 PROCEDURE — 82248 BILIRUBIN DIRECT: CPT

## 2024-08-08 PROCEDURE — 1200000003 HC TELEMETRY R&B

## 2024-08-08 PROCEDURE — 83036 HEMOGLOBIN GLYCOSYLATED A1C: CPT

## 2024-08-08 PROCEDURE — 85025 COMPLETE CBC W/AUTO DIFF WBC: CPT

## 2024-08-08 PROCEDURE — 87040 BLOOD CULTURE FOR BACTERIA: CPT

## 2024-08-08 PROCEDURE — 82948 REAGENT STRIP/BLOOD GLUCOSE: CPT

## 2024-08-08 PROCEDURE — 84443 ASSAY THYROID STIM HORMONE: CPT

## 2024-08-08 PROCEDURE — 2580000003 HC RX 258

## 2024-08-08 PROCEDURE — 2580000003 HC RX 258: Performed by: PHYSICIAN ASSISTANT

## 2024-08-08 PROCEDURE — 84145 PROCALCITONIN (PCT): CPT

## 2024-08-08 PROCEDURE — 80053 COMPREHEN METABOLIC PANEL: CPT

## 2024-08-08 PROCEDURE — 96375 TX/PRO/DX INJ NEW DRUG ADDON: CPT

## 2024-08-08 PROCEDURE — 94640 AIRWAY INHALATION TREATMENT: CPT

## 2024-08-08 PROCEDURE — 99223 1ST HOSP IP/OBS HIGH 75: CPT | Performed by: INTERNAL MEDICINE

## 2024-08-08 PROCEDURE — 85651 RBC SED RATE NONAUTOMATED: CPT

## 2024-08-08 PROCEDURE — 99285 EMERGENCY DEPT VISIT HI MDM: CPT

## 2024-08-08 PROCEDURE — 86140 C-REACTIVE PROTEIN: CPT

## 2024-08-08 PROCEDURE — 87186 SC STD MICRODIL/AGAR DIL: CPT

## 2024-08-08 PROCEDURE — 6360000002 HC RX W HCPCS: Performed by: PHYSICIAN ASSISTANT

## 2024-08-08 PROCEDURE — 73701 CT LOWER EXTREMITY W/DYE: CPT

## 2024-08-08 PROCEDURE — 36415 COLL VENOUS BLD VENIPUNCTURE: CPT

## 2024-08-08 PROCEDURE — 83605 ASSAY OF LACTIC ACID: CPT

## 2024-08-08 PROCEDURE — 94761 N-INVAS EAR/PLS OXIMETRY MLT: CPT

## 2024-08-08 PROCEDURE — 6360000002 HC RX W HCPCS

## 2024-08-08 RX ORDER — OXYCODONE HYDROCHLORIDE AND ACETAMINOPHEN 5; 325 MG/1; MG/1
1 TABLET ORAL EVERY 4 HOURS PRN
Status: DISCONTINUED | OUTPATIENT
Start: 2024-08-08 | End: 2024-08-20 | Stop reason: HOSPADM

## 2024-08-08 RX ORDER — CLINDAMYCIN HYDROCHLORIDE 150 MG/1
150 CAPSULE ORAL EVERY 6 HOURS SCHEDULED
Status: DISCONTINUED | OUTPATIENT
Start: 2024-08-08 | End: 2024-08-09

## 2024-08-08 RX ORDER — MECLIZINE HCL 12.5 MG/1
12.5 TABLET ORAL 3 TIMES DAILY PRN
Status: DISCONTINUED | OUTPATIENT
Start: 2024-08-08 | End: 2024-08-20 | Stop reason: HOSPADM

## 2024-08-08 RX ORDER — OXYCODONE HYDROCHLORIDE AND ACETAMINOPHEN 5; 325 MG/1; MG/1
2 TABLET ORAL EVERY 4 HOURS PRN
Status: DISCONTINUED | OUTPATIENT
Start: 2024-08-08 | End: 2024-08-20 | Stop reason: HOSPADM

## 2024-08-08 RX ORDER — SODIUM CHLORIDE 9 MG/ML
INJECTION, SOLUTION INTRAVENOUS CONTINUOUS
Status: DISCONTINUED | OUTPATIENT
Start: 2024-08-08 | End: 2024-08-08

## 2024-08-08 RX ORDER — BENZONATATE 100 MG/1
200 CAPSULE ORAL 3 TIMES DAILY PRN
Status: DISCONTINUED | OUTPATIENT
Start: 2024-08-08 | End: 2024-08-20 | Stop reason: HOSPADM

## 2024-08-08 RX ORDER — ONDANSETRON 2 MG/ML
4 INJECTION INTRAMUSCULAR; INTRAVENOUS EVERY 6 HOURS PRN
Status: DISCONTINUED | OUTPATIENT
Start: 2024-08-08 | End: 2024-08-20 | Stop reason: HOSPADM

## 2024-08-08 RX ORDER — BUMETANIDE 0.5 MG/1
0.5 TABLET ORAL DAILY
Status: DISCONTINUED | OUTPATIENT
Start: 2024-08-08 | End: 2024-08-20 | Stop reason: HOSPADM

## 2024-08-08 RX ORDER — ACETAMINOPHEN 650 MG/1
650 SUPPOSITORY RECTAL EVERY 6 HOURS PRN
Status: DISCONTINUED | OUTPATIENT
Start: 2024-08-08 | End: 2024-08-20 | Stop reason: HOSPADM

## 2024-08-08 RX ORDER — ASCORBIC ACID 500 MG
500 TABLET ORAL DAILY
Status: DISCONTINUED | OUTPATIENT
Start: 2024-08-08 | End: 2024-08-20 | Stop reason: HOSPADM

## 2024-08-08 RX ORDER — MAGNESIUM SULFATE IN WATER 40 MG/ML
2000 INJECTION, SOLUTION INTRAVENOUS ONCE
Status: COMPLETED | OUTPATIENT
Start: 2024-08-08 | End: 2024-08-08

## 2024-08-08 RX ORDER — LANOLIN ALCOHOL/MO/W.PET/CERES
400 CREAM (GRAM) TOPICAL DAILY
Status: DISCONTINUED | OUTPATIENT
Start: 2024-08-09 | End: 2024-08-20 | Stop reason: HOSPADM

## 2024-08-08 RX ORDER — VITAMIN B COMPLEX
1 TABLET ORAL DAILY
Status: DISCONTINUED | OUTPATIENT
Start: 2024-08-09 | End: 2024-08-20 | Stop reason: HOSPADM

## 2024-08-08 RX ORDER — ONDANSETRON 4 MG/1
4 TABLET, ORALLY DISINTEGRATING ORAL EVERY 8 HOURS PRN
Status: DISCONTINUED | OUTPATIENT
Start: 2024-08-08 | End: 2024-08-20 | Stop reason: HOSPADM

## 2024-08-08 RX ORDER — INSULIN LISPRO 100 [IU]/ML
0-8 INJECTION, SOLUTION INTRAVENOUS; SUBCUTANEOUS
Status: DISCONTINUED | OUTPATIENT
Start: 2024-08-08 | End: 2024-08-20 | Stop reason: HOSPADM

## 2024-08-08 RX ORDER — ALBUTEROL SULFATE 90 UG/1
2 AEROSOL, METERED RESPIRATORY (INHALATION) EVERY 6 HOURS PRN
Status: DISCONTINUED | OUTPATIENT
Start: 2024-08-08 | End: 2024-08-20 | Stop reason: HOSPADM

## 2024-08-08 RX ORDER — 0.9 % SODIUM CHLORIDE 0.9 %
1000 INTRAVENOUS SOLUTION INTRAVENOUS ONCE
Status: COMPLETED | OUTPATIENT
Start: 2024-08-08 | End: 2024-08-09

## 2024-08-08 RX ORDER — GLUCAGON 1 MG/ML
1 KIT INJECTION PRN
Status: DISCONTINUED | OUTPATIENT
Start: 2024-08-08 | End: 2024-08-20 | Stop reason: HOSPADM

## 2024-08-08 RX ORDER — AMLODIPINE BESYLATE 5 MG/1
5 TABLET ORAL DAILY
Status: DISCONTINUED | OUTPATIENT
Start: 2024-08-09 | End: 2024-08-20 | Stop reason: HOSPADM

## 2024-08-08 RX ORDER — ATORVASTATIN CALCIUM 10 MG/1
10 TABLET, FILM COATED ORAL DAILY
Status: DISCONTINUED | OUTPATIENT
Start: 2024-08-08 | End: 2024-08-08

## 2024-08-08 RX ORDER — POLYETHYLENE GLYCOL 3350 17 G/17G
17 POWDER, FOR SOLUTION ORAL DAILY PRN
Status: DISCONTINUED | OUTPATIENT
Start: 2024-08-08 | End: 2024-08-20 | Stop reason: HOSPADM

## 2024-08-08 RX ORDER — CHOLECALCIFEROL (VITAMIN D3) 25 MCG
1 TABLET ORAL DAILY
Status: DISCONTINUED | OUTPATIENT
Start: 2024-08-08 | End: 2024-08-08

## 2024-08-08 RX ORDER — SODIUM BICARBONATE 650 MG/1
325 TABLET ORAL 2 TIMES DAILY
Status: DISCONTINUED | OUTPATIENT
Start: 2024-08-08 | End: 2024-08-20 | Stop reason: HOSPADM

## 2024-08-08 RX ORDER — AMLODIPINE BESYLATE 5 MG/1
5 TABLET ORAL DAILY
Status: DISCONTINUED | OUTPATIENT
Start: 2024-08-08 | End: 2024-08-08

## 2024-08-08 RX ORDER — ATORVASTATIN CALCIUM 10 MG/1
10 TABLET, FILM COATED ORAL DAILY
Status: DISCONTINUED | OUTPATIENT
Start: 2024-08-09 | End: 2024-08-20 | Stop reason: HOSPADM

## 2024-08-08 RX ORDER — ONDANSETRON 2 MG/ML
4 INJECTION INTRAMUSCULAR; INTRAVENOUS ONCE
Status: COMPLETED | OUTPATIENT
Start: 2024-08-08 | End: 2024-08-08

## 2024-08-08 RX ORDER — BUDESONIDE AND FORMOTEROL FUMARATE DIHYDRATE 160; 4.5 UG/1; UG/1
2 AEROSOL RESPIRATORY (INHALATION)
Status: DISCONTINUED | OUTPATIENT
Start: 2024-08-08 | End: 2024-08-20 | Stop reason: HOSPADM

## 2024-08-08 RX ORDER — MONTELUKAST SODIUM 10 MG/1
10 TABLET ORAL NIGHTLY
Status: DISCONTINUED | OUTPATIENT
Start: 2024-08-08 | End: 2024-08-20 | Stop reason: HOSPADM

## 2024-08-08 RX ORDER — SODIUM CHLORIDE 0.9 % (FLUSH) 0.9 %
5-40 SYRINGE (ML) INJECTION EVERY 12 HOURS SCHEDULED
Status: DISCONTINUED | OUTPATIENT
Start: 2024-08-08 | End: 2024-08-20 | Stop reason: HOSPADM

## 2024-08-08 RX ORDER — SODIUM CHLORIDE 9 MG/ML
INJECTION, SOLUTION INTRAVENOUS PRN
Status: DISCONTINUED | OUTPATIENT
Start: 2024-08-08 | End: 2024-08-20 | Stop reason: HOSPADM

## 2024-08-08 RX ORDER — SODIUM CHLORIDE 0.9 % (FLUSH) 0.9 %
5-40 SYRINGE (ML) INJECTION PRN
Status: DISCONTINUED | OUTPATIENT
Start: 2024-08-08 | End: 2024-08-20 | Stop reason: HOSPADM

## 2024-08-08 RX ORDER — SPIRONOLACTONE 25 MG/1
25 TABLET ORAL DAILY
Status: DISCONTINUED | OUTPATIENT
Start: 2024-08-08 | End: 2024-08-20 | Stop reason: HOSPADM

## 2024-08-08 RX ORDER — 0.9 % SODIUM CHLORIDE 0.9 %
500 INTRAVENOUS SOLUTION INTRAVENOUS ONCE
Status: COMPLETED | OUTPATIENT
Start: 2024-08-08 | End: 2024-08-08

## 2024-08-08 RX ORDER — WARFARIN SODIUM 7.5 MG/1
7.5 TABLET ORAL
Status: COMPLETED | OUTPATIENT
Start: 2024-08-08 | End: 2024-08-08

## 2024-08-08 RX ORDER — DEXTROSE MONOHYDRATE 100 MG/ML
INJECTION, SOLUTION INTRAVENOUS CONTINUOUS PRN
Status: DISCONTINUED | OUTPATIENT
Start: 2024-08-08 | End: 2024-08-20 | Stop reason: HOSPADM

## 2024-08-08 RX ORDER — ACETAMINOPHEN 325 MG/1
650 TABLET ORAL EVERY 6 HOURS PRN
Status: DISCONTINUED | OUTPATIENT
Start: 2024-08-08 | End: 2024-08-20 | Stop reason: HOSPADM

## 2024-08-08 RX ORDER — INSULIN LISPRO 100 [IU]/ML
0-4 INJECTION, SOLUTION INTRAVENOUS; SUBCUTANEOUS NIGHTLY
Status: DISCONTINUED | OUTPATIENT
Start: 2024-08-08 | End: 2024-08-20 | Stop reason: HOSPADM

## 2024-08-08 RX ORDER — FLUTICASONE PROPIONATE 50 MCG
1 SPRAY, SUSPENSION (ML) NASAL DAILY
Status: DISCONTINUED | OUTPATIENT
Start: 2024-08-08 | End: 2024-08-20 | Stop reason: HOSPADM

## 2024-08-08 RX ORDER — MULTIVITAMIN WITH IRON
1 TABLET ORAL DAILY
Status: DISCONTINUED | OUTPATIENT
Start: 2024-08-09 | End: 2024-08-20 | Stop reason: HOSPADM

## 2024-08-08 RX ORDER — LANOLIN ALCOHOL/MO/W.PET/CERES
400 CREAM (GRAM) TOPICAL DAILY
Status: DISCONTINUED | OUTPATIENT
Start: 2024-08-08 | End: 2024-08-08

## 2024-08-08 RX ORDER — PANTOPRAZOLE SODIUM 40 MG/1
40 TABLET, DELAYED RELEASE ORAL
Status: DISCONTINUED | OUTPATIENT
Start: 2024-08-09 | End: 2024-08-20 | Stop reason: HOSPADM

## 2024-08-08 RX ORDER — MORPHINE SULFATE 4 MG/ML
4 INJECTION, SOLUTION INTRAMUSCULAR; INTRAVENOUS ONCE
Status: COMPLETED | OUTPATIENT
Start: 2024-08-08 | End: 2024-08-08

## 2024-08-08 RX ADMIN — MAGNESIUM SULFATE HEPTAHYDRATE 2000 MG: 40 INJECTION, SOLUTION INTRAVENOUS at 16:22

## 2024-08-08 RX ADMIN — WARFARIN SODIUM 7.5 MG: 7.5 TABLET ORAL at 17:52

## 2024-08-08 RX ADMIN — SODIUM CHLORIDE, PRESERVATIVE FREE 10 ML: 5 INJECTION INTRAVENOUS at 21:29

## 2024-08-08 RX ADMIN — MONTELUKAST SODIUM 10 MG: 10 TABLET ORAL at 21:28

## 2024-08-08 RX ADMIN — SODIUM CHLORIDE 500 ML: 9 INJECTION, SOLUTION INTRAVENOUS at 09:27

## 2024-08-08 RX ADMIN — METOPROLOL TARTRATE 25 MG: 25 TABLET, FILM COATED ORAL at 21:28

## 2024-08-08 RX ADMIN — PIPERACILLIN AND TAZOBACTAM 4500 MG: 4; .5 INJECTION, POWDER, FOR SOLUTION INTRAVENOUS at 09:31

## 2024-08-08 RX ADMIN — DICLOFENAC SODIUM 2 G: 10 GEL TOPICAL at 21:28

## 2024-08-08 RX ADMIN — IOPAMIDOL 80 ML: 755 INJECTION, SOLUTION INTRAVENOUS at 09:58

## 2024-08-08 RX ADMIN — PIPERACILLIN AND TAZOBACTAM 3375 MG: 3; .375 INJECTION, POWDER, FOR SOLUTION INTRAVENOUS at 16:31

## 2024-08-08 RX ADMIN — SODIUM CHLORIDE 1000 ML: 9 INJECTION, SOLUTION INTRAVENOUS at 16:18

## 2024-08-08 RX ADMIN — CLINDAMYCIN HYDROCHLORIDE 150 MG: 150 CAPSULE ORAL at 17:52

## 2024-08-08 RX ADMIN — BUDESONIDE AND FORMOTEROL FUMARATE DIHYDRATE 2 PUFF: 160; 4.5 AEROSOL RESPIRATORY (INHALATION) at 16:19

## 2024-08-08 RX ADMIN — ONDANSETRON 4 MG: 2 INJECTION INTRAMUSCULAR; INTRAVENOUS at 09:16

## 2024-08-08 RX ADMIN — VANCOMYCIN HYDROCHLORIDE 2000 MG: 1 INJECTION, POWDER, LYOPHILIZED, FOR SOLUTION INTRAVENOUS at 10:46

## 2024-08-08 RX ADMIN — MORPHINE SULFATE 4 MG: 4 INJECTION, SOLUTION INTRAMUSCULAR; INTRAVENOUS at 09:16

## 2024-08-08 RX ADMIN — CLINDAMYCIN HYDROCHLORIDE 150 MG: 150 CAPSULE ORAL at 16:09

## 2024-08-08 RX ADMIN — OXYCODONE HYDROCHLORIDE AND ACETAMINOPHEN 1 TABLET: 5; 325 TABLET ORAL at 14:48

## 2024-08-08 RX ADMIN — OXYCODONE HYDROCHLORIDE AND ACETAMINOPHEN 500 MG: 500 TABLET ORAL at 16:09

## 2024-08-08 RX ADMIN — OXYCODONE HYDROCHLORIDE AND ACETAMINOPHEN 1 TABLET: 5; 325 TABLET ORAL at 21:34

## 2024-08-08 RX ADMIN — METOPROLOL TARTRATE 25 MG: 25 TABLET, FILM COATED ORAL at 16:09

## 2024-08-08 RX ADMIN — SPIRONOLACTONE 25 MG: 25 TABLET ORAL at 16:09

## 2024-08-08 ASSESSMENT — PAIN DESCRIPTION - LOCATION
LOCATION: LEG
LOCATION: ANKLE
LOCATION: LEG

## 2024-08-08 ASSESSMENT — PAIN - FUNCTIONAL ASSESSMENT
PAIN_FUNCTIONAL_ASSESSMENT: ACTIVITIES ARE NOT PREVENTED
PAIN_FUNCTIONAL_ASSESSMENT: ACTIVITIES ARE NOT PREVENTED
PAIN_FUNCTIONAL_ASSESSMENT: 0-10
PAIN_FUNCTIONAL_ASSESSMENT: PREVENTS OR INTERFERES SOME ACTIVE ACTIVITIES AND ADLS

## 2024-08-08 ASSESSMENT — PAIN SCALES - GENERAL
PAINLEVEL_OUTOF10: 4
PAINLEVEL_OUTOF10: 7
PAINLEVEL_OUTOF10: 5
PAINLEVEL_OUTOF10: 4
PAINLEVEL_OUTOF10: 4
PAINLEVEL_OUTOF10: 10
PAINLEVEL_OUTOF10: 7

## 2024-08-08 ASSESSMENT — PAIN DESCRIPTION - DESCRIPTORS
DESCRIPTORS: ACHING

## 2024-08-08 ASSESSMENT — PAIN DESCRIPTION - ORIENTATION
ORIENTATION: RIGHT;LOWER
ORIENTATION: RIGHT
ORIENTATION: RIGHT

## 2024-08-08 NOTE — ED NOTES
Pt to ED via EMS w/ report of right lower leg swelling. EMS report that pt was in ER yesterday for right leg swelling and was given an antibiotic. Pt states that she did not start taking prescribed antibiotic. Pt states that she is normally swollen in her legs, but her right leg is swollen more than normal and presents w/ some discoloration. Call light in reach.

## 2024-08-08 NOTE — CARE COORDINATION
Spoke with patient who advised does not have ACP documents at home. Would like to review them and possibly complete this admission to have attached to chart. Consult for Spiritual Health made. Denied concerns or needs.

## 2024-08-08 NOTE — ED NOTES
Pt medicated per MAR. Pt updated on POC. Heraclio at bedside updating family at this time. Vitals collected. Call light in reach.

## 2024-08-08 NOTE — ED NOTES
Pt transported to [facility] in stable condition. Floor notified prior to arrival.

## 2024-08-08 NOTE — ED NOTES
Dr. Huntley at bedside. Pt resting in bed. Pt breathing easy and unlabored. Vitals collected. Call light in reach.

## 2024-08-08 NOTE — ED NOTES
Pt medicated per MAR. Pt resting in bed watching tv w/ family at bedside. Pt updated on POC. Pt requesting water, Heraclio HONEYCUTT states to wait until CT results come back. Pt denies further needs at this time. Pt breathing easy and unlabored. Vitals collected. Call light in reach.

## 2024-08-08 NOTE — ED NOTES
ED to inpatient nurses report      Chief Complaint:  Chief Complaint   Patient presents with    Leg Swelling     Present to ED from: home    MOA:     LOC: alert and orientated to name, place, date  Mobility: Requires assistance * 2  Oxygen Baseline: RA    Current needs required: RA     Code Status:   Prior    What abnormal results were found and what did you give/do to treat them? Cellulitis - zosyn, vancomycin  Any procedures or intervention occur? CT tibia/fibula     Mental Status:       Psych Assessment:        Vitals:  Patient Vitals for the past 24 hrs:   BP Temp Pulse Resp SpO2   08/08/24 1043 127/61 -- 92 22 95 %   08/08/24 0938 109/64 -- 94 19 99 %   08/08/24 0854 124/76 98.1 °F (36.7 °C) (!) 115 21 99 %        LDAs:   Peripheral IV 08/08/24 Right Antecubital (Active)       Ambulatory Status:  No data recorded    Diagnosis:  DISPOSITION Admitted 08/08/2024 11:13:06 AM   Final diagnoses:   Cellulitis, unspecified cellulitis site        Consults:  IP CONSULT TO CASE MANAGEMENT  IP CONSULT TO SPIRITUAL SERVICES     Pain Score:  Pain Assessment  Pain Assessment: 0-10  Pain Level: 5    C-SSRS:   Risk of Suicide: No Risk    Sepsis Screening:       [place] Fall Risk:       Swallow Screening        Preferred Language:   English      ALLERGIES     Patient has no known allergies.    SURGICAL HISTORY     History reviewed. No pertinent surgical history.    PAST MEDICAL HISTORY       Past Medical History:   Diagnosis Date    Atrial fibrillation (HCC)     Diabetes mellitus (HCC)     Hypertension            Electronically signed by Kayla Kalisch[place], RN on 8/8/2024 at 11:37 AM

## 2024-08-08 NOTE — H&P
packs/day: 0.00     Types: Cigarettes     Quit date: 1978     Years since quittin.1    Smokeless tobacco: Never   Vaping Use    Vaping Use: Never used   Substance and Sexual Activity    Alcohol use: Yes     Alcohol/week: 1.0 standard drink of alcohol     Types: 1 Cans of beer per week     Comment: occasional     Drug use: No   Social History Narrative    Jasmina has good family support    Son assists with transportation to [facility]    No barriers with medication affordability    Following up with Diabetic Clinic     Social Determinants of Health     Financial Resource Strain: Low Risk  (2024)    Overall Financial Resource Strain (CARDIA)     Difficulty of Paying Living Expenses: Not hard at all   Food Insecurity: No Food Insecurity (2024)    Hunger Vital Sign     Worried About Running Out of Food in the Last Year: Never true     Ran Out of Food in the Last Year: Never true   Transportation Needs: No Transportation Needs (2024)    PRAPARE - Transportation     Lack of Transportation (Medical): No     Lack of Transportation (Non-Medical): No   Physical Activity: Inactive (2024)    Exercise Vital Sign     Days of Exercise per Week: 0 days     Minutes of Exercise per Session: 0 min   Stress: No Stress Concern Present (2019)    [employer] [place] [employer] - Occupational Stress Questionnaire     Feeling of Stress : Not at all    Social Connections ([facility] HRSN)   Housing Stability: Low Risk  (2024)    Housing Stability Vital Sign     Unable to Pay for Housing in the Last Year: No     Number of Places Lived in the Last Year: 1     Unstable Housing in the Last Year: No        Code status: Full Code     Electronically signed by Ike Velasquez MD on 2024 at 10:41 PM.   Case was discussed with the Attending, Dr. Huntley.      This document was prepared at least partially through the use of voice recognition software. Although effort is taken to assure the accuracy of this

## 2024-08-08 NOTE — ED PROVIDER NOTES
chloride flush 0.9 % injection 5-40 mL (has no administration in time range)   0.9 % sodium chloride infusion (has no administration in time range)   ondansetron (ZOFRAN-ODT) disintegrating tablet 4 mg (has no administration in time range)     Or   ondansetron (ZOFRAN) injection 4 mg (has no administration in time range)   polyethylene glycol (GLYCOLAX) packet 17 g (has no administration in time range)   acetaminophen (TYLENOL) tablet 650 mg (has no administration in time range)     Or   acetaminophen (TYLENOL) suppository 650 mg (has no administration in time range)   albuterol sulfate HFA (PROVENTIL;VENTOLIN;PROAIR) 108 (90 Base) MCG/ACT inhaler 2 puff (has no administration in time range)   benzonatate (TESSALON) capsule 200 mg (has no administration in time range)   bumetanide (BUMEX) tablet 0.5 mg ( Oral Automatically Held 8/11/24 0900)   diclofenac sodium (VOLTAREN) 1 % gel 2 g (has no administration in time range)   fluticasone (FLONASE) 50 MCG/ACT nasal spray 1 spray (has no administration in time range)   budesonide-formoterol (SYMBICORT) 160-4.5 MCG/ACT inhaler 2 puff ( Inhalation Canceled Entry 8/8/24 1446)     And   tiotropium (SPIRIVA RESPIMAT) 2.5 MCG/ACT inhaler 2 puff (2 puffs Inhalation Not Given 8/8/24 1447)   meclizine (ANTIVERT) tablet 12.5 mg (has no administration in time range)   metoprolol tartrate (LOPRESSOR) tablet 25 mg (has no administration in time range)   montelukast (SINGULAIR) tablet 10 mg (has no administration in time range)   pantoprazole (PROTONIX) tablet 40 mg (has no administration in time range)   spironolactone (ALDACTONE) tablet 25 mg (has no administration in time range)   ascorbic acid (VITAMIN C) tablet 500 mg (has no administration in time range)   amLODIPine (NORVASC) tablet 5 mg (has no administration in time range)   multivitamin 1 tablet (has no administration in time range)   magnesium oxide (MAG-OX) tablet 400 mg (has no administration in time range)   atorvastatin

## 2024-08-09 PROBLEM — L03.90 CELLULITIS: Status: ACTIVE | Noted: 2024-08-09

## 2024-08-09 PROBLEM — A41.9 SEPSIS WITHOUT ACUTE ORGAN DYSFUNCTION (HCC): Status: ACTIVE | Noted: 2024-08-09

## 2024-08-09 LAB
ACB COMPLEX DNA BLD POS QL NAA+NON-PROBE: NOT DETECTED
ANION GAP SERPL CALC-SCNC: 12 MEQ/L (ref 8–16)
B FRAGILIS DNA BLD POS QL NAA+NON-PROBE: NOT DETECTED
BLACTX-M ISLT/SPM QL: NOT DETECTED
BLAIMP ISLT/SPM QL: NOT DETECTED
BLAKPC ISLT/SPM QL: NOT DETECTED
BLAOXA-48-LIKE ISLT/SPM QL: ABNORMAL
BLAVIM ISLT/SPM QL: NOT DETECTED
BOTTLE TYPE: ABNORMAL
BUN SERPL-MCNC: 19 MG/DL (ref 7–22)
C ALBICANS DNA BLD POS QL NAA+NON-PROBE: NOT DETECTED
C AURIS DNA BLD POS QL NAA+NON-PROBE: NOT DETECTED
C GATTII+NEOFOR DNA BLD POS QL NAA+N-PRB: NOT DETECTED
C GLABRATA DNA BLD POS QL NAA+NON-PROBE: NOT DETECTED
C KRUSEI DNA BLD POS QL NAA+NON-PROBE: NOT DETECTED
C PARAP DNA BLD POS QL NAA+NON-PROBE: NOT DETECTED
C TROPICLS DNA BLD POS QL NAA+NON-PROBE: NOT DETECTED
CALCIUM SERPL-MCNC: 8.8 MG/DL (ref 8.5–10.5)
CHLORIDE SERPL-SCNC: 96 MEQ/L (ref 98–111)
CO2 SERPL-SCNC: 21 MEQ/L (ref 23–33)
COAG NEG STAPH DNA BLD QL NAA+PROBE: NOT DETECTED
COLISTIN RES MCR-1 ISLT/SPM QL: ABNORMAL
CREAT SERPL-MCNC: 0.8 MG/DL (ref 0.4–1.2)
DEPRECATED MEAN GLUCOSE BLD GHB EST-ACNC: 120 MG/DL (ref 70–126)
DEPRECATED RDW RBC AUTO: 37.2 FL (ref 35–45)
E CLOAC COMP DNA BLD POS NAA+NON-PROBE: NOT DETECTED
E COLI DNA BLD POS QL NAA+NON-PROBE: NOT DETECTED
E FAECALIS DNA BLD POS QL NAA+NON-PROBE: NOT DETECTED
E FAECIUM DNA BLD POS QL NAA+NON-PROBE: NOT DETECTED
ENTEROBACTERALES DNA BLD POS NAA+N-PRB: NOT DETECTED
ERYTHROCYTE [DISTWIDTH] IN BLOOD BY AUTOMATED COUNT: 14.2 % (ref 11.5–14.5)
FERRITIN SERPL IA-MCNC: 353 NG/ML (ref 10–291)
FOLATE SERPL-MCNC: > 20 NG/ML (ref 4.8–24.2)
GFR SERPL CREATININE-BSD FRML MDRD: 71 ML/MIN/1.73M2
GLUCOSE BLD STRIP.AUTO-MCNC: 137 MG/DL (ref 70–108)
GLUCOSE BLD STRIP.AUTO-MCNC: 156 MG/DL (ref 70–108)
GLUCOSE BLD STRIP.AUTO-MCNC: 158 MG/DL (ref 70–108)
GLUCOSE BLD STRIP.AUTO-MCNC: 195 MG/DL (ref 70–108)
GLUCOSE SERPL-MCNC: 133 MG/DL (ref 70–108)
GP B STREP DNA SPEC QL NAA+PROBE: NOT DETECTED
GP B STREP DNA SPEC QL NAA+PROBE: NOT DETECTED
HAEM INFLU DNA BLD POS QL NAA+NON-PROBE: NOT DETECTED
HBA1C MFR BLD HPLC: 6 % (ref 4.4–6.4)
HCT VFR BLD AUTO: 29.6 % (ref 37–47)
HGB BLD-MCNC: 9.6 GM/DL (ref 12–16)
HGB RETIC QN AUTO: 25.9 PG (ref 28.2–35.7)
IMM RETICS NFR: 18 % (ref 3–15.9)
INR PPP: 2.02 (ref 0.85–1.13)
IRON SATN MFR SERPL: 6 % (ref 20–50)
IRON SERPL-MCNC: 13 UG/DL (ref 50–170)
K OXYTOCA DNA BLD POS QL NAA+NON-PROBE: NOT DETECTED
K OXYTOCA DNA BLD POS QL NAA+NON-PROBE: NOT DETECTED
KLEBSIELLA SP DNA BLD POS QL NAA+NON-PRB: NOT DETECTED
L MONOCYTOG DNA BLD POS QL NAA+NON-PROBE: NOT DETECTED
MAGNESIUM SERPL-MCNC: 1.9 MG/DL (ref 1.6–2.4)
MCH RBC QN AUTO: 23.9 PG (ref 26–33)
MCHC RBC AUTO-ENTMCNC: 32.4 GM/DL (ref 32.2–35.5)
MCV RBC AUTO: 73.8 FL (ref 81–99)
MECA ISLT/SPM QL: ABNORMAL
MECA+MECC+MREJ ISLT/SPM QL: ABNORMAL
N MEN DNA BLD POS QL NAA+NON-PROBE: NOT DETECTED
NDM: NOT DETECTED
P AERUGINOSA DNA BLD POS NAA+NON-PROBE: DETECTED
PLATELET # BLD AUTO: 182 THOU/MM3 (ref 130–400)
PMV BLD AUTO: 9 FL (ref 9.4–12.4)
POTASSIUM SERPL-SCNC: 3.6 MEQ/L (ref 3.5–5.2)
PROTEUS SPP: NOT DETECTED
RBC # BLD AUTO: 4.01 MILL/MM3 (ref 4.2–5.4)
RETICS # AUTO: 62 THOU/MM3 (ref 20–115)
RETICS/RBC NFR AUTO: 1.6 % (ref 0.5–2)
S AUREUS DNA BLD POS QL NAA+NON-PROBE: NOT DETECTED
S EPIDERMIDIS DNA BLD POS QL NAA+NON-PRB: NOT DETECTED
S LUGDUNENSIS DNA BLD POS QL NAA+NON-PRB: NOT DETECTED
S MALTOPHILIA DNA BLD POS QL NAA+NON-PRB: NOT DETECTED
S MARCESCENS DNA BLD POS NAA+NON-PROBE: NOT DETECTED
S PYO DNA THROAT QL NAA+PROBE: NOT DETECTED
SALMONELLA DNA BLD POS QL NAA+NON-PROBE: NOT DETECTED
SODIUM SERPL-SCNC: 129 MEQ/L (ref 135–145)
SOURCE OF BLOOD CULTURE: ABNORMAL
STREPTOCOCCUS DNA BLD QL NAA+PROBE: NOT DETECTED
TIBC SERPL-MCNC: 227 UG/DL (ref 171–450)
VANA+VANB ISLT/SPM QL: ABNORMAL
VIT B12 SERPL-MCNC: 216 PG/ML (ref 211–911)
WBC # BLD AUTO: 12.9 THOU/MM3 (ref 4.8–10.8)

## 2024-08-09 PROCEDURE — 6370000000 HC RX 637 (ALT 250 FOR IP)

## 2024-08-09 PROCEDURE — 82607 VITAMIN B-12: CPT

## 2024-08-09 PROCEDURE — 85610 PROTHROMBIN TIME: CPT

## 2024-08-09 PROCEDURE — 2580000003 HC RX 258

## 2024-08-09 PROCEDURE — 82746 ASSAY OF FOLIC ACID SERUM: CPT

## 2024-08-09 PROCEDURE — 87205 SMEAR GRAM STAIN: CPT

## 2024-08-09 PROCEDURE — 85027 COMPLETE CBC AUTOMATED: CPT

## 2024-08-09 PROCEDURE — 82948 REAGENT STRIP/BLOOD GLUCOSE: CPT

## 2024-08-09 PROCEDURE — 80048 BASIC METABOLIC PNL TOTAL CA: CPT

## 2024-08-09 PROCEDURE — 6360000002 HC RX W HCPCS

## 2024-08-09 PROCEDURE — 94761 N-INVAS EAR/PLS OXIMETRY MLT: CPT

## 2024-08-09 PROCEDURE — 82728 ASSAY OF FERRITIN: CPT

## 2024-08-09 PROCEDURE — 87070 CULTURE OTHR SPECIMN AEROBIC: CPT

## 2024-08-09 PROCEDURE — 1200000003 HC TELEMETRY R&B

## 2024-08-09 PROCEDURE — 94640 AIRWAY INHALATION TREATMENT: CPT

## 2024-08-09 PROCEDURE — 83540 ASSAY OF IRON: CPT

## 2024-08-09 PROCEDURE — 36415 COLL VENOUS BLD VENIPUNCTURE: CPT

## 2024-08-09 PROCEDURE — 83550 IRON BINDING TEST: CPT

## 2024-08-09 PROCEDURE — 6360000002 HC RX W HCPCS: Performed by: STUDENT IN AN ORGANIZED HEALTH CARE EDUCATION/TRAINING PROGRAM

## 2024-08-09 PROCEDURE — 99232 SBSQ HOSP IP/OBS MODERATE 35: CPT | Performed by: NURSE PRACTITIONER

## 2024-08-09 PROCEDURE — 85046 RETICYTE/HGB CONCENTRATE: CPT

## 2024-08-09 PROCEDURE — 83735 ASSAY OF MAGNESIUM: CPT

## 2024-08-09 RX ORDER — KETOROLAC TROMETHAMINE 30 MG/ML
15 INJECTION, SOLUTION INTRAMUSCULAR; INTRAVENOUS ONCE
Status: COMPLETED | OUTPATIENT
Start: 2024-08-09 | End: 2024-08-09

## 2024-08-09 RX ORDER — WARFARIN SODIUM 7.5 MG/1
7.5 TABLET ORAL
Status: COMPLETED | OUTPATIENT
Start: 2024-08-09 | End: 2024-08-09

## 2024-08-09 RX ADMIN — ACETAMINOPHEN 650 MG: 325 TABLET ORAL at 23:44

## 2024-08-09 RX ADMIN — PIPERACILLIN AND TAZOBACTAM 3375 MG: 3; .375 INJECTION, POWDER, FOR SOLUTION INTRAVENOUS at 08:05

## 2024-08-09 RX ADMIN — METOPROLOL TARTRATE 25 MG: 25 TABLET, FILM COATED ORAL at 21:08

## 2024-08-09 RX ADMIN — CLINDAMYCIN HYDROCHLORIDE 150 MG: 150 CAPSULE ORAL at 05:22

## 2024-08-09 RX ADMIN — PIPERACILLIN AND TAZOBACTAM 3375 MG: 3; .375 INJECTION, POWDER, FOR SOLUTION INTRAVENOUS at 00:11

## 2024-08-09 RX ADMIN — MONTELUKAST SODIUM 10 MG: 10 TABLET ORAL at 21:08

## 2024-08-09 RX ADMIN — SODIUM BICARBONATE 325 MG: 650 TABLET ORAL at 07:59

## 2024-08-09 RX ADMIN — BUDESONIDE AND FORMOTEROL FUMARATE DIHYDRATE 2 PUFF: 160; 4.5 AEROSOL RESPIRATORY (INHALATION) at 06:25

## 2024-08-09 RX ADMIN — SODIUM CHLORIDE, PRESERVATIVE FREE 10 ML: 5 INJECTION INTRAVENOUS at 07:59

## 2024-08-09 RX ADMIN — BUDESONIDE AND FORMOTEROL FUMARATE DIHYDRATE 2 PUFF: 160; 4.5 AEROSOL RESPIRATORY (INHALATION) at 20:03

## 2024-08-09 RX ADMIN — PIPERACILLIN AND TAZOBACTAM 3375 MG: 3; .375 INJECTION, POWDER, FOR SOLUTION INTRAVENOUS at 17:04

## 2024-08-09 RX ADMIN — PANTOPRAZOLE SODIUM 40 MG: 40 TABLET, DELAYED RELEASE ORAL at 05:22

## 2024-08-09 RX ADMIN — WARFARIN SODIUM 7.5 MG: 7.5 TABLET ORAL at 17:02

## 2024-08-09 RX ADMIN — VANCOMYCIN HYDROCHLORIDE 1000 MG: 1 INJECTION, POWDER, LYOPHILIZED, FOR SOLUTION INTRAVENOUS at 10:48

## 2024-08-09 RX ADMIN — Medication 1000 UNITS: at 07:59

## 2024-08-09 RX ADMIN — OXYCODONE HYDROCHLORIDE AND ACETAMINOPHEN 500 MG: 500 TABLET ORAL at 07:59

## 2024-08-09 RX ADMIN — KETOROLAC TROMETHAMINE 15 MG: 30 INJECTION, SOLUTION INTRAMUSCULAR at 06:11

## 2024-08-09 RX ADMIN — PIPERACILLIN AND TAZOBACTAM 3375 MG: 3; .375 INJECTION, POWDER, FOR SOLUTION INTRAVENOUS at 23:44

## 2024-08-09 RX ADMIN — OXYCODONE HYDROCHLORIDE AND ACETAMINOPHEN 1 TABLET: 5; 325 TABLET ORAL at 10:35

## 2024-08-09 RX ADMIN — CLINDAMYCIN HYDROCHLORIDE 150 MG: 150 CAPSULE ORAL at 00:08

## 2024-08-09 RX ADMIN — Medication 400 MG: at 07:59

## 2024-08-09 RX ADMIN — OXYCODONE HYDROCHLORIDE AND ACETAMINOPHEN 1 TABLET: 5; 325 TABLET ORAL at 15:11

## 2024-08-09 RX ADMIN — SODIUM BICARBONATE 325 MG: 650 TABLET ORAL at 00:09

## 2024-08-09 RX ADMIN — ATORVASTATIN CALCIUM 10 MG: 10 TABLET, FILM COATED ORAL at 07:59

## 2024-08-09 RX ADMIN — OXYCODONE HYDROCHLORIDE AND ACETAMINOPHEN 2 TABLET: 5; 325 TABLET ORAL at 04:30

## 2024-08-09 RX ADMIN — TIOTROPIUM BROMIDE INHALATION SPRAY 2 PUFF: 3.12 SPRAY, METERED RESPIRATORY (INHALATION) at 06:25

## 2024-08-09 RX ADMIN — SODIUM CHLORIDE, PRESERVATIVE FREE 10 ML: 5 INJECTION INTRAVENOUS at 21:09

## 2024-08-09 RX ADMIN — SODIUM BICARBONATE 325 MG: 650 TABLET ORAL at 21:08

## 2024-08-09 RX ADMIN — Medication 1 TABLET: at 21:08

## 2024-08-09 ASSESSMENT — PAIN SCALES - GENERAL
PAINLEVEL_OUTOF10: 0
PAINLEVEL_OUTOF10: 9
PAINLEVEL_OUTOF10: 5
PAINLEVEL_OUTOF10: 7
PAINLEVEL_OUTOF10: 8
PAINLEVEL_OUTOF10: 4
PAINLEVEL_OUTOF10: 5
PAINLEVEL_OUTOF10: 0

## 2024-08-09 ASSESSMENT — PAIN DESCRIPTION - ORIENTATION
ORIENTATION: RIGHT
ORIENTATION: RIGHT
ORIENTATION: RIGHT;LOWER
ORIENTATION: LOWER;RIGHT
ORIENTATION: RIGHT

## 2024-08-09 ASSESSMENT — PAIN DESCRIPTION - LOCATION
LOCATION: LEG

## 2024-08-09 ASSESSMENT — PAIN DESCRIPTION - DESCRIPTORS
DESCRIPTORS: ACHING

## 2024-08-09 ASSESSMENT — PAIN - FUNCTIONAL ASSESSMENT
PAIN_FUNCTIONAL_ASSESSMENT: ACTIVITIES ARE NOT PREVENTED
PAIN_FUNCTIONAL_ASSESSMENT: ACTIVITIES ARE NOT PREVENTED
PAIN_FUNCTIONAL_ASSESSMENT: PREVENTS OR INTERFERES SOME ACTIVE ACTIVITIES AND ADLS
PAIN_FUNCTIONAL_ASSESSMENT: PREVENTS OR INTERFERES SOME ACTIVE ACTIVITIES AND ADLS
PAIN_FUNCTIONAL_ASSESSMENT: ACTIVITIES ARE NOT PREVENTED

## 2024-08-09 NOTE — CARE COORDINATION
Case Management Assessment Initial Evaluation    Date/Time of Evaluation: 8/9/2024 9:54 AM  Assessment Completed by: Najma Hayden RN    If patient is discharged prior to next notation, then this note serves as note for discharge by case management.    Patient Name: Jasmina Powers                   YOB: 1935  Diagnosis: Cellulitis of right lower extremity [L03.115]  Cellulitis, unspecified cellulitis site [L03.90]                   Date / Time: 8/8/2024  8:49 AM  [facility]: [facility]     Patient Admission Status: Inpatient   Readmission Risk Low 0-14, Mod 15-19), High > 20: Readmission Risk Score: 17.9    Current PCP: Dimas Watts APRN - CNP  Health Care Decision Makers: son Jhonny (see facesheet)    Additional Case Management Notes: to ED  for evaluation of right lower lateral leg pain.   Cleocin po, DM mgmt, Zosyn IV q 8 hrs. Bicarb tabs, Na 139 (131), WBC 12.9 (15.7), INR 2.02. + blood culture. RLE needs rewrapped. ID and hospitalist.      Procedures: na    Imaging: CT tib fibula right:  Diffuse subcutaneous edema is seen in the lower leg and foot. No CT evidence of osteomyelitis.       Patient Goals/Plan/Treatment Preferences: Jasmina lives at home with her son Jhonny and D-I-L. She uses cane, has glucometer, and is unsure of needs at discharge. Will follow ID recommendations for antibiotics. Will need PT/OT ordered when appropriate. Updated MANN Young.      08/09/24 1255   Service Assessment   Patient Orientation Alert and Oriented   Cognition Alert   History Provided By Patient   Primary Caregiver Self   Accompanied By/Relationship Martha FELIX   Support Systems Children   Patient's Healthcare Decision Maker is: Legal Next of Kin  (Jhonny child HCDM)   PCP Verified by CM Yes   Last Visit to PCP Within last 3 months   Prior Functional Level Assistance with the following:;Bathing;Cooking;Housework;Shopping;Mobility   Current Functional Level

## 2024-08-09 NOTE — CONSULTS
CONSULTATION NOTE :ID       Patient - Jasmina Powers,  Age - 89 y.o.    - 1935      Room Number - 7K-24/024-A   N -  [id number]   [facility] # - [id number]  Date of Admission -  2024  8:49 AM  Patient's PCP: Dimas Watts APRN - CNP     Requesting Physician: Martha Phan APRN - CNP    REASON FOR CONSULTATION   Bacteremia with right lower leg cellulites  CHIEF COMPLAINT   Right lower leg redness    HISTORY OF PRESENT ILLNESS       This is a very pleasant 89 y.o. female who was admitted to the hospital with a chief complaints of worsening right lower leg cellulites. She was seen at the ED and came back due to pain and redness on the right lower leg.  She came from home.  She denies any trauma.  She has a dog denies being scratched.  She has chronic leg swelling.  No similar history before.  Her blood culture was positive for gram-negative bacteria the molecular panel has identified Pseudomonas.  Patient reports that she starts to have blistering which was dark.  Her CT scan was negative for soft tissue gas or necrotizing infection.  She was started on IV Zosyn vancomycin and clindamycin.  She was on Coumadin for atrial fibrillation she has history of diabetes and hypertension.  She had smoking in the past but no known history of PAD.  No known history of MRSA.    PAST MEDICAL  HISTORY       Past Medical History:   Diagnosis Date    Atrial fibrillation (HCC)     Diabetes mellitus (HCC)     Hypertension        PAST SURGICAL HISTORY     History reviewed. No pertinent surgical history.      MEDICATIONS:       Scheduled Meds:   sodium chloride flush  5-40 mL IntraVENous 2 times per day    [Held by provider] bumetanide  0.5 mg Oral Daily    diclofenac sodium  2 g Topical 4x Daily    fluticasone  1 spray Each Nostril Daily    budesonide-formoterol  2 puff Inhalation BID RT    And    tiotropium  2 puff Inhalation Daily RT    metoprolol tartrate  25 mg Oral BID    montelukast  10 mg

## 2024-08-09 NOTE — ACP (ADVANCE CARE PLANNING)
Advance Care Planning     Advance Care Planning Inpatient Note  Spiritual Care Department    Today's Date: 8/9/2024  Unit: [facility]K    Received request from IDT Member.  Upon review of chart and communication with care team, patient's decision making abilities are not in question.. Patient and family member  was/were present in the room during visit.    Goals of ACP Conversation:  Discuss advance care planning documents    Health Care Decision Makers:     No healthcare decision makers have been documented.    Summary:  No Decision Maker named by patient at this time    Advance Care Planning Documents (Patient Wishes):  None     Assessment:  PT is an 89y.o. female, propped up in bed visiting with a family member, in 7K-24.  visited due to a consult entered, re: AD'sRobin Jasmina politely shared that she is not interested in documents or this topic in general.  wished those present well and left the room.    Interventions:  Reviewed but did not complete ACP document  Patient DECLINED ACP conversation    Care Preferences Communicated:   No    Outcomes/Plan:  Pt to notify spiritual health if/when ready to discuss.    Electronically signed by Chaplain Karen on 8/9/2024 at 1:39 PM

## 2024-08-10 LAB
ANION GAP SERPL CALC-SCNC: 14 MEQ/L (ref 8–16)
ANION GAP SERPL CALC-SCNC: 15 MEQ/L (ref 8–16)
BASOPHILS ABSOLUTE: 0 THOU/MM3 (ref 0–0.1)
BASOPHILS ABSOLUTE: 0 THOU/MM3 (ref 0–0.1)
BASOPHILS NFR BLD AUTO: 0.2 %
BASOPHILS NFR BLD AUTO: 0.2 %
BUN SERPL-MCNC: 17 MG/DL (ref 7–22)
BUN SERPL-MCNC: 20 MG/DL (ref 7–22)
CALCIUM SERPL-MCNC: 8.9 MG/DL (ref 8.5–10.5)
CALCIUM SERPL-MCNC: 9.4 MG/DL (ref 8.5–10.5)
CHLORIDE SERPL-SCNC: 93 MEQ/L (ref 98–111)
CHLORIDE SERPL-SCNC: 96 MEQ/L (ref 98–111)
CO2 SERPL-SCNC: 20 MEQ/L (ref 23–33)
CO2 SERPL-SCNC: 22 MEQ/L (ref 23–33)
CREAT SERPL-MCNC: 0.8 MG/DL (ref 0.4–1.2)
CREAT SERPL-MCNC: 0.9 MG/DL (ref 0.4–1.2)
DEPRECATED RDW RBC AUTO: 37.6 FL (ref 35–45)
DEPRECATED RDW RBC AUTO: 37.7 FL (ref 35–45)
EOSINOPHIL NFR BLD AUTO: 0.8 %
EOSINOPHIL NFR BLD AUTO: 1.1 %
EOSINOPHILS ABSOLUTE: 0.1 THOU/MM3 (ref 0–0.4)
EOSINOPHILS ABSOLUTE: 0.1 THOU/MM3 (ref 0–0.4)
ERYTHROCYTE [DISTWIDTH] IN BLOOD BY AUTOMATED COUNT: 14.1 % (ref 11.5–14.5)
ERYTHROCYTE [DISTWIDTH] IN BLOOD BY AUTOMATED COUNT: 14.2 % (ref 11.5–14.5)
GFR SERPL CREATININE-BSD FRML MDRD: 62 ML/MIN/1.73M2
GFR SERPL CREATININE-BSD FRML MDRD: 71 ML/MIN/1.73M2
GLUCOSE BLD STRIP.AUTO-MCNC: 121 MG/DL (ref 70–108)
GLUCOSE BLD STRIP.AUTO-MCNC: 141 MG/DL (ref 70–108)
GLUCOSE BLD STRIP.AUTO-MCNC: 159 MG/DL (ref 70–108)
GLUCOSE BLD STRIP.AUTO-MCNC: 169 MG/DL (ref 70–108)
GLUCOSE SERPL-MCNC: 113 MG/DL (ref 70–108)
GLUCOSE SERPL-MCNC: 149 MG/DL (ref 70–108)
HCT VFR BLD AUTO: 31.6 % (ref 37–47)
HCT VFR BLD AUTO: 32.7 % (ref 37–47)
HGB BLD-MCNC: 10.1 GM/DL (ref 12–16)
HGB BLD-MCNC: 10.2 GM/DL (ref 12–16)
IMM GRANULOCYTES # BLD AUTO: 0.06 THOU/MM3 (ref 0–0.07)
IMM GRANULOCYTES # BLD AUTO: 0.23 THOU/MM3 (ref 0–0.07)
IMM GRANULOCYTES NFR BLD AUTO: 0.5 %
IMM GRANULOCYTES NFR BLD AUTO: 1.8 %
INR PPP: 2.95 (ref 0.85–1.13)
LACTATE SERPL-SCNC: 3.1 MMOL/L (ref 0.5–2)
LYMPHOCYTES ABSOLUTE: 1.2 THOU/MM3 (ref 1–4.8)
LYMPHOCYTES ABSOLUTE: 1.4 THOU/MM3 (ref 1–4.8)
LYMPHOCYTES NFR BLD AUTO: 11 %
LYMPHOCYTES NFR BLD AUTO: 8.9 %
MAGNESIUM SERPL-MCNC: 2 MG/DL (ref 1.6–2.4)
MCH RBC QN AUTO: 23.3 PG (ref 26–33)
MCH RBC QN AUTO: 23.8 PG (ref 26–33)
MCHC RBC AUTO-ENTMCNC: 31.2 GM/DL (ref 32.2–35.5)
MCHC RBC AUTO-ENTMCNC: 32 GM/DL (ref 32.2–35.5)
MCV RBC AUTO: 74.4 FL (ref 81–99)
MCV RBC AUTO: 74.8 FL (ref 81–99)
MONOCYTES ABSOLUTE: 0.8 THOU/MM3 (ref 0.4–1.3)
MONOCYTES ABSOLUTE: 1 THOU/MM3 (ref 0.4–1.3)
MONOCYTES NFR BLD AUTO: 6 %
MONOCYTES NFR BLD AUTO: 7.3 %
NEUTROPHILS ABSOLUTE: 10.6 THOU/MM3 (ref 1.8–7.7)
NEUTROPHILS ABSOLUTE: 10.7 THOU/MM3 (ref 1.8–7.7)
NEUTROPHILS NFR BLD AUTO: 80.7 %
NEUTROPHILS NFR BLD AUTO: 81.5 %
NRBC BLD AUTO-RTO: 0 /100 WBC
NRBC BLD AUTO-RTO: 0 /100 WBC
PLATELET # BLD AUTO: 208 THOU/MM3 (ref 130–400)
PLATELET # BLD AUTO: 216 THOU/MM3 (ref 130–400)
PLATELET BLD QL SMEAR: ABNORMAL
PMV BLD AUTO: 10.1 FL (ref 9.4–12.4)
PMV BLD AUTO: 9.4 FL (ref 9.4–12.4)
POTASSIUM SERPL-SCNC: 3.5 MEQ/L (ref 3.5–5.2)
POTASSIUM SERPL-SCNC: 4.2 MEQ/L (ref 3.5–5.2)
RBC # BLD AUTO: 4.25 MILL/MM3 (ref 4.2–5.4)
RBC # BLD AUTO: 4.37 MILL/MM3 (ref 4.2–5.4)
SCAN OF BLOOD SMEAR: NORMAL
SCAN OF BLOOD SMEAR: NORMAL
SODIUM SERPL-SCNC: 129 MEQ/L (ref 135–145)
SODIUM SERPL-SCNC: 131 MEQ/L (ref 135–145)
WBC # BLD AUTO: 13.1 THOU/MM3 (ref 4.8–10.8)
WBC # BLD AUTO: 13.1 THOU/MM3 (ref 4.8–10.8)

## 2024-08-10 PROCEDURE — 97116 GAIT TRAINING THERAPY: CPT

## 2024-08-10 PROCEDURE — 1200000003 HC TELEMETRY R&B

## 2024-08-10 PROCEDURE — 2580000003 HC RX 258

## 2024-08-10 PROCEDURE — 83605 ASSAY OF LACTIC ACID: CPT

## 2024-08-10 PROCEDURE — 6370000000 HC RX 637 (ALT 250 FOR IP)

## 2024-08-10 PROCEDURE — 83735 ASSAY OF MAGNESIUM: CPT

## 2024-08-10 PROCEDURE — 80048 BASIC METABOLIC PNL TOTAL CA: CPT

## 2024-08-10 PROCEDURE — 97166 OT EVAL MOD COMPLEX 45 MIN: CPT

## 2024-08-10 PROCEDURE — 85610 PROTHROMBIN TIME: CPT

## 2024-08-10 PROCEDURE — 99232 SBSQ HOSP IP/OBS MODERATE 35: CPT | Performed by: NURSE PRACTITIONER

## 2024-08-10 PROCEDURE — 82948 REAGENT STRIP/BLOOD GLUCOSE: CPT

## 2024-08-10 PROCEDURE — 87040 BLOOD CULTURE FOR BACTERIA: CPT

## 2024-08-10 PROCEDURE — 97530 THERAPEUTIC ACTIVITIES: CPT

## 2024-08-10 PROCEDURE — 6360000002 HC RX W HCPCS

## 2024-08-10 PROCEDURE — 85025 COMPLETE CBC W/AUTO DIFF WBC: CPT

## 2024-08-10 PROCEDURE — 94640 AIRWAY INHALATION TREATMENT: CPT

## 2024-08-10 PROCEDURE — 97535 SELF CARE MNGMENT TRAINING: CPT

## 2024-08-10 PROCEDURE — 94761 N-INVAS EAR/PLS OXIMETRY MLT: CPT

## 2024-08-10 PROCEDURE — 97162 PT EVAL MOD COMPLEX 30 MIN: CPT

## 2024-08-10 PROCEDURE — 36415 COLL VENOUS BLD VENIPUNCTURE: CPT

## 2024-08-10 RX ORDER — SODIUM CHLORIDE 9 MG/ML
INJECTION, SOLUTION INTRAVENOUS CONTINUOUS
Status: ACTIVE | OUTPATIENT
Start: 2024-08-10 | End: 2024-08-11

## 2024-08-10 RX ORDER — 0.9 % SODIUM CHLORIDE 0.9 %
500 INTRAVENOUS SOLUTION INTRAVENOUS ONCE
Status: COMPLETED | OUTPATIENT
Start: 2024-08-10 | End: 2024-08-10

## 2024-08-10 RX ADMIN — MONTELUKAST SODIUM 10 MG: 10 TABLET ORAL at 21:08

## 2024-08-10 RX ADMIN — SODIUM CHLORIDE: 9 INJECTION, SOLUTION INTRAVENOUS at 22:06

## 2024-08-10 RX ADMIN — OXYCODONE HYDROCHLORIDE AND ACETAMINOPHEN 2 TABLET: 5; 325 TABLET ORAL at 00:41

## 2024-08-10 RX ADMIN — SODIUM BICARBONATE 325 MG: 650 TABLET ORAL at 21:08

## 2024-08-10 RX ADMIN — Medication 400 MG: at 10:04

## 2024-08-10 RX ADMIN — Medication 1 TABLET: at 21:08

## 2024-08-10 RX ADMIN — METOPROLOL TARTRATE 25 MG: 25 TABLET, FILM COATED ORAL at 10:04

## 2024-08-10 RX ADMIN — PANTOPRAZOLE SODIUM 40 MG: 40 TABLET, DELAYED RELEASE ORAL at 05:42

## 2024-08-10 RX ADMIN — SODIUM CHLORIDE 500 ML: 9 INJECTION, SOLUTION INTRAVENOUS at 21:03

## 2024-08-10 RX ADMIN — ACETAMINOPHEN 650 MG: 650 SUPPOSITORY RECTAL at 20:56

## 2024-08-10 RX ADMIN — Medication 1000 UNITS: at 10:04

## 2024-08-10 RX ADMIN — BUDESONIDE AND FORMOTEROL FUMARATE DIHYDRATE 2 PUFF: 160; 4.5 AEROSOL RESPIRATORY (INHALATION) at 20:11

## 2024-08-10 RX ADMIN — SODIUM CHLORIDE, PRESERVATIVE FREE 10 ML: 5 INJECTION INTRAVENOUS at 10:05

## 2024-08-10 RX ADMIN — SODIUM BICARBONATE 325 MG: 650 TABLET ORAL at 10:03

## 2024-08-10 RX ADMIN — AMLODIPINE BESYLATE 5 MG: 5 TABLET ORAL at 10:04

## 2024-08-10 RX ADMIN — PIPERACILLIN AND TAZOBACTAM 3375 MG: 3; .375 INJECTION, POWDER, FOR SOLUTION INTRAVENOUS at 10:08

## 2024-08-10 RX ADMIN — ATORVASTATIN CALCIUM 10 MG: 10 TABLET, FILM COATED ORAL at 10:04

## 2024-08-10 RX ADMIN — OXYCODONE HYDROCHLORIDE AND ACETAMINOPHEN 2 TABLET: 5; 325 TABLET ORAL at 05:42

## 2024-08-10 RX ADMIN — OXYCODONE HYDROCHLORIDE AND ACETAMINOPHEN 1 TABLET: 5; 325 TABLET ORAL at 22:07

## 2024-08-10 RX ADMIN — METOPROLOL TARTRATE 25 MG: 25 TABLET, FILM COATED ORAL at 21:08

## 2024-08-10 RX ADMIN — PIPERACILLIN AND TAZOBACTAM 3375 MG: 3; .375 INJECTION, POWDER, FOR SOLUTION INTRAVENOUS at 21:26

## 2024-08-10 RX ADMIN — OXYCODONE HYDROCHLORIDE AND ACETAMINOPHEN 500 MG: 500 TABLET ORAL at 10:04

## 2024-08-10 RX ADMIN — SPIRONOLACTONE 25 MG: 25 TABLET ORAL at 10:04

## 2024-08-10 RX ADMIN — OXYCODONE HYDROCHLORIDE AND ACETAMINOPHEN 1 TABLET: 5; 325 TABLET ORAL at 11:08

## 2024-08-10 ASSESSMENT — PAIN SCALES - GENERAL
PAINLEVEL_OUTOF10: 5
PAINLEVEL_OUTOF10: 5
PAINLEVEL_OUTOF10: 2
PAINLEVEL_OUTOF10: 7
PAINLEVEL_OUTOF10: 7
PAINLEVEL_OUTOF10: 3
PAINLEVEL_OUTOF10: 4

## 2024-08-10 ASSESSMENT — PAIN DESCRIPTION - FREQUENCY: FREQUENCY: INTERMITTENT

## 2024-08-10 ASSESSMENT — PAIN DESCRIPTION - DESCRIPTORS: DESCRIPTORS: SORE

## 2024-08-10 ASSESSMENT — PAIN DESCRIPTION - ORIENTATION
ORIENTATION: RIGHT
ORIENTATION: RIGHT

## 2024-08-10 ASSESSMENT — PAIN DESCRIPTION - DIRECTION: RADIATING_TOWARDS: TOES

## 2024-08-10 ASSESSMENT — PAIN - FUNCTIONAL ASSESSMENT: PAIN_FUNCTIONAL_ASSESSMENT: PREVENTS OR INTERFERES WITH MANY ACTIVE NOT PASSIVE ACTIVITIES

## 2024-08-10 ASSESSMENT — PAIN DESCRIPTION - LOCATION
LOCATION: LEG
LOCATION: LEG;FOOT

## 2024-08-10 ASSESSMENT — PAIN DESCRIPTION - PAIN TYPE: TYPE: ACUTE PAIN

## 2024-08-10 ASSESSMENT — PAIN DESCRIPTION - ONSET: ONSET: ON-GOING

## 2024-08-11 LAB
ANION GAP SERPL CALC-SCNC: 13 MEQ/L (ref 8–16)
BACTERIA SPEC AEROBE CULT: NORMAL
BASOPHILS ABSOLUTE: 0 THOU/MM3 (ref 0–0.1)
BASOPHILS NFR BLD AUTO: 0.2 %
BUN SERPL-MCNC: 15 MG/DL (ref 7–22)
CALCIUM SERPL-MCNC: 8.9 MG/DL (ref 8.5–10.5)
CHLORIDE SERPL-SCNC: 99 MEQ/L (ref 98–111)
CO2 SERPL-SCNC: 21 MEQ/L (ref 23–33)
CREAT SERPL-MCNC: 0.8 MG/DL (ref 0.4–1.2)
DEPRECATED RDW RBC AUTO: 37.2 FL (ref 35–45)
EOSINOPHIL NFR BLD AUTO: 1.1 %
EOSINOPHILS ABSOLUTE: 0.1 THOU/MM3 (ref 0–0.4)
ERYTHROCYTE [DISTWIDTH] IN BLOOD BY AUTOMATED COUNT: 14 % (ref 11.5–14.5)
GFR SERPL CREATININE-BSD FRML MDRD: 71 ML/MIN/1.73M2
GLUCOSE BLD STRIP.AUTO-MCNC: 120 MG/DL (ref 70–108)
GLUCOSE BLD STRIP.AUTO-MCNC: 154 MG/DL (ref 70–108)
GLUCOSE BLD STRIP.AUTO-MCNC: 156 MG/DL (ref 70–108)
GLUCOSE BLD STRIP.AUTO-MCNC: 163 MG/DL (ref 70–108)
GLUCOSE SERPL-MCNC: 122 MG/DL (ref 70–108)
GRAM STN SPEC: NORMAL
HCT VFR BLD AUTO: 30.8 % (ref 37–47)
HGB BLD-MCNC: 9.8 GM/DL (ref 12–16)
IMM GRANULOCYTES # BLD AUTO: 0.07 THOU/MM3 (ref 0–0.07)
IMM GRANULOCYTES NFR BLD AUTO: 0.5 %
INR PPP: 3.16 (ref 0.85–1.13)
LACTATE SERPL-SCNC: 0.9 MMOL/L (ref 0.5–2)
LYMPHOCYTES ABSOLUTE: 1.3 THOU/MM3 (ref 1–4.8)
LYMPHOCYTES NFR BLD AUTO: 9.9 %
MCH RBC QN AUTO: 23.7 PG (ref 26–33)
MCHC RBC AUTO-ENTMCNC: 31.8 GM/DL (ref 32.2–35.5)
MCV RBC AUTO: 74.4 FL (ref 81–99)
MONOCYTES ABSOLUTE: 1.1 THOU/MM3 (ref 0.4–1.3)
MONOCYTES NFR BLD AUTO: 8.6 %
NEUTROPHILS ABSOLUTE: 10.5 THOU/MM3 (ref 1.8–7.7)
NEUTROPHILS NFR BLD AUTO: 79.7 %
NRBC BLD AUTO-RTO: 0 /100 WBC
ORGANISM: ABNORMAL
PLATELET # BLD AUTO: 222 THOU/MM3 (ref 130–400)
PMV BLD AUTO: 9.1 FL (ref 9.4–12.4)
POTASSIUM SERPL-SCNC: 3.6 MEQ/L (ref 3.5–5.2)
RBC # BLD AUTO: 4.14 MILL/MM3 (ref 4.2–5.4)
SODIUM SERPL-SCNC: 133 MEQ/L (ref 135–145)
WBC # BLD AUTO: 13.2 THOU/MM3 (ref 4.8–10.8)

## 2024-08-11 PROCEDURE — 2580000003 HC RX 258

## 2024-08-11 PROCEDURE — 1200000003 HC TELEMETRY R&B

## 2024-08-11 PROCEDURE — 94761 N-INVAS EAR/PLS OXIMETRY MLT: CPT

## 2024-08-11 PROCEDURE — 83605 ASSAY OF LACTIC ACID: CPT

## 2024-08-11 PROCEDURE — 99232 SBSQ HOSP IP/OBS MODERATE 35: CPT | Performed by: NURSE PRACTITIONER

## 2024-08-11 PROCEDURE — 94640 AIRWAY INHALATION TREATMENT: CPT

## 2024-08-11 PROCEDURE — 36415 COLL VENOUS BLD VENIPUNCTURE: CPT

## 2024-08-11 PROCEDURE — 85610 PROTHROMBIN TIME: CPT

## 2024-08-11 PROCEDURE — 82948 REAGENT STRIP/BLOOD GLUCOSE: CPT

## 2024-08-11 PROCEDURE — 80048 BASIC METABOLIC PNL TOTAL CA: CPT

## 2024-08-11 PROCEDURE — 85025 COMPLETE CBC W/AUTO DIFF WBC: CPT

## 2024-08-11 PROCEDURE — 6360000002 HC RX W HCPCS

## 2024-08-11 PROCEDURE — 2500000003 HC RX 250 WO HCPCS: Performed by: NURSE PRACTITIONER

## 2024-08-11 PROCEDURE — 6370000000 HC RX 637 (ALT 250 FOR IP)

## 2024-08-11 RX ORDER — METOPROLOL TARTRATE 1 MG/ML
2.5 INJECTION, SOLUTION INTRAVENOUS ONCE
Status: COMPLETED | OUTPATIENT
Start: 2024-08-11 | End: 2024-08-11

## 2024-08-11 RX ADMIN — Medication 1000 UNITS: at 08:10

## 2024-08-11 RX ADMIN — PIPERACILLIN AND TAZOBACTAM 3375 MG: 3; .375 INJECTION, POWDER, FOR SOLUTION INTRAVENOUS at 11:37

## 2024-08-11 RX ADMIN — PANTOPRAZOLE SODIUM 40 MG: 40 TABLET, DELAYED RELEASE ORAL at 06:00

## 2024-08-11 RX ADMIN — AMLODIPINE BESYLATE 5 MG: 5 TABLET ORAL at 08:10

## 2024-08-11 RX ADMIN — OXYCODONE HYDROCHLORIDE AND ACETAMINOPHEN 1 TABLET: 5; 325 TABLET ORAL at 08:15

## 2024-08-11 RX ADMIN — MONTELUKAST SODIUM 10 MG: 10 TABLET ORAL at 20:52

## 2024-08-11 RX ADMIN — Medication 400 MG: at 08:10

## 2024-08-11 RX ADMIN — BUDESONIDE AND FORMOTEROL FUMARATE DIHYDRATE 2 PUFF: 160; 4.5 AEROSOL RESPIRATORY (INHALATION) at 08:09

## 2024-08-11 RX ADMIN — TIOTROPIUM BROMIDE INHALATION SPRAY 2 PUFF: 3.12 SPRAY, METERED RESPIRATORY (INHALATION) at 08:06

## 2024-08-11 RX ADMIN — ACETAMINOPHEN 650 MG: 325 TABLET ORAL at 20:52

## 2024-08-11 RX ADMIN — METOPROLOL TARTRATE 25 MG: 25 TABLET, FILM COATED ORAL at 20:52

## 2024-08-11 RX ADMIN — OXYCODONE HYDROCHLORIDE AND ACETAMINOPHEN 1 TABLET: 5; 325 TABLET ORAL at 03:33

## 2024-08-11 RX ADMIN — PIPERACILLIN AND TAZOBACTAM 3375 MG: 3; .375 INJECTION, POWDER, FOR SOLUTION INTRAVENOUS at 03:39

## 2024-08-11 RX ADMIN — OXYCODONE HYDROCHLORIDE AND ACETAMINOPHEN 500 MG: 500 TABLET ORAL at 08:10

## 2024-08-11 RX ADMIN — Medication 1 TABLET: at 20:52

## 2024-08-11 RX ADMIN — METOPROLOL TARTRATE 25 MG: 25 TABLET, FILM COATED ORAL at 08:10

## 2024-08-11 RX ADMIN — METOROPROLOL TARTRATE 2.5 MG: 5 INJECTION, SOLUTION INTRAVENOUS at 19:00

## 2024-08-11 RX ADMIN — PIPERACILLIN AND TAZOBACTAM 3375 MG: 3; .375 INJECTION, POWDER, FOR SOLUTION INTRAVENOUS at 23:22

## 2024-08-11 RX ADMIN — ATORVASTATIN CALCIUM 10 MG: 10 TABLET, FILM COATED ORAL at 08:10

## 2024-08-11 RX ADMIN — SODIUM BICARBONATE 325 MG: 650 TABLET ORAL at 20:52

## 2024-08-11 RX ADMIN — OXYCODONE HYDROCHLORIDE AND ACETAMINOPHEN 1 TABLET: 5; 325 TABLET ORAL at 12:30

## 2024-08-11 RX ADMIN — SODIUM BICARBONATE 325 MG: 650 TABLET ORAL at 08:10

## 2024-08-11 RX ADMIN — SPIRONOLACTONE 25 MG: 25 TABLET ORAL at 08:10

## 2024-08-11 RX ADMIN — BUDESONIDE AND FORMOTEROL FUMARATE DIHYDRATE 2 PUFF: 160; 4.5 AEROSOL RESPIRATORY (INHALATION) at 20:06

## 2024-08-11 ASSESSMENT — PAIN SCALES - GENERAL
PAINLEVEL_OUTOF10: 0
PAINLEVEL_OUTOF10: 5
PAINLEVEL_OUTOF10: 6
PAINLEVEL_OUTOF10: 3

## 2024-08-11 ASSESSMENT — PAIN DESCRIPTION - DESCRIPTORS: DESCRIPTORS: ACHING

## 2024-08-11 ASSESSMENT — PAIN DESCRIPTION - ONSET: ONSET: ON-GOING

## 2024-08-11 ASSESSMENT — PAIN DESCRIPTION - ORIENTATION
ORIENTATION: RIGHT
ORIENTATION: RIGHT

## 2024-08-11 ASSESSMENT — PAIN DESCRIPTION - LOCATION
LOCATION: LEG
LOCATION: LEG

## 2024-08-11 ASSESSMENT — PAIN DESCRIPTION - FREQUENCY: FREQUENCY: INTERMITTENT

## 2024-08-11 ASSESSMENT — PAIN - FUNCTIONAL ASSESSMENT: PAIN_FUNCTIONAL_ASSESSMENT: ACTIVITIES ARE NOT PREVENTED

## 2024-08-11 ASSESSMENT — PAIN DESCRIPTION - PAIN TYPE: TYPE: ACUTE PAIN

## 2024-08-11 NOTE — FLOWSHEET NOTE
24 184   Treatment Team Notification   Reason for Communication Review case  (tachy, calf pain)   Name of Team Member Notified Martha Phan   Treatment Team Role Attending Provider   Method of Communication Secure Message   Response Waiting for response   Notification Time 184     patient in 7k 24, Complaining of  right calf pain the last couple hours whenever we touch it to help her move her legs. She didn't seem to have calf pain like this yesterday.    Also pt just started getting tachy on telemetry like she did last night. Her tele order is       See new orders for telemetry and IV lopressor one time dose.

## 2024-08-12 ENCOUNTER — APPOINTMENT (OUTPATIENT)
Dept: CT IMAGING | Age: 89
DRG: 603 | End: 2024-08-12
Payer: MEDICARE

## 2024-08-12 LAB
ANION GAP SERPL CALC-SCNC: 11 MEQ/L (ref 8–16)
BASOPHILS ABSOLUTE: 0 THOU/MM3 (ref 0–0.1)
BASOPHILS NFR BLD AUTO: 0.2 %
BUN SERPL-MCNC: 10 MG/DL (ref 7–22)
CALCIUM SERPL-MCNC: 9.2 MG/DL (ref 8.5–10.5)
CHLORIDE SERPL-SCNC: 99 MEQ/L (ref 98–111)
CO2 SERPL-SCNC: 23 MEQ/L (ref 23–33)
CREAT SERPL-MCNC: 0.8 MG/DL (ref 0.4–1.2)
DEPRECATED RDW RBC AUTO: 35.7 FL (ref 35–45)
EOSINOPHIL NFR BLD AUTO: 1.1 %
EOSINOPHILS ABSOLUTE: 0.1 THOU/MM3 (ref 0–0.4)
ERYTHROCYTE [DISTWIDTH] IN BLOOD BY AUTOMATED COUNT: 13.7 % (ref 11.5–14.5)
GFR SERPL CREATININE-BSD FRML MDRD: 71 ML/MIN/1.73M2
GLUCOSE BLD STRIP.AUTO-MCNC: 132 MG/DL (ref 70–108)
GLUCOSE BLD STRIP.AUTO-MCNC: 138 MG/DL (ref 70–108)
GLUCOSE BLD STRIP.AUTO-MCNC: 164 MG/DL (ref 70–108)
GLUCOSE BLD STRIP.AUTO-MCNC: 179 MG/DL (ref 70–108)
GLUCOSE SERPL-MCNC: 134 MG/DL (ref 70–108)
HCT VFR BLD AUTO: 28 % (ref 37–47)
HGB BLD-MCNC: 9.1 GM/DL (ref 12–16)
IMM GRANULOCYTES # BLD AUTO: 0.12 THOU/MM3 (ref 0–0.07)
IMM GRANULOCYTES NFR BLD AUTO: 1.1 %
INR PPP: 3.16 (ref 0.85–1.13)
LYMPHOCYTES ABSOLUTE: 1.3 THOU/MM3 (ref 1–4.8)
LYMPHOCYTES NFR BLD AUTO: 12.6 %
MAGNESIUM SERPL-MCNC: 1.8 MG/DL (ref 1.6–2.4)
MCH RBC QN AUTO: 23.6 PG (ref 26–33)
MCHC RBC AUTO-ENTMCNC: 32.5 GM/DL (ref 32.2–35.5)
MCV RBC AUTO: 72.5 FL (ref 81–99)
MONOCYTES ABSOLUTE: 0.9 THOU/MM3 (ref 0.4–1.3)
MONOCYTES NFR BLD AUTO: 8.8 %
NEUTROPHILS ABSOLUTE: 8.1 THOU/MM3 (ref 1.8–7.7)
NEUTROPHILS NFR BLD AUTO: 76.2 %
NRBC BLD AUTO-RTO: 0 /100 WBC
PLATELET # BLD AUTO: 232 THOU/MM3 (ref 130–400)
PMV BLD AUTO: 9 FL (ref 9.4–12.4)
POTASSIUM SERPL-SCNC: 3.3 MEQ/L (ref 3.5–5.2)
RBC # BLD AUTO: 3.86 MILL/MM3 (ref 4.2–5.4)
SODIUM SERPL-SCNC: 133 MEQ/L (ref 135–145)
WBC # BLD AUTO: 10.6 THOU/MM3 (ref 4.8–10.8)

## 2024-08-12 PROCEDURE — 97530 THERAPEUTIC ACTIVITIES: CPT

## 2024-08-12 PROCEDURE — 6360000002 HC RX W HCPCS: Performed by: NURSE PRACTITIONER

## 2024-08-12 PROCEDURE — 85610 PROTHROMBIN TIME: CPT

## 2024-08-12 PROCEDURE — 6370000000 HC RX 637 (ALT 250 FOR IP)

## 2024-08-12 PROCEDURE — 80048 BASIC METABOLIC PNL TOTAL CA: CPT

## 2024-08-12 PROCEDURE — 99232 SBSQ HOSP IP/OBS MODERATE 35: CPT | Performed by: NURSE PRACTITIONER

## 2024-08-12 PROCEDURE — 85025 COMPLETE CBC W/AUTO DIFF WBC: CPT

## 2024-08-12 PROCEDURE — 82948 REAGENT STRIP/BLOOD GLUCOSE: CPT

## 2024-08-12 PROCEDURE — 1200000003 HC TELEMETRY R&B

## 2024-08-12 PROCEDURE — 94640 AIRWAY INHALATION TREATMENT: CPT

## 2024-08-12 PROCEDURE — 83735 ASSAY OF MAGNESIUM: CPT

## 2024-08-12 PROCEDURE — 73700 CT LOWER EXTREMITY W/O DYE: CPT

## 2024-08-12 PROCEDURE — 36415 COLL VENOUS BLD VENIPUNCTURE: CPT

## 2024-08-12 PROCEDURE — 6360000002 HC RX W HCPCS

## 2024-08-12 PROCEDURE — 93005 ELECTROCARDIOGRAM TRACING: CPT | Performed by: NURSE PRACTITIONER

## 2024-08-12 PROCEDURE — 2580000003 HC RX 258

## 2024-08-12 RX ORDER — KETOROLAC TROMETHAMINE 30 MG/ML
15 INJECTION, SOLUTION INTRAMUSCULAR; INTRAVENOUS ONCE
Status: COMPLETED | OUTPATIENT
Start: 2024-08-12 | End: 2024-08-12

## 2024-08-12 RX ORDER — WARFARIN SODIUM 2.5 MG/1
2.5 TABLET ORAL
Status: COMPLETED | OUTPATIENT
Start: 2024-08-12 | End: 2024-08-12

## 2024-08-12 RX ORDER — ACETAMINOPHEN 500 MG
1000 TABLET ORAL ONCE
Status: COMPLETED | OUTPATIENT
Start: 2024-08-12 | End: 2024-08-12

## 2024-08-12 RX ADMIN — MONTELUKAST SODIUM 10 MG: 10 TABLET ORAL at 20:05

## 2024-08-12 RX ADMIN — BUDESONIDE AND FORMOTEROL FUMARATE DIHYDRATE 2 PUFF: 160; 4.5 AEROSOL RESPIRATORY (INHALATION) at 08:45

## 2024-08-12 RX ADMIN — KETOROLAC TROMETHAMINE 15 MG: 30 INJECTION, SOLUTION INTRAMUSCULAR at 03:20

## 2024-08-12 RX ADMIN — TIOTROPIUM BROMIDE INHALATION SPRAY 2 PUFF: 3.12 SPRAY, METERED RESPIRATORY (INHALATION) at 08:45

## 2024-08-12 RX ADMIN — SODIUM BICARBONATE 325 MG: 650 TABLET ORAL at 20:05

## 2024-08-12 RX ADMIN — ACETAMINOPHEN 1000 MG: 500 TABLET ORAL at 20:09

## 2024-08-12 RX ADMIN — AMLODIPINE BESYLATE 5 MG: 5 TABLET ORAL at 08:15

## 2024-08-12 RX ADMIN — METOPROLOL TARTRATE 25 MG: 25 TABLET, FILM COATED ORAL at 08:15

## 2024-08-12 RX ADMIN — OXYCODONE HYDROCHLORIDE AND ACETAMINOPHEN 1 TABLET: 5; 325 TABLET ORAL at 08:11

## 2024-08-12 RX ADMIN — SPIRONOLACTONE 25 MG: 25 TABLET ORAL at 08:16

## 2024-08-12 RX ADMIN — METOPROLOL TARTRATE 25 MG: 25 TABLET, FILM COATED ORAL at 20:03

## 2024-08-12 RX ADMIN — SODIUM CHLORIDE, PRESERVATIVE FREE 10 ML: 5 INJECTION INTRAVENOUS at 08:17

## 2024-08-12 RX ADMIN — ATORVASTATIN CALCIUM 10 MG: 10 TABLET, FILM COATED ORAL at 08:16

## 2024-08-12 RX ADMIN — PIPERACILLIN AND TAZOBACTAM 3375 MG: 3; .375 INJECTION, POWDER, FOR SOLUTION INTRAVENOUS at 11:32

## 2024-08-12 RX ADMIN — SODIUM CHLORIDE, PRESERVATIVE FREE 10 ML: 5 INJECTION INTRAVENOUS at 20:20

## 2024-08-12 RX ADMIN — Medication 400 MG: at 08:16

## 2024-08-12 RX ADMIN — SODIUM BICARBONATE 325 MG: 650 TABLET ORAL at 08:15

## 2024-08-12 RX ADMIN — OXYCODONE HYDROCHLORIDE AND ACETAMINOPHEN 1 TABLET: 5; 325 TABLET ORAL at 20:09

## 2024-08-12 RX ADMIN — Medication 1000 UNITS: at 08:15

## 2024-08-12 RX ADMIN — OXYCODONE HYDROCHLORIDE AND ACETAMINOPHEN 500 MG: 500 TABLET ORAL at 08:15

## 2024-08-12 RX ADMIN — PIPERACILLIN AND TAZOBACTAM 3375 MG: 3; .375 INJECTION, POWDER, FOR SOLUTION INTRAVENOUS at 20:19

## 2024-08-12 RX ADMIN — PANTOPRAZOLE SODIUM 40 MG: 40 TABLET, DELAYED RELEASE ORAL at 06:01

## 2024-08-12 RX ADMIN — WARFARIN SODIUM 2.5 MG: 2.5 TABLET ORAL at 17:04

## 2024-08-12 RX ADMIN — Medication 1 TABLET: at 20:04

## 2024-08-12 ASSESSMENT — PAIN SCALES - GENERAL
PAINLEVEL_OUTOF10: 9
PAINLEVEL_OUTOF10: 5
PAINLEVEL_OUTOF10: 2
PAINLEVEL_OUTOF10: 2
PAINLEVEL_OUTOF10: 5
PAINLEVEL_OUTOF10: 9

## 2024-08-12 ASSESSMENT — PAIN DESCRIPTION - ORIENTATION
ORIENTATION: RIGHT

## 2024-08-12 ASSESSMENT — PAIN DESCRIPTION - DESCRIPTORS
DESCRIPTORS: ACHING
DESCRIPTORS: ACHING

## 2024-08-12 ASSESSMENT — PAIN - FUNCTIONAL ASSESSMENT
PAIN_FUNCTIONAL_ASSESSMENT: PREVENTS OR INTERFERES SOME ACTIVE ACTIVITIES AND ADLS
PAIN_FUNCTIONAL_ASSESSMENT: ACTIVITIES ARE NOT PREVENTED

## 2024-08-12 ASSESSMENT — PAIN DESCRIPTION - LOCATION
LOCATION: LEG

## 2024-08-12 ASSESSMENT — PAIN DESCRIPTION - FREQUENCY: FREQUENCY: CONTINUOUS

## 2024-08-12 ASSESSMENT — PAIN DESCRIPTION - ONSET: ONSET: ON-GOING

## 2024-08-12 ASSESSMENT — PAIN DESCRIPTION - PAIN TYPE: TYPE: ACUTE PAIN

## 2024-08-12 NOTE — CARE COORDINATION
8/12/24, 3:17 PM EDT    DISCHARGE ON GOING EVALUATION    Jasmina Powers       [facility] day: 4  Location: -24/024-A Reason for admit: Cellulitis of right lower extremity [L03.115]  Cellulitis, unspecified cellulitis site [L03.90]     Procedures: na    Imaging since last note:   8/12 CT scan tibia fibula on Right:  1. Marked subcutaneous edema along the anterior lateral aspect of the mid to   lower right leg. No focal collection is seen to reflect an abscess at this time.   No CT evidence of osteomyelitis.     Barriers to Discharge: conts to spike temps Tmax 101.7 (R), ID follows second set blood cultures drawn 8/10. Coumadin as ordered, Zosyn IV q 8 hrs, pain control Percocet, bicarb tablets.     PCP: Dimas Watts APRN - CNP  Readmission Risk Score: 20.5    Patient Goals/Plan/Treatment Preferences: Jasmina lives at home with her son Jhonny and D-I-L. Following antibiotic plan; has needed DME.

## 2024-08-12 NOTE — RT PROTOCOL NOTE
RT Inhaler-Nebulizer Bronchodilator Protocol Note    There is a bronchodilator order in the chart from a provider indicating to follow the RT Bronchodilator Protocol and there is an “Initiate RT Inhaler-Nebulizer Bronchodilator Protocol” order as well (see protocol at bottom of note).    CXR Findings:  No results found.    The findings from the last RT Protocol Assessment were as follows:   History Pulmonary Disease: None or smoker <15 pack years  Respiratory Pattern: Regular pattern and RR 12-20 bpm  Breath Sounds: Slightly diminished and/or crackles  Cough: Strong, spontaneous, non-productive  Indication for Bronchodilator Therapy: Decreased or absent breath sounds, On home bronchodilators (PRN)  Bronchodilator Assessment Score: 2    Aerosolized bronchodilator medication orders have been revised according to the RT Inhaler-Nebulizer Bronchodilator Protocol below.    Respiratory Therapist to perform RT Therapy Protocol Assessment initially then follow the protocol.  Repeat RT Therapy Protocol Assessment PRN for score 0-3 or on second treatment, BID, and PRN for scores above 3.    No Indications - adjust the frequency to every 6 hours PRN wheezing or bronchospasm, if no treatments needed after 48 hours then discontinue using Per Protocol order mode.     If indication present, adjust the RT bronchodilator orders based on the Bronchodilator Assessment Score as indicated below.  Use Inhaler orders unless patient has one or more of the following: on home nebulizer, not able to hold breath for 10 seconds, is not alert and oriented, cannot activate and use MDI correctly, or respiratory rate 25 breaths per minute or more, then use the equivalent nebulizer order(s) with same Frequency and PRN reasons based on the score.  If a patient is on this medication at home then do not decrease Frequency below that used at home.    0-3 - enter or revise RT bronchodilator order(s) to equivalent RT Bronchodilator order with Frequency of

## 2024-08-13 LAB
ANION GAP SERPL CALC-SCNC: 13 MEQ/L (ref 8–16)
BASOPHILS ABSOLUTE: 0.1 THOU/MM3 (ref 0–0.1)
BASOPHILS NFR BLD AUTO: 0.5 %
BUN SERPL-MCNC: 10 MG/DL (ref 7–22)
CALCIUM SERPL-MCNC: 9.7 MG/DL (ref 8.5–10.5)
CHLORIDE SERPL-SCNC: 102 MEQ/L (ref 98–111)
CO2 SERPL-SCNC: 24 MEQ/L (ref 23–33)
CREAT SERPL-MCNC: 0.8 MG/DL (ref 0.4–1.2)
DEPRECATED RDW RBC AUTO: 36.7 FL (ref 35–45)
EKG Q-T INTERVAL: 236 MS
EKG QRS DURATION: 64 MS
EKG QTC CALCULATION (BAZETT): 347 MS
EKG R AXIS: 4 DEGREES
EKG T AXIS: 24 DEGREES
EKG VENTRICULAR RATE: 130 BPM
EOSINOPHIL NFR BLD AUTO: 2.2 %
EOSINOPHILS ABSOLUTE: 0.2 THOU/MM3 (ref 0–0.4)
ERYTHROCYTE [DISTWIDTH] IN BLOOD BY AUTOMATED COUNT: 13.9 % (ref 11.5–14.5)
GFR SERPL CREATININE-BSD FRML MDRD: 71 ML/MIN/1.73M2
GLUCOSE BLD STRIP.AUTO-MCNC: 112 MG/DL (ref 70–108)
GLUCOSE BLD STRIP.AUTO-MCNC: 180 MG/DL (ref 70–108)
GLUCOSE BLD STRIP.AUTO-MCNC: 197 MG/DL (ref 70–108)
GLUCOSE BLD STRIP.AUTO-MCNC: 199 MG/DL (ref 70–108)
GLUCOSE SERPL-MCNC: 120 MG/DL (ref 70–108)
HCT VFR BLD AUTO: 31.8 % (ref 37–47)
HGB BLD-MCNC: 10.1 GM/DL (ref 12–16)
IMM GRANULOCYTES # BLD AUTO: 0.19 THOU/MM3 (ref 0–0.07)
IMM GRANULOCYTES NFR BLD AUTO: 1.8 %
INR PPP: 2.31 (ref 0.85–1.13)
LYMPHOCYTES ABSOLUTE: 1.2 THOU/MM3 (ref 1–4.8)
LYMPHOCYTES NFR BLD AUTO: 10.9 %
MAGNESIUM SERPL-MCNC: 2 MG/DL (ref 1.6–2.4)
MCH RBC QN AUTO: 23.3 PG (ref 26–33)
MCHC RBC AUTO-ENTMCNC: 31.8 GM/DL (ref 32.2–35.5)
MCV RBC AUTO: 73.4 FL (ref 81–99)
MONOCYTES ABSOLUTE: 0.7 THOU/MM3 (ref 0.4–1.3)
MONOCYTES NFR BLD AUTO: 6.4 %
NEUTROPHILS ABSOLUTE: 8.4 THOU/MM3 (ref 1.8–7.7)
NEUTROPHILS NFR BLD AUTO: 78.2 %
NRBC BLD AUTO-RTO: 0 /100 WBC
PLATELET # BLD AUTO: 272 THOU/MM3 (ref 130–400)
PMV BLD AUTO: 9.1 FL (ref 9.4–12.4)
POTASSIUM SERPL-SCNC: 3.5 MEQ/L (ref 3.5–5.2)
RBC # BLD AUTO: 4.33 MILL/MM3 (ref 4.2–5.4)
SODIUM SERPL-SCNC: 139 MEQ/L (ref 135–145)
WBC # BLD AUTO: 10.7 THOU/MM3 (ref 4.8–10.8)

## 2024-08-13 PROCEDURE — 82948 REAGENT STRIP/BLOOD GLUCOSE: CPT

## 2024-08-13 PROCEDURE — 6370000000 HC RX 637 (ALT 250 FOR IP)

## 2024-08-13 PROCEDURE — 6360000002 HC RX W HCPCS

## 2024-08-13 PROCEDURE — 80048 BASIC METABOLIC PNL TOTAL CA: CPT

## 2024-08-13 PROCEDURE — 97530 THERAPEUTIC ACTIVITIES: CPT

## 2024-08-13 PROCEDURE — 94640 AIRWAY INHALATION TREATMENT: CPT

## 2024-08-13 PROCEDURE — 1200000003 HC TELEMETRY R&B

## 2024-08-13 PROCEDURE — 36415 COLL VENOUS BLD VENIPUNCTURE: CPT

## 2024-08-13 PROCEDURE — 99232 SBSQ HOSP IP/OBS MODERATE 35: CPT | Performed by: NURSE PRACTITIONER

## 2024-08-13 PROCEDURE — 83735 ASSAY OF MAGNESIUM: CPT

## 2024-08-13 PROCEDURE — 93010 ELECTROCARDIOGRAM REPORT: CPT | Performed by: NUCLEAR MEDICINE

## 2024-08-13 PROCEDURE — 85025 COMPLETE CBC W/AUTO DIFF WBC: CPT

## 2024-08-13 PROCEDURE — 2580000003 HC RX 258

## 2024-08-13 PROCEDURE — 85610 PROTHROMBIN TIME: CPT

## 2024-08-13 RX ORDER — WARFARIN SODIUM 5 MG/1
5 TABLET ORAL
Status: COMPLETED | OUTPATIENT
Start: 2024-08-13 | End: 2024-08-13

## 2024-08-13 RX ADMIN — SODIUM CHLORIDE, PRESERVATIVE FREE 10 ML: 5 INJECTION INTRAVENOUS at 19:42

## 2024-08-13 RX ADMIN — METOPROLOL TARTRATE 25 MG: 25 TABLET, FILM COATED ORAL at 08:36

## 2024-08-13 RX ADMIN — SODIUM BICARBONATE 325 MG: 650 TABLET ORAL at 19:44

## 2024-08-13 RX ADMIN — BUDESONIDE AND FORMOTEROL FUMARATE DIHYDRATE 2 PUFF: 160; 4.5 AEROSOL RESPIRATORY (INHALATION) at 07:53

## 2024-08-13 RX ADMIN — OXYCODONE HYDROCHLORIDE AND ACETAMINOPHEN 1 TABLET: 5; 325 TABLET ORAL at 06:06

## 2024-08-13 RX ADMIN — SPIRONOLACTONE 25 MG: 25 TABLET ORAL at 08:37

## 2024-08-13 RX ADMIN — Medication 1000 UNITS: at 08:36

## 2024-08-13 RX ADMIN — WARFARIN SODIUM 5 MG: 5 TABLET ORAL at 19:08

## 2024-08-13 RX ADMIN — PIPERACILLIN AND TAZOBACTAM 3375 MG: 3; .375 INJECTION, POWDER, FOR SOLUTION INTRAVENOUS at 19:41

## 2024-08-13 RX ADMIN — BUDESONIDE AND FORMOTEROL FUMARATE DIHYDRATE 2 PUFF: 160; 4.5 AEROSOL RESPIRATORY (INHALATION) at 17:46

## 2024-08-13 RX ADMIN — TIOTROPIUM BROMIDE INHALATION SPRAY 2 PUFF: 3.12 SPRAY, METERED RESPIRATORY (INHALATION) at 07:53

## 2024-08-13 RX ADMIN — ATORVASTATIN CALCIUM 10 MG: 10 TABLET, FILM COATED ORAL at 08:37

## 2024-08-13 RX ADMIN — PIPERACILLIN AND TAZOBACTAM 3375 MG: 3; .375 INJECTION, POWDER, FOR SOLUTION INTRAVENOUS at 04:19

## 2024-08-13 RX ADMIN — OXYCODONE HYDROCHLORIDE AND ACETAMINOPHEN 1 TABLET: 5; 325 TABLET ORAL at 19:49

## 2024-08-13 RX ADMIN — METOPROLOL TARTRATE 25 MG: 25 TABLET, FILM COATED ORAL at 19:45

## 2024-08-13 RX ADMIN — Medication 1 TABLET: at 21:03

## 2024-08-13 RX ADMIN — Medication 400 MG: at 08:36

## 2024-08-13 RX ADMIN — PIPERACILLIN AND TAZOBACTAM 3375 MG: 3; .375 INJECTION, POWDER, FOR SOLUTION INTRAVENOUS at 11:13

## 2024-08-13 RX ADMIN — OXYCODONE HYDROCHLORIDE AND ACETAMINOPHEN 1 TABLET: 5; 325 TABLET ORAL at 15:48

## 2024-08-13 RX ADMIN — SODIUM BICARBONATE 325 MG: 650 TABLET ORAL at 08:36

## 2024-08-13 RX ADMIN — OXYCODONE HYDROCHLORIDE AND ACETAMINOPHEN 1 TABLET: 5; 325 TABLET ORAL at 11:13

## 2024-08-13 RX ADMIN — MONTELUKAST SODIUM 10 MG: 10 TABLET ORAL at 19:44

## 2024-08-13 RX ADMIN — OXYCODONE HYDROCHLORIDE AND ACETAMINOPHEN 500 MG: 500 TABLET ORAL at 08:37

## 2024-08-13 RX ADMIN — PANTOPRAZOLE SODIUM 40 MG: 40 TABLET, DELAYED RELEASE ORAL at 06:01

## 2024-08-13 RX ADMIN — AMLODIPINE BESYLATE 5 MG: 5 TABLET ORAL at 08:37

## 2024-08-13 ASSESSMENT — PAIN SCALES - GENERAL
PAINLEVEL_OUTOF10: 3
PAINLEVEL_OUTOF10: 6
PAINLEVEL_OUTOF10: 2
PAINLEVEL_OUTOF10: 5
PAINLEVEL_OUTOF10: 3
PAINLEVEL_OUTOF10: 5
PAINLEVEL_OUTOF10: 6

## 2024-08-13 ASSESSMENT — PAIN DESCRIPTION - DESCRIPTORS
DESCRIPTORS: BURNING;DISCOMFORT
DESCRIPTORS: BURNING;DISCOMFORT

## 2024-08-13 ASSESSMENT — PAIN DESCRIPTION - ORIENTATION
ORIENTATION: RIGHT;LOWER
ORIENTATION: RIGHT;LOWER
ORIENTATION: RIGHT
ORIENTATION: RIGHT;LOWER

## 2024-08-13 ASSESSMENT — PAIN DESCRIPTION - LOCATION
LOCATION: LEG

## 2024-08-13 NOTE — PROCEDURES
PROCEDURE NOTE  Date: 8/12/2024   Name: Jasmina Powers  YOB: 1935    Procedures  12 lead EKG completed. Results handed to Naomy DARNELL.

## 2024-08-14 PROBLEM — B96.5 BACTEREMIA DUE TO PSEUDOMONAS: Status: ACTIVE | Noted: 2024-08-14

## 2024-08-14 PROBLEM — R53.81 PHYSICAL DECONDITIONING: Status: ACTIVE | Noted: 2024-08-14

## 2024-08-14 PROBLEM — R78.81 BACTEREMIA DUE TO PSEUDOMONAS: Status: ACTIVE | Noted: 2024-08-14

## 2024-08-14 LAB
GLUCOSE BLD STRIP.AUTO-MCNC: 130 MG/DL (ref 70–108)
GLUCOSE BLD STRIP.AUTO-MCNC: 147 MG/DL (ref 70–108)
GLUCOSE BLD STRIP.AUTO-MCNC: 194 MG/DL (ref 70–108)
GLUCOSE BLD STRIP.AUTO-MCNC: 210 MG/DL (ref 70–108)
INR PPP: 2.84 (ref 0.85–1.13)

## 2024-08-14 PROCEDURE — 85610 PROTHROMBIN TIME: CPT

## 2024-08-14 PROCEDURE — 97535 SELF CARE MNGMENT TRAINING: CPT

## 2024-08-14 PROCEDURE — 6360000002 HC RX W HCPCS

## 2024-08-14 PROCEDURE — 6370000000 HC RX 637 (ALT 250 FOR IP)

## 2024-08-14 PROCEDURE — 82948 REAGENT STRIP/BLOOD GLUCOSE: CPT

## 2024-08-14 PROCEDURE — 94640 AIRWAY INHALATION TREATMENT: CPT

## 2024-08-14 PROCEDURE — 97530 THERAPEUTIC ACTIVITIES: CPT

## 2024-08-14 PROCEDURE — 2580000003 HC RX 258

## 2024-08-14 PROCEDURE — 36415 COLL VENOUS BLD VENIPUNCTURE: CPT

## 2024-08-14 PROCEDURE — 97110 THERAPEUTIC EXERCISES: CPT

## 2024-08-14 PROCEDURE — 1200000003 HC TELEMETRY R&B

## 2024-08-14 PROCEDURE — 99232 SBSQ HOSP IP/OBS MODERATE 35: CPT

## 2024-08-14 RX ORDER — WARFARIN SODIUM 3 MG/1
3 TABLET ORAL
Status: COMPLETED | OUTPATIENT
Start: 2024-08-14 | End: 2024-08-14

## 2024-08-14 RX ADMIN — Medication 1000 UNITS: at 08:02

## 2024-08-14 RX ADMIN — PIPERACILLIN AND TAZOBACTAM 3375 MG: 3; .375 INJECTION, POWDER, FOR SOLUTION INTRAVENOUS at 19:22

## 2024-08-14 RX ADMIN — SODIUM BICARBONATE 325 MG: 650 TABLET ORAL at 19:49

## 2024-08-14 RX ADMIN — METOPROLOL TARTRATE 25 MG: 25 TABLET, FILM COATED ORAL at 08:02

## 2024-08-14 RX ADMIN — BUDESONIDE AND FORMOTEROL FUMARATE DIHYDRATE 2 PUFF: 160; 4.5 AEROSOL RESPIRATORY (INHALATION) at 18:18

## 2024-08-14 RX ADMIN — OXYCODONE HYDROCHLORIDE AND ACETAMINOPHEN 1 TABLET: 5; 325 TABLET ORAL at 10:20

## 2024-08-14 RX ADMIN — PANTOPRAZOLE SODIUM 40 MG: 40 TABLET, DELAYED RELEASE ORAL at 05:05

## 2024-08-14 RX ADMIN — WARFARIN SODIUM 3 MG: 3 TABLET ORAL at 18:16

## 2024-08-14 RX ADMIN — ATORVASTATIN CALCIUM 10 MG: 10 TABLET, FILM COATED ORAL at 08:02

## 2024-08-14 RX ADMIN — SODIUM BICARBONATE 325 MG: 650 TABLET ORAL at 08:02

## 2024-08-14 RX ADMIN — SPIRONOLACTONE 25 MG: 25 TABLET ORAL at 08:02

## 2024-08-14 RX ADMIN — METOPROLOL TARTRATE 25 MG: 25 TABLET, FILM COATED ORAL at 19:49

## 2024-08-14 RX ADMIN — OXYCODONE HYDROCHLORIDE AND ACETAMINOPHEN 2 TABLET: 5; 325 TABLET ORAL at 14:38

## 2024-08-14 RX ADMIN — DICLOFENAC SODIUM 2 G: 10 GEL TOPICAL at 19:50

## 2024-08-14 RX ADMIN — Medication 1 TABLET: at 22:14

## 2024-08-14 RX ADMIN — FLUTICASONE PROPIONATE 1 SPRAY: 50 SPRAY, METERED NASAL at 08:02

## 2024-08-14 RX ADMIN — TIOTROPIUM BROMIDE INHALATION SPRAY 2 PUFF: 3.12 SPRAY, METERED RESPIRATORY (INHALATION) at 08:46

## 2024-08-14 RX ADMIN — PIPERACILLIN AND TAZOBACTAM 3375 MG: 3; .375 INJECTION, POWDER, FOR SOLUTION INTRAVENOUS at 12:20

## 2024-08-14 RX ADMIN — AMLODIPINE BESYLATE 5 MG: 5 TABLET ORAL at 08:02

## 2024-08-14 RX ADMIN — Medication 400 MG: at 08:02

## 2024-08-14 RX ADMIN — OXYCODONE HYDROCHLORIDE AND ACETAMINOPHEN 2 TABLET: 5; 325 TABLET ORAL at 05:05

## 2024-08-14 RX ADMIN — OXYCODONE HYDROCHLORIDE AND ACETAMINOPHEN 1 TABLET: 5; 325 TABLET ORAL at 19:49

## 2024-08-14 RX ADMIN — PIPERACILLIN AND TAZOBACTAM 3375 MG: 3; .375 INJECTION, POWDER, FOR SOLUTION INTRAVENOUS at 03:43

## 2024-08-14 RX ADMIN — MONTELUKAST SODIUM 10 MG: 10 TABLET ORAL at 19:49

## 2024-08-14 RX ADMIN — OXYCODONE HYDROCHLORIDE AND ACETAMINOPHEN 500 MG: 500 TABLET ORAL at 08:02

## 2024-08-14 RX ADMIN — BUDESONIDE AND FORMOTEROL FUMARATE DIHYDRATE 2 PUFF: 160; 4.5 AEROSOL RESPIRATORY (INHALATION) at 08:46

## 2024-08-14 ASSESSMENT — PAIN SCALES - GENERAL
PAINLEVEL_OUTOF10: 5
PAINLEVEL_OUTOF10: 6
PAINLEVEL_OUTOF10: 8
PAINLEVEL_OUTOF10: 7
PAINLEVEL_OUTOF10: 5

## 2024-08-14 ASSESSMENT — PAIN - FUNCTIONAL ASSESSMENT
PAIN_FUNCTIONAL_ASSESSMENT: ACTIVITIES ARE NOT PREVENTED

## 2024-08-14 ASSESSMENT — PAIN DESCRIPTION - LOCATION
LOCATION: LEG

## 2024-08-14 ASSESSMENT — PAIN DESCRIPTION - ORIENTATION
ORIENTATION: RIGHT

## 2024-08-14 ASSESSMENT — PAIN DESCRIPTION - DESCRIPTORS
DESCRIPTORS: ACHING;THROBBING;DISCOMFORT
DESCRIPTORS: ACHING
DESCRIPTORS: ACHING;DISCOMFORT

## 2024-08-14 ASSESSMENT — PAIN DESCRIPTION - PAIN TYPE: TYPE: ACUTE PAIN

## 2024-08-14 NOTE — CARE COORDINATION
8/14/24, 2:51 PM EDT    DISCHARGE ON GOING EVALUATION    Jasmina Powers       Hospital day: 6  Location: [facility]24/024-A Reason for admit: Cellulitis of right lower extremity [L03.115]  Cellulitis, unspecified cellulitis site [L03.90]     Procedures: na    Imaging since last note: na    Barriers to Discharge: RLE dressing change by ID.  INR 2.84; Coumadin as ordered per pharmacy. needs RW/ordered.    PCP: Dimas Watts APRN - CNP  Readmission Risk Score: 19.4    Patient Goals/Plan/Treatment Preferences: from home with son Jhonny; discussed possible discharge plans again. Pt accepted HH list; will pick top 3 HH agencies and discus with son. OT recommending SNF.      She follows with Coumadin clinic here as OP.       Jasmina Powers was evaluated today and a DME order was entered for a wheeled walker because she requires this to successfully complete daily living tasks of toileting, personal cares, ambulating, and grooming.  A wheeled walker is necessary due to the patient's unsteady gait, upper body weakness, and inability to  an ambulation device; and she can ambulate only by pushing a walker instead of a lesser assistive device such as a cane, crutch, or standard walker.  The need for this equipment was discussed with the patient and she understands and is in agreement.

## 2024-08-15 ENCOUNTER — APPOINTMENT (OUTPATIENT)
Dept: GENERAL RADIOLOGY | Age: 89
DRG: 603 | End: 2024-08-15
Payer: MEDICARE

## 2024-08-15 LAB
ANION GAP SERPL CALC-SCNC: 11 MEQ/L (ref 8–16)
BACTERIA BLD AEROBE CULT: NORMAL
BASOPHILS ABSOLUTE: 0 THOU/MM3 (ref 0–0.1)
BASOPHILS NFR BLD AUTO: 0.2 %
BUN SERPL-MCNC: 8 MG/DL (ref 7–22)
CALCIUM SERPL-MCNC: 9.5 MG/DL (ref 8.5–10.5)
CHLORIDE SERPL-SCNC: 97 MEQ/L (ref 98–111)
CO2 SERPL-SCNC: 23 MEQ/L (ref 23–33)
CREAT SERPL-MCNC: 0.8 MG/DL (ref 0.4–1.2)
DEPRECATED RDW RBC AUTO: 36.6 FL (ref 35–45)
EOSINOPHIL NFR BLD AUTO: 1.2 %
EOSINOPHILS ABSOLUTE: 0.1 THOU/MM3 (ref 0–0.4)
ERYTHROCYTE [DISTWIDTH] IN BLOOD BY AUTOMATED COUNT: 13.9 % (ref 11.5–14.5)
GFR SERPL CREATININE-BSD FRML MDRD: 71 ML/MIN/1.73M2
GLUCOSE BLD STRIP.AUTO-MCNC: 129 MG/DL (ref 70–108)
GLUCOSE BLD STRIP.AUTO-MCNC: 149 MG/DL (ref 70–108)
GLUCOSE BLD STRIP.AUTO-MCNC: 166 MG/DL (ref 70–108)
GLUCOSE BLD STRIP.AUTO-MCNC: 232 MG/DL (ref 70–108)
GLUCOSE SERPL-MCNC: 140 MG/DL (ref 70–108)
HCT VFR BLD AUTO: 29.7 % (ref 37–47)
HGB BLD-MCNC: 9.5 GM/DL (ref 12–16)
IMM GRANULOCYTES # BLD AUTO: 0.22 THOU/MM3 (ref 0–0.07)
IMM GRANULOCYTES NFR BLD AUTO: 1.8 %
INR PPP: 3.49 (ref 0.85–1.13)
LYMPHOCYTES ABSOLUTE: 2.1 THOU/MM3 (ref 1–4.8)
LYMPHOCYTES NFR BLD AUTO: 16.7 %
MCH RBC QN AUTO: 23.6 PG (ref 26–33)
MCHC RBC AUTO-ENTMCNC: 32 GM/DL (ref 32.2–35.5)
MCV RBC AUTO: 73.9 FL (ref 81–99)
MONOCYTES ABSOLUTE: 0.8 THOU/MM3 (ref 0.4–1.3)
MONOCYTES NFR BLD AUTO: 6.1 %
NEUTROPHILS ABSOLUTE: 9.2 THOU/MM3 (ref 1.8–7.7)
NEUTROPHILS NFR BLD AUTO: 74 %
NRBC BLD AUTO-RTO: 0 /100 WBC
PLATELET # BLD AUTO: 309 THOU/MM3 (ref 130–400)
PMV BLD AUTO: 9.3 FL (ref 9.4–12.4)
POTASSIUM SERPL-SCNC: 4 MEQ/L (ref 3.5–5.2)
RBC # BLD AUTO: 4.02 MILL/MM3 (ref 4.2–5.4)
SODIUM SERPL-SCNC: 131 MEQ/L (ref 135–145)
WBC # BLD AUTO: 12.4 THOU/MM3 (ref 4.8–10.8)

## 2024-08-15 PROCEDURE — 99232 SBSQ HOSP IP/OBS MODERATE 35: CPT

## 2024-08-15 PROCEDURE — 71045 X-RAY EXAM CHEST 1 VIEW: CPT

## 2024-08-15 PROCEDURE — 6370000000 HC RX 637 (ALT 250 FOR IP)

## 2024-08-15 PROCEDURE — 1200000003 HC TELEMETRY R&B

## 2024-08-15 PROCEDURE — 97530 THERAPEUTIC ACTIVITIES: CPT

## 2024-08-15 PROCEDURE — 85025 COMPLETE CBC W/AUTO DIFF WBC: CPT

## 2024-08-15 PROCEDURE — 6360000002 HC RX W HCPCS

## 2024-08-15 PROCEDURE — 80048 BASIC METABOLIC PNL TOTAL CA: CPT

## 2024-08-15 PROCEDURE — 36415 COLL VENOUS BLD VENIPUNCTURE: CPT

## 2024-08-15 PROCEDURE — 82948 REAGENT STRIP/BLOOD GLUCOSE: CPT

## 2024-08-15 PROCEDURE — 2580000003 HC RX 258

## 2024-08-15 PROCEDURE — 85610 PROTHROMBIN TIME: CPT

## 2024-08-15 PROCEDURE — 94640 AIRWAY INHALATION TREATMENT: CPT

## 2024-08-15 PROCEDURE — 6360000002 HC RX W HCPCS: Performed by: INTERNAL MEDICINE

## 2024-08-15 PROCEDURE — 94761 N-INVAS EAR/PLS OXIMETRY MLT: CPT

## 2024-08-15 RX ORDER — GABAPENTIN 300 MG/1
300 CAPSULE ORAL 3 TIMES DAILY
Status: DISCONTINUED | OUTPATIENT
Start: 2024-08-15 | End: 2024-08-20 | Stop reason: HOSPADM

## 2024-08-15 RX ORDER — BUMETANIDE 0.25 MG/ML
1 INJECTION INTRAMUSCULAR; INTRAVENOUS ONCE
Status: COMPLETED | OUTPATIENT
Start: 2024-08-15 | End: 2024-08-15

## 2024-08-15 RX ADMIN — PIPERACILLIN AND TAZOBACTAM 3375 MG: 3; .375 INJECTION, POWDER, FOR SOLUTION INTRAVENOUS at 11:06

## 2024-08-15 RX ADMIN — BUDESONIDE AND FORMOTEROL FUMARATE DIHYDRATE 2 PUFF: 160; 4.5 AEROSOL RESPIRATORY (INHALATION) at 19:50

## 2024-08-15 RX ADMIN — SODIUM BICARBONATE 325 MG: 650 TABLET ORAL at 21:50

## 2024-08-15 RX ADMIN — BUMETANIDE 1 MG: 0.25 INJECTION INTRAMUSCULAR; INTRAVENOUS at 11:06

## 2024-08-15 RX ADMIN — GABAPENTIN 300 MG: 300 CAPSULE ORAL at 15:30

## 2024-08-15 RX ADMIN — SODIUM CHLORIDE, PRESERVATIVE FREE 10 ML: 5 INJECTION INTRAVENOUS at 21:52

## 2024-08-15 RX ADMIN — BUDESONIDE AND FORMOTEROL FUMARATE DIHYDRATE 2 PUFF: 160; 4.5 AEROSOL RESPIRATORY (INHALATION) at 10:31

## 2024-08-15 RX ADMIN — MONTELUKAST SODIUM 10 MG: 10 TABLET ORAL at 21:50

## 2024-08-15 RX ADMIN — SODIUM BICARBONATE 325 MG: 650 TABLET ORAL at 08:22

## 2024-08-15 RX ADMIN — OXYCODONE HYDROCHLORIDE AND ACETAMINOPHEN 2 TABLET: 5; 325 TABLET ORAL at 10:53

## 2024-08-15 RX ADMIN — PANTOPRAZOLE SODIUM 40 MG: 40 TABLET, DELAYED RELEASE ORAL at 08:23

## 2024-08-15 RX ADMIN — AMLODIPINE BESYLATE 5 MG: 5 TABLET ORAL at 08:22

## 2024-08-15 RX ADMIN — Medication 1000 UNITS: at 08:22

## 2024-08-15 RX ADMIN — METOPROLOL TARTRATE 25 MG: 25 TABLET, FILM COATED ORAL at 08:22

## 2024-08-15 RX ADMIN — SPIRONOLACTONE 25 MG: 25 TABLET ORAL at 08:22

## 2024-08-15 RX ADMIN — INSULIN LISPRO 2 UNITS: 100 INJECTION, SOLUTION INTRAVENOUS; SUBCUTANEOUS at 18:23

## 2024-08-15 RX ADMIN — Medication 400 MG: at 08:22

## 2024-08-15 RX ADMIN — GABAPENTIN 300 MG: 300 CAPSULE ORAL at 21:50

## 2024-08-15 RX ADMIN — TIOTROPIUM BROMIDE INHALATION SPRAY 2 PUFF: 3.12 SPRAY, METERED RESPIRATORY (INHALATION) at 10:31

## 2024-08-15 RX ADMIN — DICLOFENAC SODIUM 2 G: 10 GEL TOPICAL at 21:48

## 2024-08-15 RX ADMIN — FLUTICASONE PROPIONATE 1 SPRAY: 50 SPRAY, METERED NASAL at 08:22

## 2024-08-15 RX ADMIN — ATORVASTATIN CALCIUM 10 MG: 10 TABLET, FILM COATED ORAL at 08:22

## 2024-08-15 RX ADMIN — GABAPENTIN 300 MG: 300 CAPSULE ORAL at 11:35

## 2024-08-15 RX ADMIN — Medication 1 TABLET: at 21:50

## 2024-08-15 RX ADMIN — OXYCODONE HYDROCHLORIDE AND ACETAMINOPHEN 500 MG: 500 TABLET ORAL at 08:22

## 2024-08-15 RX ADMIN — PIPERACILLIN AND TAZOBACTAM 3375 MG: 3; .375 INJECTION, POWDER, FOR SOLUTION INTRAVENOUS at 03:33

## 2024-08-15 RX ADMIN — METOPROLOL TARTRATE 25 MG: 25 TABLET, FILM COATED ORAL at 21:50

## 2024-08-15 RX ADMIN — OXYCODONE HYDROCHLORIDE AND ACETAMINOPHEN 1 TABLET: 5; 325 TABLET ORAL at 03:30

## 2024-08-15 RX ADMIN — DICLOFENAC SODIUM 2 G: 10 GEL TOPICAL at 08:22

## 2024-08-15 ASSESSMENT — PAIN SCALES - GENERAL
PAINLEVEL_OUTOF10: 0
PAINLEVEL_OUTOF10: 0
PAINLEVEL_OUTOF10: 5
PAINLEVEL_OUTOF10: 1
PAINLEVEL_OUTOF10: 10
PAINLEVEL_OUTOF10: 0
PAINLEVEL_OUTOF10: 0
PAINLEVEL_OUTOF10: 4

## 2024-08-15 ASSESSMENT — PAIN - FUNCTIONAL ASSESSMENT
PAIN_FUNCTIONAL_ASSESSMENT: PREVENTS OR INTERFERES SOME ACTIVE ACTIVITIES AND ADLS
PAIN_FUNCTIONAL_ASSESSMENT: ACTIVITIES ARE NOT PREVENTED
PAIN_FUNCTIONAL_ASSESSMENT: ACTIVITIES ARE NOT PREVENTED

## 2024-08-15 ASSESSMENT — PAIN DESCRIPTION - ORIENTATION
ORIENTATION: RIGHT
ORIENTATION: RIGHT
ORIENTATION: RIGHT;LEFT
ORIENTATION: RIGHT

## 2024-08-15 ASSESSMENT — PAIN DESCRIPTION - FREQUENCY: FREQUENCY: CONTINUOUS

## 2024-08-15 ASSESSMENT — PAIN DESCRIPTION - ONSET: ONSET: ON-GOING

## 2024-08-15 ASSESSMENT — PAIN DESCRIPTION - LOCATION
LOCATION: LEG
LOCATION: LEG;KNEE

## 2024-08-15 ASSESSMENT — PAIN DESCRIPTION - PAIN TYPE
TYPE: ACUTE PAIN
TYPE: CHRONIC PAIN

## 2024-08-15 ASSESSMENT — PAIN DESCRIPTION - DESCRIPTORS
DESCRIPTORS: ACHING
DESCRIPTORS: ACHING
DESCRIPTORS: ACHING;DISCOMFORT

## 2024-08-15 ASSESSMENT — PAIN SCALES - WONG BAKER
WONGBAKER_NUMERICALRESPONSE: NO HURT
WONGBAKER_NUMERICALRESPONSE: NO HURT

## 2024-08-15 NOTE — CARE COORDINATION
8/15/24, 2:56 PM EDT    DISCHARGE ON GOING EVALUATION    Jasmina Powers       [facility] day: 7  Location: [facility]24/024-A Reason for admit: Cellulitis of right lower extremity [L03.115]  Cellulitis, unspecified cellulitis site [L03.90]     Procedures: na    Imaging since last note: na    Barriers to Discharge: Zosyn IV here, daily dressing changes to RLE per ID. Hospitalist follows. Cont therapy.    PCP: Dimas Watts APRN - CNP  Readmission Risk Score: 20    Patient Goals/Plan/Treatment Preferences: new SNF; will be precert.    2:56 PM  Dr Edwards called; he plans oral antibiotics at discharge. He discussed this with Dr Lopez ID.

## 2024-08-15 NOTE — CARE COORDINATION
DISCHARGE PLANNING EVALUATION  8/15/24, 2:19 PM EDT    Reason for Referral: discharge plan  Decision Maker: son assists with decisions, no POA  Current Services: none   New Services Requested: snf for rehab  Family/ Social/ Home environment: Jasmina lives at home alone, has family support   Payment Source:[employer] medicare   Transportation at Discharge: ambulance  Post-acute (PAC) provider list was provided to patient. Patient was informed of their freedom to choose PAC provider. Discussed and offered to show the patient the relevant PAC Providers quality and resource use measures on Medicare Compare web site via computer based on patient's goals of care and treatment preferences. Questions regarding selection process were answered.      Teach Back Method used with patient's son regarding care plan and discharge plan  Patient's son verbalized understanding of the plan of care and contribute to goal setting.       Patient preferences and discharge plan:  spoke with patient's son about discharge plan, patient was working with PT during discussion.  Patient and son are considering snf, preference [facility], referral made and facility is reviewing, will need precert if accepting.   [facility] will consider, but cannot manage IV antibiotics q8  Electronically signed by LISA Sheridan on 8/15/2024 at 2:19 PM

## 2024-08-16 LAB
ANION GAP SERPL CALC-SCNC: 12 MEQ/L (ref 8–16)
BACTERIA BLD AEROBE CULT: NORMAL
BACTERIA BLD AEROBE CULT: NORMAL
BASOPHILS ABSOLUTE: 0 THOU/MM3 (ref 0–0.1)
BASOPHILS NFR BLD AUTO: 0.2 %
BUN SERPL-MCNC: 10 MG/DL (ref 7–22)
CALCIUM SERPL-MCNC: 9.5 MG/DL (ref 8.5–10.5)
CHLORIDE SERPL-SCNC: 97 MEQ/L (ref 98–111)
CO2 SERPL-SCNC: 24 MEQ/L (ref 23–33)
CREAT SERPL-MCNC: 0.8 MG/DL (ref 0.4–1.2)
DEPRECATED RDW RBC AUTO: 36.2 FL (ref 35–45)
EOSINOPHIL NFR BLD AUTO: 0.6 %
EOSINOPHILS ABSOLUTE: 0.1 THOU/MM3 (ref 0–0.4)
ERYTHROCYTE [DISTWIDTH] IN BLOOD BY AUTOMATED COUNT: 13.7 % (ref 11.5–14.5)
GFR SERPL CREATININE-BSD FRML MDRD: 71 ML/MIN/1.73M2
GLUCOSE BLD STRIP.AUTO-MCNC: 138 MG/DL (ref 70–108)
GLUCOSE BLD STRIP.AUTO-MCNC: 153 MG/DL (ref 70–108)
GLUCOSE BLD STRIP.AUTO-MCNC: 186 MG/DL (ref 70–108)
GLUCOSE BLD STRIP.AUTO-MCNC: 205 MG/DL (ref 70–108)
GLUCOSE SERPL-MCNC: 134 MG/DL (ref 70–108)
HCT VFR BLD AUTO: 29.2 % (ref 37–47)
HGB BLD-MCNC: 9.3 GM/DL (ref 12–16)
IMM GRANULOCYTES # BLD AUTO: 0.15 THOU/MM3 (ref 0–0.07)
IMM GRANULOCYTES NFR BLD AUTO: 1.3 %
INR PPP: 2.85 (ref 0.85–1.13)
LACTATE SERPL-SCNC: 1.1 MMOL/L (ref 0.5–2)
LYMPHOCYTES ABSOLUTE: 1.7 THOU/MM3 (ref 1–4.8)
LYMPHOCYTES NFR BLD AUTO: 14 %
MAGNESIUM SERPL-MCNC: 1.7 MG/DL (ref 1.6–2.4)
MCH RBC QN AUTO: 23.4 PG (ref 26–33)
MCHC RBC AUTO-ENTMCNC: 31.8 GM/DL (ref 32.2–35.5)
MCV RBC AUTO: 73.4 FL (ref 81–99)
MONOCYTES ABSOLUTE: 0.7 THOU/MM3 (ref 0.4–1.3)
MONOCYTES NFR BLD AUTO: 6.3 %
NEUTROPHILS ABSOLUTE: 9.2 THOU/MM3 (ref 1.8–7.7)
NEUTROPHILS NFR BLD AUTO: 77.6 %
NRBC BLD AUTO-RTO: 0 /100 WBC
PLATELET # BLD AUTO: 350 THOU/MM3 (ref 130–400)
PMV BLD AUTO: 9.2 FL (ref 9.4–12.4)
POTASSIUM SERPL-SCNC: 4.3 MEQ/L (ref 3.5–5.2)
PROCALCITONIN SERPL IA-MCNC: 0.15 NG/ML (ref 0.01–0.09)
RBC # BLD AUTO: 3.98 MILL/MM3 (ref 4.2–5.4)
SODIUM SERPL-SCNC: 133 MEQ/L (ref 135–145)
WBC # BLD AUTO: 11.8 THOU/MM3 (ref 4.8–10.8)

## 2024-08-16 PROCEDURE — 85610 PROTHROMBIN TIME: CPT

## 2024-08-16 PROCEDURE — 97110 THERAPEUTIC EXERCISES: CPT

## 2024-08-16 PROCEDURE — 6370000000 HC RX 637 (ALT 250 FOR IP)

## 2024-08-16 PROCEDURE — 83605 ASSAY OF LACTIC ACID: CPT

## 2024-08-16 PROCEDURE — 2580000003 HC RX 258: Performed by: INTERNAL MEDICINE

## 2024-08-16 PROCEDURE — 97530 THERAPEUTIC ACTIVITIES: CPT

## 2024-08-16 PROCEDURE — 36415 COLL VENOUS BLD VENIPUNCTURE: CPT

## 2024-08-16 PROCEDURE — 1200000003 HC TELEMETRY R&B

## 2024-08-16 PROCEDURE — 94640 AIRWAY INHALATION TREATMENT: CPT

## 2024-08-16 PROCEDURE — 6360000002 HC RX W HCPCS

## 2024-08-16 PROCEDURE — 82948 REAGENT STRIP/BLOOD GLUCOSE: CPT

## 2024-08-16 PROCEDURE — 99232 SBSQ HOSP IP/OBS MODERATE 35: CPT

## 2024-08-16 PROCEDURE — 97535 SELF CARE MNGMENT TRAINING: CPT

## 2024-08-16 PROCEDURE — 83735 ASSAY OF MAGNESIUM: CPT

## 2024-08-16 PROCEDURE — 85025 COMPLETE CBC W/AUTO DIFF WBC: CPT

## 2024-08-16 PROCEDURE — 2580000003 HC RX 258

## 2024-08-16 PROCEDURE — 84145 PROCALCITONIN (PCT): CPT

## 2024-08-16 PROCEDURE — 80048 BASIC METABOLIC PNL TOTAL CA: CPT

## 2024-08-16 PROCEDURE — 6360000002 HC RX W HCPCS: Performed by: INTERNAL MEDICINE

## 2024-08-16 RX ORDER — DULOXETIN HYDROCHLORIDE 30 MG/1
30 CAPSULE, DELAYED RELEASE ORAL DAILY
Status: DISCONTINUED | OUTPATIENT
Start: 2024-08-16 | End: 2024-08-20 | Stop reason: HOSPADM

## 2024-08-16 RX ORDER — WARFARIN SODIUM 2.5 MG/1
2.5 TABLET ORAL
Status: COMPLETED | OUTPATIENT
Start: 2024-08-16 | End: 2024-08-16

## 2024-08-16 RX ORDER — BUMETANIDE 1 MG/1
1 TABLET ORAL ONCE
Status: COMPLETED | OUTPATIENT
Start: 2024-08-16 | End: 2024-08-16

## 2024-08-16 RX ORDER — MAGNESIUM SULFATE IN WATER 40 MG/ML
2000 INJECTION, SOLUTION INTRAVENOUS ONCE
Status: COMPLETED | OUTPATIENT
Start: 2024-08-16 | End: 2024-08-16

## 2024-08-16 RX ADMIN — FLUTICASONE PROPIONATE 1 SPRAY: 50 SPRAY, METERED NASAL at 08:06

## 2024-08-16 RX ADMIN — TIOTROPIUM BROMIDE INHALATION SPRAY 2 PUFF: 3.12 SPRAY, METERED RESPIRATORY (INHALATION) at 08:20

## 2024-08-16 RX ADMIN — Medication 1 TABLET: at 21:38

## 2024-08-16 RX ADMIN — SODIUM CHLORIDE, PRESERVATIVE FREE 10 ML: 5 INJECTION INTRAVENOUS at 21:42

## 2024-08-16 RX ADMIN — SODIUM BICARBONATE 325 MG: 650 TABLET ORAL at 08:05

## 2024-08-16 RX ADMIN — MONTELUKAST SODIUM 10 MG: 10 TABLET ORAL at 21:38

## 2024-08-16 RX ADMIN — PANTOPRAZOLE SODIUM 40 MG: 40 TABLET, DELAYED RELEASE ORAL at 06:51

## 2024-08-16 RX ADMIN — SODIUM BICARBONATE 325 MG: 650 TABLET ORAL at 21:38

## 2024-08-16 RX ADMIN — AMLODIPINE BESYLATE 5 MG: 5 TABLET ORAL at 08:05

## 2024-08-16 RX ADMIN — Medication 1000 UNITS: at 08:05

## 2024-08-16 RX ADMIN — Medication 400 MG: at 08:05

## 2024-08-16 RX ADMIN — METOPROLOL TARTRATE 25 MG: 25 TABLET, FILM COATED ORAL at 08:05

## 2024-08-16 RX ADMIN — DICLOFENAC SODIUM 2 G: 10 GEL TOPICAL at 08:06

## 2024-08-16 RX ADMIN — GABAPENTIN 300 MG: 300 CAPSULE ORAL at 08:05

## 2024-08-16 RX ADMIN — SODIUM CHLORIDE, PRESERVATIVE FREE 10 ML: 5 INJECTION INTRAVENOUS at 08:06

## 2024-08-16 RX ADMIN — INSULIN LISPRO 2 UNITS: 100 INJECTION, SOLUTION INTRAVENOUS; SUBCUTANEOUS at 17:52

## 2024-08-16 RX ADMIN — MAGNESIUM SULFATE HEPTAHYDRATE 2000 MG: 40 INJECTION, SOLUTION INTRAVENOUS at 11:12

## 2024-08-16 RX ADMIN — WARFARIN SODIUM 2.5 MG: 2.5 TABLET ORAL at 18:14

## 2024-08-16 RX ADMIN — ATORVASTATIN CALCIUM 10 MG: 10 TABLET, FILM COATED ORAL at 08:06

## 2024-08-16 RX ADMIN — PIPERACILLIN AND TAZOBACTAM 3375 MG: 3; .375 INJECTION, POWDER, FOR SOLUTION INTRAVENOUS at 14:43

## 2024-08-16 RX ADMIN — DULOXETINE HYDROCHLORIDE 30 MG: 30 CAPSULE, DELAYED RELEASE ORAL at 11:11

## 2024-08-16 RX ADMIN — OXYCODONE HYDROCHLORIDE AND ACETAMINOPHEN 2 TABLET: 5; 325 TABLET ORAL at 16:06

## 2024-08-16 RX ADMIN — OXYCODONE HYDROCHLORIDE AND ACETAMINOPHEN 500 MG: 500 TABLET ORAL at 08:05

## 2024-08-16 RX ADMIN — SPIRONOLACTONE 25 MG: 25 TABLET ORAL at 08:05

## 2024-08-16 RX ADMIN — OXYCODONE HYDROCHLORIDE AND ACETAMINOPHEN 2 TABLET: 5; 325 TABLET ORAL at 09:29

## 2024-08-16 RX ADMIN — PIPERACILLIN AND TAZOBACTAM 3375 MG: 3; .375 INJECTION, POWDER, FOR SOLUTION INTRAVENOUS at 21:36

## 2024-08-16 RX ADMIN — BUMETANIDE 1 MG: 1 TABLET ORAL at 11:11

## 2024-08-16 RX ADMIN — BUDESONIDE AND FORMOTEROL FUMARATE DIHYDRATE 2 PUFF: 160; 4.5 AEROSOL RESPIRATORY (INHALATION) at 08:20

## 2024-08-16 RX ADMIN — BUDESONIDE AND FORMOTEROL FUMARATE DIHYDRATE 2 PUFF: 160; 4.5 AEROSOL RESPIRATORY (INHALATION) at 21:36

## 2024-08-16 ASSESSMENT — PAIN SCALES - GENERAL
PAINLEVEL_OUTOF10: 0
PAINLEVEL_OUTOF10: 10
PAINLEVEL_OUTOF10: 7

## 2024-08-16 ASSESSMENT — PAIN - FUNCTIONAL ASSESSMENT
PAIN_FUNCTIONAL_ASSESSMENT: ACTIVITIES ARE NOT PREVENTED
PAIN_FUNCTIONAL_ASSESSMENT: ACTIVITIES ARE NOT PREVENTED

## 2024-08-16 ASSESSMENT — PAIN DESCRIPTION - ORIENTATION
ORIENTATION: RIGHT
ORIENTATION: RIGHT

## 2024-08-16 ASSESSMENT — PAIN DESCRIPTION - DESCRIPTORS
DESCRIPTORS: ACHING
DESCRIPTORS: ACHING

## 2024-08-16 ASSESSMENT — PAIN DESCRIPTION - LOCATION: LOCATION: LEG

## 2024-08-16 NOTE — CARE COORDINATION
8/16/24, 1:05 PM EDT    DISCHARGE PLANNING EVALUATION    Spoke with patient's son.  He is aware [facility] cannot accommodate q8 IV antibiotics.  He wants to speak with physician and consider options before making a decision.  [facility] will consider if antibiotic plan changes. Will need precert for snf.

## 2024-08-17 LAB
GLUCOSE BLD STRIP.AUTO-MCNC: 154 MG/DL (ref 70–108)
GLUCOSE BLD STRIP.AUTO-MCNC: 162 MG/DL (ref 70–108)
GLUCOSE BLD STRIP.AUTO-MCNC: 179 MG/DL (ref 70–108)
GLUCOSE BLD STRIP.AUTO-MCNC: 198 MG/DL (ref 70–108)
INR PPP: 2.18 (ref 0.85–1.13)

## 2024-08-17 PROCEDURE — 6370000000 HC RX 637 (ALT 250 FOR IP)

## 2024-08-17 PROCEDURE — 99232 SBSQ HOSP IP/OBS MODERATE 35: CPT

## 2024-08-17 PROCEDURE — 94761 N-INVAS EAR/PLS OXIMETRY MLT: CPT

## 2024-08-17 PROCEDURE — 36415 COLL VENOUS BLD VENIPUNCTURE: CPT

## 2024-08-17 PROCEDURE — 82948 REAGENT STRIP/BLOOD GLUCOSE: CPT

## 2024-08-17 PROCEDURE — 6360000002 HC RX W HCPCS: Performed by: INTERNAL MEDICINE

## 2024-08-17 PROCEDURE — 85610 PROTHROMBIN TIME: CPT

## 2024-08-17 PROCEDURE — 2580000003 HC RX 258

## 2024-08-17 PROCEDURE — 2580000003 HC RX 258: Performed by: INTERNAL MEDICINE

## 2024-08-17 PROCEDURE — 1200000003 HC TELEMETRY R&B

## 2024-08-17 PROCEDURE — 94640 AIRWAY INHALATION TREATMENT: CPT

## 2024-08-17 PROCEDURE — 6370000000 HC RX 637 (ALT 250 FOR IP): Performed by: PHARMACIST

## 2024-08-17 RX ORDER — WARFARIN SODIUM 3 MG/1
6 TABLET ORAL ONCE
Status: COMPLETED | OUTPATIENT
Start: 2024-08-17 | End: 2024-08-17

## 2024-08-17 RX ADMIN — SODIUM CHLORIDE, PRESERVATIVE FREE 10 ML: 5 INJECTION INTRAVENOUS at 08:13

## 2024-08-17 RX ADMIN — OXYCODONE HYDROCHLORIDE AND ACETAMINOPHEN 500 MG: 500 TABLET ORAL at 08:13

## 2024-08-17 RX ADMIN — WARFARIN SODIUM 6 MG: 3 TABLET ORAL at 17:15

## 2024-08-17 RX ADMIN — FLUTICASONE PROPIONATE 1 SPRAY: 50 SPRAY, METERED NASAL at 08:14

## 2024-08-17 RX ADMIN — SODIUM BICARBONATE 325 MG: 650 TABLET ORAL at 08:13

## 2024-08-17 RX ADMIN — METOPROLOL TARTRATE 25 MG: 25 TABLET, FILM COATED ORAL at 08:13

## 2024-08-17 RX ADMIN — BUMETANIDE 0.5 MG: 0.5 TABLET ORAL at 08:13

## 2024-08-17 RX ADMIN — BUDESONIDE AND FORMOTEROL FUMARATE DIHYDRATE 2 PUFF: 160; 4.5 AEROSOL RESPIRATORY (INHALATION) at 20:45

## 2024-08-17 RX ADMIN — METOPROLOL TARTRATE 25 MG: 25 TABLET, FILM COATED ORAL at 19:59

## 2024-08-17 RX ADMIN — PIPERACILLIN AND TAZOBACTAM 3375 MG: 3; .375 INJECTION, POWDER, FOR SOLUTION INTRAVENOUS at 04:04

## 2024-08-17 RX ADMIN — SODIUM BICARBONATE 325 MG: 650 TABLET ORAL at 19:59

## 2024-08-17 RX ADMIN — Medication 1000 UNITS: at 08:13

## 2024-08-17 RX ADMIN — PIPERACILLIN AND TAZOBACTAM 3375 MG: 3; .375 INJECTION, POWDER, FOR SOLUTION INTRAVENOUS at 11:32

## 2024-08-17 RX ADMIN — PIPERACILLIN AND TAZOBACTAM 3375 MG: 3; .375 INJECTION, POWDER, FOR SOLUTION INTRAVENOUS at 18:32

## 2024-08-17 RX ADMIN — MONTELUKAST SODIUM 10 MG: 10 TABLET ORAL at 20:00

## 2024-08-17 RX ADMIN — Medication 400 MG: at 08:13

## 2024-08-17 RX ADMIN — BUDESONIDE AND FORMOTEROL FUMARATE DIHYDRATE 2 PUFF: 160; 4.5 AEROSOL RESPIRATORY (INHALATION) at 08:45

## 2024-08-17 RX ADMIN — ATORVASTATIN CALCIUM 10 MG: 10 TABLET, FILM COATED ORAL at 08:13

## 2024-08-17 RX ADMIN — AMLODIPINE BESYLATE 5 MG: 5 TABLET ORAL at 08:13

## 2024-08-17 RX ADMIN — ACETAMINOPHEN 650 MG: 325 TABLET ORAL at 11:35

## 2024-08-17 RX ADMIN — OXYCODONE HYDROCHLORIDE AND ACETAMINOPHEN 2 TABLET: 5; 325 TABLET ORAL at 19:59

## 2024-08-17 RX ADMIN — DULOXETINE HYDROCHLORIDE 30 MG: 30 CAPSULE, DELAYED RELEASE ORAL at 08:13

## 2024-08-17 RX ADMIN — TIOTROPIUM BROMIDE INHALATION SPRAY 2 PUFF: 3.12 SPRAY, METERED RESPIRATORY (INHALATION) at 08:45

## 2024-08-17 RX ADMIN — PANTOPRAZOLE SODIUM 40 MG: 40 TABLET, DELAYED RELEASE ORAL at 05:50

## 2024-08-17 RX ADMIN — ACETAMINOPHEN 650 MG: 325 TABLET ORAL at 02:51

## 2024-08-17 RX ADMIN — Medication 1 TABLET: at 20:00

## 2024-08-17 RX ADMIN — SPIRONOLACTONE 25 MG: 25 TABLET ORAL at 08:13

## 2024-08-17 ASSESSMENT — PAIN SCALES - GENERAL
PAINLEVEL_OUTOF10: 8
PAINLEVEL_OUTOF10: 0
PAINLEVEL_OUTOF10: 3
PAINLEVEL_OUTOF10: 10
PAINLEVEL_OUTOF10: 6
PAINLEVEL_OUTOF10: 3
PAINLEVEL_OUTOF10: 0

## 2024-08-17 ASSESSMENT — PAIN DESCRIPTION - ONSET
ONSET: ON-GOING
ONSET: ON-GOING

## 2024-08-17 ASSESSMENT — PAIN DESCRIPTION - DESCRIPTORS
DESCRIPTORS: ACHING

## 2024-08-17 ASSESSMENT — PAIN - FUNCTIONAL ASSESSMENT
PAIN_FUNCTIONAL_ASSESSMENT: ACTIVITIES ARE NOT PREVENTED

## 2024-08-17 ASSESSMENT — PAIN DESCRIPTION - LOCATION
LOCATION: LEG
LOCATION: LEG
LOCATION: NECK
LOCATION: LEG
LOCATION: LEG

## 2024-08-17 ASSESSMENT — PAIN DESCRIPTION - FREQUENCY
FREQUENCY: CONTINUOUS
FREQUENCY: CONTINUOUS

## 2024-08-17 ASSESSMENT — PAIN DESCRIPTION - ORIENTATION
ORIENTATION: LEFT
ORIENTATION: RIGHT

## 2024-08-17 ASSESSMENT — PAIN DESCRIPTION - PAIN TYPE
TYPE: ACUTE PAIN;SURGICAL PAIN
TYPE: ACUTE PAIN

## 2024-08-18 LAB
ANION GAP SERPL CALC-SCNC: 13 MEQ/L (ref 8–16)
BUN SERPL-MCNC: 11 MG/DL (ref 7–22)
CALCIUM SERPL-MCNC: 9.8 MG/DL (ref 8.5–10.5)
CHLORIDE SERPL-SCNC: 93 MEQ/L (ref 98–111)
CO2 SERPL-SCNC: 25 MEQ/L (ref 23–33)
CREAT SERPL-MCNC: 0.7 MG/DL (ref 0.4–1.2)
GFR SERPL CREATININE-BSD FRML MDRD: 83 ML/MIN/1.73M2
GLUCOSE BLD STRIP.AUTO-MCNC: 138 MG/DL (ref 70–108)
GLUCOSE BLD STRIP.AUTO-MCNC: 177 MG/DL (ref 70–108)
GLUCOSE BLD STRIP.AUTO-MCNC: 182 MG/DL (ref 70–108)
GLUCOSE BLD STRIP.AUTO-MCNC: 201 MG/DL (ref 70–108)
GLUCOSE SERPL-MCNC: 129 MG/DL (ref 70–108)
INR PPP: 2.08 (ref 0.85–1.13)
POTASSIUM SERPL-SCNC: 4.2 MEQ/L (ref 3.5–5.2)
SODIUM SERPL-SCNC: 131 MEQ/L (ref 135–145)

## 2024-08-18 PROCEDURE — 2580000003 HC RX 258

## 2024-08-18 PROCEDURE — 36415 COLL VENOUS BLD VENIPUNCTURE: CPT

## 2024-08-18 PROCEDURE — 6370000000 HC RX 637 (ALT 250 FOR IP)

## 2024-08-18 PROCEDURE — 1200000003 HC TELEMETRY R&B

## 2024-08-18 PROCEDURE — 80048 BASIC METABOLIC PNL TOTAL CA: CPT

## 2024-08-18 PROCEDURE — 2580000003 HC RX 258: Performed by: INTERNAL MEDICINE

## 2024-08-18 PROCEDURE — 6360000002 HC RX W HCPCS: Performed by: INTERNAL MEDICINE

## 2024-08-18 PROCEDURE — 82948 REAGENT STRIP/BLOOD GLUCOSE: CPT

## 2024-08-18 PROCEDURE — 6370000000 HC RX 637 (ALT 250 FOR IP): Performed by: PHARMACIST

## 2024-08-18 PROCEDURE — 94640 AIRWAY INHALATION TREATMENT: CPT

## 2024-08-18 PROCEDURE — 94761 N-INVAS EAR/PLS OXIMETRY MLT: CPT

## 2024-08-18 PROCEDURE — 85610 PROTHROMBIN TIME: CPT

## 2024-08-18 RX ORDER — WARFARIN SODIUM 3 MG/1
3 TABLET ORAL ONCE
Status: COMPLETED | OUTPATIENT
Start: 2024-08-18 | End: 2024-08-18

## 2024-08-18 RX ADMIN — BUMETANIDE 0.5 MG: 0.5 TABLET ORAL at 08:36

## 2024-08-18 RX ADMIN — SODIUM CHLORIDE, PRESERVATIVE FREE 10 ML: 5 INJECTION INTRAVENOUS at 21:07

## 2024-08-18 RX ADMIN — OXYCODONE HYDROCHLORIDE AND ACETAMINOPHEN 1 TABLET: 5; 325 TABLET ORAL at 08:36

## 2024-08-18 RX ADMIN — Medication 1 TABLET: at 21:06

## 2024-08-18 RX ADMIN — BUDESONIDE AND FORMOTEROL FUMARATE DIHYDRATE 2 PUFF: 160; 4.5 AEROSOL RESPIRATORY (INHALATION) at 21:19

## 2024-08-18 RX ADMIN — BUDESONIDE AND FORMOTEROL FUMARATE DIHYDRATE 2 PUFF: 160; 4.5 AEROSOL RESPIRATORY (INHALATION) at 09:54

## 2024-08-18 RX ADMIN — DULOXETINE HYDROCHLORIDE 30 MG: 30 CAPSULE, DELAYED RELEASE ORAL at 08:36

## 2024-08-18 RX ADMIN — DICLOFENAC SODIUM 2 G: 10 GEL TOPICAL at 21:04

## 2024-08-18 RX ADMIN — PIPERACILLIN AND TAZOBACTAM 3375 MG: 3; .375 INJECTION, POWDER, FOR SOLUTION INTRAVENOUS at 04:01

## 2024-08-18 RX ADMIN — OXYCODONE HYDROCHLORIDE AND ACETAMINOPHEN 500 MG: 500 TABLET ORAL at 08:36

## 2024-08-18 RX ADMIN — SODIUM CHLORIDE, PRESERVATIVE FREE 10 ML: 5 INJECTION INTRAVENOUS at 08:40

## 2024-08-18 RX ADMIN — OXYCODONE HYDROCHLORIDE AND ACETAMINOPHEN 1 TABLET: 5; 325 TABLET ORAL at 16:41

## 2024-08-18 RX ADMIN — ACETAMINOPHEN 650 MG: 325 TABLET ORAL at 04:58

## 2024-08-18 RX ADMIN — SODIUM BICARBONATE 325 MG: 650 TABLET ORAL at 21:06

## 2024-08-18 RX ADMIN — PIPERACILLIN AND TAZOBACTAM 3375 MG: 3; .375 INJECTION, POWDER, FOR SOLUTION INTRAVENOUS at 11:26

## 2024-08-18 RX ADMIN — Medication 400 MG: at 08:36

## 2024-08-18 RX ADMIN — MONTELUKAST SODIUM 10 MG: 10 TABLET ORAL at 21:06

## 2024-08-18 RX ADMIN — AMLODIPINE BESYLATE 5 MG: 5 TABLET ORAL at 08:36

## 2024-08-18 RX ADMIN — METOPROLOL TARTRATE 25 MG: 25 TABLET, FILM COATED ORAL at 21:06

## 2024-08-18 RX ADMIN — PIPERACILLIN AND TAZOBACTAM 3375 MG: 3; .375 INJECTION, POWDER, FOR SOLUTION INTRAVENOUS at 18:38

## 2024-08-18 RX ADMIN — Medication 1000 UNITS: at 08:36

## 2024-08-18 RX ADMIN — FLUTICASONE PROPIONATE 1 SPRAY: 50 SPRAY, METERED NASAL at 08:40

## 2024-08-18 RX ADMIN — WARFARIN SODIUM 3 MG: 3 TABLET ORAL at 18:33

## 2024-08-18 RX ADMIN — ATORVASTATIN CALCIUM 10 MG: 10 TABLET, FILM COATED ORAL at 08:36

## 2024-08-18 RX ADMIN — SODIUM BICARBONATE 325 MG: 650 TABLET ORAL at 08:36

## 2024-08-18 RX ADMIN — TIOTROPIUM BROMIDE INHALATION SPRAY 2 PUFF: 3.12 SPRAY, METERED RESPIRATORY (INHALATION) at 09:54

## 2024-08-18 RX ADMIN — SPIRONOLACTONE 25 MG: 25 TABLET ORAL at 08:36

## 2024-08-18 RX ADMIN — METOPROLOL TARTRATE 25 MG: 25 TABLET, FILM COATED ORAL at 08:36

## 2024-08-18 RX ADMIN — PANTOPRAZOLE SODIUM 40 MG: 40 TABLET, DELAYED RELEASE ORAL at 04:58

## 2024-08-18 ASSESSMENT — PAIN DESCRIPTION - LOCATION
LOCATION: OTHER (COMMENT)
LOCATION: LEG

## 2024-08-18 ASSESSMENT — PAIN SCALES - GENERAL
PAINLEVEL_OUTOF10: 5
PAINLEVEL_OUTOF10: 0
PAINLEVEL_OUTOF10: 6
PAINLEVEL_OUTOF10: 3

## 2024-08-18 ASSESSMENT — PAIN DESCRIPTION - DESCRIPTORS
DESCRIPTORS: ACHING

## 2024-08-18 ASSESSMENT — PAIN DESCRIPTION - ONSET
ONSET: ON-GOING
ONSET: ON-GOING

## 2024-08-18 ASSESSMENT — PAIN DESCRIPTION - ORIENTATION
ORIENTATION: RIGHT

## 2024-08-18 ASSESSMENT — PAIN DESCRIPTION - PAIN TYPE
TYPE: ACUTE PAIN;SURGICAL PAIN
TYPE: ACUTE PAIN;SURGICAL PAIN

## 2024-08-18 ASSESSMENT — PAIN DESCRIPTION - FREQUENCY
FREQUENCY: CONTINUOUS
FREQUENCY: CONTINUOUS

## 2024-08-18 ASSESSMENT — PAIN - FUNCTIONAL ASSESSMENT
PAIN_FUNCTIONAL_ASSESSMENT: PREVENTS OR INTERFERES SOME ACTIVE ACTIVITIES AND ADLS
PAIN_FUNCTIONAL_ASSESSMENT: PREVENTS OR INTERFERES SOME ACTIVE ACTIVITIES AND ADLS
PAIN_FUNCTIONAL_ASSESSMENT: ACTIVITIES ARE NOT PREVENTED
PAIN_FUNCTIONAL_ASSESSMENT: ACTIVITIES ARE NOT PREVENTED

## 2024-08-19 LAB
GLUCOSE BLD STRIP.AUTO-MCNC: 124 MG/DL (ref 70–108)
GLUCOSE BLD STRIP.AUTO-MCNC: 145 MG/DL (ref 70–108)
GLUCOSE BLD STRIP.AUTO-MCNC: 169 MG/DL (ref 70–108)
GLUCOSE BLD STRIP.AUTO-MCNC: 195 MG/DL (ref 70–108)
INR PPP: 2.42 (ref 0.85–1.13)

## 2024-08-19 PROCEDURE — 6360000002 HC RX W HCPCS: Performed by: INTERNAL MEDICINE

## 2024-08-19 PROCEDURE — 1200000003 HC TELEMETRY R&B

## 2024-08-19 PROCEDURE — 2580000003 HC RX 258: Performed by: INTERNAL MEDICINE

## 2024-08-19 PROCEDURE — 6370000000 HC RX 637 (ALT 250 FOR IP)

## 2024-08-19 PROCEDURE — 36415 COLL VENOUS BLD VENIPUNCTURE: CPT

## 2024-08-19 PROCEDURE — 99232 SBSQ HOSP IP/OBS MODERATE 35: CPT | Performed by: INTERNAL MEDICINE

## 2024-08-19 PROCEDURE — 2580000003 HC RX 258

## 2024-08-19 PROCEDURE — 94640 AIRWAY INHALATION TREATMENT: CPT

## 2024-08-19 PROCEDURE — 85610 PROTHROMBIN TIME: CPT

## 2024-08-19 PROCEDURE — 82948 REAGENT STRIP/BLOOD GLUCOSE: CPT

## 2024-08-19 RX ORDER — WARFARIN SODIUM 3 MG/1
3 TABLET ORAL
Status: COMPLETED | OUTPATIENT
Start: 2024-08-19 | End: 2024-08-19

## 2024-08-19 RX ADMIN — ATORVASTATIN CALCIUM 10 MG: 10 TABLET, FILM COATED ORAL at 09:04

## 2024-08-19 RX ADMIN — OXYCODONE HYDROCHLORIDE AND ACETAMINOPHEN 2 TABLET: 5; 325 TABLET ORAL at 21:35

## 2024-08-19 RX ADMIN — BUMETANIDE 0.5 MG: 0.5 TABLET ORAL at 09:04

## 2024-08-19 RX ADMIN — PANTOPRAZOLE SODIUM 40 MG: 40 TABLET, DELAYED RELEASE ORAL at 06:21

## 2024-08-19 RX ADMIN — SODIUM BICARBONATE 325 MG: 650 TABLET ORAL at 09:08

## 2024-08-19 RX ADMIN — SODIUM BICARBONATE 325 MG: 650 TABLET ORAL at 21:36

## 2024-08-19 RX ADMIN — METOPROLOL TARTRATE 25 MG: 25 TABLET, FILM COATED ORAL at 09:08

## 2024-08-19 RX ADMIN — METOPROLOL TARTRATE 25 MG: 25 TABLET, FILM COATED ORAL at 21:34

## 2024-08-19 RX ADMIN — Medication 1000 UNITS: at 09:08

## 2024-08-19 RX ADMIN — Medication 400 MG: at 09:08

## 2024-08-19 RX ADMIN — OXYCODONE HYDROCHLORIDE AND ACETAMINOPHEN 500 MG: 500 TABLET ORAL at 09:04

## 2024-08-19 RX ADMIN — WARFARIN SODIUM 3 MG: 3 TABLET ORAL at 17:45

## 2024-08-19 RX ADMIN — BUDESONIDE AND FORMOTEROL FUMARATE DIHYDRATE 2 PUFF: 160; 4.5 AEROSOL RESPIRATORY (INHALATION) at 09:39

## 2024-08-19 RX ADMIN — PIPERACILLIN AND TAZOBACTAM 3375 MG: 3; .375 INJECTION, POWDER, FOR SOLUTION INTRAVENOUS at 02:56

## 2024-08-19 RX ADMIN — DICLOFENAC SODIUM 2 G: 10 GEL TOPICAL at 21:36

## 2024-08-19 RX ADMIN — AMLODIPINE BESYLATE 5 MG: 5 TABLET ORAL at 09:04

## 2024-08-19 RX ADMIN — PIPERACILLIN AND TAZOBACTAM 3375 MG: 3; .375 INJECTION, POWDER, FOR SOLUTION INTRAVENOUS at 17:45

## 2024-08-19 RX ADMIN — MONTELUKAST SODIUM 10 MG: 10 TABLET ORAL at 21:36

## 2024-08-19 RX ADMIN — OXYCODONE HYDROCHLORIDE AND ACETAMINOPHEN 1 TABLET: 5; 325 TABLET ORAL at 17:48

## 2024-08-19 RX ADMIN — DULOXETINE HYDROCHLORIDE 30 MG: 30 CAPSULE, DELAYED RELEASE ORAL at 09:04

## 2024-08-19 RX ADMIN — SPIRONOLACTONE 25 MG: 25 TABLET ORAL at 09:08

## 2024-08-19 RX ADMIN — SODIUM CHLORIDE, PRESERVATIVE FREE 10 ML: 5 INJECTION INTRAVENOUS at 09:09

## 2024-08-19 RX ADMIN — PIPERACILLIN AND TAZOBACTAM 3375 MG: 3; .375 INJECTION, POWDER, FOR SOLUTION INTRAVENOUS at 12:11

## 2024-08-19 RX ADMIN — OXYCODONE HYDROCHLORIDE AND ACETAMINOPHEN 1 TABLET: 5; 325 TABLET ORAL at 06:21

## 2024-08-19 RX ADMIN — OXYCODONE HYDROCHLORIDE AND ACETAMINOPHEN 2 TABLET: 5; 325 TABLET ORAL at 13:54

## 2024-08-19 ASSESSMENT — PAIN DESCRIPTION - ORIENTATION
ORIENTATION: RIGHT
ORIENTATION: RIGHT;LOWER

## 2024-08-19 ASSESSMENT — PAIN DESCRIPTION - LOCATION
LOCATION: LEG

## 2024-08-19 ASSESSMENT — PAIN SCALES - GENERAL
PAINLEVEL_OUTOF10: 3
PAINLEVEL_OUTOF10: 5
PAINLEVEL_OUTOF10: 7
PAINLEVEL_OUTOF10: 5
PAINLEVEL_OUTOF10: 6
PAINLEVEL_OUTOF10: 3
PAINLEVEL_OUTOF10: 4
PAINLEVEL_OUTOF10: 8

## 2024-08-19 ASSESSMENT — PAIN - FUNCTIONAL ASSESSMENT: PAIN_FUNCTIONAL_ASSESSMENT: PREVENTS OR INTERFERES SOME ACTIVE ACTIVITIES AND ADLS

## 2024-08-19 ASSESSMENT — PAIN DESCRIPTION - DESCRIPTORS
DESCRIPTORS: ACHING

## 2024-08-19 ASSESSMENT — PAIN DESCRIPTION - PAIN TYPE: TYPE: ACUTE PAIN

## 2024-08-19 NOTE — CARE COORDINATION
8/19/24, 3:29 PM EDT    DISCHARGE ON GOING EVALUATION    Jasmina MYERS [facility] day: 11  Location: [facility]24/024A Reason for admit: Cellulitis of right lower extremity [L03.115]  Cellulitis, unspecified cellulitis site [L03.90]     Procedures: na    Imaging since last note: na    Barriers to Discharge: IV antibiotic thru 8/20 am dose per ID    PCP: Dimas Watts APRN - CNP  Readmission Risk Score: 14.7    Patient Goals/Plan/Treatment Preferences:  Precert approved for [facility] and planning discharge Tuesday; SW follows.    Updated ID and Dr Richards earlier today re: discharge 8/20.

## 2024-08-19 NOTE — CARE COORDINATION
8/19/24, 2:59 PM EDT    DISCHARGE PLANNING EVALUATION       Updated patient's son Jhonny that iv antibiotics plan to be stopped tomorrow and changed to oral and plan for discharge.  Son stated that he would speak to doctor int he morning.  Patient's pre-cert has been approved.

## 2024-08-19 NOTE — CARE COORDINATION
8/19/24, 11:33 AM EDT    DISCHARGE PLANNING EVALUATION       Spoke with patient and son and they stated that they are waiting on antibiotic plan to determine ECF options.

## 2024-08-20 VITALS
WEIGHT: 167 LBS | RESPIRATION RATE: 16 BRPM | HEART RATE: 90 BPM | SYSTOLIC BLOOD PRESSURE: 148 MMHG | OXYGEN SATURATION: 100 % | BODY MASS INDEX: 26.84 KG/M2 | TEMPERATURE: 98 F | HEIGHT: 66 IN | DIASTOLIC BLOOD PRESSURE: 80 MMHG

## 2024-08-20 LAB
GLUCOSE BLD STRIP.AUTO-MCNC: 136 MG/DL (ref 70–108)
GLUCOSE BLD STRIP.AUTO-MCNC: 182 MG/DL (ref 70–108)
INR PPP: 2.52 (ref 0.85–1.13)

## 2024-08-20 PROCEDURE — 6370000000 HC RX 637 (ALT 250 FOR IP)

## 2024-08-20 PROCEDURE — 2580000003 HC RX 258

## 2024-08-20 PROCEDURE — 6360000002 HC RX W HCPCS: Performed by: INTERNAL MEDICINE

## 2024-08-20 PROCEDURE — 97530 THERAPEUTIC ACTIVITIES: CPT

## 2024-08-20 PROCEDURE — 85610 PROTHROMBIN TIME: CPT

## 2024-08-20 PROCEDURE — 94640 AIRWAY INHALATION TREATMENT: CPT

## 2024-08-20 PROCEDURE — 97110 THERAPEUTIC EXERCISES: CPT

## 2024-08-20 PROCEDURE — 99239 HOSP IP/OBS DSCHRG MGMT >30: CPT | Performed by: INTERNAL MEDICINE

## 2024-08-20 PROCEDURE — 36415 COLL VENOUS BLD VENIPUNCTURE: CPT

## 2024-08-20 PROCEDURE — 82948 REAGENT STRIP/BLOOD GLUCOSE: CPT

## 2024-08-20 PROCEDURE — 2580000003 HC RX 258: Performed by: INTERNAL MEDICINE

## 2024-08-20 RX ORDER — WARFARIN SODIUM 3 MG/1
3 TABLET ORAL
Status: COMPLETED | OUTPATIENT
Start: 2024-08-20 | End: 2024-08-20

## 2024-08-20 RX ADMIN — PIPERACILLIN AND TAZOBACTAM 3375 MG: 3; .375 INJECTION, POWDER, FOR SOLUTION INTRAVENOUS at 09:46

## 2024-08-20 RX ADMIN — AMLODIPINE BESYLATE 5 MG: 5 TABLET ORAL at 10:17

## 2024-08-20 RX ADMIN — PIPERACILLIN AND TAZOBACTAM 3375 MG: 3; .375 INJECTION, POWDER, FOR SOLUTION INTRAVENOUS at 04:22

## 2024-08-20 RX ADMIN — DULOXETINE HYDROCHLORIDE 30 MG: 30 CAPSULE, DELAYED RELEASE ORAL at 10:22

## 2024-08-20 RX ADMIN — BUDESONIDE AND FORMOTEROL FUMARATE DIHYDRATE 2 PUFF: 160; 4.5 AEROSOL RESPIRATORY (INHALATION) at 09:55

## 2024-08-20 RX ADMIN — Medication 400 MG: at 10:20

## 2024-08-20 RX ADMIN — SODIUM CHLORIDE, PRESERVATIVE FREE 10 ML: 5 INJECTION INTRAVENOUS at 10:23

## 2024-08-20 RX ADMIN — DICLOFENAC SODIUM 2 G: 10 GEL TOPICAL at 12:53

## 2024-08-20 RX ADMIN — ATORVASTATIN CALCIUM 10 MG: 10 TABLET, FILM COATED ORAL at 10:21

## 2024-08-20 RX ADMIN — OXYCODONE HYDROCHLORIDE AND ACETAMINOPHEN 1 TABLET: 5; 325 TABLET ORAL at 13:49

## 2024-08-20 RX ADMIN — BUMETANIDE 0.5 MG: 0.5 TABLET ORAL at 10:17

## 2024-08-20 RX ADMIN — SPIRONOLACTONE 25 MG: 25 TABLET ORAL at 10:24

## 2024-08-20 RX ADMIN — PANTOPRAZOLE SODIUM 40 MG: 40 TABLET, DELAYED RELEASE ORAL at 06:06

## 2024-08-20 RX ADMIN — TIOTROPIUM BROMIDE INHALATION SPRAY 2 PUFF: 3.12 SPRAY, METERED RESPIRATORY (INHALATION) at 09:55

## 2024-08-20 RX ADMIN — FLUTICASONE PROPIONATE 1 SPRAY: 50 SPRAY, METERED NASAL at 10:22

## 2024-08-20 RX ADMIN — WARFARIN SODIUM 3 MG: 3 TABLET ORAL at 12:53

## 2024-08-20 RX ADMIN — DICLOFENAC SODIUM 2 G: 10 GEL TOPICAL at 10:16

## 2024-08-20 RX ADMIN — Medication 1000 UNITS: at 10:33

## 2024-08-20 RX ADMIN — METOPROLOL TARTRATE 25 MG: 25 TABLET, FILM COATED ORAL at 10:22

## 2024-08-20 RX ADMIN — OXYCODONE HYDROCHLORIDE AND ACETAMINOPHEN 1 TABLET: 5; 325 TABLET ORAL at 06:06

## 2024-08-20 RX ADMIN — OXYCODONE HYDROCHLORIDE AND ACETAMINOPHEN 500 MG: 500 TABLET ORAL at 10:20

## 2024-08-20 RX ADMIN — SODIUM BICARBONATE 325 MG: 650 TABLET ORAL at 10:17

## 2024-08-20 ASSESSMENT — PAIN SCALES - GENERAL
PAINLEVEL_OUTOF10: 0
PAINLEVEL_OUTOF10: 3
PAINLEVEL_OUTOF10: 6
PAINLEVEL_OUTOF10: 0

## 2024-08-20 ASSESSMENT — PAIN DESCRIPTION - LOCATION: LOCATION: LEG

## 2024-08-20 NOTE — DISCHARGE SUMMARY
medications, discharge plan, and follow up.    Electronically signed by Nguyễn Richards MD on 8/20/24 at 7:47 AM EDT

## 2024-08-20 NOTE — CARE COORDINATION
8/20/24, 11:17 AM EDT    Patient goals/plan/ treatment preferences discussed by  and .  Patient goals/plan/ treatment preferences reviewed with patient/ family.  Patient/ family verbalize understanding of discharge plan and are in agreement with goal/plan/treatment preferences.  Understanding was demonstrated using the teach back method.  AVS provided by RN at time of discharge, which includes all necessary medical information pertaining to the patients current course of illness, treatment, post-discharge goals of care, and treatment preferences.     Services At/After Discharge: Skilled Nursing Facility (SNF) and In ambulance    Spoke with RN and patient will be ready after 2 pm.  Transport set up for 3 pm.  Spoke with Erica and she is aware of transport time.  Spoke with son Jhonny and he is aware also.  Packet placed on chart.

## 2024-08-20 NOTE — DISCHARGE INSTR - COC
Continuity of Care Form    Patient Name: Jasmina Powers   :  1935  MRN:  [id number]    Admit date:  2024  Discharge date:      Code Status Order: Full Code   Advance Directives:   Advance Care Flowsheet Documentation             Admitting Physician:  No admitting provider for patient encounter.  PCP: Dimas Watts APRN - CNP    Discharging Nurse: Nancy Ramires RN BSN  Discharging Hospital Unit/Room#: 7K-24/024-A  Discharging Unit Phone Number: [phone]    Emergency Contact:   Extended Emergency Contact Information  Primary Emergency Contact: Jhonny Powers  Address: [address] [place] [address]  Home Phone: [phone]  Mobile Phone: [phone]  Relation: Child  Secondary Emergency Contact: Enrique Mathews   [facility]  Home Phone: [phone]  Mobile Phone: [phone]  Relation: Child    Past Surgical History:  History reviewed. No pertinent surgical history.    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

## 2024-08-20 NOTE — PLAN OF CARE
Problem: Discharge Planning  Goal: Discharge to home or other facility with appropriate resources  8/13/2024 1243 by Hannah Scott RN  Flowsheets (Taken 8/9/2024 0122 by Yokasta Real RN)  Discharge to home or other facility with appropriate resources:   Identify barriers to discharge with patient and caregiver   Identify discharge learning needs (meds, wound care, etc)     Problem: Pain  Goal: Verbalizes/displays adequate comfort level or baseline comfort level  8/13/2024 1243 by Hannah Scott RN  Flowsheets (Taken 8/9/2024 0122 by Yokasta Real RN)  Verbalizes/displays adequate comfort level or baseline comfort level:   Encourage patient to monitor pain and request assistance   Assess pain using appropriate pain scale     Problem: ABCDS Injury Assessment  Goal: Absence of physical injury  8/13/2024 1243 by Hannah Scott RN  Flowsheets (Taken 8/9/2024 0122 by Yokasta Real RN)  Absence of Physical Injury: Implement safety measures based on patient assessment     Problem: Safety - Adult  Goal: Free from fall injury  8/13/2024 1243 by Hannah Scott RN  Flowsheets (Taken 8/9/2024 0122 by Yokasta Real RN)  Free From Fall Injury: Based on caregiver fall risk screen, instruct family/caregiver to ask for assistance with transferring infant if caregiver noted to have fall risk factors     Problem: Respiratory - Adult  Goal: Clear lung sounds  Description: Clear lung sounds  8/13/2024 1243 by Hannah Scott RN  Outcome: Progressing     Problem: Chronic Conditions and Co-morbidities  Goal: Patient's chronic conditions and co-morbidity symptoms are monitored and maintained or improved  8/13/2024 1243 by Hannah Scott RN  Flowsheets (Taken 8/12/2024 1957 by Naomy Carrillo RN)  Care Plan - Patient's Chronic Conditions and Co-Morbidity Symptoms are Monitored and Maintained or Improved: Monitor and assess patient's chronic conditions and comorbid symptoms for stability, deterioration, or 
  Problem: Discharge Planning  Goal: Discharge to home or other facility with appropriate resources  8/13/2024 2102 by Eda Arias RN  Outcome: Progressing  Flowsheets (Taken 8/13/2024 1945)  Discharge to home or other facility with appropriate resources:   Identify barriers to discharge with patient and caregiver   Arrange for needed discharge resources and transportation as appropriate   Identify discharge learning needs (meds, wound care, etc)  8/13/2024 1243 by Hannah Scott RN  Flowsheets (Taken 8/9/2024 0122 by Yokasta Real RN)  Discharge to home or other facility with appropriate resources:   Identify barriers to discharge with patient and caregiver   Identify discharge learning needs (meds, wound care, etc)     Problem: Pain  Goal: Verbalizes/displays adequate comfort level or baseline comfort level  8/13/2024 2102 by Eda Arias RN  Outcome: Progressing  8/13/2024 1243 by Hannah Scott RN  Flowsheets (Taken 8/9/2024 0122 by Yokasta Real RN)  Verbalizes/displays adequate comfort level or baseline comfort level:   Encourage patient to monitor pain and request assistance   Assess pain using appropriate pain scale     Problem: ABCDS Injury Assessment  Goal: Absence of physical injury  8/13/2024 2102 by Eda Arias RN  Outcome: Progressing  8/13/2024 1243 by Hannah Scott RN  Flowsheets (Taken 8/9/2024 0122 by Yokasta Real RN)  Absence of Physical Injury: Implement safety measures based on patient assessment     Problem: Safety - Adult  Goal: Free from fall injury  8/13/2024 2102 by Eda Arias RN  Outcome: Progressing  8/13/2024 1243 by Hannah Scott RN  Flowsheets (Taken 8/9/2024 0122 by Yokasta Real RN)  Free From Fall Injury: Based on caregiver fall risk screen, instruct family/caregiver to ask for assistance with transferring infant if caregiver noted to have fall risk factors     Problem: Respiratory - Adult  Goal: Clear lung sounds  Description: Clear lung 
  Problem: Discharge Planning  Goal: Discharge to home or other facility with appropriate resources  8/15/2024 1830 by Shauna Mccarty RN  Outcome: Progressing  Flowsheets (Taken 8/15/2024 1830)  Discharge to home or other facility with appropriate resources: Identify barriers to discharge with patient and caregiver     Problem: Pain  Goal: Verbalizes/displays adequate comfort level or baseline comfort level  Outcome: Progressing  Flowsheets (Taken 8/15/2024 1830)  Verbalizes/displays adequate comfort level or baseline comfort level: Encourage patient to monitor pain and request assistance     Problem: ABCDS Injury Assessment  Goal: Absence of physical injury  Outcome: Progressing  Flowsheets (Taken 8/15/2024 1830)  Absence of Physical Injury: Implement safety measures based on patient assessment     Problem: Safety - Adult  Goal: Free from fall injury  Outcome: Progressing  Flowsheets (Taken 8/15/2024 1830)  Free From Fall Injury: Instruct family/caregiver on patient safety     Problem: Respiratory - Adult  Goal: Clear lung sounds  Description: Clear lung sounds  8/15/2024 1830 by Shauna Mccarty RN  Outcome: Progressing     Problem: Chronic Conditions and Co-morbidities  Goal: Patient's chronic conditions and co-morbidity symptoms are monitored and maintained or improved  Outcome: Progressing  Flowsheets (Taken 8/15/2024 1830)  Care Plan - Patient's Chronic Conditions and Co-Morbidity Symptoms are Monitored and Maintained or Improved: Monitor and assess patient's chronic conditions and comorbid symptoms for stability, deterioration, or improvement     Problem: Skin/Tissue Integrity  Goal: Absence of new skin breakdown  Description: 1.  Monitor for areas of redness and/or skin breakdown  2.  Assess vascular access sites hourly  3.  Every 4-6 hours minimum:  Change oxygen saturation probe site  4.  Every 4-6 hours:  If on nasal continuous positive airway pressure, respiratory therapy assess nares and determine need 
  Problem: Discharge Planning  Goal: Discharge to home or other facility with appropriate resources  8/16/2024 0422 by Marianela Kebede RN  Outcome: Progressing  Flowsheets (Taken 8/15/2024 2143)  Discharge to home or other facility with appropriate resources:   Arrange for needed discharge resources and transportation as appropriate   Identify barriers to discharge with patient and caregiver   Identify discharge learning needs (meds, wound care, etc)     Problem: Pain  Goal: Verbalizes/displays adequate comfort level or baseline comfort level  8/16/2024 0422 by Marianela Kebede RN  Outcome: Progressing  Flowsheets  Taken 8/15/2024 2143  Verbalizes/displays adequate comfort level or baseline comfort level:   Encourage patient to monitor pain and request assistance   Assess pain using appropriate pain scale   Administer analgesics based on type and severity of pain and evaluate response   Implement non-pharmacological measures as appropriate and evaluate response   Consider cultural and social influences on pain and pain management  Taken 8/15/2024 1951  Verbalizes/displays adequate comfort level or baseline comfort level:   Assess pain using appropriate pain scale   Encourage patient to monitor pain and request assistance   Implement non-pharmacological measures as appropriate and evaluate response   Administer analgesics based on type and severity of pain and evaluate response   Consider cultural and social influences on pain and pain management     Problem: ABCDS Injury Assessment  Goal: Absence of physical injury  8/16/2024 0422 by Marianela Kebede RN  Outcome: Progressing  Flowsheets (Taken 8/16/2024 0422)  Absence of Physical Injury: Implement safety measures based on patient assessment     Problem: Safety - Adult  Goal: Free from fall injury  8/16/2024 0422 by Marianela Kebede RN  Outcome: Progressing  Flowsheets (Taken 8/16/2024 0422)  Free From Fall Injury: Instruct family/caregiver on patient 
  Problem: Discharge Planning  Goal: Discharge to home or other facility with appropriate resources  8/16/2024 1644 by Shauna Mccarty RN  Outcome: Progressing  Flowsheets (Taken 8/16/2024 1644)  Discharge to home or other facility with appropriate resources: Identify barriers to discharge with patient and caregiver     Problem: Pain  Goal: Verbalizes/displays adequate comfort level or baseline comfort level  8/16/2024 1644 by Shauna Mccarty RN  Outcome: Progressing  Flowsheets (Taken 8/16/2024 1644)  Verbalizes/displays adequate comfort level or baseline comfort level: Encourage patient to monitor pain and request assistance     Problem: ABCDS Injury Assessment  Goal: Absence of physical injury  8/16/2024 1644 by Shauna Mccarty RN  Outcome: Progressing  Flowsheets (Taken 8/16/2024 1644)  Absence of Physical Injury: Implement safety measures based on patient assessment     Problem: Safety - Adult  Goal: Free from fall injury  8/16/2024 1644 by Shauna Mccarty RN  Outcome: Progressing  Flowsheets (Taken 8/16/2024 1644)  Free From Fall Injury: Instruct family/caregiver on patient safety     Problem: Respiratory - Adult  Goal: Clear lung sounds  Description: Clear lung sounds  8/16/2024 1644 by Shauna Mccarty RN  Outcome: Progressing     Problem: Chronic Conditions and Co-morbidities  Goal: Patient's chronic conditions and co-morbidity symptoms are monitored and maintained or improved  8/16/2024 1644 by Shauna Mccarty RN  Outcome: Progressing  Flowsheets (Taken 8/16/2024 1644)  Care Plan - Patient's Chronic Conditions and Co-Morbidity Symptoms are Monitored and Maintained or Improved: Monitor and assess patient's chronic conditions and comorbid symptoms for stability, deterioration, or improvement     Problem: Skin/Tissue Integrity  Goal: Absence of new skin breakdown  Description: 1.  Monitor for areas of redness and/or skin breakdown  2.  Assess vascular access sites hourly  3.  Every 4-6 hours minimum:  Change oxygen 
  Problem: Discharge Planning  Goal: Discharge to home or other facility with appropriate resources  8/20/2024 1137 by Franny Anderson LPN  Outcome: Completed     Problem: Pain  Goal: Verbalizes/displays adequate comfort level or baseline comfort level  8/20/2024 1137 by Franny Anderson LPN  Outcome: Completed     Problem: ABCDS Injury Assessment  Goal: Absence of physical injury  Outcome: Completed     Problem: Safety - Adult  Goal: Free from fall injury  8/20/2024 1137 by Franny Anderson LPN  Outcome: Completed     Problem: Respiratory - Adult  Goal: Clear lung sounds  Description: Clear lung sounds  Outcome: Completed     Problem: Chronic Conditions and Co-morbidities  Goal: Patient's chronic conditions and co-morbidity symptoms are monitored and maintained or improved  8/20/2024 1137 by Franny Anderson LPN  Outcome: Completed     Problem: Skin/Tissue Integrity  Goal: Absence of new skin breakdown  Description: 1.  Monitor for areas of redness and/or skin breakdown  2.  Assess vascular access sites hourly  3.  Every 4-6 hours minimum:  Change oxygen saturation probe site  4.  Every 4-6 hours:  If on nasal continuous positive airway pressure, respiratory therapy assess nares and determine need for appliance change or resting period.  8/20/2024 1137 by Franny Anderson LPN  Outcome: Completed     Problem: Nutrition Deficit:  Goal: Optimize nutritional status  Outcome: Completed     Problem: Musculoskeletal - Adult  Goal: Return mobility to safest level of function  8/20/2024 1137 by Franny Anderson LPN  Outcome: Completed     Problem: Infection - Adult  Goal: Absence of infection at discharge  8/20/2024 1137 by Franny Anderson LPN  Outcome: Completed     
  Problem: Discharge Planning  Goal: Discharge to home or other facility with appropriate resources  Outcome: Progressing  Flowsheets (Taken 8/16/2024 1644 by Shauna Mccarty, RN)  Discharge to home or other facility with appropriate resources: Identify barriers to discharge with patient and caregiver     Problem: Pain  Goal: Verbalizes/displays adequate comfort level or baseline comfort level  Outcome: Progressing  Flowsheets (Taken 8/16/2024 1644 by Shauna Mccarty, RN)  Verbalizes/displays adequate comfort level or baseline comfort level: Encourage patient to monitor pain and request assistance     Problem: Safety - Adult  Goal: Free from fall injury  Outcome: Progressing  Flowsheets (Taken 8/16/2024 1647 by Shauna Mccarty, RN)  Free From Fall Injury: Instruct family/caregiver on patient safety     Problem: Respiratory - Adult  Goal: Clear lung sounds  Description: Clear lung sounds  8/17/2024 0858 by Doreen Guadarrama RCP  Outcome: Progressing     Problem: Skin/Tissue Integrity - Adult  Goal: Skin integrity remains intact  Outcome: Progressing  Flowsheets (Taken 8/16/2024 1648 by Shauna Mccarty, RN)  Skin Integrity Remains Intact: Monitor for areas of redness and/or skin breakdown  Note: Turns self, daily dressing change to RLE by Dr. Lopez     Problem: Musculoskeletal - Adult  Goal: Return mobility to safest level of function  Outcome: Progressing  Flowsheets (Taken 8/17/2024 1445)  Return Mobility to Safest Level of Function: Assess patient stability and activity tolerance for standing, transferring and ambulating with or without assistive devices     Problem: Infection - Adult  Goal: Absence of infection at discharge  Outcome: Progressing  Flowsheets (Taken 8/17/2024 1445)  Absence of infection at discharge:   Assess and monitor for signs and symptoms of infection   Administer medications as ordered     Care plan reviewed with patient and family.  Patient and family verbalize understanding of the plan of care and 
  Problem: Discharge Planning  Goal: Discharge to home or other facility with appropriate resources  Outcome: Progressing  Flowsheets (Taken 8/19/2024 1648)  Discharge to home or other facility with appropriate resources:   Identify barriers to discharge with patient and caregiver   Arrange for needed discharge resources and transportation as appropriate   Identify discharge learning needs (meds, wound care, etc)   Refer to discharge planning if patient needs post-hospital services based on physician order or complex needs related to functional status, cognitive ability or social support system  Note: Patients discharge has not been determined at this time, however plan will be for patient to go to SNF once IV every 8 hour ATB are completed.     Problem: Pain  Goal: Verbalizes/displays adequate comfort level or baseline comfort level  Outcome: Progressing  Flowsheets (Taken 8/19/2024 1648)  Verbalizes/displays adequate comfort level or baseline comfort level:   Encourage patient to monitor pain and request assistance   Assess pain using appropriate pain scale   Administer analgesics based on type and severity of pain and evaluate response   Implement non-pharmacological measures as appropriate and evaluate response   Notify Licensed Independent Practitioner if interventions unsuccessful or patient reports new pain  Note: Patients pain is controlled with the current pain regimen.     Problem: ABCDS Injury Assessment  Goal: Absence of physical injury  Outcome: Progressing  Flowsheets (Taken 8/19/2024 1648)  Absence of Physical Injury: Implement safety measures based on patient assessment     Problem: Safety - Adult  Goal: Free from fall injury  Outcome: Progressing  Flowsheets (Taken 8/19/2024 1648)  Free From Fall Injury:   Instruct family/caregiver on patient safety   Based on caregiver fall risk screen, instruct family/caregiver to ask for assistance with transferring infant if caregiver noted to have fall risk 
  Problem: Discharge Planning  Goal: Discharge to home or other facility with appropriate resources  Outcome: Progressing  Flowsheets (Taken 8/9/2024 0122 by Yokasta Real RN)  Discharge to home or other facility with appropriate resources:   Identify barriers to discharge with patient and caregiver   Identify discharge learning needs (meds, wound care, etc)     Problem: Pain  Goal: Verbalizes/displays adequate comfort level or baseline comfort level  Outcome: Progressing  Flowsheets (Taken 8/9/2024 0122 by Yokasta Real RN)  Verbalizes/displays adequate comfort level or baseline comfort level:   Encourage patient to monitor pain and request assistance   Assess pain using appropriate pain scale     Problem: ABCDS Injury Assessment  Goal: Absence of physical injury  Outcome: Progressing  Flowsheets (Taken 8/9/2024 0122 by Yokasta Real RN)  Absence of Physical Injury: Implement safety measures based on patient assessment     Problem: Safety - Adult  Goal: Free from fall injury  Outcome: Progressing  Flowsheets (Taken 8/9/2024 0122 by Yokasta Real RN)  Free From Fall Injury: Based on caregiver fall risk screen, instruct family/caregiver to ask for assistance with transferring infant if caregiver noted to have fall risk factors     Problem: Respiratory - Adult  Goal: Clear lung sounds  Description: Clear lung sounds  Outcome: Progressing  Note: Lungs clear   Care plan reviewed with patient.  Patient verbalize understanding of the plan of care and contribute to goal setting.     
  Problem: Discharge Planning  Goal: Discharge to home or other facility with appropriate resources  Outcome: Progressing  Flowsheets (Taken 8/9/2024 0122)  Discharge to home or other facility with appropriate resources:   Identify barriers to discharge with patient and caregiver   Identify discharge learning needs (meds, wound care, etc)     Problem: Pain  Goal: Verbalizes/displays adequate comfort level or baseline comfort level  Outcome: Progressing  Flowsheets (Taken 8/9/2024 0122)  Verbalizes/displays adequate comfort level or baseline comfort level:   Encourage patient to monitor pain and request assistance   Assess pain using appropriate pain scale     Problem: ABCDS Injury Assessment  Goal: Absence of physical injury  Outcome: Progressing  Flowsheets (Taken 8/9/2024 0122)  Absence of Physical Injury: Implement safety measures based on patient assessment     Problem: Safety - Adult  Goal: Free from fall injury  Outcome: Progressing  Flowsheets (Taken 8/9/2024 0122)  Free From Fall Injury: Based on caregiver fall risk screen, instruct family/caregiver to ask for assistance with transferring infant if caregiver noted to have fall risk factors     Problem: Respiratory - Adult  Goal: Clear lung sounds  Description: Clear lung sounds  8/9/2024 0122 by Yokasta Real, RN  Outcome: Progressing     
  Problem: Pain  Goal: Verbalizes/displays adequate comfort level or baseline comfort level  8/17/2024 2312 by Naomy Carrillo, RN  Outcome: Progressing  8/17/2024 1445 by Andrés Farley, RN  Outcome: Progressing  Flowsheets (Taken 8/16/2024 1644 by Shauna Mccarty, RN)  Verbalizes/displays adequate comfort level or baseline comfort level: Encourage patient to monitor pain and request assistance     Problem: Chronic Conditions and Co-morbidities  Goal: Patient's chronic conditions and co-morbidity symptoms are monitored and maintained or improved  Outcome: Progressing  Flowsheets (Taken 8/17/2024 1945)  Care Plan - Patient's Chronic Conditions and Co-Morbidity Symptoms are Monitored and Maintained or Improved: Monitor and assess patient's chronic conditions and comorbid symptoms for stability, deterioration, or improvement     Problem: Skin/Tissue Integrity - Adult  Goal: Skin integrity remains intact  8/17/2024 2312 by Naomy Carrillo, RN  Outcome: Progressing  Flowsheets (Taken 8/17/2024 1945)  Skin Integrity Remains Intact: Monitor for areas of redness and/or skin breakdown  8/17/2024 1445 by Andrés Farley, RN  Outcome: Progressing  Flowsheets (Taken 8/16/2024 1648 by Shauna Mccarty, RN)  Skin Integrity Remains Intact: Monitor for areas of redness and/or skin breakdown  Note: Turns self, daily dressing change to RLE by Dr. Lopez     
  Problem: Respiratory - Adult  Goal: Clear lung sounds  Description: Clear lung sounds  8/16/2024 0823 by Laura Flores RCP  Outcome: Progressing  Note: Patient agrees to goals     
  Problem: Respiratory - Adult  Goal: Clear lung sounds  Description: Clear lung sounds  Outcome: Progressing     
  Problem: Respiratory - Adult  Goal: Clear lung sounds  Description: Clear lung sounds  Outcome: Progressing    Continue tx's to improve breath sounds, increase aeration and decrease WOB.  
  Problem: Respiratory - Adult  Goal: Clear lung sounds  Description: Clear lung sounds  Outcome: Progressing   Patient mutually agreed on goals.    
  Problem: Respiratory - Adult  Goal: Clear lung sounds  Description: Clear lung sounds  Outcome: Progressing  Note: Patient agrees to goals     
  Problem: Skin/Tissue Integrity  Goal: Absence of new skin breakdown  Description: 1.  Monitor for areas of redness and/or skin breakdown  2.  Assess vascular access sites hourly  3.  Every 4-6 hours minimum:  Change oxygen saturation probe site  4.  Every 4-6 hours:  If on nasal continuous positive airway pressure, respiratory therapy assess nares and determine need for appliance change or resting period.  Outcome: Progressing     Problem: Chronic Conditions and Co-morbidities  Goal: Patient's chronic conditions and co-morbidity symptoms are monitored and maintained or improved  Outcome: Progressing  Flowsheets (Taken 8/12/2024 1957)  Care Plan - Patient's Chronic Conditions and Co-Morbidity Symptoms are Monitored and Maintained or Improved: Monitor and assess patient's chronic conditions and comorbid symptoms for stability, deterioration, or improvement     Problem: Pain  Goal: Verbalizes/displays adequate comfort level or baseline comfort level  Outcome: Progressing     
Plans snf  
4-6 hours:  If on nasal continuous positive airway pressure, respiratory therapy assess nares and determine need for appliance change or resting period.  8/17/2024 2312 by Naomy Carrillo, RN  Outcome: Progressing     Problem: Nutrition Deficit:  Goal: Optimize nutritional status  8/17/2024 2312 by Naomy Carrillo, RN  Outcome: Progressing     Problem: Skin/Tissue Integrity - Adult  Goal: Skin integrity remains intact  8/18/2024 1240 by Andrés Farley, RN  Outcome: Progressing  Flowsheets (Taken 8/17/2024 1945 by Naomy Carrillo, RN)  Skin Integrity Remains Intact: Monitor for areas of redness and/or skin breakdown     Problem: Musculoskeletal - Adult  Goal: Return mobility to safest level of function  8/18/2024 1240 by Andrés Farley, RN  Outcome: Progressing  Flowsheets (Taken 8/17/2024 1945 by Naomy Carrillo, RN)  Return Mobility to Safest Level of Function: Assess patient stability and activity tolerance for standing, transferring and ambulating with or without assistive devices     Problem: Infection - Adult  Goal: Absence of infection at discharge  8/18/2024 1240 by Andrés Farley, RN  Outcome: Progressing  Flowsheets (Taken 8/17/2024 1445)  Absence of infection at discharge:   Assess and monitor for signs and symptoms of infection   Administer medications as ordered     Care plan reviewed with patient and family.  Patient and family verbalize understanding of the plan of care and contribute to goal setting.      
evidence of new skin breakdown assessed this shift.    Care plan reviewed with patient.  Patient verbalize understanding of the plan of care and contribute to goal setting.

## 2024-08-20 NOTE — DISCHARGE INSTRUCTIONS
Dressing change to left lower leg daily.  Clean with betadine Apply nonadherent dressing then kerlix with ace wrap

## 2024-08-20 NOTE — DISCHARGE INSTR - MEDS
Clinical Pharmacy Note                                               Warfarin Discharge Recommendations    Patient discharged from [facility] today on warfarin.    Warfarin indication: Atrial fibrillation or Atrial flutter  INR goal during admission: 2.0-3.0  Previous home warfarin regimen: Coumadin 7.5 mg MF, 5 mg TWThSS per clinic (Patient takes differently-  5 every day except 7.5 mg Mon and Tues)  Interacting medications at discharge: none  Coumadin 5 mg tabs    Hospital Warfarin Doses & INR Results    Date INR Warfarin Dose   08/08/2024 1.92 7.5 mg    08/09/2024  2.02 7.5 mg     8/10/2024  2.95 No Coumadin     8/11/2024  3.16  No Coumadin     8/12/2024  3.16  2.5 mg    8/13/2024  2.31  5 mg    8/14/24  2.84  3 mg   8/15/24 3.49 No Coumadin   8/16/24 2.85 2.5 mg   8/17/24 2.18 6 mg   8/18/2024 2.08  3 mg     8/19/24  2.42  3 mg    8/20/24      2.52             3 mg             Recent INRs:  Recent Labs     08/20/24  0558   INR 2.52*     Warfarin Discharge Instructions:   Date Warfarin Dose   8/21/24 (tomorrow) 2.5 mg    8/22/24 INR draw     Provider dosing warfarin: FLORIN AIKEN  Next INR date: 8/22/24

## 2024-08-21 LAB
INR BLD: 2.5
PROTIME: NORMAL

## 2024-08-22 NOTE — PROGRESS NOTES
Progress note: Infectious diseases    Patient - Jasmina Powers,  Age - 89 y.o.    - 1935      Room Number - 7K-24/024-A   MRN -  [id number]   [facility] # - [id number]  Date of Admission -  2024  8:49 AM    SUBJECTIVE:   No new issues. No fever  OBJECTIVE   VITALS    height is 1.676 m (5' 5.98\") and weight is 75.8 kg (167 lb). Her oral temperature is 97.7 °F (36.5 °C). Her blood pressure is 121/83 and her pulse is 73. Her respiration is 20 and oxygen saturation is 98%.       Wt Readings from Last 3 Encounters:   24 75.8 kg (167 lb)   24 75.8 kg (167 lb)   24 77.6 kg (171 lb)       I/O (24 Hours)    Intake/Output Summary (Last 24 hours) at 2024 1522  Last data filed at 2024 0956  Gross per 24 hour   Intake 940 ml   Output 800 ml   Net 140 ml       General Appearance  Awake, alert, oriented,  not  In acute distress  HEENT - normocephalic, atraumatic, pale  conjunctiva,  anicteric sclera  Neck - Supple, no mass  Lungs -  Bilateral    air entry,   Cardiovascular - Heart sounds are normal.    Abdomen - soft, not distended, nontender,   Neurologic -oriented  Skin - +bruising or bleeding  Extremities - the swelling on the right lower leg is less, sensitive to touch  No prulent drainage     MEDICATIONS:      sodium chloride flush  5-40 mL IntraVENous 2 times per day    [Held by provider] bumetanide  0.5 mg Oral Daily    diclofenac sodium  2 g Topical 4x Daily    fluticasone  1 spray Each Nostril Daily    budesonide-formoterol  2 puff Inhalation BID RT    And    tiotropium  2 puff Inhalation Daily RT    metoprolol tartrate  25 mg Oral BID    montelukast  10 mg Oral Nightly    pantoprazole  40 mg Oral QAM AC    spironolactone  25 mg Oral Daily    vitamin C  500 mg Oral Daily    amLODIPine  5 mg Oral Daily    multivitamin  1 tablet Oral Daily    magnesium oxide  400 mg Oral Daily    atorvastatin  10 mg 
                                                                                          Progress note: Infectious diseases    Patient - Jasmina Powers,  Age - 89 y.o.    - 1935      Room Number - 7K-24/024-A   MRN -  [id number]   [facility] # - [id number]  Date of Admission -  2024  8:49 AM    SUBJECTIVE:   She has no new complaints. The pain is less  OBJECTIVE   VITALS    height is 1.676 m (5' 5.98\") and weight is 75.8 kg (167 lb). Her oral temperature is 97.8 °F (36.6 °C). Her blood pressure is 125/73 and her pulse is 79. Her respiration is 16 and oxygen saturation is 96%.       Wt Readings from Last 3 Encounters:   24 75.8 kg (167 lb)   24 75.8 kg (167 lb)   24 77.6 kg (171 lb)       I/O (24 Hours)    Intake/Output Summary (Last 24 hours) at 2024 1152  Last data filed at 2024 0255  Gross per 24 hour   Intake 270 ml   Output --   Net 270 ml       General Appearance  Awake, alert, oriented,  not  In acute distress  HEENT - normocephalic, atraumatic, pale  conjunctiva,  anicteric sclera  Neck - Supple, no mass  Lungs -  Bilateral    air entry,   Cardiovascular - Heart sounds are normal.    Abdomen - soft, not distended, nontender,   Neurologic -oriented  Skin - +bruising or bleeding  Extremities - the swelling on the right lower leg is less, had no new blisters. The redness is less, to touch    MEDICATIONS:      sodium chloride flush  5-40 mL IntraVENous 2 times per day    [Held by provider] bumetanide  0.5 mg Oral Daily    diclofenac sodium  2 g Topical 4x Daily    fluticasone  1 spray Each Nostril Daily    budesonide-formoterol  2 puff Inhalation BID RT    And    tiotropium  2 puff Inhalation Daily RT    metoprolol tartrate  25 mg Oral BID    montelukast  10 mg Oral Nightly    pantoprazole  40 mg Oral QAM AC    spironolactone  25 mg Oral Daily    vitamin C  500 mg Oral Daily    amLODIPine  5 mg Oral Daily    multivitamin  1 tablet Oral Daily    magnesium oxide  400 
                                                                                          Progress note: Infectious diseases    Patient - Jasmina Powers,  Age - 89 y.o.    - 1935      Room Number - 7K-24/024-A   MRN -  [id number]   [facility] # - [id number]  Date of Admission -  2024  8:49 AM    SUBJECTIVE:   She has no new complaints  OBJECTIVE   VITALS    height is 1.676 m (5' 5.98\") and weight is 75.8 kg (167 lb). Her oral temperature is 98.4 °F (36.9 °C). Her blood pressure is 135/71 and her pulse is 69. Her respiration is 18 and oxygen saturation is 99%.       Wt Readings from Last 3 Encounters:   24 75.8 kg (167 lb)   24 75.8 kg (167 lb)   24 77.6 kg (171 lb)       I/O (24 Hours)    Intake/Output Summary (Last 24 hours) at 8/15/2024 1031  Last data filed at 8/15/2024 1006  Gross per 24 hour   Intake --   Output 1200 ml   Net -1200 ml       General Appearance  Awake, alert, oriented,  not  In acute distress  HEENT - normocephalic, atraumatic, pale  conjunctiva,  anicteric sclera  Neck - Supple, no mass     Neurologic -oriented  Skin - +bruising or bleeding  Extremities -has bilateral leg swelling the redness on the right lower leg is less, has clear blisters, no purulent drainage        MEDICATIONS:      gabapentin  300 mg Oral TID    sodium chloride flush  5-40 mL IntraVENous 2 times per day    [Held by provider] bumetanide  0.5 mg Oral Daily    diclofenac sodium  2 g Topical 4x Daily    fluticasone  1 spray Each Nostril Daily    budesonide-formoterol  2 puff Inhalation BID RT    And    tiotropium  2 puff Inhalation Daily RT    metoprolol tartrate  25 mg Oral BID    montelukast  10 mg Oral Nightly    pantoprazole  40 mg Oral QAM AC    spironolactone  25 mg Oral Daily    vitamin C  500 mg Oral Daily    amLODIPine  5 mg Oral Daily    multivitamin  1 tablet Oral Daily    magnesium oxide  400 mg Oral Daily    atorvastatin  10 mg Oral Daily    Vitamin D  1 tablet Oral Daily    warfarin 
                                                                                          Progress note: Infectious diseases    Patient - Jasmina Powers,  Age - 89 y.o.    - 1935      Room Number - 7K-24/024-A   MRN -  [id number]   [facility] # - [id number]  Date of Admission -  2024  8:49 AM    SUBJECTIVE:   No new issues. Plan for discharge today.  Her son had questions on her discharge    OBJECTIVE   VITALS    height is 1.676 m (5' 5.98\") and weight is 75.8 kg (167 lb). Her oral temperature is 98 °F (36.7 °C). Her blood pressure is 130/74 and her pulse is 69. Her respiration is 18 and oxygen saturation is 100%.       Wt Readings from Last 3 Encounters:   24 75.8 kg (167 lb)   24 75.8 kg (167 lb)   24 77.6 kg (171 lb)       I/O (24 Hours)    Intake/Output Summary (Last 24 hours) at 2024 0836  Last data filed at 2024 0557  Gross per 24 hour   Intake 510 ml   Output 1200 ml   Net -690 ml    Not in distress  Slightly pale conjunctiva unicteric sclear  The swelling over the right leg is less, still has open blisters with pink wound bed  Bruising of the skin. The tendrness is much improved      MEDICATIONS:      DULoxetine  30 mg Oral Daily    piperacillin-tazobactam  3,375 mg IntraVENous Q8H    [Held by provider] gabapentin  300 mg Oral TID    sodium chloride flush  5-40 mL IntraVENous 2 times per day    bumetanide  0.5 mg Oral Daily    diclofenac sodium  2 g Topical 4x Daily    fluticasone  1 spray Each Nostril Daily    budesonide-formoterol  2 puff Inhalation BID RT    And    tiotropium  2 puff Inhalation Daily RT    metoprolol tartrate  25 mg Oral BID    montelukast  10 mg Oral Nightly    pantoprazole  40 mg Oral QAM AC    spironolactone  25 mg Oral Daily    vitamin C  500 mg Oral Daily    amLODIPine  5 mg Oral Daily    multivitamin  1 tablet Oral Daily    magnesium oxide  400 mg Oral Daily    atorvastatin  10 mg Oral Daily    Vitamin D  1 tablet Oral Daily    warfarin 
                                                                                          Progress note: Infectious diseases    Patient - Jasmina Powers,  Age - 89 y.o.    - 1935      Room Number - 7K-24/024-A   MRN -  [id number]   [facility] # - [id number]  Date of Admission -  2024  8:49 AM    SUBJECTIVE:   Her wbc is back to normal. She had fever over the night.  The pain on the right lower leg is less   OBJECTIVE   VITALS    height is 1.676 m (5' 5.98\") and weight is 75.8 kg (167 lb). Her oral temperature is 98.2 °F (36.8 °C). Her blood pressure is 127/94 (abnormal) and her pulse is 104 (abnormal). Her respiration is 16 and oxygen saturation is 100%.       Wt Readings from Last 3 Encounters:   24 75.8 kg (167 lb)   24 75.8 kg (167 lb)   24 77.6 kg (171 lb)       I/O (24 Hours)    Intake/Output Summary (Last 24 hours) at 2024 0841  Last data filed at 2024 2227  Gross per 24 hour   Intake 550 ml   Output --   Net 550 ml       General Appearance  Awake, alert, oriented,  not  In acute distress  HEENT - normocephalic, atraumatic, pale  conjunctiva,  anicteric sclera  Neck - Supple, no mass  Lungs -  Bilateral    air entry,   Cardiovascular - Heart sounds are normal.    Abdomen - soft, not distended, nontender,   Neurologic -oriented  Skin - +bruising or bleeding  Extremities - the swelling on the right lower leg is less, the redness is less, still had blisters, no crepitus felt    MEDICATIONS:      sodium chloride flush  5-40 mL IntraVENous 2 times per day    [Held by provider] bumetanide  0.5 mg Oral Daily    diclofenac sodium  2 g Topical 4x Daily    fluticasone  1 spray Each Nostril Daily    budesonide-formoterol  2 puff Inhalation BID RT    And    tiotropium  2 puff Inhalation Daily RT    metoprolol tartrate  25 mg Oral BID    montelukast  10 mg Oral Nightly    pantoprazole  40 mg Oral QAM AC    spironolactone  25 mg Oral Daily    vitamin C  500 mg Oral Daily    amLODIPine  5 
                                                                                          Progress note: Infectious diseases    Patient - Jasmina Powers,  Age - 89 y.o.    - 1935      Room Number - 7K-24/024-A   MRN -  [id number]   [facility] # - [id number]  Date of Admission -  2024  8:49 AM    SUBJECTIVE:   No new issues    OBJECTIVE   VITALS    height is 1.676 m (5' 5.98\") and weight is 75.8 kg (167 lb). Her oral temperature is 97.9 °F (36.6 °C). Her blood pressure is 139/83 and her pulse is 97. Her respiration is 16 and oxygen saturation is 99%.       Wt Readings from Last 3 Encounters:   24 75.8 kg (167 lb)   24 75.8 kg (167 lb)   24 77.6 kg (171 lb)       I/O (24 Hours)    Intake/Output Summary (Last 24 hours) at 2024 1107  Last data filed at 2024 1043  Gross per 24 hour   Intake 1750 ml   Output 1700 ml   Net 50 ml       General Appearance  Awake, alert, oriented,  not  In acute distress  HEENT - normocephalic, atraumatic, pink conjunctiva,  anicteric sclera  Neck - Supple, no mass  Neurologic - AAOx3  Skin - (+) bruising or bleeding  Extremities - the swelling on the leg is less  Has bruising of the skin over the right calf less drainage    MEDICATIONS:      DULoxetine  30 mg Oral Daily    piperacillin-tazobactam  3,375 mg IntraVENous Q8H    [Held by provider] gabapentin  300 mg Oral TID    sodium chloride flush  5-40 mL IntraVENous 2 times per day    bumetanide  0.5 mg Oral Daily    diclofenac sodium  2 g Topical 4x Daily    fluticasone  1 spray Each Nostril Daily    budesonide-formoterol  2 puff Inhalation BID RT    And    tiotropium  2 puff Inhalation Daily RT    metoprolol tartrate  25 mg Oral BID    montelukast  10 mg Oral Nightly    pantoprazole  40 mg Oral QAM AC    spironolactone  25 mg Oral Daily    vitamin C  500 mg Oral Daily    amLODIPine  5 mg Oral Daily    multivitamin  1 tablet Oral Daily    magnesium oxide  400 mg Oral Daily    atorvastatin  10 mg Oral 
                                                                                          Progress note: Infectious diseases    Patient - Jasmina Powers,  Age - 89 y.o.    - 1935      Room Number - 7K-24/024-A   MRN -  [id number]   [facility] # - [id number]  Date of Admission -  2024  8:49 AM    SUBJECTIVE:   No new issues  OBJECTIVE   VITALS    height is 1.676 m (5' 5.98\") and weight is 75.8 kg (167 lb). Her oral temperature is 98.6 °F (37 °C). Her blood pressure is 129/81 and her pulse is 81. Her respiration is 18 and oxygen saturation is 96%.       Wt Readings from Last 3 Encounters:   24 75.8 kg (167 lb)   24 75.8 kg (167 lb)   24 77.6 kg (171 lb)       I/O (24 Hours)    Intake/Output Summary (Last 24 hours) at 2024 1046  Last data filed at 2024 0928  Gross per 24 hour   Intake 900 ml   Output 300 ml   Net 600 ml       General Appearance  Awake, alert, oriented,  not  In acute distress  HEENT - normocephalic, atraumatic, pale  conjunctiva,  anicteric sclera  Neck - Supple, no mass  Lungs -  Bilateral    air entry,   Cardiovascular - Heart sounds are normal.    Abdomen - soft, not distended, nontender,   Neurologic -oriented  Skin - +bruising or bleeding  Extremities - the swelling on the right lower leg is less, the redness is less,  no drainage     MEDICATIONS:      sodium chloride flush  5-40 mL IntraVENous 2 times per day    [Held by provider] bumetanide  0.5 mg Oral Daily    diclofenac sodium  2 g Topical 4x Daily    fluticasone  1 spray Each Nostril Daily    budesonide-formoterol  2 puff Inhalation BID RT    And    tiotropium  2 puff Inhalation Daily RT    metoprolol tartrate  25 mg Oral BID    montelukast  10 mg Oral Nightly    pantoprazole  40 mg Oral QAM AC    spironolactone  25 mg Oral Daily    vitamin C  500 mg Oral Daily    amLODIPine  5 mg Oral Daily    multivitamin  1 tablet Oral Daily    magnesium oxide  400 mg Oral Daily    atorvastatin  10 mg Oral Daily    
                                                                                          Progress note: Infectious diseases    Patient - Jasmina Powers,  Age - 89 y.o.    - 1935      Room Number - 7K-24/024-A   MRN -  [id number]   [facility] # - [id number]  Date of Admission -  2024  8:49 AM    SUBJECTIVE:   Wound healing well.  Swelling improved with Bumex.  No new issues  OBJECTIVE   VITALS    height is 1.676 m (5' 5.98\") and weight is 75.8 kg (167 lb). Her oral temperature is 98.4 °F (36.9 °C). Her blood pressure is 125/84 and her pulse is 89. Her respiration is 18 and oxygen saturation is 100%.       Wt Readings from Last 3 Encounters:   24 75.8 kg (167 lb)   24 75.8 kg (167 lb)   24 77.6 kg (171 lb)       I/O (24 Hours)    Intake/Output Summary (Last 24 hours) at 2024 0948  Last data filed at 2024 0806  Gross per 24 hour   Intake 765 ml   Output 2200 ml   Net -1435 ml       General Appearance  Awake, alert, oriented,  not  In acute distress  HEENT - normocephalic, atraumatic, pink conjunctiva,  anicteric sclera  Neck - Supple, no mass  Neurologic - AAOx3  Skin - (+) bruising or bleeding  Extremities - bilateral leg swelling with blisters on the RLE wound.  No purulent drainage, erythema is improving    MEDICATIONS:      bumetanide  1 mg Oral Once    magnesium sulfate  2,000 mg IntraVENous Once    DULoxetine  30 mg Oral Daily    piperacillin-tazobactam  3,375 mg IntraVENous Q8H    [Held by provider] gabapentin  300 mg Oral TID    sodium chloride flush  5-40 mL IntraVENous 2 times per day    bumetanide  0.5 mg Oral Daily    diclofenac sodium  2 g Topical 4x Daily    fluticasone  1 spray Each Nostril Daily    budesonide-formoterol  2 puff Inhalation BID RT    And    tiotropium  2 puff Inhalation Daily RT    metoprolol tartrate  25 mg Oral BID    montelukast  10 mg Oral Nightly    pantoprazole  40 mg Oral QAM AC    spironolactone  25 mg Oral Daily    vitamin C  500 mg Oral Daily 
                                                          Clinical Pharmacy Note                                               Warfarin Discharge Recommendations    Patient discharged from [facility] today on warfarin.    Warfarin indication: Atrial fibrillation or Atrial flutter  INR goal during admission: 2.0-3.0  Previous home warfarin regimen: Coumadin 7.5 mg MF, 5 mg TWThSS per clinic (Patient takes differently-  5 every day except 7.5 mg Mon and Tues)  Interacting medications at discharge: none  Coumadin 5 mg tabs    Hospital Warfarin Doses & INR Results     Date INR Warfarin Dose   08/08/2024 1.92 7.5 mg    08/09/2024  2.02 7.5 mg     8/10/2024  2.95 No Coumadin     8/11/2024  3.16  No Coumadin     8/12/2024  3.16  2.5 mg    8/13/2024  2.31  5 mg    8/14/24  2.84  3 mg   8/15/24 3.49 No Coumadin   8/16/24 2.85 2.5 mg   8/17/24 2.18 6 mg   8/18/2024 2.08  3 mg     8/19/24  2.42  3 mg    8/20/24      2.52             3 mg             Recent INRs:  Recent Labs     08/20/24  0558   INR 2.52*     Warfarin Discharge Instructions:   Date Warfarin Dose   8/21/24 (tomorrow) 2.5 mg    8/22/24 INR draw     Provider dosing warfarin: FLORIN AIKEN  Next INR date: 8/22/24     
    Hospitalist Progress Note    Patient:  Jasmina Powers      Unit/Bed:7K-24/024-A    YOB: 1935    MRN: [id number]       Acct: [id number]     PCP: Dimas Watts APRN - CNP    Date of Admission: 8/8/2024    Assessment/Plan:    Bacteremia gram culture preliminary report growing gram negative bacilli  ID on consult appreciate in put  Continue abx per ID  Will continue to follow cultures    8/10 repeat  blood culture in progress  Swab RLE preliminary report  no growth  Initial blood culture grew Pseudomonas bacteremia     8/12 patient with persistent fever review of records shows that temperature ranging from 100 to  101.  Discussions with ID Jessica did order CT of the right lower extremity to assess for any possible abscess.  Appreciate input  CT showed: Marked subcutaneous edema along the anterior lateral aspect of the mid to lower right leg. No focal collection is seen to reflect an abscess at this time.  No CT evidence of osteomyelitis.    8/13 no fever reported overnight  Blood culture preliminary report in 48 hrs no growth      2.Right lower extremity cellulitis with blister formation in the setting of Diabetes Mellitus  Continue antibiotics as above  Venous Doppler done on 8/7 showed no DVT     8/12 CT showed: Marked subcutaneous edema along the anterior lateral aspect of the mid to lower right leg. No focal collection is seen to reflect an abscess at this time.  No CT evidence of osteomyelitis.      3.Atrial fibrillation  Rate controlled  Continue Metoprolol  Continue Warfarin pharmacy dosing      4. Diabetes Mellitus type type 2   Continue home meds  Insulin sliding scale  Monitor glucose  Hypoglycemia protocol  HgA1C done 8/8/24 level 6.0      5. Chronic Kidney disease stage 3   Stable  Avoid nephrotoxins  Renally dose medications   Monitor BMP    8/10 BUN/Cr 20/0.9 this am     8/11 BUN/Cr 15/0.8    6.Chronic hyponatremia  On Sodium bicarbonate  Continue to monitor BMP    8/10 NA level 
    Hospitalist Progress Note    Patient:  Jasmina Powers      Unit/Bed:7K-24/024-A    YOB: 1935    MRN: [id number]       Acct: [id number]     PCP: Dimas Watts APRN - CNP    Date of Admission: 8/8/2024    Assessment/Plan:    #Bacteremia, Pseudomonas.  -  Likely due to right lower leg cellulitis with blisters..  -  Initial 1 out of 2 blood cultures positive for Pseudomonas,however repeat cultures are negative.      -On IV antibiotics per ID recommendations.  -  SNF placement.  On discharge antibiotic will be changed to . po.    -Awaiting SNF placement.      #Right lower extremity cellulitis.   -On iv Zosyn.  - ID on board.  .  #A-fib.    -Rate controlled on metoprolol.  On warfarin for anticoagulation.Pharmacy managing coumadin.     #Type 2 diabetes mellitus.   -Continue with sliding scale.  POC glucose checks AC plus at bedtime.  -Hypoglycemia protocol.    #CKD stage III.  -Stable at base level.  -Monitor with daily BMP.     #Deconditioning.   -PT/OT evaluation.  - Patient will need SNF placement.     #Primary hypertension.  -Continue with home dose  amlodipine, Lopressor.    #Mild to moderate aortic stenosis.   -Outpatient follow up with cardiology.      #CHF HFpEF.  NYHA II.  Monitoring.   -Euvolemic at this time.  -Continue with  GDMT as able.        Expected discharge date: 24-48 hours    Disposition:    [] Home       [] TCU       [] Rehab       [] Psych       [] SNF       [] Long Term Care Facility       [x] Other-TBD    Chief Complaint: Leg pain    Hospital Course: History Of Present Illness:    History obtained from patient at the bedside.  This is an 89-year-old female who presented to [facility] ED on 08/08/2024.  Patient had notably presented to the emergency department on 08/07/2024 secondary to right-sided ankle pain.  At that encounter, the patient underwent right foot and ankle XR which showed diffuse soft tissue edema, vascular calcifications, and soft tissue calcifications.  Vascular 
    Hospitalist Progress Note    Patient:  Jasmina Powers      Unit/Bed:7K-24/024-A    YOB: 1935    MRN: [id number]       Acct: [id number]     PCP: Dimas Watts APRN - CNP    Date of Admission: 8/8/2024    Assessment/Plan:    Bacteremia, Pseudomonas.  Likely due to right lower leg cellulitis with blisters..  Initial 1 out of 2 blood cultures positive for Pseudomonas bacteremia.  However repeat cultures are negative.  Awaiting pre-CERT for SNF placement..    -On IV antibiotics per ID recommendations.  SNF placement.  On discharge antibiotic will be changed to . p.  -Daily weights, strict FAVIOLA's.  Fall/aspiration precautions.    -Daily BMP.  Supportive care.    -Awaiting SNF placement.    Right lower extremity cellulitis.  On antibiotics.  Dressing applied.  ID following 2.  A-fib.  Controlled by metoprolol.  On warfarin.    Type 2 diabetes mellitus.  On sliding scale.  POC glucose checks AC plus at bedtime.  CKD stage III.  Looks stable.  Deconditioning.  Patient will need SNF placement.  PT OT.   notified.  Primary hypertension.  Home meds resumed including amlodipine, Lopressor.  Mild to moderate aortic stenosis.   Per echo.  Monitoring.  CHF HFpEF.  NYHA II.  Monitoring.  Walter looks euvolemic.  GDMT as able.        Expected discharge date: 24-48 hours    Disposition:    [] Home       [] TCU       [] Rehab       [] Psych       [] SNF       [] Long Term Care Facility       [x] Other-TBD    Chief Complaint: Leg pain    Hospital Course: History Of Present Illness:    History obtained from patient at the bedside.  This is an 89-year-old female who presented to [facility] ED on 08/08/2024.  Patient had notably presented to the emergency department on 08/07/2024 secondary to right-sided ankle pain.  At that encounter, the patient underwent right foot and ankle XR which showed diffuse soft tissue edema, vascular calcifications, and soft tissue calcifications.  Vascular duplex lower extremity 
    Hospitalist Progress Note    Patient:  Jasmina Powers      Unit/Bed:7K-24/024-A    YOB: 1935    MRN: [id number]       Acct: [id number]     PCP: Dimas Watts APRN - CNP    Date of Admission: 8/8/2024    Assessment/Plan:    Bacteremia gram culture preliminary report growing gram negative bacilli  ID on consult appreciate in put  Continue abx per ID  Will continue to follow cultures    8/10 repeat  blood culture in progress  Swab RLE preliminary report  no growth  Initial blood culture grew Pseudomonas bacteremia     2.Right lower extremity cellulitis with blister formation in the setting of Diabetes Mellitus  Continue antibiotics as above  Venous Doppler done on 8/7 showed no DVT    3.Atrial fibrillation  Rate controlled  Continue Metoprolol  Continue Warfarin pharmacy dosing      4. Diabetes Mellitus type type 2   Continue home meds  Insulin sliding scale  Monitor glucose  Hypoglycemia protocol  HgA1C done 8/8/24 level 6.0      5. Chronic Kidney disease stage 3   Stable  Avoid nephrotoxins  Renally dose medications   Monitor BMP    8/10 BUN/Cr 20/0.9 this am     8/11 BUN/Cr 15/0.8    6.Chronic hyponatremia  On Sodium bicarbonate  Continue to monitor BMP    8/10 NA level 129    8/11 NA level 133    5. Physical deconditioning  PT/OT          Anticipated Discharge in : 3-4 days    Active Hospital Problems    Diagnosis Date Noted    Cellulitis [L03.90] 08/09/2024    Sepsis without acute organ dysfunction (HCC) [A41.9] 08/09/2024    Cellulitis of right lower extremity [L03.115] 08/08/2024         Chief Complaint: Right leg pain    Hospital Course: History Of Present Illness:    History obtained from patient at the bedside.  This is an 89-year-old female who presented to [facility] ED on 08/08/2024.  Patient had notably presented to the emergency department on 08/07/2024 secondary to right-sided ankle pain.  At that encounter, the patient underwent right foot and ankle XR which showed diffuse soft tissue 
    Hospitalist Progress Note    Patient:  Jasmina Powers      Unit/Bed:7K-24/024-A    YOB: 1935    MRN: [id number]       Acct: [id number]     PCP: Dimas Watts APRN - CNP    Date of Admission: 8/8/2024    Assessment/Plan:    Bacteremia gram culture preliminary report growing gram negative bacilli  ID on consult appreciate in put  Continue abx per ID  Will continue to follow cultures    8/10 repeat  blood culture in progress  Swab RLE preliminary report  no growth    2.Right lower extremity cellulitis with blister formation in the setting of Diabetes Mellitus  Continue antibiotics as above  Venous Doppler done on 8/7 showed no DVT    3.Atrial fibrillation  Rate controlled  Continue Metoprolol  Continue Warfarin pharmacy dosing      4. Diabetes Mellitus type type 2   Continue home meds  Insulin sliding scale  Monitor glucose  Hypoglycemia protocol  HgA1C done 8/8/24 level 6.0      5. Chronic Kidney disease stage 3   Stable  Avoid nephrotoxins  Renally dose medications   Monitor BMP    8/10 BUN/Cr 20/0.9 this am     6.Chronic hyponatremia  On Sodium bicarbonate  Continue to monitor BMP    8/10 NA level 129    5. Physical deconditioning  PT/OT          Anticipated Discharge in : 3-4 days    Active Hospital Problems    Diagnosis Date Noted    Cellulitis [L03.90] 08/09/2024    Sepsis without acute organ dysfunction (HCC) [A41.9] 08/09/2024    Cellulitis of right lower extremity [L03.115] 08/08/2024         Chief Complaint: Right leg pain    Hospital Course: History Of Present Illness:    History obtained from patient at the bedside.  This is an 89-year-old female who presented to [facility] ED on 08/08/2024.  Patient had notably presented to the emergency department on 08/07/2024 secondary to right-sided ankle pain.  At that encounter, the patient underwent right foot and ankle XR which showed diffuse soft tissue edema, vascular calcifications, and soft tissue calcifications.  Vascular duplex lower 
    Hospitalist Progress Note    Patient:  Jasmina Powers      Unit/Bed:7K-24/024-A    YOB: 1935    MRN: [id number]       Acct: [id number]     PCP: Dimas Watts APRN - CNP    Date of Admission: 8/8/2024    Assessment/Plan:    Bacteremia gram culture preliminary report growing gram negative bacilli  ID on consult appreciate in put  Continue abx per ID  Will continue to follow cultures    2.Right lower extremity cellulitis with blister formation in the setting of Diabetes Mellitus  Continue antibiotics as above  Venous Doppler done on 8/7 showed no DVT    3.Atrial fibrillation  Rate controlled  Continue Metoprolol  Continue Warfarin pharmacy dosing      4. Diabetes Mellitus type type 2   Continue home meds  Insulin sliding scale  Monitor glucose  Hypoglycemia protocol  HgA1C done 8/8/24 level 6.0      5. Chronic Kidney disease stage 3   Stable  Avoid nephrotoxins  Renally dose medications   Monitor BMP      6.Chronic hyponatremia  On Sodium bicarbonate  Continue to monitor BMP    5. Physical deconditioning  PT/OT          Anticipated Discharge in : 3-4 days    Active Hospital Problems    Diagnosis Date Noted    Cellulitis [L03.90] 08/09/2024    Sepsis without acute organ dysfunction (HCC) [A41.9] 08/09/2024    Cellulitis of right lower extremity [L03.115] 08/08/2024         Chief Complaint: Right leg pain    Hospital Course: History Of Present Illness:    History obtained from patient at the bedside.  This is an 89-year-old female who presented to [facility] ED on 08/08/2024.  Patient had notably presented to the emergency department on 08/07/2024 secondary to right-sided ankle pain.  At that encounter, the patient underwent right foot and ankle XR which showed diffuse soft tissue edema, vascular calcifications, and soft tissue calcifications.  Vascular duplex lower extremity venous right study showed no sonographic evidence of deep venous thrombosis within the right lower extremity as well.  Based on 
    Hospitalist Progress Note    Patient:  Jasmina Powers      Unit/Bed:7K-24/024-A    YOB: 1935    MRN: [id number]       Acct: [id number]     PCP: Dimas Watts APRN - CNP    Date of Admission: 8/8/2024    Assessment/Plan:    #Bacteremia, Pseudomonas.  -  Likely due to right lower leg cellulitis with blisters..  -  Initial 1 out of 2 blood cultures positive for Pseudomonas,however repeat cultures are negative.      -On IV antibiotics per ID recommendations.  -  SNF placement.  On discharge antibiotic will be changed to . po.    -Awaiting SNF placement.      #Right lower extremity cellulitis.   -On iv Zosyn.  - ID on board.    #Hyponatremia.Hypervolemic.  -Fluid restriction  -Monitor with daily BMP.  .  #A-fib.    -Rate controlled on metoprolol.  On warfarin for anticoagulation.Pharmacy managing coumadin.INR 2.42.     #Type 2 diabetes mellitus.   -Continue with sliding scale.  POC glucose checks AC plus at bedtime.  -Hypoglycemia protocol.    #CKD stage III.  -Stable at base level.  -Monitor with daily BMP.     #Deconditioning.   -PT/OT evaluation.  - Patient will need SNF placement.     #Primary hypertension.  -Continue with home dose  amlodipine, Lopressor.    #Mild to moderate aortic stenosis.   -Outpatient follow up with cardiology.      #CHF HFpEF.  NYHA II.  Monitoring.   -Euvolemic at this time.  -Continue with  GDMT as able.        Expected discharge date: 24-48 hours    Disposition:    [] Home       [] TCU       [] Rehab       [] Psych       [] SNF       [] Long Term Care Facility       [x] Other-TBD    Chief Complaint: Leg pain    Hospital Course: History Of Present Illness:    History obtained from patient at the bedside.  This is an 89-year-old female who presented to [facility] ED on 08/08/2024.  Patient had notably presented to the emergency department on 08/07/2024 secondary to right-sided ankle pain.  At that encounter, the patient underwent right foot and ankle XR which showed diffuse 
    Hospitalist Progress Note    Patient:  Jsamina Powers      Unit/Bed:7K-24/024-A    YOB: 1935    MRN: [id number]       Acct: [id number]     PCP: Dimas Watts APRN - CNP    Date of Admission: 8/8/2024    Assessment/Plan:    Pseudomonas bacteremia.  Likely due to right lower extremity cellulitis.  One of the blood cultures positive.  Repeat is negative.    -On antibiotics.  ID following, per ID recommendations, will continue IV antibiotics for now.  -Awaiting full report from second set  -Daily weights FAVIOLA's.  Fall/aspiration precautions.  -Daily BMP/CBC  -Supportive care  Right lower extremity cellulitis.  On antibiotics.  Dressing applied.  ID following 2.  A-fib.  Controlled by metoprolol.  On warfarin.    Type 2 diabetes mellitus.  On sliding scale.  POC glucose checks AC plus at bedtime.  CKD stage III.  Looks stable.  Deconditioning.  Patient will need SNF placement.  PT OT.   notified.  Primary hypertension.  Home meds resumed including amlodipine, Lopressor.  Mild to moderate aortic stenosis.   Per echo.  Monitoring.  CHF HFpEF.  NYHA II.  Monitoring.  Walter looks euvolemic.  GDMT as able.        Expected discharge date: 24-48 hours    Disposition:    [] Home       [] TCU       [] Rehab       [] Psych       [] SNF       [] Long Term Care Facility       [x] Other-TBD    Chief Complaint: Leg pain    Hospital Course: History Of Present Illness:    History obtained from patient at the bedside.  This is an 89-year-old female who presented to [facility] ED on 08/08/2024.  Patient had notably presented to the emergency department on 08/07/2024 secondary to right-sided ankle pain.  At that encounter, the patient underwent right foot and ankle XR which showed diffuse soft tissue edema, vascular calcifications, and soft tissue calcifications.  Vascular duplex lower extremity venous right study showed no sonographic evidence of deep venous thrombosis within the right lower extremity as well. 
  Physician Progress Note      PATIENT:               SRUTHI MACK  [facility] #:                  [id number]  :                       1935  ADMIT DATE:       2024 8:49 AM  DISCH DATE:        2024 5:12 PM  RESPONDING  PROVIDER #:        Nguyễn Richards MD          QUERY TEXT:    Patient admitted with Right lower extremity cellulitis.  Noted documentation   of Sepsis in PN  and cellulitis without Sepsis in Query response inn PN on   . If possible, please document in progress notes and discharge summary if   you are evaluating and /or treating any of the following:    The medical record reflects the following:  Risk Factors:  Right Lower Extremity Cellulitis.  Clinical Indicators: H&P- -Right Lower Extremity Cellulitis,  PN  PROVIDER RESPONSE TEXT: This patient has cellulitis without Sepsis.  WBC 15.7,131, 13.2,  LACTIC ACID 2.4, 3.1  PROCALCITONIN 1.4,  HR -91, 101, 115, 94,  RR 21, 22, 19  PROCALCITONIN 1.72  CRP 13.31  Treatment: piperacillin-tazobactam, vancomycin, clindamycin.    Thanks,  Carolyn Mace [facility].  Options provided:  -- Sepsis confirmed and Right Lower Extremity Cellulitis ruled out  -- Right Lower Extremity Cellulitis confirmed and Sepsis ruled out  -- Other - I will add my own diagnosis  -- Disagree - Not applicable / Not valid  -- Disagree - Clinically unable to determine / Unknown  -- Refer to Clinical Documentation Reviewer    PROVIDER RESPONSE TEXT:    After study, Right Lower Extremity Cellulitis confirmed and Sepsis ruled out.    Query created by: Carolyn Mace on 2024 2:04 AM      QUERY TEXT:    Patient admitted with lower leg cellulitis. Noted to have Mild malnutrition in   Nutrition note . If possible, please document in progress notes and   discharge summary if you are evaluating and /or treating any of the following:    The medical record reflects the following:  Risk Factors: DM, HTN, Advance age.  Clinical Indicators: PN -Malnutrition Assessment: 
  Physician Progress Note      PATIENT:               SRUTHI MACK  [facility] #:                  [id number]  :                       1935  ADMIT DATE:       2024 8:49 AM  DISCH DATE:  RESPONDING  PROVIDER #:        Ike Velasquez          QUERY TEXT:    Pt admitted with Right Lower Extremity Cellulitis. Pt noted to have WBC,   LACTIC ACID, PROCALCITONIN, CRP, HR, RR. If possible, please document in the   progress notes and discharge summary if you are evaluating and /or treating   any of the following:    The medical record reflects the following:  Risk Factors: Right Lower Extremity Cellulitis  Clinical Indicators: H&P-Right Lower Extremity Cellulitis, Developed over 48   hours per patient with no prior trauma, surgical intervention, animal/insect   bite  H&P -2024 CT tibia/fibula right with contrast showing diffuse   subcutaneous edema in the lower leg and foot with no CT evidence of   osteomyelitis.  WBC 15.7,  LACTIC ACID 2.4,  PROCALCITONIN 1.4,  HR -91, 101, 115, 94,  RR 21, 22, 19  PROCALCITONIN 1.72  CRP 13.31  Treatment: piperacillin-tazobactam, vancomycin, clindamycin.    ThankCarolyn [facility].  Options provided:  -- Sepsis is due to Right Lower Extremity Cellulitis which is confirmed,   present on admission  -- Right Lower Extremity Cellulitis without Sepsis  -- Other - I will add my own diagnosis  -- Disagree - Not applicable / Not valid  -- Disagree - Clinically unable to determine / Unknown  -- Refer to Clinical Documentation Reviewer    PROVIDER RESPONSE TEXT:    This patient has cellulitis without Sepsis.    Query created by: Carolyn Mace on 2024 3:56 AM      Electronically signed by:  Ike Velasquez 2024 7:18 AM          
  Warfarin Pharmacy Consult Note    Warfarin Indication: Atrial fibrillation or Atrial flutter  Target INR: 2.0-3.0  Dose prior to admission: 7.5 mg MF, 5 mg TWTSS     Recent Labs     08/11/24  0629 08/12/24 0434 08/13/24  0639   HGB 9.8* 9.1* 10.1*    232 272     Recent Labs     08/11/24  0629 08/12/24 0434 08/13/24  0639   INR 3.16* 3.16* 2.31*     Concurrent anticoagulants/antiplatelets: none  Significant warfarin drug-drug interactions: none     Date INR Warfarin Dose   08/08/2024 1.92 7.5 mg    08/09/2024  2.02 7.5 mg     8/10/2024  2.95 No Coumadin     8/11/2024  3.16  No Coumadin     8/12/2024  3.16  2.5 mg    8/13/2024  2.31  5 mg               Monitoring:                   INR will be monitored daily until therapeutic INR is achieved.    **Please contact pharmacy for discharge instructions when indicated**    Aurelia De La Rosa, PharmD  8/13/2024 at 8:35 AM    
  Warfarin Pharmacy Consult Note    Warfarin Indication: Atrial fibrillation or Atrial flutter  Target INR: 2.0-3.0  Dose prior to admission: 7.5 mg MF, 5 mg TWTSS     Recent Labs     08/12/24  0434 08/13/24  0639   HGB 9.1* 10.1*    272     Recent Labs     08/12/24  0434 08/13/24  0639 08/14/24  0643   INR 3.16* 2.31* 2.84*     Concurrent anticoagulants/antiplatelets: none  Significant warfarin drug-drug interactions: none     Date INR Warfarin Dose   08/08/2024 1.92 7.5 mg    08/09/2024  2.02 7.5 mg     8/10/2024  2.95 No Coumadin     8/11/2024  3.16  No Coumadin     8/12/2024  3.16  2.5 mg    8/13/2024  2.31  5 mg    8/14/24  2.84  3 mg       Monitoring:                   INR will be monitored daily until therapeutic INR is achieved.    **Please contact pharmacy for discharge instructions when indicated**    Aurelia De La Rosa, PharmD  8/14/2024 at 3:48 PM    
 [facility]  INPATIENT PHYSICAL THERAPY  DAILY NOTE  [facility] ORTHOPEDICS 7K - 7K-24/024-A      Discharge Recommendations: Subacte/Skilled Nursing Facility  Equipment Recommendations:Equipment Needed: No (defer to next level of care, patient has only cane)      Time In: 1040  Time Out: 1105  Timed Code Treatment Minutes: 25 Minutes  Minutes: 25          Date: 8/15/2024  Patient Name: Jasmina Powers,  Gender:  female        MRN: [id number]  : 1935  (89 y.o.)     Referring Practitioner: Martha Phan APRN - CNP  Diagnosis: Cellulitis of right lower extremity  Additional Pertinent Hx: Per EMR:This is a very pleasant 89 y.o. female who was admitted to the hospital with a chief complaints of worsening right lower leg cellulites. She was seen at the ED and came back due to pain and redness on the right lower leg.  She came from home.  She denies any trauma.  She has a dog denies being scratched.  She has chronic leg swelling.  No similar history before.  Her blood culture was positive for gram-negative bacteria the molecular panel has identified Pseudomonas.  Patient reports that she starts to have blistering which was dark.  Her CT scan was negative for soft tissue gas or necrotizing infection.  She was started on IV Zosyn vancomycin and clindamycin.  She was on Coumadin for atrial fibrillation she has history of diabetes and hypertension.  She had smoking in the past but no known history of PAD.  No known history of MRSA.     Prior Level of Function:  Lives With: Son (and daughter in law)  Type of Home: House  Home Layout: Two level, 1/2 bath on main level, Bed/Bath upstairs (pt has a lift chair to get to/from second level)  Home Access: Level entry  Home Equipment: Cane   Bathroom Shower/Tub: Tub/Shower unit, Curtain  Bathroom Toilet: Standard  Bathroom Equipment: None  Bathroom Accessibility: Accessible    Receives Help From: Family  ADL Assistance: Independent  Homemaking Assistance: Independent 
 [facility]  INPATIENT PHYSICAL THERAPY  DAILY NOTE  [facility] ORTHOPEDICS 7K - 7K-24/024-A      Discharge Recommendations: Subacte/Skilled Nursing Facility  Equipment Recommendations:Equipment Needed: No (defer to next level of care, patient has only cane)    Time In: 843  Time Out: 906  Timed Code Treatment Minutes: 23 Minutes  Minutes: 23          Date: 2024  Patient Name: Jasmina Powers,  Gender:  female        MRN: [id number]  : 1935  (89 y.o.)     Referring Practitioner: Martha Phan APRN - CNP  Diagnosis: Cellulitis of right lower extremity  Additional Pertinent Hx: Per EMR:This is a very pleasant 89 y.o. female who was admitted to the hospital with a chief complaints of worsening right lower leg cellulites. She was seen at the ED and came back due to pain and redness on the right lower leg.  She came from home.  She denies any trauma.  She has a dog denies being scratched.  She has chronic leg swelling.  No similar history before.  Her blood culture was positive for gram-negative bacteria the molecular panel has identified Pseudomonas.  Patient reports that she starts to have blistering which was dark.  Her CT scan was negative for soft tissue gas or necrotizing infection.  She was started on IV Zosyn vancomycin and clindamycin.  She was on Coumadin for atrial fibrillation she has history of diabetes and hypertension.  She had smoking in the past but no known history of PAD.  No known history of MRSA.     Prior Level of Function:  Lives With: Son (and daughter in law)  Type of Home: House  Home Layout: Two level, 1/2 bath on main level, Bed/Bath upstairs (pt has a lift chair to get to/from second level)  Home Access: Level entry  Home Equipment: Cane   Bathroom Shower/Tub: Tub/Shower unit, Curtain  Bathroom Toilet: Standard  Bathroom Equipment: None  Bathroom Accessibility: Accessible    Receives Help From: Family  ADL Assistance: Independent  Homemaking Assistance: Independent 
 [facility]  INPATIENT PHYSICAL THERAPY  DAILY NOTE  [facility] ORTHOPEDICS 7K - 7K-24/024-A      Discharge Recommendations: Subacte/Skilled Nursing Facility and Patient would benefit from continued PT at discharge  Equipment Recommendations:Equipment Needed: No (defer to next level of care, patient has only cane)      Time In: 1320  Time Out: 1359  Timed Code Treatment Minutes: 39 Minutes  Minutes: 39          Date: 2024  Patient Name: Jasmina Powers,  Gender:  female        MRN: [id number]  : 1935  (89 y.o.)     Referring Practitioner: Martha Phan APRN - CNP  Diagnosis: Cellulitis of right lower extremity  Additional Pertinent Hx: Per EMR:This is a very pleasant 89 y.o. female who was admitted to the hospital with a chief complaints of worsening right lower leg cellulites. She was seen at the ED and came back due to pain and redness on the right lower leg.  She came from home.  She denies any trauma.  She has a dog denies being scratched.  She has chronic leg swelling.  No similar history before.  Her blood culture was positive for gram-negative bacteria the molecular panel has identified Pseudomonas.  Patient reports that she starts to have blistering which was dark.  Her CT scan was negative for soft tissue gas or necrotizing infection.  She was started on IV Zosyn vancomycin and clindamycin.  She was on Coumadin for atrial fibrillation she has history of diabetes and hypertension.  She had smoking in the past but no known history of PAD.  No known history of MRSA.     Prior Level of Function:  Lives With: Son (and daughter in law)  Type of Home: House  Home Layout: Two level, 1/2 bath on main level, Bed/Bath upstairs (pt has a lift chair to get to/from second level)  Home Access: Level entry  Home Equipment: Cane   Bathroom Shower/Tub: Tub/Shower unit, Curtain  Bathroom Toilet: Standard  Bathroom Equipment: None  Bathroom Accessibility: Accessible    Receives Help From: Family  ADL 
Attempted to get patient up to chair at this time. Patient states she feels tired and declines. Monitor.   
[facility]  OCCUPATIONAL THERAPY MISSED TREATMENT NOTE  [facility] ORTHOPEDICS 7K  7K-24/024-A      Date: 2024  Patient Name: Jasmina Powers        CSN: [id number]   : 1935  (89 y.o.)  Gender: female   Referring Practitioner: Martha Phan APRN - CNP  Diagnosis: cellulitis of right lower extremity         REASON FOR MISSED TREATMENT: Pt. Declined OT session this date.  OT staff to check back as able/appropriate.     
Comprehensive Nutrition Assessment    Type and Reason for Visit:  Initial, RD Nutrition Re-Screen/LOS    Nutrition Recommendations/Plan:   Recommend initiation of bowel regimen - last documented BM 8/11; pt reporting constipation  Pt reports feeling anxious this AM - recommend to continue cymbalta or consideration for adjustments prn  Start ONS: Strawberry activia (TID) - to assist wit constipation  Recommend to continue vitamin supplementation   Continue diet per provider and encourage adequate PO intake at best efforts  Long term hx/o following with OP Dietitian for DM MNT management     Malnutrition Assessment:  Malnutrition Status:  Mild malnutrition (08/16/24 1113)    Context:  Acute Illness     Findings of the 6 clinical characteristics of malnutrition:  Energy Intake:  75% or less of estimated energy requirements for 7 or more days (Throughout LOS)  Weight Loss:  Unable to assess (only stated weight this admit)     Body Fat Loss:  No significant body fat loss     Muscle Mass Loss:   (difficult to assess r/t advanced age)    Fluid Accumulation:  Mild Extremities   Strength:  Not Performed    Nutrition Assessment:     Pt. mildy malnourished AEB criteria as listed above.  At risk for further nutrition compromise r/t admit with pseudomonas bacteremia likely d/t RLE cellulitis - wound improving per ID, advanced age, extended LOS and underlying medical condition (PMHx: A fib, CHF, CKD III, DM, HTN, former smoker).        Nutrition Related Findings:    Pt. Report/Treatments/Miscellaneous: Pt seen, c/o feeling anxious this AM and with shaking hands and struggling to get cup lid secured. Pt states he appetite has been fine - she just c/o multiple interruptions when she is trying to eat and therefore has not been eating as well as she would like to. Pt notes that she does feel constipated and denies BM for a few days; denies struggling with constipation PTA. Agreed to trying strawberry activia to assist with BM 
Discussed ongoing fevers with dr. Lopez this am. He will see patient and change dressing to evaluate wound.  
[facility]  OCCUPATIONAL THERAPY MISSED TREATMENT NOTE  [facility]  7K-24/024-A      Date: 2024  Patient Name: Jasmina Powers        CSN: [id number]   : 1935  (89 y.o.)  Gender: female   Referring Practitioner: Martha Phan APRN - CNP  Diagnosis: cellulitis of right lower extremity         REASON FOR MISSED TREATMENT:  patient seated up in bed with eyes closed upon OT arrivla and visitor present. Patient kept eyes closed while conversing with this writer and refusing tx and OOB activity stating she was \"relaxed\" in bed and did not want to participate in OT despite edu in purpose/benefits of OT. Visitor present stated patient was getting discharged today. OT to check back as able and time allows.                
[facility]   Pharmacy Pharmacokinetic Monitoring Service - Vancomycin     Jasmina Powers is a 89 y.o. female starting on vancomycin therapy for SSTI. Pharmacy consulted by Dr Velasquez for monitoring and adjustment.    Target Concentration: Goal AUC/MARY 400-600 mg*hr/L    Additional Antimicrobials: Zosyn    Pertinent Laboratory Values:   Wt Readings from Last 1 Encounters:   08/08/24 75.8 kg (167 lb)     Temp Readings from Last 1 Encounters:   08/08/24 97.7 °F (36.5 °C) (Oral)     Estimated Creatinine Clearance: 41 mL/min (based on SCr of 1 mg/dL).  Recent Labs     08/07/24  1131 08/07/24  1132 08/08/24  0907   CREATININE 1.0  --  1.0   BUN 24*  --  23*   WBC  --  6.7 15.7*     Pertinent Cultures:  Date Source Results   8/8/24 BC X 2 Sent    MRSA Nasal Swab: N/A. Non-respiratory infection.    Plan:  Dosing recommendations based on Bayesian software  Vancomycin 2000 mg IV administered in ED 8/8/2024 1046.  Start vancomycin 1000 mg IV Q24H, next dose 08/09/24 1000  Anticipated AUC of 519 and trough concentration of 15.1 at steady state  Renal labs as indicated- sCr in AM   Vancomycin concentration 8/10/24- not ordered yet    Pharmacy will continue to monitor patient and adjust therapy as indicated    Thank you for the consult,  Stephanie Hanna PharmD 8/8/2024 2:00 PM      
Matthew here to transport to [place]. Discharged with last dose MAR, ELVIE and belongings.  
[facility]   PROGRESS NOTE      Patient: Jasmina Powers  Room #: 7K-24/024-A            YOB: 1935  Age: 89 y.o.  Gender: female            Admit Date & Time: 8/8/2024  8:49 AM    Assessment:    The patient declined a visit today.    Interventions:  The patient was provided information about Spiritual Care being available.     Outcomes:  The  wished the patient a positive day.     Plan:  1.Spiritual care will continue to follow the patient according to [facility] spiritual care SOP.       Electronically signed by Chaplain Lio, on 8/13/2024 at 5:42 PM.  Spiritual Care Department  [facility]  [phone]     08/13/24 1742   Encounter Summary   Encounter Overview/Reason Follow-up   Service Provided For Patient   Referral/Consult From [facility]   Support System Family members   Last Encounter  08/13/24   Complexity of Encounter Low   Begin Time 1710   End Time  1715   Total Time Calculated 5 min   Spiritual/Emotional needs   Type Spiritual Support   Assessment/Intervention/Outcome   Assessment Coping   Intervention Empowerment;Active listening   Outcome Refused/Declined;Receptive   Plan and Referrals   Plan/Referrals Continue to visit, (comment)       
Patient educated on how to use incentive spirometer. Patient verbalized understanding and demonstrated proper use. Emphasized importance and usage of device, with coughing and deep breathing every 4 hours while awake.       
Patient educated on how to use incentive spirometer. Patient verbalized understanding and demonstrated proper use. Emphasized importance and usage of device, with coughing and deep breathing every 4 hours while awake.        
Patient tachy 130's - 140's up to bedside commode at this time. EKG ordered per protocol. Patient signed off to Naomy DARNELL.   
Pharmacy Note  BioFire Result    Jasmina Powers is a 89 y.o. female, with a positive blood culture result    Communication received from Avi, laboratory employee on 8/9/2024 at 3:14 AM    First Gram stain result: gram negative bacilli    BioFire BCID result: Pseudomonas aeruginosa no resistance genes detected    BioFire BCID and gram stain congruent? Yes    Suspected source? Lower extremity cellulitis    Patient chart has been reviewed for signs/symptoms of infection: Yes  /68   Pulse 82   Temp 99.7 °F (37.6 °C) (Oral)   Resp 16   Ht 1.676 m (5' 5.98\")   Wt 75.8 kg (167 lb)   SpO2 91%   BMI 26.97 kg/m²   Lab Results   Component Value Date    WBC 15.7 (H) 08/08/2024     Allergies reviewed  Patient has no known allergies.    Renal function reviewed  Estimated Creatinine Clearance: 41 mL/min (based on SCr of 1 mg/dL).    Current antibiotic regimen: Zosyn and Vanc and clindamycin    Intervention needed: No    Individual contacted: Yokasta DARNELL    Recommendations: Can continue current therapy as Zosyn will cover the identified bacteria and inform the provider of the results in the am.    Recommendations accepted? Yes    Leslie Mao RP  8/9/2024 3:14 AM    
[facility]  [facility] ORTHOPEDICS 7K  Occupational Therapy  Daily Note    Discharge Recommendations: Subacute/skilled nursing facility  Equipment Recommendations: Other: Continue to monitor needs.      Time In: 08  Time Out: 932  Timed Code Treatment Minutes: 38 Minutes  Minutes: 38          Date: 2024  Patient Name: Jasmina Powers,   Gender: female      Room: [address] [place] [address]  MRN: [id number]  : 1935  (89 y.o.)  Referring Practitioner: Martha Phan APRN - CNP  Diagnosis: cellulitis of right lower extremity  Additional Pertinent Hx: Per H&P: \"his is an 89-year-old female who presented to [facility] ED on 2024.  Patient had notably presented to the emergency department on 2024 secondary to right-sided ankle pain.  At that encounter, the patient underwent right foot and ankle XR which showed diffuse soft tissue edema, vascular calcifications, and soft tissue calcifications.  Vascular duplex lower extremity venous right study showed no sonographic evidence of deep venous thrombosis within the right lower extremity as well.  Based on these findings, the patient was given a prescription for doxycycline 100 mg twice daily for 10 days as well as hydrocodone-acetaminophen for an unspecified cellulitis.\"    Restrictions/Precautions:  Restrictions/Precautions: General Precautions  Position Activity Restriction  Other position/activity restrictions: have a chair following patient for safety with ambulation     Social/Functional History:  Lives With: Son (and daughter in law)  Type of Home: House  Home Layout: Two level, 1/2 bath on main level, Bed/Bath upstairs (pt has a lift chair to get to/from second level)  Home Access: Level entry  Home Equipment: Cane   Bathroom Shower/Tub: Tub/Shower unit, Curtain  Bathroom Toilet: Standard  Bathroom Equipment: None  Bathroom Accessibility: Accessible    Receives Help From: Family  ADL Assistance: Independent  Homemaking Assistance: Independent (shares 
Pt is an 89y.o. female in 7K-24. Please refer to ACP note for context, re: AD's.     08/09/24 0939   Encounter Summary   Encounter Overview/Reason Advance Care Planning   Service Provided For Patient and family together   Referral/Consult From Multi-disciplinary team   Support System Family members   Last Encounter  08/09/24   Complexity of Encounter Low   Begin Time 0939   End Time  0942   Total Time Calculated 3 min   Advance Care Planning   Type ACP conversation   Assessment/Intervention/Outcome   Assessment Calm;Coping   Intervention Explored/Affirmed feelings, thoughts, concerns   Outcome Expressed Gratitude;Receptive       
Report called to Lawson magaña  
[facility]  PHYSICAL THERAPY MISSED TREATMENT NOTE  [facility] 7K    Date: 2024  Patient Name: Jasmina Powers        MRN: [id number]   : 1935  (89 y.o.)  Gender: female   Referring Practitioner: Martha Phan APRN - CNP  Diagnosis: Cellulitis of right lower extremity         REASON FOR MISSED TREATMENT:  Missed Treat.  RN ok'ed session however voices patient was experiencing increased pain with pain medication given 30 minutes prior. Upon arrival patient was sleeping in recliner and was fairly easy to wake. Pt voices increased pain and fatigue with therapist suggesting returning to bed for pressure relief however patient refuse and requested pillows placed under legs. Education provided on mobility and benefits of therapy however patient continued to politely refuse. PT to attempt to see at next available date as time allows and as willing.       
[facility]  OCCUPATIONAL THERAPY MISSED TREATMENT NOTE  [facility] ORTHOPEDICS 7K  7K-24/024-A      Date: 8/15/2024  Patient Name: Jasmina Powers        CSN: [id number]   : 1935  (89 y.o.)  Gender: female   Referring Practitioner: Martha Phan APRN - CNP  Diagnosis: cellulitis of right lower extremity         REASON FOR MISSED TREATMENT: RN okayed session.  First session Pt. Working on finishing breakfast and second attempt Pt. Declined, encouragement provided although Pt. Continues to decline reports no pain currently and resting well would like to wait until next date to attempt therapy.                
[facility]  OCCUPATIONAL THERAPY MISSED TREATMENT NOTE  [facility] ORTHOPEDICS 7K  7K-24/024-A      Date: 2024  Patient Name: Jasmina Powers        CSN: [id number]   : 1935  (89 y.o.)  Gender: female   Referring Practitioner: Martha Phan APRN - CNP  Diagnosis: cellulitis of right lower extremity         REASON FOR MISSED TREATMENT: Patient Refused.  ATTN x 2- pt had just completed her PT session.  Pt was attempted later in the PM and she was eating supper.  She agreed to resume therapy tomorrow.                
Warfarin Pharmacy Consult Note    Jasmina Powers is a 89 y.o. female for whom pharmacy has been asked to manage warfarin therapy.     Consulting Provider: Ike Velasquez  Warfarin Indication: Atrial fibrillation or Atrial flutter  Target INR range: 2.0-3.0   Warfarin dose prior to admission: Coumadin 7.5 mg MF, 5 mg TWThSS per clinic (Patient takes differently-  5 every day except 7.5 mg Mon and Tues)  Outpatient warfarin provider:  [facility]    Recent Labs     08/07/24  1132 08/08/24  0907   HGB 11.0* 11.5*    236     No results for input(s): \"INR\" in the last 72 hours.  Concurrent anticoagulants/antiplatelets: none  Significant warfarin drug-drug interactions: none    Date INR Warfarin Dose   08/08/2024 1.92 7.5 mg                                   INR will be monitored routinely until therapeutic INR is achieved.    Pharmacy will continue to follow. Thank you for the consult,   Sushma Watts, PharmD 8/8/2024 1:35 PM  
Warfarin Pharmacy Consult Note    Warfarin Indication: Atrial fibrillation or Atrial flutter  Target INR: 2.0-3.0  Dose prior to admission: 7.5 mg MF and 5 mg [id number]    Recent Labs     08/08/24  0907 08/09/24  0638 08/10/24  0936   HGB 11.5* 9.6* 10.1*    182 216     Recent Labs     08/08/24  1312 08/09/24  0638 08/10/24  0720   INR 1.92* 2.02* 2.95*     Concurrent anticoagulants/antiplatelets: none  Significant warfarin drug-drug interactions: none     Date INR Warfarin Dose   08/08/2024 1.92 7.5 mg    08/09/2024  2.02 7.5 mg     8/10/2024  2.95 No Coumadin                                      Monitoring:                   -No Coumadin today as INR has risen by ~1 point overnight  -INR will be monitored daily until therapeutic INR is achieved.    **Please contact pharmacy for discharge instructions when indicated**    Eliud PeterD, BCPS  8/10/2024  2:48 PM      
Warfarin Pharmacy Consult Note    Warfarin Indication: Atrial fibrillation or Atrial flutter  Target INR: 2.0-3.0  Dose prior to admission: 7.5 mg MF, 5 mg TWTSS      Recent Labs     08/15/24  1117 08/16/24  0657   HGB 9.5* 9.3*    350     Recent Labs     08/14/24  0643 08/15/24  0602 08/16/24  0657   INR 2.84* 3.49* 2.85*     Concurrent anticoagulants/antiplatelets: none  Significant warfarin drug-drug interactions: none     Date INR Warfarin Dose   08/08/2024 1.92 7.5 mg    08/09/2024  2.02 7.5 mg     8/10/2024  2.95 No Coumadin     8/11/2024  3.16  No Coumadin     8/12/2024  3.16  2.5 mg    8/13/2024  2.31  5 mg    8/14/24  2.84  3 mg   8/15/24 3.49 No Coumadin   8/16/24 2.85 2.5 mg     Monitoring:                   INR will be monitored daily until therapeutic INR is achieved.    **Please contact pharmacy for discharge instructions when indicated**    Aurelia De La Rosa, PharmD  8/16/2024 at 1:43 PM   
Warfarin Pharmacy Consult Note    Warfarin Indication: Atrial fibrillation or Atrial flutter  Target INR: 2.0-3.0  Dose prior to admission: 7.5 mg MF, 5 mg TWTSS     Recent Labs     08/13/24  0639 08/15/24  1117   HGB 10.1* 9.5*    309     Recent Labs     08/13/24  0639 08/14/24  0643 08/15/24  0602   INR 2.31* 2.84* 3.49*     Concurrent anticoagulants/antiplatelets: none  Significant warfarin drug-drug interactions: none     Date INR Warfarin Dose   08/08/2024 1.92 7.5 mg    08/09/2024  2.02 7.5 mg     8/10/2024  2.95 No Coumadin     8/11/2024  3.16  No Coumadin     8/12/2024  3.16  2.5 mg    8/13/2024  2.31  5 mg    8/14/24  2.84  3 mg   8/15/24 3.49 No Coumadin     Monitoring:                   INR will be monitored daily until therapeutic INR is achieved.    **Please contact pharmacy for discharge instructions when indicated**    Aurelia De La Rosa, PharmD  8/15/2024 at 4:12 PM   
Warfarin Pharmacy Consult Note    Warfarin Indication: Atrial fibrillation or Atrial flutter  Target INR: 2.0-3.0  Dose prior to admission: 7.5 mg MF, 5 mg TWTSS    Recent Labs     08/10/24  0936 08/10/24  2114 08/11/24  0629   HGB 10.1* 10.2* 9.8*    208 222     Recent Labs     08/09/24  0638 08/10/24  0720 08/11/24  0629   INR 2.02* 2.95* 3.16*     Concurrent anticoagulants/antiplatelets: none  Significant warfarin drug-drug interactions: none     Date INR Warfarin Dose   08/08/2024 1.92 7.5 mg    08/09/2024  2.02 7.5 mg     8/10/2024  2.95 No Coumadin     8/11/2024  3.16  No Coumadin                                Monitoring:                   INR will be monitored daily until therapeutic INR is achieved.    **Please contact pharmacy for discharge instructions when indicated**    Sonya Mary, EliudD, BCPS  8/11/2024  12:12 PM      
Warfarin Pharmacy Consult Note    Warfarin Indication: Atrial fibrillation or Atrial flutter  Target INR: 2.0-3.0  Dose prior to admission: 7.5 mg MF, 5mg all other days    Recent Labs     08/15/24  1117 08/16/24  0657   HGB 9.5* 9.3*    350     Recent Labs     08/15/24  0602 08/16/24  0657 08/17/24  0726   INR 3.49* 2.85* 2.18*     Concurrent anticoagulants/antiplatelets: none  Significant warfarin drug-drug interactions: none     Date INR Warfarin Dose   08/08/2024 1.92 7.5 mg    08/09/2024  2.02 7.5 mg     8/10/2024  2.95 No Coumadin     8/11/2024  3.16  No Coumadin     8/12/2024  3.16  2.5 mg    8/13/2024  2.31  5 mg    8/14/24  2.84  3 mg   8/15/24 3.49 No Coumadin   8/16/24 2.85 2.5 mg   8/17/24 2.18 6 mg                      Monitoring:                   INR will be monitored daily until therapeutic INR is achieved.    **Please contact pharmacy for discharge instructions when indicated**    Noemi Garnica R.Ph., BCPS., 8/17/2024,2:26 PM      
Warfarin Pharmacy Consult Note    Warfarin Indication: Atrial fibrillation or Atrial flutter  Target INR: 2.0-3.0  Dose prior to admission: Coumadin 7.5 mg MF and 5 mg all other days    No results for input(s): \"HGB\", \"PLT\" in the last 72 hours.  Recent Labs     08/17/24  0726 08/18/24  0650 08/19/24  0638   INR 2.18* 2.08* 2.42*     Concurrent anticoagulants/antiplatelets: none  Significant warfarin drug-drug interactions: none     Date INR Warfarin Dose   08/08/2024 1.92 7.5 mg    08/09/2024  2.02 7.5 mg     8/10/2024  2.95 No Coumadin     8/11/2024  3.16  No Coumadin     8/12/2024  3.16  2.5 mg    8/13/2024  2.31  5 mg    8/14/24  2.84  3 mg   8/15/24 3.49 No Coumadin   8/16/24 2.85 2.5 mg   8/17/24 2.18 6 mg   8/18/2024 2.08  3 mg     8/19/24  2.42  3 mg                  Monitoring:                   INR will be monitored daily until therapeutic INR is achieved.    **Please contact pharmacy for discharge instructions when indicated**    Sendy Granados, PharmD  PGY1 Pharmacy Resident  8/19/2024 11:50 AM        
Warfarin Pharmacy Consult Note    Warfarin Indication: Atrial fibrillation or Atrial flutter  Target INR: 2.0-3.0  Dose prior to admission: Coumadin 7.5 mg MF, 5 mg TWThSS per clinic     Recent Labs     08/16/24  0657   HGB 9.3*        Recent Labs     08/16/24  0657 08/17/24  0726 08/18/24  0650   INR 2.85* 2.18* 2.08*     Concurrent anticoagulants/antiplatelets: none  Significant warfarin drug-drug interactions: none     Date INR Warfarin Dose   08/08/2024 1.92 7.5 mg    08/09/2024  2.02 7.5 mg     8/10/2024  2.95 No Coumadin     8/11/2024  3.16  No Coumadin     8/12/2024  3.16  2.5 mg    8/13/2024  2.31  5 mg    8/14/24  2.84  3 mg   8/15/24 3.49 No Coumadin   8/16/24 2.85 2.5 mg   8/17/24 2.18 6 mg   8/18/2024 2.08  3 mg                    Monitoring:                   INR will be monitored daily until therapeutic INR is achieved.    **Please contact pharmacy for discharge instructions when indicated**    Stephanie Hanna PharmD 8/18/2024 11:49 AM      
Warfarin Pharmacy Consult Note    Warfarin Indication: Atrial fibrillation or Atrial flutter  Target INR: 2.0-3.0  Dose prior to admission: Coumadin 7.5 mg MF, 5 mg TWThSS per clinic (Patient takes differently- 5 every day except 7.5 mg Mon and Tues)     Recent Labs     08/07/24  1132 08/08/24  0907 08/09/24  0638   HGB 11.0* 11.5* 9.6*    236 182     Recent Labs     08/08/24  1312 08/09/24  0638   INR 1.92* 2.02*     Concurrent anticoagulants/antiplatelets: none  Significant warfarin drug-drug interactions: none     Date INR Warfarin Dose   08/08/2024 1.92 7.5 mg    08/09/2024  2.02 7.5 mg                                                  Monitoring:                   INR will be monitored daily until therapeutic INR is achieved.    **Please contact pharmacy for discharge instructions when indicated**    Sushma Watts, PharmD 8/9/2024 8:10 AM  
Warfarin Pharmacy Consult Note    Warfarin Indication: Atrial fibrillation or Atrial flutter  Target INR: 2.0-3.0  Dose prior to admission: Dose prior to admission: 7.5 mg MF, 5 mg TWTSS     Recent Labs     08/10/24  2114 08/11/24  0629 08/12/24 0434   HGB 10.2* 9.8* 9.1*    222 232     Recent Labs     08/10/24  0720 08/11/24  0629 08/12/24 0434   INR 2.95* 3.16* 3.16*     Concurrent anticoagulants/antiplatelets: none  Significant warfarin drug-drug interactions: none     Date INR Warfarin Dose   08/08/2024 1.92 7.5 mg    08/09/2024  2.02 7.5 mg     8/10/2024  2.95 No Coumadin     8/11/2024  3.16  No Coumadin     8/12/2024  3.16  2.5 mg                     Monitoring:                   INR will be monitored daily until therapeutic INR is achieved.    **Please contact pharmacy for discharge instructions when indicated**    Aurelia De La Rosa, PharmD  8/12/2024 at 1:35 PM    
(shares responsibilities with family)  Homemaking Responsibilities: Yes  Ambulation Assistance: Independent  Transfer Assistance: Independent  Active : Yes  Additional Comments: Pt. reports independence with ADLs/IADLs/mobility and transfers with intermittent use of cane.  Pt. has strong support system from family    Restrictions/Precautions:  Restrictions/Precautions: General Precautions  Position Activity Restriction  Other position/activity restrictions: have a chair following patient for safety with ambulation     SUBJECTIVE: Pt in bed upon arrival, and agrees to therapy, RN approved session, Pt A&O X 3/4, Pt flat but cooperative, Demos confusion, pt Lethargic/falling asleep- extended time needed for responses, very slow moving    PAIN: RLE 5/10    Vitals:   Vitals:    08/20/24 0955   BP:    Pulse:    Resp:    Temp:    SpO2: 100%     OBJECTIVE:  Bed Mobility:  Supine to Sit: Minimal Assistance, with head of bed raised, with rail, with verbal cues , with increased time for completion  Scooting: Minimal Assistance, with verbal cues , with increased time for completion    Transfers:  Sit to Stand: Minimal Assistance, with increased time for completion, cues for hand placement, with verbal cues, cues for foot placement and set up-- extensive education provided on this prior to transfer to improve technique and ease of tfer  Stand to Sit:Minimal Assistance, with increased time for completion, cues for hand placement, with verbal cues, cues needed for alignment to surface and hand placement. Decreased eccentric control    Ambulation:  Minimal Assistance, with verbal cues , with increased time for completion  Distance: 3ft  Surface: Level Tile  Device: Rolling Walker  Gait Deviations: Forward Flexed Posture, Slow Joy, Decreased Step Length Bilaterally, Decreased Gait Speed, Decreased Foot Clearance Right, Decreased Foot Clearance Left, Unsteady Gait, Decreased Terminal Knee Extension, Increased reliance on 
Assistance: Independent  Homemaking Assistance: Independent (shares responsibilities with family)  Homemaking Responsibilities: Yes  Ambulation Assistance: Independent  Transfer Assistance: Independent  Active : Yes  Additional Comments: Pt. reports independence with ADLs/IADLs/mobility and transfers with intermittent use of cane.  Pt. has strong support system from family    Restrictions/Precautions:  Restrictions/Precautions: General Precautions  Position Activity Restriction  Other position/activity restrictions: have a chair following patient for safety with ambulation     SUBJECTIVE: Patient supine in bed upon  PTA arrival with her son at bedside. Patient pleasant and agreeable to therapy session. Patient A&Ox3/4     PAIN: 10/10: RLE with Wbing, 5/10 at end of therapy session.    Vitals: Vitals not assessed per clinical judgement, see nursing flowsheet    OBJECTIVE:  Bed Mobility:  Supine to Sit: Contact Guard Assistance, with head of bed flat, with rail, with verbal cues , with increased time for completion  Sit to Supine: Minimal Assistance, X 1, with head of bed flat, with rail, with verbal cues , with increased time for completion, requires assist to elevate RLE onto mattress   Scooting: Stand By Assistance, with verbal cues , with increased time for completion, Patient requires assist of hercules shee to scoot toward HOB.    Transfers:  Sit to Stand: Minimal Assistance, X 1, with increased time for completion, cues for hand placement, from EOB and BSC. She requires cues for proper hand/foot placement from elevated surface.  Stand to Sit:Contact Guard Assistance, with increased time for completion, cues for hand placement  Bed to/from BSC: Spencer for RW navigation throughout turn and cues for R foot placement/steps.    Ambulation:  Not Tested    Stairs:  Not Tested    Balance:  Static Sitting Balance:  Supervision  Static Standing Balance: Contact Guard Assistance    Exercise:  None    Functional 
Daily    multivitamin  1 tablet Oral Daily    magnesium oxide  400 mg Oral Daily    atorvastatin  10 mg Oral Daily    Vitamin D  1 tablet Oral Daily    warfarin placeholder: dosing by pharmacy   Other RX Placeholder    piperacillin-tazobactam  3,375 mg IntraVENous Q8H    insulin lispro  0-8 Units SubCUTAneous TID WC    insulin lispro  0-4 Units SubCUTAneous Nightly    sodium bicarbonate  325 mg Oral BID      sodium chloride      dextrose       sodium chloride flush, sodium chloride, ondansetron **OR** ondansetron, polyethylene glycol, acetaminophen **OR** acetaminophen, albuterol sulfate HFA, benzonatate, meclizine, oxyCODONE-acetaminophen **OR** oxyCODONE-acetaminophen, glucose, dextrose bolus **OR** dextrose bolus, glucagon (rDNA), dextrose      LABS:     CBC:   Recent Labs     08/08/24  0907 08/09/24  0638 08/10/24  0936   WBC 15.7* 12.9* 13.1*   HGB 11.5* 9.6* 10.1*    182 216     BMP:    Recent Labs     08/08/24  1738 08/09/24  0638 08/10/24  0936   * 129* 129*   K 3.6 3.6 3.5   CL 94* 96* 93*   CO2 22* 21* 22*   BUN 21 19 20   CREATININE 1.0 0.8 0.9   GLUCOSE 145* 133* 149*     Calcium:  Recent Labs     08/10/24  0936   CALCIUM 8.9     Ionized Calcium:Invalid input(s): \"IONCA\"  Magnesium:  Recent Labs     08/10/24  0936   MG 2.0     Phosphorus:No results for input(s): \"PHOS\" in the last 72 hours.  BNP:No results for input(s): \"BNP\" in the last 72 hours.  Glucose:  Recent Labs     08/09/24  1935 08/10/24  0642 08/10/24  1100   POCGLU 158* 141* 159*     HgbA1C:   Recent Labs     08/08/24  2002   LABA1C 6.0     INR:   Recent Labs     08/08/24  1312 08/09/24  0638 08/10/24  0720   INR 1.92* 2.02* 2.95*     Hepatic:   Recent Labs     08/08/24  0907   ALKPHOS 101   ALT 18   AST 23   BILITOT 1.2   BILIDIR 0.5     Amylase and Lipase:No results for input(s): \"LACTA\", \"AMYLASE\" in the last 72 hours.  Lactic Acid: No results for input(s): \"LACTA\" in the last 72 hours.  Troponin: No results for input(s): 
Oral Daily    atorvastatin  10 mg Oral Daily    Vitamin D  1 tablet Oral Daily    warfarin placeholder: dosing by pharmacy   Other RX Placeholder    insulin lispro  0-8 Units SubCUTAneous TID WC    insulin lispro  0-4 Units SubCUTAneous Nightly    sodium bicarbonate  325 mg Oral BID      sodium chloride      dextrose       sodium chloride flush, sodium chloride, ondansetron **OR** ondansetron, polyethylene glycol, acetaminophen **OR** acetaminophen, albuterol sulfate HFA, benzonatate, meclizine, oxyCODONE-acetaminophen **OR** oxyCODONE-acetaminophen, glucose, dextrose bolus **OR** dextrose bolus, glucagon (rDNA), dextrose      LABS:     CBC:   No results for input(s): \"WBC\", \"HGB\", \"PLT\" in the last 72 hours.    BMP:    Recent Labs     08/18/24  0650   *   K 4.2   CL 93*   CO2 25   BUN 11   CREATININE 0.7   GLUCOSE 129*     Calcium:  Recent Labs     08/18/24  0650   CALCIUM 9.8     Ionized Calcium:Invalid input(s): \"IONCA\"  Magnesium:  No results for input(s): \"MG\" in the last 72 hours.    Phosphorus:No results for input(s): \"PHOS\" in the last 72 hours.  BNP:No results for input(s): \"BNP\" in the last 72 hours.  Glucose:  Recent Labs     08/18/24 2010 08/19/24  0608 08/19/24  1146   POCGLU 201* 169* 195*     HgbA1C: No results for input(s): \"LABA1C\" in the last 72 hours.  INR:   Recent Labs     08/17/24  0726 08/18/24  0650 08/19/24  0638   INR 2.18* 2.08* 2.42*      CULTURES:   UA: No results for input(s): \"SPECGRAV\", \"PHUR\", \"COLORU\", \"CLARITYU\", \"MUCUS\", \"PROTEINU\", \"BLOODU\", \"RBCUA\", \"WBCUA\", \"BACTERIA\", \"NITRU\", \"GLUCOSEU\", \"BILIRUBINUR\", \"UROBILINOGEN\", \"KETUA\", \"LABCAST\", \"LABCASTTY\", \"AMORPHOS\" in the last 72 hours.    Invalid input(s): \"CRYSTALS\"  Micro:   Lab Results   Component Value Date/Time    BC No growth 24 hours. No growth 48 hours. No growth 08/10/2024 09:21 PM            Problem list of patient:     Patient Active Problem List   Diagnosis Code    Anticoagulated on Coumadin Z79.01    Diabetes 
questions from therapist. No family present during session. Right lower leg wrapped with ace bandage. RN did report using 2 people for stand pivot to [facility] [place] for safety.    General:  Overall Orientation Status:  (Slow to respond to questions, but oriented to day. Took increased time to look at calendar to determine month and year.)  Vision: Within Functional Limits  Hearing: Within functional limits       Pain: 0/10: None    Vitals:   Vitals:    08/10/24 0345   BP: 110/84   Pulse: 83   Resp: 18   Temp: 98.6 °F (37 °C)   SpO2: 98%         Social/Functional History:    Lives With: Son (and DIL)  Type of Home: House  Home Layout: Two level, 1/2 bath on main level, Bed/Bath upstairs (Pt has a chair lift up to 2ns level of home)  Home Access: Level entry  Home Equipment: Cane     Bathroom Shower/Tub: Tub/Shower unit  Bathroom Toilet: Standard  Bathroom Equipment: None    Receives Help From: Family  ADL Assistance: Independent  Homemaking Assistance: Independent  Homemaking Responsibilities: Yes  Ambulation Assistance: Independent  Transfer Assistance: Independent    Active : Yes     Additional Comments: Patient reports she is IND PTA without use of an AD. Patient later reports she uses a cane only when taking walks around her block at home. Pt reports she helps with household tasks \"when she feels like it\". Pt reports son and DIL are home during the day but they do not physically help her with ADLs.    OBJECTIVE:  Range of Motion:  Right Lower Extremity: hip and knee WFL, ankle to neutral only. Increased edema in R ankle and lower leg.   Left Lower Extremity: WFL    Strength:  Right Lower Extremity: ankle 2-/5, knee 4-/5, hip 4/5  Left Lower Extremity: WFL    Balance:  Static Standing Balance: Pt with initial standing balance of max A of 1 from therapist due to posterior lean. Likely from increased plantarflexion position of right ankle. Needed increased time and verbal cues to use UE 's on walker to take 
Applicable    Assessment:  Assessment: Pt. seen for OT eval and tx this date and appropriate for skilled OT services at this time as pt not at PLOF.  Performance deficits / Impairments: Decreased functional mobility , Decreased strength, Decreased high-level IADLs, Decreased safe awareness, Decreased ADL status, Decreased balance, Decreased endurance  Prognosis: Fair  Decision Making: Medium Complexity    Treatment Initiated: Treatment and education initiated within context of evaluation.  Evaluation time included review of current medical information, gathering information related to past medical, social and functional history, completion of standardized testing, formal and informal observation of tasks, assessment of data and development of plan of care and goals.  Treatment time included skilled education and facilitation of tasks to increase safety and independence with ADL's for improved functional independence and quality of life.    Discharge Recommendations:  Continue to assess pending progress, Home with Home health OT    Patient Education:          Patient Education  Education Given To: Patient  Education Provided: Role of Therapy;Plan of Care;Precautions;ADL Adaptive Strategies;Transfer Training  Education Method: Demonstration  Barriers to Learning: Cognition  Education Outcome: Verbalized understanding;Demonstrated understanding    Equipment Recommendations:  Other: Continue to monitor needs.    Plan:  Times Per Week: 5x  Current Treatment Recommendations: Strengthening, Safety education & training, Patient/Caregiver education & training, Balance training, Functional mobility training, Equipment evaluation, education, & procurement, Endurance training, Neuromuscular re-education, Self-Care / ADL, Home management training, Coordination training.  See long-term goal time frame for expected duration of plan of care.  If no long-term goals established, a short length of stay is anticipated.    Goals:   
Good treatment tolerance      Plan: Times Per Week: 5x  Current Treatment Recommendations: Strengthening, Safety education & training, Patient/Caregiver education & training, Balance training, Functional mobility training, Equipment evaluation, education, & procurement, Endurance training, Neuromuscular re-education, Self-Care / ADL, Home management training, Coordination training    Education:  Learners: Patient  Role of OT, Plan of Care, ADL's, IADL's, Home Safety, Importance of Increasing Activity, Fall Prevention, and Assistive Device Safety    Goals  Short Term Goals  Time Frame for Short Term Goals: by time of d/c  Short Term Goal 1: Pt. to tolerate standing up to 3 minutes with SBA to improve activity tolerance for self care regimen.  Short Term Goal 2: Pt. to demo improved BUE strength to 4/5 for ease with ADL based transfers.  Short Term Goal 3: Pt. to progress to ambulating to/from bathroom with CGA to maximize performance with ADL routines.  Short Term Goal 4: Pt. to transfer to/from BSC/toilet with CGA to improve performance with daily self cares.  Short Term Goal 5: Pt. to verbalize at least 3 fall prevention techniques with good carryover during daily tasks.  Long Term Goals  Time Frame for Long Term Goals : no LTG d/t ELOS    Following session, patient left in safe position with all fall risk precautions in place.        
lower extremities: ,Ankle pumps, Glut sets, Quad sets, Heelslides, Short arc quads, and Hip abduction/adduction.  Exercises were completed for increased independence with functional mobility.  Patient requires extended period of time to complete all there ex     Functional Outcome Measures:  [facility] (6 CLICK) BASIC MOBILITY           Modified Bozena Scale:  Not Applicable    ASSESSMENT:  Assessment: Patient progressing toward established goals.  Activity Tolerance:  Patient tolerance of  treatment:Good.  Plan: Current Treatment Recommendations: Strengthening, Balance training, Functional mobility training, Transfer training, Gait training, Safety education & training, Patient/Caregiver education & training, Equipment evaluation, education, & procurement, Therapeutic activities  General Plan:  (5x)    Education:  Learners: Patient  Patient Education: Plan of Care, Home Exercise Program, Bed Mobility, Transfers    Goals:  Patient Goals : Pt goal to get better  Short Term Goals  Time Frame for Short Term Goals: By discharge  Short Term Goal 1: Supine to/from sit at CGA in order to get in/out of bed.  Short Term Goal 2: Sit to/from stand at consistent CGA in order to get up to walk.  Short Term Goal 3: Ambulate 25 feet with RW at CGA in order to walk in home safely.  Long Term Goals  Time Frame for Long Term Goals : NA due to short ELOS    Following session, patient left in safe position with all fall risk precautions in place.    
noted. Otherwise ROS is negative).    Medications:  Reviewed    Infusion Medications    sodium chloride      dextrose       Scheduled Medications    DULoxetine  30 mg Oral Daily    piperacillin-tazobactam  3,375 mg IntraVENous Q8H    [Held by provider] gabapentin  300 mg Oral TID    sodium chloride flush  5-40 mL IntraVENous 2 times per day    bumetanide  0.5 mg Oral Daily    diclofenac sodium  2 g Topical 4x Daily    fluticasone  1 spray Each Nostril Daily    budesonide-formoterol  2 puff Inhalation BID RT    And    tiotropium  2 puff Inhalation Daily RT    metoprolol tartrate  25 mg Oral BID    montelukast  10 mg Oral Nightly    pantoprazole  40 mg Oral QAM AC    spironolactone  25 mg Oral Daily    vitamin C  500 mg Oral Daily    amLODIPine  5 mg Oral Daily    multivitamin  1 tablet Oral Daily    magnesium oxide  400 mg Oral Daily    atorvastatin  10 mg Oral Daily    Vitamin D  1 tablet Oral Daily    warfarin placeholder: dosing by pharmacy   Other RX Placeholder    insulin lispro  0-8 Units SubCUTAneous TID WC    insulin lispro  0-4 Units SubCUTAneous Nightly    sodium bicarbonate  325 mg Oral BID     PRN Meds: sodium chloride flush, sodium chloride, ondansetron **OR** ondansetron, polyethylene glycol, acetaminophen **OR** acetaminophen, albuterol sulfate HFA, benzonatate, meclizine, oxyCODONE-acetaminophen **OR** oxyCODONE-acetaminophen, glucose, dextrose bolus **OR** dextrose bolus, glucagon (rDNA), dextrose      Intake/Output Summary (Last 24 hours) at 8/17/2024 0942  Last data filed at 8/16/2024 2142  Gross per 24 hour   Intake 750 ml   Output 900 ml   Net -150 ml       Diet:  ADULT DIET; Regular; 5 carb choices (75 gm/meal); Low Fat/Low Chol/High Fiber/RADHA; Low Sodium (2 gm); 2000 ml  ADULT ORAL NUTRITION SUPPLEMENT; Breakfast, Lunch, Dinner; Other Oral Supplement; Strawberry Activia    Exam:  /83   Pulse 97   Temp 97.9 °F (36.6 °C) (Oral)   Resp 16   Ht 1.676 m (5' 5.98\")   Wt 75.8 kg (167 lb)   
distention. Trachea midline.  Respiratory:  Normal respiratory effort. Clear to auscultation, bilaterally without Rales/Wheezes/Rhonchi.  Cardiovascular: Regular rate and rhythm with normal S1/S2 without murmurs, rubs or gallops.  Abdomen: Soft, non-tender, non-distended with normal bowel sounds.  Musculoskeletal: passive and active ROM x 4 extremities.  Skin: Skin color, texture, turgor normal.  No rashes or lesions.  Neurologic:  Neurovascularly intact without any focal sensory/motor deficits. Cranial nerves: II-XII intact, grossly non-focal.  Psychiatric: Alert and oriented, thought content appropriate, normal insight  Capillary Refill: Brisk,< 3 seconds   Peripheral Pulses: +2 palpable, equal bilaterally       Labs:   Recent Labs     08/13/24  0639   WBC 10.7   HGB 10.1*   HCT 31.8*        Recent Labs     08/13/24  0639      K 3.5      CO2 24   BUN 10   CREATININE 0.8   CALCIUM 9.7     No results for input(s): \"AST\", \"ALT\", \"BILIDIR\", \"BILITOT\", \"ALKPHOS\" in the last 72 hours.  Recent Labs     08/13/24  0639 08/14/24  0643 08/15/24  0602   INR 2.31* 2.84* 3.49*     No results for input(s): \"CKTOTAL\", \"TROPONINI\" in the last 72 hours.    Microbiology:      Urinalysis:    No results found for: \"NITRU\", \"WBCUA\", \"BACTERIA\", \"RBCUA\", \"BLOODU\", \"SPECGRAV\", \"GLUCOSEU\"    Radiology:  CT TIBIA FIBULA RIGHT WO CONTRAST   Final Result   1. Marked subcutaneous edema along the anterior lateral aspect of the mid to   lower right leg. No focal collection is seen to reflect an abscess at this time.   No CT evidence of osteomyelitis.            **This report has been created using voice recognition software.  It may contain   minor errors which are inherent in voice recognition technology.**      Electronically signed by Dr. Marci Ricardo      CT TIBIA FIBULA RIGHT W CONTRAST   Final Result   1. Diffuse subcutaneous edema is seen in the lower leg and foot. No CT evidence   of osteomyelitis.          
results for input(s): \"AST\", \"ALT\", \"BILIDIR\", \"BILITOT\", \"ALKPHOS\" in the last 72 hours.    Recent Labs     08/10/24  0720 08/11/24  0629 08/12/24  0434   INR 2.95* 3.16* 3.16*     No results for input(s): \"CKTOTAL\", \"TROPONINI\" in the last 72 hours.    Urinalysis:    No results found for: \"NITRU\", \"WBCUA\", \"BACTERIA\", \"RBCUA\", \"BLOODU\", \"SPECGRAV\", \"GLUCOSEU\"    Radiology:  CT TIBIA FIBULA RIGHT WO CONTRAST   Final Result   1. Marked subcutaneous edema along the anterior lateral aspect of the mid to   lower right leg. No focal collection is seen to reflect an abscess at this time.   No CT evidence of osteomyelitis.            **This report has been created using voice recognition software.  It may contain   minor errors which are inherent in voice recognition technology.**      Electronically signed by Dr. Marci Ricardo      CT TIBIA FIBULA RIGHT W CONTRAST   Final Result   1. Diffuse subcutaneous edema is seen in the lower leg and foot. No CT evidence   of osteomyelitis.            **This report has been created using voice recognition software.  It may contain   minor errors which are inherent in voice recognition technology.**      Electronically signed by Dr. Marci Ricardo          Diet: ADULT DIET; Regular; 5 carb choices (75 gm/meal); Low Fat/Low Chol/High Fiber/RADHA; Low Sodium (2 gm); 2000 ml    DVT prophylaxis: [] Lovenox                                 [x] SCDs                                 [] SQ Heparin                                 [] Encourage ambulation           [] Already on Anticoagulation     Disposition:    [x] Home       [] TCU       [] Rehab       [] Psych       [] SNF       [] Long Term Care Facility       [] Other-    Code Status: Full Code    PT/OT Eval Status: yes        Electronically signed by JOSH Villafana CNP on 8/12/2024 at 2:15 PM

## 2024-08-23 LAB
INR BLD: 1.9
PROTIME: NORMAL

## 2024-08-24 DIAGNOSIS — I10 ESSENTIAL HYPERTENSION: ICD-10-CM

## 2024-08-26 RX ORDER — METOPROLOL TARTRATE 25 MG/1
25 TABLET, FILM COATED ORAL 2 TIMES DAILY
Qty: 180 TABLET | Refills: 0 | Status: SHIPPED | OUTPATIENT
Start: 2024-08-26

## 2024-08-28 ENCOUNTER — HOSPITAL ENCOUNTER (OUTPATIENT)
Dept: WOUND CARE | Age: 89
Discharge: HOME | End: 2024-08-28
Attending: INTERNAL MEDICINE
Payer: MEDICARE

## 2024-08-28 VITALS
RESPIRATION RATE: 18 BRPM | SYSTOLIC BLOOD PRESSURE: 101 MMHG | HEART RATE: 80 BPM | DIASTOLIC BLOOD PRESSURE: 57 MMHG | OXYGEN SATURATION: 99 % | TEMPERATURE: 97.4 F

## 2024-08-28 PROCEDURE — 99214 OFFICE O/P EST MOD 30 MIN: CPT

## 2024-08-28 ASSESSMENT — PAIN DESCRIPTION - ORIENTATION: ORIENTATION: RIGHT;LOWER

## 2024-08-28 ASSESSMENT — PAIN SCALES - GENERAL: PAINLEVEL_OUTOF10: 5

## 2024-08-28 ASSESSMENT — PAIN DESCRIPTION - LOCATION: LOCATION: LEG

## 2024-08-28 NOTE — PROGRESS NOTES
[facility]          Progress Note and Procedure Note      Jasmina Powers  MEDICAL RECORD NUMBER:  [id number]  AGE: 89 y.o.   GENDER: female  : 1935  EPISODE DATE:  2024    Subjective:     SUBJECTIVE     Chief Complaint   Patient presents with    Wound Check     right leg    Follow-up visit from hospital.       HISTORY OF PRESENT ILLNESS      Jasmina Powers is a 89 y.o. female who presents today for wound/ulcer evaluation.  She was recently hospitalized for right lower leg cellulitis with open blistering wound and Pseudomonas bacteremia she was treated with IV antibiotics and discharged to the nursing home.  She has an open wound on her right leg for which she has been getting compression therapy she has history of diabetes hypertension and atrial fibrillation.  She is currently at the nursing home getting wound dressing with nonstick ABD and compression therapy.     PAST MEDICAL HISTORY         Diagnosis Date    Atrial fibrillation (HCC)     Diabetes mellitus (HCC)     Hypertension          PAST SURGICAL HISTORY     No past surgical history on file.  FAMILY HISTORY         Family History   Problem Relation Age of Onset    Cancer Mother         breast cancer    Diabetes Mother      SOCIAL HISTORY       Social History     Tobacco Use    Smoking status: Former     Current packs/day: 0.00     Types: Cigarettes     Quit date: 1978     Years since quittin.2    Smokeless tobacco: Never   Vaping Use    Vaping status: Never Used   Substance Use Topics    Alcohol use: Yes     Alcohol/week: 1.0 standard drink of alcohol     Types: 1 Cans of beer per week     Comment: occasional     Drug use: No     ALLERGIES         No Known Allergies  MEDICATIONS         Current Outpatient Medications on File Prior to Encounter   Medication Sig Dispense Refill    metoprolol tartrate (LOPRESSOR) 25 MG tablet TAKE 1 TABLET TWICE DAILY 180 tablet 0    amLODIPine (NORVASC) 10 MG tablet Take 0.5 tablets by

## 2024-08-28 NOTE — PATIENT INSTRUCTIONS
Visit Discharge/Physician Orders:  - Patient needs to be up and walking at the facility. She has no restrictions to be up walking.     Home Care: [facility]    Wound Location: Right leg     Dressing orders:     1) Gather wound care supplies and arrange on clean table.     2) Wash your hands with soap and water or use alcohol based hand  for 20 seconds (sing \"Happy Birthday\" twice).    3) Cleanse wounds with normal saline or wound cleanser and gauze. Pat dry with clean gauze.    4) Right leg- Apply adaptic to wound. Cover with ABD. Wrap with kerlix then ace wrap. Need to start wrap at base of toes up 1-2 inches below the bend of the knee. Change daily.     Keep all dressings clean & dry.    Follow up visit: 3 Weeks on Wednesday September 18th at 9:15 am     Keep next scheduled appointment. Please give 24 hour notice if unable to keep appointment. [phone]    If you experience any of the following, please call the Wound Care Service during business hours: Monday through Friday 8:00 am - 4:30 pm  ([phone]).   *Increase in pain   *Temperature over 101   *Increase in drainage from your wound or a foul odor   *Uncontrolled swelling   *Need for compression bandage changes due to slippage, breakthrough drainage    If you need medical attention outside of business hours, please contact your Primary Care Doctor or go to the nearest emergency room.

## 2024-08-28 NOTE — PLAN OF CARE
Problem: Wound:  Goal: Will show signs of wound healing; wound closure and no evidence of infection  Description: Will show signs of wound healing; wound closure and no evidence of infection  Outcome: Progressing     Patient presents to wound clinic for right leg wound. No s/s of infection noted. See AVS for discharge instructions Follow up visit: 3 Weeks on Wednesday September 18th at 9:15 am     Care plan reviewed with patient and family.  Patient and family verbalize understanding of the plan of care and contribute to goal setting.

## 2024-08-30 LAB
INR BLD: 1.7
PROTIME: NORMAL

## 2024-09-04 ENCOUNTER — TELEPHONE (OUTPATIENT)
Dept: FAMILY MEDICINE CLINIC | Age: 89
End: 2024-09-04

## 2024-09-04 ENCOUNTER — CARE COORDINATION (OUTPATIENT)
Dept: CASE MANAGEMENT | Age: 89
End: 2024-09-04

## 2024-09-04 ENCOUNTER — TELEPHONE (OUTPATIENT)
Age: 89
End: 2024-09-04

## 2024-09-04 NOTE — TELEPHONE ENCOUNTER
[facility] started today with skilled nursing 1w8, PT, OT and HHA.       Dressing change for full thickness wound on right lower leg.       Pt is on Coumadin and [facility] Coumadin clinic wont work with the company. [facility] would need an order for them to draw the coumadin level once a week if you are willing to dose the coumadin. Please advise.

## 2024-09-04 NOTE — TELEPHONE ENCOUNTER
I don't manage coumadin, but I can place the orders but coumadin clinic would need to continue to manage, please call the CC and see if this is an option. Thanks .

## 2024-09-04 NOTE — CARE COORDINATION
Care Transitions Note    Initial Call - Call within 2 business days of discharge: Yes    Attempted to reach patient for transitions of care follow up. Unable to reach patient.    Outreach Attempts:   Unable to leave message. Mailbox full    Patient: Jasmina Powers    Patient : 1935   MRN: [id number]    Reason for Admission: Cellulitis  Discharge Date: 24  RURS: Readmission Risk Score: 15.2    Last Discharge Facility       Date Complaint Diagnosis Description Type Department Provider    24 Leg Swelling Cellulitis, unspecified cellulitis site ED to Hosp-Admission (Discharged) (ADMITTED) Nguyễn Waters MD; Demetrio Huntley...            Was this an external facility discharge? Yes. Discharge Date: 9/3/24. Facility Name: [facility]    Follow Up Appointment:   Patient has hospital follow up appointment scheduled within 14 days of discharge.    Future Appointments         Provider Specialty Dept Phone    2024 2:00 PM Asia Mao RD, LD Internal Medicine [phone]    2024 9:15 AM Rojelio Lopez MD Wound Ostomy [phone]    10/15/2024 8:00 AM Emperatriz Onofre MD Cardiology [phone]    2025 1:15 PM Emperatriz Onofre MD Cardiology [phone]            Plan for follow-up on next business day.      Jessica Araya RN

## 2024-09-04 NOTE — TELEPHONE ENCOUNTER
Noted. Will await return call from pt's son. Have placed a calendar reminder to follow back up on this pt if no word by 9/5/24.

## 2024-09-04 NOTE — TELEPHONE ENCOUNTER
[facility] ([place]) called and states that patient was discharged from [facility] and had INR checked on 8/30/24 and it was 1.7.   Son feels that the patient is not strong enough to come to the clinic, therefore home health is going to check with PCP to see if they will give a order for labs and follow the patient for INR's. We explained that we could only follow if [facility] was drawing INR.  We offered to get her an appointment here  in the clinic and she said she would call us back, she is going to make some phone calls per the son's request.

## 2024-09-05 ENCOUNTER — CARE COORDINATION (OUTPATIENT)
Dept: CASE MANAGEMENT | Age: 89
End: 2024-09-05

## 2024-09-05 NOTE — TELEPHONE ENCOUNTER
Spoke with Berta at the , Jasmina has decided to return to the coumadin clinic to have her coumadin managed. There is nothing needed from pt's PCP.      Future Appointments   Date Time Provider Department Center   9/9/2024  2:20 PM Finesse Salazar, [facility] STR MED MGMT [facility] [place]   9/16/2024  2:00 PM Asia Mao RD, LD SRPX Physic BS [facility] DEP   9/18/2024  9:15 AM Rojelio Lopez MD [facility] WOUND [place] [facility]   10/15/2024  8:00 AM Emperatriz Onofre MD N SRPX Heart [facility] [place]   1/23/2025  1:15 PM Emperatriz Onofre MD N SRPX Heart P - [place]

## 2024-09-05 NOTE — CARE COORDINATION
Care Transitions Note    Initial Call - Call within 2 business days of discharge: Yes    Patient Current Location:  Home: [address] [place] [id number]    Care Transition Nurse contacted the patient by telephone to perform post hospital discharge assessment, verified name and  as identifiers. Provided introduction to self, and explanation of the Care Transition Nurse role.     Patient: Jasmina Powers    Patient : 1935   MRN: [id number]    Reason for Admission: Cellulitis RLE  Discharge Date: 24  RURS: Readmission Risk Score: 15.2      Last Discharge Facility       Date Complaint Diagnosis Description Type Department Provider    24 Leg Swelling Cellulitis, unspecified cellulitis site ED to Hosp-Admission (Discharged) (ADMITTED) Nguyễn Waters MD; Demetrio Huntley...            Was this an external facility discharge? Yes. Discharge Date: 9/3/24. Facility Name: [facility]    Additional needs identified to be addressed with provider   No needs identified             Method of communication with provider: none.    Patients top risk factors for readmission: medical condition-Cellulitis      Care Summary Note: Patient states she is not doing too well. States her Rt leg bothers her. States area is improving. States it is hutson and drying up. Patient unable to get a chance to explain pain, she asked CTN to call back. States she is expecting a [facility] nurse in 20mins. Agency name unknown. CTN complied.     Follow Up Appointment:   Discussed follow up appointments. Patient has hospital follow up appointment scheduled within 14 days of discharge.   Future Appointments         Provider Specialty Dept Phone    2024 2:00 PM Asia Mao RD, LD Internal Medicine [phone]    2024 9:15 AM Rojelio Lopez MD Wound Ostomy [phone]    10/15/2024 8:00 AM Emperatriz Onofre MD Cardiology [phone]    2025 1:15 PM Emperatriz Onofre MD Cardiology [phone]

## 2024-09-06 ENCOUNTER — CARE COORDINATION (OUTPATIENT)
Dept: CASE MANAGEMENT | Age: 89
End: 2024-09-06

## 2024-09-06 ENCOUNTER — TELEPHONE (OUTPATIENT)
Age: 89
End: 2024-09-06

## 2024-09-06 DIAGNOSIS — M79.606 PAIN OF LOWER EXTREMITY, UNSPECIFIED LATERALITY: Primary | ICD-10-CM

## 2024-09-06 DIAGNOSIS — L03.90 CELLULITIS, UNSPECIFIED CELLULITIS SITE: ICD-10-CM

## 2024-09-06 RX ORDER — HYDROCODONE BITARTRATE AND ACETAMINOPHEN 5; 325 MG/1; MG/1
1 TABLET ORAL EVERY 6 HOURS PRN
Qty: 12 TABLET | Refills: 0 | OUTPATIENT
Start: 2024-09-06 | End: 2024-09-09

## 2024-09-06 RX ORDER — HYDROCODONE BITARTRATE AND ACETAMINOPHEN 5; 325 MG/1; MG/1
1 TABLET ORAL EVERY 8 HOURS PRN
Qty: 15 TABLET | Refills: 0 | Status: SHIPPED | OUTPATIENT
Start: 2024-09-06 | End: 2024-09-11

## 2024-09-06 NOTE — TELEPHONE ENCOUNTER
Received call from MANN Veras at [facility].      Per RN, son today reported to RN that pt missed her dose last night. Per RN, pt was discharged from [facility] with instructions to take Coumadin 3mg every other day alternating with 4mg every other day.  Per RN, son reported that patient only has one 4mg tablet left at home and also 5mg tablets as previously prescribed by this clinic. Pt's last know regimen from our clinic was Coumadin 7.5mg MF and 5mg all other days.      Gave instructions to have pt take Coumadin 4mg today, then Coumadin 5mg Sat and Sun.  Keep appt on Monday for INR check.    RN will fax discharge med instructions from [facility] and will also ask son to bring patient's instructions to 9/9 appt.

## 2024-09-06 NOTE — CARE COORDINATION
Care Transitions Note    Initial Call - Call within 2 business days of discharge: Yes    Attempted to reach patient for transitions of care follow up. Unable to reach patient.    Outreach Attempts:   Unable to leave message.  Per recording mailbox is full    Patient: Jasmina Powers    Patient : 1935   MRN: [id number]    Reason for Admission: Cellulitis  Discharge Date: 24  RURS: Readmission Risk Score: 15.2    Last Discharge Facility       Date Complaint Diagnosis Description Type Department Provider    24 Leg Swelling Cellulitis, unspecified cellulitis site ED to Hosp-Admission (Discharged) (ADMITTED) Nguyễn Waters MD; Demetrio Huntley...            Was this an external facility discharge? Yes. Discharge Date: 9/3/24. Facility Name: [facility]    Follow Up Appointment:   Patient has hospital follow up appointment scheduled within 14 days of discharge.    Future Appointments         Provider Specialty Dept Phone    [facility] 2[facility]20 [facility] [phone]    9/10/2024 3:20 PM Dimas Watts, APRN - CNP Family Medicine [phone]    2024 2:00 PM Asia Mao RD, LD Internal Medicine [phone]    2024 9:15 AM Rojelio Lopez MD Wound Ostomy [phone]    10/15/2024 8:00 AM Emperatriz Onofre MD Cardiology [phone]    2025 1:15 PM Emperatriz Onofre MD Cardiology [phone]            Patient referred back to [facility]  for continued management.     Jessica Araya RN

## 2024-09-09 ENCOUNTER — ANTI-COAG VISIT (OUTPATIENT)
Age: 89
End: 2024-09-09
Payer: MEDICARE

## 2024-09-09 DIAGNOSIS — Z79.01 ANTICOAGULATED ON COUMADIN: Primary | ICD-10-CM

## 2024-09-09 DIAGNOSIS — I48.20 CHRONIC ATRIAL FIBRILLATION (HCC): ICD-10-CM

## 2024-09-09 DIAGNOSIS — Z51.81 ENCOUNTER FOR THERAPEUTIC DRUG MONITORING: ICD-10-CM

## 2024-09-09 LAB — POC INR: 1.5 (ref 0.8–1.2)

## 2024-09-09 PROCEDURE — 99212 OFFICE O/P EST SF 10 MIN: CPT | Performed by: PHARMACIST

## 2024-09-09 PROCEDURE — 85610 PROTHROMBIN TIME: CPT | Performed by: PHARMACIST

## 2024-09-09 RX ORDER — TRAMADOL HYDROCHLORIDE 50 MG/1
50 TABLET ORAL EVERY 6 HOURS PRN
COMMUNITY

## 2024-09-10 ENCOUNTER — OFFICE VISIT (OUTPATIENT)
Dept: FAMILY MEDICINE CLINIC | Age: 89
End: 2024-09-10

## 2024-09-10 VITALS
HEIGHT: 66 IN | SYSTOLIC BLOOD PRESSURE: 114 MMHG | DIASTOLIC BLOOD PRESSURE: 68 MMHG | HEART RATE: 77 BPM | WEIGHT: 162 LBS | TEMPERATURE: 97.8 F | RESPIRATION RATE: 16 BRPM | BODY MASS INDEX: 26.03 KG/M2 | OXYGEN SATURATION: 98 %

## 2024-09-10 DIAGNOSIS — N18.31 TYPE 2 DIABETES MELLITUS WITH STAGE 3A CHRONIC KIDNEY DISEASE, WITHOUT LONG-TERM CURRENT USE OF INSULIN (HCC): ICD-10-CM

## 2024-09-10 DIAGNOSIS — M79.604 PAIN OF RIGHT LOWER EXTREMITY: ICD-10-CM

## 2024-09-10 DIAGNOSIS — Z09 HOSPITAL DISCHARGE FOLLOW-UP: Primary | ICD-10-CM

## 2024-09-10 DIAGNOSIS — R60.0 BILATERAL LEG EDEMA: ICD-10-CM

## 2024-09-10 DIAGNOSIS — R53.81 PHYSICAL DECONDITIONING: ICD-10-CM

## 2024-09-10 DIAGNOSIS — Z79.01 ANTICOAGULATED ON COUMADIN: ICD-10-CM

## 2024-09-10 DIAGNOSIS — E11.22 TYPE 2 DIABETES MELLITUS WITH STAGE 3A CHRONIC KIDNEY DISEASE, WITHOUT LONG-TERM CURRENT USE OF INSULIN (HCC): ICD-10-CM

## 2024-09-10 DIAGNOSIS — M79.606 PAIN OF LOWER EXTREMITY, UNSPECIFIED LATERALITY: ICD-10-CM

## 2024-09-10 DIAGNOSIS — E11.9 TYPE 2 DIABETES MELLITUS WITHOUT COMPLICATION, WITHOUT LONG-TERM CURRENT USE OF INSULIN (HCC): ICD-10-CM

## 2024-09-10 DIAGNOSIS — S81.802D WOUND OF LEFT LOWER EXTREMITY, SUBSEQUENT ENCOUNTER: ICD-10-CM

## 2024-09-10 DIAGNOSIS — L03.115 CELLULITIS OF RIGHT LOWER EXTREMITY: ICD-10-CM

## 2024-09-10 RX ORDER — TRAMADOL HYDROCHLORIDE 50 MG/1
50 TABLET ORAL 2 TIMES DAILY
Qty: 14 TABLET | Refills: 0 | Status: SHIPPED | OUTPATIENT
Start: 2024-09-10 | End: 2024-09-17

## 2024-09-10 RX ORDER — TRAMADOL HYDROCHLORIDE 50 MG/1
50 TABLET ORAL EVERY 6 HOURS PRN
OUTPATIENT
Start: 2024-09-10

## 2024-09-10 RX ORDER — HYDROCODONE BITARTRATE AND ACETAMINOPHEN 5; 325 MG/1; MG/1
1 TABLET ORAL EVERY 8 HOURS PRN
Qty: 15 TABLET | Refills: 0 | OUTPATIENT
Start: 2024-09-10 | End: 2024-09-15

## 2024-09-10 RX ORDER — HYDROCODONE BITARTRATE AND ACETAMINOPHEN 5; 325 MG/1; MG/1
1 TABLET ORAL 2 TIMES DAILY
Qty: 14 TABLET | Refills: 0 | Status: SHIPPED | OUTPATIENT
Start: 2024-09-10 | End: 2024-09-17

## 2024-09-10 ASSESSMENT — ENCOUNTER SYMPTOMS
DIARRHEA: 0
NAUSEA: 0
RESPIRATORY NEGATIVE: 1
ABDOMINAL PAIN: 0
CONSTIPATION: 0
BLOOD IN STOOL: 0
RECTAL PAIN: 0
VOMITING: 0
ABDOMINAL DISTENTION: 0
ANAL BLEEDING: 0

## 2024-09-12 ENCOUNTER — CARE COORDINATION (OUTPATIENT)
Dept: CARE COORDINATION | Age: 89
End: 2024-09-12

## 2024-09-13 ENCOUNTER — TELEPHONE (OUTPATIENT)
Dept: FAMILY MEDICINE CLINIC | Age: 89
End: 2024-09-13

## 2024-09-13 RX ORDER — GABAPENTIN 100 MG/1
100 CAPSULE ORAL 2 TIMES DAILY
Qty: 60 CAPSULE | Refills: 2 | Status: SHIPPED | OUTPATIENT
Start: 2024-09-13 | End: 2024-12-12

## 2024-09-16 ENCOUNTER — TELEPHONE (OUTPATIENT)
Dept: FAMILY MEDICINE CLINIC | Age: 89
End: 2024-09-16

## 2024-09-17 ENCOUNTER — HOSPITAL ENCOUNTER (INPATIENT)
Age: 89
LOS: 3 days | Discharge: HOME HEALTH | End: 2024-09-20
Attending: STUDENT IN AN ORGANIZED HEALTH CARE EDUCATION/TRAINING PROGRAM | Admitting: STUDENT IN AN ORGANIZED HEALTH CARE EDUCATION/TRAINING PROGRAM
Payer: MEDICARE

## 2024-09-17 ENCOUNTER — APPOINTMENT (OUTPATIENT)
Dept: GENERAL RADIOLOGY | Age: 89
End: 2024-09-17
Payer: MEDICARE

## 2024-09-17 ENCOUNTER — APPOINTMENT (OUTPATIENT)
Dept: INTERVENTIONAL RADIOLOGY/VASCULAR | Age: 89
End: 2024-09-17
Payer: MEDICARE

## 2024-09-17 DIAGNOSIS — E87.1 HYPONATREMIA: ICD-10-CM

## 2024-09-17 DIAGNOSIS — G89.29 OTHER CHRONIC PAIN: ICD-10-CM

## 2024-09-17 DIAGNOSIS — S81.801A WOUND OF RIGHT LOWER EXTREMITY, INITIAL ENCOUNTER: ICD-10-CM

## 2024-09-17 DIAGNOSIS — Z51.5 PALLIATIVE CARE PATIENT: ICD-10-CM

## 2024-09-17 DIAGNOSIS — M79.604 RIGHT LEG PAIN: Primary | ICD-10-CM

## 2024-09-17 DIAGNOSIS — S81.801S LEG WOUND, RIGHT, SEQUELA: ICD-10-CM

## 2024-09-17 DIAGNOSIS — I50.32 CHRONIC HEART FAILURE WITH PRESERVED EJECTION FRACTION (HFPEF) (HCC): ICD-10-CM

## 2024-09-17 DIAGNOSIS — R52 INTRACTABLE PAIN: ICD-10-CM

## 2024-09-17 DIAGNOSIS — I48.11 LONGSTANDING PERSISTENT ATRIAL FIBRILLATION (HCC): ICD-10-CM

## 2024-09-17 LAB
ALBUMIN SERPL BCG-MCNC: 3.8 G/DL (ref 3.5–5.1)
ALP SERPL-CCNC: 123 U/L (ref 38–126)
ALT SERPL W/O P-5'-P-CCNC: 15 U/L (ref 11–66)
ANION GAP SERPL CALC-SCNC: 18 MEQ/L (ref 8–16)
AST SERPL-CCNC: 18 U/L (ref 5–40)
BASOPHILS ABSOLUTE: 0 THOU/MM3 (ref 0–0.1)
BASOPHILS NFR BLD AUTO: 0.4 %
BILIRUB SERPL-MCNC: 0.5 MG/DL (ref 0.3–1.2)
BUN SERPL-MCNC: 8 MG/DL (ref 7–22)
CALCIUM SERPL-MCNC: 10.2 MG/DL (ref 8.5–10.5)
CHLORIDE SERPL-SCNC: 91 MEQ/L (ref 98–111)
CO2 SERPL-SCNC: 21 MEQ/L (ref 23–33)
CREAT SERPL-MCNC: 0.9 MG/DL (ref 0.4–1.2)
CRP SERPL-MCNC: 3.02 MG/DL (ref 0–1)
DEPRECATED RDW RBC AUTO: 36.3 FL (ref 35–45)
EKG Q-T INTERVAL: 316 MS
EKG QRS DURATION: 66 MS
EKG QTC CALCULATION (BAZETT): 415 MS
EKG R AXIS: 12 DEGREES
EKG T AXIS: 56 DEGREES
EKG VENTRICULAR RATE: 104 BPM
EOSINOPHIL NFR BLD AUTO: 2.6 %
EOSINOPHILS ABSOLUTE: 0.2 THOU/MM3 (ref 0–0.4)
ERYTHROCYTE [DISTWIDTH] IN BLOOD BY AUTOMATED COUNT: 14.7 % (ref 11.5–14.5)
ERYTHROCYTE [SEDIMENTATION RATE] IN BLOOD BY WESTERGREN METHOD: 82 MM/HR (ref 0–20)
GFR SERPL CREATININE-BSD FRML MDRD: 62 ML/MIN/1.73M2
GLUCOSE BLD STRIP.AUTO-MCNC: 114 MG/DL (ref 70–108)
GLUCOSE BLD STRIP.AUTO-MCNC: 123 MG/DL (ref 70–108)
GLUCOSE SERPL-MCNC: 99 MG/DL (ref 70–108)
HCT VFR BLD AUTO: 31.8 % (ref 37–47)
HGB BLD-MCNC: 10.2 GM/DL (ref 12–16)
IMM GRANULOCYTES # BLD AUTO: 0.03 THOU/MM3 (ref 0–0.07)
IMM GRANULOCYTES NFR BLD AUTO: 0.4 %
INR PPP: 2.32 (ref 0.85–1.13)
LACTIC ACID, SEPSIS: 1.4 MMOL/L (ref 0.5–1.9)
LACTIC ACID, SEPSIS: 1.9 MMOL/L (ref 0.5–1.9)
LYMPHOCYTES ABSOLUTE: 2 THOU/MM3 (ref 1–4.8)
LYMPHOCYTES NFR BLD AUTO: 25.1 %
MCH RBC QN AUTO: 22.6 PG (ref 26–33)
MCHC RBC AUTO-ENTMCNC: 32.1 GM/DL (ref 32.2–35.5)
MCV RBC AUTO: 70.4 FL (ref 81–99)
MONOCYTES ABSOLUTE: 0.7 THOU/MM3 (ref 0.4–1.3)
MONOCYTES NFR BLD AUTO: 9.2 %
NEUTROPHILS ABSOLUTE: 5 THOU/MM3 (ref 1.8–7.7)
NEUTROPHILS NFR BLD AUTO: 62.3 %
NRBC BLD AUTO-RTO: 0 /100 WBC
OSMOLALITY SERPL CALC.SUM OF ELEC: 259.2 MOSMOL/KG (ref 275–300)
PLATELET # BLD AUTO: 400 THOU/MM3 (ref 130–400)
PMV BLD AUTO: 8.4 FL (ref 9.4–12.4)
POTASSIUM SERPL-SCNC: 4.2 MEQ/L (ref 3.5–5.2)
PROT SERPL-MCNC: 7.6 G/DL (ref 6.1–8)
RBC # BLD AUTO: 4.52 MILL/MM3 (ref 4.2–5.4)
SODIUM SERPL-SCNC: 130 MEQ/L (ref 135–145)
WBC # BLD AUTO: 8.1 THOU/MM3 (ref 4.8–10.8)

## 2024-09-17 PROCEDURE — 99285 EMERGENCY DEPT VISIT HI MDM: CPT

## 2024-09-17 PROCEDURE — 82948 REAGENT STRIP/BLOOD GLUCOSE: CPT

## 2024-09-17 PROCEDURE — 36415 COLL VENOUS BLD VENIPUNCTURE: CPT

## 2024-09-17 PROCEDURE — 1200000003 HC TELEMETRY R&B

## 2024-09-17 PROCEDURE — 94640 AIRWAY INHALATION TREATMENT: CPT

## 2024-09-17 PROCEDURE — 2580000003 HC RX 258

## 2024-09-17 PROCEDURE — 6370000000 HC RX 637 (ALT 250 FOR IP)

## 2024-09-17 PROCEDURE — 94760 N-INVAS EAR/PLS OXIMETRY 1: CPT

## 2024-09-17 PROCEDURE — 6360000002 HC RX W HCPCS: Performed by: PHYSICIAN ASSISTANT

## 2024-09-17 PROCEDURE — 84295 ASSAY OF SERUM SODIUM: CPT

## 2024-09-17 PROCEDURE — 85610 PROTHROMBIN TIME: CPT

## 2024-09-17 PROCEDURE — 96376 TX/PRO/DX INJ SAME DRUG ADON: CPT

## 2024-09-17 PROCEDURE — 85651 RBC SED RATE NONAUTOMATED: CPT

## 2024-09-17 PROCEDURE — 96375 TX/PRO/DX INJ NEW DRUG ADDON: CPT

## 2024-09-17 PROCEDURE — 83605 ASSAY OF LACTIC ACID: CPT

## 2024-09-17 PROCEDURE — 80053 COMPREHEN METABOLIC PANEL: CPT

## 2024-09-17 PROCEDURE — 85025 COMPLETE CBC W/AUTO DIFF WBC: CPT

## 2024-09-17 PROCEDURE — 93010 ELECTROCARDIOGRAM REPORT: CPT | Performed by: INTERNAL MEDICINE

## 2024-09-17 PROCEDURE — 86140 C-REACTIVE PROTEIN: CPT

## 2024-09-17 PROCEDURE — 93005 ELECTROCARDIOGRAM TRACING: CPT | Performed by: PHYSICIAN ASSISTANT

## 2024-09-17 PROCEDURE — 99223 1ST HOSP IP/OBS HIGH 75: CPT | Performed by: STUDENT IN AN ORGANIZED HEALTH CARE EDUCATION/TRAINING PROGRAM

## 2024-09-17 PROCEDURE — 93971 EXTREMITY STUDY: CPT

## 2024-09-17 PROCEDURE — 87040 BLOOD CULTURE FOR BACTERIA: CPT

## 2024-09-17 PROCEDURE — 96374 THER/PROPH/DIAG INJ IV PUSH: CPT

## 2024-09-17 PROCEDURE — 73590 X-RAY EXAM OF LOWER LEG: CPT

## 2024-09-17 RX ORDER — ACETAMINOPHEN 650 MG
TABLET, EXTENDED RELEASE ORAL PRN
Status: DISCONTINUED | OUTPATIENT
Start: 2024-09-17 | End: 2024-09-20 | Stop reason: HOSPADM

## 2024-09-17 RX ORDER — GLUCAGON 1 MG/ML
1 KIT INJECTION PRN
Status: DISCONTINUED | OUTPATIENT
Start: 2024-09-17 | End: 2024-09-20 | Stop reason: HOSPADM

## 2024-09-17 RX ORDER — HYDROCODONE BITARTRATE AND ACETAMINOPHEN 5; 325 MG/1; MG/1
1 TABLET ORAL EVERY 6 HOURS PRN
Status: DISCONTINUED | OUTPATIENT
Start: 2024-09-17 | End: 2024-09-18

## 2024-09-17 RX ORDER — MONTELUKAST SODIUM 10 MG/1
10 TABLET ORAL NIGHTLY
Status: DISCONTINUED | OUTPATIENT
Start: 2024-09-17 | End: 2024-09-20 | Stop reason: HOSPADM

## 2024-09-17 RX ORDER — SODIUM CHLORIDE 9 MG/ML
INJECTION, SOLUTION INTRAVENOUS PRN
Status: DISCONTINUED | OUTPATIENT
Start: 2024-09-17 | End: 2024-09-20 | Stop reason: HOSPADM

## 2024-09-17 RX ORDER — MORPHINE SULFATE 2 MG/ML
2 INJECTION, SOLUTION INTRAMUSCULAR; INTRAVENOUS ONCE
Status: COMPLETED | OUTPATIENT
Start: 2024-09-17 | End: 2024-09-17

## 2024-09-17 RX ORDER — TRAMADOL HYDROCHLORIDE 50 MG/1
50 TABLET ORAL EVERY 6 HOURS PRN
Status: DISCONTINUED | OUTPATIENT
Start: 2024-09-17 | End: 2024-09-17

## 2024-09-17 RX ORDER — MORPHINE SULFATE 4 MG/ML
4 INJECTION, SOLUTION INTRAMUSCULAR; INTRAVENOUS ONCE
Status: COMPLETED | OUTPATIENT
Start: 2024-09-17 | End: 2024-09-17

## 2024-09-17 RX ORDER — HYDROCODONE BITARTRATE AND ACETAMINOPHEN 5; 325 MG/1; MG/1
2 TABLET ORAL EVERY 6 HOURS PRN
Status: DISCONTINUED | OUTPATIENT
Start: 2024-09-17 | End: 2024-09-18

## 2024-09-17 RX ORDER — POLYETHYLENE GLYCOL 3350 17 G/17G
17 POWDER, FOR SOLUTION ORAL DAILY PRN
Status: DISCONTINUED | OUTPATIENT
Start: 2024-09-17 | End: 2024-09-20 | Stop reason: HOSPADM

## 2024-09-17 RX ORDER — GABAPENTIN 100 MG/1
100 CAPSULE ORAL 2 TIMES DAILY
Status: DISCONTINUED | OUTPATIENT
Start: 2024-09-17 | End: 2024-09-19

## 2024-09-17 RX ORDER — PANTOPRAZOLE SODIUM 40 MG/1
40 TABLET, DELAYED RELEASE ORAL
Status: DISCONTINUED | OUTPATIENT
Start: 2024-09-18 | End: 2024-09-20 | Stop reason: HOSPADM

## 2024-09-17 RX ORDER — ONDANSETRON 2 MG/ML
4 INJECTION INTRAMUSCULAR; INTRAVENOUS ONCE
Status: COMPLETED | OUTPATIENT
Start: 2024-09-17 | End: 2024-09-17

## 2024-09-17 RX ORDER — METOPROLOL TARTRATE 50 MG
25 TABLET ORAL 2 TIMES DAILY
Status: DISCONTINUED | OUTPATIENT
Start: 2024-09-17 | End: 2024-09-20 | Stop reason: HOSPADM

## 2024-09-17 RX ORDER — INSULIN GLARGINE 100 [IU]/ML
5 INJECTION, SOLUTION SUBCUTANEOUS NIGHTLY
Status: DISCONTINUED | OUTPATIENT
Start: 2024-09-17 | End: 2024-09-18

## 2024-09-17 RX ORDER — SODIUM CHLORIDE 0.9 % (FLUSH) 0.9 %
5-40 SYRINGE (ML) INJECTION EVERY 12 HOURS SCHEDULED
Status: DISCONTINUED | OUTPATIENT
Start: 2024-09-17 | End: 2024-09-20 | Stop reason: HOSPADM

## 2024-09-17 RX ORDER — ACETAMINOPHEN 650 MG/1
650 SUPPOSITORY RECTAL EVERY 6 HOURS PRN
Status: DISCONTINUED | OUTPATIENT
Start: 2024-09-17 | End: 2024-09-20 | Stop reason: HOSPADM

## 2024-09-17 RX ORDER — SPIRONOLACTONE 25 MG/1
25 TABLET ORAL DAILY
Status: DISCONTINUED | OUTPATIENT
Start: 2024-09-17 | End: 2024-09-20 | Stop reason: HOSPADM

## 2024-09-17 RX ORDER — FERROUS SULFATE 325(65) MG
325 TABLET ORAL 2 TIMES DAILY
Status: DISCONTINUED | OUTPATIENT
Start: 2024-09-17 | End: 2024-09-20 | Stop reason: HOSPADM

## 2024-09-17 RX ORDER — ACETAMINOPHEN 325 MG/1
650 TABLET ORAL EVERY 6 HOURS PRN
Status: DISCONTINUED | OUTPATIENT
Start: 2024-09-17 | End: 2024-09-20 | Stop reason: HOSPADM

## 2024-09-17 RX ORDER — ONDANSETRON 2 MG/ML
4 INJECTION INTRAMUSCULAR; INTRAVENOUS EVERY 6 HOURS PRN
Status: DISCONTINUED | OUTPATIENT
Start: 2024-09-17 | End: 2024-09-20 | Stop reason: HOSPADM

## 2024-09-17 RX ORDER — POTASSIUM CHLORIDE 1500 MG/1
40 TABLET, EXTENDED RELEASE ORAL PRN
Status: DISCONTINUED | OUTPATIENT
Start: 2024-09-17 | End: 2024-09-20 | Stop reason: HOSPADM

## 2024-09-17 RX ORDER — ONDANSETRON 4 MG/1
4 TABLET, ORALLY DISINTEGRATING ORAL EVERY 8 HOURS PRN
Status: DISCONTINUED | OUTPATIENT
Start: 2024-09-17 | End: 2024-09-20 | Stop reason: HOSPADM

## 2024-09-17 RX ORDER — INSULIN LISPRO 100 [IU]/ML
0-8 INJECTION, SOLUTION INTRAVENOUS; SUBCUTANEOUS
Status: DISCONTINUED | OUTPATIENT
Start: 2024-09-17 | End: 2024-09-20 | Stop reason: HOSPADM

## 2024-09-17 RX ORDER — ALBUTEROL SULFATE 90 UG/1
2 INHALANT RESPIRATORY (INHALATION) EVERY 6 HOURS PRN
Status: DISCONTINUED | OUTPATIENT
Start: 2024-09-17 | End: 2024-09-20 | Stop reason: HOSPADM

## 2024-09-17 RX ORDER — SODIUM BICARBONATE 650 MG/1
325 TABLET ORAL 2 TIMES DAILY
Status: DISCONTINUED | OUTPATIENT
Start: 2024-09-17 | End: 2024-09-20 | Stop reason: HOSPADM

## 2024-09-17 RX ORDER — BUDESONIDE AND FORMOTEROL FUMARATE DIHYDRATE 160; 4.5 UG/1; UG/1
2 AEROSOL RESPIRATORY (INHALATION)
Status: DISCONTINUED | OUTPATIENT
Start: 2024-09-17 | End: 2024-09-20 | Stop reason: HOSPADM

## 2024-09-17 RX ORDER — MAGNESIUM SULFATE IN WATER 40 MG/ML
2000 INJECTION, SOLUTION INTRAVENOUS PRN
Status: DISCONTINUED | OUTPATIENT
Start: 2024-09-17 | End: 2024-09-20 | Stop reason: HOSPADM

## 2024-09-17 RX ORDER — WARFARIN SODIUM 2.5 MG/1
2.5 TABLET ORAL ONCE
Status: COMPLETED | OUTPATIENT
Start: 2024-09-17 | End: 2024-09-17

## 2024-09-17 RX ORDER — SODIUM CHLORIDE 0.9 % (FLUSH) 0.9 %
5-40 SYRINGE (ML) INJECTION PRN
Status: DISCONTINUED | OUTPATIENT
Start: 2024-09-17 | End: 2024-09-20 | Stop reason: HOSPADM

## 2024-09-17 RX ORDER — ATORVASTATIN CALCIUM 10 MG/1
10 TABLET, FILM COATED ORAL DAILY
Status: DISCONTINUED | OUTPATIENT
Start: 2024-09-17 | End: 2024-09-20 | Stop reason: HOSPADM

## 2024-09-17 RX ORDER — INSULIN LISPRO 100 [IU]/ML
0-4 INJECTION, SOLUTION INTRAVENOUS; SUBCUTANEOUS NIGHTLY
Status: DISCONTINUED | OUTPATIENT
Start: 2024-09-17 | End: 2024-09-20 | Stop reason: HOSPADM

## 2024-09-17 RX ORDER — TRAMADOL HYDROCHLORIDE 50 MG/1
100 TABLET ORAL EVERY 6 HOURS PRN
Status: DISCONTINUED | OUTPATIENT
Start: 2024-09-17 | End: 2024-09-17

## 2024-09-17 RX ORDER — POTASSIUM CHLORIDE 7.45 MG/ML
10 INJECTION INTRAVENOUS PRN
Status: DISCONTINUED | OUTPATIENT
Start: 2024-09-17 | End: 2024-09-20 | Stop reason: HOSPADM

## 2024-09-17 RX ORDER — AMLODIPINE BESYLATE 5 MG/1
5 TABLET ORAL DAILY
Status: DISCONTINUED | OUTPATIENT
Start: 2024-09-17 | End: 2024-09-20 | Stop reason: HOSPADM

## 2024-09-17 RX ORDER — FLUTICASONE PROPIONATE 50 MCG
1 SPRAY, SUSPENSION (ML) NASAL NIGHTLY PRN
Status: DISCONTINUED | OUTPATIENT
Start: 2024-09-17 | End: 2024-09-20 | Stop reason: HOSPADM

## 2024-09-17 RX ORDER — DEXTROSE MONOHYDRATE 100 MG/ML
INJECTION, SOLUTION INTRAVENOUS CONTINUOUS PRN
Status: DISCONTINUED | OUTPATIENT
Start: 2024-09-17 | End: 2024-09-20 | Stop reason: HOSPADM

## 2024-09-17 RX ADMIN — AMLODIPINE BESYLATE 5 MG: 5 TABLET ORAL at 18:58

## 2024-09-17 RX ADMIN — SODIUM CHLORIDE, PRESERVATIVE FREE 10 ML: 5 INJECTION INTRAVENOUS at 21:45

## 2024-09-17 RX ADMIN — METOPROLOL TARTRATE 25 MG: 50 TABLET, FILM COATED ORAL at 18:58

## 2024-09-17 RX ADMIN — GABAPENTIN 100 MG: 100 CAPSULE ORAL at 21:43

## 2024-09-17 RX ADMIN — HYDROCODONE BITARTRATE AND ACETAMINOPHEN 2 TABLET: 5; 325 TABLET ORAL at 16:35

## 2024-09-17 RX ADMIN — MORPHINE SULFATE 2 MG: 2 INJECTION, SOLUTION INTRAMUSCULAR; INTRAVENOUS at 09:45

## 2024-09-17 RX ADMIN — SODIUM BICARBONATE 325 MG: 650 TABLET ORAL at 21:43

## 2024-09-17 RX ADMIN — MONTELUKAST SODIUM 10 MG: 10 TABLET ORAL at 21:43

## 2024-09-17 RX ADMIN — ATORVASTATIN CALCIUM 10 MG: 10 TABLET, FILM COATED ORAL at 21:43

## 2024-09-17 RX ADMIN — SPIRONOLACTONE 25 MG: 25 TABLET ORAL at 21:43

## 2024-09-17 RX ADMIN — FERROUS SULFATE TAB 325 MG (65 MG ELEMENTAL FE) 325 MG: 325 (65 FE) TAB at 21:43

## 2024-09-17 RX ADMIN — INSULIN GLARGINE 5 UNITS: 100 INJECTION, SOLUTION SUBCUTANEOUS at 21:48

## 2024-09-17 RX ADMIN — WARFARIN SODIUM 2.5 MG: 2.5 TABLET ORAL at 18:58

## 2024-09-17 RX ADMIN — BUDESONIDE AND FORMOTEROL FUMARATE DIHYDRATE 2 PUFF: 160; 4.5 AEROSOL RESPIRATORY (INHALATION) at 21:22

## 2024-09-17 RX ADMIN — ONDANSETRON 4 MG: 2 INJECTION INTRAMUSCULAR; INTRAVENOUS at 09:45

## 2024-09-17 RX ADMIN — MORPHINE SULFATE 4 MG: 4 INJECTION, SOLUTION INTRAMUSCULAR; INTRAVENOUS at 11:11

## 2024-09-17 ASSESSMENT — PAIN DESCRIPTION - LOCATION: LOCATION: LEG

## 2024-09-17 ASSESSMENT — ENCOUNTER SYMPTOMS
NAUSEA: 0
VOMITING: 0
ABDOMINAL PAIN: 0
SHORTNESS OF BREATH: 0

## 2024-09-17 ASSESSMENT — PAIN SCALES - GENERAL
PAINLEVEL_OUTOF10: 10
PAINLEVEL_OUTOF10: 10

## 2024-09-17 ASSESSMENT — PAIN - FUNCTIONAL ASSESSMENT: PAIN_FUNCTIONAL_ASSESSMENT: 0-10

## 2024-09-17 ASSESSMENT — PAIN DESCRIPTION - ORIENTATION: ORIENTATION: RIGHT

## 2024-09-17 ASSESSMENT — PAIN DESCRIPTION - DESCRIPTORS: DESCRIPTORS: ACHING;DISCOMFORT

## 2024-09-18 ENCOUNTER — HOSPITAL ENCOUNTER (OUTPATIENT)
Dept: WOUND CARE | Age: 89
Discharge: HOME | End: 2024-09-18
Attending: INTERNAL MEDICINE
Payer: MEDICARE

## 2024-09-18 ENCOUNTER — CARE COORDINATION (OUTPATIENT)
Dept: CARE COORDINATION | Age: 89
End: 2024-09-18

## 2024-09-18 ENCOUNTER — APPOINTMENT (OUTPATIENT)
Age: 89
End: 2024-09-18
Payer: MEDICARE

## 2024-09-18 PROBLEM — M79.604 RIGHT LEG PAIN: Status: ACTIVE | Noted: 2024-09-18

## 2024-09-18 PROBLEM — Z51.5 PALLIATIVE CARE PATIENT: Status: ACTIVE | Noted: 2024-09-18

## 2024-09-18 PROBLEM — I50.32 CHRONIC HEART FAILURE WITH PRESERVED EJECTION FRACTION (HFPEF) (HCC): Status: ACTIVE | Noted: 2024-09-18

## 2024-09-18 PROBLEM — G89.29 OTHER CHRONIC PAIN: Status: ACTIVE | Noted: 2024-09-18

## 2024-09-18 PROBLEM — S81.801S LEG WOUND, RIGHT, SEQUELA: Status: ACTIVE | Noted: 2024-09-18

## 2024-09-18 LAB
ANION GAP SERPL CALC-SCNC: 11 MEQ/L (ref 8–16)
BUN SERPL-MCNC: 8 MG/DL (ref 7–22)
CALCIUM SERPL-MCNC: 9.4 MG/DL (ref 8.5–10.5)
CHLORIDE SERPL-SCNC: 98 MEQ/L (ref 98–111)
CO2 SERPL-SCNC: 22 MEQ/L (ref 23–33)
CREAT SERPL-MCNC: 0.8 MG/DL (ref 0.4–1.2)
DEPRECATED RDW RBC AUTO: 36.3 FL (ref 35–45)
ERYTHROCYTE [DISTWIDTH] IN BLOOD BY AUTOMATED COUNT: 14.5 % (ref 11.5–14.5)
FERRITIN SERPL IA-MCNC: 410 NG/ML (ref 10–291)
GFR SERPL CREATININE-BSD FRML MDRD: 71 ML/MIN/1.73M2
GLUCOSE BLD STRIP.AUTO-MCNC: 109 MG/DL (ref 70–108)
GLUCOSE BLD STRIP.AUTO-MCNC: 120 MG/DL (ref 70–108)
GLUCOSE BLD STRIP.AUTO-MCNC: 77 MG/DL (ref 70–108)
GLUCOSE BLD STRIP.AUTO-MCNC: 96 MG/DL (ref 70–108)
GLUCOSE SERPL-MCNC: 92 MG/DL (ref 70–108)
HCT VFR BLD AUTO: 26.3 % (ref 37–47)
HGB BLD-MCNC: 8.4 GM/DL (ref 12–16)
INR PPP: 2.67 (ref 0.85–1.13)
IRON SATN MFR SERPL: 8 % (ref 20–50)
IRON SERPL-MCNC: 15 UG/DL (ref 50–170)
MAGNESIUM SERPL-MCNC: 1.4 MG/DL (ref 1.6–2.4)
MAGNESIUM SERPL-MCNC: 2 MG/DL (ref 1.6–2.4)
MCH RBC QN AUTO: 22.8 PG (ref 26–33)
MCHC RBC AUTO-ENTMCNC: 31.9 GM/DL (ref 32.2–35.5)
MCV RBC AUTO: 71.3 FL (ref 81–99)
PLATELET # BLD AUTO: 324 THOU/MM3 (ref 130–400)
PMV BLD AUTO: 8.4 FL (ref 9.4–12.4)
POTASSIUM SERPL-SCNC: 3.6 MEQ/L (ref 3.5–5.2)
RBC # BLD AUTO: 3.69 MILL/MM3 (ref 4.2–5.4)
SODIUM SERPL-SCNC: 131 MEQ/L (ref 135–145)
TIBC SERPL-MCNC: 181 UG/DL (ref 171–450)
WBC # BLD AUTO: 7.3 THOU/MM3 (ref 4.8–10.8)

## 2024-09-18 PROCEDURE — 97535 SELF CARE MNGMENT TRAINING: CPT

## 2024-09-18 PROCEDURE — 6370000000 HC RX 637 (ALT 250 FOR IP)

## 2024-09-18 PROCEDURE — 83735 ASSAY OF MAGNESIUM: CPT

## 2024-09-18 PROCEDURE — 6370000000 HC RX 637 (ALT 250 FOR IP): Performed by: STUDENT IN AN ORGANIZED HEALTH CARE EDUCATION/TRAINING PROGRAM

## 2024-09-18 PROCEDURE — 99232 SBSQ HOSP IP/OBS MODERATE 35: CPT

## 2024-09-18 PROCEDURE — 82728 ASSAY OF FERRITIN: CPT

## 2024-09-18 PROCEDURE — 83540 ASSAY OF IRON: CPT

## 2024-09-18 PROCEDURE — 83550 IRON BINDING TEST: CPT

## 2024-09-18 PROCEDURE — 97530 THERAPEUTIC ACTIVITIES: CPT

## 2024-09-18 PROCEDURE — 36415 COLL VENOUS BLD VENIPUNCTURE: CPT

## 2024-09-18 PROCEDURE — 6360000002 HC RX W HCPCS

## 2024-09-18 PROCEDURE — 1200000003 HC TELEMETRY R&B

## 2024-09-18 PROCEDURE — 2580000003 HC RX 258

## 2024-09-18 PROCEDURE — 82746 ASSAY OF FOLIC ACID SERUM: CPT

## 2024-09-18 PROCEDURE — 80048 BASIC METABOLIC PNL TOTAL CA: CPT

## 2024-09-18 PROCEDURE — 85027 COMPLETE CBC AUTOMATED: CPT

## 2024-09-18 PROCEDURE — 85610 PROTHROMBIN TIME: CPT

## 2024-09-18 PROCEDURE — 94640 AIRWAY INHALATION TREATMENT: CPT

## 2024-09-18 PROCEDURE — 82607 VITAMIN B-12: CPT

## 2024-09-18 PROCEDURE — 82948 REAGENT STRIP/BLOOD GLUCOSE: CPT

## 2024-09-18 PROCEDURE — 97166 OT EVAL MOD COMPLEX 45 MIN: CPT

## 2024-09-18 PROCEDURE — 99223 1ST HOSP IP/OBS HIGH 75: CPT | Performed by: STUDENT IN AN ORGANIZED HEALTH CARE EDUCATION/TRAINING PROGRAM

## 2024-09-18 RX ORDER — WARFARIN SODIUM 2.5 MG/1
2.5 TABLET ORAL
Status: COMPLETED | OUTPATIENT
Start: 2024-09-18 | End: 2024-09-18

## 2024-09-18 RX ORDER — MORPHINE SULFATE 4 MG/ML
4 INJECTION, SOLUTION INTRAMUSCULAR; INTRAVENOUS EVERY 4 HOURS PRN
Status: DISCONTINUED | OUTPATIENT
Start: 2024-09-18 | End: 2024-09-20 | Stop reason: HOSPADM

## 2024-09-18 RX ORDER — ACETAMINOPHEN 500 MG
1000 TABLET ORAL EVERY 8 HOURS SCHEDULED
Status: DISCONTINUED | OUTPATIENT
Start: 2024-09-18 | End: 2024-09-20 | Stop reason: HOSPADM

## 2024-09-18 RX ORDER — OXYCODONE HYDROCHLORIDE 5 MG/1
10 TABLET ORAL EVERY 4 HOURS PRN
Status: DISCONTINUED | OUTPATIENT
Start: 2024-09-18 | End: 2024-09-20 | Stop reason: HOSPADM

## 2024-09-18 RX ADMIN — SPIRONOLACTONE 25 MG: 25 TABLET ORAL at 08:32

## 2024-09-18 RX ADMIN — FERROUS SULFATE TAB 325 MG (65 MG ELEMENTAL FE) 325 MG: 325 (65 FE) TAB at 21:06

## 2024-09-18 RX ADMIN — TIOTROPIUM BROMIDE INHALATION SPRAY 2 PUFF: 3.12 SPRAY, METERED RESPIRATORY (INHALATION) at 07:35

## 2024-09-18 RX ADMIN — MAGNESIUM SULFATE HEPTAHYDRATE 2000 MG: 40 INJECTION, SOLUTION INTRAVENOUS at 12:19

## 2024-09-18 RX ADMIN — METOPROLOL TARTRATE 25 MG: 50 TABLET, FILM COATED ORAL at 08:32

## 2024-09-18 RX ADMIN — SODIUM CHLORIDE, PRESERVATIVE FREE 10 ML: 5 INJECTION INTRAVENOUS at 21:07

## 2024-09-18 RX ADMIN — GABAPENTIN 100 MG: 100 CAPSULE ORAL at 08:32

## 2024-09-18 RX ADMIN — WARFARIN SODIUM 2.5 MG: 2.5 TABLET ORAL at 17:55

## 2024-09-18 RX ADMIN — SODIUM CHLORIDE, PRESERVATIVE FREE 10 ML: 5 INJECTION INTRAVENOUS at 08:35

## 2024-09-18 RX ADMIN — HYDROCODONE BITARTRATE AND ACETAMINOPHEN 2 TABLET: 5; 325 TABLET ORAL at 01:26

## 2024-09-18 RX ADMIN — ATORVASTATIN CALCIUM 10 MG: 10 TABLET, FILM COATED ORAL at 08:32

## 2024-09-18 RX ADMIN — MONTELUKAST SODIUM 10 MG: 10 TABLET ORAL at 21:06

## 2024-09-18 RX ADMIN — BUDESONIDE AND FORMOTEROL FUMARATE DIHYDRATE 2 PUFF: 160; 4.5 AEROSOL RESPIRATORY (INHALATION) at 07:37

## 2024-09-18 RX ADMIN — SODIUM BICARBONATE 325 MG: 650 TABLET ORAL at 21:06

## 2024-09-18 RX ADMIN — GABAPENTIN 100 MG: 100 CAPSULE ORAL at 21:06

## 2024-09-18 RX ADMIN — FERROUS SULFATE TAB 325 MG (65 MG ELEMENTAL FE) 325 MG: 325 (65 FE) TAB at 08:32

## 2024-09-18 RX ADMIN — METOPROLOL TARTRATE 25 MG: 50 TABLET, FILM COATED ORAL at 21:09

## 2024-09-18 RX ADMIN — HYDROCODONE BITARTRATE AND ACETAMINOPHEN 2 TABLET: 5; 325 TABLET ORAL at 07:27

## 2024-09-18 RX ADMIN — AMLODIPINE BESYLATE 5 MG: 5 TABLET ORAL at 08:32

## 2024-09-18 RX ADMIN — ACETAMINOPHEN 1000 MG: 500 TABLET ORAL at 21:06

## 2024-09-18 RX ADMIN — HYDROCODONE BITARTRATE AND ACETAMINOPHEN 2 TABLET: 5; 325 TABLET ORAL at 14:50

## 2024-09-18 RX ADMIN — PANTOPRAZOLE SODIUM 40 MG: 40 TABLET, DELAYED RELEASE ORAL at 07:27

## 2024-09-18 RX ADMIN — OXYCODONE HYDROCHLORIDE 10 MG: 5 TABLET ORAL at 18:37

## 2024-09-18 RX ADMIN — BUDESONIDE AND FORMOTEROL FUMARATE DIHYDRATE 2 PUFF: 160; 4.5 AEROSOL RESPIRATORY (INHALATION) at 22:36

## 2024-09-18 RX ADMIN — SODIUM BICARBONATE 325 MG: 650 TABLET ORAL at 08:32

## 2024-09-18 ASSESSMENT — PAIN DESCRIPTION - LOCATION
LOCATION: LEG

## 2024-09-18 ASSESSMENT — PAIN SCALES - GENERAL
PAINLEVEL_OUTOF10: 8
PAINLEVEL_OUTOF10: 10
PAINLEVEL_OUTOF10: 10
PAINLEVEL_OUTOF10: 7
PAINLEVEL_OUTOF10: 6
PAINLEVEL_OUTOF10: 7
PAINLEVEL_OUTOF10: 6

## 2024-09-18 ASSESSMENT — PAIN DESCRIPTION - ORIENTATION
ORIENTATION: RIGHT

## 2024-09-18 ASSESSMENT — PAIN DESCRIPTION - DESCRIPTORS
DESCRIPTORS: ACHING;DISCOMFORT
DESCRIPTORS: ACHING;SPASM;SHARP;SORE
DESCRIPTORS: ACHING;SORE
DESCRIPTORS: ACHING;DISCOMFORT
DESCRIPTORS: ACHING;DISCOMFORT

## 2024-09-19 LAB
ANION GAP SERPL CALC-SCNC: 15 MEQ/L (ref 8–16)
BASOPHILS ABSOLUTE: 0 THOU/MM3 (ref 0–0.1)
BASOPHILS NFR BLD AUTO: 0.3 %
BUN SERPL-MCNC: 6 MG/DL (ref 7–22)
CALCIUM SERPL-MCNC: 9.9 MG/DL (ref 8.5–10.5)
CHLORIDE SERPL-SCNC: 97 MEQ/L (ref 98–111)
CO2 SERPL-SCNC: 23 MEQ/L (ref 23–33)
CREAT SERPL-MCNC: 0.6 MG/DL (ref 0.4–1.2)
DEPRECATED RDW RBC AUTO: 38.9 FL (ref 35–45)
EOSINOPHIL NFR BLD AUTO: 5.5 %
EOSINOPHILS ABSOLUTE: 0.5 THOU/MM3 (ref 0–0.4)
ERYTHROCYTE [DISTWIDTH] IN BLOOD BY AUTOMATED COUNT: 15 % (ref 11.5–14.5)
FOLATE SERPL-MCNC: > 20 NG/ML (ref 4.8–24.2)
GFR SERPL CREATININE-BSD FRML MDRD: 86 ML/MIN/1.73M2
GLUCOSE BLD STRIP.AUTO-MCNC: 100 MG/DL (ref 70–108)
GLUCOSE BLD STRIP.AUTO-MCNC: 130 MG/DL (ref 70–108)
GLUCOSE BLD STRIP.AUTO-MCNC: 131 MG/DL (ref 70–108)
GLUCOSE BLD STRIP.AUTO-MCNC: 137 MG/DL (ref 70–108)
GLUCOSE SERPL-MCNC: 87 MG/DL (ref 70–108)
HCT VFR BLD AUTO: 31.6 % (ref 37–47)
HGB BLD-MCNC: 9.8 GM/DL (ref 12–16)
IMM GRANULOCYTES # BLD AUTO: 0.04 THOU/MM3 (ref 0–0.07)
IMM GRANULOCYTES NFR BLD AUTO: 0.4 %
INR PPP: 2.68 (ref 0.85–1.13)
LYMPHOCYTES ABSOLUTE: 2 THOU/MM3 (ref 1–4.8)
LYMPHOCYTES NFR BLD AUTO: 21.4 %
MCH RBC QN AUTO: 22.6 PG (ref 26–33)
MCHC RBC AUTO-ENTMCNC: 31 GM/DL (ref 32.2–35.5)
MCV RBC AUTO: 73 FL (ref 81–99)
MONOCYTES ABSOLUTE: 0.8 THOU/MM3 (ref 0.4–1.3)
MONOCYTES NFR BLD AUTO: 8.6 %
NEUTROPHILS ABSOLUTE: 6 THOU/MM3 (ref 1.8–7.7)
NEUTROPHILS NFR BLD AUTO: 63.8 %
NRBC BLD AUTO-RTO: 0 /100 WBC
PLATELET # BLD AUTO: 379 THOU/MM3 (ref 130–400)
PMV BLD AUTO: 8.9 FL (ref 9.4–12.4)
POTASSIUM SERPL-SCNC: 4.1 MEQ/L (ref 3.5–5.2)
RBC # BLD AUTO: 4.33 MILL/MM3 (ref 4.2–5.4)
SODIUM SERPL-SCNC: 135 MEQ/L (ref 135–145)
VIT B12 SERPL-MCNC: 401 PG/ML (ref 211–911)
WBC # BLD AUTO: 9.4 THOU/MM3 (ref 4.8–10.8)

## 2024-09-19 PROCEDURE — 80048 BASIC METABOLIC PNL TOTAL CA: CPT

## 2024-09-19 PROCEDURE — 99231 SBSQ HOSP IP/OBS SF/LOW 25: CPT

## 2024-09-19 PROCEDURE — 82948 REAGENT STRIP/BLOOD GLUCOSE: CPT

## 2024-09-19 PROCEDURE — 97535 SELF CARE MNGMENT TRAINING: CPT

## 2024-09-19 PROCEDURE — 85610 PROTHROMBIN TIME: CPT

## 2024-09-19 PROCEDURE — 94640 AIRWAY INHALATION TREATMENT: CPT

## 2024-09-19 PROCEDURE — 6370000000 HC RX 637 (ALT 250 FOR IP)

## 2024-09-19 PROCEDURE — 97530 THERAPEUTIC ACTIVITIES: CPT

## 2024-09-19 PROCEDURE — 97110 THERAPEUTIC EXERCISES: CPT

## 2024-09-19 PROCEDURE — 2580000003 HC RX 258

## 2024-09-19 PROCEDURE — 36415 COLL VENOUS BLD VENIPUNCTURE: CPT

## 2024-09-19 PROCEDURE — 85025 COMPLETE CBC W/AUTO DIFF WBC: CPT

## 2024-09-19 PROCEDURE — 94760 N-INVAS EAR/PLS OXIMETRY 1: CPT

## 2024-09-19 PROCEDURE — 99233 SBSQ HOSP IP/OBS HIGH 50: CPT | Performed by: STUDENT IN AN ORGANIZED HEALTH CARE EDUCATION/TRAINING PROGRAM

## 2024-09-19 PROCEDURE — 6370000000 HC RX 637 (ALT 250 FOR IP): Performed by: STUDENT IN AN ORGANIZED HEALTH CARE EDUCATION/TRAINING PROGRAM

## 2024-09-19 PROCEDURE — 1200000003 HC TELEMETRY R&B

## 2024-09-19 RX ORDER — GABAPENTIN 100 MG/1
200 CAPSULE ORAL 2 TIMES DAILY
Status: DISCONTINUED | OUTPATIENT
Start: 2024-09-19 | End: 2024-09-20 | Stop reason: HOSPADM

## 2024-09-19 RX ORDER — WARFARIN SODIUM 2.5 MG/1
2.5 TABLET ORAL
Status: COMPLETED | OUTPATIENT
Start: 2024-09-19 | End: 2024-09-19

## 2024-09-19 RX ADMIN — TIOTROPIUM BROMIDE INHALATION SPRAY 2 PUFF: 3.12 SPRAY, METERED RESPIRATORY (INHALATION) at 07:42

## 2024-09-19 RX ADMIN — GABAPENTIN 100 MG: 100 CAPSULE ORAL at 09:25

## 2024-09-19 RX ADMIN — BUDESONIDE AND FORMOTEROL FUMARATE DIHYDRATE 2 PUFF: 160; 4.5 AEROSOL RESPIRATORY (INHALATION) at 20:00

## 2024-09-19 RX ADMIN — GABAPENTIN 200 MG: 100 CAPSULE ORAL at 22:06

## 2024-09-19 RX ADMIN — AMLODIPINE BESYLATE 5 MG: 5 TABLET ORAL at 09:19

## 2024-09-19 RX ADMIN — SODIUM CHLORIDE, PRESERVATIVE FREE 10 ML: 5 INJECTION INTRAVENOUS at 22:06

## 2024-09-19 RX ADMIN — ATORVASTATIN CALCIUM 10 MG: 10 TABLET, FILM COATED ORAL at 09:21

## 2024-09-19 RX ADMIN — SPIRONOLACTONE 25 MG: 25 TABLET ORAL at 09:34

## 2024-09-19 RX ADMIN — BUDESONIDE AND FORMOTEROL FUMARATE DIHYDRATE 2 PUFF: 160; 4.5 AEROSOL RESPIRATORY (INHALATION) at 07:43

## 2024-09-19 RX ADMIN — OXYCODONE HYDROCHLORIDE 10 MG: 5 TABLET ORAL at 12:40

## 2024-09-19 RX ADMIN — FERROUS SULFATE TAB 325 MG (65 MG ELEMENTAL FE) 325 MG: 325 (65 FE) TAB at 09:27

## 2024-09-19 RX ADMIN — ACETAMINOPHEN 1000 MG: 500 TABLET ORAL at 07:40

## 2024-09-19 RX ADMIN — SODIUM BICARBONATE 325 MG: 650 TABLET ORAL at 09:31

## 2024-09-19 RX ADMIN — ACETAMINOPHEN 1000 MG: 500 TABLET ORAL at 22:11

## 2024-09-19 RX ADMIN — WARFARIN SODIUM 2.5 MG: 2.5 TABLET ORAL at 17:13

## 2024-09-19 RX ADMIN — DICLOFENAC SODIUM 2 G: 10 GEL TOPICAL at 09:22

## 2024-09-19 RX ADMIN — MONTELUKAST SODIUM 10 MG: 10 TABLET ORAL at 22:06

## 2024-09-19 RX ADMIN — PANTOPRAZOLE SODIUM 40 MG: 40 TABLET, DELAYED RELEASE ORAL at 07:40

## 2024-09-19 RX ADMIN — METOPROLOL TARTRATE 25 MG: 50 TABLET, FILM COATED ORAL at 22:06

## 2024-09-19 RX ADMIN — METOPROLOL TARTRATE 25 MG: 50 TABLET, FILM COATED ORAL at 09:28

## 2024-09-19 RX ADMIN — SODIUM BICARBONATE 325 MG: 650 TABLET ORAL at 22:06

## 2024-09-19 RX ADMIN — OXYCODONE HYDROCHLORIDE 10 MG: 5 TABLET ORAL at 08:20

## 2024-09-19 RX ADMIN — SODIUM CHLORIDE, PRESERVATIVE FREE 10 ML: 5 INJECTION INTRAVENOUS at 09:34

## 2024-09-19 RX ADMIN — ACETAMINOPHEN 1000 MG: 500 TABLET ORAL at 14:06

## 2024-09-19 RX ADMIN — FERROUS SULFATE TAB 325 MG (65 MG ELEMENTAL FE) 325 MG: 325 (65 FE) TAB at 22:06

## 2024-09-19 ASSESSMENT — PAIN SCALES - GENERAL
PAINLEVEL_OUTOF10: 10
PAINLEVEL_OUTOF10: 7
PAINLEVEL_OUTOF10: 5
PAINLEVEL_OUTOF10: 6
PAINLEVEL_OUTOF10: 7
PAINLEVEL_OUTOF10: 5

## 2024-09-19 ASSESSMENT — PAIN DESCRIPTION - ORIENTATION
ORIENTATION: RIGHT;LOWER
ORIENTATION: RIGHT
ORIENTATION: RIGHT

## 2024-09-19 ASSESSMENT — PAIN DESCRIPTION - LOCATION
LOCATION: LEG
LOCATION: KNEE

## 2024-09-20 ENCOUNTER — TELEPHONE (OUTPATIENT)
Dept: FAMILY MEDICINE CLINIC | Age: 89
End: 2024-09-20

## 2024-09-20 ENCOUNTER — TELEPHONE (OUTPATIENT)
Dept: WOUND CARE | Age: 89
End: 2024-09-20

## 2024-09-20 VITALS
TEMPERATURE: 97.7 F | HEART RATE: 65 BPM | SYSTOLIC BLOOD PRESSURE: 117 MMHG | RESPIRATION RATE: 18 BRPM | DIASTOLIC BLOOD PRESSURE: 79 MMHG | OXYGEN SATURATION: 100 %

## 2024-09-20 LAB
GLUCOSE BLD STRIP.AUTO-MCNC: 176 MG/DL (ref 70–108)
GLUCOSE BLD STRIP.AUTO-MCNC: 86 MG/DL (ref 70–108)
INR PPP: 3.48 (ref 0.85–1.13)

## 2024-09-20 PROCEDURE — 36415 COLL VENOUS BLD VENIPUNCTURE: CPT

## 2024-09-20 PROCEDURE — 6370000000 HC RX 637 (ALT 250 FOR IP)

## 2024-09-20 PROCEDURE — 94640 AIRWAY INHALATION TREATMENT: CPT

## 2024-09-20 PROCEDURE — 85610 PROTHROMBIN TIME: CPT

## 2024-09-20 PROCEDURE — 2580000003 HC RX 258

## 2024-09-20 PROCEDURE — 99233 SBSQ HOSP IP/OBS HIGH 50: CPT | Performed by: STUDENT IN AN ORGANIZED HEALTH CARE EDUCATION/TRAINING PROGRAM

## 2024-09-20 PROCEDURE — 6370000000 HC RX 637 (ALT 250 FOR IP): Performed by: STUDENT IN AN ORGANIZED HEALTH CARE EDUCATION/TRAINING PROGRAM

## 2024-09-20 PROCEDURE — 99239 HOSP IP/OBS DSCHRG MGMT >30: CPT

## 2024-09-20 PROCEDURE — 82948 REAGENT STRIP/BLOOD GLUCOSE: CPT

## 2024-09-20 RX ORDER — ACETAMINOPHEN 650 MG
TABLET, EXTENDED RELEASE ORAL
Qty: 473 ML | Refills: 0 | Status: ON HOLD | OUTPATIENT
Start: 2024-09-20 | End: 2024-09-27

## 2024-09-20 RX ORDER — SENNOSIDES A AND B 8.6 MG/1
1 TABLET, FILM COATED ORAL 2 TIMES DAILY
Qty: 60 TABLET | Refills: 0 | Status: ON HOLD | OUTPATIENT
Start: 2024-09-20 | End: 2024-10-20

## 2024-09-20 RX ORDER — ACETAMINOPHEN 500 MG
1000 TABLET ORAL EVERY 8 HOURS SCHEDULED
Qty: 120 TABLET | Refills: 3 | Status: ON HOLD | OUTPATIENT
Start: 2024-09-20

## 2024-09-20 RX ORDER — SENNOSIDES A AND B 8.6 MG/1
1 TABLET, FILM COATED ORAL 2 TIMES DAILY
Status: DISCONTINUED | OUTPATIENT
Start: 2024-09-20 | End: 2024-09-20 | Stop reason: HOSPADM

## 2024-09-20 RX ORDER — OXYCODONE HYDROCHLORIDE 10 MG/1
10 TABLET ORAL EVERY 4 HOURS PRN
Qty: 180 TABLET | Refills: 0 | Status: ON HOLD | OUTPATIENT
Start: 2024-09-20 | End: 2024-10-20

## 2024-09-20 RX ORDER — POLYETHYLENE GLYCOL 3350 17 G/17G
17 POWDER, FOR SOLUTION ORAL DAILY
Qty: 527 G | Refills: 1 | Status: ON HOLD | OUTPATIENT
Start: 2024-09-20 | End: 2024-11-21

## 2024-09-20 RX ORDER — POLYETHYLENE GLYCOL 3350 17 G/17G
17 POWDER, FOR SOLUTION ORAL DAILY
Status: DISCONTINUED | OUTPATIENT
Start: 2024-09-20 | End: 2024-09-20 | Stop reason: HOSPADM

## 2024-09-20 RX ADMIN — ACETAMINOPHEN 1000 MG: 500 TABLET ORAL at 13:53

## 2024-09-20 RX ADMIN — SODIUM BICARBONATE 325 MG: 650 TABLET ORAL at 08:07

## 2024-09-20 RX ADMIN — OXYCODONE HYDROCHLORIDE 10 MG: 5 TABLET ORAL at 16:11

## 2024-09-20 RX ADMIN — OXYCODONE HYDROCHLORIDE 10 MG: 5 TABLET ORAL at 12:01

## 2024-09-20 RX ADMIN — METOPROLOL TARTRATE 25 MG: 50 TABLET, FILM COATED ORAL at 08:07

## 2024-09-20 RX ADMIN — SENNOSIDES 8.6 MG: 8.6 TABLET, FILM COATED ORAL at 12:01

## 2024-09-20 RX ADMIN — GABAPENTIN 200 MG: 100 CAPSULE ORAL at 08:06

## 2024-09-20 RX ADMIN — SODIUM CHLORIDE, PRESERVATIVE FREE 10 ML: 5 INJECTION INTRAVENOUS at 08:04

## 2024-09-20 RX ADMIN — SPIRONOLACTONE 25 MG: 25 TABLET ORAL at 08:08

## 2024-09-20 RX ADMIN — PANTOPRAZOLE SODIUM 40 MG: 40 TABLET, DELAYED RELEASE ORAL at 07:22

## 2024-09-20 RX ADMIN — OXYCODONE HYDROCHLORIDE 10 MG: 5 TABLET ORAL at 08:05

## 2024-09-20 RX ADMIN — FERROUS SULFATE TAB 325 MG (65 MG ELEMENTAL FE) 325 MG: 325 (65 FE) TAB at 08:06

## 2024-09-20 RX ADMIN — ACETAMINOPHEN 1000 MG: 500 TABLET ORAL at 07:21

## 2024-09-20 RX ADMIN — OXYCODONE HYDROCHLORIDE 10 MG: 5 TABLET ORAL at 00:58

## 2024-09-20 RX ADMIN — BUDESONIDE AND FORMOTEROL FUMARATE DIHYDRATE 2 PUFF: 160; 4.5 AEROSOL RESPIRATORY (INHALATION) at 10:04

## 2024-09-20 RX ADMIN — ATORVASTATIN CALCIUM 10 MG: 10 TABLET, FILM COATED ORAL at 08:06

## 2024-09-20 RX ADMIN — AMLODIPINE BESYLATE 5 MG: 5 TABLET ORAL at 08:06

## 2024-09-20 RX ADMIN — TIOTROPIUM BROMIDE INHALATION SPRAY 2 PUFF: 3.12 SPRAY, METERED RESPIRATORY (INHALATION) at 10:02

## 2024-09-20 ASSESSMENT — PAIN DESCRIPTION - LOCATION
LOCATION: LEG

## 2024-09-20 ASSESSMENT — PAIN DESCRIPTION - ORIENTATION
ORIENTATION: RIGHT

## 2024-09-20 ASSESSMENT — PAIN SCALES - GENERAL
PAINLEVEL_OUTOF10: 10
PAINLEVEL_OUTOF10: 5
PAINLEVEL_OUTOF10: 7
PAINLEVEL_OUTOF10: 9
PAINLEVEL_OUTOF10: 10
PAINLEVEL_OUTOF10: 10

## 2024-09-20 ASSESSMENT — PAIN DESCRIPTION - ONSET: ONSET: ON-GOING

## 2024-09-20 ASSESSMENT — PAIN DESCRIPTION - DESCRIPTORS
DESCRIPTORS: DISCOMFORT
DESCRIPTORS: ACHING;DISCOMFORT

## 2024-09-20 ASSESSMENT — PAIN DESCRIPTION - PAIN TYPE: TYPE: ACUTE PAIN

## 2024-09-20 ASSESSMENT — PAIN - FUNCTIONAL ASSESSMENT
PAIN_FUNCTIONAL_ASSESSMENT: ACTIVITIES ARE NOT PREVENTED

## 2024-09-20 ASSESSMENT — PAIN DESCRIPTION - FREQUENCY: FREQUENCY: CONTINUOUS

## 2024-09-22 LAB — BACTERIA BLD AEROBE CULT: NORMAL

## 2024-09-23 ENCOUNTER — CARE COORDINATION (OUTPATIENT)
Dept: CASE MANAGEMENT | Age: 89
End: 2024-09-23

## 2024-09-23 ENCOUNTER — TELEPHONE (OUTPATIENT)
Dept: FAMILY MEDICINE CLINIC | Age: 89
End: 2024-09-23

## 2024-09-23 DIAGNOSIS — S81.801S LEG WOUND, RIGHT, SEQUELA: Primary | ICD-10-CM

## 2024-09-23 PROCEDURE — 1111F DSCHRG MED/CURRENT MED MERGE: CPT | Performed by: NURSE PRACTITIONER

## 2024-09-24 ENCOUNTER — HOSPITAL ENCOUNTER (INPATIENT)
Age: 89
LOS: 7 days | Discharge: NURSING, REHAB OR LONG-TERM CARE | DRG: 592 | End: 2024-10-01
Attending: FAMILY MEDICINE | Admitting: HOSPITALIST
Payer: MEDICARE

## 2024-09-24 ENCOUNTER — APPOINTMENT (OUTPATIENT)
Dept: CT IMAGING | Age: 89
DRG: 592 | End: 2024-09-24
Payer: MEDICARE

## 2024-09-24 ENCOUNTER — APPOINTMENT (OUTPATIENT)
Dept: GENERAL RADIOLOGY | Age: 89
DRG: 592 | End: 2024-09-24
Payer: MEDICARE

## 2024-09-24 DIAGNOSIS — G89.29 OTHER CHRONIC PAIN: ICD-10-CM

## 2024-09-24 DIAGNOSIS — R52 INTRACTABLE PAIN: ICD-10-CM

## 2024-09-24 DIAGNOSIS — L08.9 INFECTED BLISTER OF RIGHT LOWER EXTREMITY, INITIAL ENCOUNTER: ICD-10-CM
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
## 2024-09-24 DIAGNOSIS — I50.32 CHRONIC HEART FAILURE WITH PRESERVED EJECTION FRACTION (HFPEF) (HCC): ICD-10-CM

## 2024-09-24 DIAGNOSIS — R41.82 ALTERED MENTAL STATUS, UNSPECIFIED ALTERED MENTAL STATUS TYPE: ICD-10-CM

## 2024-09-24 DIAGNOSIS — Z51.5 PALLIATIVE CARE PATIENT: ICD-10-CM

## 2024-09-24 DIAGNOSIS — F43.20 ADJUSTMENT REACTION TO MEDICAL THERAPY: ICD-10-CM

## 2024-09-24 DIAGNOSIS — S80.821A INFECTED BLISTER OF RIGHT LOWER EXTREMITY, INITIAL ENCOUNTER: ICD-10-CM

## 2024-09-24 DIAGNOSIS — S81.801S LEG WOUND, RIGHT, SEQUELA: ICD-10-CM

## 2024-09-24 PROBLEM — R53.1 WEAKNESS: Status: ACTIVE | Noted: 2024-09-24

## 2024-09-24 LAB
ALBUMIN SERPL BCG-MCNC: 3.4 G/DL (ref 3.5–5.1)
ALP SERPL-CCNC: 170 U/L (ref 38–126)
ALT SERPL W/O P-5'-P-CCNC: 20 U/L (ref 11–66)
ANION GAP SERPL CALC-SCNC: 17 MEQ/L (ref 8–16)
AST SERPL-CCNC: 29 U/L (ref 5–40)
BASOPHILS ABSOLUTE: 0 THOU/MM3 (ref 0–0.1)
BASOPHILS NFR BLD AUTO: 0.1 %
BILIRUB CONJ SERPL-MCNC: 0.2 MG/DL (ref 0.1–13.8)
BILIRUB SERPL-MCNC: 0.3 MG/DL (ref 0.3–1.2)
BILIRUB UR QL STRIP.AUTO: NEGATIVE
BUN SERPL-MCNC: 28 MG/DL (ref 7–22)
CALCIUM SERPL-MCNC: 9.8 MG/DL (ref 8.5–10.5)
CHARACTER UR: CLEAR
CHLORIDE SERPL-SCNC: 92 MEQ/L (ref 98–111)
CK SERPL-CCNC: 104 U/L (ref 30–135)
CO2 SERPL-SCNC: 21 MEQ/L (ref 23–33)
COLOR, UA: YELLOW
CREAT SERPL-MCNC: 1 MG/DL (ref 0.4–1.2)
DEPRECATED RDW RBC AUTO: 36.6 FL (ref 35–45)
EKG Q-T INTERVAL: 318 MS
EKG QRS DURATION: 62 MS
EKG QTC CALCULATION (BAZETT): 420 MS
EKG R AXIS: 11 DEGREES
EKG T AXIS: -173 DEGREES
EKG VENTRICULAR RATE: 105 BPM
EOSINOPHIL NFR BLD AUTO: 3.7 %
EOSINOPHILS ABSOLUTE: 0.3 THOU/MM3 (ref 0–0.4)
ERYTHROCYTE [DISTWIDTH] IN BLOOD BY AUTOMATED COUNT: 14.8 % (ref 11.5–14.5)
ETHANOL SERPL-MCNC: < 0.01 % (ref 0–0.08)
GFR SERPL CREATININE-BSD FRML MDRD: 54 ML/MIN/1.73M2
GLUCOSE SERPL-MCNC: 98 MG/DL (ref 70–108)
GLUCOSE UR QL STRIP.AUTO: NEGATIVE MG/DL
HCT VFR BLD AUTO: 29.8 % (ref 37–47)
HGB BLD-MCNC: 9.6 GM/DL (ref 12–16)
HGB UR QL STRIP.AUTO: NEGATIVE
IMM GRANULOCYTES # BLD AUTO: 0.03 THOU/MM3 (ref 0–0.07)
IMM GRANULOCYTES NFR BLD AUTO: 0.4 %
INR PPP: 3.06 (ref 0.85–1.13)
KETONES UR QL STRIP.AUTO: ABNORMAL
LACTIC ACID, SEPSIS: 1.1 MMOL/L (ref 0.5–1.9)
LYMPHOCYTES ABSOLUTE: 1.1 THOU/MM3 (ref 1–4.8)
LYMPHOCYTES NFR BLD AUTO: 15 %
MCH RBC QN AUTO: 22.8 PG (ref 26–33)
MCHC RBC AUTO-ENTMCNC: 32.2 GM/DL (ref 32.2–35.5)
MCV RBC AUTO: 70.8 FL (ref 81–99)
MONOCYTES ABSOLUTE: 0.6 THOU/MM3 (ref 0.4–1.3)
MONOCYTES NFR BLD AUTO: 8.6 %
NEUTROPHILS ABSOLUTE: 5.3 THOU/MM3 (ref 1.8–7.7)
NEUTROPHILS NFR BLD AUTO: 72.2 %
NITRITE UR QL STRIP: NEGATIVE
NRBC BLD AUTO-RTO: 0 /100 WBC
NT-PROBNP SERPL IA-MCNC: 852.8 PG/ML (ref 0–449)
OSMOLALITY SERPL CALC.SUM OF ELEC: 266.2 MOSMOL/KG (ref 275–300)
PH UR STRIP.AUTO: 5 [PH] (ref 5–9)
PLATELET # BLD AUTO: 430 THOU/MM3 (ref 130–400)
PMV BLD AUTO: 8.5 FL (ref 9.4–12.4)
POTASSIUM SERPL-SCNC: 4.5 MEQ/L (ref 3.5–5.2)
PROT SERPL-MCNC: 6.9 G/DL (ref 6.1–8)
PROT UR STRIP.AUTO-MCNC: NEGATIVE MG/DL
RBC # BLD AUTO: 4.21 MILL/MM3 (ref 4.2–5.4)
SODIUM SERPL-SCNC: 130 MEQ/L (ref 135–145)
SP GR UR REFRACT.AUTO: 1.02 (ref 1–1.03)
TROPONIN, HIGH SENSITIVITY: 22 NG/L (ref 0–12)
UROBILINOGEN, URINE: 0.2 EU/DL (ref 0–1)
WBC # BLD AUTO: 7.3 THOU/MM3 (ref 4.8–10.8)
WBC #/AREA URNS HPF: NEGATIVE /[HPF]

## 2024-09-24 PROCEDURE — 36415 COLL VENOUS BLD VENIPUNCTURE: CPT

## 2024-09-24 PROCEDURE — 85610 PROTHROMBIN TIME: CPT

## 2024-09-24 PROCEDURE — 6370000000 HC RX 637 (ALT 250 FOR IP): Performed by: HOSPITALIST

## 2024-09-24 PROCEDURE — 99223 1ST HOSP IP/OBS HIGH 75: CPT | Performed by: STUDENT IN AN ORGANIZED HEALTH CARE EDUCATION/TRAINING PROGRAM

## 2024-09-24 PROCEDURE — 99285 EMERGENCY DEPT VISIT HI MDM: CPT

## 2024-09-24 PROCEDURE — 84484 ASSAY OF TROPONIN QUANT: CPT

## 2024-09-24 PROCEDURE — 82550 ASSAY OF CK (CPK): CPT

## 2024-09-24 PROCEDURE — 83880 ASSAY OF NATRIURETIC PEPTIDE: CPT

## 2024-09-24 PROCEDURE — 6360000002 HC RX W HCPCS: Performed by: STUDENT IN AN ORGANIZED HEALTH CARE EDUCATION/TRAINING PROGRAM

## 2024-09-24 PROCEDURE — 83605 ASSAY OF LACTIC ACID: CPT

## 2024-09-24 PROCEDURE — 1200000000 HC SEMI PRIVATE

## 2024-09-24 PROCEDURE — 80053 COMPREHEN METABOLIC PANEL: CPT

## 2024-09-24 PROCEDURE — 93005 ELECTROCARDIOGRAM TRACING: CPT | Performed by: FAMILY MEDICINE

## 2024-09-24 PROCEDURE — 2580000003 HC RX 258: Performed by: HOSPITALIST

## 2024-09-24 PROCEDURE — 71045 X-RAY EXAM CHEST 1 VIEW: CPT

## 2024-09-24 PROCEDURE — 93010 ELECTROCARDIOGRAM REPORT: CPT | Performed by: NUCLEAR MEDICINE

## 2024-09-24 PROCEDURE — 2580000003 HC RX 258: Performed by: FAMILY MEDICINE

## 2024-09-24 PROCEDURE — 6370000000 HC RX 637 (ALT 250 FOR IP): Performed by: FAMILY MEDICINE

## 2024-09-24 PROCEDURE — 82077 ASSAY SPEC XCP UR&BREATH IA: CPT

## 2024-09-24 PROCEDURE — 82248 BILIRUBIN DIRECT: CPT

## 2024-09-24 PROCEDURE — 70450 CT HEAD/BRAIN W/O DYE: CPT

## 2024-09-24 PROCEDURE — 81003 URINALYSIS AUTO W/O SCOPE: CPT

## 2024-09-24 PROCEDURE — 85025 COMPLETE CBC W/AUTO DIFF WBC: CPT

## 2024-09-24 RX ORDER — FERROUS SULFATE 325(65) MG
325 TABLET ORAL 2 TIMES DAILY
Status: DISCONTINUED | OUTPATIENT
Start: 2024-09-24 | End: 2024-10-01 | Stop reason: HOSPADM

## 2024-09-24 RX ORDER — SODIUM CHLORIDE 0.9 % (FLUSH) 0.9 %
5-40 SYRINGE (ML) INJECTION PRN
Status: DISCONTINUED | OUTPATIENT
Start: 2024-09-24 | End: 2024-10-01 | Stop reason: HOSPADM

## 2024-09-24 RX ORDER — OXYCODONE HYDROCHLORIDE 5 MG/1
10 TABLET ORAL EVERY 4 HOURS PRN
Status: DISCONTINUED | OUTPATIENT
Start: 2024-09-24 | End: 2024-09-24

## 2024-09-24 RX ORDER — MONTELUKAST SODIUM 10 MG/1
10 TABLET ORAL NIGHTLY
Status: DISCONTINUED | OUTPATIENT
Start: 2024-09-24 | End: 2024-10-01 | Stop reason: HOSPADM

## 2024-09-24 RX ORDER — BUMETANIDE 0.5 MG/1
0.5 TABLET ORAL DAILY
Status: DISCONTINUED | OUTPATIENT
Start: 2024-09-25 | End: 2024-10-01 | Stop reason: HOSPADM

## 2024-09-24 RX ORDER — ACETAMINOPHEN 500 MG
1000 TABLET ORAL EVERY 8 HOURS SCHEDULED
Status: DISCONTINUED | OUTPATIENT
Start: 2024-09-24 | End: 2024-09-24

## 2024-09-24 RX ORDER — OXYCODONE HYDROCHLORIDE 5 MG/1
5 TABLET ORAL ONCE
Status: COMPLETED | OUTPATIENT
Start: 2024-09-24 | End: 2024-09-24

## 2024-09-24 RX ORDER — AMLODIPINE BESYLATE 5 MG/1
5 TABLET ORAL DAILY
Status: DISCONTINUED | OUTPATIENT
Start: 2024-09-25 | End: 2024-10-01 | Stop reason: HOSPADM

## 2024-09-24 RX ORDER — POLYETHYLENE GLYCOL 3350 17 G/17G
17 POWDER, FOR SOLUTION ORAL DAILY
Status: DISCONTINUED | OUTPATIENT
Start: 2024-09-24 | End: 2024-09-24

## 2024-09-24 RX ORDER — ACETAMINOPHEN 650 MG/1
650 SUPPOSITORY RECTAL EVERY 6 HOURS PRN
Status: DISCONTINUED | OUTPATIENT
Start: 2024-09-24 | End: 2024-10-01 | Stop reason: HOSPADM

## 2024-09-24 RX ORDER — HYDROCODONE BITARTRATE AND ACETAMINOPHEN 5; 325 MG/1; MG/1
1 TABLET ORAL ONCE
Status: COMPLETED | OUTPATIENT
Start: 2024-09-24 | End: 2024-09-24

## 2024-09-24 RX ORDER — SODIUM CHLORIDE 0.9 % (FLUSH) 0.9 %
5-40 SYRINGE (ML) INJECTION EVERY 12 HOURS SCHEDULED
Status: DISCONTINUED | OUTPATIENT
Start: 2024-09-24 | End: 2024-10-01 | Stop reason: HOSPADM

## 2024-09-24 RX ORDER — POTASSIUM CHLORIDE 1500 MG/1
40 TABLET, EXTENDED RELEASE ORAL PRN
Status: DISCONTINUED | OUTPATIENT
Start: 2024-09-24 | End: 2024-10-01 | Stop reason: HOSPADM

## 2024-09-24 RX ORDER — SODIUM BICARBONATE 650 MG/1
325 TABLET ORAL 2 TIMES DAILY
Status: DISCONTINUED | OUTPATIENT
Start: 2024-09-24 | End: 2024-10-01 | Stop reason: HOSPADM

## 2024-09-24 RX ORDER — ONDANSETRON 4 MG/1
4 TABLET, ORALLY DISINTEGRATING ORAL EVERY 8 HOURS PRN
Status: DISCONTINUED | OUTPATIENT
Start: 2024-09-24 | End: 2024-10-01 | Stop reason: HOSPADM

## 2024-09-24 RX ORDER — ACETAMINOPHEN 500 MG
1000 TABLET ORAL 3 TIMES DAILY
Status: DISCONTINUED | OUTPATIENT
Start: 2024-09-24 | End: 2024-10-01 | Stop reason: HOSPADM

## 2024-09-24 RX ORDER — POLYETHYLENE GLYCOL 3350 17 G/17G
17 POWDER, FOR SOLUTION ORAL DAILY PRN
Status: DISCONTINUED | OUTPATIENT
Start: 2024-09-24 | End: 2024-10-01 | Stop reason: HOSPADM

## 2024-09-24 RX ORDER — ATORVASTATIN CALCIUM 10 MG/1
10 TABLET, FILM COATED ORAL DAILY
Status: DISCONTINUED | OUTPATIENT
Start: 2024-09-25 | End: 2024-10-01 | Stop reason: HOSPADM

## 2024-09-24 RX ORDER — MULTIVITAMIN WITH IRON
1 TABLET ORAL DAILY
Status: DISCONTINUED | OUTPATIENT
Start: 2024-09-25 | End: 2024-10-01 | Stop reason: HOSPADM

## 2024-09-24 RX ORDER — ACETAMINOPHEN 325 MG/1
650 TABLET ORAL EVERY 6 HOURS PRN
Status: DISCONTINUED | OUTPATIENT
Start: 2024-09-24 | End: 2024-10-01 | Stop reason: HOSPADM

## 2024-09-24 RX ORDER — MAGNESIUM SULFATE IN WATER 40 MG/ML
2000 INJECTION, SOLUTION INTRAVENOUS PRN
Status: DISCONTINUED | OUTPATIENT
Start: 2024-09-24 | End: 2024-10-01 | Stop reason: HOSPADM

## 2024-09-24 RX ORDER — MORPHINE SULFATE 4 MG/ML
4 INJECTION, SOLUTION INTRAMUSCULAR; INTRAVENOUS EVERY 4 HOURS PRN
Status: DISCONTINUED | OUTPATIENT
Start: 2024-09-24 | End: 2024-09-26

## 2024-09-24 RX ORDER — ONDANSETRON 2 MG/ML
4 INJECTION INTRAMUSCULAR; INTRAVENOUS EVERY 6 HOURS PRN
Status: DISCONTINUED | OUTPATIENT
Start: 2024-09-24 | End: 2024-10-01 | Stop reason: HOSPADM

## 2024-09-24 RX ORDER — SENNOSIDES A AND B 8.6 MG/1
1 TABLET, FILM COATED ORAL 2 TIMES DAILY
Status: DISCONTINUED | OUTPATIENT
Start: 2024-09-24 | End: 2024-10-01 | Stop reason: HOSPADM

## 2024-09-24 RX ORDER — FLUTICASONE PROPIONATE 50 MCG
1 SPRAY, SUSPENSION (ML) NASAL DAILY
Status: DISCONTINUED | OUTPATIENT
Start: 2024-09-25 | End: 2024-10-01 | Stop reason: HOSPADM

## 2024-09-24 RX ORDER — METOPROLOL TARTRATE 25 MG/1
25 TABLET, FILM COATED ORAL 2 TIMES DAILY
Status: DISCONTINUED | OUTPATIENT
Start: 2024-09-24 | End: 2024-10-01 | Stop reason: HOSPADM

## 2024-09-24 RX ORDER — MORPHINE SULFATE 2 MG/ML
2 INJECTION, SOLUTION INTRAMUSCULAR; INTRAVENOUS EVERY 4 HOURS PRN
Status: DISCONTINUED | OUTPATIENT
Start: 2024-09-24 | End: 2024-09-24

## 2024-09-24 RX ORDER — POTASSIUM CHLORIDE 7.45 MG/ML
10 INJECTION INTRAVENOUS PRN
Status: DISCONTINUED | OUTPATIENT
Start: 2024-09-24 | End: 2024-10-01 | Stop reason: HOSPADM

## 2024-09-24 RX ORDER — 0.9 % SODIUM CHLORIDE 0.9 %
500 INTRAVENOUS SOLUTION INTRAVENOUS ONCE
Status: COMPLETED | OUTPATIENT
Start: 2024-09-24 | End: 2024-09-24

## 2024-09-24 RX ORDER — ENOXAPARIN SODIUM 100 MG/ML
40 INJECTION SUBCUTANEOUS NIGHTLY
Status: DISCONTINUED | OUTPATIENT
Start: 2024-09-24 | End: 2024-09-24

## 2024-09-24 RX ORDER — SENNOSIDES A AND B 8.6 MG/1
1 TABLET, FILM COATED ORAL 2 TIMES DAILY
Status: DISCONTINUED | OUTPATIENT
Start: 2024-09-24 | End: 2024-09-24

## 2024-09-24 RX ORDER — POLYETHYLENE GLYCOL 3350 17 G/17G
17 POWDER, FOR SOLUTION ORAL DAILY
Status: DISCONTINUED | OUTPATIENT
Start: 2024-09-24 | End: 2024-10-01 | Stop reason: HOSPADM

## 2024-09-24 RX ORDER — OXYCODONE HYDROCHLORIDE 5 MG/1
10 TABLET ORAL EVERY 4 HOURS PRN
Status: DISCONTINUED | OUTPATIENT
Start: 2024-09-24 | End: 2024-10-01 | Stop reason: HOSPADM

## 2024-09-24 RX ORDER — SODIUM CHLORIDE 9 MG/ML
INJECTION, SOLUTION INTRAVENOUS PRN
Status: DISCONTINUED | OUTPATIENT
Start: 2024-09-24 | End: 2024-10-01 | Stop reason: HOSPADM

## 2024-09-24 RX ADMIN — METOPROLOL TARTRATE 25 MG: 25 TABLET, FILM COATED ORAL at 21:07

## 2024-09-24 RX ADMIN — HYDROCODONE BITARTRATE AND ACETAMINOPHEN 1 TABLET: 5; 325 TABLET ORAL at 12:29

## 2024-09-24 RX ADMIN — FERROUS SULFATE TAB 325 MG (65 MG ELEMENTAL FE) 325 MG: 325 (65 FE) TAB at 21:07

## 2024-09-24 RX ADMIN — SODIUM CHLORIDE, PRESERVATIVE FREE 10 ML: 5 INJECTION INTRAVENOUS at 21:07

## 2024-09-24 RX ADMIN — MORPHINE SULFATE 4 MG: 4 INJECTION, SOLUTION INTRAMUSCULAR; INTRAVENOUS at 17:52

## 2024-09-24 RX ADMIN — SODIUM BICARBONATE 325 MG: 650 TABLET ORAL at 21:07

## 2024-09-24 RX ADMIN — MONTELUKAST 10 MG: 10 TABLET, FILM COATED ORAL at 21:07

## 2024-09-24 RX ADMIN — SODIUM CHLORIDE 500 ML: 9 INJECTION, SOLUTION INTRAVENOUS at 12:28

## 2024-09-24 RX ADMIN — OXYCODONE 5 MG: 5 TABLET ORAL at 15:48

## 2024-09-24 RX ADMIN — SENNOSIDES 8.6 MG: 8.6 TABLET, FILM COATED ORAL at 21:07

## 2024-09-24 RX ADMIN — MORPHINE SULFATE 4 MG: 4 INJECTION, SOLUTION INTRAMUSCULAR; INTRAVENOUS at 23:24

## 2024-09-24 RX ADMIN — ACETAMINOPHEN 500 MG TABLET 1000 MG: at 18:59

## 2024-09-24 ASSESSMENT — PAIN SCALES - GENERAL
PAINLEVEL_OUTOF10: 4
PAINLEVEL_OUTOF10: 5
PAINLEVEL_OUTOF10: 8
PAINLEVEL_OUTOF10: 4
PAINLEVEL_OUTOF10: 9
PAINLEVEL_OUTOF10: 10
PAINLEVEL_OUTOF10: 5
PAINLEVEL_OUTOF10: 10
PAINLEVEL_OUTOF10: 10

## 2024-09-24 ASSESSMENT — PAIN DESCRIPTION - DESCRIPTORS: DESCRIPTORS: ACHING;DISCOMFORT

## 2024-09-24 ASSESSMENT — PAIN - FUNCTIONAL ASSESSMENT
PAIN_FUNCTIONAL_ASSESSMENT: 0-10
PAIN_FUNCTIONAL_ASSESSMENT: 0-10

## 2024-09-24 ASSESSMENT — PAIN DESCRIPTION - ORIENTATION
ORIENTATION: RIGHT
ORIENTATION: RIGHT

## 2024-09-24 ASSESSMENT — PAIN DESCRIPTION - LOCATION
LOCATION: LEG
LOCATION: LEG

## 2024-09-24 ASSESSMENT — PAIN DESCRIPTION - PAIN TYPE: TYPE: CHRONIC PAIN

## 2024-09-24 NOTE — ED NOTES
Pt family on call light. Reports pt needs more pain medication. Pt reports when she went to CT scan the bed went over a bump causing her pain in her right leg. Reports pain now 9/10.Dr. Torres made aware

## 2024-09-24 NOTE — ED NOTES
Admitting informed of pt restlessness in bed. Pt crying at times. Reports she wants someone to \"fix\" her leg.

## 2024-09-24 NOTE — ED NOTES
Pt had episode of incontinence stool in depend appears to have been there from previous episode.  Green discharge noted on right leg from leg wounds. Dr. Torres made aware. Placed on external cath. Will monitor

## 2024-09-24 NOTE — ED NOTES
Pt resting in bed son. Son at bedside. Reports pain has gone down some. Side rails up x2. Will monitor

## 2024-09-24 NOTE — ED NOTES
ED to inpatient nurses report      Chief Complaint:  Chief Complaint   Patient presents with    Fall    Fatigue     Present to ED from: home w/son and daughter in law     MOA:     LOC: alert and orientated to name and place  Mobility: Requires assistance * 2  Oxygen Baseline: 98%    Current needs required: RA     Code Status:   Prior    What abnormal results were found and what did you give/do to treat them? Fall, AMS. Infection of right lower extremity   Any procedures or intervention occur? CT head    Mental Status:  Level of Consciousness: Alert (0)    Psych Assessment:        Vitals:  Patient Vitals for the past 24 hrs:   BP Temp Pulse Resp SpO2   09/24/24 1551 126/73 -- 95 16 98 %   09/24/24 1356 124/67 -- 93 18 100 %   09/24/24 1225 109/70 -- 80 16 96 %   09/24/24 1111 92/77 98.1 °F (36.7 °C) 95 16 100 %        LDAs:   Peripheral IV 09/24/24 Left;Proximal Forearm (Active)   Site Assessment Clean, dry & intact 09/24/24 1134   Phlebitis Assessment No symptoms 09/24/24 1134   Infiltration Assessment 0 09/24/24 1134       Ambulatory Status:  Presents to emergency department  because of falls (Syncope, seizure, or loss of consciousness): No, Age > 70: Yes, Altered Mental Status, Intoxication with alcohol or substance confusion (Disorientation, impaired judgment, poor safety awaremess, or inability to follow instructions): No, Impaired Mobility: Ambulates or transfers with assistive devices or assistance; Unable to ambulate or transer.: Yes, Nursing Judgement: Yes    Diagnosis:  DISPOSITION Admitted 09/24/2024 03:55:37 PM   Final diagnoses:   Fall, initial encounter   Infected blister of right lower extremity, initial encounter   Adjustment reaction to medical therapy   Altered mental status, unspecified altered mental status type        Consults:  IP CONSULT TO PALLIATIVE CARE     Pain Score:  Pain Assessment  Pain Assessment: 0-10  Pain Level: 9  Pain Location: Leg  Pain Orientation: Right  Pain Type: Chronic

## 2024-09-24 NOTE — ED PROVIDER NOTES
EMERGENCY DEPARTMENT ENCOUNTER      CHIEF COMPLAINT    Chief Complaint   Patient presents with    Fall    Fatigue       HPI    Jasmina Powers is a 89 y.o. female living at home with a hx of DM2, A-fib, HTN, with recent admission for right lower leg infection (managed by infectious disease), who presents with generalized weakness, dizziness and need for a lift assist after she got weak while going to the bathroom overnight, and lying on the floor until the morning. Pt typically takes tramadol and other opiates for her neuropathic pain. She denies any fever, chest pain, dyspnea on exertion since the onset last few days. Her son Jhonny, who lives with her, served as the independent historian.     Pt was admitted initially in August for her leg wound, and sent to [place] for several days, but then sent home \"prematurely\". Next she came back this past week for another hospitalization, but [employer] felt she didn't qualify for rehab at a SNF.    Today, the generalized weakness was not associated with headache, blurry vision or neck stiffness. No aggravating or alleviating factors. Pt also is on warfarin for her A-fib.    REVIEW OF SYSTEMS    Cardiac: No chest pain or palpitations  Respiratory: No shortness of breath or new cough  General: No weight loss or night sweats   : No dysuria or hematuria  See HPI for further details.  All other systems reviewed and are negative.    PAST MEDICAL OR SURGICAL HISTORY    Past Medical History:   Diagnosis Date    Atrial fibrillation (HCC)     Diabetes mellitus (HCC)     Hypertension      History reviewed. No pertinent surgical history.    CURRENT MEDICATIONS    Current Outpatient Rx   Medication Sig Dispense Refill    povidone-iodine (BETADINE) 10 % external solution Apply topically as needed. 473 mL 0    acetaminophen (TYLENOL) 500 MG tablet Take 2 tablets by mouth every 8 hours 120 tablet 3    oxyCODONE (OXY-IR) 10 MG immediate release tablet Take 1 tablet by mouth every 4

## 2024-09-24 NOTE — ED TRIAGE NOTES
Presents to ER after assisting self down on the floor by EMS. Pt reports she lives with son and daughter in law. Incident unwitnessed. Reports she was ambulating without her walker or her can to the restroom when she started to feel that she was going to fall. Reports no dizziness, cp, or sob. Denies any trouble with urinating. Denies any NVD. Reports pain in right leg. Reports she has had and infection to her right leg. Pt diabetic. Reports pain 10/10 in right leg. Pt grimacing in pain. Pt confused at times. Pt only alert to self, and that the day is Tuesday. Pt a poor historian to medical hx. Will monitor

## 2024-09-24 NOTE — CARE COORDINATION
Spoke with Dr. Torres and Dr. Nieves about POC for patient. Went to speak to patient and son about future living arrangements for patient if she continues to fall. Son advised he can not speak in that until patient is observed here for a couple of days for weakness. Initially son advised patients weakness began back in August related to wound. Further conversation son advised patient had been ambulating to bathroom with walker by self until yesterday. Patient lives with son and his wife who were unable to pick patient up off of floor. When inquired about PT/OT with home health son advised they came out once last week and he turned them away because patient was in too much pain to try therapy. Inquired if had home health since discharge from [facility] in August or since discharged from [facility] within the last week. Son advised patient was not just here in hospital and charting in computer must have been completed on wrong chart. Patient did not engage in conversation at all and son could not provide time line of events for the last 2 months. When son began changing answers he began to disengage in conversation and started watching television.

## 2024-09-24 NOTE — H&P
History & Physical    Patient:  Jasmina Powers  YOB: 1935  Date of Service: 9/24/2024  MRN: [id number]   Acct:  [id number]   Primary Care Physician: Dimas Watts APRN - CNP    Chief Complaint: leg pain and weakness    History of Present Illness:   History obtained from the patient and son    The patient is a 89 y.o. female who presents with complaints of worsening right lower extremity pain and weakness.  Patient has a history of A-fib, diabetes and hypertension was just discharged from the hospital on Friday to [facility] for nonhealing wound of her right lower leg with skin necrosis.  She was seen by infectious disease and palliative care and completed a course of IV antibiotics.  She was discharged to [facility] for wound care including daily Betadine dressing changes.  Son is at bedside stated that insurance only allowed her there for 2 days and she was discharged home.  He states that patient was doing well and able to ambulate intermittently with a walker however became extremely weak today and could not ambulate any further.  She also reports excruciating pain and that her leg is shaking.  Her son states that her pain is well-controlled when she is on her oxycodone every 4 hours however during her transfer and admission process to the ED she has missed her pain medication doses and now is in excruciating pain.  Son at bedside states he has been doing her dressing changes at home and was encouraged with her healing process.  Palliative care did see patient in ED and placed her on a pain regimen.      Past Medical History:        Diagnosis Date    Atrial fibrillation (HCC)     Diabetes mellitus (HCC)     Hypertension        Past Surgical History:  History reviewed. No pertinent surgical history.    Home Medications:   No current facility-administered medications on file prior to encounter.     Current Outpatient Medications on File Prior to Encounter   Medication Sig Dispense Refill    povidone-iodine

## 2024-09-25 LAB
ANION GAP SERPL CALC-SCNC: 17 MEQ/L (ref 8–16)
BASOPHILS ABSOLUTE: 0 THOU/MM3 (ref 0–0.1)
BASOPHILS NFR BLD AUTO: 0.2 %
BUN SERPL-MCNC: 15 MG/DL (ref 7–22)
CALCIUM SERPL-MCNC: 10.2 MG/DL (ref 8.5–10.5)
CHLORIDE SERPL-SCNC: 93 MEQ/L (ref 98–111)
CO2 SERPL-SCNC: 23 MEQ/L (ref 23–33)
CREAT SERPL-MCNC: 0.8 MG/DL (ref 0.4–1.2)
DEPRECATED RDW RBC AUTO: 39.2 FL (ref 35–45)
EOSINOPHIL NFR BLD AUTO: 2.3 %
EOSINOPHILS ABSOLUTE: 0.2 THOU/MM3 (ref 0–0.4)
ERYTHROCYTE [DISTWIDTH] IN BLOOD BY AUTOMATED COUNT: 15.3 % (ref 11.5–14.5)
GFR SERPL CREATININE-BSD FRML MDRD: 71 ML/MIN/1.73M2
GLUCOSE BLD STRIP.AUTO-MCNC: 91 MG/DL (ref 70–108)
GLUCOSE SERPL-MCNC: 101 MG/DL (ref 70–108)
HCT VFR BLD AUTO: 31.6 % (ref 37–47)
HGB BLD-MCNC: 9.8 GM/DL (ref 12–16)
IMM GRANULOCYTES # BLD AUTO: 0.03 THOU/MM3 (ref 0–0.07)
IMM GRANULOCYTES NFR BLD AUTO: 0.4 %
INR PPP: 2.89 (ref 0.85–1.13)
LYMPHOCYTES ABSOLUTE: 1.7 THOU/MM3 (ref 1–4.8)
LYMPHOCYTES NFR BLD AUTO: 19.9 %
MCH RBC QN AUTO: 22.7 PG (ref 26–33)
MCHC RBC AUTO-ENTMCNC: 31 GM/DL (ref 32.2–35.5)
MCV RBC AUTO: 73.1 FL (ref 81–99)
MONOCYTES ABSOLUTE: 0.7 THOU/MM3 (ref 0.4–1.3)
MONOCYTES NFR BLD AUTO: 7.9 %
NEUTROPHILS ABSOLUTE: 5.8 THOU/MM3 (ref 1.8–7.7)
NEUTROPHILS NFR BLD AUTO: 69.3 %
NRBC BLD AUTO-RTO: 0 /100 WBC
PLATELET # BLD AUTO: 448 THOU/MM3 (ref 130–400)
PMV BLD AUTO: 8.2 FL (ref 9.4–12.4)
POTASSIUM SERPL-SCNC: 4.3 MEQ/L (ref 3.5–5.2)
RBC # BLD AUTO: 4.32 MILL/MM3 (ref 4.2–5.4)
SODIUM SERPL-SCNC: 133 MEQ/L (ref 135–145)
WBC # BLD AUTO: 8.3 THOU/MM3 (ref 4.8–10.8)

## 2024-09-25 PROCEDURE — 97166 OT EVAL MOD COMPLEX 45 MIN: CPT

## 2024-09-25 PROCEDURE — 99232 SBSQ HOSP IP/OBS MODERATE 35: CPT | Performed by: NURSE PRACTITIONER

## 2024-09-25 PROCEDURE — 85610 PROTHROMBIN TIME: CPT

## 2024-09-25 PROCEDURE — 6370000000 HC RX 637 (ALT 250 FOR IP): Performed by: HOSPITALIST

## 2024-09-25 PROCEDURE — 2580000003 HC RX 258: Performed by: HOSPITALIST

## 2024-09-25 PROCEDURE — 97530 THERAPEUTIC ACTIVITIES: CPT

## 2024-09-25 PROCEDURE — 6370000000 HC RX 637 (ALT 250 FOR IP): Performed by: NURSE PRACTITIONER

## 2024-09-25 PROCEDURE — 1200000000 HC SEMI PRIVATE

## 2024-09-25 PROCEDURE — 97116 GAIT TRAINING THERAPY: CPT

## 2024-09-25 PROCEDURE — 85025 COMPLETE CBC W/AUTO DIFF WBC: CPT

## 2024-09-25 PROCEDURE — 36415 COLL VENOUS BLD VENIPUNCTURE: CPT

## 2024-09-25 PROCEDURE — 97162 PT EVAL MOD COMPLEX 30 MIN: CPT

## 2024-09-25 PROCEDURE — 82948 REAGENT STRIP/BLOOD GLUCOSE: CPT

## 2024-09-25 PROCEDURE — 80048 BASIC METABOLIC PNL TOTAL CA: CPT

## 2024-09-25 PROCEDURE — 6360000002 HC RX W HCPCS: Performed by: STUDENT IN AN ORGANIZED HEALTH CARE EDUCATION/TRAINING PROGRAM

## 2024-09-25 PROCEDURE — 6370000000 HC RX 637 (ALT 250 FOR IP): Performed by: STUDENT IN AN ORGANIZED HEALTH CARE EDUCATION/TRAINING PROGRAM

## 2024-09-25 RX ORDER — HYDROXYZINE PAMOATE 25 MG/1
25 CAPSULE ORAL 3 TIMES DAILY PRN
Status: DISCONTINUED | OUTPATIENT
Start: 2024-09-25 | End: 2024-10-01 | Stop reason: HOSPADM

## 2024-09-25 RX ORDER — ALBUTEROL SULFATE 90 UG/1
2 INHALANT RESPIRATORY (INHALATION) EVERY 6 HOURS PRN
Status: DISCONTINUED | OUTPATIENT
Start: 2024-09-25 | End: 2024-10-01 | Stop reason: HOSPADM

## 2024-09-25 RX ORDER — WARFARIN SODIUM 5 MG/1
5 TABLET ORAL
Status: COMPLETED | OUTPATIENT
Start: 2024-09-25 | End: 2024-09-25

## 2024-09-25 RX ADMIN — ACETAMINOPHEN 500 MG TABLET 1000 MG: at 08:41

## 2024-09-25 RX ADMIN — MORPHINE SULFATE 4 MG: 4 INJECTION, SOLUTION INTRAMUSCULAR; INTRAVENOUS at 12:25

## 2024-09-25 RX ADMIN — BUMETANIDE 0.5 MG: 0.5 TABLET ORAL at 08:45

## 2024-09-25 RX ADMIN — ATORVASTATIN CALCIUM 10 MG: 10 TABLET, FILM COATED ORAL at 08:43

## 2024-09-25 RX ADMIN — SENNOSIDES 8.6 MG: 8.6 TABLET, FILM COATED ORAL at 22:33

## 2024-09-25 RX ADMIN — Medication 1 TABLET: at 08:41

## 2024-09-25 RX ADMIN — FERROUS SULFATE TAB 325 MG (65 MG ELEMENTAL FE) 325 MG: 325 (65 FE) TAB at 08:43

## 2024-09-25 RX ADMIN — MONTELUKAST 10 MG: 10 TABLET, FILM COATED ORAL at 22:33

## 2024-09-25 RX ADMIN — MORPHINE SULFATE 4 MG: 4 INJECTION, SOLUTION INTRAMUSCULAR; INTRAVENOUS at 03:40

## 2024-09-25 RX ADMIN — FERROUS SULFATE TAB 325 MG (65 MG ELEMENTAL FE) 325 MG: 325 (65 FE) TAB at 22:33

## 2024-09-25 RX ADMIN — HYDROXYZINE PAMOATE 25 MG: 25 CAPSULE ORAL at 08:42

## 2024-09-25 RX ADMIN — SENNOSIDES 8.6 MG: 8.6 TABLET, FILM COATED ORAL at 08:42

## 2024-09-25 RX ADMIN — SODIUM BICARBONATE 325 MG: 650 TABLET ORAL at 22:33

## 2024-09-25 RX ADMIN — SODIUM BICARBONATE 325 MG: 650 TABLET ORAL at 08:43

## 2024-09-25 RX ADMIN — SODIUM CHLORIDE, PRESERVATIVE FREE 10 ML: 5 INJECTION INTRAVENOUS at 08:45

## 2024-09-25 RX ADMIN — WARFARIN SODIUM 5 MG: 5 TABLET ORAL at 18:26

## 2024-09-25 RX ADMIN — AMLODIPINE BESYLATE 5 MG: 5 TABLET ORAL at 08:45

## 2024-09-25 RX ADMIN — METOPROLOL TARTRATE 25 MG: 25 TABLET, FILM COATED ORAL at 08:43

## 2024-09-25 RX ADMIN — METOPROLOL TARTRATE 25 MG: 25 TABLET, FILM COATED ORAL at 22:34

## 2024-09-25 RX ADMIN — HYDROXYZINE PAMOATE 25 MG: 25 CAPSULE ORAL at 15:15

## 2024-09-25 RX ADMIN — POLYETHYLENE GLYCOL 3350 17 G: 17 POWDER, FOR SOLUTION ORAL at 08:42

## 2024-09-25 RX ADMIN — ACETAMINOPHEN 500 MG TABLET 1000 MG: at 22:33

## 2024-09-25 ASSESSMENT — PAIN DESCRIPTION - ORIENTATION
ORIENTATION: RIGHT

## 2024-09-25 ASSESSMENT — PAIN DESCRIPTION - LOCATION
LOCATION: LEG

## 2024-09-25 ASSESSMENT — PAIN SCALES - GENERAL
PAINLEVEL_OUTOF10: 10
PAINLEVEL_OUTOF10: 3
PAINLEVEL_OUTOF10: 10
PAINLEVEL_OUTOF10: 5
PAINLEVEL_OUTOF10: 4

## 2024-09-25 ASSESSMENT — PAIN DESCRIPTION - DESCRIPTORS
DESCRIPTORS: ACHING
DESCRIPTORS: BURNING

## 2024-09-25 NOTE — CARE COORDINATION
09/25/24 1042   Readmission Assessment   Number of Days since last admission? 1-7 days   Previous Disposition Home with Home Health   Who is being Interviewed Caregiver   What was the patient's/caregiver's perception as to why they think they needed to return back to the hospital? Other (Comment)  (Patient fell twice at home. With the second fall Jhonny and his wife were not able to get her off the ground.)   Did you visit your Primary Care Physician after you left the hospital, before you returned this time? No   Why weren't you able to visit your PCP? Other (Comment)  (Her follow-up apoointment was scheduled for 9/26/24.)   Did you see a specialist, such as Cardiac, Pulmonary, Orthopedic Physician, etc. after you left the hospital? No   Who advised the patient to return to the hospital? Caregiver   Does the patient report anything that got in the way of taking their medications? No   In our efforts to provide the best possible care to you and others like you, can you think of anything that we could have done to help you after you left the hospital the first time, so that you might not have needed to return so soon? Other (Comment)  (Jhonny felt Jasmina was ready for discharge to home. Jasmina is weak and not able to ambulate well. SNF encouraged at discharged.)

## 2024-09-25 NOTE — CARE COORDINATION
09/25/24 1025   Service Assessment   Patient Orientation Unable to Assess   Cognition Other (see comment)  (unable to assess)   History Provided By Child/Family;Medical Record   Primary Caregiver Family   Accompanied By/Relationship Son Jhonny   Support Systems Children;Family Members   Patient's Healthcare Decision Maker is: Patient Declined (Legal Next of Kin Remains as Decision Maker)   PCP Verified by CM Yes   Last Visit to PCP Within last 3 months   Prior Functional Level Assistance with the following:;Bathing;Dressing;Toileting;Cooking;Housework;Shopping;Mobility   Current Functional Level Assistance with the following:;Bathing;Dressing;Toileting;Cooking;Housework;Shopping;Mobility   Can patient return to prior living arrangement Unknown at present   Ability to make needs known: Fair   Family able to assist with home care needs: Yes   Would you like for me to discuss the discharge plan with any other family members/significant others, and if so, who? No   Financial Resources Medicare   Community Resources ECF/Home Care  ([facility])   CM/SW Referral ADLs/IADLs   Social/Functional History   Active  No   Patient's  Info Children   Occupation Retired   Discharge Planning   Type of Residence House   Living Arrangements Children   Current Services Prior To Admission Durable Medical Equipment;Home Care   Current DME Prior to Arrival Glucometer;Cane;Walker;Wheelchair;Other (Comment)  (stair lift chair)   Potential Assistance Needed Skilled Nursing Facility   DME Ordered? No   Potential Assistance Purchasing Medications No   Type of Home Care Services Nursing Services;OT;PT;Aide Services   Patient expects to be discharged to: House   History of falls? 1   Services At/After Discharge   Transition of Care Consult (CM Consult) SNF   Internal Home Health No   Reason Outside Agency Chosen Patient already serviced by other home care/hospice agency   Services At/After Discharge Skilled Nursing

## 2024-09-25 NOTE — RT PROTOCOL NOTE
RT Inhaler-Nebulizer Bronchodilator Protocol Note    There is a bronchodilator order in the chart from a provider indicating to follow the RT Bronchodilator Protocol and there is an “Initiate RT Inhaler-Nebulizer Bronchodilator Protocol” order as well (see protocol at bottom of note).    CXR Findings:  XR CHEST PORTABLE    Result Date: 9/24/2024  1. No acute cardiopulmonary finding.. **This report has been created using voice recognition software.  It may contain minor errors which are inherent in voice recognition technology.** Electronically signed by Dr. Marci Ricardo      The findings from the last RT Protocol Assessment were as follows:   History Pulmonary Disease: None or smoker <15 pack years  Respiratory Pattern: Regular pattern and RR 12-20 bpm  Breath Sounds: Clear breath sounds  Cough: Strong, spontaneous, non-productive  Indication for Bronchodilator Therapy: None  Bronchodilator Assessment Score: 0    Aerosolized bronchodilator medication orders have been revised according to the RT Inhaler-Nebulizer Bronchodilator Protocol below.    Respiratory Therapist to perform RT Therapy Protocol Assessment initially then follow the protocol.  Repeat RT Therapy Protocol Assessment PRN for score 0-3 or on second treatment, BID, and PRN for scores above 3.    No Indications - adjust the frequency to every 6 hours PRN wheezing or bronchospasm, if no treatments needed after 48 hours then discontinue using Per Protocol order mode.     If indication present, adjust the RT bronchodilator orders based on the Bronchodilator Assessment Score as indicated below.  Use Inhaler orders unless patient has one or more of the following: on home nebulizer, not able to hold breath for 10 seconds, is not alert and oriented, cannot activate and use MDI correctly, or respiratory rate 25 breaths per minute or more, then use the equivalent nebulizer order(s) with same Frequency and PRN reasons based on the score.  If a patient is on

## 2024-09-25 NOTE — PLAN OF CARE
Problem: Discharge Planning  Goal: Discharge to home or other facility with appropriate resources  Outcome: Progressing   SW consult received. See SW note 9/25/24.

## 2024-09-25 NOTE — PLAN OF CARE
Problem: Discharge Planning  Goal: Discharge to home or other facility with appropriate resources  Outcome: Progressing   SW consult received. See Sw note 9/25/24

## 2024-09-25 NOTE — CARE COORDINATION
DISCHARGE PLANNING EVALUATION  9/25/24, 10:34 AM EDT    Reason for Referral: SNF placement  Decision Maker: self with support from son, pt has confusion at times  Current Services: [facility]  New Services Requested: SNF recommended  Family/ Social/ Home environment: Jasmina lives at home with her son and daughter in law  Payment Source:[employer] Medicare  Transportation at Discharge: pending mobility, family vs ambulette  Post-acute (PAC) provider list was provided to patient. Patient was informed of their freedom to choose PAC provider. Discussed and offered to show the patient the relevant PAC Providers quality and resource use measures on Medicare Compare web site via computer based on patient's goals of care and treatment preferences. Questions regarding selection process were answered.      Teach Back Method used with pt and son Jhonny at bedside regarding care plan and needs  Patient and son verbalized understanding of the plan of care and contribute to goal setting.       Patient preferences and discharge plan: SW discussed with CM who had spoke with son, pt recently at [facility], son considering [facility].     Pt/son agreeable to SNF. SW did discussed process of getting preferences for facility, referral process and then insurance precert. Son wanting to wait until Dr. Lopez sees today and then will make decision on facility. SW discussed coming back after noon today for preferences.     SW did call [facility], spoke with Felicitas, reports they did have resumption on care for pt 9/21, reports nursing did see pt. They had PT/OT orders to see pt as well, they had aide services in the past to see pt, reports this was discontinued 9/15. Reports nurse was to see pt once/week. Jamaal did updated on anticipating plans for SNF at discharge, however would keep them updated.     Electronically signed by LISA Sams on 9/25/2024 at 10:34 AM       12:54 PM EDT  SW met with pt's son Jhonny in the

## 2024-09-25 NOTE — CONSULTS
CONSULTATION NOTE :ID       Patient - Jasmina Powers,  Age - 89 y.o.    - 1935      Room Number - 5K-07/007-A   N -  [id number]   [facility] # - [id number]  Date of Admission -  2024 11:03 AM  Patient's PCP: Dimas Watts APRN - CNP     Requesting Physician: Martha Phan APRN - CNP    REASON FOR CONSULTATION   Leg wound  CHIEF COMPLAINT   fall    HISTORY OF PRESENT ILLNESS       This is a very pleasant 89 y.o. female who was admitted to the hospital with a chief complaints of fall.  According to her son, she fell at home twice.  She was recently discharged from the hospital after she was admitted due to excruciating pain.  Patient had been requesting more narcotics to control her pain.  Her last hospitalization she was evaluated by palliative care and adjustment was made to her medication including addition of gabapentin.  Her son thinks may all be related to the gabapentin.  It has been on hold has not been eating very well and she has been getting very weak son feels he is not able to take care of her at home. He wants her to [facility] to [facility].  no any report of fever or chills she has a nonhealing right leg wound that started after she had Pseudomonas infection and bruising of the skin from her anticoagulation.  There is no any drainage from the wound she has history of diabetes and hypertension. The wound has formed an escar which is stable    PAST MEDICAL  HISTORY       Past Medical History:   Diagnosis Date    Atrial fibrillation (HCC)     Diabetes mellitus (HCC)     Hypertension        PAST SURGICAL HISTORY     History reviewed. No pertinent surgical history.      MEDICATIONS:       Scheduled Meds:   warfarin  5 mg Oral Once    polyethylene glycol  17 g Oral Daily    acetaminophen  1,000 mg Oral TID    senna  1 tablet Oral BID    sodium chloride flush  5-40 mL IntraVENous 2 times per day    amLODIPine  5 mg Oral Daily    atorvastatin  10 mg Oral Daily    bumetanide  0.5 mg 
nausea and vomiting. Family reports nausea and vomiting.  Constipation/Diarrhea: Patient denies constipation or diarrhea.  Depression: Patient denies depression or issues with mood.  Anxiety: Patient denies anxiety.    Serious Illness Conversation:  Please refer to the previous outpatient or inpatient palliative care notes and/or Palliative Care RN note for full goals of care discussion. Briefly,  We will continue with the current plan of care at this time.    Advanced Directives:  Patient does have capacity to make medical decisions.  Patient does not have a Health Care Power of . A majority of her living adult children would be her surrogate decision makers  Patient does not have a living will.  Patient does not have a POLST form completed. POLST form completion deferred today.    Past Medical History:    Past Medical History:   Diagnosis Date    Atrial fibrillation (HCC)     Diabetes mellitus (HCC)     Hypertension        Past Surgical History:    History reviewed. No pertinent surgical history.    Scheduled Medications:      Infusions:      PRN Medications:      Allergies:  Patient has no known allergies.    Social History:    reports that she quit smoking about 46 years ago. Her smoking use included cigarettes. She has never used smokeless tobacco. She reports that she does not currently use alcohol after a past usage of about 1.0 standard drink of alcohol per week. She reports that she does not use drugs.    Family History:   family history includes Cancer in her mother; Diabetes in her mother.    Diet:  No diet orders on file    Nutrition:-  Oral intake    Review of Systems/Symptom Assessment:- Pertinent positives as noted in the HPI. All other systems reviewed and negative.    Objective   Vitals:-    /73   Pulse 95   Temp 98.1 °F (36.7 °C)   Resp 16   SpO2 98%     Pt. Weight:  Wt Readings from Last 3 Encounters:   09/10/24 73.5 kg (162 lb)   08/08/24 75.8 kg (167 lb)   08/07/24 75.8 kg (167

## 2024-09-26 LAB
ANION GAP SERPL CALC-SCNC: 14 MEQ/L (ref 8–16)
AUTO DIFF PNL BLD: ABNORMAL
BASOPHILS ABSOLUTE: 0 THOU/MM3 (ref 0–0.1)
BASOPHILS NFR BLD AUTO: 0.3 %
BUN SERPL-MCNC: 11 MG/DL (ref 7–22)
CALCIUM SERPL-MCNC: 9.9 MG/DL (ref 8.5–10.5)
CHLORIDE SERPL-SCNC: 95 MEQ/L (ref 98–111)
CO2 SERPL-SCNC: 26 MEQ/L (ref 23–33)
CREAT SERPL-MCNC: 0.7 MG/DL (ref 0.4–1.2)
DEPRECATED RDW RBC AUTO: 36.6 FL (ref 35–45)
EOSINOPHIL NFR BLD AUTO: 4.5 %
EOSINOPHILS ABSOLUTE: 0.3 THOU/MM3 (ref 0–0.4)
ERYTHROCYTE [DISTWIDTH] IN BLOOD BY AUTOMATED COUNT: 15 % (ref 11.5–14.5)
GFR SERPL CREATININE-BSD FRML MDRD: 83 ML/MIN/1.73M2
GLUCOSE SERPL-MCNC: 102 MG/DL (ref 70–108)
HCT VFR BLD AUTO: 29.9 % (ref 37–47)
HGB BLD-MCNC: 9.8 GM/DL (ref 12–16)
IMM GRANULOCYTES # BLD AUTO: 0.03 THOU/MM3 (ref 0–0.07)
IMM GRANULOCYTES NFR BLD AUTO: 0.4 %
INR PPP: 2.32 (ref 0.85–1.13)
LYMPHOCYTES ABSOLUTE: 1.9 THOU/MM3 (ref 1–4.8)
LYMPHOCYTES NFR BLD AUTO: 25.3 %
MCH RBC QN AUTO: 22.7 PG (ref 26–33)
MCHC RBC AUTO-ENTMCNC: 32.8 GM/DL (ref 32.2–35.5)
MCV RBC AUTO: 69.2 FL (ref 81–99)
MICROCYTES BLD QL SMEAR: PRESENT
MONOCYTES ABSOLUTE: 0.8 THOU/MM3 (ref 0.4–1.3)
MONOCYTES NFR BLD AUTO: 10.7 %
NEUTROPHILS ABSOLUTE: 4.4 THOU/MM3 (ref 1.8–7.7)
NEUTROPHILS NFR BLD AUTO: 58.8 %
NRBC BLD AUTO-RTO: 0 /100 WBC
PATHOLOGIST REVIEW: ABNORMAL
PLATELET # BLD AUTO: 416 THOU/MM3 (ref 130–400)
PLATELET BLD QL SMEAR: ABNORMAL
PMV BLD AUTO: 8.2 FL (ref 9.4–12.4)
POTASSIUM SERPL-SCNC: 3.5 MEQ/L (ref 3.5–5.2)
RBC # BLD AUTO: 4.32 MILL/MM3 (ref 4.2–5.4)
SCAN OF BLOOD SMEAR: NORMAL
SODIUM SERPL-SCNC: 135 MEQ/L (ref 135–145)
WBC # BLD AUTO: 7.4 THOU/MM3 (ref 4.8–10.8)

## 2024-09-26 PROCEDURE — 80048 BASIC METABOLIC PNL TOTAL CA: CPT

## 2024-09-26 PROCEDURE — 6370000000 HC RX 637 (ALT 250 FOR IP): Performed by: STUDENT IN AN ORGANIZED HEALTH CARE EDUCATION/TRAINING PROGRAM

## 2024-09-26 PROCEDURE — 2580000003 HC RX 258: Performed by: HOSPITALIST

## 2024-09-26 PROCEDURE — 85025 COMPLETE CBC W/AUTO DIFF WBC: CPT

## 2024-09-26 PROCEDURE — 36415 COLL VENOUS BLD VENIPUNCTURE: CPT

## 2024-09-26 PROCEDURE — 6370000000 HC RX 637 (ALT 250 FOR IP): Performed by: HOSPITALIST

## 2024-09-26 PROCEDURE — 99232 SBSQ HOSP IP/OBS MODERATE 35: CPT | Performed by: NURSE PRACTITIONER

## 2024-09-26 PROCEDURE — 85610 PROTHROMBIN TIME: CPT

## 2024-09-26 PROCEDURE — 1200000000 HC SEMI PRIVATE

## 2024-09-26 PROCEDURE — 6360000002 HC RX W HCPCS: Performed by: STUDENT IN AN ORGANIZED HEALTH CARE EDUCATION/TRAINING PROGRAM

## 2024-09-26 PROCEDURE — 6370000000 HC RX 637 (ALT 250 FOR IP): Performed by: NURSE PRACTITIONER

## 2024-09-26 RX ORDER — WARFARIN SODIUM 2.5 MG/1
2.5 TABLET ORAL
Status: COMPLETED | OUTPATIENT
Start: 2024-09-26 | End: 2024-09-26

## 2024-09-26 RX ADMIN — OXYCODONE 10 MG: 5 TABLET ORAL at 14:13

## 2024-09-26 RX ADMIN — AMLODIPINE BESYLATE 5 MG: 5 TABLET ORAL at 10:32

## 2024-09-26 RX ADMIN — ACETAMINOPHEN 500 MG TABLET 1000 MG: at 17:12

## 2024-09-26 RX ADMIN — METOPROLOL TARTRATE 25 MG: 25 TABLET, FILM COATED ORAL at 10:32

## 2024-09-26 RX ADMIN — OXYCODONE 10 MG: 5 TABLET ORAL at 10:12

## 2024-09-26 RX ADMIN — MORPHINE SULFATE 4 MG: 4 INJECTION, SOLUTION INTRAMUSCULAR; INTRAVENOUS at 00:05

## 2024-09-26 RX ADMIN — Medication 1 TABLET: at 10:31

## 2024-09-26 RX ADMIN — ACETAMINOPHEN 500 MG TABLET 1000 MG: at 10:30

## 2024-09-26 RX ADMIN — FERROUS SULFATE TAB 325 MG (65 MG ELEMENTAL FE) 325 MG: 325 (65 FE) TAB at 10:32

## 2024-09-26 RX ADMIN — POLYETHYLENE GLYCOL 3350 17 G: 17 POWDER, FOR SOLUTION ORAL at 10:31

## 2024-09-26 RX ADMIN — ATORVASTATIN CALCIUM 10 MG: 10 TABLET, FILM COATED ORAL at 10:27

## 2024-09-26 RX ADMIN — SENNOSIDES 8.6 MG: 8.6 TABLET, FILM COATED ORAL at 10:28

## 2024-09-26 RX ADMIN — SODIUM BICARBONATE 325 MG: 650 TABLET ORAL at 10:28

## 2024-09-26 RX ADMIN — SODIUM CHLORIDE, PRESERVATIVE FREE 10 ML: 5 INJECTION INTRAVENOUS at 10:33

## 2024-09-26 RX ADMIN — WARFARIN SODIUM 2.5 MG: 2.5 TABLET ORAL at 17:12

## 2024-09-26 RX ADMIN — FLUTICASONE PROPIONATE 1 SPRAY: 50 SPRAY, METERED NASAL at 14:13

## 2024-09-26 RX ADMIN — HYDROXYZINE PAMOATE 25 MG: 25 CAPSULE ORAL at 05:09

## 2024-09-26 RX ADMIN — BUMETANIDE 0.5 MG: 0.5 TABLET ORAL at 10:31

## 2024-09-26 ASSESSMENT — PAIN SCALES - GENERAL
PAINLEVEL_OUTOF10: 7
PAINLEVEL_OUTOF10: 7
PAINLEVEL_OUTOF10: 0
PAINLEVEL_OUTOF10: 9
PAINLEVEL_OUTOF10: 10
PAINLEVEL_OUTOF10: 0
PAINLEVEL_OUTOF10: 9

## 2024-09-26 ASSESSMENT — PAIN SCALES - WONG BAKER
WONGBAKER_NUMERICALRESPONSE: NO HURT

## 2024-09-26 ASSESSMENT — PAIN DESCRIPTION - LOCATION
LOCATION: LEG

## 2024-09-26 ASSESSMENT — PAIN DESCRIPTION - ORIENTATION: ORIENTATION: RIGHT

## 2024-09-26 ASSESSMENT — PAIN DESCRIPTION - DESCRIPTORS: DESCRIPTORS: ACHING

## 2024-09-26 NOTE — PLAN OF CARE
Problem: Pain  Goal: Verbalizes/displays adequate comfort level or baseline comfort level  Outcome: Progressing     Problem: ABCDS Injury Assessment  Goal: Absence of physical injury  Outcome: Progressing     Problem: Safety - Adult  Goal: Free from fall injury  Outcome: Progressing     Problem: Skin/Tissue Integrity  Goal: Absence of new skin breakdown  Description: 1.  Monitor for areas of redness and/or skin breakdown  2.  Assess vascular access sites hourly  3.  Every 4-6 hours minimum:  Change oxygen saturation probe site  4.  Every 4-6 hours:  If on nasal continuous positive airway pressure, respiratory therapy assess nares and determine need for appliance change or resting period.  Outcome: Progressing     Problem: Chronic Conditions and Co-morbidities  Goal: Patient's chronic conditions and co-morbidity symptoms are monitored and maintained or improved  Outcome: Progressing     Problem: Discharge Planning  Goal: Discharge to home or other facility with appropriate resources  Outcome: Progressing

## 2024-09-26 NOTE — CARE COORDINATION
9/26/24, 10:06 AM EDT    DISCHARGE PLANNING EVALUATION       Spoke with patient's son and he requested facilities in [place] to call in her insurance network to see if they have a private room and a wound nurse.      [facility]- no private room and no wound nurse.  [facility]- has both  [facility]- has both  [facility]- has both  [facility]- has a wound RN and will most likely have a private room.     Son's first choice is [facility], referral made to [facility] at [facility] as requested. Patient's son stated that his second choice is [facility].

## 2024-09-26 NOTE — DISCHARGE INSTR - COC
Continuity of Care Form    Patient Name: Jasmina Powers   :  1935  MRN:  [id number]    Admit date:  2024  Discharge date:  10/1/2024    Code Status Order: Full Code   Advance Directives:   Advance Care Flowsheet Documentation             Admitting Physician:  No admitting provider for patient encounter.  PCP: Dimas Watts APRN - CNP    Discharging Nurse: Mark Can RN  Discharging Hospital Unit/Room#: 5K-07/007-A  Discharging Unit Phone Number: [phone]    Emergency Contact:   Extended Emergency Contact Information  Primary Emergency Contact: Jhonny Powers  Address: [address] [place] [address]  Home Phone: [phone]  Mobile Phone: [phone]  Relation: Child   needed? No  Secondary Emergency Contact: Enrique Mathews   [facility]  Home Phone: [phone]  Mobile Phone: [phone]  Relation: Child   needed? No    Past Surgical History:  History reviewed. No pertinent surgical history.    Immunization History:   Immunization History   Administered Date(s) Administered    COVID-19, PFIZER PURPLE top, DILUTE for use, (age 12 y+), 30mcg/0.3mL 2021, 2021, 2021    COVID-19, PFIZER, (age 12y+), IM, 30mcg/0.3mL 2024       Active Problems:  Patient Active Problem List   Diagnosis Code    Anticoagulated on Coumadin Z79.01    Diabetes mellitus (HCC) E11.9    Bilateral leg edema +1 R60.0    Encounter for therapeutic drug monitoring Z51.81    Primary hypertension I10    Abnormal EKG R94.31    Clicking tinnitus of both ears H93.13    Chronic renal disease, stage III (HCC) [589510] N18.30    Secondary hypercoagulable state (HCC) D68.69    Type 2 diabetes mellitus 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

## 2024-09-27 LAB
ANION GAP SERPL CALC-SCNC: 16 MEQ/L (ref 8–16)
BASOPHILS ABSOLUTE: 0 THOU/MM3 (ref 0–0.1)
BASOPHILS NFR BLD AUTO: 0.3 %
BUN SERPL-MCNC: 10 MG/DL (ref 7–22)
CALCIUM SERPL-MCNC: 9.5 MG/DL (ref 8.5–10.5)
CHLORIDE SERPL-SCNC: 96 MEQ/L (ref 98–111)
CO2 SERPL-SCNC: 25 MEQ/L (ref 23–33)
CREAT SERPL-MCNC: 0.8 MG/DL (ref 0.4–1.2)
DEPRECATED RDW RBC AUTO: 37.2 FL (ref 35–45)
EOSINOPHIL NFR BLD AUTO: 5.1 %
EOSINOPHILS ABSOLUTE: 0.4 THOU/MM3 (ref 0–0.4)
ERYTHROCYTE [DISTWIDTH] IN BLOOD BY AUTOMATED COUNT: 14.8 % (ref 11.5–14.5)
GFR SERPL CREATININE-BSD FRML MDRD: 71 ML/MIN/1.73M2
GLUCOSE SERPL-MCNC: 88 MG/DL (ref 70–108)
HCT VFR BLD AUTO: 30.1 % (ref 37–47)
HGB BLD-MCNC: 9.6 GM/DL (ref 12–16)
IMM GRANULOCYTES # BLD AUTO: 0.03 THOU/MM3 (ref 0–0.07)
IMM GRANULOCYTES NFR BLD AUTO: 0.4 %
INR PPP: 2.12 (ref 0.85–1.13)
LYMPHOCYTES ABSOLUTE: 1.5 THOU/MM3 (ref 1–4.8)
LYMPHOCYTES NFR BLD AUTO: 21.6 %
MCH RBC QN AUTO: 22.7 PG (ref 26–33)
MCHC RBC AUTO-ENTMCNC: 31.9 GM/DL (ref 32.2–35.5)
MCV RBC AUTO: 71.3 FL (ref 81–99)
MONOCYTES ABSOLUTE: 0.7 THOU/MM3 (ref 0.4–1.3)
MONOCYTES NFR BLD AUTO: 10.6 %
NEUTROPHILS ABSOLUTE: 4.3 THOU/MM3 (ref 1.8–7.7)
NEUTROPHILS NFR BLD AUTO: 62 %
NRBC BLD AUTO-RTO: 0 /100 WBC
PLATELET # BLD AUTO: 415 THOU/MM3 (ref 130–400)
PMV BLD AUTO: 8.3 FL (ref 9.4–12.4)
POTASSIUM SERPL-SCNC: 3.5 MEQ/L (ref 3.5–5.2)
RBC # BLD AUTO: 4.22 MILL/MM3 (ref 4.2–5.4)
SODIUM SERPL-SCNC: 137 MEQ/L (ref 135–145)
WBC # BLD AUTO: 6.9 THOU/MM3 (ref 4.8–10.8)

## 2024-09-27 PROCEDURE — 1200000000 HC SEMI PRIVATE

## 2024-09-27 PROCEDURE — 2580000003 HC RX 258: Performed by: HOSPITALIST

## 2024-09-27 PROCEDURE — 85610 PROTHROMBIN TIME: CPT

## 2024-09-27 PROCEDURE — 99233 SBSQ HOSP IP/OBS HIGH 50: CPT | Performed by: STUDENT IN AN ORGANIZED HEALTH CARE EDUCATION/TRAINING PROGRAM

## 2024-09-27 PROCEDURE — 85025 COMPLETE CBC W/AUTO DIFF WBC: CPT

## 2024-09-27 PROCEDURE — 36415 COLL VENOUS BLD VENIPUNCTURE: CPT

## 2024-09-27 PROCEDURE — 6370000000 HC RX 637 (ALT 250 FOR IP): Performed by: HOSPITALIST

## 2024-09-27 PROCEDURE — 6370000000 HC RX 637 (ALT 250 FOR IP): Performed by: STUDENT IN AN ORGANIZED HEALTH CARE EDUCATION/TRAINING PROGRAM

## 2024-09-27 PROCEDURE — 99232 SBSQ HOSP IP/OBS MODERATE 35: CPT | Performed by: PHYSICIAN ASSISTANT

## 2024-09-27 PROCEDURE — 80048 BASIC METABOLIC PNL TOTAL CA: CPT

## 2024-09-27 PROCEDURE — 6370000000 HC RX 637 (ALT 250 FOR IP): Performed by: NURSE PRACTITIONER

## 2024-09-27 RX ORDER — WARFARIN SODIUM 5 MG/1
5 TABLET ORAL ONCE
Status: COMPLETED | OUTPATIENT
Start: 2024-09-27 | End: 2024-09-27

## 2024-09-27 RX ADMIN — SENNOSIDES 8.6 MG: 8.6 TABLET, FILM COATED ORAL at 09:37

## 2024-09-27 RX ADMIN — SENNOSIDES 8.6 MG: 8.6 TABLET, FILM COATED ORAL at 19:55

## 2024-09-27 RX ADMIN — MONTELUKAST 10 MG: 10 TABLET, FILM COATED ORAL at 19:56

## 2024-09-27 RX ADMIN — AMLODIPINE BESYLATE 5 MG: 5 TABLET ORAL at 09:37

## 2024-09-27 RX ADMIN — FLUTICASONE PROPIONATE 1 SPRAY: 50 SPRAY, METERED NASAL at 09:37

## 2024-09-27 RX ADMIN — FERROUS SULFATE TAB 325 MG (65 MG ELEMENTAL FE) 325 MG: 325 (65 FE) TAB at 19:55

## 2024-09-27 RX ADMIN — OXYCODONE 10 MG: 5 TABLET ORAL at 12:20

## 2024-09-27 RX ADMIN — SODIUM CHLORIDE, PRESERVATIVE FREE 10 ML: 5 INJECTION INTRAVENOUS at 09:37

## 2024-09-27 RX ADMIN — BUMETANIDE 0.5 MG: 0.5 TABLET ORAL at 09:37

## 2024-09-27 RX ADMIN — ACETAMINOPHEN 500 MG TABLET 1000 MG: at 09:37

## 2024-09-27 RX ADMIN — METOPROLOL TARTRATE 25 MG: 25 TABLET, FILM COATED ORAL at 19:54

## 2024-09-27 RX ADMIN — ACETAMINOPHEN 500 MG TABLET 1000 MG: at 02:10

## 2024-09-27 RX ADMIN — ACETAMINOPHEN 500 MG TABLET 1000 MG: at 18:39

## 2024-09-27 RX ADMIN — SODIUM CHLORIDE, PRESERVATIVE FREE 10 ML: 5 INJECTION INTRAVENOUS at 19:56

## 2024-09-27 RX ADMIN — SODIUM BICARBONATE 325 MG: 650 TABLET ORAL at 09:37

## 2024-09-27 RX ADMIN — Medication 1 TABLET: at 09:37

## 2024-09-27 RX ADMIN — OXYCODONE 10 MG: 5 TABLET ORAL at 07:57

## 2024-09-27 RX ADMIN — SODIUM BICARBONATE 325 MG: 650 TABLET ORAL at 19:55

## 2024-09-27 RX ADMIN — METOPROLOL TARTRATE 25 MG: 25 TABLET, FILM COATED ORAL at 09:37

## 2024-09-27 RX ADMIN — HYDROXYZINE PAMOATE 25 MG: 25 CAPSULE ORAL at 07:57

## 2024-09-27 RX ADMIN — WARFARIN SODIUM 5 MG: 5 TABLET ORAL at 19:06

## 2024-09-27 RX ADMIN — ATORVASTATIN CALCIUM 10 MG: 10 TABLET, FILM COATED ORAL at 09:37

## 2024-09-27 RX ADMIN — FERROUS SULFATE TAB 325 MG (65 MG ELEMENTAL FE) 325 MG: 325 (65 FE) TAB at 09:37

## 2024-09-27 ASSESSMENT — PAIN SCALES - GENERAL
PAINLEVEL_OUTOF10: 0
PAINLEVEL_OUTOF10: 10
PAINLEVEL_OUTOF10: 10

## 2024-09-27 ASSESSMENT — PAIN SCALES - WONG BAKER: WONGBAKER_NUMERICALRESPONSE: NO HURT

## 2024-09-27 NOTE — PLAN OF CARE
Problem: Pain  Goal: Verbalizes/displays adequate comfort level or baseline comfort level  Outcome: Progressing  Flowsheets (Taken 9/26/2024 2310)  Verbalizes/displays adequate comfort level or baseline comfort level:   Encourage patient to monitor pain and request assistance   Assess pain using appropriate pain scale   Administer analgesics based on type and severity of pain and evaluate response   Implement non-pharmacological measures as appropriate and evaluate response   Notify Licensed Independent Practitioner if interventions unsuccessful or patient reports new pain     Problem: ABCDS Injury Assessment  Goal: Absence of physical injury  Outcome: Progressing  Flowsheets (Taken 9/26/2024 2310)  Absence of Physical Injury: Implement safety measures based on patient assessment     Problem: Safety - Adult  Goal: Free from fall injury  Outcome: Progressing  Flowsheets (Taken 9/26/2024 2310)  Free From Fall Injury: Instruct family/caregiver on patient safety     Problem: Skin/Tissue Integrity  Goal: Absence of new skin breakdown  Description: 1.  Monitor for areas of redness and/or skin breakdown  2.  Assess vascular access sites hourly  3.  Every 4-6 hours minimum:  Change oxygen saturation probe site  4.  Every 4-6 hours:  If on nasal continuous positive airway pressure, respiratory therapy assess nares and determine need for appliance change or resting period.  Outcome: Progressing     Problem: Chronic Conditions and Co-morbidities  Goal: Patient's chronic conditions and co-morbidity symptoms are monitored and maintained or improved  Outcome: Progressing  Flowsheets (Taken 9/26/2024 2310)  Care Plan - Patient's Chronic Conditions and Co-Morbidity Symptoms are Monitored and Maintained or Improved:   Monitor and assess patient's chronic conditions and comorbid symptoms for stability, deterioration, or improvement   Collaborate with multidisciplinary team to address chronic and comorbid conditions and prevent

## 2024-09-27 NOTE — CARE COORDINATION
9/27/24, 10:03 AM EDT    DISCHARGE PLANNING EVALUATION    LANA spoke with [facility]ie with [facility], confirms they did accept pt and starting precert.     LANA did call pt's son to update, son aware, reports he had spoke facility representative yesterday.

## 2024-09-28 LAB — INR PPP: 2.39 (ref 0.85–1.13)

## 2024-09-28 PROCEDURE — 6370000000 HC RX 637 (ALT 250 FOR IP): Performed by: PHYSICIAN ASSISTANT

## 2024-09-28 PROCEDURE — 1200000000 HC SEMI PRIVATE

## 2024-09-28 PROCEDURE — 2580000003 HC RX 258: Performed by: PHYSICIAN ASSISTANT

## 2024-09-28 PROCEDURE — 36415 COLL VENOUS BLD VENIPUNCTURE: CPT

## 2024-09-28 PROCEDURE — 6360000002 HC RX W HCPCS: Performed by: HOSPITALIST

## 2024-09-28 PROCEDURE — 99231 SBSQ HOSP IP/OBS SF/LOW 25: CPT | Performed by: PHYSICIAN ASSISTANT

## 2024-09-28 PROCEDURE — 6370000000 HC RX 637 (ALT 250 FOR IP): Performed by: HOSPITALIST

## 2024-09-28 PROCEDURE — 2580000003 HC RX 258: Performed by: HOSPITALIST

## 2024-09-28 PROCEDURE — 85610 PROTHROMBIN TIME: CPT

## 2024-09-28 PROCEDURE — 6370000000 HC RX 637 (ALT 250 FOR IP): Performed by: NURSE PRACTITIONER

## 2024-09-28 PROCEDURE — 6370000000 HC RX 637 (ALT 250 FOR IP): Performed by: STUDENT IN AN ORGANIZED HEALTH CARE EDUCATION/TRAINING PROGRAM

## 2024-09-28 RX ORDER — SODIUM CHLORIDE 9 MG/ML
INJECTION, SOLUTION INTRAVENOUS CONTINUOUS
Status: DISCONTINUED | OUTPATIENT
Start: 2024-09-28 | End: 2024-09-29

## 2024-09-28 RX ORDER — WARFARIN SODIUM 2.5 MG/1
2.5 TABLET ORAL
Status: COMPLETED | OUTPATIENT
Start: 2024-09-28 | End: 2024-09-28

## 2024-09-28 RX ADMIN — METOPROLOL TARTRATE 25 MG: 25 TABLET, FILM COATED ORAL at 09:45

## 2024-09-28 RX ADMIN — OXYCODONE 10 MG: 5 TABLET ORAL at 13:10

## 2024-09-28 RX ADMIN — METOPROLOL TARTRATE 25 MG: 25 TABLET, FILM COATED ORAL at 20:23

## 2024-09-28 RX ADMIN — SODIUM CHLORIDE: 9 INJECTION, SOLUTION INTRAVENOUS at 17:10

## 2024-09-28 RX ADMIN — OXYCODONE 10 MG: 5 TABLET ORAL at 17:19

## 2024-09-28 RX ADMIN — SODIUM BICARBONATE 325 MG: 650 TABLET ORAL at 20:22

## 2024-09-28 RX ADMIN — SODIUM CHLORIDE, PRESERVATIVE FREE 10 ML: 5 INJECTION INTRAVENOUS at 11:27

## 2024-09-28 RX ADMIN — FERROUS SULFATE TAB 325 MG (65 MG ELEMENTAL FE) 325 MG: 325 (65 FE) TAB at 09:45

## 2024-09-28 RX ADMIN — ONDANSETRON 4 MG: 2 INJECTION INTRAMUSCULAR; INTRAVENOUS at 11:27

## 2024-09-28 RX ADMIN — AMLODIPINE BESYLATE 5 MG: 5 TABLET ORAL at 09:46

## 2024-09-28 RX ADMIN — SODIUM CHLORIDE, PRESERVATIVE FREE 10 ML: 5 INJECTION INTRAVENOUS at 20:23

## 2024-09-28 RX ADMIN — ATORVASTATIN CALCIUM 10 MG: 10 TABLET, FILM COATED ORAL at 09:58

## 2024-09-28 RX ADMIN — SENNOSIDES 8.6 MG: 8.6 TABLET, FILM COATED ORAL at 20:32

## 2024-09-28 RX ADMIN — HYDROXYZINE PAMOATE 25 MG: 25 CAPSULE ORAL at 00:19

## 2024-09-28 RX ADMIN — SENNOSIDES 8.6 MG: 8.6 TABLET, FILM COATED ORAL at 09:48

## 2024-09-28 RX ADMIN — MONTELUKAST 10 MG: 10 TABLET, FILM COATED ORAL at 20:23

## 2024-09-28 RX ADMIN — SODIUM BICARBONATE 325 MG: 650 TABLET ORAL at 09:58

## 2024-09-28 RX ADMIN — Medication 1 TABLET: at 09:58

## 2024-09-28 RX ADMIN — ACETAMINOPHEN 500 MG TABLET 1000 MG: at 02:40

## 2024-09-28 RX ADMIN — OXYCODONE 10 MG: 5 TABLET ORAL at 00:19

## 2024-09-28 RX ADMIN — SODIUM CHLORIDE, PRESERVATIVE FREE 10 ML: 5 INJECTION INTRAVENOUS at 09:59

## 2024-09-28 RX ADMIN — ACETAMINOPHEN 500 MG TABLET 1000 MG: at 09:47

## 2024-09-28 RX ADMIN — BUMETANIDE 0.5 MG: 0.5 TABLET ORAL at 09:46

## 2024-09-28 RX ADMIN — FERROUS SULFATE TAB 325 MG (65 MG ELEMENTAL FE) 325 MG: 325 (65 FE) TAB at 20:23

## 2024-09-28 RX ADMIN — POLYETHYLENE GLYCOL 3350 17 G: 17 POWDER, FOR SOLUTION ORAL at 09:45

## 2024-09-28 RX ADMIN — WARFARIN SODIUM 2.5 MG: 2.5 TABLET ORAL at 17:56

## 2024-09-28 RX ADMIN — ACETAMINOPHEN 500 MG TABLET 1000 MG: at 16:59

## 2024-09-28 ASSESSMENT — PAIN DESCRIPTION - ORIENTATION
ORIENTATION: RIGHT
ORIENTATION: MID

## 2024-09-28 ASSESSMENT — PAIN SCALES - GENERAL
PAINLEVEL_OUTOF10: 6
PAINLEVEL_OUTOF10: 6
PAINLEVEL_OUTOF10: 7
PAINLEVEL_OUTOF10: 0
PAINLEVEL_OUTOF10: 3
PAINLEVEL_OUTOF10: 0
PAINLEVEL_OUTOF10: 8

## 2024-09-28 ASSESSMENT — PAIN - FUNCTIONAL ASSESSMENT
PAIN_FUNCTIONAL_ASSESSMENT: PREVENTS OR INTERFERES SOME ACTIVE ACTIVITIES AND ADLS
PAIN_FUNCTIONAL_ASSESSMENT: PREVENTS OR INTERFERES SOME ACTIVE ACTIVITIES AND ADLS
PAIN_FUNCTIONAL_ASSESSMENT: PREVENTS OR INTERFERES WITH ALL ACTIVE AND SOME PASSIVE ACTIVITIES
PAIN_FUNCTIONAL_ASSESSMENT: PREVENTS OR INTERFERES WITH ALL ACTIVE AND SOME PASSIVE ACTIVITIES

## 2024-09-28 ASSESSMENT — PAIN DESCRIPTION - LOCATION
LOCATION: LEG
LOCATION: LEG
LOCATION: OTHER (COMMENT)
LOCATION: LEG

## 2024-09-28 ASSESSMENT — PAIN DESCRIPTION - DESCRIPTORS
DESCRIPTORS: ACHING
DESCRIPTORS: ACHING;CRAMPING
DESCRIPTORS: SHARP;STABBING

## 2024-09-28 ASSESSMENT — PAIN SCALES - WONG BAKER
WONGBAKER_NUMERICALRESPONSE: NO HURT

## 2024-09-29 LAB — INR PPP: 2.64 (ref 0.85–1.13)

## 2024-09-29 PROCEDURE — 6370000000 HC RX 637 (ALT 250 FOR IP): Performed by: PHYSICIAN ASSISTANT

## 2024-09-29 PROCEDURE — 2580000003 HC RX 258: Performed by: PHYSICIAN ASSISTANT

## 2024-09-29 PROCEDURE — 6370000000 HC RX 637 (ALT 250 FOR IP): Performed by: HOSPITALIST

## 2024-09-29 PROCEDURE — 1200000000 HC SEMI PRIVATE

## 2024-09-29 PROCEDURE — 2580000003 HC RX 258: Performed by: HOSPITALIST

## 2024-09-29 PROCEDURE — 85610 PROTHROMBIN TIME: CPT

## 2024-09-29 PROCEDURE — 36415 COLL VENOUS BLD VENIPUNCTURE: CPT

## 2024-09-29 PROCEDURE — 99231 SBSQ HOSP IP/OBS SF/LOW 25: CPT | Performed by: PHYSICIAN ASSISTANT

## 2024-09-29 RX ORDER — WARFARIN SODIUM 1 MG/1
1 TABLET ORAL
Status: COMPLETED | OUTPATIENT
Start: 2024-09-29 | End: 2024-09-29

## 2024-09-29 RX ADMIN — ACETAMINOPHEN 500 MG TABLET 1000 MG: at 08:49

## 2024-09-29 RX ADMIN — AMLODIPINE BESYLATE 5 MG: 5 TABLET ORAL at 08:45

## 2024-09-29 RX ADMIN — OXYCODONE 10 MG: 5 TABLET ORAL at 20:04

## 2024-09-29 RX ADMIN — MONTELUKAST 10 MG: 10 TABLET, FILM COATED ORAL at 20:06

## 2024-09-29 RX ADMIN — FERROUS SULFATE TAB 325 MG (65 MG ELEMENTAL FE) 325 MG: 325 (65 FE) TAB at 08:45

## 2024-09-29 RX ADMIN — ACETAMINOPHEN 500 MG TABLET 1000 MG: at 18:30

## 2024-09-29 RX ADMIN — OXYCODONE 10 MG: 5 TABLET ORAL at 14:01

## 2024-09-29 RX ADMIN — METOPROLOL TARTRATE 25 MG: 25 TABLET, FILM COATED ORAL at 20:06

## 2024-09-29 RX ADMIN — BUMETANIDE 0.5 MG: 0.5 TABLET ORAL at 08:46

## 2024-09-29 RX ADMIN — SODIUM CHLORIDE: 9 INJECTION, SOLUTION INTRAVENOUS at 06:05

## 2024-09-29 RX ADMIN — SODIUM BICARBONATE 325 MG: 650 TABLET ORAL at 20:05

## 2024-09-29 RX ADMIN — ATORVASTATIN CALCIUM 10 MG: 10 TABLET, FILM COATED ORAL at 08:46

## 2024-09-29 RX ADMIN — FERROUS SULFATE TAB 325 MG (65 MG ELEMENTAL FE) 325 MG: 325 (65 FE) TAB at 20:06

## 2024-09-29 RX ADMIN — METOPROLOL TARTRATE 25 MG: 25 TABLET, FILM COATED ORAL at 08:45

## 2024-09-29 RX ADMIN — POLYETHYLENE GLYCOL 3350 17 G: 17 POWDER, FOR SOLUTION ORAL at 12:03

## 2024-09-29 RX ADMIN — Medication 1 TABLET: at 08:45

## 2024-09-29 RX ADMIN — WARFARIN SODIUM 1 MG: 1 TABLET ORAL at 18:30

## 2024-09-29 RX ADMIN — SODIUM BICARBONATE 325 MG: 650 TABLET ORAL at 08:46

## 2024-09-29 RX ADMIN — SODIUM CHLORIDE, PRESERVATIVE FREE 10 ML: 5 INJECTION INTRAVENOUS at 20:07

## 2024-09-29 RX ADMIN — FLUTICASONE PROPIONATE 1 SPRAY: 50 SPRAY, METERED NASAL at 08:47

## 2024-09-29 RX ADMIN — OXYCODONE 10 MG: 5 TABLET ORAL at 00:49

## 2024-09-29 RX ADMIN — SENNOSIDES 8.6 MG: 8.6 TABLET, FILM COATED ORAL at 08:45

## 2024-09-29 RX ADMIN — SENNOSIDES 8.6 MG: 8.6 TABLET, FILM COATED ORAL at 20:06

## 2024-09-29 ASSESSMENT — PAIN - FUNCTIONAL ASSESSMENT
PAIN_FUNCTIONAL_ASSESSMENT: PREVENTS OR INTERFERES SOME ACTIVE ACTIVITIES AND ADLS
PAIN_FUNCTIONAL_ASSESSMENT: PREVENTS OR INTERFERES SOME ACTIVE ACTIVITIES AND ADLS

## 2024-09-29 ASSESSMENT — PAIN DESCRIPTION - ORIENTATION
ORIENTATION: RIGHT

## 2024-09-29 ASSESSMENT — PAIN DESCRIPTION - LOCATION
LOCATION: LEG

## 2024-09-29 ASSESSMENT — PAIN SCALES - GENERAL
PAINLEVEL_OUTOF10: 0
PAINLEVEL_OUTOF10: 7
PAINLEVEL_OUTOF10: 7
PAINLEVEL_OUTOF10: 0
PAINLEVEL_OUTOF10: 7
PAINLEVEL_OUTOF10: 0

## 2024-09-29 ASSESSMENT — PAIN DESCRIPTION - DESCRIPTORS
DESCRIPTORS: ACHING;DISCOMFORT
DESCRIPTORS: ACHING;SHARP

## 2024-09-29 ASSESSMENT — PAIN SCALES - WONG BAKER: WONGBAKER_NUMERICALRESPONSE: HURTS WHOLE LOT

## 2024-09-29 NOTE — FLOWSHEET NOTE
Report received from primary RN. Patient is resting comfortably in bed.    Vital Signs:  Temp: 98.2  Pulse: 76bpm  BP: 123/67  Resp: 16 breaths/min  O2 Sat: 99%, room air.  Pain: 0/10. No numbness or tingling reported.     Physical Assessment:  Patient is alert and oriented x3; not oriented to year, but oriented to person, place, and reason for hospital admission.   Patient reports 0/10 pain with no numbness or tingling.  Pupils are equal, round, and reactive to light with consensual response.  Pulse is 76 bpm with regular rhythm and strong amplitude.  Heart sounds are regular with regular rhythm and strong amplitude.  Lung sounds are clear and equal bilaterally. Respirations are 16 breaths per minute with moderate depth, bilateral symmetry, and regular rhythm.  Upper extremities are tan, warm, and dry. No tingling, numbness, or pain present. Capillary refill and skin tugar are < 3 seconds.  Lower extremities are tan, warm, and dry. Right lower leg dressing changed by physician on 9/27/24. Dressing remains dry and intact. No pain, numbess, or tingling reported. Pedal push and pull are unequal with weakness presenting on the right side. No edema present on the left leg; unable to visually inspect on the right due to dressing change.               IV sites are non-infiltrated and intact.     COMPA Chamberlain-SN

## 2024-09-29 NOTE — PLAN OF CARE
Problem: Pain  Goal: Verbalizes/displays adequate comfort level or baseline comfort level  9/29/2024 1108 by Aurelia Cruz RN  Outcome: Progressing  Flowsheets (Taken 9/29/2024 1108)  Verbalizes/displays adequate comfort level or baseline comfort level:   Encourage patient to monitor pain and request assistance   Assess pain using appropriate pain scale   Administer analgesics based on type and severity of pain and evaluate response   Implement non-pharmacological measures as appropriate and evaluate response     Problem: ABCDS Injury Assessment  Goal: Absence of physical injury  9/29/2024 1108 by Aurelia Cruz RN  Outcome: Progressing  Flowsheets (Taken 9/29/2024 1108)  Absence of Physical Injury: Implement safety measures based on patient assessment     Problem: Safety - Adult  Goal: Free from fall injury  9/29/2024 1108 by Aurelia Cruz RN  Outcome: Progressing  Flowsheets (Taken 9/29/2024 1108)  Free From Fall Injury: Instruct family/caregiver on patient safety     Problem: Skin/Tissue Integrity  Goal: Absence of new skin breakdown  Description: 1.  Monitor for areas of redness and/or skin breakdown  2.  Assess vascular access sites hourly  3.  Every 4-6 hours minimum:  Change oxygen saturation probe site  4.  Every 4-6 hours:  If on nasal continuous positive airway pressure, respiratory therapy assess nares and determine need for appliance change or resting period.  9/29/2024 1108 by Aurelia Cruz RN  Outcome: Progressing  Note: Skin assessment completed.  Patient turned every 2 hours and as needed.  No skin breakdown this shift.         Problem: Chronic Conditions and Co-morbidities  Goal: Patient's chronic conditions and co-morbidity symptoms are monitored and maintained or improved  9/29/2024 1108 by Aurelia Cruz RN  Outcome: Progressing  Flowsheets (Taken 9/29/2024 1108)  Care Plan - Patient's Chronic Conditions and Co-Morbidity Symptoms are Monitored and Maintained or Improved:   Monitor and assess

## 2024-09-30 LAB — INR PPP: 2.06 (ref 0.85–1.13)

## 2024-09-30 PROCEDURE — 6370000000 HC RX 637 (ALT 250 FOR IP): Performed by: HOSPITALIST

## 2024-09-30 PROCEDURE — 85610 PROTHROMBIN TIME: CPT

## 2024-09-30 PROCEDURE — 6370000000 HC RX 637 (ALT 250 FOR IP): Performed by: STUDENT IN AN ORGANIZED HEALTH CARE EDUCATION/TRAINING PROGRAM

## 2024-09-30 PROCEDURE — 36415 COLL VENOUS BLD VENIPUNCTURE: CPT

## 2024-09-30 PROCEDURE — 99231 SBSQ HOSP IP/OBS SF/LOW 25: CPT | Performed by: PHYSICIAN ASSISTANT

## 2024-09-30 PROCEDURE — 1200000000 HC SEMI PRIVATE

## 2024-09-30 PROCEDURE — 2580000003 HC RX 258: Performed by: HOSPITALIST

## 2024-09-30 RX ORDER — WARFARIN SODIUM 5 MG/1
5 TABLET ORAL
Status: COMPLETED | OUTPATIENT
Start: 2024-09-30 | End: 2024-09-30

## 2024-09-30 RX ADMIN — OXYCODONE 10 MG: 5 TABLET ORAL at 01:32

## 2024-09-30 RX ADMIN — OXYCODONE 10 MG: 5 TABLET ORAL at 14:33

## 2024-09-30 RX ADMIN — AMLODIPINE BESYLATE 5 MG: 5 TABLET ORAL at 09:28

## 2024-09-30 RX ADMIN — Medication 1 TABLET: at 09:28

## 2024-09-30 RX ADMIN — SODIUM BICARBONATE 325 MG: 650 TABLET ORAL at 22:07

## 2024-09-30 RX ADMIN — SODIUM CHLORIDE, PRESERVATIVE FREE 10 ML: 5 INJECTION INTRAVENOUS at 22:08

## 2024-09-30 RX ADMIN — MONTELUKAST 10 MG: 10 TABLET, FILM COATED ORAL at 22:08

## 2024-09-30 RX ADMIN — SENNOSIDES 8.6 MG: 8.6 TABLET, FILM COATED ORAL at 22:08

## 2024-09-30 RX ADMIN — METOPROLOL TARTRATE 25 MG: 25 TABLET, FILM COATED ORAL at 09:29

## 2024-09-30 RX ADMIN — ACETAMINOPHEN 500 MG TABLET 1000 MG: at 18:35

## 2024-09-30 RX ADMIN — METOPROLOL TARTRATE 25 MG: 25 TABLET, FILM COATED ORAL at 22:08

## 2024-09-30 RX ADMIN — POLYETHYLENE GLYCOL 3350 17 G: 17 POWDER, FOR SOLUTION ORAL at 16:54

## 2024-09-30 RX ADMIN — ATORVASTATIN CALCIUM 10 MG: 10 TABLET, FILM COATED ORAL at 09:29

## 2024-09-30 RX ADMIN — FERROUS SULFATE TAB 325 MG (65 MG ELEMENTAL FE) 325 MG: 325 (65 FE) TAB at 22:08

## 2024-09-30 RX ADMIN — SENNOSIDES 8.6 MG: 8.6 TABLET, FILM COATED ORAL at 11:59

## 2024-09-30 RX ADMIN — WARFARIN SODIUM 5 MG: 5 TABLET ORAL at 18:34

## 2024-09-30 RX ADMIN — SODIUM BICARBONATE 325 MG: 650 TABLET ORAL at 09:29

## 2024-09-30 RX ADMIN — ACETAMINOPHEN 500 MG TABLET 1000 MG: at 03:51

## 2024-09-30 RX ADMIN — ACETAMINOPHEN 500 MG TABLET 1000 MG: at 11:58

## 2024-09-30 RX ADMIN — SODIUM CHLORIDE, PRESERVATIVE FREE 10 ML: 5 INJECTION INTRAVENOUS at 12:01

## 2024-09-30 RX ADMIN — OXYCODONE 10 MG: 5 TABLET ORAL at 22:08

## 2024-09-30 RX ADMIN — BUMETANIDE 0.5 MG: 0.5 TABLET ORAL at 09:29

## 2024-09-30 RX ADMIN — FERROUS SULFATE TAB 325 MG (65 MG ELEMENTAL FE) 325 MG: 325 (65 FE) TAB at 09:28

## 2024-09-30 RX ADMIN — OXYCODONE 10 MG: 5 TABLET ORAL at 18:35

## 2024-09-30 RX ADMIN — FLUTICASONE PROPIONATE 1 SPRAY: 50 SPRAY, METERED NASAL at 09:28

## 2024-09-30 ASSESSMENT — PAIN SCALES - GENERAL
PAINLEVEL_OUTOF10: 7
PAINLEVEL_OUTOF10: 5
PAINLEVEL_OUTOF10: 7
PAINLEVEL_OUTOF10: 7

## 2024-09-30 ASSESSMENT — PAIN DESCRIPTION - ORIENTATION
ORIENTATION: RIGHT
ORIENTATION: RIGHT

## 2024-09-30 ASSESSMENT — PAIN DESCRIPTION - DESCRIPTORS
DESCRIPTORS: ACHING
DESCRIPTORS: ACHING

## 2024-09-30 ASSESSMENT — PAIN DESCRIPTION - LOCATION: LOCATION: LEG

## 2024-09-30 NOTE — CARE COORDINATION
9/30/24, 11:15 AM EDT  DISCHARGE PLANNING EVALUATION    LANA received a call from Pennie at [facility] and she reported that precert has been approved until 10/1.     LANA updated attending. Plan discharge tomorrow.     RN made aware. Telesitter still present in room. RN to remove today.     LANA attempted to call isela Barry but received an error message that \"all circuits are busy\".

## 2024-09-30 NOTE — PLAN OF CARE
Problem: Pain  Goal: Verbalizes/displays adequate comfort level or baseline comfort level  Outcome: Progressing  Flowsheets (Taken 9/29/2024 2000)  Verbalizes/displays adequate comfort level or baseline comfort level:   Encourage patient to monitor pain and request assistance   Administer analgesics based on type and severity of pain and evaluate response   Assess pain using appropriate pain scale   Implement non-pharmacological measures as appropriate and evaluate response     Problem: Safety - Adult  Goal: Free from fall injury  Outcome: Progressing   Fall assessment completed. Patient using call light appropriately to call for assistance.  Personal items within reach. Placed bed in low position. Bed alarm turned on      Problem: Skin/Tissue Integrity  Goal: Absence of new skin breakdown  Description: 1.  Monitor for areas of redness and/or skin breakdown  2.  Assess vascular access sites hourly  Outcome: Progressing     Problem: Chronic Conditions and Co-morbidities  Goal: Patient's chronic conditions and co-morbidity symptoms are monitored and maintained or improved  Outcome: Progressing  Flowsheets (Taken 9/29/2024 2000)  Care Plan - Patient's Chronic Conditions and Co-Morbidity Symptoms are Monitored and Maintained or Improved: Monitor and assess patient's chronic conditions and comorbid symptoms for stability, deterioration, or improvement     Problem: Discharge Planning  Goal: Discharge to home or other facility with appropriate resources  Outcome: Progressing  Flowsheets (Taken 9/29/2024 2000)  Discharge to home or other facility with appropriate resources: Identify barriers to discharge with patient and caregiver     Care plan reviewed with patient.  Patient verbalize understanding of the plan of care and contribute to goal setting.

## 2024-09-30 NOTE — CARE COORDINATION
9/30/24, 4:24 PM EDT    DISCHARGE ON GOING EVALUATION    Jasmina MYERS [facility] day: 6  Location: -07/007-A Reason for admit: Weakness [R53.1]  Adjustment reaction to medical therapy [F43.20]  Fall, initial encounter [W19.XXXA]  Altered mental status, unspecified altered mental status type [R41.82]  Infected blister of right lower extremity, initial encounter [S80.821A, L08.9]     Barriers to Discharge: Placement only, must be sitter free for 24 hrs. Nursing staff reports sitter was removed this AM. Anticipate dc tomorrow.     PCP: Dimas Watts APRN - CNP  Readmission Risk Score: 21.7    Patient Goals/Plan/Treatment Preferences: Plans to [facility] to [facility] after sitter free x 24 hrs. Precert approved only through tomorrow, 10/1. Anticipate dc tomorrow.

## 2024-10-01 VITALS
HEIGHT: 66 IN | DIASTOLIC BLOOD PRESSURE: 72 MMHG | WEIGHT: 162 LBS | TEMPERATURE: 98.1 F | OXYGEN SATURATION: 100 % | SYSTOLIC BLOOD PRESSURE: 118 MMHG | RESPIRATION RATE: 16 BRPM | BODY MASS INDEX: 26.03 KG/M2 | HEART RATE: 68 BPM

## 2024-10-01 LAB — INR PPP: 2.12 (ref 0.85–1.13)

## 2024-10-01 PROCEDURE — 2580000003 HC RX 258: Performed by: HOSPITALIST

## 2024-10-01 PROCEDURE — 97530 THERAPEUTIC ACTIVITIES: CPT

## 2024-10-01 PROCEDURE — 97110 THERAPEUTIC EXERCISES: CPT

## 2024-10-01 PROCEDURE — 85610 PROTHROMBIN TIME: CPT

## 2024-10-01 PROCEDURE — 6370000000 HC RX 637 (ALT 250 FOR IP): Performed by: HOSPITALIST

## 2024-10-01 PROCEDURE — 36415 COLL VENOUS BLD VENIPUNCTURE: CPT

## 2024-10-01 RX ORDER — HYDROXYZINE PAMOATE 25 MG/1
25 CAPSULE ORAL 3 TIMES DAILY PRN
DISCHARGE
Start: 2024-10-01 | End: 2024-10-15

## 2024-10-01 RX ORDER — OXYCODONE HYDROCHLORIDE 10 MG/1
10 TABLET ORAL EVERY 4 HOURS PRN
Qty: 180 TABLET | Refills: 0 | Status: SHIPPED | OUTPATIENT
Start: 2024-10-01 | End: 2024-10-31

## 2024-10-01 RX ORDER — WARFARIN SODIUM 2.5 MG/1
2.5 TABLET ORAL
Status: COMPLETED | OUTPATIENT
Start: 2024-10-01 | End: 2024-10-01

## 2024-10-01 RX ADMIN — BUMETANIDE 0.5 MG: 0.5 TABLET ORAL at 09:16

## 2024-10-01 RX ADMIN — ACETAMINOPHEN 500 MG TABLET 1000 MG: at 10:10

## 2024-10-01 RX ADMIN — FERROUS SULFATE TAB 325 MG (65 MG ELEMENTAL FE) 325 MG: 325 (65 FE) TAB at 09:14

## 2024-10-01 RX ADMIN — Medication 1 TABLET: at 09:20

## 2024-10-01 RX ADMIN — AMLODIPINE BESYLATE 5 MG: 5 TABLET ORAL at 09:15

## 2024-10-01 RX ADMIN — OXYCODONE 10 MG: 5 TABLET ORAL at 10:05

## 2024-10-01 RX ADMIN — ACETAMINOPHEN 500 MG TABLET 1000 MG: at 02:36

## 2024-10-01 RX ADMIN — WARFARIN SODIUM 2.5 MG: 2.5 TABLET ORAL at 12:13

## 2024-10-01 RX ADMIN — SENNOSIDES 8.6 MG: 8.6 TABLET, FILM COATED ORAL at 09:20

## 2024-10-01 RX ADMIN — SODIUM CHLORIDE, PRESERVATIVE FREE 10 ML: 5 INJECTION INTRAVENOUS at 09:21

## 2024-10-01 RX ADMIN — ATORVASTATIN CALCIUM 10 MG: 10 TABLET, FILM COATED ORAL at 09:16

## 2024-10-01 RX ADMIN — SODIUM BICARBONATE 325 MG: 650 TABLET ORAL at 09:17

## 2024-10-01 RX ADMIN — METOPROLOL TARTRATE 25 MG: 25 TABLET, FILM COATED ORAL at 09:19

## 2024-10-01 ASSESSMENT — PAIN DESCRIPTION - ORIENTATION
ORIENTATION: RIGHT
ORIENTATION: RIGHT

## 2024-10-01 ASSESSMENT — PAIN SCALES - WONG BAKER: WONGBAKER_NUMERICALRESPONSE: HURTS A LITTLE BIT

## 2024-10-01 ASSESSMENT — PAIN DESCRIPTION - DESCRIPTORS
DESCRIPTORS: ACHING;DULL
DESCRIPTORS: ACHING;SHARP

## 2024-10-01 ASSESSMENT — PAIN DESCRIPTION - LOCATION
LOCATION: LEG
LOCATION: LEG

## 2024-10-01 ASSESSMENT — PAIN SCALES - GENERAL
PAINLEVEL_OUTOF10: 5
PAINLEVEL_OUTOF10: 7
PAINLEVEL_OUTOF10: 6
PAINLEVEL_OUTOF10: 5
PAINLEVEL_OUTOF10: 7

## 2024-10-01 ASSESSMENT — PAIN - FUNCTIONAL ASSESSMENT: PAIN_FUNCTIONAL_ASSESSMENT: PREVENTS OR INTERFERES SOME ACTIVE ACTIVITIES AND ADLS

## 2024-10-01 NOTE — PLAN OF CARE
Problem: Pain  Goal: Verbalizes/displays adequate comfort level or baseline comfort level  10/1/2024 0636 by Carmen Amor RN  Outcome: Progressing     Problem: ABCDS Injury Assessment  Goal: Absence of physical injury  10/1/2024 0636 by Carmen Amor RN  Outcome: Progressing     Problem: Safety - Adult  Goal: Free from fall injury  10/1/2024 0636 by Carmen Amor RN  Outcome: Progressing     Problem: Skin/Tissue Integrity  Goal: Absence of new skin breakdown  Description: 1.  Monitor for areas of redness and/or skin breakdown  2.  Assess vascular access sites hourly  3.  Every 4-6 hours minimum:  Change oxygen saturation probe site  4.  Every 4-6 hours:  If on nasal continuous positive airway pressure, respiratory therapy assess nares and determine need for appliance change or resting period.  10/1/2024 0636 by Carmen Amor RN  Outcome: Progressing     Problem: Chronic Conditions and Co-morbidities  Goal: Patient's chronic conditions and co-morbidity symptoms are monitored and maintained or improved  10/1/2024 0636 by Carmen Amor RN  Outcome: Progressing  Flowsheets (Taken 9/30/2024 2200)  Care Plan - Patient's Chronic Conditions and Co-Morbidity Symptoms are Monitored and Maintained or Improved: Monitor and assess patient's chronic conditions and comorbid symptoms for stability, deterioration, or improvement     Problem: Discharge Planning  Goal: Discharge to home or other facility with appropriate resources  10/1/2024 0636 by Carmen Amor RN  Outcome: Progressing  Flowsheets (Taken 9/30/2024 2200)  Discharge to home or other facility with appropriate resources: Identify barriers to discharge with patient and caregiver

## 2024-10-01 NOTE — DISCHARGE SUMMARY
Hospital Medicine Discharge Summary      Patient Identification:   Jasmina Powers   : 1935  MRN: [id number]   Account: [id number]      Patient's PCP: Dimas Watts APRN - CNP    Admit Date: 2024     Discharge Date: 10/1/24    Admitting Physician: No admitting provider for patient encounter.     Discharge Physician: Paul Powers PA-C     Jasmina Powers is a 89 y.o. female admitted to [facility] on 2024.      HPI On Admission From H&P:    ***    Assessment/Plan With Discharge Diagnoses:    ***    Exam:     Vitals:  Vitals:    24 2238 10/01/24 0321 10/01/24 0845 10/01/24 0857   BP:  113/72 (!) 112/58    Pulse:  85 81 81   Resp: 15 16 18    Temp:  98.2 °F (36.8 °C) 98.5 °F (36.9 °C)    TempSrc:  Oral Oral    SpO2:  100% 97%    Weight:       Height:         Weight: Weight - Scale: 73.5 kg (162 lb)     24 hour intake/output:  Intake/Output Summary (Last 24 hours) at 10/1/2024 0948  Last data filed at 10/1/2024 0321  Gross per 24 hour   Intake 1190 ml   Output 1025 ml   Net 165 ml         Labs: For convenience and continuity at follow-up the following most recent labs are provided:    CBC:    Lab Results   Component Value Date/Time    WBC 6.9 2024 08:03 AM    HGB 9.6 2024 08:03 AM    HCT 30.1 2024 08:03 AM     2024 08:03 AM       Renal:    Lab Results   Component Value Date/Time     2024 08:03 AM    K 3.5 2024 08:03 AM    K 4.1 2024 06:50 AM    CL 96 2024 08:03 AM    CO2 25 2024 08:03 AM    BUN 10 2024 08:03 AM    CREATININE 0.8 2024 08:03 AM    CALCIUM 9.5 2024 08:03 AM         Significant Diagnostic Studies    Radiology:   CT Head W/O Contrast   Final Result      1. Stable CT scan of the brain, no interval change since previous study dated   2024.            **This report has been created using voice recognition software. It may contain   minor errors which are inherent in voice

## 2024-10-01 NOTE — DISCHARGE INSTR - MEDS
Warfarin Discharge Recommendations     Patient discharged from [facility] today on warfarin.     Warfarin indication: Atrial fibrillation/Atrial flutter  INR goal during admission: 2.0-3.0  Previous home warfarin regimen: 5 mg MWF and 2.5 mg TuThSaSu   Interacting medications at discharge: multivitamin  Coumadin 5 mg tabs     Hospital Warfarin Doses & INR Results  Date INR Warfarin Dose   9/24 3.06 HOLD  (Patient and patient's son unable to verify if warfarin was given today; will hold given INR 3.06)    9/25/2024 2.89   5 mg    9/26/2024 2.32  2.5 mg     9/27/2024 2.12   5 mg    9/28/2024  2.39 2.5 mg     9/29/2024 2.64  1 mg      9/30/2024  2.06  5 mg   10/1/24 2.12 2.5 mg         Recent INRs:      Recent Labs     10/01/24  0539   INR 2.12*      Warfarin Discharge Instructions:   Date Warfarin Dose   10/2/24 (tomorrow) Continue Home Dose until evaluated by nursing home provider      Provider dosing warfarin:  [facility]  Next INR date: per physician at [facility], PharmD 10/1/2024 10:29 AM

## 2024-10-01 NOTE — PLAN OF CARE
Problem: Pain  Goal: Verbalizes/displays adequate comfort level or baseline comfort level  Outcome: Progressing  Verbalizes/displays adequate comfort level or baseline comfort level:   Encourage patient to monitor pain and request assistance   Administer analgesics based on type and severity of pain and evaluate response   Assess pain using appropriate pain scale   Implement non-pharmacological measures as appropriate and evaluate response     Problem: Safety - Adult  Goal: Free from fall injury  Outcome: Progressing   Fall assessment completed. Patient using call light appropriately to call for assistance.  Personal items within reach. Placed bed in low position. Bed alarm turned on       Problem: Skin/Tissue Integrity  Goal: Absence of new skin breakdown  Description: 1.  Monitor for areas of redness and/or skin breakdown  2.  Assess vascular access sites hourly  Outcome: Progressing     Problem: Chronic Conditions and Co-morbidities  Goal: Patient's chronic conditions and co-morbidity symptoms are monitored and maintained or improved  Outcome: Progressing  Care Plan - Patient's Chronic Conditions and Co-Morbidity Symptoms are Monitored and Maintained or Improved: Monitor and assess patient's chronic conditions and comorbid symptoms for stability, deterioration, or improvement     Problem: Discharge Planning  Goal: Discharge to home or other facility with appropriate resources  Outcome: Progressing  Discharge to home or other facility with appropriate resources: Identify barriers to discharge with patient and caregiver     Care plan reviewed with patient.  Patient verbalize understanding of the plan of care and contribute to goal setting.

## 2024-10-01 NOTE — CARE COORDINATION
10/1/24, 10:19 AM EDT    DISCHARGE PLANNING EVALUATION     LANA met with pt's son and daughter in law outside of pt's room this morning, updated son on approval from insurance to go to [facility], let them know SW would set up transport and keep them updated on time.     10/1/24, 10:44 AM EDT    Patient goals/plan/ treatment preferences discussed by  and .  Patient goals/plan/ treatment preferences reviewed with patient/ family.  Patient/ family verbalize understanding of discharge plan and are in agreement with goal/plan/treatment preferences.  Understanding was demonstrated using the teach back method.  AVS provided by RN at time of discharge, which includes all necessary medical information pertaining to the patients current course of illness, treatment, post-discharge goals of care, and treatment preferences.     Services At/After Discharge: Skilled Nursing Facility (SNF) and In ambulance [facility]    [facility] LANA faxed transport paperwork, copy placed on chart. LANA spoke with Pennie with [facility], discussed discharge today and transport at 1pm, they are okay with this.        LANA met with pt, son and daughter in law this morning, they are aware of transport time. LANA updated nurse on transport time.

## 2024-10-01 NOTE — PROGRESS NOTES
Progress note: Infectious diseases    Patient - Jasmina Powers,  Age - 89 y.o.    - 1935      Room Number - 5K-07/007-A   MRN -  [id number]   [facility] # - [id number]  Date of Admission -  2024 11:03 AM    SUBJECTIVE:   No new issues. Sitter at bed side due to an episode of confusion last night  OBJECTIVE   VITALS    height is 1.676 m (5' 5.98\") and weight is 73.5 kg (162 lb). Her oral temperature is 98.2 °F (36.8 °C). Her blood pressure is 143/92 (abnormal) and her pulse is 91. Her respiration is 16 and oxygen saturation is 98%.       Wt Readings from Last 3 Encounters:   24 73.5 kg (162 lb)   09/10/24 73.5 kg (162 lb)   24 75.8 kg (167 lb)       I/O (24 Hours)    Intake/Output Summary (Last 24 hours) at 2024 1221  Last data filed at 2024 1029  Gross per 24 hour   Intake 1020 ml   Output 0 ml   Net 1020 ml       General Appearance  Awake, alert, oriented,  not  In acute distress  HEENT - normocephalic, atraumatic, pale conjunctiva,  anicteric sclera  Neck - Supple, no mass  Lungs -  Bilateral good air entry, no rhonchi, no wheeze  Cardiovascular - Heart sounds are normal.    Abdomen - soft, not distended, nontender,   Neurologic -oriented  Skin - No bruising or bleeding  Extremities - stable escar on the right lower leg , dry no drainage    MEDICATIONS:      warfarin  2.5 mg Oral Once    polyethylene glycol  17 g Oral Daily    acetaminophen  1,000 mg Oral TID    senna  1 tablet Oral BID    sodium chloride flush  5-40 mL IntraVENous 2 times per day    amLODIPine  5 mg Oral Daily    atorvastatin  10 mg Oral Daily    bumetanide  0.5 mg Oral Daily    fluticasone  1 spray Each Nostril Daily    ferrous sulfate  325 mg Oral BID    multivitamin  1 tablet Oral Daily    montelukast  10 mg Oral Nightly    metoprolol tartrate  25 mg Oral BID    sodium bicarbonate  325 mg Oral BID    warfarin placeholder: 
                                                                                          Progress note: Infectious diseases    Patient - Jasmina Powers,  Age - 89 y.o.    - 1935      Room Number - 5K-07/007-A   MRN -  [id number]   [facility] # - [id number]  Date of Admission -  2024 11:03 AM    SUBJECTIVE:   No new issues. Going to [facility]  OBJECTIVE   VITALS    height is 1.676 m (5' 5.98\") and weight is 73.5 kg (162 lb). Her oral temperature is 98.5 °F (36.9 °C). Her blood pressure is 112/58 (abnormal) and her pulse is 81. Her respiration is 18 and oxygen saturation is 97%.       Wt Readings from Last 3 Encounters:   24 73.5 kg (162 lb)   09/10/24 73.5 kg (162 lb)   24 75.8 kg (167 lb)       I/O (24 Hours)    Intake/Output Summary (Last 24 hours) at 10/1/2024 1001  Last data filed at 10/1/2024 0321  Gross per 24 hour   Intake 1190 ml   Output 1025 ml   Net 165 ml       General Appearance  Awake, alert, oriented,  not  In acute distress  HEENT - normocephalic, atraumatic, pale conjunctiva,  anicteric sclera   Neurologic -oriented  Skin - No bruising or bleeding  Extremities - she has a non healing wound on the right lower leg with escar formation  The wound has no drainage , clean. Dressing changed    MEDICATIONS:      polyethylene glycol  17 g Oral Daily    acetaminophen  1,000 mg Oral TID    senna  1 tablet Oral BID    sodium chloride flush  5-40 mL IntraVENous 2 times per day    amLODIPine  5 mg Oral Daily    atorvastatin  10 mg Oral Daily    bumetanide  0.5 mg Oral Daily    fluticasone  1 spray Each Nostril Daily    ferrous sulfate  325 mg Oral BID    multivitamin  1 tablet Oral Daily    montelukast  10 mg Oral Nightly    metoprolol tartrate  25 mg Oral BID    sodium bicarbonate  325 mg Oral BID    warfarin placeholder: dosing by pharmacy   Other RX Placeholder      sodium chloride       hydrOXYzine pamoate, albuterol sulfate HFA, oxyCODONE, sodium chloride flush, sodium chloride, potassium 
                                                                                          Progress note: Infectious diseases    Patient - Jasmina Powers,  Age - 89 y.o.    - 1935      Room Number - 5K-07/007-A   MRN -  [id number]   [facility] # - [id number]  Date of Admission -  2024 11:03 AM    SUBJECTIVE:   No new issues.awaiting placement  OBJECTIVE   VITALS    height is 1.676 m (5' 5.98\") and weight is 73.5 kg (162 lb). Her oral temperature is 98.2 °F (36.8 °C). Her blood pressure is 117/75 and her pulse is 82. Her respiration is 16 and oxygen saturation is 100%.       Wt Readings from Last 3 Encounters:   24 73.5 kg (162 lb)   09/10/24 73.5 kg (162 lb)   24 75.8 kg (167 lb)       I/O (24 Hours)    Intake/Output Summary (Last 24 hours) at 2024 1536  Last data filed at 2024 0351  Gross per 24 hour   Intake 400 ml   Output --   Net 400 ml       General Appearance  Awake, alert, oriented,  not  In acute distress  HEENT - normocephalic, atraumatic, pale conjunctiva,  anicteric sclera   Neurologic -oriented  Skin - No bruising or bleeding  Extremities - stable right lower leg wound  Has escar which is still adherent to the wound bed    MEDICATIONS:      warfarin  5 mg Oral Once    polyethylene glycol  17 g Oral Daily    acetaminophen  1,000 mg Oral TID    senna  1 tablet Oral BID    sodium chloride flush  5-40 mL IntraVENous 2 times per day    amLODIPine  5 mg Oral Daily    atorvastatin  10 mg Oral Daily    bumetanide  0.5 mg Oral Daily    fluticasone  1 spray Each Nostril Daily    ferrous sulfate  325 mg Oral BID    multivitamin  1 tablet Oral Daily    montelukast  10 mg Oral Nightly    metoprolol tartrate  25 mg Oral BID    sodium bicarbonate  325 mg Oral BID    warfarin placeholder: dosing by pharmacy   Other RX Placeholder      sodium chloride       hydrOXYzine pamoate, albuterol sulfate HFA, oxyCODONE, sodium chloride flush, sodium chloride, potassium chloride **OR** potassium 
                                                                                          Progress note: Infectious diseases    Patient - Jasmina Powers,  Age - 89 y.o.    - 1935      Room Number - 5K-07/007-A   MRN -  [id number]   [facility] # - [id number]  Date of Admission -  2024 11:03 AM    SUBJECTIVE:   No new issues.plan to go to [facility]  OBJECTIVE   VITALS    height is 1.676 m (5' 5.98\") and weight is 73.5 kg (162 lb). Her oral temperature is 98.5 °F (36.9 °C). Her blood pressure is 140/71 (abnormal) and her pulse is 108 (abnormal). Her respiration is 18 and oxygen saturation is 97%.       Wt Readings from Last 3 Encounters:   24 73.5 kg (162 lb)   09/10/24 73.5 kg (162 lb)   24 75.8 kg (167 lb)       I/O (24 Hours)    Intake/Output Summary (Last 24 hours) at 2024 1116  Last data filed at 2024  Gross per 24 hour   Intake 100 ml   Output 1350 ml   Net -1250 ml       General Appearance  Awake, alert, oriented,  not  In acute distress  HEENT - normocephalic, atraumatic, pale conjunctiva,  anicteric sclera     Neurologic -oriented  Skin - No bruising or bleeding  Extremities - an escar on the right lateral leg, dry and scaly skin    MEDICATIONS:      warfarin  5 mg Oral Once    polyethylene glycol  17 g Oral Daily    acetaminophen  1,000 mg Oral TID    senna  1 tablet Oral BID    sodium chloride flush  5-40 mL IntraVENous 2 times per day    amLODIPine  5 mg Oral Daily    atorvastatin  10 mg Oral Daily    bumetanide  0.5 mg Oral Daily    fluticasone  1 spray Each Nostril Daily    ferrous sulfate  325 mg Oral BID    multivitamin  1 tablet Oral Daily    montelukast  10 mg Oral Nightly    metoprolol tartrate  25 mg Oral BID    sodium bicarbonate  325 mg Oral BID    warfarin placeholder: dosing by pharmacy   Other RX Placeholder      sodium chloride       hydrOXYzine pamoate, albuterol sulfate HFA, oxyCODONE, sodium chloride flush, sodium chloride, potassium chloride **OR** 
                             Provider Progress Note        Patient:   Jasmina Powers  YOB: 1935  Age:  89 y.o.  Room:  On license of [facility]07/007-  MRN:  [id number]   Acct: [id number]  PCP: Dimas Watts APRN - CNP    Date of Admission:  9/24/2024 11:03 AM  Date of Service:  9/27/2024    Reason for Consult: Symptom Management and Continuity of Care.    History Obtained From:-  Patient, Family member(s) - Son, Electronic Medical Record, and Patient's primary nurse             Subjective   Jasmina Powers was seen in their room today for follow up with the palliative care team. There was family present at the bedside. Patient is complaining of pain. They have Tylenol 1 g 3 times a day, Senna 8.6 mg twice a day, and Miralax 17 g daily for scheduled symptom relief. From 8 AM yesterday to 8 AM today, they have used Oxycodone IR 10 mg 3 times for as needed symptom relief.  Their comfort medications are working well to control their symptoms.  They did not get a good night sleep last night. The patient is eating or receiving tube feeds. They have not had a bowel movement in the last 24 hours documented.       Objective   Vitals:-    BP (!) 140/71   Pulse (!) 108   Temp 98.5 °F (36.9 °C) (Oral)   Resp 18   Ht 1.676 m (5' 5.98\")   Wt 73.5 kg (162 lb)   SpO2 97%   BMI 26.16 kg/m²     Physical Exam  Vitals and nursing note reviewed.   Constitutional:       General: She is not in acute distress.     Appearance: She is ill-appearing.   HENT:      Head: Normocephalic and atraumatic.      Right Ear: External ear normal.      Left Ear: External ear normal.      Nose: No rhinorrhea.   Eyes:      General:         Right eye: No discharge.         Left eye: No discharge.   Pulmonary:      Effort: Pulmonary effort is normal. No respiratory distress.   Musculoskeletal:      Cervical back: Neck supple.   Skin:     Findings: Wound present.   Neurological:      Mental Status: She is alert.   Psychiatric:         Speech: She is 
    Hospitalist Progress Note    Patient:  Jasmina Powers      Unit/Bed:5K-07/007-A    YOB: 1935    MRN: [id number]       Acct: [id number]     PCP: Dimas Watts APRN - CNP    Date of Admission: 9/24/2024    Assessment/Plan:    1.Non healing wound of right lower leg, with skin necrosis:   -Infectious Disease consulted, appreciate recs   -Dressing changed     2.Physical deconditioning, patient with generalized weakness, per record family not able to take care of her at home want her to be placed in SNF  -PT/OT evaluation and treat     3.Generalized weakness  -Continue to hold Neurontin     4.HFpEF: Not in acute exacerbation  -Last ECHO  5/10/2023 reveals EF 60% with moderately to severely dilated left atrium. Mildly enlarged right atrium size. Mild aortic regurgitation is noted. Mild to moderate aortic stenosis is  present.  -Continue Bumex  -Daily weight,monitor I&Os  -9/27/28, Sodium, potassium, and Cr reviewed, all WNL     5.Essential Hypertension:  -Continue Metoprolol, Amlodipine, Aldactone  -Continue to monitor BP     6.Hyperlipidemia  -Continue Atorvastatin     7.Diabetes Mellitus type 2  -Current blood sugar 91,101  -Monitor glucose     8.Metabolic encephalopathy most likely due to narcotics  -Pain medications adjusted per palliative care team  -Will continue to monitor     Await placement to SNF    Disposition:    [] Home       [] TCU       [] Rehab       [] Psych       [x] SNF       [] Long Term Care Facility       [] Other-    Chief Complaint: Leg pain and weakness       Subjective: 89 y.o. female admitted to the hospitalist service for chronic leg wound. Patient awaiting placement. She denies chest pain. She denies nausea or vomiting.      Medications:    Infusion Medications    sodium chloride       Scheduled Medications    polyethylene glycol  17 g Oral Daily    acetaminophen  1,000 mg Oral TID    senna  1 tablet Oral BID    sodium chloride flush  5-40 mL IntraVENous 2 times per day 
    Hospitalist Progress Note    Patient:  Jasmina Powers      Unit/Bed:5K-07/007-A    YOB: 1935    MRN: [id number]       Acct: [id number]     PCP: Dimas Watts APRN - CNP    Date of Admission: 9/24/2024    Assessment/Plan:    1.Non healing wound of right lower leg, with skin necrosis:   -Infectious Disease consulted, appreciate recs     2.Physical deconditioning, patient with generalized weakness, per record family not able to take care of her at home want her to be placed in SNF  -PT/OT evaluation and treat     3.Generalized weakness  -Continue to hold Neurontin     4.HFpEF: Not in acute exacerbation  -Last ECHO  5/10/2023 reveals EF 60% with moderately to severely dilated left atrium. Mildly enlarged right atrium size. Mild aortic regurgitation is noted. Mild to moderate aortic stenosis is  present.  -Continue Bumex  -Daily weight,monitor I&Os  -9/27/28, Sodium, potassium, and Cr reviewed, all WNL     5.Essential Hypertension:  -Continue Metoprolol, Amlodipine, Aldactone  -Continue to monitor BP     6.Hyperlipidemia  -Continue Atorvastatin     7.Diabetes Mellitus type 2  -Current blood sugar 91,101  -Monitor glucose     8.Metabolic encephalopathy most likely due to narcotics  -Pain medications adjusted per palliative care team  -Will continue to monitor     Await placement to SNF    Disposition:    [] Home       [] TCU       [] Rehab       [] Psych       [x] SNF       [] Long Term Care Facility       [] Other-    Chief Complaint: Leg pain and weakness       Subjective: 89 y.o. female admitted to the hospitalist service for chronic leg wound. Patient awaiting placement. She denies chest pain. She denies nausea or vomiting. She reports not feeling too good.    Medications:    Infusion Medications    sodium chloride       Scheduled Medications    warfarin  2.5 mg Oral Once    polyethylene glycol  17 g Oral Daily    acetaminophen  1,000 mg Oral TID    senna  1 tablet Oral BID    sodium chloride 
    Hospitalist Progress Note    Patient:  Jasmina Powers      Unit/Bed:5K-07/007-A    YOB: 1935    MRN: [id number]       Acct: [id number]     PCP: Dimas Watts APRN - CNP    Date of Admission: 9/24/2024    Assessment/Plan:    1.Non healing wound of right lower leg, with skin necrosis:  Patient was admitted who had  recent infection and bruising related to Coumadin.   Continue analgesics prn   Wound care/ID on consult appreciate input    2.Physical deconditioning, patient with generalized weakness, per record family not able to take care of her at home want her to be placed in SNF  PT/OT evaluation and treat    3.Generalized weakness  Continue to  hold Neurontin    4.HFpEF   Compensated  Last ECHO  5/10/2023 reveals EF 60% with moderately to severely dilated left atrium. Mildly enlarged right atrium size. Mild aortic regurgitation is noted. Mild to moderate aortic stenosis is  present.  Continue Bumex  Daily weight,monitor I&Os    5.Essential Hypertension  Stable  Continue Metoprolol ,Amlodipine,Aldactone  Continue to monitor BP    6.Hyperlipidemia  Continue Atorvastatin    7.Diabetes Mellitus type 2  Current blood sugar 91,101  Monitor glucose    Anticipated Discharge in : pending SNF placement    Active Hospital Problems    Diagnosis Date Noted    Weakness [R53.1] 09/24/2024    Other chronic pain [G89.29] 09/18/2024    Palliative care patient [Z51.5] 09/18/2024    Leg wound, right, sequela [S81.801S] 09/18/2024    Chronic heart failure with preserved ejection fraction (HFpEF) ([facility]) [I50.32] 09/18/2024    Physical deconditioning [R53.81] 08/14/2024    Fall from standing [W19.XXXA] 06/30/2024             Chief Complaint:  leg pain and weakness     Hospital Course: Initial History of Present Illness:   History obtained from the patient and son     The patient is a 89 y.o. female who presents with complaints of worsening right lower extremity pain and weakness.  Patient has a history of A-fib, 
    Hospitalist Progress Note    Patient:  Jasmina Powers      Unit/Bed:5K-07/007-A    YOB: 1935    MRN: [id number]       Acct: [id number]     PCP: Dimas Watts APRN - CNP    Date of Admission: 9/24/2024    Assessment/Plan:    1.Non healing wound of right lower leg, with skin necrosis:   -Infectious Disease consulted, appreciate recs     2.Physical deconditioning, patient with generalized weakness, per record family not able to take care of her at home want her to be placed in SNF  -PT/OT evaluation and treat     3.Generalized weakness  -Continue to hold Neurontin     4.HFpEF: Not in acute exacerbation  -Last ECHO  5/10/2023 reveals EF 60% with moderately to severely dilated left atrium. Mildly enlarged right atrium size. Mild aortic regurgitation is noted. Mild to moderate aortic stenosis is  present.  -Continue Bumex  -Daily weight,monitor I&Os  -9/27/28, Sodium, potassium, and Cr reviewed, all WNL     5.Essential Hypertension:  -Continue Metoprolol, Amlodipine, Aldactone  -Continue to monitor BP     6.Hyperlipidemia  -Continue Atorvastatin     7.Diabetes Mellitus type 2  -Current blood sugar 91,101  -Monitor glucose     8.Metabolic encephalopathy most likely due to narcotics  -Pain medications adjusted per palliative care team  -Will continue to monitor     Await placement to SNF    Disposition:    [] Home       [] TCU       [] Rehab       [] Psych       [x] SNF       [] Long Term Care Facility       [] Other-    Chief Complaint: Leg pain and weakness       Subjective: 89 y.o. female admitted to the hospitalist service for chronic leg wound. Patient awaiting placement. She denies nausea or vomiting. She denies chest pain. She denies shortness of breath.    Medications:    Infusion Medications    sodium chloride 100 mL/hr at 09/29/24 0605    sodium chloride       Scheduled Medications    warfarin  1 mg Oral Once    polyethylene glycol  17 g Oral Daily    acetaminophen  1,000 mg Oral TID    
   09/25/24 1036   Encounter Summary   Encounter Overview/Reason Spiritual/Emotional Needs   Service Provided For Patient   Referral/Consult From Rounding   Support System Children   Last Encounter  09/25/24  (Palliative Care pt)   Complexity of Encounter Moderate   Begin Time 1028   End Time  1036   Total Time Calculated 8 min   Spiritual/Emotional needs   Type Spiritual Support   Assessment/Intervention/Outcome   Assessment Calm;Coping   Intervention Prayer (assurance of)/Roulette;Nurtured Hope;Sustaining Presence/Ministry of presence   Outcome Coping     Assessment:  In my encounter with the 89 yr old Palliative Care patient, while rounding  the unit 5K,  I provided spiritual care to patient through conversation, I also came to assess the patient's spiritual needs present. The pt was admitted due to weakness.     Interventions:  I provided prayer, emotional support and words of comfort.     Outcomes:  The patient was encouraged and didn't share any further spiritual needs at this time.     Plan:  Chaplains will follow-up at a later time for assessment of any spiritual care needs present.  
  Clinical Pharmacy Note                                               Warfarin Discharge Recommendations    Patient discharged from [facility] today on warfarin.    Warfarin indication: Atrial fibrillation/Atrial flutter  INR goal during admission: 2.0-3.0  Previous home warfarin regimen: 5 mg MWF and 2.5 mg TuThSaSu   Interacting medications at discharge: multivitamin  Coumadin 5 mg tabs    Hospital Warfarin Doses & INR Results  Date INR Warfarin Dose   9/24 3.06 HOLD  (Patient and patient's son unable to verify if warfarin was given today; will hold given INR 3.06)    9/25/2024 2.89   5 mg    9/26/2024 2.32  2.5 mg     9/27/2024 2.12   5 mg    9/28/2024  2.39 2.5 mg     9/29/2024 2.64  1 mg      9/30/2024  2.06  5 mg   10/1/24 2.12 2.5 mg       Recent INRs:  Recent Labs     10/01/24  0539   INR 2.12*     Warfarin Discharge Instructions:   Date Warfarin Dose   10/2/24 (tomorrow) Continue Home Dose until evaluated by nursing home provider     Provider dosing warfarin:  Dominique Mendez  Next INR date: per physician at Dominique Watts, PharmD 10/1/2024 10:29 AM  
  Physician Progress Note      PATIENT:               SRUTHI POWERS  CSN #:                  [id number]  :                       1935  ADMIT DATE:       2024 11:03 AM  DISCH DATE:  RESPONDING  PROVIDER #:        Paul Poewrs PA-C          QUERY TEXT:    Pt admitted with non healing right lower leg ulcer. Pt noted to have diabetes   mellitus type 2 and documentation of recent infection and bruising related to   Coumadin. If possible, please document in progress notes and discharge summary   the relationship, if any, between RLE ulcer and the following:    The medical record reflects the following:  Risk Factors: Diabetes mellitus type 2, non healing right lower leg ulcer  Clinical Indicators: presented with right lower leg nonhealing ulcer with skin   necrosis; IM progress notes indicate recent infection and bruising related to   Coumadin; glucose this admission   Treatment: Labs, imaging, monitoring, ID consult, Wound care consult, ECF   placement on discharge  Options provided:  -- Right lower leg non healing ulcer due to diabetes  -- Right lower leg non healing ulcer due to other cause, Please document cause   of right lower leg nonhealing ulcer.  -- Other - I will add my own diagnosis  -- Disagree - Not applicable / Not valid  -- Disagree - Clinically unable to determine / Unknown  -- Refer to Clinical Documentation Reviewer    PROVIDER RESPONSE TEXT:    This patient has Right lower leg non healing ulcer due to    Query created by: Jordana Mary on 2024 1:22 PM      Electronically signed by:  Paul Powers PA-C 2024 4:17 PM          
 [facility]  INPATIENT PHYSICAL THERAPY  DAILY NOTE  [facility] ONC MED 5K - 5K-07/007-A      Discharge Recommendations: Subacte/Skilled Nursing Facility  Equipment Recommendations: No             Time In: 1001  Time Out: 1040  Timed Code Treatment Minutes: 39 Minutes  Minutes: 39          Date: 10/1/2024  Patient Name: Jasmina Powers,  Gender:  female        MRN: [id number]  : 1935  (89 y.o.)     Referring Practitioner: Sabine Dhlilon MD  Diagnosis: Weakness  Additional Pertinent Hx: 89 y.o. female who presents with complaints of worsening right lower extremity pain and weakness.  Patient has a history of A-fib, diabetes and hypertension was just discharged from the hospital on Friday to ECF for nonhealing wound of her right lower leg with skin necrosis.  She was seen by infectious disease and palliative care and completed a course of IV antibiotics.  She was discharged to ECF for wound care including daily Betadine dressing changes.  Son is at bedside stated that insurance only allowed her there for 2 days and she was discharged home.  He states that patient was doing well and able to ambulate intermittently with a walker however became extremely weak today and could not ambulate any further.  She also reports excruciating pain and that her leg is shaking.  Her son states that her pain is well-controlled when she is on her oxycodone every 4 hours however during her transfer and admission process to the ED she has missed her pain medication doses and now is in excruciating pain.     Prior Level of Function:  Lives With: Family (son and DIL)  Type of Home: House  Home Layout: Two level, 1/2 bath on main level, Performs ADL's on one level (bed/bath on 2nd floor)  Home Access: Stairs to enter with rails  Entrance Stairs - Number of Steps: 1  Home Equipment: Cane, Walker - Rolling (stair lift)        Receives Help From: Family  ADL Assistance: Independent  Homemaking Assistance: 
1315: Pt departed the floor via ambulance service and was discharged in stable condition.Pt and son were educated on her hospital course, discharge instructions, and medication changes - they verbalized thier understanding. Pts IV was removed w/o complications and was intact. Pts belongings were accounted for and sent with her. ASH Dumont from [facility] was called report- questions were answered.  
1325: VSC was discontinued  
[facility]  OCCUPATIONAL THERAPY MISSED TREATMENT NOTE  [facility] ONC MED 5K  5K-07/007-A      Date: 2024  Patient Name: Jasmina Powers        CSN: [id number]   : 1935  (89 y.o.)  Gender: female   Referring Practitioner: Sabine Dhillon MD  Diagnosis: weakness         REASON FOR MISSED TREATMENT: Attempt 1: patient eating breakfast, attempt 2: recently declined with PTA d/t wanting to rest because she is cold despite encouragement.  Will attempt next available time               
[facility]  PHYSICAL THERAPY MISSED TREATMENT NOTE  [facility] ONC MED 5K    Date: 2024  Patient Name: Jasmina Powers        MRN: [id number]   : 1935  (89 y.o.)  Gender: female   Referring Practitioner: Sabine Dhillon MD  Diagnosis: Weakness         REASON FOR MISSED TREATMENT:  Patient refused treatment.      Attempted skilled therapy twice. Pt declined therapy services. Pt educated on benefits of mobilizing, Pt's refusal reason was being too cold to participate despite encouragement and blanket offering.  
[facility]  OCCUPATIONAL THERAPY MISSED TREATMENT NOTE  STR ONC MED 5K  5K-07/007-A      Date: 2024  Patient Name: Jasmina Powers        CSN: [id number]   : 1935  (89 y.o.)  Gender: female   Referring Practitioner: Sabine Dhillon MD  Diagnosis: weakness         REASON FOR MISSED TREATMENT: Per nursing hold at this time as pt in increased pain.  OT staff to check back as able/appropriate.               
[facility]  PHYSICAL THERAPY MISSED TREATMENT NOTE  [facility] ONC MED 5K    Date: 2024  Patient Name: Jasmina Powers        MRN: [id number]   : 1935  (89 y.o.)  Gender: female   Referring Practitioner: Sabine Dhillon MD  Diagnosis: Weakness         REASON FOR MISSED TREATMENT:  RN approves patient for PT but cautions this therapists because the patient has had max pain and tearful. RN has been trying to control pain with scheduled and PRN medication in the last hour. Upon arrival to patient's room, patient has eyes closed tight and in visible discomfort. Patient's  states \"she can't do PT right now, not with her pain like this\". Will attempt to follow up as time allows .      Jasmina Estrada, PTA  12:01 PM  2024      
[facility]  OCCUPATIONAL THERAPY MISSED TREATMENT NOTE  [facility] ONC MED 5K  5K-07/007-A      Date: 2024  Patient Name: Jasmina Powers        CSN: [id number]   : 1935  (89 y.o.)  Gender: female   Referring Practitioner: Sabine Dhillon MD  Diagnosis: weakness         REASON FOR MISSED TREATMENT:  nurse okayed session. Pain meds given at 800 and OT entered room at 846. Pt in bed with sitter present. Therapist attempted to get pt EOB but pt crying for the duration of the session d/t pain however pt not reporting pain level, pain location and not answering therapists questions. Therapist educated pt on importance of activity and trying to sit EOB for self care/breakfast. Pt crying/yelling at therapist when therapist touched B Le in prep for transfer. Pt initiating no movement on her own with max cues. Session ended d/t pt noncompliance with therapy and increased pain. Will try back as time allows. Therapist relayed information to doctor via conversation.                  
Obtained report from nurse Felicitas RN.   
[facility]  INPATIENT OCCUPATIONAL THERAPY  STR ONC MED 5K  EVALUATION      Discharge Recommendations: Continue to assess pending progress, Patient would benefit from continued therapy after discharge  Equipment Recommendations: No continue to assess      Time In: 08  Time Out: 906  Timed Code Treatment Minutes: 23 Minutes  Minutes: 32          Date: 2024  Patient Name: Jasmina Powers,   Gender: female      MRN: [id number]  : 1935  (89 y.o.)  Referring Practitioner: Sabine Dhillon MD  Diagnosis: weakness  Additional Pertinent Hx: The patient is a 89 y.o. female who presents with complaints of worsening right lower extremity pain and weakness.  Patient has a history of A-fib, diabetes and hypertension was just discharged from the hospital on Friday to ECF for nonhealing wound of her right lower leg with skin necrosis.  She was seen by infectious disease and palliative care and completed a course of IV antibiotics.  She was discharged to ECF for wound care including daily Betadine dressing changes.  Son is at bedside stated that insurance only allowed her there for 2 days and she was discharged home.  He states that patient was doing well and able to ambulate intermittently with a walker however became extremely weak today and could not ambulate any further.  She also reports excruciating pain and that her leg is shaking.  Her son states that her pain is well-controlled when she is on her oxycodone every 4 hours however during her transfer and admission process to the ED she has missed her pain medication doses and now is in excruciating pain.  Son at bedside states he has been doing her dressing changes at home and was encouraged with her healing process.  Palliative care did see patient in ED and placed her on a pain regimen.    Restrictions/Precautions:  Restrictions/Precautions: Fall Risk, General Precautions    Subjective  Chart Reviewed: Yes, Orders, Progress Notes, History and 
Patient is resting comfortably in bed. Call light is within reach and bed alarm is on.    Vital Signs:  Temp: 97.9  HR: 96  BP: 118/77  SpO2 Sat: 100%, room air.  Pain: 10/10, primary RN notified.    Physical assessment completed. No changes from original assessment.    Hand-off report given to primary RN.  
Patient now agreeable to take medications. 6 pm meds given.  
Patient refused vitals and assessment until her wound dressing was changed. Dressing change to the RLE per the last dressing order's provided at discharge. Non a  
Patient refusing to take 6 pm medications after several attempts. She states \"What is wrong with you, just leave me alone\".   
Pt is Loc to self with clear and appropriate speech. Refused vital signs and perrla, states having no pain, Lip/mucus membranes are pink and moist with out lesions or ulcers. Upper extremities are warm/tan/dry with out any numbness or tingling cap refill and skin turgor less than 3 seconds. Lung movement are symmetrical with no cough. Pulse was strong. Abdomen flat non distended with out pain. Active bowel sounds in all four quadrants. Lower extremities are warm/tan/ dry/ no numbness or tingling. No edema present. Dressing on lowers legs are clean/dry/intact  
Pt is Loc to self with clear and appropriate speech. states having no pain, Lip/mucus membranes are pink and moist with out lesions or ulcers. Upper extremities are warm/tan/dry with out any numbness or tingling cap refill and skin turgor less than 3 seconds. Lung movement are symmetrical with no cough. Pulse was strong and equal. Abdomen flat non distended with out pain. Active bowel sounds in all four quadrants. Lower extremities are warm/tan/ dry/ no numbness or tingling. No edema present. Dressing on lower legs are clean/dry/intact                 
Report from primary RN   
Report given to primary rn  
Report received from primary RN. Patient is resting comfortable in bed and repositioned appropriately.    Vital Signs  Temp: 98.2  Pulse: 76bpm  BP: 123/67  Resp: 16 breaths/min  Pain: 0/10. No numbness or tingling reported.    Physical Assessment:  Patient is alert and oriented x3; not oriented to year, but oriented to person, place, and reason for hospital admission.   Patient reports 0/10 pain with no numbness or tingling.  Pupils are equal, round, and reactive to light with consensual response.  Pulse is 76 bpm with regular rhythm and strong amplitude.  Heart sounds are regular with regular rhythm and strong amplitude.  Lung sounds are clear and equal bilaterally. Respirations are 16 breaths per minute with moderate depth, bilateral symmetry, and regular rhythm.  Upper extremities are tan, warm, and dry. No tingling, numbness, or pain present. Capillary refill and skin tugar are < 3 seconds.  Lower extremities are tan, warm, and dry. Right lower leg dressing changed by physician on 9/27/24. No pain, numbess, or tingling reported. Pedal push and pull are unequal with weakness presenting on the right side. No edema present on the left leg; unable to visually inspect on the right due to dressing change.                 IV sites are non-infiltrated and intact.    Hourly rounds will continue. Bed alarm is on and call light is within reach.    COMPA Chamberlain-  
Reported off to nurse Felicitas RN.   
[facility]  OCCUPATIONAL THERAPY MISSED TREATMENT NOTE  [facility] ONC MED 5K  5K-07/007-A      Date: 2024  Patient Name: Jasmina Powers        CSN: [id number]   : 1935  (89 y.o.)  Gender: female   Referring Practitioner: Sabine Dhillon MD  Diagnosis: weakness         REASON FOR MISSED TREATMENT: Patient Refused.  ; patient seated up in bed upon OT arrival and staring forward and appeared sad/emotional. Patient minimally speaking when asked questions and refusing to sit EOB or attempt to sit in recliner. Edu in importance of increasing activity with patient continuing to refuse. Live sitter present. OT to attempt back another date.                
This Tech notified RN about patient not voiding @ 0200. Also let nurse no that urine in external catheter was there from previous shift, and that patient may need a bladder scan.  
Warfarin Pharmacy Consult Note    Jasmina Powers is a 89 y.o. female for whom pharmacy has been asked to manage warfarin therapy.     Consulting Provider: Dr. Dhillon  Indication: Atrial fibrillation/Atrial flutter  Target INR: 2.0-3.0   Warfarin dose prior to admission: 5 mg MWF, 2.5 mg TuThSaSu  Outpatient warfarin provider: [facility]    Recent Labs     09/24/24  1201   HGB 9.6*   *     Recent Labs     09/24/24  1201   INR 3.06*     Concurrent anticoagulants/antiplatelets: None  Significant warfarin drug-drug interactions: None    Date INR Warfarin Dose   9/24 3.06 HOLD  (Patient and patient's son unable to verify if warfarin was given today; will hold given INR 3.06)                                   INR will be monitored routinely until therapeutic INR is achieved.    Pharmacy will continue to follow. Thank you for the consult,     Sendy Aguila, PharmD  9/24/2024 7:18 PM   
Warfarin Pharmacy Consult Note    Warfarin Indication: Atrial fibrillation/Atrial flutter  Target INR: 2.0-3.0  Dose prior to admission: 5 mg MWF and 2.5 mg TuThSaSu     No results for input(s): \"HGB\", \"PLT\" in the last 72 hours.  Recent Labs     09/28/24  0329 09/29/24  0540 09/30/24  0405   INR 2.39* 2.64* 2.06*     Concurrent anticoagulants/antiplatelets: None  Significant warfarin drug-drug interactions: Multivitamin     Date INR Warfarin Dose   9/24 3.06 HOLD  (Patient and patient's son unable to verify if warfarin was given today; will hold given INR 3.06)    9/25/2024 2.89   5 mg    9/26/2024 2.32  2.5 mg     9/27/2024 2.12   5 mg    9/28/2024  2.39 2.5 mg     9/29/2024 2.64  1 mg      9/30/2024  2.06  5 mg        Monitoring:                   INR will be monitored daily until therapeutic INR is achieved.    **Please contact pharmacy for discharge instructions when indicated**    Sushma Watts, PharmD 9/30/2024 11:10 AM  
Warfarin Pharmacy Consult Note    Warfarin Indication: Atrial fibrillation/Atrial flutter  Target INR: 2.0-3.0  Dose prior to admission: 5 mg MWF and 2.5 mg TuThSaSu     Recent Labs     09/26/24  0819 09/27/24  0803   HGB 9.8* 9.6*   * 415*     Recent Labs     09/26/24  0819 09/27/24  0803 09/28/24  0329   INR 2.32* 2.12* 2.39*     Concurrent anticoagulants/antiplatelets: None  Significant warfarin drug-drug interactions: Multivitamin     Date INR Warfarin Dose   9/24 3.06 HOLD  (Patient and patient's son unable to verify if warfarin was given today; will hold given INR 3.06)    9/25/2024 2.89   5 mg    9/26/2024 2.32  2.5 mg     9/27/2024 2.12   5 mg    9/28/2024  2.39 2.5 mg                        Monitoring:                   INR will be monitored daily until therapeutic INR is achieved.    **Please contact pharmacy for discharge instructions when indicated**    Naren Ford RPh 9/28/2024 8:35 AM      
Warfarin Pharmacy Consult Note    Warfarin Indication: Atrial fibrillation/Atrial flutter  Target INR: 2.0-3.0  Dose prior to admission: 5 mg MWF and 2.5 mg TuThSaSu     Recent Labs     09/27/24  0803   HGB 9.6*   *     Recent Labs     09/27/24  0803 09/28/24  0329 09/29/24  0540   INR 2.12* 2.39* 2.64*     Concurrent anticoagulants/antiplatelets: None  Significant warfarin drug-drug interactions: Multivitamin     Date INR Warfarin Dose   9/24 3.06 HOLD  (Patient and patient's son unable to verify if warfarin was given today; will hold given INR 3.06)    9/25/2024 2.89   5 mg    9/26/2024 2.32  2.5 mg     9/27/2024 2.12   5 mg    9/28/2024  2.39 2.5 mg     9/29/2024 2.64  1 mg                 Monitoring:                   INR will be monitored daily until therapeutic INR is achieved.    **Please contact pharmacy for discharge instructions when indicated**    Naren Ford [facility] 9/29/2024 8:28 AM      
Warfarin Pharmacy Consult Note    Warfarin Indication: Atrial fibrillation/Atrial flutter  Target INR: 2.0-3.0  Dose prior to admission: 5 mg MWF and 2.5 mg TuThSaSu    Recent Labs     09/24/24  1201 09/25/24  0750 09/26/24  0819   HGB 9.6* 9.8* 9.8*   * 448* 416*     Recent Labs     09/24/24  1201 09/25/24  0944 09/26/24  0819   INR 3.06* 2.89* 2.32*     Concurrent anticoagulants/antiplatelets: None  Significant warfarin drug-drug interactions: None     Date INR Warfarin Dose   9/24 3.06 HOLD  (Patient and patient's son unable to verify if warfarin was given today; will hold given INR 3.06)    9/25/2024 2.89   5 mg    9/26/2024 2.32  2.5 mg                                      Monitoring:                   INR will be monitored daily until therapeutic INR is achieved.    **Please contact pharmacy for discharge instructions when indicated**    Finesse Salazar, EliudD, BCPS  9/26/2024  11:54 AM      
Warfarin Pharmacy Consult Note    Warfarin Indication: Atrial fibrillation/Atrial flutter  Target INR: 2.0-3.0  Dose prior to admission: 5 mg MWF and 2.5 mg TuThSaSu    Recent Labs     09/25/24  0750 09/26/24  0819 09/27/24  0803   HGB 9.8* 9.8* 9.6*   * 416* 415*     Recent Labs     09/25/24  0944 09/26/24  0819 09/27/24  0803   INR 2.89* 2.32* 2.12*     Concurrent anticoagulants/antiplatelets: None  Significant warfarin drug-drug interactions: Multivitamin     Date INR Warfarin Dose   9/24 3.06 HOLD  (Patient and patient's son unable to verify if warfarin was given today; will hold given INR 3.06)    9/25/2024 2.89   5 mg    9/26/2024 2.32  2.5 mg     9/27/24 2.12   5 mg                             Monitoring:                   INR will be monitored daily until therapeutic INR is achieved.    **Please contact pharmacy for discharge instructions when indicated**    Finesse Salazar, EliudD, BCPS  9/27/2024  9:59 AM      
Warfarin Pharmacy Consult Note    Warfarin Indication: Atrial fibrillation/Atrial flutter  Target INR: 2.0-3.0  Dose prior to admission: 5mg MWF, 2.5mg TuThSaSu    Recent Labs     09/24/24  1201 09/25/24  0750   HGB 9.6* 9.8*   * 448*     Recent Labs     09/24/24  1201 09/25/24  0944   INR 3.06* 2.89*     Concurrent anticoagulants/antiplatelets: None  Significant warfarin drug-drug interactions: None     Date INR Warfarin Dose   9/24 3.06 HOLD  (Patient and patient's son unable to verify if warfarin was given today; will hold given INR 3.06)    9/25/2024 2.89   5 mg                                               Monitoring:                   INR will be monitored daily until therapeutic INR is achieved.    **Please contact pharmacy for discharge instructions when indicated**    Barbie Hampton [facility] BCPS  9/25/2024 10:27 AM      
[facility]  OCCUPATIONAL THERAPY MISSED TREATMENT NOTE  STR ONC MED 5K  5K-07/007-A      Date: 10/1/2024  Patient Name: Jasmina Powers        CSN: [id number]   : 1935  (89 y.o.)  Gender: female   Referring Practitioner: Sabine Dhillon MD  Diagnosis: weakness         REASON FOR MISSED TREATMENT: Patient Refused.  ; RN approved session, patient supine in bed upon OT arrival and refusing OT stating she is leaving for a SNF at 1300. Edu patient in importance of participating OT and sitting up in recliner with patient stating, \"no\". OT to check back as able and time allows.                
08/14/2024    Fall from standing [W19.XXXA] 06/30/2024             Chief Complaint:  leg pain and weakness     Hospital Course: Initial History of Present Illness:   History obtained from the patient and son     The patient is a 89 y.o. female who presents with complaints of worsening right lower extremity pain and weakness.  Patient has a history of A-fib, diabetes and hypertension was just discharged from the hospital on Friday to F for nonhealing wound of her right lower leg with skin necrosis.  She was seen by infectious disease and palliative care and completed a course of IV antibiotics.  She was discharged to F for wound care including daily Betadine dressing changes.  Son is at bedside stated that insurance only allowed her there for 2 days and she was discharged home.  He states that patient was doing well and able to ambulate intermittently with a walker however became extremely weak today and could not ambulate any further.  She also reports excruciating pain and that her leg is shaking.  Her son states that her pain is well-controlled when she is on her oxycodone every 4 hours however during her transfer and admission process to the ED she has missed her pain medication doses and now is in excruciating pain.  Son at bedside states he has been doing her dressing changes at home and was encouraged with her healing process.  Palliative care did see patient in ED and placed her on a pain regimen.        9/26/24 Patient is reported to have  have confusion  last night was screaming and was agitated.Bedside sitter was engaged for patient's safety.Patient was on Morphine prn that has been discontinued.Pain medications adjusted per palliative care team.Patient's mentation improving  Will continue to monitor  Patient for SNF placement at discharge    Subjective: Patient was resting on the bed during exam was alert with some disorientation, reports pain to right leg rates at 7/10,RN in the room giving pain 
Daily    acetaminophen  1,000 mg Oral TID    senna  1 tablet Oral BID    sodium chloride flush  5-40 mL IntraVENous 2 times per day    amLODIPine  5 mg Oral Daily    atorvastatin  10 mg Oral Daily    bumetanide  0.5 mg Oral Daily    fluticasone  1 spray Each Nostril Daily    ferrous sulfate  325 mg Oral BID    multivitamin  1 tablet Oral Daily    montelukast  10 mg Oral Nightly    metoprolol tartrate  25 mg Oral BID    sodium bicarbonate  325 mg Oral BID    warfarin placeholder: dosing by pharmacy   Other RX Placeholder     PRN Meds: hydrOXYzine pamoate, albuterol sulfate HFA, oxyCODONE, sodium chloride flush, sodium chloride, potassium chloride **OR** potassium alternative oral replacement **OR** potassium chloride, magnesium sulfate, ondansetron **OR** ondansetron, polyethylene glycol, acetaminophen **OR** acetaminophen      Intake/Output Summary (Last 24 hours) at 9/30/2024 0995  Last data filed at 9/30/2024 0351  Gross per 24 hour   Intake 400 ml   Output 500 ml   Net -100 ml       Diet:  ADULT DIET; Regular    Exam:  /75   Pulse 82   Temp 98.2 °F (36.8 °C) (Oral)   Resp 16   Ht 1.676 m (5' 5.98\")   Wt 73.5 kg (162 lb)   SpO2 100%   BMI 26.16 kg/m²     Physical Exam  Constitutional:       Interventions: She is not intubated.  Pulmonary:      Effort: She is not intubated.   Neurological:      Mental Status: She is alert.   Psychiatric:         Speech: She is communicative.        Labs:   No results for input(s): \"WBC\", \"HGB\", \"HCT\", \"PLT\" in the last 72 hours.    No results for input(s): \"NA\", \"K\", \"CL\", \"CO2\", \"BUN\", \"CREATININE\", \"CALCIUM\", \"PHOS\" in the last 72 hours.    Invalid input(s): \"MAGNES\"    No results for input(s): \"AST\", \"ALT\", \"BILIDIR\", \"BILITOT\", \"ALKPHOS\" in the last 72 hours.    Recent Labs     09/28/24  0329 09/29/24  0540 09/30/24  0405   INR 2.39* 2.64* 2.06*     No results for input(s): \"CKTOTAL\", \"TROPONINI\" in the last 72 hours.      Urinalysis:      Lab Results   Component 
Functional Status:  Not Applicable    ASSESSMENT:  Activity Tolerance:  Patient tolerance of treatment:Fair. Pt is demo confusion and required reorientation at times. She is perseverating on calling her son- did let RN know.     Treatment Initiated: Treatment and education initiated within context of evaluation.  Evaluation time included review of current medical information, gathering information related to past medical, social and functional history, completion of standardized testing, formal and informal observation of tasks, assessment of data and development of plan of care and goals.  Treatment time included skilled education and facilitation of tasks to increase safety and independence with functional mobility for improved independence and quality of life.    Assessment:  Body Structures, Functions, Activity Limitations Requiring Skilled Therapeutic Intervention: Decreased functional mobility , Decreased strength, Decreased endurance, Decreased balance, Decreased posture  Assessment: Jasmina Powers is a 89 y.o. female that presents with weakness. Pt demonstrates a decrease in baseline by way of bed mobility, transfers and ambulation secondary to decreased activity tolerance, strength, fatigue, and balance deficits. Pt will benefit from skilled PT services throughout admission and beyond hospital discharge for improvements in functional mobility and in order to decrease fall risk and return pt to PLOF.   Therapy Prognosis: Good    Requires PT Follow-Up: Yes    Patient Education:      .    Patient Education  Education Given To: Patient  Education Provided: Role of Therapy, Plan of Care  Education Method: Verbal  Barriers to Learning: Cognition  Education Outcome: Verbalized understanding       Plan:  Current Treatment Recommendations: Strengthening, Balance training, Functional mobility training, Transfer training, Endurance training, Gait training, Stair training, Neuromuscular re-education, Home exercise

## 2024-10-01 NOTE — DISCHARGE INSTRUCTIONS
Right lower leg:  Clean wound with saline, pad with bethadine   Apply vaseline soaked or adaptic and cover with ABD kerlix and ace wrap  Change right lower leg wound daily and as needed  Call Dr Lopez if there are issues

## 2024-10-02 ENCOUNTER — CARE COORDINATION (OUTPATIENT)
Dept: CARE COORDINATION | Age: 89
End: 2024-10-02

## 2024-10-02 ENCOUNTER — CARE COORDINATION (OUTPATIENT)
Dept: CASE MANAGEMENT | Age: 89
End: 2024-10-02

## 2024-10-02 NOTE — CARE COORDINATION
Pt admitted from 9/24/24-10/1/24.  Pt was discharged to [facility].    CT program will be closed and CTN signing off.      Gaye Cid RN

## 2024-10-21 ENCOUNTER — TELEPHONE (OUTPATIENT)
Dept: WOUND CARE | Age: 89
End: 2024-10-21

## 2024-10-21 NOTE — TELEPHONE ENCOUNTER
Received a call from Katya at [facility] stating patient has a new wound on her left posterior calf (1.5x.6x.1) that they would like Dr. Lopez to look at on Wednesday at her appointment.

## 2024-10-22 DIAGNOSIS — I48.19 PERSISTENT ATRIAL FIBRILLATION (HCC): ICD-10-CM

## 2024-10-22 DIAGNOSIS — Z79.01 ANTICOAGULATED ON COUMADIN: ICD-10-CM

## 2024-10-22 RX ORDER — WARFARIN SODIUM 5 MG/1
TABLET ORAL
Qty: 105 TABLET | Refills: 3 | Status: SHIPPED | OUTPATIENT
Start: 2024-10-22

## 2024-10-22 NOTE — TELEPHONE ENCOUNTER
Recent Visits  Date Type Provider Dept   09/10/24 Office Visit Mac Dimas, APRN - CNP [facility]   05/30/24 Office Visit Mac Dimas, APRN - CNP [facility]   04/18/24 Office Visit Mac Dimas, APRN - CNP [facility]   02/26/24 Office Visit Mac Dimas, APRN - CNP [facility]   08/23/23 Office Visit Dimas Watts APRN - CNP [facility]   Showing recent visits within past 540 days with a meds authorizing provider and meeting all other requirements  Future Appointments  Date Type Provider Dept   02/10/25 Appointment Dimas Watts APRN - CNP [facility]   Showing future appointments within next 150 days with a meds authorizing provider and meeting all other requirements

## 2024-10-23 ENCOUNTER — HOSPITAL ENCOUNTER (OUTPATIENT)
Dept: WOUND CARE | Age: 89
Discharge: HOME | End: 2024-10-23
Attending: INTERNAL MEDICINE
Payer: MEDICARE

## 2024-10-23 VITALS
RESPIRATION RATE: 16 BRPM | TEMPERATURE: 97.9 F | HEART RATE: 79 BPM | SYSTOLIC BLOOD PRESSURE: 120 MMHG | OXYGEN SATURATION: 98 % | DIASTOLIC BLOOD PRESSURE: 69 MMHG

## 2024-10-23 DIAGNOSIS — L03.115 CELLULITIS OF RIGHT LOWER EXTREMITY: Primary | ICD-10-CM

## 2024-10-23 PROCEDURE — 99214 OFFICE O/P EST MOD 30 MIN: CPT

## 2024-10-23 RX ORDER — SODIUM CHLOR/HYPOCHLOROUS ACID 0.033 %
SOLUTION, IRRIGATION IRRIGATION ONCE
Status: CANCELLED | OUTPATIENT
Start: 2024-10-23 | End: 2024-10-23

## 2024-10-23 RX ORDER — BACITRACIN ZINC AND POLYMYXIN B SULFATE 500; 1000 [USP'U]/G; [USP'U]/G
OINTMENT TOPICAL ONCE
OUTPATIENT
Start: 2024-10-23 | End: 2024-10-23

## 2024-10-23 RX ORDER — GENTAMICIN SULFATE 1 MG/G
OINTMENT TOPICAL ONCE
Status: CANCELLED | OUTPATIENT
Start: 2024-10-23 | End: 2024-10-23

## 2024-10-23 RX ORDER — GENTAMICIN SULFATE 1 MG/G
OINTMENT TOPICAL ONCE
OUTPATIENT
Start: 2024-10-23 | End: 2024-10-23

## 2024-10-23 RX ORDER — CLOBETASOL PROPIONATE 0.5 MG/G
OINTMENT TOPICAL ONCE
OUTPATIENT
Start: 2024-10-23 | End: 2024-10-23

## 2024-10-23 RX ORDER — NEOMYCIN/BACITRACIN/POLYMYXINB 3.5-400-5K
OINTMENT (GRAM) TOPICAL ONCE
Status: CANCELLED | OUTPATIENT
Start: 2024-10-23 | End: 2024-10-23

## 2024-10-23 RX ORDER — LIDOCAINE 50 MG/G
OINTMENT TOPICAL ONCE
Status: CANCELLED | OUTPATIENT
Start: 2024-10-23 | End: 2024-10-23

## 2024-10-23 RX ORDER — LIDOCAINE 50 MG/G
OINTMENT TOPICAL ONCE
OUTPATIENT
Start: 2024-10-23 | End: 2024-10-23

## 2024-10-23 RX ORDER — LIDOCAINE HYDROCHLORIDE 20 MG/ML
JELLY TOPICAL ONCE
OUTPATIENT
Start: 2024-10-23 | End: 2024-10-23

## 2024-10-23 RX ORDER — BACITRACIN ZINC AND POLYMYXIN B SULFATE 500; 1000 [USP'U]/G; [USP'U]/G
OINTMENT TOPICAL ONCE
Status: CANCELLED | OUTPATIENT
Start: 2024-10-23 | End: 2024-10-23

## 2024-10-23 RX ORDER — NEOMYCIN/BACITRACIN/POLYMYXINB 3.5-400-5K
OINTMENT (GRAM) TOPICAL ONCE
OUTPATIENT
Start: 2024-10-23 | End: 2024-10-23

## 2024-10-23 RX ORDER — TRIAMCINOLONE ACETONIDE 1 MG/G
OINTMENT TOPICAL ONCE
OUTPATIENT
Start: 2024-10-23 | End: 2024-10-23

## 2024-10-23 RX ORDER — CLOBETASOL PROPIONATE 0.5 MG/G
OINTMENT TOPICAL ONCE
Status: CANCELLED | OUTPATIENT
Start: 2024-10-23 | End: 2024-10-23

## 2024-10-23 RX ORDER — LIDOCAINE 40 MG/G
CREAM TOPICAL ONCE
OUTPATIENT
Start: 2024-10-23 | End: 2024-10-23

## 2024-10-23 RX ORDER — SILVER SULFADIAZINE 10 MG/G
CREAM TOPICAL ONCE
OUTPATIENT
Start: 2024-10-23 | End: 2024-10-23

## 2024-10-23 RX ORDER — GINSENG 100 MG
CAPSULE ORAL ONCE
Status: CANCELLED | OUTPATIENT
Start: 2024-10-23 | End: 2024-10-23

## 2024-10-23 RX ORDER — SILVER SULFADIAZINE 10 MG/G
CREAM TOPICAL ONCE
Status: CANCELLED | OUTPATIENT
Start: 2024-10-23 | End: 2024-10-23

## 2024-10-23 RX ORDER — SENNOSIDES A AND B 8.6 MG/1
1 TABLET, FILM COATED ORAL 2 TIMES DAILY
COMMUNITY

## 2024-10-23 RX ORDER — LIDOCAINE HYDROCHLORIDE 40 MG/ML
SOLUTION TOPICAL ONCE
Status: CANCELLED | OUTPATIENT
Start: 2024-10-23 | End: 2024-10-23

## 2024-10-23 RX ORDER — GINSENG 100 MG
CAPSULE ORAL ONCE
OUTPATIENT
Start: 2024-10-23 | End: 2024-10-23

## 2024-10-23 RX ORDER — BETAMETHASONE DIPROPIONATE 0.5 MG/G
CREAM TOPICAL ONCE
Status: CANCELLED | OUTPATIENT
Start: 2024-10-23 | End: 2024-10-23

## 2024-10-23 RX ORDER — POTASSIUM CHLORIDE 750 MG/1
10 TABLET, EXTENDED RELEASE ORAL DAILY
COMMUNITY

## 2024-10-23 RX ORDER — LIDOCAINE HYDROCHLORIDE 20 MG/ML
JELLY TOPICAL ONCE
Status: CANCELLED | OUTPATIENT
Start: 2024-10-23 | End: 2024-10-23

## 2024-10-23 RX ORDER — SODIUM CHLOR/HYPOCHLOROUS ACID 0.033 %
SOLUTION, IRRIGATION IRRIGATION ONCE
OUTPATIENT
Start: 2024-10-23 | End: 2024-10-23

## 2024-10-23 RX ORDER — TRIAMCINOLONE ACETONIDE 1 MG/G
OINTMENT TOPICAL ONCE
Status: CANCELLED | OUTPATIENT
Start: 2024-10-23 | End: 2024-10-23

## 2024-10-23 RX ORDER — BETAMETHASONE DIPROPIONATE 0.5 MG/G
CREAM TOPICAL ONCE
OUTPATIENT
Start: 2024-10-23 | End: 2024-10-23

## 2024-10-23 RX ORDER — MUPIROCIN 20 MG/G
OINTMENT TOPICAL ONCE
Status: CANCELLED | OUTPATIENT
Start: 2024-10-23 | End: 2024-10-23

## 2024-10-23 RX ORDER — MUPIROCIN 20 MG/G
OINTMENT TOPICAL ONCE
OUTPATIENT
Start: 2024-10-23 | End: 2024-10-23

## 2024-10-23 RX ORDER — LIDOCAINE HYDROCHLORIDE 40 MG/ML
SOLUTION TOPICAL ONCE
OUTPATIENT
Start: 2024-10-23 | End: 2024-10-23

## 2024-10-23 RX ORDER — LIDOCAINE 40 MG/G
CREAM TOPICAL ONCE
Status: CANCELLED | OUTPATIENT
Start: 2024-10-23 | End: 2024-10-23

## 2024-10-23 ASSESSMENT — PAIN DESCRIPTION - ORIENTATION: ORIENTATION: RIGHT

## 2024-10-23 ASSESSMENT — PAIN DESCRIPTION - LOCATION: LOCATION: LEG

## 2024-10-23 ASSESSMENT — PAIN SCALES - GENERAL: PAINLEVEL_OUTOF10: 7

## 2024-10-23 NOTE — PROGRESS NOTES
[facility]          Progress Note and Procedure Note      Jasmina Powers  MEDICAL RECORD NUMBER:  [id number]  AGE: 89 y.o.   GENDER: female  : 1935  EPISODE DATE:  10/23/2024    Subjective:     SUBJECTIVE     Chief Complaint   Patient presents with    Wound Check           HISTORY OF PRESENT ILLNESS      Jasmina Powers is a 89 y.o. female who presents today for wound/ulcer evaluation.  She has multiple wounds on her right lower leg following infection with Pseudomonas and bruising to the skin from anticoagulation use the leg wound has formed an eschar the tissues are starting to separate.  She is currently at the nursing home she was accompanied by her son and daughter-in-law patient has not been eating  very well during the evaluation she was noted to have a new wound on the left lateral calf with bruising of the skin.  There were no other issues  .  PAST MEDICAL HISTORY         Diagnosis Date    Allergic rhinitis     Aortic stenosis, mild to moderate     ASHD (arteriosclerotic heart disease)     Atrial fibrillation (HCC)     COPD (chronic obstructive pulmonary disease) (HCC)     DM2 (diabetes mellitus, type 2) (HCC)     Dyslipidemia     GERD (gastroesophageal reflux disease)     Heart failure with preserved ejection fraction (HCC)     Hypertension, essential          PAST SURGICAL HISTORY     History reviewed. No pertinent surgical history.  FAMILY HISTORY         Family History   Problem Relation Age of Onset    Cancer Mother         breast cancer    Diabetes Mother      SOCIAL HISTORY       Social History     Tobacco Use    Smoking status: Former     Current packs/day: 0.00     Types: Cigarettes     Quit date: 1978     Years since quittin.3    Smokeless tobacco: Never   Vaping Use    Vaping status: Never Used   Substance Use Topics    Alcohol use: Not Currently     Alcohol/week: 1.0 standard drink of alcohol     Types: 1 Cans of beer per week    Drug use: No     ALLERGIES

## 2024-10-23 NOTE — PATIENT INSTRUCTIONS
Visit Discharge/Physician Orders:  - Patient needs to be up and walking at the facility. She has no restrictions to be up walking.     [facility]: [facility]    Wound Location: Bilateral lower legs    Dressing orders:     1) Gather wound care supplies and arrange on clean table.     2) Wash your hands with soap and water or use alcohol based hand  for 20 seconds (sing \"Happy Birthday\" twice).    3) Cleanse wounds with normal saline or wound cleanser and gauze. Pat dry with clean gauze.    4) Right leg- Apply lotion to dry, healed skin around wound.  Apply adaptic/Xeroform to wound. Cover with ABD. Wrap with kerlix then ace wrap. Need to start wrap at base of toes up 1-2 inches below the bend of the knee. Change daily.     5) Left posterior leg-  Apply skin prep to gonzalo-wound skin.  Cover with foam dressing.  Change every other day.    Keep all dressings clean & dry.    Follow up visit: 3 Weeks on Wednesday November 13, 2024 @ 9 am    Keep next scheduled appointment. Please give 24 hour notice if unable to keep appointment. [phone]    If you experience any of the following, please call the Wound Care Service during business hours: Monday through Friday 8:00 am - 4:30 pm  ([phone]).   *Increase in pain   *Temperature over 101   *Increase in drainage from your wound or a foul odor   *Uncontrolled swelling   *Need for compression bandage changes due to slippage, breakthrough drainage    If you need medical attention outside of business hours, please contact your Primary Care Doctor or go to the nearest emergency room.

## 2024-10-23 NOTE — PROGRESS NOTES
[facility]  [address]   [place], [place] [address]  Telephone: [phone]     FAX [phone]      [facility]: [facility] Phone # [phone], Fax # [phone]    Patient Instructions   Visit Discharge/Physician Orders:  - Patient needs to be up and walking at the facility. She has no restrictions to be up walking.     [facility]: [facility]    Wound Location: Bilateral lower legs    Dressing orders:     1) Gather wound care supplies and arrange on clean table.     2) Wash your hands with soap and water or use alcohol based hand  for 20 seconds (sing \"Happy Birthday\" twice).    3) Cleanse wounds with normal saline or wound cleanser and gauze. Pat dry with clean gauze.    4) Right leg- Apply lotion to dry, healed skin around wound.  Apply adaptic/Xeroform to wound. Cover with ABD. Wrap with kerlix then ace wrap. Need to start wrap at base of toes up 1-2 inches below the bend of the knee. Change daily.     5) Left posterior leg-  Apply skin prep to gonzalo-wound skin.  Cover with foam dressing.  Change every other day.    Keep all dressings clean & dry.    Follow up visit: 3 Weeks on Wednesday November 13, 2024 @ 9 am    Keep next scheduled appointment. Please give 24 hour notice if unable to keep appointment. [phone]    If you experience any of the following, please call the Wound Care Service during business hours: Monday through Friday 8:00 am - 4:30 pm  ([phone]).   *Increase in pain   *Temperature over 101   *Increase in drainage from your wound or a foul odor   *Uncontrolled swelling   *Need for compression bandage changes due to slippage, breakthrough drainage    If you need medical attention outside of business hours, please contact your Primary Care Doctor or go to the nearest emergency room.               Skilled nurse to evaluate and treat for wound care. Change dressing as ordered. Patient/Family/caregiver may change dressings as needed

## 2024-10-23 NOTE — PLAN OF CARE
Problem: Cognitive:  Goal: Knowledge of wound care  Description: Knowledge of wound care  Outcome: Progressing  Goal: Understands risk factors for wounds  Description: Understands risk factors for wounds  Outcome: Progressing     Problem: Wound:  Goal: Will show signs of wound healing; wound closure and no evidence of infection  Description: Will show signs of wound healing; wound closure and no evidence of infection  Outcome: Progressing   Seen today in wound clinic.  See AVS for discharge instructions.    Care plan reviewed with patient and son.  Patient and son verbalize understanding of the plan of care and contribute to goal setting.
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
## 2024-10-30 ENCOUNTER — OFFICE VISIT (OUTPATIENT)
Dept: PALLATIVE CARE | Age: 89
End: 2024-10-30

## 2024-10-30 VITALS
RESPIRATION RATE: 16 BRPM | SYSTOLIC BLOOD PRESSURE: 98 MMHG | HEART RATE: 56 BPM | OXYGEN SATURATION: 99 % | DIASTOLIC BLOOD PRESSURE: 50 MMHG

## 2024-10-30 DIAGNOSIS — Z51.5 PALLIATIVE CARE PATIENT: ICD-10-CM

## 2024-10-30 DIAGNOSIS — I50.32 CHRONIC HEART FAILURE WITH PRESERVED EJECTION FRACTION (HFPEF) (HCC): Primary | ICD-10-CM

## 2024-10-30 DIAGNOSIS — N18.31 TYPE 2 DIABETES MELLITUS WITH STAGE 3A CHRONIC KIDNEY DISEASE, WITHOUT LONG-TERM CURRENT USE OF INSULIN (HCC): ICD-10-CM

## 2024-10-30 DIAGNOSIS — G89.29 OTHER CHRONIC PAIN: ICD-10-CM

## 2024-10-30 DIAGNOSIS — S81.801S LEG WOUND, RIGHT, SEQUELA: ICD-10-CM

## 2024-10-30 DIAGNOSIS — R53.81 PHYSICAL DECONDITIONING: ICD-10-CM

## 2024-10-30 DIAGNOSIS — E11.22 TYPE 2 DIABETES MELLITUS WITH STAGE 3A CHRONIC KIDNEY DISEASE, WITHOUT LONG-TERM CURRENT USE OF INSULIN (HCC): ICD-10-CM

## 2024-10-30 RX ORDER — AMMONIUM LACTATE 12 G/100G
LOTION TOPICAL PRN
COMMUNITY

## 2024-10-30 RX ORDER — MEDICAL SUPPLY, MISCELLANEOUS
EACH MISCELLANEOUS
COMMUNITY

## 2024-10-30 RX ORDER — DRONABINOL 2.5 MG/1
2.5 CAPSULE ORAL
COMMUNITY

## 2024-10-30 RX ORDER — HYDROXYZINE PAMOATE 25 MG/1
25 CAPSULE ORAL 3 TIMES DAILY PRN
COMMUNITY

## 2024-10-30 NOTE — PROGRESS NOTES
Jasmina Powers is a 89 y.o. female who presents to the palliative care clinic today for:  Chief Complaint   Patient presents with    Follow-up     Hospital follow up, Denies pain       HPI:   Jasmina Powers presents to the palliative care clinic today For follow-up of symptoms related to her chronic diseases.    Symptoms:  Pain:   Location- Right leg  Duration- Their pain is getting worse.  Current Treatments- They have Tylenol 1 g three times per day for scheduled pain relief. From 8 AM yesterday to 8 AM today, they have used Oxycodone IR 10 mg Three times per day for as needed pain relief.  Effectiveness of Current Treatment- Their pain medications are not working well to control their pain.  Appetite: Patient reports poor appetite.        Current Outpatient Medications   Medication Sig Dispense Refill    droNABinol (MARINOL) 2.5 MG capsule Take 1 capsule by mouth 2 times daily (before meals). Max Daily Amount: 5 mg      hydrOXYzine pamoate (VISTARIL) 25 MG capsule Take 1 capsule by mouth 3 times daily as needed for Itching      Nutritional Supplements (NUTRITIONAL DRINK) LIQD Take by mouth      ammonium lactate (LAC-HYDRIN) 12 % lotion Apply topically as needed for Dry Skin Apply topically as needed.      potassium chloride (KLOR-CON M) 10 MEQ extended release tablet Take 1 tablet by mouth daily      senna (SENOKOT) 8.6 MG tablet Take 1 tablet by mouth 2 times daily      SODIUM CHLORIDE PO Take 1 tablet by mouth in the morning and 1 tablet in the evening. 1 Gm.      warfarin (COUMADIN) 5 MG tablet TAKE AS DIRECTED BY [facility] COU[facility] (35 TABLETS EQUAL 30 DAY SUPPLY) 5 mg daily, except 7.5 mg Mon and Tues From coumadin clinic patient alternating between 2.5mg and 5mg tablets. 105 tablet 3    oxyCODONE HCl (OXY-IR) 10 MG immediate release tablet Take 1 tablet by mouth every 4 hours as needed for Pain for up to 30 days. Max Daily Amount: 60 mg 180 tablet 0    acetaminophen (TYLENOL) 500 MG tablet Take

## 2024-10-30 NOTE — PATIENT INSTRUCTIONS
Continue current plan of care for acute and chronic conditions per primary and specialist teams  Continue Oxycodone IR 10 mg every 4 hours as needed for breakthrough pain  Continue Tylenol 1 g three times a day scheduled for  pain  Continue Senna and Miralax for constipation  PDMP reviewed  Please call the office if your symptoms worsen or if you were to develop new symptoms  Please call the palliative care office when you need refills

## 2024-11-04 ENCOUNTER — TELEPHONE (OUTPATIENT)
Dept: WOUND CARE | Age: 89
End: 2024-11-04

## 2024-11-04 NOTE — TELEPHONE ENCOUNTER
Sent this message to Dr Lopez, he responded that patient's son is tesxting him for daily dressing orders. Called Charlene back at [facility], no answer. Voicemail left.

## 2024-11-04 NOTE — TELEPHONE ENCOUNTER
----- Message from Karina MYERS sent at 11/4/2024  1:38 PM EST -----   [phone] CHAGO(WOUND CARE NURSE) @ [facility]. SHE HAS A NEW PRESSURE WOUND ON LEFT POSTERIOR LOWER EXTREMITY, STAGE 3 FULL THICKNESS WOUND. BEEN MONITORING IT WEEKLY, TAKING MEASUREMENTS, NOTICED IT HAS INCREASED IN SIZE AND AT 90% SLOUGH TISSUE. ALL THERE ARE DOING, OTHER THAN THE INTERVENTIONS OF THE LOW AIR LOSS MATTRESS AND OFF LOADING. APPLYING SILICONE FOAM DRESSING, CHANGING EVERY 3 DAYS. WANT TO KNOW IF DR MONCADA WOULD BE AGREEABLE TO TRYING AN AUTOLYTIC DRESSING? SUGGESTING USING [employer] 1 TIME A DAY.

## 2024-11-05 ENCOUNTER — TELEPHONE (OUTPATIENT)
Dept: WOUND CARE | Age: 89
End: 2024-11-05

## 2024-11-05 NOTE — TELEPHONE ENCOUNTER
Charlene returned call. Updated her that Dr Lopez responded ok and that he was texting the son. Told her to reach out to patient's son. Verbalized understanding.

## 2024-11-07 DIAGNOSIS — I10 ESSENTIAL HYPERTENSION: ICD-10-CM

## 2024-11-08 RX ORDER — METOPROLOL TARTRATE 25 MG/1
25 TABLET, FILM COATED ORAL 2 TIMES DAILY
Qty: 180 TABLET | Refills: 0 | Status: SHIPPED | OUTPATIENT
Start: 2024-11-08

## 2024-11-12 ENCOUNTER — APPOINTMENT (OUTPATIENT)
Dept: GENERAL RADIOLOGY | Age: 89
DRG: 593 | End: 2024-11-12
Payer: MEDICARE

## 2024-11-12 ENCOUNTER — HOSPITAL ENCOUNTER (INPATIENT)
Age: 89
LOS: 5 days | Discharge: NURSING, REHAB OR LONG-TERM CARE | DRG: 593 | End: 2024-11-18
Attending: STUDENT IN AN ORGANIZED HEALTH CARE EDUCATION/TRAINING PROGRAM
Payer: MEDICARE

## 2024-11-12 DIAGNOSIS — Z51.5 PALLIATIVE CARE PATIENT: ICD-10-CM

## 2024-11-12 DIAGNOSIS — E83.59 CALCIPHYLAXIS OF LEFT LOWER EXTREMITY WITH NONHEALING ULCER, UNSPECIFIED ULCER STAGE (HCC): ICD-10-CM

## 2024-11-12 DIAGNOSIS — L03.116 CELLULITIS OF LEFT LOWER EXTREMITY: Primary | ICD-10-CM

## 2024-11-12 DIAGNOSIS — R79.89 ELEVATED TROPONIN: ICD-10-CM

## 2024-11-12 DIAGNOSIS — R52 INTRACTABLE PAIN: ICD-10-CM

## 2024-11-12 DIAGNOSIS — L03.115 CELLULITIS OF RIGHT LOWER EXTREMITY: ICD-10-CM

## 2024-11-12 DIAGNOSIS — L97.929 CALCIPHYLAXIS OF LEFT LOWER EXTREMITY WITH NONHEALING ULCER, UNSPECIFIED ULCER STAGE (HCC): ICD-10-CM

## 2024-11-12 DIAGNOSIS — G89.29 OTHER CHRONIC PAIN: ICD-10-CM

## 2024-11-12 DIAGNOSIS — I50.32 CHRONIC HEART FAILURE WITH PRESERVED EJECTION FRACTION (HFPEF) (HCC): ICD-10-CM

## 2024-11-12 LAB
ALBUMIN SERPL BCG-MCNC: 2.8 G/DL (ref 3.5–5.1)
ALP SERPL-CCNC: 105 U/L (ref 38–126)
ALT SERPL W/O P-5'-P-CCNC: 12 U/L (ref 11–66)
ANION GAP SERPL CALC-SCNC: 16 MEQ/L (ref 8–16)
APTT PPP: 34.1 SECONDS (ref 22–38)
AST SERPL-CCNC: 18 U/L (ref 5–40)
BASOPHILS ABSOLUTE: 0 THOU/MM3 (ref 0–0.1)
BASOPHILS NFR BLD AUTO: 0.3 %
BILIRUB SERPL-MCNC: 0.6 MG/DL (ref 0.3–1.2)
BUN SERPL-MCNC: 19 MG/DL (ref 7–22)
CALCIUM SERPL-MCNC: 9.5 MG/DL (ref 8.5–10.5)
CHLORIDE SERPL-SCNC: 99 MEQ/L (ref 98–111)
CO2 SERPL-SCNC: 22 MEQ/L (ref 23–33)
CREAT SERPL-MCNC: 0.8 MG/DL (ref 0.4–1.2)
DEPRECATED RDW RBC AUTO: 45 FL (ref 35–45)
EOSINOPHIL NFR BLD AUTO: 1.6 %
EOSINOPHILS ABSOLUTE: 0.1 THOU/MM3 (ref 0–0.4)
ERYTHROCYTE [DISTWIDTH] IN BLOOD BY AUTOMATED COUNT: 18.4 % (ref 11.5–14.5)
GFR SERPL CREATININE-BSD FRML MDRD: 71 ML/MIN/1.73M2
GLUCOSE SERPL-MCNC: 124 MG/DL (ref 70–108)
HCT VFR BLD AUTO: 32.1 % (ref 37–47)
HGB BLD-MCNC: 10.1 GM/DL (ref 12–16)
IMM GRANULOCYTES # BLD AUTO: 0.03 THOU/MM3 (ref 0–0.07)
IMM GRANULOCYTES NFR BLD AUTO: 0.4 %
INR PPP: 1.33 (ref 0.85–1.13)
LACTIC ACID, SEPSIS: 2.1 MMOL/L (ref 0.5–1.9)
LYMPHOCYTES ABSOLUTE: 2.7 THOU/MM3 (ref 1–4.8)
LYMPHOCYTES NFR BLD AUTO: 35.8 %
MCH RBC QN AUTO: 22.2 PG (ref 26–33)
MCHC RBC AUTO-ENTMCNC: 31.5 GM/DL (ref 32.2–35.5)
MCV RBC AUTO: 70.7 FL (ref 81–99)
MONOCYTES ABSOLUTE: 0.7 THOU/MM3 (ref 0.4–1.3)
MONOCYTES NFR BLD AUTO: 9.8 %
NEUTROPHILS ABSOLUTE: 3.9 THOU/MM3 (ref 1.8–7.7)
NEUTROPHILS NFR BLD AUTO: 52.1 %
NRBC BLD AUTO-RTO: 0 /100 WBC
NT-PROBNP SERPL IA-MCNC: 885.6 PG/ML (ref 0–449)
OSMOLALITY SERPL CALC.SUM OF ELEC: 277.5 MOSMOL/KG (ref 275–300)
PLATELET # BLD AUTO: 234 THOU/MM3 (ref 130–400)
PMV BLD AUTO: 9.4 FL (ref 9.4–12.4)
POTASSIUM SERPL-SCNC: 3.6 MEQ/L (ref 3.5–5.2)
PROT SERPL-MCNC: 6.7 G/DL (ref 6.1–8)
RBC # BLD AUTO: 4.54 MILL/MM3 (ref 4.2–5.4)
REASON FOR REJECTION: NORMAL
REJECTED TEST: NORMAL
SODIUM SERPL-SCNC: 137 MEQ/L (ref 135–145)
TROPONIN, HIGH SENSITIVITY: 81 NG/L (ref 0–12)
TSH SERPL DL<=0.005 MIU/L-ACNC: 0.94 UIU/ML (ref 0.4–4.2)
WBC # BLD AUTO: 7.5 THOU/MM3 (ref 4.8–10.8)

## 2024-11-12 PROCEDURE — 85610 PROTHROMBIN TIME: CPT

## 2024-11-12 PROCEDURE — 83880 ASSAY OF NATRIURETIC PEPTIDE: CPT

## 2024-11-12 PROCEDURE — 2580000003 HC RX 258: Performed by: STUDENT IN AN ORGANIZED HEALTH CARE EDUCATION/TRAINING PROGRAM

## 2024-11-12 PROCEDURE — 96375 TX/PRO/DX INJ NEW DRUG ADDON: CPT

## 2024-11-12 PROCEDURE — 73590 X-RAY EXAM OF LOWER LEG: CPT

## 2024-11-12 PROCEDURE — 93005 ELECTROCARDIOGRAM TRACING: CPT | Performed by: STUDENT IN AN ORGANIZED HEALTH CARE EDUCATION/TRAINING PROGRAM

## 2024-11-12 PROCEDURE — 6360000002 HC RX W HCPCS: Performed by: STUDENT IN AN ORGANIZED HEALTH CARE EDUCATION/TRAINING PROGRAM

## 2024-11-12 PROCEDURE — 85730 THROMBOPLASTIN TIME PARTIAL: CPT

## 2024-11-12 PROCEDURE — 85025 COMPLETE CBC W/AUTO DIFF WBC: CPT

## 2024-11-12 PROCEDURE — 84484 ASSAY OF TROPONIN QUANT: CPT

## 2024-11-12 PROCEDURE — 80053 COMPREHEN METABOLIC PANEL: CPT

## 2024-11-12 PROCEDURE — 96374 THER/PROPH/DIAG INJ IV PUSH: CPT

## 2024-11-12 PROCEDURE — 84443 ASSAY THYROID STIM HORMONE: CPT

## 2024-11-12 PROCEDURE — 83605 ASSAY OF LACTIC ACID: CPT

## 2024-11-12 PROCEDURE — 71045 X-RAY EXAM CHEST 1 VIEW: CPT

## 2024-11-12 PROCEDURE — 36415 COLL VENOUS BLD VENIPUNCTURE: CPT

## 2024-11-12 PROCEDURE — 99285 EMERGENCY DEPT VISIT HI MDM: CPT

## 2024-11-12 RX ORDER — FENTANYL CITRATE 50 UG/ML
25 INJECTION, SOLUTION INTRAMUSCULAR; INTRAVENOUS ONCE
Status: COMPLETED | OUTPATIENT
Start: 2024-11-12 | End: 2024-11-12

## 2024-11-12 RX ORDER — ONDANSETRON 2 MG/ML
4 INJECTION INTRAMUSCULAR; INTRAVENOUS ONCE
Status: COMPLETED | OUTPATIENT
Start: 2024-11-12 | End: 2024-11-12

## 2024-11-12 RX ADMIN — CEFTRIAXONE SODIUM 2000 MG: 2 INJECTION, POWDER, FOR SOLUTION INTRAMUSCULAR; INTRAVENOUS at 22:18

## 2024-11-12 RX ADMIN — ONDANSETRON 4 MG: 2 INJECTION INTRAMUSCULAR; INTRAVENOUS at 22:09

## 2024-11-12 RX ADMIN — FENTANYL CITRATE 25 MCG: 50 INJECTION, SOLUTION INTRAMUSCULAR; INTRAVENOUS at 22:12

## 2024-11-12 ASSESSMENT — PAIN SCALES - GENERAL: PAINLEVEL_OUTOF10: 10

## 2024-11-13 PROBLEM — S81.801A: Status: ACTIVE | Noted: 2024-11-13

## 2024-11-13 PROBLEM — E83.59 CALCIPHYLAXIS OF LEFT LOWER EXTREMITY WITH NONHEALING ULCER (HCC): Status: ACTIVE | Noted: 2024-11-13

## 2024-11-13 PROBLEM — S81.802A: Status: ACTIVE | Noted: 2024-11-13

## 2024-11-13 PROBLEM — L97.929 CALCIPHYLAXIS OF LEFT LOWER EXTREMITY WITH NONHEALING ULCER (HCC): Status: ACTIVE | Noted: 2024-11-13

## 2024-11-13 PROBLEM — R79.89 ELEVATED TROPONIN: Status: ACTIVE | Noted: 2024-11-13

## 2024-11-13 LAB
INR PPP: 1.26 (ref 0.85–1.13)
LACTATE SERPL-SCNC: 1.3 MMOL/L (ref 0.5–2)
TROPONIN, HIGH SENSITIVITY: 55 NG/L (ref 0–12)

## 2024-11-13 PROCEDURE — 2580000003 HC RX 258

## 2024-11-13 PROCEDURE — 2580000003 HC RX 258: Performed by: INTERNAL MEDICINE

## 2024-11-13 PROCEDURE — 6370000000 HC RX 637 (ALT 250 FOR IP)

## 2024-11-13 PROCEDURE — 94640 AIRWAY INHALATION TREATMENT: CPT

## 2024-11-13 PROCEDURE — 99223 1ST HOSP IP/OBS HIGH 75: CPT | Performed by: STUDENT IN AN ORGANIZED HEALTH CARE EDUCATION/TRAINING PROGRAM

## 2024-11-13 PROCEDURE — 83605 ASSAY OF LACTIC ACID: CPT

## 2024-11-13 PROCEDURE — 85610 PROTHROMBIN TIME: CPT

## 2024-11-13 PROCEDURE — 36415 COLL VENOUS BLD VENIPUNCTURE: CPT

## 2024-11-13 PROCEDURE — 6370000000 HC RX 637 (ALT 250 FOR IP): Performed by: INTERNAL MEDICINE

## 2024-11-13 PROCEDURE — 6360000002 HC RX W HCPCS

## 2024-11-13 PROCEDURE — 51798 US URINE CAPACITY MEASURE: CPT

## 2024-11-13 PROCEDURE — 6360000002 HC RX W HCPCS: Performed by: INTERNAL MEDICINE

## 2024-11-13 PROCEDURE — 87040 BLOOD CULTURE FOR BACTERIA: CPT

## 2024-11-13 PROCEDURE — 84484 ASSAY OF TROPONIN QUANT: CPT

## 2024-11-13 PROCEDURE — 1200000003 HC TELEMETRY R&B

## 2024-11-13 RX ORDER — MONTELUKAST SODIUM 10 MG/1
10 TABLET ORAL NIGHTLY
Status: DISCONTINUED | OUTPATIENT
Start: 2024-11-13 | End: 2024-11-18 | Stop reason: HOSPADM

## 2024-11-13 RX ORDER — MULTIVITAMIN WITH IRON
1 TABLET ORAL DAILY
Status: DISCONTINUED | OUTPATIENT
Start: 2024-11-13 | End: 2024-11-18 | Stop reason: HOSPADM

## 2024-11-13 RX ORDER — MAGNESIUM SULFATE IN WATER 40 MG/ML
2000 INJECTION, SOLUTION INTRAVENOUS PRN
Status: DISCONTINUED | OUTPATIENT
Start: 2024-11-13 | End: 2024-11-13 | Stop reason: SDUPTHER

## 2024-11-13 RX ORDER — SODIUM CHLORIDE 0.9 % (FLUSH) 0.9 %
5-40 SYRINGE (ML) INJECTION EVERY 12 HOURS SCHEDULED
Status: DISCONTINUED | OUTPATIENT
Start: 2024-11-13 | End: 2024-11-13 | Stop reason: SDUPTHER

## 2024-11-13 RX ORDER — POTASSIUM CHLORIDE 1500 MG/1
10 TABLET, EXTENDED RELEASE ORAL DAILY
Status: DISCONTINUED | OUTPATIENT
Start: 2024-11-13 | End: 2024-11-18 | Stop reason: HOSPADM

## 2024-11-13 RX ORDER — ASCORBIC ACID 500 MG
500 TABLET ORAL DAILY
Status: DISCONTINUED | OUTPATIENT
Start: 2024-11-13 | End: 2024-11-18 | Stop reason: HOSPADM

## 2024-11-13 RX ORDER — FLUTICASONE PROPIONATE 50 MCG
1 SPRAY, SUSPENSION (ML) NASAL DAILY
Status: DISCONTINUED | OUTPATIENT
Start: 2024-11-13 | End: 2024-11-18 | Stop reason: HOSPADM

## 2024-11-13 RX ORDER — VITAMIN B COMPLEX
1000 TABLET ORAL DAILY
Status: DISCONTINUED | OUTPATIENT
Start: 2024-11-13 | End: 2024-11-18 | Stop reason: HOSPADM

## 2024-11-13 RX ORDER — SODIUM CHLORIDE 9 MG/ML
INJECTION, SOLUTION INTRAVENOUS PRN
Status: DISCONTINUED | OUTPATIENT
Start: 2024-11-13 | End: 2024-11-18 | Stop reason: HOSPADM

## 2024-11-13 RX ORDER — SPIRONOLACTONE 25 MG/1
25 TABLET ORAL DAILY
Status: DISCONTINUED | OUTPATIENT
Start: 2024-11-13 | End: 2024-11-18 | Stop reason: HOSPADM

## 2024-11-13 RX ORDER — LANOLIN ALCOHOL/MO/W.PET/CERES
400 CREAM (GRAM) TOPICAL DAILY
Status: DISCONTINUED | OUTPATIENT
Start: 2024-11-13 | End: 2024-11-18 | Stop reason: HOSPADM

## 2024-11-13 RX ORDER — MAGNESIUM SULFATE IN WATER 40 MG/ML
2000 INJECTION, SOLUTION INTRAVENOUS PRN
Status: DISCONTINUED | OUTPATIENT
Start: 2024-11-13 | End: 2024-11-13

## 2024-11-13 RX ORDER — AMLODIPINE BESYLATE 5 MG/1
5 TABLET ORAL DAILY
Status: DISCONTINUED | OUTPATIENT
Start: 2024-11-13 | End: 2024-11-18 | Stop reason: HOSPADM

## 2024-11-13 RX ORDER — ONDANSETRON 2 MG/ML
4 INJECTION INTRAMUSCULAR; INTRAVENOUS EVERY 6 HOURS PRN
Status: DISCONTINUED | OUTPATIENT
Start: 2024-11-13 | End: 2024-11-13

## 2024-11-13 RX ORDER — SODIUM BICARBONATE 650 MG/1
325 TABLET ORAL 2 TIMES DAILY
Status: DISCONTINUED | OUTPATIENT
Start: 2024-11-13 | End: 2024-11-18 | Stop reason: HOSPADM

## 2024-11-13 RX ORDER — ONDANSETRON 4 MG/1
4 TABLET, ORALLY DISINTEGRATING ORAL EVERY 8 HOURS PRN
Status: DISCONTINUED | OUTPATIENT
Start: 2024-11-13 | End: 2024-11-13 | Stop reason: SDUPTHER

## 2024-11-13 RX ORDER — ALBUTEROL SULFATE 90 UG/1
2 INHALANT RESPIRATORY (INHALATION) 2 TIMES DAILY
Status: DISCONTINUED | OUTPATIENT
Start: 2024-11-13 | End: 2024-11-18 | Stop reason: HOSPADM

## 2024-11-13 RX ORDER — ACETAMINOPHEN 650 MG/1
650 SUPPOSITORY RECTAL EVERY 6 HOURS PRN
Status: DISCONTINUED | OUTPATIENT
Start: 2024-11-13 | End: 2024-11-13 | Stop reason: SDUPTHER

## 2024-11-13 RX ORDER — POLYETHYLENE GLYCOL 3350 17 G/17G
17 POWDER, FOR SOLUTION ORAL DAILY
Status: DISCONTINUED | OUTPATIENT
Start: 2024-11-13 | End: 2024-11-18 | Stop reason: HOSPADM

## 2024-11-13 RX ORDER — SODIUM CHLORIDE 1 G/1
1 TABLET ORAL 2 TIMES DAILY
Status: DISCONTINUED | OUTPATIENT
Start: 2024-11-13 | End: 2024-11-18 | Stop reason: HOSPADM

## 2024-11-13 RX ORDER — AMMONIUM LACTATE 12 G/100G
LOTION TOPICAL PRN
Status: DISCONTINUED | OUTPATIENT
Start: 2024-11-13 | End: 2024-11-18 | Stop reason: HOSPADM

## 2024-11-13 RX ORDER — ACETAMINOPHEN 650 MG/1
650 SUPPOSITORY RECTAL EVERY 6 HOURS PRN
Status: DISCONTINUED | OUTPATIENT
Start: 2024-11-13 | End: 2024-11-13

## 2024-11-13 RX ORDER — ONDANSETRON 4 MG/1
4 TABLET, ORALLY DISINTEGRATING ORAL EVERY 8 HOURS PRN
Status: DISCONTINUED | OUTPATIENT
Start: 2024-11-13 | End: 2024-11-13

## 2024-11-13 RX ORDER — ACETAMINOPHEN 500 MG
1000 TABLET ORAL EVERY 8 HOURS SCHEDULED
Status: DISCONTINUED | OUTPATIENT
Start: 2024-11-13 | End: 2024-11-18 | Stop reason: HOSPADM

## 2024-11-13 RX ORDER — FERROUS SULFATE 325(65) MG
325 TABLET ORAL 2 TIMES DAILY
Status: DISCONTINUED | OUTPATIENT
Start: 2024-11-13 | End: 2024-11-18 | Stop reason: HOSPADM

## 2024-11-13 RX ORDER — PANTOPRAZOLE SODIUM 40 MG/1
40 TABLET, DELAYED RELEASE ORAL
Status: DISCONTINUED | OUTPATIENT
Start: 2024-11-13 | End: 2024-11-18 | Stop reason: HOSPADM

## 2024-11-13 RX ORDER — ATORVASTATIN CALCIUM 10 MG/1
10 TABLET, FILM COATED ORAL DAILY
Status: DISCONTINUED | OUTPATIENT
Start: 2024-11-13 | End: 2024-11-18 | Stop reason: HOSPADM

## 2024-11-13 RX ORDER — POTASSIUM CHLORIDE 7.45 MG/ML
10 INJECTION INTRAVENOUS PRN
Status: DISCONTINUED | OUTPATIENT
Start: 2024-11-13 | End: 2024-11-13 | Stop reason: SDUPTHER

## 2024-11-13 RX ORDER — SODIUM CHLORIDE 0.9 % (FLUSH) 0.9 %
5-40 SYRINGE (ML) INJECTION EVERY 12 HOURS SCHEDULED
Status: DISCONTINUED | OUTPATIENT
Start: 2024-11-13 | End: 2024-11-18 | Stop reason: HOSPADM

## 2024-11-13 RX ORDER — HYDROCODONE BITARTRATE AND ACETAMINOPHEN 5; 325 MG/1; MG/1
1 TABLET ORAL EVERY 6 HOURS PRN
Status: DISCONTINUED | OUTPATIENT
Start: 2024-11-13 | End: 2024-11-18 | Stop reason: HOSPADM

## 2024-11-13 RX ORDER — ACETAMINOPHEN 325 MG/1
650 TABLET ORAL EVERY 6 HOURS PRN
Status: DISCONTINUED | OUTPATIENT
Start: 2024-11-13 | End: 2024-11-13 | Stop reason: SDUPTHER

## 2024-11-13 RX ORDER — HYDROXYZINE PAMOATE 25 MG/1
25 CAPSULE ORAL 3 TIMES DAILY PRN
Status: DISCONTINUED | OUTPATIENT
Start: 2024-11-13 | End: 2024-11-18 | Stop reason: HOSPADM

## 2024-11-13 RX ORDER — ALBUTEROL SULFATE 90 UG/1
2 INHALANT RESPIRATORY (INHALATION) EVERY 6 HOURS PRN
Status: DISCONTINUED | OUTPATIENT
Start: 2024-11-13 | End: 2024-11-13

## 2024-11-13 RX ORDER — ONDANSETRON 2 MG/ML
4 INJECTION INTRAMUSCULAR; INTRAVENOUS EVERY 6 HOURS PRN
Status: DISCONTINUED | OUTPATIENT
Start: 2024-11-13 | End: 2024-11-13 | Stop reason: SDUPTHER

## 2024-11-13 RX ORDER — POLYETHYLENE GLYCOL 3350 17 G/17G
17 POWDER, FOR SOLUTION ORAL DAILY PRN
Status: DISCONTINUED | OUTPATIENT
Start: 2024-11-13 | End: 2024-11-13 | Stop reason: SDUPTHER

## 2024-11-13 RX ORDER — BUDESONIDE AND FORMOTEROL FUMARATE DIHYDRATE 80; 4.5 UG/1; UG/1
2 AEROSOL RESPIRATORY (INHALATION)
Status: DISCONTINUED | OUTPATIENT
Start: 2024-11-13 | End: 2024-11-18 | Stop reason: HOSPADM

## 2024-11-13 RX ORDER — POTASSIUM CHLORIDE 7.45 MG/ML
10 INJECTION INTRAVENOUS PRN
Status: DISCONTINUED | OUTPATIENT
Start: 2024-11-13 | End: 2024-11-13 | Stop reason: RX

## 2024-11-13 RX ORDER — ENOXAPARIN SODIUM 100 MG/ML
40 INJECTION SUBCUTANEOUS DAILY
Status: DISCONTINUED | OUTPATIENT
Start: 2024-11-13 | End: 2024-11-13

## 2024-11-13 RX ORDER — GABAPENTIN 100 MG/1
100 CAPSULE ORAL 2 TIMES DAILY
Status: DISCONTINUED | OUTPATIENT
Start: 2024-11-13 | End: 2024-11-18 | Stop reason: HOSPADM

## 2024-11-13 RX ORDER — BUMETANIDE 0.5 MG/1
0.5 TABLET ORAL DAILY
Status: DISCONTINUED | OUTPATIENT
Start: 2024-11-13 | End: 2024-11-18 | Stop reason: HOSPADM

## 2024-11-13 RX ORDER — ENOXAPARIN SODIUM 100 MG/ML
40 INJECTION SUBCUTANEOUS DAILY
Status: DISCONTINUED | OUTPATIENT
Start: 2024-11-13 | End: 2024-11-13 | Stop reason: SDUPTHER

## 2024-11-13 RX ORDER — SENNOSIDES A AND B 8.6 MG/1
1 TABLET, FILM COATED ORAL 2 TIMES DAILY
Status: DISCONTINUED | OUTPATIENT
Start: 2024-11-13 | End: 2024-11-18 | Stop reason: HOSPADM

## 2024-11-13 RX ORDER — SODIUM CHLORIDE 0.9 % (FLUSH) 0.9 %
5-40 SYRINGE (ML) INJECTION PRN
Status: DISCONTINUED | OUTPATIENT
Start: 2024-11-13 | End: 2024-11-13 | Stop reason: SDUPTHER

## 2024-11-13 RX ORDER — SODIUM CHLORIDE 9 MG/ML
INJECTION, SOLUTION INTRAVENOUS PRN
Status: DISCONTINUED | OUTPATIENT
Start: 2024-11-13 | End: 2024-11-13 | Stop reason: SDUPTHER

## 2024-11-13 RX ORDER — WARFARIN SODIUM 5 MG/1
5 TABLET ORAL DAILY
Status: DISCONTINUED | OUTPATIENT
Start: 2024-11-13 | End: 2024-11-13

## 2024-11-13 RX ORDER — HYDROCODONE BITARTRATE AND ACETAMINOPHEN 5; 325 MG/1; MG/1
2 TABLET ORAL EVERY 6 HOURS PRN
Status: DISCONTINUED | OUTPATIENT
Start: 2024-11-13 | End: 2024-11-18 | Stop reason: HOSPADM

## 2024-11-13 RX ORDER — POLYETHYLENE GLYCOL 3350 17 G/17G
17 POWDER, FOR SOLUTION ORAL DAILY PRN
Status: DISCONTINUED | OUTPATIENT
Start: 2024-11-13 | End: 2024-11-13

## 2024-11-13 RX ORDER — METOPROLOL TARTRATE 25 MG/1
25 TABLET, FILM COATED ORAL 2 TIMES DAILY
Status: DISCONTINUED | OUTPATIENT
Start: 2024-11-13 | End: 2024-11-18 | Stop reason: HOSPADM

## 2024-11-13 RX ORDER — DRONABINOL 2.5 MG/1
2.5 CAPSULE ORAL
Status: DISCONTINUED | OUTPATIENT
Start: 2024-11-13 | End: 2024-11-13 | Stop reason: RX

## 2024-11-13 RX ORDER — SODIUM CHLORIDE 0.9 % (FLUSH) 0.9 %
5-40 SYRINGE (ML) INJECTION PRN
Status: DISCONTINUED | OUTPATIENT
Start: 2024-11-13 | End: 2024-11-18 | Stop reason: HOSPADM

## 2024-11-13 RX ORDER — ACETAMINOPHEN 325 MG/1
650 TABLET ORAL EVERY 6 HOURS PRN
Status: DISCONTINUED | OUTPATIENT
Start: 2024-11-13 | End: 2024-11-13

## 2024-11-13 RX ORDER — POTASSIUM CHLORIDE 1500 MG/1
40 TABLET, EXTENDED RELEASE ORAL PRN
Status: DISCONTINUED | OUTPATIENT
Start: 2024-11-13 | End: 2024-11-18 | Stop reason: HOSPADM

## 2024-11-13 RX ORDER — CETIRIZINE HYDROCHLORIDE 10 MG/1
10 TABLET ORAL DAILY
Status: DISCONTINUED | OUTPATIENT
Start: 2024-11-13 | End: 2024-11-13

## 2024-11-13 RX ORDER — POTASSIUM CHLORIDE 1500 MG/1
40 TABLET, EXTENDED RELEASE ORAL PRN
Status: DISCONTINUED | OUTPATIENT
Start: 2024-11-13 | End: 2024-11-13 | Stop reason: SDUPTHER

## 2024-11-13 RX ADMIN — Medication 1 TABLET: at 03:25

## 2024-11-13 RX ADMIN — SODIUM BICARBONATE 325 MG: 650 TABLET ORAL at 22:58

## 2024-11-13 RX ADMIN — CEFEPIME 2000 MG: 2 INJECTION, POWDER, FOR SOLUTION INTRAVENOUS at 15:06

## 2024-11-13 RX ADMIN — SODIUM BICARBONATE 325 MG: 650 TABLET ORAL at 09:08

## 2024-11-13 RX ADMIN — METOPROLOL TARTRATE 25 MG: 50 TABLET, FILM COATED ORAL at 08:59

## 2024-11-13 RX ADMIN — ACETAMINOPHEN 1000 MG: 500 TABLET ORAL at 06:04

## 2024-11-13 RX ADMIN — SODIUM CHLORIDE 1 G: 1 TABLET ORAL at 16:55

## 2024-11-13 RX ADMIN — METOPROLOL TARTRATE 25 MG: 50 TABLET, FILM COATED ORAL at 20:48

## 2024-11-13 RX ADMIN — FERROUS SULFATE TAB 325 MG (65 MG ELEMENTAL FE) 325 MG: 325 (65 FE) TAB at 08:59

## 2024-11-13 RX ADMIN — BUMETANIDE 0.5 MG: 0.5 TABLET ORAL at 08:59

## 2024-11-13 RX ADMIN — GABAPENTIN 100 MG: 100 CAPSULE ORAL at 08:59

## 2024-11-13 RX ADMIN — HYDROCODONE BITARTRATE AND ACETAMINOPHEN 2 TABLET: 5; 325 TABLET ORAL at 04:28

## 2024-11-13 RX ADMIN — VANCOMYCIN HYDROCHLORIDE 1750 MG: 1 INJECTION, POWDER, LYOPHILIZED, FOR SOLUTION INTRAVENOUS at 02:23

## 2024-11-13 RX ADMIN — GABAPENTIN 100 MG: 100 CAPSULE ORAL at 22:54

## 2024-11-13 RX ADMIN — OXYCODONE HYDROCHLORIDE AND ACETAMINOPHEN 500 MG: 500 TABLET ORAL at 08:59

## 2024-11-13 RX ADMIN — APIXABAN 5 MG: 5 TABLET, FILM COATED ORAL at 22:10

## 2024-11-13 RX ADMIN — DICLOFENAC SODIUM 2 G: 10 GEL TOPICAL at 15:07

## 2024-11-13 RX ADMIN — SENNOSIDES 8.6 MG: 8.6 TABLET, FILM COATED ORAL at 08:59

## 2024-11-13 RX ADMIN — METOPROLOL TARTRATE 25 MG: 50 TABLET, FILM COATED ORAL at 03:25

## 2024-11-13 RX ADMIN — SODIUM CHLORIDE, PRESERVATIVE FREE 10 ML: 5 INJECTION INTRAVENOUS at 09:14

## 2024-11-13 RX ADMIN — ALBUTEROL SULFATE 2 PUFF: 90 AEROSOL, METERED RESPIRATORY (INHALATION) at 07:42

## 2024-11-13 RX ADMIN — SENNOSIDES 8.6 MG: 8.6 TABLET, FILM COATED ORAL at 22:57

## 2024-11-13 RX ADMIN — PANTOPRAZOLE SODIUM 40 MG: 40 TABLET, DELAYED RELEASE ORAL at 06:04

## 2024-11-13 RX ADMIN — POTASSIUM CHLORIDE 10 MEQ: 1500 TABLET, EXTENDED RELEASE ORAL at 09:12

## 2024-11-13 RX ADMIN — POLYETHYLENE GLYCOL 3350 17 G: 17 POWDER, FOR SOLUTION ORAL at 08:58

## 2024-11-13 RX ADMIN — BUDESONIDE AND FORMOTEROL FUMARATE DIHYDRATE 2 PUFF: 80; 4.5 AEROSOL RESPIRATORY (INHALATION) at 07:33

## 2024-11-13 RX ADMIN — DICLOFENAC SODIUM 2 G: 10 GEL TOPICAL at 09:15

## 2024-11-13 RX ADMIN — AMLODIPINE BESYLATE 5 MG: 5 TABLET ORAL at 08:59

## 2024-11-13 RX ADMIN — HYDROCODONE BITARTRATE AND ACETAMINOPHEN 2 TABLET: 5; 325 TABLET ORAL at 10:24

## 2024-11-13 RX ADMIN — HYDROCODONE BITARTRATE AND ACETAMINOPHEN 2 TABLET: 5; 325 TABLET ORAL at 16:55

## 2024-11-13 RX ADMIN — ATORVASTATIN CALCIUM 10 MG: 10 TABLET, FILM COATED ORAL at 08:59

## 2024-11-13 RX ADMIN — FERROUS SULFATE TAB 325 MG (65 MG ELEMENTAL FE) 325 MG: 325 (65 FE) TAB at 22:54

## 2024-11-13 RX ADMIN — SODIUM CHLORIDE 1 G: 1 TABLET ORAL at 09:00

## 2024-11-13 RX ADMIN — MONTELUKAST 10 MG: 10 TABLET, FILM COATED ORAL at 03:36

## 2024-11-13 RX ADMIN — Medication 400 MG: at 08:59

## 2024-11-13 RX ADMIN — SPIRONOLACTONE 25 MG: 25 TABLET ORAL at 09:00

## 2024-11-13 RX ADMIN — Medication 1000 UNITS: at 09:00

## 2024-11-13 ASSESSMENT — PAIN - FUNCTIONAL ASSESSMENT
PAIN_FUNCTIONAL_ASSESSMENT: PREVENTS OR INTERFERES SOME ACTIVE ACTIVITIES AND ADLS
PAIN_FUNCTIONAL_ASSESSMENT: PREVENTS OR INTERFERES SOME ACTIVE ACTIVITIES AND ADLS
PAIN_FUNCTIONAL_ASSESSMENT: PREVENTS OR INTERFERES WITH ALL ACTIVE AND SOME PASSIVE ACTIVITIES
PAIN_FUNCTIONAL_ASSESSMENT: PREVENTS OR INTERFERES SOME ACTIVE ACTIVITIES AND ADLS

## 2024-11-13 ASSESSMENT — PAIN DESCRIPTION - FREQUENCY: FREQUENCY: CONTINUOUS

## 2024-11-13 ASSESSMENT — PAIN SCALES - GENERAL
PAINLEVEL_OUTOF10: 0
PAINLEVEL_OUTOF10: 8
PAINLEVEL_OUTOF10: 4
PAINLEVEL_OUTOF10: 9
PAINLEVEL_OUTOF10: 3
PAINLEVEL_OUTOF10: 9
PAINLEVEL_OUTOF10: 10
PAINLEVEL_OUTOF10: 10

## 2024-11-13 ASSESSMENT — PAIN DESCRIPTION - DESCRIPTORS
DESCRIPTORS: SHARP
DESCRIPTORS: ACHING;DISCOMFORT
DESCRIPTORS: ACHING;SHARP;SHOOTING
DESCRIPTORS: THROBBING

## 2024-11-13 ASSESSMENT — PAIN DESCRIPTION - ORIENTATION
ORIENTATION: OTHER (COMMENT)
ORIENTATION: RIGHT
ORIENTATION: LEFT;RIGHT
ORIENTATION: LEFT;RIGHT
ORIENTATION: RIGHT;LEFT

## 2024-11-13 ASSESSMENT — PAIN DESCRIPTION - LOCATION
LOCATION: LEG
LOCATION: KNEE

## 2024-11-13 ASSESSMENT — PAIN DESCRIPTION - ONSET: ONSET: ON-GOING

## 2024-11-13 ASSESSMENT — PAIN DESCRIPTION - PAIN TYPE: TYPE: CHRONIC PAIN

## 2024-11-13 NOTE — ED NOTES
Pt resting in cot w/ lights dimmed. Pt updated on POC. Pt denies further needs at this time. Vitals collected. Pt breathing easy and unlabored. Call light in reach.

## 2024-11-13 NOTE — PLAN OF CARE
Problem: Chronic Conditions and Co-morbidities  Goal: Patient's chronic conditions and co-morbidity symptoms are monitored and maintained or improved  Flowsheets (Taken 11/13/2024 0200)  Care Plan - Patient's Chronic Conditions and Co-Morbidity Symptoms are Monitored and Maintained or Improved:   Monitor and assess patient's chronic conditions and comorbid symptoms for stability, deterioration, or improvement   Collaborate with multidisciplinary team to address chronic and comorbid conditions and prevent exacerbation or deterioration   Update acute care plan with appropriate goals if chronic or comorbid symptoms are exacerbated and prevent overall improvement and discharge     Problem: Discharge Planning  Goal: Discharge to home or other facility with appropriate resources  Flowsheets (Taken 11/13/2024 0200)  Discharge to home or other facility with appropriate resources:   Identify barriers to discharge with patient and caregiver   Arrange for needed discharge resources and transportation as appropriate   Identify discharge learning needs (meds, wound care, etc)   Refer to discharge planning if patient needs post-hospital services based on physician order or complex needs related to functional status, cognitive ability or social support system     Problem: Pain  Goal: Verbalizes/displays adequate comfort level or baseline comfort level  Flowsheets (Taken 11/13/2024 0200)  Verbalizes/displays adequate comfort level or baseline comfort level:   Encourage patient to monitor pain and request assistance   Assess pain using appropriate pain scale   Administer analgesics based on type and severity of pain and evaluate response   Implement non-pharmacological measures as appropriate and evaluate response   Consider cultural and social influences on pain and pain management   Notify Licensed Independent Practitioner if interventions unsuccessful or patient reports new pain     Problem: Skin/Tissue Integrity  Goal: Absence  of new skin breakdown  Description: 1.  Monitor for areas of redness and/or skin breakdown  2.  Assess vascular access sites hourly  3.  Every 4-6 hours minimum:  Change oxygen saturation probe site  4.  Every 4-6 hours:  If on nasal continuous positive airway pressure, respiratory therapy assess nares and determine need for appliance change or resting period.  Note: No new skin breakdown noted     Problem: Safety - Adult  Goal: Free from fall injury  Flowsheets (Taken 11/13/2024 2086)  Free From Fall Injury:   Instruct family/caregiver on patient safety   Based on caregiver fall risk screen, instruct family/caregiver to ask for assistance with transferring infant if caregiver noted to have fall risk factors

## 2024-11-13 NOTE — PROGRESS NOTES
Pharmacy Note - Extended Infusion Beta-Lactam Dose Adjustment    Cefepime 2000 mg q24h intermittent infusion for treatment of Skin and soft tissue infection. Per [facility] Extended Infusion Beta-Lactam Policy, cefepime will be changed to   2000 mg q24h extended infusion    Estimated Creatinine Clearance: Estimated Creatinine Clearance: 45 mL/min (based on SCr of 0.8 mg/dL).    BMI: Body mass index is 23.15 kg/m².    Rationale for Adjustment: Dose adjusted per [facility] Extended Infusion Policy based on renal function and indication. The above medication is renally eliminated and demonstrates time-dependent effects on bacterial eradication. Extended-infusion dosing strategy aims to enhance microbiologic and clinical efficacy.     Pharmacy will monitor renal function daily and adjust dose as necessary.      Please call with any questions.    Thank you,    Finesse Salazar, PharmD, BCPS  11/13/2024  3:24 PM

## 2024-11-13 NOTE — CARE COORDINATION
Case Management Assessment Initial Evaluation    Date/Time of Evaluation: 2024 9:22 AM  Assessment Completed by: Kelle Julian RN    If patient is discharged prior to next notation, then this note serves as note for discharge by case management.    Patient Name: Jasmina Powers                   YOB: 1935  Diagnosis: Elevated troponin [R79.89]  Cellulitis of right lower extremity [L03.115]  Cellulitis of left lower extremity [L03.116]  Open wound of both lower extremities with complication, initial encounter [S81.801A, S81.802A]                   Date / Time: 2024  8:28 PM  [facility]     Patient Admission Status: Inpatient   Readmission Risk Low 0-14, Mod 15-19), High > 20: Readmission Risk Score: 26.1    Current PCP: Dimas Watts APRN - CNP  Health Care Decision Makers:   Primary Decision Maker: PowersJhonny - Child - [phone]    Secondary Decision Maker: Enrique Mathews - Child - [phone]    Additional Case Management Notes: Admitted through ER from [facility] with weakness and left leg pain. Wound noted to LLE. Trop noted to be elevated on admit. Wound Care consulted. ID consulted. IV Rocephin and Vanc.  consulted.     Procedures: No.    Imagin24   XR CHEST PORTABLE   Final Result   Impression:   1. Cardiomegaly without failure.     24   XR TIBIA FIBULA LEFT (2 VIEWS)   Final Result   No soft tissue gas. Chronic appearing findings including soft tissue    calcifications, vascular calcifications, and osteopenia.     Patient Goals/Plan/Treatment Preferences: Pt is from [facility]. CM visit deferred to  for continuity of services.

## 2024-11-13 NOTE — ED NOTES
Spoke to 4B staff who approved patient transfer to to -12. Patient transported upstairs in stable condition.

## 2024-11-13 NOTE — ED PROVIDER NOTES
[facility] EMERGENCY DEPT  EMERGENCY DEPARTMENT ENCOUNTER          Pt Name: Jasmina Powers  MRN: [id number]  Birthdate 1935  Date of evaluation: 11/12/2024  Physician: Adrián Mosquera DO      CHIEF COMPLAINT       Chief Complaint   Patient presents with    Leg Pain    Extremity Weakness         HISTORY OF PRESENT ILLNESS    HPI  Jasmina Powers is a 89 y.o. female who presents to the emergency department from nursing home, brought in by EMS for evaluation of left leg pain.  Patient has extensive past medical history.  Patient unable to provide any significant history other than her left leg hurts.  Patient noted to be mildly tachycardic upon arrival to the emergency department.  It does appear that she is receiving wound care to her left lower extremity.  The patient has no other acute complaints at this time.      REVIEW OF SYSTEMS   Review of Systems   All other systems reviewed and are negative.        PAST MEDICAL AND SURGICAL HISTORY     Past Medical History:   Diagnosis Date    Allergic rhinitis     Aortic stenosis, mild to moderate     ASHD (arteriosclerotic heart disease)     Atrial fibrillation (HCC)     COPD (chronic obstructive pulmonary disease) ([facility])     DM2 (diabetes mellitus, type 2) ([facility])     Dyslipidemia     GERD (gastroesophageal reflux disease)     Heart failure with preserved ejection fraction ([facility])     Hypertension, essential      History reviewed. No pertinent surgical history.      MEDICATIONS   No current facility-administered medications for this encounter.    Current Outpatient Medications:     metoprolol tartrate (LOPRESSOR) 25 MG tablet, TAKE 1 TABLET TWICE DAILY, Disp: 180 tablet, Rfl: 0    droNABinol (MARINOL) 2.5 MG capsule, Take 1 capsule by mouth 2 times daily (before meals). Max Daily Amount: 5 mg, Disp: , Rfl:     hydrOXYzine pamoate (VISTARIL) 25 MG capsule, Take 1 capsule by mouth 3 times daily as needed for Itching, Disp: , Rfl:     Nutritional Supplements

## 2024-11-13 NOTE — ED NOTES
ED to inpatient nurses report      Chief Complaint:  Chief Complaint   Patient presents with    Leg Pain    Extremity Weakness     Present to ED from: [facility]    MOA:     LOC: alert and oriented to name and birthday  Mobility: Requires assistance * 2  Oxygen Baseline: RA    Current needs required: RA     Code Status:   Prior    What abnormal results were found and what did you give/do to treat them? Cellulitis (rocephin)  Any procedures or intervention occur? Chest XR, XR left tibia/fibula    Mental Status:  Level of Consciousness: Alert (0)    Psych Assessment:        Vitals:  Patient Vitals for the past 24 hrs:   BP Pulse Resp SpO2 Height Weight   11/12/24 2226 108/68 80 13 100 % -- --   11/12/24 2212 131/81 87 14 100 % -- --   11/12/24 2035 122/86 (!) 108 19 100 % 1.651 m (5' 5\") 66.3 kg (146 lb 3.2 oz)        LDAs:   Peripheral IV 11/12/24 Right Antecubital (Active)       Ambulatory Status:  No data recorded    Diagnosis:  DISPOSITION     Final diagnoses:   Cellulitis of left lower extremity        Consults:  None     Pain Score:  Pain Assessment  Pain Level: 10    C-SSRS:   Risk of Suicide: No Risk    Sepsis Screening:       Starr Fall Risk:       Swallow Screening        Preferred Language:   English      ALLERGIES     Patient has no known allergies.    SURGICAL HISTORY     History reviewed. No pertinent surgical history.    PAST MEDICAL HISTORY       Past Medical History:   Diagnosis Date    Allergic rhinitis     Aortic stenosis, mild to moderate     ASHD (arteriosclerotic heart disease)     Atrial fibrillation (HCC)     COPD (chronic obstructive pulmonary disease) (HCC)     DM2 (diabetes mellitus, type 2) (HCC)     Dyslipidemia     GERD (gastroesophageal reflux disease)     Heart failure with preserved ejection fraction (HCC)     Hypertension, essential            Electronically signed by Kayla [place]isch[place], RN on 11/12/2024 at 10:56 PM

## 2024-11-13 NOTE — CONSULTS
CONSULTATION NOTE :ID       Patient - Jasmina Powers,  Age - 89 y.o.    - 1935      Room Number - 4B-12/012-A   N -  [id number]   [facility] # - [id number]  Date of Admission -  2024  8:28 PM  Patient's PCP: Dimas Watts APRN - CNP     Requesting Physician: Jorge L Henriquez MD    REASON FOR CONSULTATION   Worsening left leg wound  CHIEF COMPLAINT   Leg pain    HISTORY OF PRESENT ILLNESS       This is a very pleasant 89 y.o. female who was admitted to the hospital with a chief complaints of worsening left leg pain  She has hx of non healing right lower leg wound that followed after she had infection. She has hx of diabetes and chronic vein disease  She has extensive soft tissue calcification and recently developed a left lower leg necrotic wound that has been getting much worse  With necrosis  of the skin. She has chronic pain. There is no report of fever or chills. Has been getting pain medications.  She was started on broad spectrum antibiotics. The feet are swollen,     PAST MEDICAL  HISTORY       Past Medical History:   Diagnosis Date    Allergic rhinitis     Aortic stenosis, mild to moderate     ASHD (arteriosclerotic heart disease)     Atrial fibrillation (HCC)     COPD (chronic obstructive pulmonary disease) (HCC)     DM2 (diabetes mellitus, type 2) (HCC)     Dyslipidemia     GERD (gastroesophageal reflux disease)     Heart failure with preserved ejection fraction (HCC)     Hypertension, essential        PAST SURGICAL HISTORY     History reviewed. No pertinent surgical history.      MEDICATIONS:       Scheduled Meds:   sodium chloride flush  5-40 mL IntraVENous 2 times per day    polyethylene glycol  17 g Oral Daily    acetaminophen  1,000 mg Oral 3 times per day    amLODIPine  5 mg Oral Daily    atorvastatin  10 mg Oral Daily    bumetanide  0.5 mg Oral Daily    diclofenac sodium  2 g Topical 4x Daily    ferrous sulfate  325 mg Oral BID    fluticasone  1 spray

## 2024-11-13 NOTE — RT PROTOCOL NOTE
RT Inhaler-Nebulizer Bronchodilator Protocol Note    There is a bronchodilator order in the chart from a provider indicating to follow the RT Bronchodilator Protocol and there is an “Initiate RT Inhaler-Nebulizer Bronchodilator Protocol” order as well (see protocol at bottom of note).    CXR Findings:  XR CHEST PORTABLE    Result Date: 11/12/2024  Impression: 1. Cardiomegaly without failure. This document has been electronically signed by: Jag Mccarthy MD on 11/12/2024 10:26 PM      The findings from the last RT Protocol Assessment were as follows:   History Pulmonary Disease: Chronic pulmonary disease  Respiratory Pattern: Regular pattern and RR 12-20 bpm  Breath Sounds: Slightly diminished and/or crackles  Cough: Strong, spontaneous, non-productive  Indication for Bronchodilator Therapy:    Bronchodilator Assessment Score: 4    Aerosolized bronchodilator medication orders have been revised according to the RT Inhaler-Nebulizer Bronchodilator Protocol below.    Respiratory Therapist to perform RT Therapy Protocol Assessment initially then follow the protocol.  Repeat RT Therapy Protocol Assessment PRN for score 0-3 or on second treatment, BID, and PRN for scores above 3.    No Indications - adjust the frequency to every 6 hours PRN wheezing or bronchospasm, if no treatments needed after 48 hours then discontinue using Per Protocol order mode.     If indication present, adjust the RT bronchodilator orders based on the Bronchodilator Assessment Score as indicated below.  Use Inhaler orders unless patient has one or more of the following: on home nebulizer, not able to hold breath for 10 seconds, is not alert and oriented, cannot activate and use MDI correctly, or respiratory rate 25 breaths per minute or more, then use the equivalent nebulizer order(s) with same Frequency and PRN reasons based on the score.  If a patient is on this medication at home then do not decrease Frequency below that used at

## 2024-11-13 NOTE — ED NOTES
Pt medicated per MAR. Pt updated on POC. Pt denies being able to urinate at this time. Vitals collected. Pt breathing easy and unlabored. Call light in reach.

## 2024-11-13 NOTE — ED NOTES
Pt resting on cot. Pt updated on POC. Pt denies being able to urinate at this time. Purewick remains in place. Vitals collected. Pt breathing easy and unlabored. Call light in reach.

## 2024-11-13 NOTE — PALLIATIVE CARE
Initial Evaluation        Patient:   Jasmina Powers  YOB: 1935  Age:  89 y.o.  Room:  [facility]12/012-A  MRN:  [id number]   Acct: [id number]    Date of Admission:  11/12/2024  8:28 PM  Date of Service:  11/13/2024  Completed By:  Jossy Topete RN        Reason for Palliative Care Evaluation:-   Continuity of Care- Patient follows palliative outpatient      Current Concerns   pain     Palliative Performance Scale   40%  Mainly in bed; Extensive disease; Mainly assist; intake normal or reduced; LOC full/confusion     History    Patient in from [facility] due to bilateral leg pain and right arm weakness. Patient has bilateral non healing leg wounds. Per notes wounds are having purulent drainage. ID consulted. PMH includes HTN, HLD, afib, COPD, GERD, HF, DM, aortic stenosis.      Goals of Care Discussions and Plan         Advance Care Planning   Goals of Care/Advance Care Planning (ACP) Conversation    Date of Conversation: 11/13/24    Individuals present for the conversation: Patient     ACP documents on file prior to discussion:  -None    Previously completed document/s not on file: Patient / participant reports that there are no previously executed ACP documents.    Healthcare Power of /Healthcare Surrogate Decision Makers:  Per [place] Code 2133.08: [place] Hierarchy of Surrogate Decision Makers   Patient reports she has two sons    Conversation Summary: Patient was initially in room when this RN arrived. Informed son of this RN wanting to speak with patient. Son stated he would be back later this afternoon.     In room to speak with patient. Patient resting in bed with eyes closed. Patient agreed to speak with this RN. Patient very soft spoken, kept eyes closed throughout most of conversation. Patient noticeably uncomfortable throughout conversation. Patient had just received Norco prior to this RN entering room. Discussed reason for admission. Patient stated she came in due to \"her

## 2024-11-13 NOTE — ED TRIAGE NOTES
Pt to ED via EMS from [facility] w/ report of bilateral leg pain and right arm weakness. EMS report that pt has chronic bilateral leg swelling. Pt reports that her left leg hurts worse. EMS also report nursing home nurses report right arm weakness. Upon arrival, pt has bilateral strong hand , and has strong push/pull in both upper extremities. Pt presents w/ bilateral ace wraps on lower legs and upon removal of wraps, pt has bilateral leg wounds.Pt presents tearful. EKG completed upon arrival. Pt A&O X 2, to name and birthday only, not oriented to date or location. Call light in reach.

## 2024-11-13 NOTE — ED NOTES
Pt medicated per MAR. Pt denies being able to urinate. Vitals collected. Pt breathing easy and unlabored. Call light in reach. Lights dimmed.

## 2024-11-13 NOTE — PROGRESS NOTES
RN and PSA attempted to help patient to the bathroom to get cleaned up. Patient voided in brief, refusing to change it. Took telemetry off and refusing to allow staff to put back on. Updated Dr. Henriquez regarding behaviors. Patient kept telling both staff members to leave her alone and back off or else she was going to know them upside the head. Returned to bed.

## 2024-11-13 NOTE — PROGRESS NOTES
[facility]  SPEECH THERAPY MISSED TREATMENT NOTE  STRZ CVICU 4B      Date: 2024  Patient Name: Jasmina Powers        MRN: [id number]    : 1935  (89 y.o.)    REASON FOR MISSED TREATMENT:    Attempted to see patient at 13:12. However, unable as the physician was in the room with patient. Will re-attempt at subsequent time as schedule permits.     Sonya FLOREZ Speech Intern

## 2024-11-13 NOTE — ED NOTES
Pt resting on cot. Dr. Mosquera at bedside. XR at bedside. Lab at bedside. External purewick catheter placed.

## 2024-11-13 NOTE — CARE COORDINATION
11/13/24, 1:44 PM EST  Discharge Planning Evaluation  Social work consult received, patient from ECF.    Patient preference is to return to [facility].    The patient's current payor source at the facility is Medicaid Pending per Pennie with HCF.   Medicare skilled days available: N/A  Medicare does the patient have a three midnight qualifying stay? N/A  Insurance precert:  Yes if skillable  Spoke with Pennie at the facility.  Patient bed hold: yes  Anticipated transport plan: to be determined.  Patient's Healthcare Decision Maker: Legal Next of Kin    Pennie states pt is alert and oriented at [facility], uses a 2 wheeled walker contact guard assist. Pt is Medicaid pending.    Readmission Risk Low 0-14, Mod 15-19), High > 20: Readmission Risk Score: 26.6    Current PCP: Dimas Watts APRN - CNP  PCP verified by CM? Yes    Patient Orientation: Alert and Oriented    Patient Cognition: Alert  History Provided by: Patient, Other (see comment) (Pennie from HCF)    Advance Directives:      Code Status: Full Code   Patient's Primary Decision Maker is: Legal Next of Kin       Discharge Planning:    Patient lives with:   Type of Home: Skilled Nursing Facility  Primary Care Giver: Other (Comment) (Long term resident at [facility].)  Patient Support Systems include: Children   Current Financial resources: Other (Comment) ([employer] Medicare and pending Medicaid.)  Current community resources: ECF/Home Care  Current services prior to admission: Skilled Nursing Facility            Current DME:              Type of Home Care services:       ADLS  Prior functional level: Assistance with the following:  Current functional level: Assistance with the following:    Family can provide assistance at DC: Other (comment) (n/a)  Would you like Case Management to discuss the discharge plan with any other family members/significant others, and if so, who?    Plans to Return to Present Housing: Yes  Other Identified Issues/Barriers to

## 2024-11-13 NOTE — PLAN OF CARE
Problem: Respiratory - Adult  Goal: Achieves optimal ventilation and oxygenation  Outcome: Progressing  Flowsheets (Taken 11/13/2024 0743)  Achieves optimal ventilation and oxygenation:   Assess for changes in respiratory status   Respiratory therapy support as indicated   Assess and instruct to report shortness of breath or any respiratory difficulty

## 2024-11-13 NOTE — PROGRESS NOTES
Warfarin Pharmacy Consult Note    Jasmina Powers is a 89 y.o. female for whom pharmacy has been asked to manage warfarin therapy.     Consulting Provider: Dr Duval  Indication: Atrial fibrillation/Atrial flutter  Target INR: 2.0-3.0   Warfarin dose prior to admission: 5mg MWF, 2.5mg TThSaSu (per ECF papers)  Outpatient warfarin provider: ECF provider    Recent Labs     11/12/24  2050   HGB 10.1*        Recent Labs     11/12/24  2308 11/13/24  0353   INR 1.33* 1.26*     Concurrent anticoagulants/antiplatelets: none  Significant warfarin drug-drug interactions: none    Date INR Warfarin Dose   11/13/24 1.26 6 mg                                   INR will be monitored routinely until therapeutic INR is achieved.    Pharmacy will continue to follow. Thank you for the consult,   Josseline Guzman, Pharm.D., BCPS, BCCCP 11/13/2024 8:33 AM

## 2024-11-13 NOTE — H&P
History & Physical  Internal Medicine Resident         Patient: Jasmina Powers 89 y.o. female      : 1935  Date of Admission: 2024  Date of Service: Pt seen/examined on 24 and Admitted to Inpatient with expected LOS less than two midnights due to medical therapy.       ASSESSMENT AND PLAN  Non-healing wounds of bilateral lower extremities: Likely related to recent infection and bruising related to Coumadin. Patient presented with bilateral lower extremity pain; rated 6/10. Patient has open wounds in bilateral lower extremities that appear to be infected; purulent discharge in both. WBC 7.5. Lactic acid 2.1. Currently afebrile. XR tibia fibula left shows no soft tissue gas, chronic appearing findings including soft tissue calcifications, vascular calcifications, and osteopenia. Started on Vancomycin and Rocephin. ID consulted. Wound care consulted.  Continue Vancomycin  Continue Rocephin 1,000 mg IV QD ( - )  Continue compression wrap with Betadine soaked dressing daily   ID consulted; appreciate recommendations  Wound care consulted; appreciate recommendations    Bilateral lower extremity pain: Likely secondary to non-healing wound ulcers. Pain currently rated 6/10. XR tibia fibula left as noted above. Patient seen by palliative during previous hospitalization. Started on pain panel. Currently on Norco, gabapentin and tylenol.  Continue Norco 2 tablets PO q6h PRN  Continue Tylenol 1,000 mg q8h  Continue Gabapentin 100 mg PO BID  Monitor for S/S of worsening bilateral lower extremity pain  Troponinemia: Likely secondary to demand ischemia from bilateral lower extremity infection. Troponin 81. Patient reports no chest pain or SOB. EKG shows atrial fibrillation with rapid ventricular response with premature ventricular or aberrantly conducted complexes; no acute ischemic changes.  Trend troponin  Monitor for S/S of worsening ACS     HFpEF exacerbation: NT Pro-. Patient reports  of Paying Living Expenses: Not hard at all   Food Insecurity: No Food Insecurity (9/24/2024)    Hunger Vital Sign     Worried About Running Out of Food in the Last Year: Never true     Ran Out of Food in the Last Year: Never true   Transportation Needs: No Transportation Needs (9/24/2024)    PRAPARE - Transportation     Lack of Transportation (Medical): No     Lack of Transportation (Non-Medical): No   Physical Activity: Inactive (2/26/2024)    Exercise Vital Sign     Days of Exercise per Week: 0 days     Minutes of Exercise per Session: 0 min   Stress: No Stress Concern Present (12/19/2019)    [place] - Occupational Stress Questionnaire     Feeling of Stress : Not at all   Social Connections: Unknown (12/19/2019)    Social Connection and Isolation Panel [NHANES]     Frequency of Communication with Friends and Family: More than three times a week     Frequency of Social Gatherings with Friends and Family: More than three times a week     Attends [employer] Services: More than 4 times per year     Active Member of Clubs or Organizations: No     Attends Club or Organization Meetings: Never   Housing Stability: Low Risk  (9/24/2024)    Housing Stability Vital Sign     Unable to Pay for Housing in the Last Year: No     Number of Times Moved in the Last Year: 0     Homeless in the Last Year: No        Code status: Full Code     Electronically signed by Brenna Duval DO on 11/13/2024 at 2:59 AM.   Case was discussed with the Attending, Dr. Henriquez.

## 2024-11-14 ENCOUNTER — APPOINTMENT (OUTPATIENT)
Dept: CT IMAGING | Age: 89
DRG: 593 | End: 2024-11-14
Payer: MEDICARE

## 2024-11-14 ENCOUNTER — APPOINTMENT (OUTPATIENT)
Dept: GENERAL RADIOLOGY | Age: 89
DRG: 593 | End: 2024-11-14
Payer: MEDICARE

## 2024-11-14 PROBLEM — E44.0 MODERATE MALNUTRITION (HCC): Status: ACTIVE | Noted: 2024-11-14

## 2024-11-14 LAB
ANION GAP SERPL CALC-SCNC: 12 MEQ/L (ref 8–16)
BASOPHILS ABSOLUTE: 0 THOU/MM3 (ref 0–0.1)
BASOPHILS NFR BLD AUTO: 0.3 %
BUN SERPL-MCNC: 16 MG/DL (ref 7–22)
CALCIUM SERPL-MCNC: 9 MG/DL (ref 8.5–10.5)
CHLORIDE SERPL-SCNC: 101 MEQ/L (ref 98–111)
CO2 SERPL-SCNC: 24 MEQ/L (ref 23–33)
CREAT SERPL-MCNC: 0.8 MG/DL (ref 0.4–1.2)
DEPRECATED RDW RBC AUTO: 47.5 FL (ref 35–45)
EKG Q-T INTERVAL: 324 MS
EKG QRS DURATION: 62 MS
EKG QTC CALCULATION (BAZETT): 422 MS
EKG R AXIS: 0 DEGREES
EKG T AXIS: -16 DEGREES
EKG VENTRICULAR RATE: 102 BPM
EOSINOPHIL NFR BLD AUTO: 2.1 %
EOSINOPHILS ABSOLUTE: 0.2 THOU/MM3 (ref 0–0.4)
ERYTHROCYTE [DISTWIDTH] IN BLOOD BY AUTOMATED COUNT: 18.1 % (ref 11.5–14.5)
GFR SERPL CREATININE-BSD FRML MDRD: 71 ML/MIN/1.73M2
GLUCOSE SERPL-MCNC: 98 MG/DL (ref 70–108)
HCT VFR BLD AUTO: 28.9 % (ref 37–47)
HGB BLD-MCNC: 8.8 GM/DL (ref 12–16)
IMM GRANULOCYTES # BLD AUTO: 0.02 THOU/MM3 (ref 0–0.07)
IMM GRANULOCYTES NFR BLD AUTO: 0.3 %
INR PPP: 2.49 (ref 0.85–1.13)
LYMPHOCYTES ABSOLUTE: 1.5 THOU/MM3 (ref 1–4.8)
LYMPHOCYTES NFR BLD AUTO: 20.6 %
MAGNESIUM SERPL-MCNC: 1.5 MG/DL (ref 1.6–2.4)
MCH RBC QN AUTO: 22.2 PG (ref 26–33)
MCHC RBC AUTO-ENTMCNC: 30.4 GM/DL (ref 32.2–35.5)
MCV RBC AUTO: 73 FL (ref 81–99)
MONOCYTES ABSOLUTE: 0.6 THOU/MM3 (ref 0.4–1.3)
MONOCYTES NFR BLD AUTO: 7.6 %
NEUTROPHILS ABSOLUTE: 5.2 THOU/MM3 (ref 1.8–7.7)
NEUTROPHILS NFR BLD AUTO: 69.1 %
NRBC BLD AUTO-RTO: 0 /100 WBC
PLATELET # BLD AUTO: 214 THOU/MM3 (ref 130–400)
PMV BLD AUTO: 8.9 FL (ref 9.4–12.4)
POTASSIUM SERPL-SCNC: 3.3 MEQ/L (ref 3.5–5.2)
RBC # BLD AUTO: 3.96 MILL/MM3 (ref 4.2–5.4)
SODIUM SERPL-SCNC: 137 MEQ/L (ref 135–145)
WBC # BLD AUTO: 7.5 THOU/MM3 (ref 4.8–10.8)

## 2024-11-14 PROCEDURE — 6370000000 HC RX 637 (ALT 250 FOR IP): Performed by: INTERNAL MEDICINE

## 2024-11-14 PROCEDURE — 73100 X-RAY EXAM OF WRIST: CPT

## 2024-11-14 PROCEDURE — 99232 SBSQ HOSP IP/OBS MODERATE 35: CPT | Performed by: STUDENT IN AN ORGANIZED HEALTH CARE EDUCATION/TRAINING PROGRAM

## 2024-11-14 PROCEDURE — 94640 AIRWAY INHALATION TREATMENT: CPT

## 2024-11-14 PROCEDURE — 2580000003 HC RX 258: Performed by: INTERNAL MEDICINE

## 2024-11-14 PROCEDURE — 94761 N-INVAS EAR/PLS OXIMETRY MLT: CPT

## 2024-11-14 PROCEDURE — 6360000002 HC RX W HCPCS: Performed by: INTERNAL MEDICINE

## 2024-11-14 PROCEDURE — 83735 ASSAY OF MAGNESIUM: CPT

## 2024-11-14 PROCEDURE — 92610 EVALUATE SWALLOWING FUNCTION: CPT

## 2024-11-14 PROCEDURE — 1200000003 HC TELEMETRY R&B

## 2024-11-14 PROCEDURE — 6360000004 HC RX CONTRAST MEDICATION: Performed by: INTERNAL MEDICINE

## 2024-11-14 PROCEDURE — 75635 CT ANGIO ABDOMINAL ARTERIES: CPT

## 2024-11-14 PROCEDURE — 93010 ELECTROCARDIOGRAM REPORT: CPT | Performed by: INTERNAL MEDICINE

## 2024-11-14 PROCEDURE — 85610 PROTHROMBIN TIME: CPT

## 2024-11-14 PROCEDURE — 6370000000 HC RX 637 (ALT 250 FOR IP)

## 2024-11-14 PROCEDURE — 80048 BASIC METABOLIC PNL TOTAL CA: CPT

## 2024-11-14 PROCEDURE — 2580000003 HC RX 258

## 2024-11-14 PROCEDURE — 99223 1ST HOSP IP/OBS HIGH 75: CPT | Performed by: STUDENT IN AN ORGANIZED HEALTH CARE EDUCATION/TRAINING PROGRAM

## 2024-11-14 PROCEDURE — 36415 COLL VENOUS BLD VENIPUNCTURE: CPT

## 2024-11-14 PROCEDURE — 85025 COMPLETE CBC W/AUTO DIFF WBC: CPT

## 2024-11-14 PROCEDURE — 6370000000 HC RX 637 (ALT 250 FOR IP): Performed by: STUDENT IN AN ORGANIZED HEALTH CARE EDUCATION/TRAINING PROGRAM

## 2024-11-14 RX ORDER — IOPAMIDOL 755 MG/ML
80 INJECTION, SOLUTION INTRAVASCULAR
Status: COMPLETED | OUTPATIENT
Start: 2024-11-14 | End: 2024-11-14

## 2024-11-14 RX ADMIN — ATORVASTATIN CALCIUM 10 MG: 10 TABLET, FILM COATED ORAL at 08:09

## 2024-11-14 RX ADMIN — SODIUM CHLORIDE 1 G: 1 TABLET ORAL at 18:16

## 2024-11-14 RX ADMIN — POTASSIUM CHLORIDE 40 MEQ: 1500 TABLET, EXTENDED RELEASE ORAL at 06:29

## 2024-11-14 RX ADMIN — APIXABAN 5 MG: 5 TABLET, FILM COATED ORAL at 08:09

## 2024-11-14 RX ADMIN — IOPAMIDOL 80 ML: 755 INJECTION, SOLUTION INTRAVENOUS at 14:41

## 2024-11-14 RX ADMIN — SODIUM CHLORIDE 1 G: 1 TABLET ORAL at 08:09

## 2024-11-14 RX ADMIN — CEFEPIME 2000 MG: 2 INJECTION, POWDER, FOR SOLUTION INTRAVENOUS at 15:25

## 2024-11-14 RX ADMIN — BUDESONIDE AND FORMOTEROL FUMARATE DIHYDRATE 2 PUFF: 80; 4.5 AEROSOL RESPIRATORY (INHALATION) at 08:33

## 2024-11-14 RX ADMIN — SENNOSIDES 8.6 MG: 8.6 TABLET, FILM COATED ORAL at 08:09

## 2024-11-14 RX ADMIN — HYDROCODONE BITARTRATE AND ACETAMINOPHEN 2 TABLET: 5; 325 TABLET ORAL at 04:17

## 2024-11-14 RX ADMIN — ACETAMINOPHEN 1000 MG: 500 TABLET ORAL at 05:15

## 2024-11-14 RX ADMIN — PANTOPRAZOLE SODIUM 40 MG: 40 TABLET, DELAYED RELEASE ORAL at 05:15

## 2024-11-14 RX ADMIN — Medication 400 MG: at 08:09

## 2024-11-14 RX ADMIN — OXYCODONE HYDROCHLORIDE AND ACETAMINOPHEN 500 MG: 500 TABLET ORAL at 08:09

## 2024-11-14 RX ADMIN — Medication 1000 UNITS: at 08:08

## 2024-11-14 RX ADMIN — ALBUTEROL SULFATE 2 PUFF: 90 AEROSOL, METERED RESPIRATORY (INHALATION) at 08:33

## 2024-11-14 RX ADMIN — FERROUS SULFATE TAB 325 MG (65 MG ELEMENTAL FE) 325 MG: 325 (65 FE) TAB at 08:09

## 2024-11-14 RX ADMIN — AMLODIPINE BESYLATE 5 MG: 5 TABLET ORAL at 08:09

## 2024-11-14 RX ADMIN — HYDROCODONE BITARTRATE AND ACETAMINOPHEN 2 TABLET: 5; 325 TABLET ORAL at 10:38

## 2024-11-14 RX ADMIN — SODIUM BICARBONATE 325 MG: 650 TABLET ORAL at 08:09

## 2024-11-14 RX ADMIN — METOPROLOL TARTRATE 25 MG: 50 TABLET, FILM COATED ORAL at 08:09

## 2024-11-14 RX ADMIN — BUMETANIDE 0.5 MG: 0.5 TABLET ORAL at 08:09

## 2024-11-14 RX ADMIN — SPIRONOLACTONE 25 MG: 25 TABLET ORAL at 08:08

## 2024-11-14 RX ADMIN — POLYETHYLENE GLYCOL 3350 17 G: 17 POWDER, FOR SOLUTION ORAL at 08:08

## 2024-11-14 RX ADMIN — GABAPENTIN 100 MG: 100 CAPSULE ORAL at 08:09

## 2024-11-14 RX ADMIN — SODIUM CHLORIDE, PRESERVATIVE FREE 10 ML: 5 INJECTION INTRAVENOUS at 08:10

## 2024-11-14 RX ADMIN — SODIUM CHLORIDE, PRESERVATIVE FREE 10 ML: 5 INJECTION INTRAVENOUS at 22:55

## 2024-11-14 RX ADMIN — POTASSIUM CHLORIDE 10 MEQ: 1500 TABLET, EXTENDED RELEASE ORAL at 08:08

## 2024-11-14 ASSESSMENT — PAIN DESCRIPTION - DESCRIPTORS
DESCRIPTORS: ACHING
DESCRIPTORS: ACHING

## 2024-11-14 ASSESSMENT — PAIN DESCRIPTION - FREQUENCY: FREQUENCY: CONTINUOUS

## 2024-11-14 ASSESSMENT — PAIN DESCRIPTION - LOCATION
LOCATION: LEG

## 2024-11-14 ASSESSMENT — PAIN DESCRIPTION - PAIN TYPE: TYPE: CHRONIC PAIN

## 2024-11-14 ASSESSMENT — PAIN SCALES - GENERAL
PAINLEVEL_OUTOF10: 5
PAINLEVEL_OUTOF10: 10
PAINLEVEL_OUTOF10: 0
PAINLEVEL_OUTOF10: 7
PAINLEVEL_OUTOF10: 0

## 2024-11-14 ASSESSMENT — PAIN DESCRIPTION - ORIENTATION
ORIENTATION: RIGHT;LEFT

## 2024-11-14 ASSESSMENT — PAIN DESCRIPTION - ONSET: ONSET: ON-GOING

## 2024-11-14 NOTE — PROGRESS NOTES
[facility]  OCCUPATIONAL THERAPY MISSED TREATMENT NOTE  STRZ CVICU 4B  4B-12/012-A      Date: 2024  Patient Name: Jasmina Powers        CSN: [id number]   : 1935  (89 y.o.)  Gender: female                REASON FOR MISSED TREATMENT:  OT attempted at this time, although pt declined due to fatigue and difficulty staying awake, requesting to rest at this time. Will check back as able

## 2024-11-14 NOTE — PROGRESS NOTES
[facility]  SPEECH THERAPY  STRZ CVICU 4B  Clinical Swallow Evaluation    Discharge Recommendations: SNF  DIET ORDER RECOMMENDATIONS AFTER EVALUATION: minced and moist and thin liquids  Strategies:  Set-up with Meals, Full Upright Position, Small Bite/Sip, Pulmonary Monitoring, Intermittent Supervision, Medications Crushed with Puree, Limit Distractions, and Monitor for Fatigue   *medications passed in puree crushed 1-2 at a time with liquid wash between puree medication presentations, check oral cavity for FULL clearance prior to passing more medications    SLP Individual Minutes  Time In: 0810  Time Out: 0818  Minutes: 8  Timed Code Treatment Minutes: 0 Minutes       Date: 2024  Patient Name: Jasmina Powers      CSN: [id number]   : 1935  (89 y.o.)  Gender: female   Referring Physician:      Jorge L Henriquez MD       Diagnosis: Open wound of both lower extremities with complication, initial encounter    History of Present Illness/Injury:  Jasmina Powers is a 89 y.o. female with PMHx of aoritc stenosis, atrial fibrillation, COPD, T2DM, HLD, GERD, HFpEF and HTN who presents to [facility] with bilateral lower extremity pain. Patient is somnolent and is a poor historian. Patient reports bilateral leg pain that started yesterday and was intermittent all day. Patient rated pain 6/10. Patient has open wounds in bilateral lower extremities that appear to be infected. Purulent discharge in both. Reports no chest pain or SOB. Denies fever, nausea/vomiting, constipation/diarrhea, diaphoresis, HA, dizziness, and numbness/tingling. Reports bilateral leg pain.       ED course: CO2 22, 2.1 lactic acid, glucose 124, Prp-. Troponin 81. MCV 70. Chest x-ray shows cardiomegaly without failure. XR tibia fibula left shows no soft tissue gas, chronic appearing findings including soft tissue calcifications, vascular calcifications, and osteopenia. Patient started on Vancomycin and  evidence of learning, and Family not present    PLAN:  Skilled SLP intervention on acute care 3-5 x per week or until goals met and/or patient plateaus in function.  Specific interventions for next session may include: dysphagia interventions.    PATIENT GOAL:    Did not state.  Will further assess during treatment.    SHORT TERM GOALS:  Short Term Goals  Time Frame for Short Term Goals: 2 weeks  Goal 1: Patient will consume minced and moist and thin liquids with stable pulmonary status and incorporation of trained compensatory strategies to assist with nutrition/hydration measures  Goal 2: Considerations for an instrumental evaluation should adverse changes be conveyed in direct relation to the safety/efficency of the swallow mechanism.    LONG TERM GOALS:  No LTGs established d/t short BRIAN Khan MA, CCC-SLP [id number]

## 2024-11-14 NOTE — PROGRESS NOTES
Progress note: Infectious diseases    Patient - Jasmina Powers,  Age - 89 y.o.    - 1935      Room Number - 4B-12/012-A   N -  [id number]   [facility] # - [phone]  Date of Admission -  2024  8:28 PM    SUBJECTIVE:   No new issues  Discussed with her family  OBJECTIVE   VITALS    height is 1.651 m (5' 5\") and weight is 63.1 kg (139 lb 1.8 oz). Her oral temperature is 97.5 °F (36.4 °C). Her blood pressure is 110/75 and her pulse is 86. Her respiration is 18 and oxygen saturation is 100%.       Wt Readings from Last 3 Encounters:   24 63.1 kg (139 lb 1.8 oz)   10/21/24 68 kg (150 lb)   24 73.5 kg (162 lb)       I/O (24 Hours)    Intake/Output Summary (Last 24 hours) at 2024 1208  Last data filed at 2024 0400  Gross per 24 hour   Intake 375.88 ml   Output --   Net 375.88 ml       General Appearance  Awake, alert, oriented,  not  In acute distress  HEENT - normocephalic, atraumatic,pale  conjunctiva,  anicteric sclera   Neurologic -awake and oriented  Skin - No bruising or bleeding  Extremities - bilateral necrotic wound, no drainage    MEDICATIONS:      sodium chloride flush  5-40 mL IntraVENous 2 times per day    polyethylene glycol  17 g Oral Daily    acetaminophen  1,000 mg Oral 3 times per day    amLODIPine  5 mg Oral Daily    atorvastatin  10 mg Oral Daily    bumetanide  0.5 mg Oral Daily    diclofenac sodium  2 g Topical 4x Daily    ferrous sulfate  325 mg Oral BID    fluticasone  1 spray Each Nostril Daily    budesonide-formoterol  2 puff Inhalation BID RT    And    tiotropium  2 puff Inhalation Daily RT    gabapentin  100 mg Oral BID    magnesium oxide  400 mg Oral Daily    metoprolol tartrate  25 mg Oral BID    montelukast  10 mg Oral Nightly    multivitamin  1 tablet Oral Daily    pantoprazole  40 mg Oral QAM AC    potassium chloride  10 mEq Oral Daily    senna  1 tablet Oral BID

## 2024-11-14 NOTE — PLAN OF CARE
Problem: Chronic Conditions and Co-morbidities  Goal: Patient's chronic conditions and co-morbidity symptoms are monitored and maintained or improved  Outcome: Progressing  Flowsheets (Taken 11/13/2024 2127)  Care Plan - Patient's Chronic Conditions and Co-Morbidity Symptoms are Monitored and Maintained or Improved:   Monitor and assess patient's chronic conditions and comorbid symptoms for stability, deterioration, or improvement   Collaborate with multidisciplinary team to address chronic and comorbid conditions and prevent exacerbation or deterioration     Problem: Discharge Planning  Goal: Discharge to home or other facility with appropriate resources  Outcome: Progressing  Flowsheets (Taken 11/13/2024 2127)  Discharge to home or other facility with appropriate resources:   Identify barriers to discharge with patient and caregiver   Identify discharge learning needs (meds, wound care, etc)   Refer to discharge planning if patient needs post-hospital services based on physician order or complex needs related to functional status, cognitive ability or social support system   Arrange for needed discharge resources and transportation as appropriate     Problem: Pain  Goal: Verbalizes/displays adequate comfort level or baseline comfort level  Outcome: Progressing  Flowsheets (Taken 11/13/2024 2127)  Verbalizes/displays adequate comfort level or baseline comfort level:   Assess pain using appropriate pain scale   Implement non-pharmacological measures as appropriate and evaluate response   Encourage patient to monitor pain and request assistance   Administer analgesics based on type and severity of pain and evaluate response   Consider cultural and social influences on pain and pain management   Notify Licensed Independent Practitioner if interventions unsuccessful or patient reports new pain     Problem: Skin/Tissue Integrity  Goal: Absence of new skin breakdown  Description: 1.  Monitor for areas of redness and/or

## 2024-11-14 NOTE — PROGRESS NOTES
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  CTA of the lower extremities has been ordered.  Currently on cefepime  Stop warfarin.  Patient will be started on Eliquis  Continue compression wrap with Betadine soaked dressing daily   ID consulted; appreciate recommendations  Wound care consulted; appreciate recommendations    Bilateral lower extremity pain: Likely secondary to non-healing wound ulcers. Pain currently rated 6/10. XR tibia fibula left as noted above. Patient seen by palliative during previous hospitalization. Started on pain panel. Currently on Norco, gabapentin and tylenol.  Continue Norco 2 tablets PO q6h PRN  Continue Tylenol 1,000 mg q8h  Continue Gabapentin 100 mg PO BID  Monitor for S/S of worsening bilateral lower extremity pain  Palliative medicine consulted  Troponinemia: Likely secondary to demand ischemia from bilateral lower extremity infection. Troponin 81. Patient reports no chest pain or SOB. EKG shows atrial fibrillation with rapid ventricular response with premature ventricular or aberrantly conducted complexes; no acute ischemic changes.  Trend troponin  Monitor for S/S of worsening ACS     HFpEF exacerbation: NT Pro-. Patient reports SOB; currently on RA. 2+ pitting edema. 5/10/2023 echocardiogram showed EF 60%, left atrium moderately dilated, mildly enlarged right atrium, mild aortic regurgitation, mild to moderate    atherosclerosis however the posterior tibial and anterior tibial arteries appear   to maintain patency across the ankle.   2. Distended appearance of the gallbladder with what appears to be   pericholecystic fluid. These findings are concerning for acute cholecystitis.   3. Colonic diverticulosis without evidence of acute diverticulitis.         **This report has been created using voice recognition software. It may contain   minor errors which are inherent in voice recognition technology.**      Electronically signed by Dr Elkin Tello      XR TIBIA FIBULA LEFT (2 VIEWS)   Final Result   No soft tissue gas. Chronic appearing findings including soft tissue    calcifications, vascular calcifications, and osteopenia.      This document has been electronically signed by: Jag Mccarthy MD on 11/12/2024 10:24 PM      XR CHEST PORTABLE   Final Result   Impression:   1. Cardiomegaly without failure.      This document has been electronically signed by: Jag Mccarthy MD on 11/12/2024 10:26 PM      XR WRIST RIGHT (2 VIEWS)    (Results Pending)     XR CHEST PORTABLE    Result Date: 11/12/2024  1 view chest Comparison: CR/SR - XR CHEST PORTABLE - 9/24/24 12:13 EDT Findings: No consolidation or effusion. There are fine benign appearing right pericardial calcifications. No acute fracture.     Impression: 1. Cardiomegaly without failure. This document has been electronically signed by: Jag Mccarthy MD on 11/12/2024 10:26 PM    XR TIBIA FIBULA LEFT (2 VIEWS)    Result Date: 11/12/2024  2 view left tibia-fibula Comparison: None Findings Osteopenia. No lytic changes. No fractures or dislocations. No significant arthritic change. No radiopaque foreign body. Innumerable fine soft tissue calcifications. Possibly due to chronic venous stasis. Significant vascular calcifications.     No soft tissue gas. Chronic appearing findings including soft tissue calcifications, vascular

## 2024-11-14 NOTE — CONSULTS
Physician Consult Note        Patient:   Jasmina Powers  YOB: 1935  Age:  89 y.o.  Room:  [facility]12/012-A  MRN:  [id number]   Acct: [id number]  PCP: Dimas Watts APRN - CNP    Date of Admission:  11/12/2024  8:28 PM  Date of Service:  11/14/2024    Reason for Consult: Symptom Management             Subjective   Chief Complaint:-  Leg Pain and Extremity Weakness       History Obtained From:-  Patient, Electronic Medical Record, and Patient's primary nurse    History of Present Illness:-            Jasmina Powers is a 89 y.o. female who  has a past medical history of Allergic rhinitis, Aortic stenosis, mild to moderate, ASHD (arteriosclerotic heart disease), Atrial fibrillation ([facility]), COPD (chronic obstructive pulmonary disease) ([facility]), DM2 (diabetes mellitus, type 2) ([facility]), Dyslipidemia, GERD (gastroesophageal reflux disease), Heart failure with preserved ejection fraction ([facility]), and Hypertension, essential. They present to the hospital with Leg Pain and Extremity Weakness   and are admitted for Open wound of both lower extremities with complication, initial encounter. Palliative care was consulted for Symptom Management.    Symptoms:  Pain: Location- Bilateral leg  Radiation- Occasionally has hip  Quality- sharp, burning, throbbing  Duration- They describe their pain as intermittent.  They report that their pain comes and goes.  Severity- Their pain is mild.  Exacerbating factors- light touch, walking, and pressing on it  Pain affects- sleep, standing, walking, and moving  Relieving factors- pain medication  Current Treatments- They have no medications for scheduled pain relief. From 8 AM yesterday to 8 AM today, they have used Norco 5-325mg 2 tablets three times for as needed pain relief.  Effectiveness of Current Treatment- The Norco is taking 30 minutes to take effect and it is lasting twice a day. Their pain medications are working well to control their  Daily    budesonide-formoterol  2 puff Inhalation BID RT    And    tiotropium  2 puff Inhalation Daily RT    gabapentin  100 mg Oral BID    magnesium oxide  400 mg Oral Daily    metoprolol tartrate  25 mg Oral BID    montelukast  10 mg Oral Nightly    multivitamin  1 tablet Oral Daily    pantoprazole  40 mg Oral QAM AC    potassium chloride  10 mEq Oral Daily    senna  1 tablet Oral BID    sodium bicarbonate  325 mg Oral BID    sodium chloride  1 g Oral BID    spironolactone  25 mg Oral Daily    vitamin C  500 mg Oral Daily    Vitamin D  1,000 Units Oral Daily    albuterol sulfate HFA  2 puff Inhalation BID    cefepime  2,000 mg IntraVENous Q24H    apixaban  5 mg Oral BID       Infusions:   sodium chloride         PRN Medications:  sodium chloride flush, sodium chloride, potassium chloride **OR** potassium alternative oral replacement **OR** [DISCONTINUED] potassium chloride, ammonium lactate, hydrOXYzine pamoate, HYDROcodone 5 mg - acetaminophen **OR** HYDROcodone 5 mg - acetaminophen    Allergies:  Patient has no known allergies.    Social History:    reports that she quit smoking about 46 years ago. Her smoking use included cigarettes. She has never used smokeless tobacco. She reports that she does not currently use alcohol after a past usage of about 1.0 standard drink of alcohol per week. She reports that she does not use drugs.    Family History:   family history includes Cancer in her mother; Diabetes in her mother.    Diet:  ADULT DIET; Dysphagia - Minced and Moist  ADULT ORAL NUTRITION SUPPLEMENT; Breakfast, Dinner, Lunch; Diabetic Oral Supplement    Nutrition:-  Oral intake    Review of Systems/Symptom Assessment:- Pertinent positives as noted in the HPI. All other systems reviewed and negative.    Objective   Vitals:-    /75   Pulse 86   Temp 97.5 °F (36.4 °C) (Oral)   Resp 18   Ht 1.651 m (5' 5\")   Wt 63.1 kg (139 lb 1.8 oz)   SpO2 100%   BMI 23.15 kg/m²     Pt. Weight:  Wt Readings from

## 2024-11-14 NOTE — PROGRESS NOTES
Comprehensive Nutrition Assessment    Type and Reason for Visit:  Initial, Positive nutrition screen, Wound    Nutrition Recommendations/Plan:   Continue MVI.  ONS modified to Glucerna TID. Not drinking Chon.  Diet per SLP.      Malnutrition Assessment:  Malnutrition Status:  Moderate malnutrition (11/14/24 1157)    Context:  Chronic Illness     Findings of the 6 clinical characteristics of malnutrition:  Energy Intake:  75% or less estimated energy requirements for 1 month or longer  Weight Loss:   (hard to assess with edema, hx CHF but weight down 16.3% in the last 10 months)     Body Fat Loss:  Mild body fat loss Orbital, Triceps, Fat Overlying Ribs   Muscle Mass Loss:  Mild muscle mass loss Temples (temporalis), Clavicles (pectoralis & deltoids), Thigh (quadriceps)  Fluid Accumulation:  Severe Extremities   Strength:  Not Performed    Nutrition Assessment:     Pt. moderately malnourished AEB criteria as listed above.  At risk for further nutrition compromise r/t increased nutrient needs to support wound healing, dysphagia, admit with bilateral leg pain, right arm pain, elevated troponin, CHF exacerbation, and underlying medical condition (hx: dyslipidemia, afib, HTN, DM, ASHD, CHF, COPD, GERD, aortic stenosis, smoking).        Nutrition Related Findings:   Pt. Report/Treatments/Miscellaneous: Patient seen earlier this morning eating breakfast. States her appetite is good and not good, when asked what that meant states\" I think I am hungry, then foods comes and I can't eat much\".  SLP just saw-diet modified.  Patient voiced she has lost unplanned weight.  Not drinking Chon. Agreed to trial Glucerna.   GI Status: BM 11/11/24  Pertinent Labs: 11/14/24: Potassium 3.3, Mg 1.5  Pertinent Meds: vitamin C, lipitor, bumex, maxipime, iron, neurotin, mag-ox, lopressor, MVI, protonix, glycolax      Wound Type:  (bilateral LE tibial venous and lateral full thickness wounds)       Current Nutrition Intake & Therapies:     Average Meal Intake: 26-50%, 0%  Average Supplements Intake:  (not drinking Chon)  ADULT DIET; Dysphagia - Minced and Moist  ADULT ORAL NUTRITION SUPPLEMENT; Breakfast, Dinner, Lunch; Diabetic Oral Supplement    Anthropometric Measures:  Height: 165.1 cm (5' 5\")  Ideal Body Weight (IBW): 125 lbs (57 kg)    Admission Body Weight: 66.3 kg (146 lb 3.2 oz) (11/12/24 +2-3 LE edema)  Current Body Weight: 63.1 kg (139 lb 1.8 oz) (11/13/24: bedscale, +2-3 LE edema),    Current BMI (kg/m2): 23.1  Usual Body Weight:  (8/23/23: 172#13oz, 1/25/24: 166#4oz, 4/18/24: 169#13oz)                          BMI Categories: Normal Weight (BMI 22.0 to 24.9) age over 65    Estimated Daily Nutrient Needs:  Energy Requirements Based On: Kcal/kg  Weight Used for Energy Requirements:  (63 kg)  Energy (kcal/day): ~ 9106-2357 kcals (25-30 kcals/kg/day)  Weight Used for Protein Requirements:  (63 kg)  Protein (g/day): ~  grams (1.3-2 grams/kg/day)         Nutrition Diagnosis:   Moderate malnutrition, in context of chronic illness related to decreased appetite as evidenced by criteria as identified in malnutrition assessment    Nutrition Interventions:   Food and/or Nutrient Delivery: Continue Current Diet, Modify Oral Nutrition Supplement  Nutrition Education/Counseling: Education/Counseling initiated  Coordination of Nutrition Care: Continue to monitor while inpatient, Speech Therapy       Goals:  Goals: PO intake 75% or greater, by next RD assessment  Type of Goal: New goal       Nutrition Monitoring and Evaluation:      Food/Nutrient Intake Outcomes: Diet Advancement/Tolerance, Food and Nutrient Intake, Supplement Intake  Physical Signs/Symptoms Outcomes: Biochemical Data, Chewing or Swallowing, GI Status, Fluid Status or Edema, Nutrition Focused Physical Findings, Weight, Skin    Discharge Planning:          Allison C Schwab, RD, LD  Contact: [phone]
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
## 2024-11-14 NOTE — PLAN OF CARE
Problem: Respiratory - Adult  Goal: Achieves optimal ventilation and oxygenation  11/14/2024 0842 by Catarino Whitlock, P  Outcome: Progressing     Problem: Respiratory - Adult  Goal: Lung sounds clear or within normal limits for patient  Outcome: Progressing     Unable to get agreement for goals because no family is present and patient cannot respond.

## 2024-11-15 PROCEDURE — 6360000002 HC RX W HCPCS: Performed by: INTERNAL MEDICINE

## 2024-11-15 PROCEDURE — 94640 AIRWAY INHALATION TREATMENT: CPT

## 2024-11-15 PROCEDURE — 99232 SBSQ HOSP IP/OBS MODERATE 35: CPT | Performed by: STUDENT IN AN ORGANIZED HEALTH CARE EDUCATION/TRAINING PROGRAM

## 2024-11-15 PROCEDURE — 6370000000 HC RX 637 (ALT 250 FOR IP): Performed by: INTERNAL MEDICINE

## 2024-11-15 PROCEDURE — 97530 THERAPEUTIC ACTIVITIES: CPT

## 2024-11-15 PROCEDURE — 2580000003 HC RX 258

## 2024-11-15 PROCEDURE — 97535 SELF CARE MNGMENT TRAINING: CPT

## 2024-11-15 PROCEDURE — 1200000003 HC TELEMETRY R&B

## 2024-11-15 PROCEDURE — 6360000002 HC RX W HCPCS: Performed by: STUDENT IN AN ORGANIZED HEALTH CARE EDUCATION/TRAINING PROGRAM

## 2024-11-15 PROCEDURE — 97167 OT EVAL HIGH COMPLEX 60 MIN: CPT

## 2024-11-15 PROCEDURE — 2580000003 HC RX 258: Performed by: INTERNAL MEDICINE

## 2024-11-15 PROCEDURE — 6370000000 HC RX 637 (ALT 250 FOR IP)

## 2024-11-15 RX ORDER — HALOPERIDOL 5 MG/ML
1 INJECTION INTRAMUSCULAR EVERY 6 HOURS PRN
Status: DISCONTINUED | OUTPATIENT
Start: 2024-11-15 | End: 2024-11-18 | Stop reason: HOSPADM

## 2024-11-15 RX ORDER — HALOPERIDOL 5 MG/ML
1 INJECTION INTRAMUSCULAR ONCE
Status: COMPLETED | OUTPATIENT
Start: 2024-11-15 | End: 2024-11-15

## 2024-11-15 RX ADMIN — POTASSIUM CHLORIDE 10 MEQ: 1500 TABLET, EXTENDED RELEASE ORAL at 07:47

## 2024-11-15 RX ADMIN — GABAPENTIN 100 MG: 100 CAPSULE ORAL at 08:00

## 2024-11-15 RX ADMIN — DICLOFENAC SODIUM 2 G: 10 GEL TOPICAL at 07:43

## 2024-11-15 RX ADMIN — CEFEPIME 2000 MG: 2 INJECTION, POWDER, FOR SOLUTION INTRAVENOUS at 13:59

## 2024-11-15 RX ADMIN — BUDESONIDE AND FORMOTEROL FUMARATE DIHYDRATE 2 PUFF: 80; 4.5 AEROSOL RESPIRATORY (INHALATION) at 08:30

## 2024-11-15 RX ADMIN — AMLODIPINE BESYLATE 5 MG: 5 TABLET ORAL at 07:44

## 2024-11-15 RX ADMIN — OXYCODONE HYDROCHLORIDE AND ACETAMINOPHEN 500 MG: 500 TABLET ORAL at 07:44

## 2024-11-15 RX ADMIN — ATORVASTATIN CALCIUM 10 MG: 10 TABLET, FILM COATED ORAL at 07:44

## 2024-11-15 RX ADMIN — ALBUTEROL SULFATE 2 PUFF: 90 AEROSOL, METERED RESPIRATORY (INHALATION) at 08:30

## 2024-11-15 RX ADMIN — APIXABAN 5 MG: 5 TABLET, FILM COATED ORAL at 08:00

## 2024-11-15 RX ADMIN — Medication 1000 UNITS: at 07:41

## 2024-11-15 RX ADMIN — SODIUM BICARBONATE 325 MG: 650 TABLET ORAL at 07:42

## 2024-11-15 RX ADMIN — SODIUM CHLORIDE, PRESERVATIVE FREE 10 ML: 5 INJECTION INTRAVENOUS at 07:43

## 2024-11-15 RX ADMIN — HALOPERIDOL LACTATE 1 MG: 5 INJECTION, SOLUTION INTRAMUSCULAR at 13:35

## 2024-11-15 RX ADMIN — BUMETANIDE 0.5 MG: 0.5 TABLET ORAL at 07:41

## 2024-11-15 RX ADMIN — SPIRONOLACTONE 25 MG: 25 TABLET ORAL at 07:41

## 2024-11-15 RX ADMIN — HALOPERIDOL LACTATE 1 MG: 5 INJECTION, SOLUTION INTRAMUSCULAR at 12:09

## 2024-11-15 RX ADMIN — HYDROCODONE BITARTRATE AND ACETAMINOPHEN 1 TABLET: 5; 325 TABLET ORAL at 07:35

## 2024-11-15 RX ADMIN — HYDROXYZINE PAMOATE 25 MG: 25 CAPSULE ORAL at 07:35

## 2024-11-15 RX ADMIN — METOPROLOL TARTRATE 25 MG: 50 TABLET, FILM COATED ORAL at 07:41

## 2024-11-15 RX ADMIN — Medication 400 MG: at 07:42

## 2024-11-15 RX ADMIN — SENNOSIDES 8.6 MG: 8.6 TABLET, FILM COATED ORAL at 07:41

## 2024-11-15 RX ADMIN — SODIUM CHLORIDE 1 G: 1 TABLET ORAL at 07:40

## 2024-11-15 RX ADMIN — FERROUS SULFATE TAB 325 MG (65 MG ELEMENTAL FE) 325 MG: 325 (65 FE) TAB at 07:41

## 2024-11-15 ASSESSMENT — PAIN SCALES - GENERAL
PAINLEVEL_OUTOF10: 0
PAINLEVEL_OUTOF10: 0
PAINLEVEL_OUTOF10: 2

## 2024-11-15 ASSESSMENT — PAIN DESCRIPTION - DESCRIPTORS: DESCRIPTORS: ACHING

## 2024-11-15 ASSESSMENT — PAIN DESCRIPTION - LOCATION: LOCATION: FOOT

## 2024-11-15 ASSESSMENT — PAIN SCALES - WONG BAKER
WONGBAKER_NUMERICALRESPONSE: NO HURT
WONGBAKER_NUMERICALRESPONSE: NO HURT

## 2024-11-15 ASSESSMENT — PAIN DESCRIPTION - ORIENTATION: ORIENTATION: RIGHT;LEFT

## 2024-11-15 NOTE — PROGRESS NOTES
Decreased high-level IADLs  Prognosis: Fair  REQUIRES OT FOLLOW-UP: Yes  Decision Making: Medium Complexity    Treatment Initiated: Treatment and education initiated within context of evaluation.  Evaluation time included review of current medical information, gathering information related to past medical, social and functional history, completion of standardized testing, formal and informal observation of tasks, assessment of data and development of plan of care and goals.  Treatment time included skilled education and facilitation of tasks to increase safety and independence with ADL's for improved functional independence and quality of life.    Patient Education:          Patient Education  Education Given To: Patient  Education Provided: Role of Therapy;Equipment;Fall Prevention Strategies;Orientation  Education Method: Verbal;Demonstration  Barriers to Learning: Cognition  Education Outcome: Continued education needed;Unable to demonstrate understanding;Unable to verbalize    Plan:  Times Per Week: 3-5x  Current Treatment Recommendations: Strengthening, Balance training, Functional mobility training, Endurance training, Cognitive reorientation, Safety education & training, Self-Care / ADL.  See long-term goal time frame for expected duration of plan of care.  If no long-term goals established, a short length of stay is anticipated.    Goals:  Patient goals : not able to report therapy goals but pt states she wants to go home  Short Term Goals  Time Frame for Short Term Goals: at discharge  Short Term Goal 1: Pt will perform functional mobility with walker SBA to/from bathroom with safe technqiues to increase I with toileting task.  Short Term Goal 2: Pt will perform standing balance activity for 7-10 minutes SBA to increase balance and endurance for self care at sink.  Short Term Goal 3: Pt will perform UB/LB dressing mod I to increase I with ADLs.  Short Term Goal 4: Pt will demo B UE HEP with min resistance

## 2024-11-15 NOTE — DISCHARGE INSTR - COC
Continuity of Care Form    Patient Name: Jasmina Powers   :  1935  MRN:  [id number]    Admit date:  2024  Discharge date:  2024    Code Status Order: Full Code   Advance Directives:   Advance Care Flowsheet Documentation             Admitting Physician:  Santa Edwards DO  PCP: Dimas Watts APRN - CNP    Discharging Nurse: Rubina Daniel  Discharging Hospital Unit/Room#: 7K14  Discharging Unit Phone Number: [phone]    Emergency Contact:   Extended Emergency Contact Information  Primary Emergency Contact: Jhonny Powers  Address: [address] [place] [address]  Home Phone: [phone]  Mobile Phone: [phone]  Relation: Child   needed? No  Secondary Emergency Contact: Enrique Mathews   [facility]  Home Phone: [phone]  Mobile Phone: [phone]  Relation: Child   needed? No    Past Surgical History:  History reviewed. No pertinent surgical history.    Immunization History:   Immunization History   Administered Date(s) Administered    COVID-19, PFIZER PURPLE top, DILUTE for use, (age 12 y+), 30mcg/0.3mL 2021, 2021, 2021    COVID-19, PFIZER, (age 12y+), IM, 30mcg/0.3mL 2024    Zoster Recombinant (Shingrix) 2024, 2024       Active Problems:  Patient Active Problem List   Diagnosis Code    Anticoagulated on Coumadin Z79.01    Diabetes mellitus (HCC) E11.9    Bilateral leg edema +1 R60.0    Encounter for therapeutic drug monitoring Z51.81    Primary hypertension I10    Abnormal EKG R94.31    Clicking tinnitus of both ears H93.13    Chronic renal disease, stage III (HCC) [912393] N18.30    Secondary hypercoagulable state (HCC) D68.69    Type 2 diabetes mellitus with chronic kidney disease E11.22    Mammogram declined Z53.20    Chronic atrial fibrillation (HCC) I48.20    Syncope and collapse R55    Metabolic acidosis E87.20    Hyponatremia E87.1    Cellulitis of right lower

## 2024-11-15 NOTE — CARE COORDINATION
11/15/24, 2:18 PM EST    DISCHARGE PLANNING EVALUATION     Jamaal spoke with Pennie at [facility] to check status of precert. [facility] precert was never started due to no therapy notes. Pennie states she was reviewing notes a little while ago and saw that security was called due to pt acting out. Pennie monroy pt did not act out at nursing home. Pennie states pt can return to facility under pending Medicaid and no precert. Pennie did state they can not accept pt if still acting out. JAMAAL explained a psych consult was ordered.   [facility] staff can call Dominique Mendez over the weekend if pt is appropriate for transfer.

## 2024-11-15 NOTE — PROGRESS NOTES
Physician Progress Note      PATIENT:               SRUTHI MACK  [facility] #:                  [id number]  :                       1935  ADMIT DATE:       2024 8:28 PM  DISCH DATE:  RESPONDING  PROVIDER #:        Jorge L Henriquez MD          QUERY TEXT:    Patient with dietician consult and the progress note on  states patient   meets criteria for moderate malnutrition. If possible, please document in   progress notes and discharge summary if you are evaluating and /or treating   any of the following:    The medical record reflects the following:  Risk Factors: chronic illness related to decreased appetite  Clinical Indicators:  Malnutrition Status:  Moderate malnutrition (24 1157)  Context:  Chronic Illness  Findings of the 6 clinical characteristics of malnutrition:  Energy Intake:  75% or less estimated energy requirements for 1 month or   longer  Weight Loss:   (hard to assess with edema, hx CHF but weight down 16.3% in the   last 10 months)  Body Fat Loss:  Mild body fat loss Orbital, Triceps, Fat Overlying Ribs  Muscle Mass Loss:  Mild muscle mass loss Temples (temporalis), Clavicles   (pectoralis & deltoids), Thigh (quadriceps)  Fluid Accumulation:  Severe Extremities   Strength:  Not Performed  Treatment: dietician consult, Continue MVI. ONS modified to Glucerna TID. Not   drinking Chon. Diet per SLP.    ASPEN Criteria:    https://aspenjournals.onlinelibrary.cordon.com/doi/full/10.1177/595138771479862  5  Options provided:  -- Protein calorie malnutrition moderate  -- Other - I will add my own diagnosis  -- Disagree - Not applicable / Not valid  -- Disagree - Clinically unable to determine / Unknown  -- Refer to Clinical Documentation Reviewer    PROVIDER RESPONSE TEXT:    This patient has moderate protein calorie malnutrition.    Query created by: Xuan Malone on 11/15/2024 1:01 PM      Electronically signed by:  Jorge L Henriquez MD 11/15/2024 1:51 PM
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
## 2024-11-15 NOTE — PROGRESS NOTES
Oral QAM AC    potassium chloride  10 mEq Oral Daily    senna  1 tablet Oral BID    sodium bicarbonate  325 mg Oral BID    sodium chloride  1 g Oral BID    spironolactone  25 mg Oral Daily    vitamin C  500 mg Oral Daily    Vitamin D  1,000 Units Oral Daily    albuterol sulfate HFA  2 puff Inhalation BID    cefepime  2,000 mg IntraVENous Q24H    apixaban  5 mg Oral BID      sodium chloride       sodium chloride flush, sodium chloride, potassium chloride **OR** potassium alternative oral replacement **OR** [DISCONTINUED] potassium chloride, ammonium lactate, hydrOXYzine pamoate, HYDROcodone 5 mg - acetaminophen **OR** HYDROcodone 5 mg - acetaminophen      LABS:     CBC:   Recent Labs     11/12/24  2050 11/14/24  0408   WBC 7.5 7.5   HGB 10.1* 8.8*    214     BMP:    Recent Labs     11/12/24  2200 11/14/24  0408    137   K 3.6 3.3*   CL 99 101   CO2 22* 24   BUN 19 16   CREATININE 0.8 0.8   GLUCOSE 124* 98     Calcium:  Recent Labs     11/14/24  0408   CALCIUM 9.0     Ionized Calcium:Invalid input(s): \"IONCA\"  Magnesium:  Recent Labs     11/14/24  0408   MG 1.5*      Recent Labs     11/12/24  2308 11/13/24  0353 11/14/24  0408   INR 1.33* 1.26* 2.49*     Hepatic:   Recent Labs     11/12/24  2200   ALKPHOS 105   ALT 12   AST 18   BILITOT 0.6     Amylase and Lipase:  Recent Labs     11/13/24  0953   LACTA 1.3     Lactic Acid:   Recent Labs     11/13/24  0953   LACTA 1.3          Problem list of patient:     Patient Active Problem List   Diagnosis Code    Anticoagulated on Coumadin Z79.01    Diabetes mellitus (HCC) E11.9    Bilateral leg edema +1 R60.0    Encounter for therapeutic drug monitoring Z51.81    Primary hypertension I10    Abnormal EKG R94.31    Clicking tinnitus of both ears H93.13    Chronic renal disease, stage III (HCC) [926710] N18.30    Secondary hypercoagulable state (HCC) D68.69    Type 2 diabetes mellitus with chronic kidney disease E11.22    Mammogram declined Z53.20    Chronic atrial  fibrillation ([facility]) I48.20    Syncope and collapse R55    Metabolic acidosis E87.20    Hyponatremia E87.1    Cellulitis of right lower extremity L03.115    Cellulitis L03.90    Sepsis without acute organ dysfunction ([facility]) A41.9    Bacteremia due to Pseudomonas R78.81, B96.5    Physical deconditioning R53.81    Intractable pain R52    Right leg pain M79.604    Leg wound, right, sequela S81.801S    Other chronic pain G89.29    Palliative care patient Z51.5    Chronic heart failure with preserved ejection fraction (HFpEF) ([facility]) I50.32    Weakness R53.1    Open wound of both lower extremities with complication, initial encounter S81.801A, S81.802A    Elevated troponin R79.89    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 Has calcification of the soft tissue .    Stable wound: will continue to dress the wound with non stick dressing  Right wrist drop: will do xray of the wrist. Need therapy  Ok with discharge plan  Exam didn't show any active infection         Rojelio Lopez MD, 11/15/2024 9:32 AM
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
## 2024-11-16 PROCEDURE — 99232 SBSQ HOSP IP/OBS MODERATE 35: CPT | Performed by: STUDENT IN AN ORGANIZED HEALTH CARE EDUCATION/TRAINING PROGRAM

## 2024-11-16 PROCEDURE — 2580000003 HC RX 258: Performed by: INTERNAL MEDICINE

## 2024-11-16 PROCEDURE — 94761 N-INVAS EAR/PLS OXIMETRY MLT: CPT

## 2024-11-16 PROCEDURE — 6360000002 HC RX W HCPCS: Performed by: INTERNAL MEDICINE

## 2024-11-16 PROCEDURE — 6370000000 HC RX 637 (ALT 250 FOR IP): Performed by: INTERNAL MEDICINE

## 2024-11-16 PROCEDURE — 1200000000 HC SEMI PRIVATE

## 2024-11-16 PROCEDURE — 6370000000 HC RX 637 (ALT 250 FOR IP)

## 2024-11-16 PROCEDURE — 90792 PSYCH DIAG EVAL W/MED SRVCS: CPT | Performed by: PSYCHIATRY & NEUROLOGY

## 2024-11-16 PROCEDURE — 94640 AIRWAY INHALATION TREATMENT: CPT

## 2024-11-16 RX ADMIN — GABAPENTIN 100 MG: 100 CAPSULE ORAL at 20:11

## 2024-11-16 RX ADMIN — MONTELUKAST 10 MG: 10 TABLET, FILM COATED ORAL at 20:11

## 2024-11-16 RX ADMIN — HYDROCODONE BITARTRATE AND ACETAMINOPHEN 1 TABLET: 5; 325 TABLET ORAL at 20:16

## 2024-11-16 RX ADMIN — ALBUTEROL SULFATE 2 PUFF: 90 AEROSOL, METERED RESPIRATORY (INHALATION) at 09:16

## 2024-11-16 RX ADMIN — BUDESONIDE AND FORMOTEROL FUMARATE DIHYDRATE 2 PUFF: 80; 4.5 AEROSOL RESPIRATORY (INHALATION) at 09:17

## 2024-11-16 RX ADMIN — HYDROXYZINE PAMOATE 25 MG: 25 CAPSULE ORAL at 08:06

## 2024-11-16 RX ADMIN — CEFEPIME 2000 MG: 2 INJECTION, POWDER, FOR SOLUTION INTRAVENOUS at 14:47

## 2024-11-16 RX ADMIN — BUDESONIDE AND FORMOTEROL FUMARATE DIHYDRATE 2 PUFF: 80; 4.5 AEROSOL RESPIRATORY (INHALATION) at 18:14

## 2024-11-16 RX ADMIN — Medication 1 TABLET: at 20:11

## 2024-11-16 RX ADMIN — APIXABAN 5 MG: 5 TABLET, FILM COATED ORAL at 08:07

## 2024-11-16 RX ADMIN — GABAPENTIN 100 MG: 100 CAPSULE ORAL at 08:06

## 2024-11-16 RX ADMIN — METOPROLOL TARTRATE 25 MG: 50 TABLET, FILM COATED ORAL at 20:11

## 2024-11-16 RX ADMIN — ALBUTEROL SULFATE 2 PUFF: 90 AEROSOL, METERED RESPIRATORY (INHALATION) at 18:14

## 2024-11-16 RX ADMIN — SODIUM CHLORIDE 1 G: 1 TABLET ORAL at 17:34

## 2024-11-16 RX ADMIN — FERROUS SULFATE TAB 325 MG (65 MG ELEMENTAL FE) 325 MG: 325 (65 FE) TAB at 20:11

## 2024-11-16 RX ADMIN — SENNOSIDES 8.6 MG: 8.6 TABLET, FILM COATED ORAL at 20:11

## 2024-11-16 RX ADMIN — APIXABAN 5 MG: 5 TABLET, FILM COATED ORAL at 20:11

## 2024-11-16 RX ADMIN — DICLOFENAC SODIUM 2 G: 10 GEL TOPICAL at 14:18

## 2024-11-16 RX ADMIN — SODIUM BICARBONATE 325 MG: 650 TABLET ORAL at 20:12

## 2024-11-16 RX ADMIN — HYDROCODONE BITARTRATE AND ACETAMINOPHEN 2 TABLET: 5; 325 TABLET ORAL at 08:06

## 2024-11-16 RX ADMIN — HYDROCODONE BITARTRATE AND ACETAMINOPHEN 2 TABLET: 5; 325 TABLET ORAL at 14:17

## 2024-11-16 RX ADMIN — DICLOFENAC SODIUM 2 G: 10 GEL TOPICAL at 08:05

## 2024-11-16 ASSESSMENT — PAIN DESCRIPTION - DESCRIPTORS
DESCRIPTORS: ACHING
DESCRIPTORS: ACHING
DESCRIPTORS: ACHING;DISCOMFORT

## 2024-11-16 ASSESSMENT — PAIN - FUNCTIONAL ASSESSMENT: PAIN_FUNCTIONAL_ASSESSMENT: ACTIVITIES ARE NOT PREVENTED

## 2024-11-16 ASSESSMENT — PAIN SCALES - GENERAL
PAINLEVEL_OUTOF10: 8
PAINLEVEL_OUTOF10: 5
PAINLEVEL_OUTOF10: 0
PAINLEVEL_OUTOF10: 7
PAINLEVEL_OUTOF10: 5

## 2024-11-16 ASSESSMENT — PAIN DESCRIPTION - LOCATION
LOCATION: GENERALIZED
LOCATION: LEG
LOCATION: LEG

## 2024-11-16 ASSESSMENT — PAIN DESCRIPTION - ORIENTATION
ORIENTATION: LEFT;RIGHT
ORIENTATION: RIGHT;LEFT

## 2024-11-16 NOTE — PLAN OF CARE
Problem: Chronic Conditions and Co-morbidities  Goal: Patient's chronic conditions and co-morbidity symptoms are monitored and maintained or improved  Outcome: Progressing     Problem: Discharge Planning  Goal: Discharge to home or other facility with appropriate resources  Outcome: Progressing     Problem: Pain  Goal: Verbalizes/displays adequate comfort level or baseline comfort level  Outcome: Progressing     Problem: Respiratory - Adult  Goal: Achieves optimal ventilation and oxygenation  Recent Flowsheet Documentation  Taken 11/16/2024 0915 by Rosy Holley RCP  Achieves optimal ventilation and oxygenation: Respiratory therapy support as indicated

## 2024-11-16 NOTE — PROGRESS NOTES
Patient refused dressing change to bilateral leg wound X4 this shift . Night RN notified during bedside hand off report . We will continue to encourage

## 2024-11-16 NOTE — FLOWSHEET NOTE
11/16/24 1021   Treatment Team Notification   Reason for Communication Patient/Family request  (family at bedside and would lketo know a plan)   Name of Team Member Notified Dr. Henriquez   Treatment Team Role Attending Provider   Method of Communication Secure Message   Response Waiting for response

## 2024-11-16 NOTE — PROGRESS NOTES
Oral Daily    metoprolol tartrate  25 mg Oral BID    montelukast  10 mg Oral Nightly    multivitamin  1 tablet Oral Daily    pantoprazole  40 mg Oral QAM AC    potassium chloride  10 mEq Oral Daily    senna  1 tablet Oral BID    sodium bicarbonate  325 mg Oral BID    sodium chloride  1 g Oral BID    spironolactone  25 mg Oral Daily    vitamin C  500 mg Oral Daily    Vitamin D  1,000 Units Oral Daily    albuterol sulfate HFA  2 puff Inhalation BID    cefepime  2,000 mg IntraVENous Q24H    apixaban  5 mg Oral BID      sodium chloride       haloperidol lactate, sodium chloride flush, sodium chloride, potassium chloride **OR** potassium alternative oral replacement **OR** [DISCONTINUED] potassium chloride, ammonium lactate, hydrOXYzine pamoate, HYDROcodone 5 mg - acetaminophen **OR** HYDROcodone 5 mg - acetaminophen      LABS:     CBC:   Recent Labs     11/14/24 0408   WBC 7.5   HGB 8.8*        BMP:    Recent Labs     11/14/24 0408      K 3.3*      CO2 24   BUN 16   CREATININE 0.8   GLUCOSE 98     Calcium:  Recent Labs     11/14/24 0408   CALCIUM 9.0     Ionized Calcium:Invalid input(s): \"IONCA\"  Magnesium:  Recent Labs     11/14/24 0408   MG 1.5*     Phosphorus:No results for input(s): \"PHOS\" in the last 72 hours.  BNP:No results for input(s): \"BNP\" in the last 72 hours.  Glucose:No results for input(s): \"POCGLU\" in the last 72 hours.  HgbA1C: No results for input(s): \"LABA1C\" in the last 72 hours.  INR:   Recent Labs     11/14/24 0408   INR 2.49*      CULTURES:   UA: No results for input(s): \"SPECGRAV\", \"PHUR\", \"COLORU\", \"CLARITYU\", \"MUCUS\", \"PROTEINU\", \"BLOODU\", \"RBCUA\", \"WBCUA\", \"BACTERIA\", \"NITRU\", \"GLUCOSEU\", \"BILIRUBINUR\", \"UROBILINOGEN\", \"KETUA\", \"LABCAST\", \"LABCASTTY\", \"AMORPHOS\" in the last 72 hours.    Invalid input(s): \"CRYSTALS\"  Micro:   Lab Results   Component Value Date/Time    BC No growth 24 hours. No growth 48 hours. 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    Mammogram declined Z53.20    Chronic atrial fibrillation ([facility]) I48.20    Syncope and collapse R55    Metabolic acidosis E87.20    Hyponatremia E87.1    Cellulitis of right lower extremity L03.115    Cellulitis L03.90    Sepsis without acute organ dysfunction ([facility]) A41.9    Bacteremia due to Pseudomonas R78.81, B96.5    Physical deconditioning R53.81    Intractable pain R52    Right leg pain M79.604    Leg wound, right, sequela S81.801S    Other chronic pain G89.29    Palliative care patient Z51.5    Chronic heart failure with preserved ejection fraction (HFpEF) ([facility]) I50.32    Weakness R53.1    Open wound of both lower extremities with complication, initial encounter S81.801A, S81.802A    Elevated troponin R79.89    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

## 2024-11-17 PROBLEM — R41.0 ACUTE DELIRIUM: Status: ACTIVE | Noted: 2024-11-17

## 2024-11-17 PROCEDURE — 2580000003 HC RX 258

## 2024-11-17 PROCEDURE — 6370000000 HC RX 637 (ALT 250 FOR IP): Performed by: INTERNAL MEDICINE

## 2024-11-17 PROCEDURE — 6360000002 HC RX W HCPCS: Performed by: INTERNAL MEDICINE

## 2024-11-17 PROCEDURE — 94640 AIRWAY INHALATION TREATMENT: CPT

## 2024-11-17 PROCEDURE — 6370000000 HC RX 637 (ALT 250 FOR IP)

## 2024-11-17 PROCEDURE — 2580000003 HC RX 258: Performed by: INTERNAL MEDICINE

## 2024-11-17 PROCEDURE — 99232 SBSQ HOSP IP/OBS MODERATE 35: CPT | Performed by: STUDENT IN AN ORGANIZED HEALTH CARE EDUCATION/TRAINING PROGRAM

## 2024-11-17 PROCEDURE — 1200000000 HC SEMI PRIVATE

## 2024-11-17 RX ADMIN — POLYETHYLENE GLYCOL 3350 17 G: 17 POWDER, FOR SOLUTION ORAL at 09:59

## 2024-11-17 RX ADMIN — AMLODIPINE BESYLATE 5 MG: 5 TABLET ORAL at 09:59

## 2024-11-17 RX ADMIN — BUDESONIDE AND FORMOTEROL FUMARATE DIHYDRATE 2 PUFF: 80; 4.5 AEROSOL RESPIRATORY (INHALATION) at 19:30

## 2024-11-17 RX ADMIN — ALBUTEROL SULFATE 2 PUFF: 90 AEROSOL, METERED RESPIRATORY (INHALATION) at 07:43

## 2024-11-17 RX ADMIN — METOPROLOL TARTRATE 25 MG: 50 TABLET, FILM COATED ORAL at 20:18

## 2024-11-17 RX ADMIN — BUMETANIDE 0.5 MG: 0.5 TABLET ORAL at 10:00

## 2024-11-17 RX ADMIN — OXYCODONE HYDROCHLORIDE AND ACETAMINOPHEN 500 MG: 500 TABLET ORAL at 10:00

## 2024-11-17 RX ADMIN — SODIUM CHLORIDE, PRESERVATIVE FREE 10 ML: 5 INJECTION INTRAVENOUS at 20:29

## 2024-11-17 RX ADMIN — SODIUM BICARBONATE 325 MG: 650 TABLET ORAL at 09:59

## 2024-11-17 RX ADMIN — CEFEPIME 2000 MG: 2 INJECTION, POWDER, FOR SOLUTION INTRAVENOUS at 14:51

## 2024-11-17 RX ADMIN — FERROUS SULFATE TAB 325 MG (65 MG ELEMENTAL FE) 325 MG: 325 (65 FE) TAB at 10:00

## 2024-11-17 RX ADMIN — MONTELUKAST 10 MG: 10 TABLET, FILM COATED ORAL at 20:17

## 2024-11-17 RX ADMIN — SENNOSIDES 8.6 MG: 8.6 TABLET, FILM COATED ORAL at 10:00

## 2024-11-17 RX ADMIN — METOPROLOL TARTRATE 25 MG: 50 TABLET, FILM COATED ORAL at 09:59

## 2024-11-17 RX ADMIN — DICLOFENAC SODIUM 2 G: 10 GEL TOPICAL at 22:31

## 2024-11-17 RX ADMIN — POTASSIUM CHLORIDE 10 MEQ: 1500 TABLET, EXTENDED RELEASE ORAL at 09:59

## 2024-11-17 RX ADMIN — FLUTICASONE PROPIONATE 1 SPRAY: 50 SPRAY, METERED NASAL at 10:00

## 2024-11-17 RX ADMIN — ACETAMINOPHEN 1000 MG: 500 TABLET ORAL at 20:17

## 2024-11-17 RX ADMIN — HYDROCODONE BITARTRATE AND ACETAMINOPHEN 2 TABLET: 5; 325 TABLET ORAL at 23:21

## 2024-11-17 RX ADMIN — Medication 1 TABLET: at 20:17

## 2024-11-17 RX ADMIN — ALBUTEROL SULFATE 2 PUFF: 90 AEROSOL, METERED RESPIRATORY (INHALATION) at 19:28

## 2024-11-17 RX ADMIN — SPIRONOLACTONE 25 MG: 25 TABLET ORAL at 10:00

## 2024-11-17 RX ADMIN — FERROUS SULFATE TAB 325 MG (65 MG ELEMENTAL FE) 325 MG: 325 (65 FE) TAB at 20:18

## 2024-11-17 RX ADMIN — SODIUM BICARBONATE 325 MG: 650 TABLET ORAL at 20:17

## 2024-11-17 RX ADMIN — Medication 1000 UNITS: at 09:59

## 2024-11-17 RX ADMIN — SENNOSIDES 8.6 MG: 8.6 TABLET, FILM COATED ORAL at 20:18

## 2024-11-17 RX ADMIN — BUDESONIDE AND FORMOTEROL FUMARATE DIHYDRATE 2 PUFF: 80; 4.5 AEROSOL RESPIRATORY (INHALATION) at 07:45

## 2024-11-17 RX ADMIN — ACETAMINOPHEN 1000 MG: 500 TABLET ORAL at 14:48

## 2024-11-17 RX ADMIN — GABAPENTIN 100 MG: 100 CAPSULE ORAL at 20:18

## 2024-11-17 RX ADMIN — APIXABAN 5 MG: 5 TABLET, FILM COATED ORAL at 10:00

## 2024-11-17 RX ADMIN — APIXABAN 5 MG: 5 TABLET, FILM COATED ORAL at 20:18

## 2024-11-17 RX ADMIN — Medication 400 MG: at 10:00

## 2024-11-17 RX ADMIN — ATORVASTATIN CALCIUM 10 MG: 10 TABLET, FILM COATED ORAL at 10:00

## 2024-11-17 ASSESSMENT — PAIN DESCRIPTION - FREQUENCY: FREQUENCY: INTERMITTENT

## 2024-11-17 ASSESSMENT — PAIN DESCRIPTION - ORIENTATION
ORIENTATION: RIGHT;LEFT

## 2024-11-17 ASSESSMENT — PAIN DESCRIPTION - DESCRIPTORS
DESCRIPTORS: SHOOTING;SHARP
DESCRIPTORS: ACHING;DULL
DESCRIPTORS: ACHING

## 2024-11-17 ASSESSMENT — PAIN DESCRIPTION - PAIN TYPE: TYPE: ACUTE PAIN

## 2024-11-17 ASSESSMENT — PAIN DESCRIPTION - LOCATION
LOCATION: LEG
LOCATION: LEG
LOCATION: LEG;FOOT

## 2024-11-17 ASSESSMENT — PAIN SCALES - GENERAL
PAINLEVEL_OUTOF10: 10
PAINLEVEL_OUTOF10: 10
PAINLEVEL_OUTOF10: 5

## 2024-11-17 ASSESSMENT — PAIN - FUNCTIONAL ASSESSMENT: PAIN_FUNCTIONAL_ASSESSMENT: ACTIVITIES ARE NOT PREVENTED

## 2024-11-17 ASSESSMENT — PAIN DESCRIPTION - ONSET: ONSET: ON-GOING

## 2024-11-17 NOTE — PROGRESS NOTES
Electronically signed by Dr. Daniel Bermeo      CTA ABDOMINAL AORTA W BILAT RUNOFF W WO CONTRAST   Final Result      1. The arteries throughout both thighs are widely patent. Evaluation of the   trifurcation arteries is difficult bilaterally due to extensive calcified   atherosclerosis however the posterior tibial and anterior tibial arteries appear   to maintain patency across the ankle.   2. Distended appearance of the gallbladder with what appears to be   pericholecystic fluid. These findings are concerning for acute cholecystitis.   3. Colonic diverticulosis without evidence of acute diverticulitis.         **This report has been created using voice recognition software. It may contain   minor errors which are inherent in voice recognition technology.**      Electronically signed by Dr Elkin Tello      XR TIBIA FIBULA LEFT (2 VIEWS)   Final Result   No soft tissue gas. Chronic appearing findings including soft tissue    calcifications, vascular calcifications, and osteopenia.      This document has been electronically signed by: Jag Mccarthy MD on 11/12/2024 10:24 PM      XR CHEST PORTABLE   Final Result   Impression:   1. Cardiomegaly without failure.      This document has been electronically signed by: Jag Mccarthy MD on 11/12/2024 10:26 PM        XR CHEST PORTABLE    Result Date: 11/12/2024  1 view chest Comparison: CR/SR - XR CHEST PORTABLE - 9/24/24 12:13 EDT Findings: No consolidation or effusion. There are fine benign appearing right pericardial calcifications. No acute fracture.     Impression: 1. Cardiomegaly without failure. This document has been electronically signed by: Jag Mccarthy MD on 11/12/2024 10:26 PM    XR TIBIA FIBULA LEFT (2 VIEWS)    Result Date: 11/12/2024  2 view left tibia-fibula Comparison: None Findings Osteopenia. No lytic changes. No fractures or dislocations. No significant arthritic change. No radiopaque foreign body.  Innumerable fine soft tissue calcifications. Possibly due to chronic venous stasis. Significant vascular calcifications.     No soft tissue gas. Chronic appearing findings including soft tissue calcifications, vascular calcifications, and osteopenia. This document has been electronically signed by: Jag Mccarthy MD on 11/12/2024 10:24 PM      Electronically signed by Jorge L Henriquez MD on 11/17/2024 at 3:28 PM   no

## 2024-11-17 NOTE — PLAN OF CARE
Problem: Chronic Conditions and Co-morbidities  Goal: Patient's chronic conditions and co-morbidity symptoms are monitored and maintained or improved  11/17/2024 1126 by Meaghan tMz RN  Outcome: Progressing  Flowsheets (Taken 11/17/2024 1126)  Care Plan - Patient's Chronic Conditions and Co-Morbidity Symptoms are Monitored and Maintained or Improved:   Monitor and assess patient's chronic conditions and comorbid symptoms for stability, deterioration, or improvement   Collaborate with multidisciplinary team to address chronic and comorbid conditions and prevent exacerbation or deterioration     Problem: Discharge Planning  Goal: Discharge to home or other facility with appropriate resources  11/17/2024 1126 by Meaghan Mtz RN  Outcome: Progressing  Flowsheets (Taken 11/17/2024 1126)  Discharge to home or other facility with appropriate resources:   Identify barriers to discharge with patient and caregiver   Arrange for needed discharge resources and transportation as appropriate   Identify discharge learning needs (meds, wound care, etc)     Problem: Pain  Goal: Verbalizes/displays adequate comfort level or baseline comfort level  11/17/2024 1126 by Meaghan Mtz RN  Outcome: Progressing  Flowsheets (Taken 11/17/2024 1126)  Verbalizes/displays adequate comfort level or baseline comfort level:   Encourage patient to monitor pain and request assistance   Assess pain using appropriate pain scale     Problem: Skin/Tissue Integrity  Goal: Absence of new skin breakdown  Description: 1.  Monitor for areas of redness and/or skin breakdown  2.  Assess vascular access sites hourly  3.  Every 4-6 hours minimum:  Change oxygen saturation probe site  4.  Every 4-6 hours:  If on nasal continuous positive airway pressure, respiratory therapy assess nares and determine need for appliance change or resting period.  11/17/2024 1126 by Meaghan Mtz RN  Outcome: Progressing     Problem: Safety - Adult  Goal: Free from fall  consult, as indicated  11/17/2024 1126 by Meaghan Mtz, RN  Outcome: Progressing  Flowsheets (Taken 11/17/2024 1126)  Effect of thought disturbance (confusion, delirium, dementia, or psychosis) are managed with adequate functional status:   Assess for contributors to thought disturbance, including medications, impaired vision or hearing, underlying metabolic abnormalities, dehydration, psychiatric diagnoses, notify LIP   [place] high risk fall precautions, as indicated

## 2024-11-17 NOTE — PLAN OF CARE
Problem: Chronic Conditions and Co-morbidities  Goal: Patient's chronic conditions and co-morbidity symptoms are monitored and maintained or improved  11/17/2024 0022 by Swati Marcelo RN  Outcome: Progressing  Flowsheets (Taken 11/13/2024 2127 by Anna Hess, RN)  Care Plan - Patient's Chronic Conditions and Co-Morbidity Symptoms are Monitored and Maintained or Improved:   Monitor and assess patient's chronic conditions and comorbid symptoms for stability, deterioration, or improvement   Collaborate with multidisciplinary team to address chronic and comorbid conditions and prevent exacerbation or deterioration     Problem: Discharge Planning  Goal: Discharge to home or other facility with appropriate resources  11/17/2024 0022 by Swati Marcelo RN  Outcome: Progressing  Flowsheets (Taken 11/13/2024 2127 by Anna Hess, RN)  Discharge to home or other facility with appropriate resources:   Identify barriers to discharge with patient and caregiver   Identify discharge learning needs (meds, wound care, etc)   Refer to discharge planning if patient needs post-hospital services based on physician order or complex needs related to functional status, cognitive ability or social support system   Arrange for needed discharge resources and transportation as appropriate     Problem: Pain  Goal: Verbalizes/displays adequate comfort level or baseline comfort level  11/17/2024 0022 by Swati Marcelo, RN  Outcome: Progressing  Flowsheets (Taken 11/13/2024 2127 by Anna Hess, RN)  Verbalizes/displays adequate comfort level or baseline comfort level:   Assess pain using appropriate pain scale   Implement non-pharmacological measures as appropriate and evaluate response   Encourage patient to monitor pain and request assistance   Administer analgesics based on type and severity of pain and evaluate response   Consider cultural and social influences on pain and pain management   Notify Licensed Independent

## 2024-11-18 VITALS
WEIGHT: 139.11 LBS | HEART RATE: 77 BPM | RESPIRATION RATE: 18 BRPM | DIASTOLIC BLOOD PRESSURE: 73 MMHG | OXYGEN SATURATION: 100 % | TEMPERATURE: 97.5 F | SYSTOLIC BLOOD PRESSURE: 108 MMHG | HEIGHT: 65 IN | BODY MASS INDEX: 23.18 KG/M2

## 2024-11-18 LAB
BACTERIA BLD AEROBE CULT: NORMAL
BACTERIA BLD AEROBE CULT: NORMAL

## 2024-11-18 PROCEDURE — 97535 SELF CARE MNGMENT TRAINING: CPT

## 2024-11-18 PROCEDURE — 2580000003 HC RX 258: Performed by: INTERNAL MEDICINE

## 2024-11-18 PROCEDURE — 94761 N-INVAS EAR/PLS OXIMETRY MLT: CPT

## 2024-11-18 PROCEDURE — 6360000002 HC RX W HCPCS: Performed by: INTERNAL MEDICINE

## 2024-11-18 PROCEDURE — 94640 AIRWAY INHALATION TREATMENT: CPT

## 2024-11-18 PROCEDURE — 97110 THERAPEUTIC EXERCISES: CPT

## 2024-11-18 PROCEDURE — 2580000003 HC RX 258

## 2024-11-18 PROCEDURE — 6370000000 HC RX 637 (ALT 250 FOR IP): Performed by: INTERNAL MEDICINE

## 2024-11-18 PROCEDURE — 6370000000 HC RX 637 (ALT 250 FOR IP)

## 2024-11-18 PROCEDURE — 99233 SBSQ HOSP IP/OBS HIGH 50: CPT | Performed by: STUDENT IN AN ORGANIZED HEALTH CARE EDUCATION/TRAINING PROGRAM

## 2024-11-18 PROCEDURE — 99239 HOSP IP/OBS DSCHRG MGMT >30: CPT | Performed by: STUDENT IN AN ORGANIZED HEALTH CARE EDUCATION/TRAINING PROGRAM

## 2024-11-18 RX ORDER — HYDROCODONE BITARTRATE AND ACETAMINOPHEN 5; 325 MG/1; MG/1
2 TABLET ORAL EVERY 6 HOURS PRN
Qty: 120 TABLET | Refills: 0 | Status: SHIPPED | OUTPATIENT
Start: 2024-11-18 | End: 2024-12-18

## 2024-11-18 RX ADMIN — Medication 400 MG: at 09:10

## 2024-11-18 RX ADMIN — AMLODIPINE BESYLATE 5 MG: 5 TABLET ORAL at 09:10

## 2024-11-18 RX ADMIN — HYDROCODONE BITARTRATE AND ACETAMINOPHEN 1 TABLET: 5; 325 TABLET ORAL at 15:39

## 2024-11-18 RX ADMIN — BUMETANIDE 0.5 MG: 0.5 TABLET ORAL at 09:07

## 2024-11-18 RX ADMIN — APIXABAN 5 MG: 5 TABLET, FILM COATED ORAL at 09:11

## 2024-11-18 RX ADMIN — SODIUM CHLORIDE 1 G: 1 TABLET ORAL at 09:10

## 2024-11-18 RX ADMIN — SODIUM CHLORIDE, PRESERVATIVE FREE 10 ML: 5 INJECTION INTRAVENOUS at 09:12

## 2024-11-18 RX ADMIN — DICLOFENAC SODIUM 2 G: 10 GEL TOPICAL at 09:07

## 2024-11-18 RX ADMIN — GABAPENTIN 100 MG: 100 CAPSULE ORAL at 09:12

## 2024-11-18 RX ADMIN — POTASSIUM CHLORIDE 10 MEQ: 1500 TABLET, EXTENDED RELEASE ORAL at 09:07

## 2024-11-18 RX ADMIN — DICLOFENAC SODIUM 2 G: 10 GEL TOPICAL at 14:17

## 2024-11-18 RX ADMIN — Medication 1000 UNITS: at 09:06

## 2024-11-18 RX ADMIN — PANTOPRAZOLE SODIUM 40 MG: 40 TABLET, DELAYED RELEASE ORAL at 05:08

## 2024-11-18 RX ADMIN — OXYCODONE HYDROCHLORIDE AND ACETAMINOPHEN 500 MG: 500 TABLET ORAL at 09:11

## 2024-11-18 RX ADMIN — ATORVASTATIN CALCIUM 10 MG: 10 TABLET, FILM COATED ORAL at 09:10

## 2024-11-18 RX ADMIN — SODIUM BICARBONATE 325 MG: 650 TABLET ORAL at 09:11

## 2024-11-18 RX ADMIN — BUDESONIDE AND FORMOTEROL FUMARATE DIHYDRATE 2 PUFF: 80; 4.5 AEROSOL RESPIRATORY (INHALATION) at 07:18

## 2024-11-18 RX ADMIN — ALBUTEROL SULFATE 2 PUFF: 90 AEROSOL, METERED RESPIRATORY (INHALATION) at 07:18

## 2024-11-18 RX ADMIN — METOPROLOL TARTRATE 25 MG: 50 TABLET, FILM COATED ORAL at 09:11

## 2024-11-18 RX ADMIN — SENNOSIDES 8.6 MG: 8.6 TABLET, FILM COATED ORAL at 09:12

## 2024-11-18 RX ADMIN — SPIRONOLACTONE 25 MG: 25 TABLET ORAL at 09:06

## 2024-11-18 RX ADMIN — ACETAMINOPHEN 1000 MG: 500 TABLET ORAL at 05:08

## 2024-11-18 RX ADMIN — CEFEPIME 2000 MG: 2 INJECTION, POWDER, FOR SOLUTION INTRAVENOUS at 14:23

## 2024-11-18 RX ADMIN — FERROUS SULFATE TAB 325 MG (65 MG ELEMENTAL FE) 325 MG: 325 (65 FE) TAB at 09:11

## 2024-11-18 ASSESSMENT — PAIN SCALES - GENERAL
PAINLEVEL_OUTOF10: 5
PAINLEVEL_OUTOF10: 5
PAINLEVEL_OUTOF10: 0
PAINLEVEL_OUTOF10: 9

## 2024-11-18 ASSESSMENT — PAIN SCALES - WONG BAKER
WONGBAKER_NUMERICALRESPONSE: NO HURT

## 2024-11-18 ASSESSMENT — PAIN DESCRIPTION - DESCRIPTORS: DESCRIPTORS: ACHING;DULL

## 2024-11-18 ASSESSMENT — PAIN DESCRIPTION - LOCATION
LOCATION: LEG
LOCATION: FOOT;LEG

## 2024-11-18 ASSESSMENT — PAIN DESCRIPTION - ORIENTATION
ORIENTATION: RIGHT;LEFT
ORIENTATION: RIGHT

## 2024-11-18 NOTE — PROGRESS NOTES
[facility]  [facility]  Occupational Therapy  Daily Note    Discharge Recommendations: Subacute/skilled nursing facility  Equipment Recommendations: No        Time In: 0905  Time Out: 0943  Timed Code Treatment Minutes: 38 Minutes  Minutes: 38          Date: 2024  Patient Name: Jasmina Powers,   Gender: female      Room: [facility]14/014-A  MRN: [id number]  : 1935  (89 y.o.)  Referring Practitioner: Jorge L Henriquez MD  Diagnosis: Open wound of both lower extremities with complication, initial encounter  Additional Pertinent Hx: per chart review:  Jasmina Powers is a 89 y.o. female with PMHx of aoritc stenosis, atrial fibrillation, COPD, T2DM, HLD, GERD, HFpEF and HTN who presents to [facility] with bilateral lower extremity pain. Patient is somnolent and is a poor historian. Patient reports bilateral leg pain that started yesterday and was intermittent all day. Patient rated pain 6/10. Patient has open wounds in bilateral lower extremities that appear to be infected. Purulent discharge in both. Reports no chest pain or SOB. Denies fever, nausea/vomiting, constipation/diarrhea, diaphoresis, HA, dizziness, and numbness/tingling. Reports bilateral leg pain.    Restrictions/Precautions:  Restrictions/Precautions: Bed Alarm, General Precautions, Fall Risk  Position Activity Restriction  Other position/activity restrictions: hx dementia, Code Ngoc 11/15     Social/Functional History:  Lives With: Other (comment) (from )  Type of Home:  (SNF)  Home Equipment: Walker - Standard        Receives Help From: Other (comment) (From SNF)  ADL Assistance: Needs assistance  Homemaking Responsibilities: No  Ambulation Assistance: Needs assistance (Contact guard assist at SNF, Uses 2 wheeled walker)  Transfer Assistance: Independent    Active : No  Mode of Transportation: Van, Family  Occupation: Retired       SUBJECTIVE: RN zacked OT session.  Pt. In room and agreeable to  training, Cognitive reorientation, Safety education & training, Self-Care / ADL    Education:  Learners: Patient  ADL's, Home Exercise Program, and Fall Prevention    Goals  Short Term Goals  Time Frame for Short Term Goals: at discharge  Short Term Goal 1: Pt will perform functional mobility with walker SBA to/from bathroom with safe technqiues to increase I with toileting task.  Short Term Goal 2: Pt will perform standing balance activity for 7-10 minutes SBA to increase balance and endurance for self care at sink.  Short Term Goal 3: Pt will perform UB/LB dressing mod I to increase I with ADLs.  Short Term Goal 4: Pt will demo B UE HEP with min resistance theraband with 0-2 cues to increase strength for transfers.  Short Term Goal 5: Pt will sequence 3-5 step ADL/IADL tasks with 0-2 cues.  Long Term Goals  Time Frame for Long Term Goals : none set d/t short ELOS    Following session, patient left in safe position with all fall risk precautions in place.

## 2024-11-18 NOTE — PROGRESS NOTES
Comprehensive Nutrition Assessment    Type and Reason for Visit:  Reassess    Nutrition Recommendations/Plan:   Continue MVI  ONS: Glucerna TID  Diet as per Speech Therapy/ Physician   Bowel meds as ordered      Malnutrition Assessment:  Malnutrition Status:  Moderate malnutrition (11/14/24 1157)    Context:  Chronic Illness     Findings of the 6 clinical characteristics of malnutrition:  Energy Intake:  75% or less estimated energy requirements for 1 month or longer  Weight Loss:   (hard to assess with edema, hx CHF but weight down 16.3% in the last 10 months)     Body Fat Loss:  Mild body fat loss Orbital, Triceps, Fat Overlying Ribs   Muscle Mass Loss:  Mild muscle mass loss Temples (temporalis), Clavicles (pectoralis & deltoids), Thigh (quadriceps)  Fluid Accumulation:  Severe Extremities   Strength:  Not Performed    Nutrition Assessment:     Pt. moderately malnourished AEB criteria as listed above.  At risk for further nutrition compromise r/t increased nutrient needs to support wound healing, dysphagia, admit with bilateral leg pain, right arm pain, elevated troponin, CHF exacerbation, and underlying medical condition (hx: dyslipidemia, afib, HTN, DM, ASHD, CHF, COPD, GERD, aortic stenosis, smoking).        Nutrition Related Findings:    Pt. Report/Treatments/Miscellaneous: Patient seen and reports intake improving, ~50% of meals, good acceptance of Glucerna shakes and drinking ~100% of them, reports tolerating minced and moist diet texture   GI Status: reports passing gas today; BM 1/11   Pertinent Labs: glucose (11/14) 98   Pertinent Meds: vitamin C, bumex, maxipime, MVI, glycolax, senokot, sodium chloride tablet, aldactone, vitamin D      Wound Type:  (bilateral LE tibial venous and lateral full thickness wounds)       Current Nutrition Intake & Therapies:    Average Meal Intake: 26-50%, 51-75% (~50% per patient)  Average Supplements Intake: % (likes Glucerna)  ADULT DIET; Dysphagia - Minced and

## 2024-11-18 NOTE — PROGRESS NOTES
Provider Progress Note        Patient:   Jasmina Powers  YOB: 1935  Age:  89 y.o.  Room:  [facility]14/014-A  MRN:  [id number]   Acct: [id number]  PCP: Dimas Watts APRN - CNP    Date of Admission:  11/12/2024  8:28 PM  Date of Service:  11/18/2024    Reason for Consult: Symptom Management.    History Obtained From:-  Patient and Electronic Medical Record             Subjective   Jasmina Powers was seen in their room today for follow up with the palliative care team. There was family present at the bedside. Patient is complaining of pain. Patient denies shortness of breath, cough, nausea, vomiting, and diarrhea. Their comfort medications are working well to control their symptoms.  They have had a bowel movement in the last 24 hours.  Patient complaining of right leg pain on exam today.  States it is well-controlled on pain medication.  Patient denies any anxiety, nausea, vomiting, diarrhea.  Denies any shortness of breath       Objective   Vitals:-    /69   Pulse 85   Temp 97.6 °F (36.4 °C) (Oral)   Resp 16   Ht 1.651 m (5' 5\")   Wt 63.1 kg (139 lb 1.8 oz)   SpO2 100%   BMI 23.15 kg/m²     Physical Exam  Constitutional:       General: She is not in acute distress.     Appearance: She is normal weight.   HENT:      Head: Normocephalic.   Eyes:      Extraocular Movements: Extraocular movements intact.      Pupils: Pupils are equal, round, and reactive to light.   Cardiovascular:      Rate and Rhythm: Normal rate and regular rhythm.      Pulses: Normal pulses.      Heart sounds: Normal heart sounds.   Pulmonary:      Effort: Pulmonary effort is normal.      Breath sounds: Normal breath sounds.   Abdominal:      General: Abdomen is flat.      Palpations: Abdomen is soft.   Musculoskeletal:      Cervical back: Normal range of motion.   Neurological:      General: No focal deficit present.      Mental Status: She is alert and oriented to person, place, and time.

## 2024-11-18 NOTE — CARE COORDINATION
11/18/24, 7:58 AM EST    DISCHARGE BARRIERS        Patient transferred to  14. Report given to unit SW, Fanta RAMIREZ, regarding discharge plan for this patient.

## 2024-11-18 NOTE — CARE COORDINATION
11/18/24, 1:36 PM EST    Patient goals/plan/ treatment preferences discussed by  and .  Patient goals/plan/ treatment preferences reviewed with patient/ family.  Patient/ family verbalize understanding of discharge plan and are in agreement with goal/plan/treatment preferences.  Understanding was demonstrated using the teach back method.  AVS provided by RN at time of discharge, which includes all necessary medical information pertaining to the patients current course of illness, treatment, post-discharge goals of care, and treatment preferences.     Services At/After Discharge: Skilled Nursing Facility (SNF) and In ambulance    [facility] and [facility] ready to [facility] the patient.  Transport set up for 4 pm. Updated son Ruben Casper aware of transport time.  RN has phone number and packet for discharge.

## 2024-11-18 NOTE — CONSULTS
Department of Psychiatry   Psychiatric Assessment      Thank you very much for allowing us to participate in the care of this patient.      Reason for Consult:  agitation    HISTORY OF PRESENT ILLNESS:    Patient is an 89-year-old female with no significant psychiatric history admitted for multiple nonhealing wounds on bilateral lower extremities.  Psychiatrist consulted after patient had a significant event of agitation during occupational therapy visit yesterday.  At this time she is only oriented to self.  Identifies place as hospital.  Not oriented to situation.  Staff reports that she has been refusing most of her medication other than pain medications this morning.  Mentions that after receiving the Haldol she was no longer agitated.  Patient's aunt was present in the room who mentioned that patient has no psychiatric history however has been dealing with study neurocognitive decline since she has been in and out of the hospital for last 3 months.  Reports that she was living with him before her medical issues started worsening last 3 months ago.  Reports that she also has been in and out of nursing facilities.    PSYCHIATRIC HISTORY:  No significant psychiatric history, suicide attempts or inpatient psychiatric hospitalizations per family    Social History:    Patient was [place] and [place].  Currently lives in a nursing facility.  Son is very supportive of her    SUBSTANCE USE HISTORY: No significant substance use history    Family Medical and Psychiatric History:           Problem Relation Age of Onset    Cancer Mother         breast cancer    Diabetes Mother      Prior to Admission medications    Medication Sig Start Date End Date Taking? Authorizing Provider   metoprolol tartrate (LOPRESSOR) 25 MG tablet TAKE 1 TABLET TWICE DAILY 11/8/24   Emperatriz Onofre MD   droNABinol (MARINOL) 2.5 MG capsule Take 1 capsule by mouth 2 times daily (before meals).    Provider, Historical, MD   hydrOXYzine

## 2024-11-18 NOTE — PROGRESS NOTES
Daily Progress Note  11/18/2024  CHIEF COMPLAINT:  Agitation     Reviewed patient's current plan of care and vital signs with nursing staff.    SUBJECTIVE:  88 yo female with pmhx of atrial fibrillation, COPD, DM2, HTN, and HFpEF admitted for non healing wounds, and for which behavioral was consulted for an episode of agitation 2 days ago. She was given Haldol and improved at that time. Pt is a poor historian. As per nursing, no further events of agitation and patient's mood has improved.     Mental Status Exam  Level of consciousness:  Within normal limits  Appearance: Hospital attire, seated in chair, with good grooming and hygiene   Behavior/Motor: No abnormalities noted  Attitude toward examiner:  Cooperative, attentive, good eye contact  Speech:  spontaneous, normal rate, normal volume and well articulated  Mood: euthymic  Affect: mood congruent  Thought processes: thought blocking  Thought content:  denies homicidal ideation  Suicidal Ideation: no suicidal ideation  Delusions:  no evidence of delusions  Cognition:  Oriented to self, not to location/time/situation  Memory: age appropriate  Insight & Judgement: improving     Data   height is 1.651 m (5' 5\") and weight is 63.1 kg (139 lb 1.8 oz). Her oral temperature is 97.6 °F (36.4 °C). Her blood pressure is 112/69 and her pulse is 85. Her respiration is 16 and oxygen saturation is 100%.   Labs:   Admission on 11/12/2024   Component Date Value Ref Range Status    WBC 11/12/2024 7.5  4.8 - 10.8 thou/mm3 Final    RBC 11/12/2024 4.54  4.20 - 5.40 mill/mm3 Final    Hemoglobin 11/12/2024 10.1 (L)  12.0 - 16.0 gm/dl Final    Hematocrit 11/12/2024 32.1 (L)  37.0 - 47.0 % Final    MCV 11/12/2024 70.7 (L)  81.0 - 99.0 fL Final    MCH 11/12/2024 22.2 (L)  26.0 - 33.0 pg Final    MCHC 11/12/2024 31.5 (L)  32.2 - 35.5 gm/dl Final    RDW-CV 11/12/2024 18.4 (H)  11.5 - 14.5 % Final    RDW-SD 11/12/2024 45.0  35.0 - 45.0 fL Final    Platelets 11/12/2024 234  130 - 400  11/14/2024 7.5  4.8 - 10.8 thou/mm3 Final    RBC 11/14/2024 3.96 (L)  4.20 - 5.40 mill/mm3 Final    Hemoglobin 11/14/2024 8.8 (L)  12.0 - 16.0 gm/dl Final    Hematocrit 11/14/2024 28.9 (L)  37.0 - 47.0 % Final    MCV 11/14/2024 73.0 (L)  81.0 - 99.0 fL Final    MCH 11/14/2024 22.2 (L)  26.0 - 33.0 pg Final    MCHC 11/14/2024 30.4 (L)  32.2 - 35.5 gm/dl Final    RDW-CV 11/14/2024 18.1 (H)  11.5 - 14.5 % Final    RDW-SD 11/14/2024 47.5 (H)  35.0 - 45.0 fL Final    Platelets 11/14/2024 214  130 - 400 thou/mm3 Final    MPV 11/14/2024 8.9 (L)  9.4 - 12.4 fL Final    Seg Neutrophils 11/14/2024 69.1  % Final    Lymphocytes 11/14/2024 20.6  % Final    Monocytes % 11/14/2024 7.6  % Final    Eosinophils 11/14/2024 2.1  % Final    Basophils 11/14/2024 0.3  % Final    Immature Granulocytes % 11/14/2024 0.3  % Final    Neutrophils Absolute 11/14/2024 5.2  1.8 - 7.7 thou/mm3 Final    Lymphocytes Absolute 11/14/2024 1.5  1.0 - 4.8 thou/mm3 Final    Monocytes Absolute 11/14/2024 0.6  0.4 - 1.3 thou/mm3 Final    Eosinophils Absolute 11/14/2024 0.2  0.0 - 0.4 thou/mm3 Final    Basophils Absolute 11/14/2024 0.0  0.0 - 0.1 thou/mm3 Final    Immature Grans (Abs) 11/14/2024 0.02  0.00 - 0.07 thou/mm3 Final    nRBC 11/14/2024 0  /100 wbc Final    Performed at [facility] [address] [place] [address]    INR 11/14/2024 2.49 (H)  0.85 - 1.13 Final    Comment: ---------INDICATION-----------------------INR Reference Range  DVT, PE, AF, AMI, tissue heart valve          2.0 to 3.0  Mechanical prosthetic valves                  2.5 to 3.5  Performed at [facility] [place] [address]      Anion Gap 11/14/2024 12.0  8.0 - 16.0 meq/L Final    Comment: ANION GAP = Sodium -(Chloride + CO2)  Performed at [facility] [place] [address]      Est, Glom Filt Rate 11/14/2024 71  >60 ml/min/1.73m2 Final    Comment: Pediatric calculator

## 2024-11-18 NOTE — PLAN OF CARE
Problem: Respiratory - Adult  Goal: Lung sounds clear or within normal limits for patient  11/18/2024 0724 by Doreen Guadarrama, RCP  Outcome: Progressing   Continue tx's to improve breath sounds, increase aeration and decrease WOB.

## 2024-11-18 NOTE — PROGRESS NOTES
Patient discharged to home with LCMA. Patient left with all belongings, AVS, prescriptions/ medications. Patient sent home with [facility] soap for infection prevention. All questions answered at this time. Patient has follow up appointments scheduled with PCP and ID.

## 2024-11-18 NOTE — PLAN OF CARE
Problem: Respiratory - Adult  Goal: Lung sounds clear or within normal limits for patient  11/18/2024 0724 by Doreen Guadarrama RCP  Outcome: Progressing     Problem: Chronic Conditions and Co-morbidities  Goal: Patient's chronic conditions and co-morbidity symptoms are monitored and maintained or improved  Outcome: Completed     Problem: Discharge Planning  Goal: Discharge to home or other facility with appropriate resources  Outcome: Completed     Problem: Pain  Goal: Verbalizes/displays adequate comfort level or baseline comfort level  Outcome: Completed  Flowsheets (Taken 11/18/2024 1400)  Verbalizes/displays adequate comfort level or baseline comfort level:   Encourage patient to monitor pain and request assistance   Assess pain using appropriate pain scale   Administer analgesics based on type and severity of pain and evaluate response   Implement non-pharmacological measures as appropriate and evaluate response   Notify Licensed Independent Practitioner if interventions unsuccessful or patient reports new pain     Problem: Skin/Tissue Integrity  Goal: Absence of new skin breakdown  Description: 1.  Monitor for areas of redness and/or skin breakdown  2.  Assess vascular access sites hourly  3.  Every 4-6 hours minimum:  Change oxygen saturation probe site  4.  Every 4-6 hours:  If on nasal continuous positive airway pressure, respiratory therapy assess nares and determine need for appliance change or resting period.  Outcome: Completed     Problem: Safety - Adult  Goal: Free from fall injury  Outcome: Completed     Problem: Respiratory - Adult  Goal: Achieves optimal ventilation and oxygenation  Outcome: Completed     Problem: Nutrition Deficit:  Goal: Optimize nutritional status  Outcome: Completed     Problem: Confusion  Goal: Confusion, delirium, dementia, or psychosis is improved or at baseline  Description: INTERVENTIONS:  1. Assess for possible contributors to thought disturbance, including medications,

## 2024-11-18 NOTE — PROGRESS NOTES
Progress note: Infectious diseases    Patient - Jasmina Powers,  Age - 89 y.o.    - 1935      Room Number - 7K-14/014-A   MRN -  [id number]   [facility] # - [id number]  Date of Admission -  2024  8:28 PM    SUBJECTIVE:   No new issues  She was doing therapy  No concerns per nursing staff.  OBJECTIVE   VITALS    height is 1.651 m (5' 5\") and weight is 63.1 kg (139 lb 1.8 oz). Her oral temperature is 97.6 °F (36.4 °C). Her blood pressure is 112/69 and her pulse is 85. Her respiration is 16 and oxygen saturation is 100%.       Wt Readings from Last 3 Encounters:   24 63.1 kg (139 lb 1.8 oz)   10/21/24 68 kg (150 lb)   24 73.5 kg (162 lb)       I/O (24 Hours)    Intake/Output Summary (Last 24 hours) at 2024 0934  Last data filed at 2024 0541  Gross per 24 hour   Intake 460 ml   Output 750 ml   Net -290 ml       General Appearance  Awake, alert, oriented,  not  In acute distress  HEENT - normocephalic, atraumatic, pale  conjunctiva,  anicteric sclera  Neck - Supple, no mass  Lungs -  Bilateral   air entry,    Cardiovascular - Heart sounds are normal.     Abdomen - soft, not distended, nontender,   Neurologic -awake and oriented  Skin - No bruising or bleeding  Extremities - bilateral leg wound. Has an escar, no redness or purulent  drainage    MEDICATIONS:      sodium chloride flush  5-40 mL IntraVENous 2 times per day    polyethylene glycol  17 g Oral Daily    acetaminophen  1,000 mg Oral 3 times per day    amLODIPine  5 mg Oral Daily    atorvastatin  10 mg Oral Daily    bumetanide  0.5 mg Oral Daily    diclofenac sodium  2 g Topical 4x Daily    ferrous sulfate  325 mg Oral BID    fluticasone  1 spray Each Nostril Daily    budesonide-formoterol  2 puff Inhalation BID RT    And    tiotropium  2 puff Inhalation Daily RT    gabapentin  100 mg Oral BID    magnesium oxide  400 mg Oral Daily     metoprolol tartrate  25 mg Oral BID    montelukast  10 mg Oral Nightly    multivitamin  1 tablet Oral Daily    pantoprazole  40 mg Oral QAM AC    potassium chloride  10 mEq Oral Daily    senna  1 tablet Oral BID    sodium bicarbonate  325 mg Oral BID    sodium chloride  1 g Oral BID    spironolactone  25 mg Oral Daily    vitamin C  500 mg Oral Daily    Vitamin D  1,000 Units Oral Daily    albuterol sulfate HFA  2 puff Inhalation BID    cefepime  2,000 mg IntraVENous Q24H    apixaban  5 mg Oral BID      sodium chloride       haloperidol lactate, sodium chloride flush, sodium chloride, potassium chloride **OR** potassium alternative oral replacement **OR** [DISCONTINUED] potassium chloride, ammonium lactate, hydrOXYzine pamoate, HYDROcodone 5 mg - acetaminophen **OR** HYDROcodone 5 mg - acetaminophen      LABS:    Hb 8.8  CULTURES:   UA: No results for input(s): \"SPECGRAV\", \"PHUR\", \"COLORU\", \"CLARITYU\", \"MUCUS\", \"PROTEINU\", \"BLOODU\", \"RBCUA\", \"WBCUA\", \"BACTERIA\", \"NITRU\", \"GLUCOSEU\", \"BILIRUBINUR\", \"UROBILINOGEN\", \"KETUA\", \"LABCAST\", \"LABCASTTY\", \"AMORPHOS\" in the last 72 hours.    Invalid input(s): \"CRYSTALS\"  Micro:   Lab Results   Component Value Date/Time    BC  11/13/2024 12:35 AM     No growth 24 hours. No growth 48 hours. No growth at 5 days          Problem list of patient:     Patient Active Problem List   Diagnosis Code    Anticoagulated on Coumadin Z79.01    Diabetes mellitus (HCC) E11.9    Bilateral leg edema +1 R60.0    Encounter for therapeutic drug monitoring Z51.81    Primary hypertension I10    Abnormal EKG R94.31    Clicking tinnitus of both ears H93.13    Chronic renal disease, stage III (HCC) [779575] N18.30    Secondary hypercoagulable state (HCC) D68.69    Type 2 diabetes mellitus with chronic kidney disease E11.22    Mammogram declined Z53.20    Chronic atrial fibrillation (HCC) I48.20    Syncope and collapse R55    Metabolic acidosis E87.20    Hyponatremia E87.1    Cellulitis of right lower

## 2024-11-19 PROBLEM — A41.9 SEPSIS WITHOUT ACUTE ORGAN DYSFUNCTION (HCC): Status: RESOLVED | Noted: 2024-08-09 | Resolved: 2024-11-19

## 2024-11-19 PROBLEM — Z51.81 ENCOUNTER FOR THERAPEUTIC DRUG MONITORING: Status: RESOLVED | Noted: 2021-04-07 | Resolved: 2024-11-19

## 2024-11-19 PROBLEM — R53.1 WEAKNESS: Status: RESOLVED | Noted: 2024-09-24 | Resolved: 2024-11-19

## 2024-11-19 PROBLEM — L03.90 CELLULITIS: Status: RESOLVED | Noted: 2024-08-09 | Resolved: 2024-11-19

## 2024-11-19 PROBLEM — L97.909 CALCIPHYLAXIS OF LOWER EXTREMITY WITH NONHEALING ULCER (HCC): Status: ACTIVE | Noted: 2024-11-19

## 2024-11-19 PROBLEM — E83.59 CALCIPHYLAXIS OF LEFT LOWER EXTREMITY WITH NONHEALING ULCER (HCC): Status: RESOLVED | Noted: 2024-11-13 | Resolved: 2024-11-19

## 2024-11-19 PROBLEM — R60.0 BILATERAL LEG EDEMA: Status: RESOLVED | Noted: 2020-08-11 | Resolved: 2024-11-19

## 2024-11-19 PROBLEM — B96.5 BACTEREMIA DUE TO PSEUDOMONAS: Status: RESOLVED | Noted: 2024-08-14 | Resolved: 2024-11-19

## 2024-11-19 PROBLEM — R41.0 ACUTE DELIRIUM: Status: RESOLVED | Noted: 2024-11-17 | Resolved: 2024-11-19

## 2024-11-19 PROBLEM — R78.81 BACTEREMIA DUE TO PSEUDOMONAS: Status: RESOLVED | Noted: 2024-08-14 | Resolved: 2024-11-19

## 2024-11-19 PROBLEM — E87.20 METABOLIC ACIDOSIS: Status: RESOLVED | Noted: 2024-06-30 | Resolved: 2024-11-19

## 2024-11-19 PROBLEM — L97.929 CALCIPHYLAXIS OF LEFT LOWER EXTREMITY WITH NONHEALING ULCER (HCC): Status: RESOLVED | Noted: 2024-11-13 | Resolved: 2024-11-19

## 2024-11-19 PROBLEM — I35.0 AORTIC STENOSIS, MODERATE: Status: ACTIVE | Noted: 2024-11-19

## 2024-11-19 PROBLEM — D68.69 SECONDARY HYPERCOAGULABLE STATE (HCC): Status: RESOLVED | Noted: 2023-08-23 | Resolved: 2024-11-19

## 2024-11-19 PROBLEM — R94.31 ABNORMAL EKG: Status: RESOLVED | Noted: 2022-07-21 | Resolved: 2024-11-19

## 2024-11-19 PROBLEM — Z79.01 ANTICOAGULATED ON COUMADIN: Status: RESOLVED | Noted: 2018-07-05 | Resolved: 2024-11-19

## 2024-11-19 PROBLEM — R55 SYNCOPE AND COLLAPSE: Status: RESOLVED | Noted: 2024-05-18 | Resolved: 2024-11-19

## 2024-11-19 PROBLEM — E83.59 CALCIPHYLAXIS OF LOWER EXTREMITY WITH NONHEALING ULCER (HCC): Status: ACTIVE | Noted: 2024-11-19

## 2024-11-19 NOTE — PROGRESS NOTES
Physician Progress Note      PATIENT:               SRUTHI MACK  CSN #:                  [id number]  :                       1935  ADMIT DATE:       2024 8:28 PM  DISCH DATE:        2024 6:47 PM  RESPONDING  PROVIDER #:        Jorge L Henriquez MD          QUERY TEXT:    Patient with documentation of HFpEF exacerbation per IM progress notes.  NT   Pro-. Patient reports SOB; currently on RA. 2+ pitting edema.  Treated   with oral Bumex, Lopressor, and Aldactone.  Please clarify the following:    The medical record reflects the following:  Risk Factors: age, htn, CHF, aortic stenosis, afib, DM2  Clinical Indicators: per  PN:    HFpEF exacerbation (resolved): NT   Pro-. Patient reports SOB; currently on RA. 2+ pitting edema. 5/10/2023   echocardiogram showed EF 60%, left atrium moderately dilated, mildly enlarged   right atrium, mild aortic regurgitation, mild to moderate aortic stenosis   present  Treatment: PO meds only-Bumex, Lopressor and Aldactone  Options provided:  -- Chronic HFpEF  -- HFpEF exacerbation present as evidenced by, Please document evidence.  -- Other - I will add my own diagnosis  -- Disagree - Not applicable / Not valid  -- Disagree - Clinically unable to determine / Unknown  -- Refer to Clinical Documentation Reviewer    PROVIDER RESPONSE TEXT:    Chronic HFpEF    Query created by: Xuan Malone on 2024 8:24 AM      Electronically signed by:  Jorge L Henriquez MD 2024 3:08 PM

## 2024-11-20 NOTE — DISCHARGE SUMMARY
0.5 MG tablet  Commonly known as: Bumex  Take 1 tablet by mouth daily     Centrum Silver 50+Women Tabs     * [employer] Blood Glucose Meter w/Device Kit  1 each by Does not apply route in the morning and at bedtime Dispense Accucheck meter     * Accu-Chek Guide w/Device Kit  USE AS DIRECTED     diclofenac sodium 1 % Gel  Commonly known as: VOLTAREN  Apply 2 g topically 4 times daily     FeroSul 325 (65 Fe) MG tablet  Generic drug: ferrous sulfate  TAKE 1 TABLET TWICE DAILY     fluticasone 50 MCG/ACT nasal spray  Commonly known as: FLONASE  1 spray by Each Nostril route daily     gabapentin 100 MG capsule  Commonly known as: NEURONTIN  Take 1 capsule by mouth 2 times daily for 90 days. Intended supply: 90 days     hydrOXYzine pamoate 25 MG capsule  Commonly known as: VISTARIL     magnesium oxide 400 (240 Mg) MG tablet  Commonly known as: MAG-OX  TAKE 1 TABLET EVERY DAY     metoprolol tartrate 25 MG tablet  Commonly known as: LOPRESSOR  TAKE 1 TABLET TWICE DAILY     montelukast 10 MG tablet  Commonly known as: SINGULAIR     naloxone 4 MG/0.1ML Liqd nasal spray  1 spray by Nasal route as needed for Opioid Reversal     Nutritional Drink Liqd     omeprazole 40 MG delayed release capsule  Commonly known as: PRILOSEC  TAKE 1 CAPSULE EVERY DAY     polyethylene glycol 17 g packet  Commonly known as: GLYCOLAX  Take 1 packet by mouth daily     potassium chloride 10 MEQ extended release tablet  Commonly known as: KLOR-CON M     senna 8.6 MG tablet  Commonly known as: SENOKOT     sodium bicarbonate 325 MG tablet  Take 1 tablet by mouth 2 times daily     SODIUM CHLORIDE PO     spironolactone 25 MG tablet  Commonly known as: Aldactone  Take 1 tablet by mouth daily     Trelegy Ellipta 200-62.5-25 MCG/ACT Aepb inhaler  Generic drug: fluticasone-umeclidin-vilant     vitamin C 500 MG tablet  Commonly known as: ASCORBIC ACID  TAKE 1 TABLET EVERY DAY     vitamin D3 25 MCG (1000 UT) Tabs tablet  Commonly known as: CHOLECALCIFEROL  TAKE 1 TABLET  11/12/2024  1 view chest Comparison: CR/SR - XR CHEST PORTABLE - 9/24/24 12:13 EDT Findings: No consolidation or effusion. There are fine benign appearing right pericardial calcifications. No acute fracture.     Impression: 1. Cardiomegaly without failure. This document has been electronically signed by: Jag Mccarthy MD on 11/12/2024 10:26 PM    XR TIBIA FIBULA LEFT (2 VIEWS)    Result Date: 11/12/2024  2 view left tibia-fibula Comparison: None Findings Osteopenia. No lytic changes. No fractures or dislocations. No significant arthritic change. No radiopaque foreign body. Innumerable fine soft tissue calcifications. Possibly due to chronic venous stasis. Significant vascular calcifications.     No soft tissue gas. Chronic appearing findings including soft tissue calcifications, vascular calcifications, and osteopenia. This document has been electronically signed by: Jag Mccarthy MD on 11/12/2024 10:24 PM       Follow-up scheduled after discharge :-    Future Appointments         Provider Specialty Dept Phone    12/18/2024 9:15 AM Rojelio Lopez MD Wound Ostomy [phone]    1/22/2025 3:30 PM Torrey Nieves MD Palliative Care [phone]    1/23/2025 1:15 PM Emperatriz Onofre MD Cardiology [phone]    2/10/2025 3:20 PM Dimas Watts APRN - CNP Family Medicine [phone]              Consultations during this hospital stay:-  [] NONE [] Cardiology  [] Nephrology  [] Hemo onco   [] GI   [x] ID  [] Endocrine  [] Pulm    [] Neuro    [] Psych   [] Urology  [] ENT   [] G SURGERY   []Ortho    []CV surg    [] Palliative  [] Hospice [] Pain management   []    []TCU   [] PT/OT  OTHERS:-    Disposition: SNF  Condition at Discharge: Stable    Time Spent:- 35 minutes    Electronically signed by Jorge L Henriquez MD on 11/20/24 at 2:33 PM EST   Discharging Hospitalist

## 2024-11-21 DIAGNOSIS — E11.59 TYPE 2 DIABETES MELLITUS WITH OTHER CIRCULATORY COMPLICATION, WITHOUT LONG-TERM CURRENT USE OF INSULIN (HCC): ICD-10-CM

## 2024-11-21 RX ORDER — BLOOD SUGAR DIAGNOSTIC
STRIP MISCELLANEOUS
Qty: 200 STRIP | Refills: 3 | Status: SHIPPED | OUTPATIENT
Start: 2024-11-21

## 2024-11-26 DIAGNOSIS — E11.59 TYPE 2 DIABETES MELLITUS WITH OTHER CIRCULATORY COMPLICATION, WITHOUT LONG-TERM CURRENT USE OF INSULIN (HCC): ICD-10-CM

## 2024-11-26 RX ORDER — LANCETS
EACH MISCELLANEOUS
Qty: 100 EACH | Refills: 3 | Status: SHIPPED | OUTPATIENT
Start: 2024-11-26

## 2024-11-26 NOTE — TELEPHONE ENCOUNTER
Recent Visits  Date Type Provider Dept   09/10/24 Office Visit Dimas Watts APRN - CNP [facility]   05/30/24 Office Visit Dimas Watts APRN - CNP [facility]   04/18/24 Office Visit Dimas Watts APRN - CNP [facility]   02/26/24 Office Visit Dimas Watts APRN - CNP [facility]   08/23/23 Office Visit Dimas Watts APRN - CNP [facility]   Showing recent visits within past 540 days with a meds authorizing provider and meeting all other requirements  Future Appointments  Date Type Provider Dept   12/09/24 Appointment Dimas Watts APRN - CNP [facility]   02/10/25 Appointment Dimas Watts APRN - CNP [facility]   Showing future appointments within next 150 days with a meds authorizing provider and meeting all other requirements

## 2024-12-02 ENCOUNTER — TELEPHONE (OUTPATIENT)
Dept: FAMILY MEDICINE CLINIC | Age: 89
End: 2024-12-02

## 2024-12-02 LAB
BUN / CREAT RATIO: 19 (ref 7–25)
BUN BLDV-MCNC: 13 MG/DL (ref 3–29)
CALCIUM SERPL-MCNC: 9.3 MG/DL (ref 8.5–10.5)
CHLORIDE BLD-SCNC: 105 MEQ/L (ref 96–110)
CO2: 23 MEQ/L (ref 19–32)
CREAT SERPL-MCNC: 0.7 MG/DL (ref 0.5–1.2)
ESTIMATED GLOMERULAR FILTRATION RATE CREATININE EQUATION: 84 MLS/MIN/1.73M2
FASTING STATUS: NORMAL
GLUCOSE BLD-MCNC: 70 MG/DL (ref 70–99)
HCT VFR BLD CALC: 30.5 % (ref 34–49)
HEMOGLOBIN: 8.9 G/DL (ref 11.2–15.7)
MAGNESIUM: 1.5 MG/DL (ref 1.4–2.5)
MCH RBC QN AUTO: 22.1 PG (ref 26–34)
MCHC RBC AUTO-ENTMCNC: 29.2 G/DL (ref 30.7–35.5)
MCV RBC AUTO: 75.9 FL (ref 80–100)
PDW BLD-RTO: 17 %
PLATELET # BLD: 413 K/UL (ref 140–400)
PMV BLD AUTO: 9.2 FL (ref 7.2–11.7)
POTASSIUM SERPL-SCNC: 3.8 MEQ/L (ref 3.4–5.3)
RBC # BLD: 4.02 M/UL (ref 3.95–5.26)
SODIUM BLD-SCNC: 138 MEQ/L (ref 135–148)
WBC # BLD: 7.1 K/UL (ref 3.5–10.9)

## 2024-12-02 NOTE — TELEPHONE ENCOUNTER
Pts son jessica stated he does not want to schedule an appt at this time because he is not sure what it will take to get her transferred.

## 2024-12-02 NOTE — TELEPHONE ENCOUNTER
Luz from [facility] called and would like to know if Dimas will follow the patient for skilled nursing, PT, OT,  and home health aid.

## 2024-12-03 ENCOUNTER — TELEPHONE (OUTPATIENT)
Dept: WOUND CARE | Age: 89
End: 2024-12-03

## 2024-12-03 ENCOUNTER — TELEPHONE (OUTPATIENT)
Dept: FAMILY MEDICINE CLINIC | Age: 89
End: 2024-12-03

## 2024-12-03 RX ORDER — FERROUS SULFATE 325(65) MG
1 TABLET ORAL 2 TIMES DAILY
Qty: 180 TABLET | Refills: 3 | Status: SHIPPED | OUTPATIENT
Start: 2024-12-03

## 2024-12-03 NOTE — TELEPHONE ENCOUNTER
[facility] called and stated they admitted her to home health services. She has SN, PT and OT. They stated she would benefit from ST for cognition and swallowing. If ok, fax to [phone]    No HHA available through [facility]. Would need to get through another agency.   Pt is not able to safely transfer to the car. She is a mynor lift right now. She is not able to be evaluated by a service to come get her for another 2 weeks. If available, is she able to go a virtual on the 9th?

## 2024-12-03 NOTE — TELEPHONE ENCOUNTER
----- Message from JOSH Palomares CNP sent at 12/3/2024 12:19 PM EST -----  Let pt know her K++ levels are normal,  Hgb and Hct still low at her baseline , is she having any black tarry stools or blood in stool or stomach upset suggesting blood loss? Recommend she take ferrous sulfate 325 mg bid for now to bring up levels and make a f/u appt with me in the next 2 weeks thanks

## 2024-12-03 NOTE — TELEPHONE ENCOUNTER
Received call from Camilo at [facility].  States patient has a small odor to leg wound, small amount of purulent drainage. Heart rate 103 and BP was 104/75, no fever. Updated Dr Lopez with this a long with that dressing changes are daily and the dressing order they are currently using (betadine, xerform, abd, kerlix and ace wrap). Dr Lopez states at this time there is nothing he would change. If patient develops a fever she needs to go to the er. Called camilo back and updated her with this information.

## 2024-12-03 NOTE — TELEPHONE ENCOUNTER
Pt's son informed and voiced understanding.  Okay per HIPAA    States pt has not have an upset stomach, black tarry stools or blood in her stool.    Future Appointments   Date Time Provider Department Center   12/9/2024  2:20 PM Dimas Watts APRN - CNP [facility]am [facility]   12/18/2024  9:15 AM Rojelio Lopez MD [facility] WOUND [place] [facility]   12/18/2024  3:00 PM Dimas Watts APRN - CNP Fam Med [facility] DE[facility]   1/22/2025  3:30 PM Torrey Nieves MD [facility] [place]   1/23/2025  1:15 PM Emperatriz Onofre MD N [facility] [place]   2/10/2025  3:20 PM Dimas Watts APRN - CNP Fam Med [facility]

## 2024-12-04 ENCOUNTER — TELEPHONE (OUTPATIENT)
Dept: FAMILY MEDICINE CLINIC | Age: 89
End: 2024-12-04

## 2024-12-04 NOTE — TELEPHONE ENCOUNTER
I don't show pt being on diabetic medication on her  med list, unless something was started when she was recently in the [facility]. If she is not on diabetic meds they do not need to continue to check her sugar twice a day. Please call son to verify this thanks

## 2024-12-04 NOTE — TELEPHONE ENCOUNTER
Pts son called and is asking about getting a dexcom for her. She is now living in the home with the son and with her being twice a day finger sticks, the son and daughter in law have an aversion to needles. Is this something they can get?

## 2024-12-04 NOTE — TELEPHONE ENCOUNTER
Noted, no need to check sugars BID. We cna discuss at her upcoming appt and I can order an A1C at that time.  .

## 2024-12-04 NOTE — TELEPHONE ENCOUNTER
Pt son informed of message below  . Pt son voiced understanding with no further questions at this time.       He states she is not on Diabetic meds.

## 2024-12-04 NOTE — TELEPHONE ENCOUNTER
Pts son said he will be available for a VV on the 9th. Visit changed and phone number on appt desk to be called at.

## 2024-12-05 NOTE — TELEPHONE ENCOUNTER
Patients son Jhonny came to the office and was questioning getting a Dexcom to monitor his mother sugars. He stated that his wife does not like needles and refuses to prick her finger twice a day. I let the patient know what the previous telephone notes said and he was still addiment that she get a Dexcom stating \"how are we supposed to know what her sugars are?\"  I then got Fanta WHITESIDE to come assist me.

## 2024-12-05 NOTE — TELEPHONE ENCOUNTER
/I spoke to Jhonny and let him know that since patient isn't on any diabetic.  medications she doesn't need a dexcom. I also let patient know  that Dimas said they can discuss more about it at patients follow up on 12/9/24  Dimas hogue, APRN - CNP Nurse Practitioner Signed Yesterday       Noted, no need to check sugars BID. We cna discuss at her upcoming appt and I can order an A1C at that time.  .       He states understanding

## 2024-12-09 PROBLEM — S81.801A: Status: RESOLVED | Noted: 2024-11-13 | Resolved: 2024-12-09

## 2024-12-09 PROBLEM — R79.89 ELEVATED TROPONIN: Status: RESOLVED | Noted: 2024-11-13 | Resolved: 2024-12-09

## 2024-12-09 PROBLEM — M79.604 RIGHT LEG PAIN: Status: RESOLVED | Noted: 2024-09-18 | Resolved: 2024-12-09

## 2024-12-09 PROBLEM — S81.802A: Status: RESOLVED | Noted: 2024-11-13 | Resolved: 2024-12-09

## 2024-12-16 ENCOUNTER — APPOINTMENT (OUTPATIENT)
Dept: GENERAL RADIOLOGY | Age: 89
DRG: 641 | End: 2024-12-16
Payer: MEDICARE

## 2024-12-16 ENCOUNTER — TELEPHONE (OUTPATIENT)
Dept: FAMILY MEDICINE CLINIC | Age: 89
End: 2024-12-16

## 2024-12-16 ENCOUNTER — HOSPITAL ENCOUNTER (INPATIENT)
Age: 89
LOS: 8 days | Discharge: HOSPICE | DRG: 641 | End: 2024-12-24
Attending: NURSE PRACTITIONER | Admitting: INTERNAL MEDICINE
Payer: MEDICARE

## 2024-12-16 DIAGNOSIS — M54.6 ACUTE RIGHT-SIDED THORACIC BACK PAIN: ICD-10-CM

## 2024-12-16 DIAGNOSIS — E83.59 CALCIPHYLAXIS OF LOWER EXTREMITY WITH NONHEALING ULCER (HCC): ICD-10-CM

## 2024-12-16 DIAGNOSIS — K21.9 GASTROESOPHAGEAL REFLUX DISEASE, UNSPECIFIED WHETHER ESOPHAGITIS PRESENT: ICD-10-CM

## 2024-12-16 DIAGNOSIS — R53.81 PHYSICAL DECONDITIONING: Primary | ICD-10-CM

## 2024-12-16 DIAGNOSIS — Z78.0 POSTMENOPAUSE: ICD-10-CM

## 2024-12-16 DIAGNOSIS — S31.000D WOUND OF SACRAL REGION, SUBSEQUENT ENCOUNTER: ICD-10-CM

## 2024-12-16 DIAGNOSIS — G89.29 OTHER CHRONIC PAIN: ICD-10-CM

## 2024-12-16 DIAGNOSIS — E11.59 TYPE 2 DIABETES MELLITUS WITH OTHER CIRCULATORY COMPLICATION, WITHOUT LONG-TERM CURRENT USE OF INSULIN (HCC): ICD-10-CM

## 2024-12-16 DIAGNOSIS — I50.32 CHRONIC HEART FAILURE WITH PRESERVED EJECTION FRACTION (HFPEF) (HCC): ICD-10-CM

## 2024-12-16 DIAGNOSIS — I10 PRIMARY HYPERTENSION: ICD-10-CM

## 2024-12-16 DIAGNOSIS — E44.0 MODERATE PROTEIN-CALORIE MALNUTRITION (HCC): ICD-10-CM

## 2024-12-16 DIAGNOSIS — E87.1 HYPONATREMIA: ICD-10-CM

## 2024-12-16 DIAGNOSIS — L97.929 CALCIPHYLAXIS OF LEFT LOWER EXTREMITY WITH NONHEALING ULCER, UNSPECIFIED ULCER STAGE (HCC): ICD-10-CM

## 2024-12-16 DIAGNOSIS — Z51.5 PALLIATIVE CARE PATIENT: ICD-10-CM

## 2024-12-16 DIAGNOSIS — I10 ESSENTIAL HYPERTENSION: ICD-10-CM

## 2024-12-16 DIAGNOSIS — E83.59 CALCIPHYLAXIS OF LEFT LOWER EXTREMITY WITH NONHEALING ULCER, UNSPECIFIED ULCER STAGE (HCC): ICD-10-CM

## 2024-12-16 DIAGNOSIS — L97.909 CALCIPHYLAXIS OF LOWER EXTREMITY WITH NONHEALING ULCER (HCC): ICD-10-CM

## 2024-12-16 DIAGNOSIS — R52 INTRACTABLE PAIN: ICD-10-CM

## 2024-12-16 PROBLEM — R53.1 WEAKNESS: Status: ACTIVE | Noted: 2024-12-16

## 2024-12-16 LAB
ALBUMIN SERPL BCG-MCNC: 2.8 G/DL (ref 3.5–5.1)
ALP SERPL-CCNC: 121 U/L (ref 38–126)
ALT SERPL W/O P-5'-P-CCNC: 23 U/L (ref 11–66)
ANION GAP SERPL CALC-SCNC: 16 MEQ/L (ref 8–16)
AST SERPL-CCNC: 44 U/L (ref 5–40)
BACTERIA URNS QL MICRO: ABNORMAL /HPF
BASE EXCESS BLDA CALC-SCNC: -3.1 MMOL/L (ref -2–3)
BASOPHILS ABSOLUTE: 0 THOU/MM3 (ref 0–0.1)
BASOPHILS NFR BLD AUTO: 0.4 %
BILIRUB CONJ SERPL-MCNC: 0.5 MG/DL (ref 0.1–13.8)
BILIRUB SERPL-MCNC: 0.8 MG/DL (ref 0.3–1.2)
BILIRUB UR QL STRIP.AUTO: ABNORMAL
BUN SERPL-MCNC: 15 MG/DL (ref 7–22)
CALCIUM SERPL-MCNC: 10.4 MG/DL (ref 8.5–10.5)
CASTS #/AREA URNS LPF: ABNORMAL /LPF
CASTS 2: ABNORMAL /LPF
CHARACTER UR: ABNORMAL
CHLORIDE SERPL-SCNC: 96 MEQ/L (ref 98–111)
CO2 SERPL-SCNC: 22 MEQ/L (ref 23–33)
COLLECTED BY:: ABNORMAL
COLOR, UA: ABNORMAL
CREAT SERPL-MCNC: 0.7 MG/DL (ref 0.4–1.2)
CRYSTALS URNS MICRO: ABNORMAL
DEPRECATED RDW RBC AUTO: 45.6 FL (ref 35–45)
DEVICE: ABNORMAL
EKG Q-T INTERVAL: 312 MS
EKG Q-T INTERVAL: 328 MS
EKG QRS DURATION: 60 MS
EKG QRS DURATION: 66 MS
EKG QTC CALCULATION (BAZETT): 394 MS
EKG QTC CALCULATION (BAZETT): 410 MS
EKG R AXIS: -7 DEGREES
EKG R AXIS: 14 DEGREES
EKG T AXIS: -10 DEGREES
EKG VENTRICULAR RATE: 94 BPM
EKG VENTRICULAR RATE: 96 BPM
EOSINOPHIL NFR BLD AUTO: 2.5 %
EOSINOPHILS ABSOLUTE: 0.2 THOU/MM3 (ref 0–0.4)
EPITHELIAL CELLS, UA: ABNORMAL /HPF
ERYTHROCYTE [DISTWIDTH] IN BLOOD BY AUTOMATED COUNT: 17.7 % (ref 11.5–14.5)
FIO2 ON VENT O2 ANALYZER: 21 %
GFR SERPL CREATININE-BSD FRML MDRD: 83 ML/MIN/1.73M2
GLUCOSE SERPL-MCNC: 96 MG/DL (ref 70–108)
GLUCOSE UR QL STRIP.AUTO: NEGATIVE MG/DL
HCO3 BLDA-SCNC: 19 MMOL/L (ref 23–28)
HCT VFR BLD AUTO: 33.9 % (ref 37–47)
HGB BLD-MCNC: 10.5 GM/DL (ref 12–16)
HGB UR QL STRIP.AUTO: NEGATIVE
IMM GRANULOCYTES # BLD AUTO: 0.02 THOU/MM3 (ref 0–0.07)
IMM GRANULOCYTES NFR BLD AUTO: 0.2 %
KETONES UR QL STRIP.AUTO: 15
LIPASE SERPL-CCNC: 26.5 U/L (ref 5.6–51.3)
LYMPHOCYTES ABSOLUTE: 2.3 THOU/MM3 (ref 1–4.8)
LYMPHOCYTES NFR BLD AUTO: 28.3 %
MCH RBC QN AUTO: 22.7 PG (ref 26–33)
MCHC RBC AUTO-ENTMCNC: 31 GM/DL (ref 32.2–35.5)
MCV RBC AUTO: 73.4 FL (ref 81–99)
MISCELLANEOUS 2: ABNORMAL
MONOCYTES ABSOLUTE: 0.8 THOU/MM3 (ref 0.4–1.3)
MONOCYTES NFR BLD AUTO: 10.4 %
NEUTROPHILS ABSOLUTE: 4.7 THOU/MM3 (ref 1.8–7.7)
NEUTROPHILS NFR BLD AUTO: 58.2 %
NITRITE UR QL STRIP: NEGATIVE
NRBC BLD AUTO-RTO: 0 /100 WBC
OSMOLALITY SERPL CALC.SUM OF ELEC: 268.9 MOSMOL/KG (ref 275–300)
PCO2 TEMP ADJ BLDMV: 28 MMHG (ref 41–51)
PH BLDMV: 7.45 [PH] (ref 7.31–7.41)
PH UR STRIP.AUTO: 5.5 [PH] (ref 5–9)
PLATELET # BLD AUTO: 297 THOU/MM3 (ref 130–400)
PMV BLD AUTO: 8.8 FL (ref 9.4–12.4)
PO2 BLDMV: 30 MMHG (ref 25–40)
POTASSIUM SERPL-SCNC: 3.6 MEQ/L (ref 3.5–5.2)
PROT SERPL-MCNC: 7.1 G/DL (ref 6.1–8)
PROT UR STRIP.AUTO-MCNC: 30 MG/DL
RBC # BLD AUTO: 4.62 MILL/MM3 (ref 4.2–5.4)
RBC URINE: ABNORMAL /HPF
RENAL EPI CELLS #/AREA URNS HPF: ABNORMAL /[HPF]
SAO2 % BLDMV: 63 %
SITE: ABNORMAL
SODIUM SERPL-SCNC: 134 MEQ/L (ref 135–145)
SP GR UR REFRACT.AUTO: 1.02 (ref 1–1.03)
TROPONIN, HIGH SENSITIVITY: 38 NG/L (ref 0–12)
UROBILINOGEN, URINE: 1 EU/DL (ref 0–1)
VENTILATION MODE VENT: ABNORMAL
WBC # BLD AUTO: 8.1 THOU/MM3 (ref 4.8–10.8)
WBC #/AREA URNS HPF: ABNORMAL /HPF
WBC #/AREA URNS HPF: ABNORMAL /[HPF]
YEAST LIKE FUNGI URNS QL MICRO: ABNORMAL

## 2024-12-16 PROCEDURE — 2580000003 HC RX 258: Performed by: NURSE PRACTITIONER

## 2024-12-16 PROCEDURE — 84484 ASSAY OF TROPONIN QUANT: CPT

## 2024-12-16 PROCEDURE — 81001 URINALYSIS AUTO W/SCOPE: CPT

## 2024-12-16 PROCEDURE — 87086 URINE CULTURE/COLONY COUNT: CPT

## 2024-12-16 PROCEDURE — 83690 ASSAY OF LIPASE: CPT

## 2024-12-16 PROCEDURE — 36415 COLL VENOUS BLD VENIPUNCTURE: CPT

## 2024-12-16 PROCEDURE — 82746 ASSAY OF FOLIC ACID SERUM: CPT

## 2024-12-16 PROCEDURE — 82803 BLOOD GASES ANY COMBINATION: CPT

## 2024-12-16 PROCEDURE — 6360000002 HC RX W HCPCS: Performed by: NURSE PRACTITIONER

## 2024-12-16 PROCEDURE — 93010 ELECTROCARDIOGRAM REPORT: CPT | Performed by: NUCLEAR MEDICINE

## 2024-12-16 PROCEDURE — 82607 VITAMIN B-12: CPT

## 2024-12-16 PROCEDURE — 85025 COMPLETE CBC W/AUTO DIFF WBC: CPT

## 2024-12-16 PROCEDURE — 71045 X-RAY EXAM CHEST 1 VIEW: CPT

## 2024-12-16 PROCEDURE — 93005 ELECTROCARDIOGRAM TRACING: CPT | Performed by: NURSE PRACTITIONER

## 2024-12-16 PROCEDURE — 1200000000 HC SEMI PRIVATE

## 2024-12-16 PROCEDURE — 99223 1ST HOSP IP/OBS HIGH 75: CPT | Performed by: NURSE PRACTITIONER

## 2024-12-16 PROCEDURE — 99285 EMERGENCY DEPT VISIT HI MDM: CPT

## 2024-12-16 PROCEDURE — 87106 FUNGI IDENTIFICATION YEAST: CPT

## 2024-12-16 PROCEDURE — 80053 COMPREHEN METABOLIC PANEL: CPT

## 2024-12-16 PROCEDURE — 6370000000 HC RX 637 (ALT 250 FOR IP): Performed by: NURSE PRACTITIONER

## 2024-12-16 PROCEDURE — 82248 BILIRUBIN DIRECT: CPT

## 2024-12-16 RX ORDER — SODIUM CHLORIDE 9 MG/ML
INJECTION, SOLUTION INTRAVENOUS CONTINUOUS
Status: DISCONTINUED | OUTPATIENT
Start: 2024-12-16 | End: 2024-12-24 | Stop reason: HOSPADM

## 2024-12-16 RX ORDER — GABAPENTIN 100 MG/1
100 CAPSULE ORAL 2 TIMES DAILY
Status: DISCONTINUED | OUTPATIENT
Start: 2024-12-16 | End: 2024-12-24 | Stop reason: HOSPADM

## 2024-12-16 RX ORDER — ACETAMINOPHEN 650 MG/1
650 SUPPOSITORY RECTAL EVERY 6 HOURS PRN
Status: DISCONTINUED | OUTPATIENT
Start: 2024-12-16 | End: 2024-12-24 | Stop reason: HOSPADM

## 2024-12-16 RX ORDER — SODIUM CHLORIDE 9 MG/ML
INJECTION, SOLUTION INTRAVENOUS PRN
Status: DISCONTINUED | OUTPATIENT
Start: 2024-12-16 | End: 2024-12-24 | Stop reason: HOSPADM

## 2024-12-16 RX ORDER — MORPHINE SULFATE 2 MG/ML
2 INJECTION, SOLUTION INTRAMUSCULAR; INTRAVENOUS ONCE
Status: COMPLETED | OUTPATIENT
Start: 2024-12-16 | End: 2024-12-16

## 2024-12-16 RX ORDER — SENNOSIDES A AND B 8.6 MG/1
1 TABLET, FILM COATED ORAL 2 TIMES DAILY
Status: DISCONTINUED | OUTPATIENT
Start: 2024-12-16 | End: 2024-12-24 | Stop reason: HOSPADM

## 2024-12-16 RX ORDER — HYDROCODONE BITARTRATE AND ACETAMINOPHEN 5; 325 MG/1; MG/1
2 TABLET ORAL EVERY 6 HOURS PRN
Status: DISCONTINUED | OUTPATIENT
Start: 2024-12-16 | End: 2024-12-20

## 2024-12-16 RX ORDER — VITAMIN B COMPLEX
1 TABLET ORAL DAILY
Status: DISCONTINUED | OUTPATIENT
Start: 2024-12-17 | End: 2024-12-24 | Stop reason: HOSPADM

## 2024-12-16 RX ORDER — ALBUTEROL SULFATE 90 UG/1
2 INHALANT RESPIRATORY (INHALATION) EVERY 6 HOURS PRN
Status: DISCONTINUED | OUTPATIENT
Start: 2024-12-16 | End: 2024-12-24 | Stop reason: HOSPADM

## 2024-12-16 RX ORDER — POLYETHYLENE GLYCOL 3350 17 G/17G
17 POWDER, FOR SOLUTION ORAL DAILY PRN
Status: DISCONTINUED | OUTPATIENT
Start: 2024-12-16 | End: 2024-12-24 | Stop reason: HOSPADM

## 2024-12-16 RX ORDER — ONDANSETRON 4 MG/1
4 TABLET, ORALLY DISINTEGRATING ORAL EVERY 8 HOURS PRN
Status: DISCONTINUED | OUTPATIENT
Start: 2024-12-16 | End: 2024-12-24 | Stop reason: HOSPADM

## 2024-12-16 RX ORDER — HYDROXYZINE PAMOATE 25 MG/1
25 CAPSULE ORAL 3 TIMES DAILY PRN
Status: DISCONTINUED | OUTPATIENT
Start: 2024-12-16 | End: 2024-12-24 | Stop reason: HOSPADM

## 2024-12-16 RX ORDER — FERROUS SULFATE 325(65) MG
325 TABLET ORAL 2 TIMES DAILY
Status: DISCONTINUED | OUTPATIENT
Start: 2024-12-16 | End: 2024-12-24 | Stop reason: HOSPADM

## 2024-12-16 RX ORDER — ACETAMINOPHEN 325 MG/1
650 TABLET ORAL EVERY 6 HOURS PRN
Status: DISCONTINUED | OUTPATIENT
Start: 2024-12-16 | End: 2024-12-24 | Stop reason: HOSPADM

## 2024-12-16 RX ORDER — LANOLIN ALCOHOL/MO/W.PET/CERES
400 CREAM (GRAM) TOPICAL DAILY
Status: DISCONTINUED | OUTPATIENT
Start: 2024-12-17 | End: 2024-12-24 | Stop reason: HOSPADM

## 2024-12-16 RX ORDER — ONDANSETRON 2 MG/ML
4 INJECTION INTRAMUSCULAR; INTRAVENOUS EVERY 6 HOURS PRN
Status: DISCONTINUED | OUTPATIENT
Start: 2024-12-16 | End: 2024-12-24 | Stop reason: HOSPADM

## 2024-12-16 RX ORDER — SODIUM CHLORIDE 0.9 % (FLUSH) 0.9 %
5-40 SYRINGE (ML) INJECTION EVERY 12 HOURS SCHEDULED
Status: DISCONTINUED | OUTPATIENT
Start: 2024-12-16 | End: 2024-12-24 | Stop reason: HOSPADM

## 2024-12-16 RX ORDER — PANTOPRAZOLE SODIUM 40 MG/1
40 TABLET, DELAYED RELEASE ORAL
Status: DISCONTINUED | OUTPATIENT
Start: 2024-12-17 | End: 2024-12-24 | Stop reason: HOSPADM

## 2024-12-16 RX ORDER — MAGNESIUM SULFATE IN WATER 40 MG/ML
2000 INJECTION, SOLUTION INTRAVENOUS PRN
Status: DISCONTINUED | OUTPATIENT
Start: 2024-12-16 | End: 2024-12-24 | Stop reason: HOSPADM

## 2024-12-16 RX ORDER — POTASSIUM CHLORIDE 1500 MG/1
40 TABLET, EXTENDED RELEASE ORAL PRN
Status: DISCONTINUED | OUTPATIENT
Start: 2024-12-16 | End: 2024-12-24 | Stop reason: HOSPADM

## 2024-12-16 RX ORDER — POTASSIUM CHLORIDE 7.45 MG/ML
10 INJECTION INTRAVENOUS PRN
Status: DISCONTINUED | OUTPATIENT
Start: 2024-12-16 | End: 2024-12-24 | Stop reason: HOSPADM

## 2024-12-16 RX ORDER — SODIUM CHLORIDE 0.9 % (FLUSH) 0.9 %
5-40 SYRINGE (ML) INJECTION PRN
Status: DISCONTINUED | OUTPATIENT
Start: 2024-12-16 | End: 2024-12-24 | Stop reason: HOSPADM

## 2024-12-16 RX ADMIN — HYDROXYZINE PAMOATE 25 MG: 25 CAPSULE ORAL at 21:20

## 2024-12-16 RX ADMIN — MORPHINE SULFATE 2 MG: 2 INJECTION, SOLUTION INTRAMUSCULAR; INTRAVENOUS at 18:21

## 2024-12-16 RX ADMIN — SODIUM CHLORIDE: 9 INJECTION, SOLUTION INTRAVENOUS at 11:30

## 2024-12-16 RX ADMIN — SODIUM CHLORIDE: 9 INJECTION, SOLUTION INTRAVENOUS at 15:07

## 2024-12-16 RX ADMIN — HYDROCODONE BITARTRATE AND ACETAMINOPHEN 2 TABLET: 5; 325 TABLET ORAL at 21:20

## 2024-12-16 ASSESSMENT — PAIN SCALES - PAIN ASSESSMENT IN ADVANCED DEMENTIA (PAINAD)
NEGVOCALIZATION: REPEATED TROUBLED CALLING OUT, LOUD MOANING/GROANING, CRYING
CONSOLABILITY: UNABLE TO CONSOLE, DISTRACT OR REASSURE
BREATHING: NOISY LABORED BREATHING, LONG PERIODS HYPERVENTILATION, CHEYNE-STOKES RESPIRATIONS
BODYLANGUAGE: RIGID, FISTS CLENCHED, KNEES UP, PUSHING/PULLING AWAY, STRIKES OUT
TOTALSCORE: 10
FACIALEXPRESSION: FACIAL GRIMACING

## 2024-12-16 ASSESSMENT — PAIN DESCRIPTION - ORIENTATION: ORIENTATION: RIGHT;LEFT;POSTERIOR;LOWER;MID

## 2024-12-16 ASSESSMENT — PAIN DESCRIPTION - LOCATION: LOCATION: GENERALIZED;COCCYX;LEG

## 2024-12-16 ASSESSMENT — PAIN DESCRIPTION - DESCRIPTORS: DESCRIPTORS: ACHING;DISCOMFORT;SORE;TENDER

## 2024-12-16 ASSESSMENT — PAIN - FUNCTIONAL ASSESSMENT: PAIN_FUNCTIONAL_ASSESSMENT: PAIN ASSESSMENT IN ADVANCED DEMENTIA (PAINAD)

## 2024-12-16 NOTE — PROGRESS NOTES
Pt admitted to  5K25 by cart/stretcher from  ED .  IV normal saline infusing into the wrist left, condition patent and no redness at a rate of 25 mls/ hour with about 900 mls in the bag still. IV site free of s/s of infection or infiltration. Vital signs obtained. Assessment complete. Oriented to room. All questions answered with no further questions at this time.   Two nurse skin assessment performed by MANN Leonard and MANN Mojica. Oriented to room. Policies and procedures for  explained. A Fall prevention and safety brochure discussed with patient.  Bed alarm on. Call light in reach.

## 2024-12-16 NOTE — ED NOTES
Son and daughter in law at bedside; rprt inability to care for pt at home. States pt refusing to eat and drink and has become weak and is deteriorating.

## 2024-12-16 NOTE — ED PROVIDER NOTES
[facility]      EMERGENCY MEDICINE     Pt Name: Jasmina Powers  MRN: [id number]  Birthdate 1935  Date of evaluation: 12/16/2024  Provider: JOSH Sánchez CNP    CHIEF COMPLAINT       Chief Complaint   Patient presents with    Social Consult     HISTORY OF PRESENT ILLNESS   Jasmina Powers is a pleasant 89 y.o. female who presents to the emergency department from home with c/o decompensation, not eating, unable to care for self, family unable to care for her.  The patient was just released from NH 2 weeks ago.  Family states patient is not eating.  Refuses to take meds other than her pain meds.  She has wounds on the bilateral legs that they are changing dressings on and wounds on the buttocks they are applying prescription treatment to.  Family states patient is a full code.        History is obtained from:  patient, family member - son and DIL  PASTMEDICAL HISTORY     Past Medical History:   Diagnosis Date    Allergic rhinitis     Aortic stenosis, mild to moderate     ASHD (arteriosclerotic heart disease)     Atrial fibrillation (HCC)     COPD (chronic obstructive pulmonary disease) (HCC)     DM2 (diabetes mellitus, type 2) (HCC)     Dyslipidemia     GERD (gastroesophageal reflux disease)     Heart failure with preserved ejection fraction (HCC)     Hypertension, essential        Patient Active Problem List   Diagnosis Code    Diabetes mellitus (HCC) E11.9    Primary hypertension I10    Clicking tinnitus of both ears H93.13    Chronic renal disease, stage III (HCC) [319890] N18.30    Type 2 diabetes mellitus with chronic kidney disease E11.22    Mammogram declined Z53.20    Chronic atrial fibrillation (HCC) I48.20    Hyponatremia E87.1    Cellulitis of right lower extremity L03.115    Physical deconditioning R53.81    Intractable pain R52    Leg wound, right, sequela S81.801S    Other chronic pain G89.29    Palliative care patient Z51.5    Chronic heart failure with preserved ejection fraction  reflux disease, unspecified whether esophagitis present      vitamin C (ASCORBIC ACID) 500 MG tablet TAKE 1 TABLET EVERY DAY  Qty: 90 tablet, Refills: 3      fluticasone-umeclidin-vilant (TRELEGY ELLIPTA) 200-62.5-25 MCG/ACT AEPB inhaler Inhale 1 puff into the lungs daily      montelukast (SINGULAIR) 10 MG tablet Take 1 tablet by mouth nightly      !! Blood Glucose Monitoring Suppl ([employer] BLOOD GLUCOSE METER) w/Device KIT 1 each by Does not apply route in the morning and at bedtime Dispense Accucheck meter  Qty: 1 kit, Refills: 0    Associated Diagnoses: Type 2 diabetes mellitus without complication, without long-term current use of insulin (Formerly [facility])      Multiple Vitamins-Minerals (CENTRUM SILVER 50+WOMEN) TABS Take 1 tablet by mouth daily 42% of Vitamin K requirement      ACCU-CHEK GUIDE strip TEST BLOOD SUGAR TWICE DAILY  Qty: 200 strip, Refills: 3    Associated Diagnoses: Type 2 diabetes mellitus with other circulatory complication, without long-term current use of insulin (Formerly [facility])      Nutritional Supplements (NUTRITIONAL DRINK) LIQD Take by mouth      naloxone 4 MG/0.1ML LIQD nasal spray 1 spray by Nasal route as needed for Opioid Reversal  Qty: 2 each, Refills: 4      sodium bicarbonate 325 MG tablet Take 1 tablet by mouth 2 times daily  Qty: 180 tablet, Refills: 3    Associated Diagnoses: Hyponatremia       !! - Potential duplicate medications found. Please discuss with provider.          ALLERGIES     is allergic to warfarin and related.    FAMILY HISTORY     She indicated that her mother is . She indicated that her father is .       SOCIAL HISTORY       Social History     Tobacco Use    Smoking status: Former     Current packs/day: 0.00     Types: Cigarettes     Quit date: 1978     Years since quittin.5    Smokeless tobacco: Never   Vaping Use    Vaping status: Never Used   Substance Use Topics    Alcohol use: Not Currently     Alcohol/week: 1.0 standard drink of alcohol     Types: 1 Cans          FORMAL DIAGNOSTIC RESULTS     RADIOLOGY: Interpretation per the Radiologist below, if available at the time of this note (none if blank):    XR CHEST PORTABLE   Final Result   1. Moderate cardiomegaly. Mildly ectatic and mildly tortuous descending thoracic   aorta.   2. No acute findings. No infiltrates or effusions are seen.            **This report has been created using voice recognition software.  It may contain   minor errors which are inherent in voice recognition technology.**      Electronically signed by Dr. Daniel Bermeo          LABS: (none if blank)  Labs Reviewed   CULTURE, REFLEXED, URINE - Abnormal; Notable for the following components:       Result Value    Urine Culture Reflex No growth-preliminary (*)     Organism Yeast (*)     All other components within normal limits    Narrative:     Source: urine, clean catch       Site: clean void          Current Antibiotics: not stated   CBC WITH AUTO DIFFERENTIAL - Abnormal; Notable for the following components:    Hemoglobin 10.5 (*)     Hematocrit 33.9 (*)     MCV 73.4 (*)     MCH 22.7 (*)     MCHC 31.0 (*)     RDW-CV 17.7 (*)     RDW-SD 45.6 (*)     MPV 8.8 (*)     All other components within normal limits   BASIC METABOLIC PANEL - Abnormal; Notable for the following components:    Sodium 134 (*)     Chloride 96 (*)     CO2 22 (*)     All other components within normal limits   BLOOD GAS, VENOUS - Abnormal; Notable for the following components:    PH MIXED 7.45 (*)     PCO2, MIXED VENOUS 28 (*)     HCO3, Mixed 19 (*)     Base Exc, Mixed -3.1 (*)     All other components within normal limits   TROPONIN - Abnormal; Notable for the following components:    Troponin, High Sensitivity 38 (*)     All other components within normal limits   HEPATIC FUNCTION PANEL - Abnormal; Notable for the following components:    Albumin 2.8 (*)     AST 44 (*)     All other components within normal limits   OSMOLALITY - Abnormal; Notable for the following components:

## 2024-12-16 NOTE — TELEPHONE ENCOUNTER
Son called in stating that pt has been home for about a week, she is not eating nor taking her medication. He states it appears she is just getting weaker and weaker, wants to know if he should call and have her admitted to hospital. Or what the best route is for patient, states she is just getting worse and worse everyday. Isn't drinking much, did have a little bit of her ensure yesterday. Would like a call back as soon as possible states he doesn't know what else to do.    Please advise

## 2024-12-16 NOTE — PLAN OF CARE
Problem: Chronic Conditions and Co-morbidities  Goal: Patient's chronic conditions and co-morbidity symptoms are monitored and maintained or improved  Outcome: Progressing     Problem: Pain  Goal: Verbalizes/displays adequate comfort level or baseline comfort level  Outcome: Progressing     Problem: Skin/Tissue Integrity - Adult  Goal: Skin integrity remains intact  Outcome: Progressing  Goal: Incisions, wounds, or drain sites healing without S/S of infection  Outcome: Progressing     Problem: Musculoskeletal - Adult  Goal: Return mobility to safest level of function  Outcome: Progressing     Problem: Discharge Planning  Goal: Discharge to home or other facility with appropriate resources  Outcome: Progressing  Flowsheets (Taken 12/16/2024 1546 by Elizabeth Amanda, RN)  Discharge to home or other facility with appropriate resources:   Identify barriers to discharge with patient and caregiver   Identify discharge learning needs (meds, wound care, etc)   Refer to discharge planning if patient needs post-hospital services based on physician order or complex needs related to functional status, cognitive ability or social support system     Problem: Safety - Adult  Goal: Free from fall injury  Outcome: Progressing     Problem: Skin/Tissue Integrity  Goal: Absence of new skin breakdown  Description: 1.  Monitor for areas of redness and/or skin breakdown  2.  Assess vascular access sites hourly  3.  Every 4-6 hours minimum:  Change oxygen saturation probe site  4.  Every 4-6 hours:  If on nasal continuous positive airway pressure, respiratory therapy assess nares and determine need for appliance change or resting period.  Outcome: Progressing       Care plan reviewed with patient , patient verbalized understanding of plan of care and contributed to goal setting.

## 2024-12-16 NOTE — CONSULTS
CONSULTATION NOTE :ID       Patient - Jasmina Powers,  Age - 89 y.o.    - 1935      Room Number - 5K-25/025-A   N -  [id number]   [facility] # - [id number]  Date of Admission -  2024  9:38 AM  Patient's PCP: Dimas Watts APRN - CNP     Requesting Physician: Felicitas Mccormick APRN*    REASON FOR CONSULTATION   Bilateral lower leg wound and coccyx wound.  CHIEF COMPLAINT   Poor oral intake    HISTORY OF PRESENT ILLNESS       This is a very pleasant 89 y.o. female who was admitted to the hospital with a chief complaints of poor oral intake.  Patient refused to drink and eat.  She was recently discharged home from an extended stay at the nursing facility.  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  Since her last visit patient now has developed with a coccyx wound stage III.  She has been refusing to turn she has also been refusing to have her leg wound dressing changed.  She has chronic pain at the time of my evaluation she was complaining of pain and did not want her dressing changed.  There is no any report of fever or chills.  She lives with her son.    PAST MEDICAL  HISTORY       Past Medical History:   Diagnosis Date    Allergic rhinitis     Aortic stenosis, mild to moderate     ASHD (arteriosclerotic heart disease)     Atrial fibrillation (HCC)     COPD (chronic obstructive pulmonary disease) (HCC)     DM2 (diabetes mellitus, type 2) (HCC)     Dyslipidemia     GERD (gastroesophageal reflux disease)     Heart failure with preserved ejection fraction (HCC)     Hypertension, essential        PAST SURGICAL HISTORY     History reviewed. No pertinent surgical history.      MEDICATIONS:       Scheduled Meds:   apixaban  5 mg Oral BID    diclofenac sodium  2 g Topical 4x Daily    ferrous sulfate  325  mg Oral BID    gabapentin  100 mg Oral BID    magnesium oxide  400 mg Oral Daily    Centrum Silver 50+Women  1 tablet Oral Daily    [START ON 12/17/2024] pantoprazole  40 mg Oral QAM AC    senna  1 tablet Oral BID    vitamin D3  1 tablet Oral Daily    sodium chloride flush  5-40 mL IntraVENous 2 times per day    morphine  2 mg IntraVENous Once     Continuous Infusions:   sodium chloride 50 mL/hr at 12/16/24 1610    sodium chloride       PRN Meds:albuterol sulfate HFA, HYDROcodone-acetaminophen, hydrOXYzine pamoate, sodium chloride flush, sodium chloride, potassium chloride **OR** potassium alternative oral replacement **OR** potassium chloride, magnesium sulfate, ondansetron **OR** ondansetron, polyethylene glycol, acetaminophen **OR** acetaminophen  Allergies:   ALLERGIES:    Warfarin and related        SOCIAL HISTORY:     TOBACCO:   reports that she quit smoking about 46 years ago. Her smoking use included cigarettes. She has never used smokeless tobacco.     ETOH:   reports that she does not currently use alcohol after a past usage of about 1.0 standard drink of alcohol per week.  Patient currently lives with family       FAMILY HISTORY:         Problem Relation Age of Onset    Cancer Mother         breast cancer    Diabetes Mother        REVIEW OF SYSTEMS:     Constitutional: no fever, no night sweats, + fatigue, no weight loss.  Head: no head ache , no head injury, no migranes.  Eye: no eye discharge, blurring of vision, no double vision,no eye pain.  Ears: no hearing difficulty, no tinnitus  Mouth/throat: Poor oral intake   respiratory: no cough no chest pain,no shortness of breath,no wheezing  CVS: no palpitation, no chest pain,   GI: no abdominal pain, no nausea , no vomiting, no constipation,no diarrhea.  WOLFGANG: no dysuria, frequency and urgency, no hematuria, no kidney stones  Musculoskeletal: no joint pain, + swelling , stiffness, leg and coccyx wound  Endocrine: no polyuria, polydipsia, no cold or heat  fibrillation (Formerly [facility]) I48.20    Hyponatremia E87.1    Cellulitis of right lower extremity L03.115    Physical deconditioning R53.81    Intractable pain R52    Leg wound, right, sequela S81.801S    Other chronic pain G89.29    Palliative care patient Z51.5    Chronic heart failure with preserved ejection fraction (HFpEF) (Formerly [facility]) I50.32    Moderate malnutrition (Formerly [facility]) E44.0    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  I will meet with her son tomorrow for further discussion  Palliative care has been consulted     Thank you Felicitas Mccormick APRN* for allowing me to participate in this patient's care.    Rojelio Lopez MD, 12/16/2024 5:52 PM

## 2024-12-16 NOTE — PALLIATIVE CARE
Initial Evaluation        Patient:   Jasmina Powers  YOB: 1935  Age:  89 y.o.  Room:  [address] [place] [address]  MRN:  [id number]   Acct: [id number]    Date of Admission:  12/16/2024  9:38 AM  Date of Service:  12/16/2024  Completed By:  Gabriel Velazquez RN        Reason for Palliative Care Evaluation:-   Goals of Care and code status     Current Concerns   Did not assess at this time     Palliative Performance Scale   40%  Mainly in bed; Extensive disease; Mainly assist; intake normal or reduced; LOC full/confusion     History    Patient admitted 12/16 from home with weakness and possible failure to thrive. Patient was discharged from the nursing home two weeks ago to live with son. Patient was refusing to eat or take pills at home. Last inpatient stay was 11/12-11/20 for bilateral lower extremity pain from non-healing wounds. Other chronic conditions includes HTN, PAF, COPD, GERD.      Goals of Care Discussions and Plan             Plan/Follow-Up:-   Attempted to see patient and family. No family at bedside at this time. Will work to meet with son at a later time.     Treatment Limitations: did not assess at this time    Code Status:Full Code No additional code details    ACP documents on file:  -None    Healthcare Power of /Healthcare Surrogate Decision Makers:  Per [place] Code 2133.08: [place] Hierarchy of Surrogate Decision Makers Jhonny Powers (son) Enrique Mathews (son)             Electronically signed by Gabriel Velazquez RN on 12/16/2024 at 3:49 PM           Palliative Care Office: [phone]

## 2024-12-16 NOTE — ED TRIAGE NOTES
Pt to ED via EMS from home w/rprts of unable to care for herself. EMS rprts pt has been home for the past two weeks w/family, but they are having difficulty caring for her. Pt refusing to take home meds beyond pain medication. They called pcp and instructed to have her eval in ER. Pt has a hx of dementia. Presents to ED crying and screaming unintelligent verbiage. Pt has what appears to be old soiled brief, saturated in urine and stool. Two assist needed to provide gonzalo care and reposition on cot. Pt prefers to lay on R side and once cleaned has calmed. Unable to orient pt to location or reason for visit. Posey sitter and bed alarm placed. Protocol orders initiated.

## 2024-12-16 NOTE — PROCEDURES
PROCEDURE NOTE  Date: 12/16/2024   Name: Jasmina Powers  YOB: 1935    Procedures  12 lead EKG completed. Results handed to Aisha Ordaz CET

## 2024-12-16 NOTE — H&P
History & Physical        Patient:  Jasmina Powers  YOB: 1935    MRN: [id number]     Acct: [id number]    PCP: Dimas Watts APRN - CNP    Date of Admission: 12/16/2024    Date of Service: Pt seen/examined on 12/16/24  and Admitted to Inpatient with expected LOS greater than two midnights due to medical therapy.     ASSESSMENT/PLAN:    Weakness with physical deconditioning/debility likely secondary to numerous comorbid medical conditions and advanced age--will involve social work along with PT/OT; obtain urinalysis along with chest x-ray and EKG  Possible failure to thrive--patient has been refusing to eat drink and take medications, will involve social work along with PT and OT and palliative care  Inability to care for self at home--please see #1 and #2, daughter-in-law states she has tried numerous interventions and sounds like patient declines  Nonhealing wounds of bilateral lower extremities (POA)--legs are currently wrapped and patient would not allow me to examine, patient is known to Dr. Lopez from infectious disease so will involve him  Possible coccyx wound (POA)--daughter-in-law at bedside states they tried to change positions however patient does not want to; frequent turns  Primary hypertension, uncontrolled--blood pressure is on the lower side, will hold medications; monitor  Hyperlipidemia--takes a statin at home  PAF with probable secondary hypercoagulable state--on Eliquis  COPD--stable, on room air  GERD--on Protonix  CODE STATUS--full code as discussed with son and daughter-in-law at bedside, he inquired about the possibility of a feeding tube, will involve palliative care      Chief Complaint: Not eating or drinking      History Of Present Illness:    89 y.o. female who presented to [facility] with not eating or drinking in 2 weeks; patient has a past medical history of hypertension, hyperlipidemia, PAF, COPD, GERD, dementia and nonhealing wounds of the  bilateral lower leg; report was obtained from son and daughter-in-law at bedside, she has been home from [place] 2 weeks ago today states the first day she was home she did well otherwise after that she did not want to eat, did not want to drink, did not take any medications except for pain medicine, she is normally wheelchair-bound and she is only been up to the wheelchair once otherwise family assist her to pivot to the commode, they state on August 8 she was in the hospital secondary to right leg infection and from there was discharged to [facility].    Patient does not want to be examined, physical exam was very difficult, patient keeps saying \"I just want to go home\", she cannot tell me where she is, she cannot tell me the month or the year; I discussed CODE STATUS with son and daughter-in-law at the bedside and they want full resuscitative efforts, son then ask if she is not eating or drinking ask about placement of a feeding tube, I stated GI would need to be consulted and evaluate the patient to see if she is a candidate.    In the ER, her CMP showed sodium 134 and CO2 at 22, troponin was noted to be 38 however her troponin from November 13, 2024 was noted to be 55, her albumin is low at 2.8; she has a normal white count and differential, she is anemic along with being microcytic and hypochromic and she is supposed to be taking iron tablets twice daily per home medications; she is being admitted to the hospital service as family is unable to care for her at home.    Past Medical History:          Diagnosis Date    Allergic rhinitis     Aortic stenosis, mild to moderate     ASHD (arteriosclerotic heart disease)     Atrial fibrillation (HCC)     COPD (chronic obstructive pulmonary disease) (HCC)     DM2 (diabetes mellitus, type 2) (HCC)     Dyslipidemia     GERD (gastroesophageal reflux disease)     Heart failure with preserved ejection fraction (HCC)     Hypertension, essential        Past

## 2024-12-16 NOTE — TELEPHONE ENCOUNTER
If she has not been eating or drinking or taking her medication, I do recommend an eval in the ER to see if she needs admitted for hydration and she also needs a physical assessment . Thanks

## 2024-12-17 LAB
ANION GAP SERPL CALC-SCNC: 13 MEQ/L (ref 8–16)
BASOPHILS ABSOLUTE: 0 THOU/MM3 (ref 0–0.1)
BASOPHILS NFR BLD AUTO: 0.3 %
BUN SERPL-MCNC: 14 MG/DL (ref 7–22)
CALCIUM SERPL-MCNC: 9.4 MG/DL (ref 8.5–10.5)
CHLORIDE SERPL-SCNC: 103 MEQ/L (ref 98–111)
CO2 SERPL-SCNC: 20 MEQ/L (ref 23–33)
CREAT SERPL-MCNC: 0.6 MG/DL (ref 0.4–1.2)
DEPRECATED RDW RBC AUTO: 44.1 FL (ref 35–45)
EOSINOPHIL NFR BLD AUTO: 1.9 %
EOSINOPHILS ABSOLUTE: 0.2 THOU/MM3 (ref 0–0.4)
ERYTHROCYTE [DISTWIDTH] IN BLOOD BY AUTOMATED COUNT: 17.2 % (ref 11.5–14.5)
FERRITIN SERPL IA-MCNC: 749 NG/ML (ref 10–291)
FOLATE SERPL-MCNC: > 20 NG/ML (ref 4.8–24.2)
GFR SERPL CREATININE-BSD FRML MDRD: 86 ML/MIN/1.73M2
GLUCOSE SERPL-MCNC: 82 MG/DL (ref 70–108)
HCT VFR BLD AUTO: 29.4 % (ref 37–47)
HGB BLD-MCNC: 9.1 GM/DL (ref 12–16)
IMM GRANULOCYTES # BLD AUTO: 0.03 THOU/MM3 (ref 0–0.07)
IMM GRANULOCYTES NFR BLD AUTO: 0.4 %
IRON SATN MFR SERPL: 17 % (ref 20–50)
IRON SERPL-MCNC: 26 UG/DL (ref 50–170)
LYMPHOCYTES ABSOLUTE: 1.7 THOU/MM3 (ref 1–4.8)
LYMPHOCYTES NFR BLD AUTO: 21.6 %
MAGNESIUM SERPL-MCNC: 1.4 MG/DL (ref 1.6–2.4)
MCH RBC QN AUTO: 22.4 PG (ref 26–33)
MCHC RBC AUTO-ENTMCNC: 31 GM/DL (ref 32.2–35.5)
MCV RBC AUTO: 72.4 FL (ref 81–99)
MONOCYTES ABSOLUTE: 1 THOU/MM3 (ref 0.4–1.3)
MONOCYTES NFR BLD AUTO: 12.6 %
NEUTROPHILS ABSOLUTE: 5 THOU/MM3 (ref 1.8–7.7)
NEUTROPHILS NFR BLD AUTO: 63.2 %
NRBC BLD AUTO-RTO: 0 /100 WBC
PLATELET # BLD AUTO: 266 THOU/MM3 (ref 130–400)
PMV BLD AUTO: 8.8 FL (ref 9.4–12.4)
POTASSIUM SERPL-SCNC: 3.3 MEQ/L (ref 3.5–5.2)
RBC # BLD AUTO: 4.06 MILL/MM3 (ref 4.2–5.4)
SODIUM SERPL-SCNC: 136 MEQ/L (ref 135–145)
TIBC SERPL-MCNC: 155 UG/DL (ref 171–450)
TSH SERPL DL<=0.005 MIU/L-ACNC: 0.91 UIU/ML (ref 0.4–4.2)
VIT B12 SERPL-MCNC: 354 PG/ML (ref 211–911)
WBC # BLD AUTO: 7.9 THOU/MM3 (ref 4.8–10.8)

## 2024-12-17 PROCEDURE — 36415 COLL VENOUS BLD VENIPUNCTURE: CPT

## 2024-12-17 PROCEDURE — 99233 SBSQ HOSP IP/OBS HIGH 50: CPT

## 2024-12-17 PROCEDURE — 6370000000 HC RX 637 (ALT 250 FOR IP): Performed by: NURSE PRACTITIONER

## 2024-12-17 PROCEDURE — 6360000002 HC RX W HCPCS

## 2024-12-17 PROCEDURE — 51798 US URINE CAPACITY MEASURE: CPT

## 2024-12-17 PROCEDURE — 83735 ASSAY OF MAGNESIUM: CPT

## 2024-12-17 PROCEDURE — 80048 BASIC METABOLIC PNL TOTAL CA: CPT

## 2024-12-17 PROCEDURE — 83540 ASSAY OF IRON: CPT

## 2024-12-17 PROCEDURE — 83550 IRON BINDING TEST: CPT

## 2024-12-17 PROCEDURE — 85025 COMPLETE CBC W/AUTO DIFF WBC: CPT

## 2024-12-17 PROCEDURE — 1200000000 HC SEMI PRIVATE

## 2024-12-17 PROCEDURE — 84443 ASSAY THYROID STIM HORMONE: CPT

## 2024-12-17 PROCEDURE — 2580000003 HC RX 258: Performed by: NURSE PRACTITIONER

## 2024-12-17 PROCEDURE — 82728 ASSAY OF FERRITIN: CPT

## 2024-12-17 RX ORDER — HALOPERIDOL 5 MG/ML
5 INJECTION INTRAMUSCULAR EVERY 6 HOURS PRN
Status: DISCONTINUED | OUTPATIENT
Start: 2024-12-17 | End: 2024-12-18

## 2024-12-17 RX ORDER — HYDROXYZINE HYDROCHLORIDE 50 MG/ML
25 INJECTION, SOLUTION INTRAMUSCULAR ONCE
Status: COMPLETED | OUTPATIENT
Start: 2024-12-17 | End: 2024-12-17

## 2024-12-17 RX ADMIN — HALOPERIDOL LACTATE 5 MG: 5 INJECTION, SOLUTION INTRAMUSCULAR at 20:09

## 2024-12-17 RX ADMIN — HYDROXYZINE PAMOATE 25 MG: 25 CAPSULE ORAL at 07:37

## 2024-12-17 RX ADMIN — SODIUM CHLORIDE: 9 INJECTION, SOLUTION INTRAVENOUS at 04:10

## 2024-12-17 RX ADMIN — HYDROCODONE BITARTRATE AND ACETAMINOPHEN 2 TABLET: 5; 325 TABLET ORAL at 07:37

## 2024-12-17 RX ADMIN — PANTOPRAZOLE SODIUM 40 MG: 40 TABLET, DELAYED RELEASE ORAL at 05:10

## 2024-12-17 RX ADMIN — HYDROXYZINE HYDROCHLORIDE 25 MG: 50 INJECTION, SOLUTION INTRAMUSCULAR at 20:21

## 2024-12-17 RX ADMIN — POTASSIUM CHLORIDE 40 MEQ: 1500 TABLET, EXTENDED RELEASE ORAL at 06:26

## 2024-12-17 ASSESSMENT — PAIN SCALES - GENERAL
PAINLEVEL_OUTOF10: 6
PAINLEVEL_OUTOF10: 6
PAINLEVEL_OUTOF10: 7

## 2024-12-17 ASSESSMENT — PAIN DESCRIPTION - ORIENTATION: ORIENTATION: MID

## 2024-12-17 ASSESSMENT — PAIN DESCRIPTION - LOCATION
LOCATION: LEG
LOCATION: GENERALIZED

## 2024-12-17 NOTE — PLAN OF CARE
Problem: Chronic Conditions and Co-morbidities  Goal: Patient's chronic conditions and co-morbidity symptoms are monitored and maintained or improved  Outcome: Progressing  Flowsheets (Taken 12/17/2024 1805)  Care Plan - Patient's Chronic Conditions and Co-Morbidity Symptoms are Monitored and Maintained or Improved:   Monitor and assess patient's chronic conditions and comorbid symptoms for stability, deterioration, or improvement   Collaborate with multidisciplinary team to address chronic and comorbid conditions and prevent exacerbation or deterioration     Problem: Pain  Goal: Verbalizes/displays adequate comfort level or baseline comfort level  Outcome: Progressing  Flowsheets (Taken 12/17/2024 1805)  Verbalizes/displays adequate comfort level or baseline comfort level:   Encourage patient to monitor pain and request assistance   Assess pain using appropriate pain scale   Administer analgesics based on type and severity of pain and evaluate response   Implement non-pharmacological measures as appropriate and evaluate response     Problem: Skin/Tissue Integrity - Adult  Goal: Skin integrity remains intact  Outcome: Progressing  Flowsheets (Taken 12/17/2024 1805)  Skin Integrity Remains Intact:   Monitor for areas of redness and/or skin breakdown   Assess vascular access sites hourly     Problem: Skin/Tissue Integrity - Adult  Goal: Incisions, wounds, or drain sites healing without S/S of infection  Outcome: Progressing  Flowsheets (Taken 12/17/2024 1805)  Incisions, Wounds, or Drain Sites Healing Without Sign and Symptoms of Infection: ADMISSION and DAILY: Assess and document risk factors for pressure ulcer development     Problem: Musculoskeletal - Adult  Goal: Return mobility to safest level of function  Outcome: Progressing  Flowsheets (Taken 12/17/2024 1805)  Return Mobility to Safest Level of Function:   Assess patient stability and activity tolerance for standing, transferring and ambulating with or

## 2024-12-17 NOTE — CARE COORDINATION
12/17/24 1522   Service Assessment   Patient Orientation Self   Cognition Severely Impaired   History Provided By Child/Family;Medical Record   Primary Caregiver Family   Accompanied By/Relationship Son, Jhonny   Support Systems Children;Family Members   Patient's Healthcare Decision Maker is: Legal Next of Kin   PCP Verified by CM Yes   Last Visit to PCP Within last 6 months   Prior Functional Level Assistance with the following:;Bathing;Dressing;Toileting;Feeding;Cooking;Housework;Shopping;Mobility   Current Functional Level Assistance with the following:;Bathing;Dressing;Toileting;Feeding;Cooking;Housework;Shopping;Mobility   Can patient return to prior living arrangement Unknown at present   Ability to make needs known: Poor   Family able to assist with home care needs: Yes  (Jhonny reports he can take care of her at home.)   Would you like for me to discuss the discharge plan with any other family members/significant others, and if so, who? No   Financial Resources Medicare   Community Resources ECF/Home Care  ([facility])   CM/SW Referral ADLs/IADLs   Social/Functional History   Active  No   Patient's  Info Children   Occupation Retired   Discharge Planning   Type of Residence House   Living Arrangements Children;Family Members   Current Services Prior To Admission Home Care   Potential Assistance Needed Home Care;Skilled Nursing Facility   DME Ordered? No   Potential Assistance Purchasing Medications No   Type of Home Care Services Nursing Services;PT;OT   Patient expects to be discharged to: House   History of falls? 0   Services At/After Discharge   Transition of Care Consult (CM Consult) Home Health   Internal Home Health No   Reason Outside Agency Chosen Patient already serviced by other home care/hospice agency   Services At/After Discharge Nursing services;PT;OT;Home Health   Confirm Follow Up Transport Family   Condition of Participation: Discharge Planning   The Plan for

## 2024-12-17 NOTE — PROGRESS NOTES
Notified Kaylin HONEYCUTT that patient has not urinated today. Was unable to assess bathroom needs as patient had been aggressive. External catheter is hooked to suction, positioned properly, and canister is empty. Patient allowed this RN to complete bladder scan totaling 170 mls.

## 2024-12-17 NOTE — CARE COORDINATION
12/17/24 1534   Readmission Assessment   Number of Days since last admission? 8-30 days   Previous Disposition SNF   Who is being Interviewed Caregiver   What was the patient's/caregiver's perception as to why they think they needed to return back to the hospital? Not enough help at home   Did you visit your Primary Care Physician after you left the hospital, before you returned this time? No   Did you see a specialist, such as Cardiac, Pulmonary, Orthopedic Physician, etc. after you left the hospital? Yes   Who advised the patient to return to the hospital? Caregiver   Does the patient report anything that got in the way of taking their medications? No  (Patient refuses to take her medications at times.)   In our efforts to provide the best possible care to you and others like you, can you think of anything that we could have done to help you after you left the hospital the first time, so that you might not have needed to return so soon? Other (Comment)  (Patient's son plans to take her home and resume the services she has in place.)

## 2024-12-17 NOTE — PALLIATIVE CARE
Follow Up / Progress Note        Patient:   Jasmina Powers  YOB: 1935  Age:  89 y.o.  Room:  [facility]/025-  MRN:  [id number]         Family/Patient Discussion:  Met with patient at the bedside. Patient alert to person, place and . Patient tearful at times during discussion however appears to comprehend situation at this current time. Discussed code status with patient. Explained to patient that currently she is Full Code. Patient states she is in agreement with this and it reflects her wishes at this time. Patient does not wish to change her code status at this time and wants to further discuss with her son. Educated patient about the difference between DNRCCA and DNRCC to which she verbalized understanding. Offered to discuss this conversation with her son Jhonny, which she was in agreement with.   Patient son Jhonny arrived to discuss and requested this conversation take place outside in the [facility] area. Reviewed and discussed with Jhonny about patients current situation, plan of care and also discussed code status. Jhonny states he has noticed a steady deconditioning in his mothers overall health the past six months and is aware that if things continue this route her end of life days will be here soon. Jhonny states he feels capable of taking care of his mother at home as this is her desire overall however is realistic about the risks for care giver fatigue. Jhonny is also aware that with patients diagnosis of Dementia that her overall condition will progressively decline as time goes on. Jhonny requested to evaluate patients status the next 48 to 72 hours and see if this visit will assist her in returning to a baseline he feels comfortable in returning home with the patient. Jhonny also wants to have a detailed conversation with his brother before making any changes in code status moving forward. Currently Jhonny wants to keep patient Full Code at this  time. No further questions voiced. Much Emotional support provided. Contact information given to Jhonny if he or his brother have any questions regarding code status or goals of care, he can contact Palliative Care directly.     Plan/Follow-Up:  Palliative care will continue to visit patient/family while hospitalized. Please contact Palliative Care if further needs arise.        Electronically signed by Torrey Alarcon RN on 12/17/2024 at 10:13 AM             Palliative Care Office: [phone]

## 2024-12-17 NOTE — PROGRESS NOTES
Spiritual Health History and Assessment/Progress Note  [facility]    Initial Encounter, Spiritual/Emotional Needs,  ,  ,      Name: Jasmina Powers MRN: [id number]    Age: 89 y.o.     Sex: female   Language: English   Yarsanism: Quaker   Weakness     Date: 12/17/2024            Total Time Calculated: (P) 10 min              Spiritual Assessment began in [facility] ONC MED 5K        Referral/Consult From: Rounding   Encounter Overview/Reason: Initial Encounter, Spiritual/Emotional Needs  Service Provided For: Patient    Kate, Belief, Meaning:   Patient identifies as spiritual  Family/Friends identify as spiritual      Importance and Influence:  Patient has spiritual/personal beliefs that influence decisions regarding their health  Family/Friends have spiritual/personal beliefs that influence decisions regarding the patient's health    Community:  Patient is connected with a spiritual community  Family/Friends are connected with a spiritual community:    Assessment and Plan of Care:   In my encounter with the 89 yr old patient, while rounding the unit 5K, I provided spiritual care to patient through conversation, I also came to assess the patient's spiritual needs present. The pt was admitted due to weakness.     Patient Interventions include: Facilitated expression of thoughts and feelings  Family/Friends Interventions include: Facilitated expression of thoughts and feelings    Patient Plan of Care: Contact Kate  for support or sacramental needs  Family/Friends Plan of Care: Contact Kate  for support or sacramental needs    Electronically signed by JIMENA Adams on 12/17/2024 at 2:06 PM

## 2024-12-17 NOTE — PROGRESS NOTES
Patient is yelling and tearful. Patient states she's leaving. Attempted to give PRN norco to patient. Patient spit out. Wasted on MAR with Andreina Bergman RN and notified pharmacy

## 2024-12-17 NOTE — PROGRESS NOTES
Progress note: Infectious diseases    Patient - Jasmina Powers,  Age - 89 y.o.    - 1935      Room Number - 5K-25/025-A   MRN -  [id number]   [facility] # - [id number]  Date of Admission -  2024  9:38 AM    SUBJECTIVE:   Patient agreed after her son arrived  OBJECTIVE   VITALS    weight is 69 kg (152 lb 1.9 oz). Her axillary temperature is 98.8 °F (37.1 °C). Her blood pressure is 106/74 and her pulse is 89. Her respiration is 16 and oxygen saturation is 96%.       Wt Readings from Last 3 Encounters:   24 69 kg (152 lb 1.9 oz)   24 63.1 kg (139 lb 1.8 oz)   10/21/24 68 kg (150 lb)       I/O (24 Hours)    Intake/Output Summary (Last 24 hours) at 2024 1140  Last data filed at 2024 0942  Gross per 24 hour   Intake 90 ml   Output --   Net 90 ml       General Appearance  Awake, alert, oriented,  not  In acute distress  HEENT - normocephalic, atraumatic, pale conjunctiva,  anicteric sclera  Neck - Supple, no mass  Lungs -  Bilateral   air entry, no rhonchi, no wheeze  Cardiovascular - Heart sounds are normal.     Abdomen - soft, not distended, nontender,   Neurologic -oriented  Skin - No bruising or bleeding  Extremities - open clean stage 3 coccyx wound  Bilateral leg open wound with escar formation. No redness no drainage  Overall better than previous encounter.              MEDICATIONS:      apixaban  5 mg Oral BID    diclofenac sodium  2 g Topical 4x Daily    ferrous sulfate  325 mg Oral BID    gabapentin  100 mg Oral BID    magnesium oxide  400 mg Oral Daily    pantoprazole  40 mg Oral QAM AC    senna  1 tablet Oral BID    Vitamin D  1 tablet Oral Daily    sodium chloride flush  5-40 mL IntraVENous 2 times per day      sodium chloride 50 mL/hr at 24 0410    sodium chloride       albuterol sulfate HFA, HYDROcodone-acetaminophen, hydrOXYzine pamoate, sodium chloride flush, sodium chloride,

## 2024-12-17 NOTE — PROGRESS NOTES
[facility]  OCCUPATIONAL THERAPY MISSED TREATMENT NOTE  [facility] ONC MED 5K  5K-25/025-A      Date: 2024  Patient Name: Jasmina Powers        CSN: [id number]   : 1935  (89 y.o.)  Gender: female                REASON FOR MISSED TREATMENT:  RN approved session. Patient side lying in bed and crying-unable to be re-directed to participate in therapy or that she had untouched breakfast. Opened blinds of patient's room to orientate to time of day and increase alertness. Patient continued to cry and unable to be consoled/re-directed. OT to check back as able and time allows.

## 2024-12-17 NOTE — PROGRESS NOTES
Hospitalist Progress Note    Patient:  Jasmina Powers    Unit/Bed:5K-25/025-A  YOB: 1935  MRN: [id number]   Acct: [id number]   PCP: Dimas Watts APRN - CNP  Date of Admission: 12/16/2024    ASSESSMENT AND PLAN  Active Problems  Failure to thrive: Pt presented from home with son. patient has been refusing to eat drink and take medications.  Pallitiave care, PT/OT, SW involved. Pending goals of care discussion with son to determine possible change in code status and discharge disposition.  Pt noted to be uncooperative towards nursing, continue to refuse treatment.   UA noteable for leuks, some pyuria, budding yeast, few bacteria. Pt unable to report urinary symptoms. Urine culture shows no prelim bacterial growth, notes yeast. Discussed with ID- Dr. Lopez, will defer treatment at this time due to lack of fever, leukocytosis.    CXR unremarkable.   Generalized weakness, physical deconditioning: likely secondary to comorbid conditions. PT/OT consulted.   Dementia: Unspecified type. Concern for progression of disease contriubting to #1, #2.   Non-healing wounds of bilateral lower extremities, coccyx: POA. ID consulted and managing.   Hypokalemia, hypomagnesemia: replace per protocol. Repeat BMP.      Resolved Problems      Chronic Conditions (reviewed and stable unless otherwise stated):   Essential HTN: BP normal to soft. Hold amlodipine, metoprolol, spironolactone, bumex. On IVF. Monitor closely.   HLD: on statin, not ordered on admission  Chronic microcytic anemia: H&H stable. Anemia work up ordered. Monitor daily CBC.   PAF with probable secondary hypercoagulable state--on Eliquis. Rate controlled.  HFpEF: Last ECHO 5/2023 reveals EF 60% with dilated left atrium, midl aortic regurg, mod aortic stenosis.  COPD--stable, on room air  GERD--on Protonix  CODE STATUS-- per admitting provider, Full code. Palliative care consulted.        DVT Prophylaxis: [] Lovenox / [] Heparin / [] SCDs / [x] Already on Systemic Anticoagulation / [] None  LDA: []CVC / []PICC / []Midline / []Carney / []Drains / []Mediport / [x]None  Antibiotics: none   Steroids: none  Labs (still needed?): [x]Yes / []No  IVF (still needed?): [x]Yes / []No    Level of care: []Step Down / [x]Med-Surg  Bed Status: [x]Inpatient / []Observation  Telemetry: []Yes / [x]No  PT/OT: [x]Yes / []No    Code status: Full Code     ===================================================================  Chief Complaint: \" Not eating or drinking\"   Initial HPI: History obtained per patient and chart review.    \"89 y.o. female who presented to [facility] with not eating or drinking in 2 weeks; patient has a past medical history of hypertension, hyperlipidemia, PAF, COPD, GERD, dementia and nonhealing wounds of the bilateral lower leg; report was obtained from son and daughter-in-law at bedside, she has been home from [facility] 2 weeks ago today states the first day she was home she did well otherwise after that she did not want to eat, did not want to drink, did not take any medications except for pain medicine, she is normally wheelchair-bound and she is only been up to the wheelchair once otherwise family assist her to pivot to the commode, they state on August 8 she was in the hospital secondary to right leg infection and from there was discharged to [facility].     Patient does not want to be examined, physical exam was very difficult, patient keeps saying \"I just want to go home\", she cannot tell me where she is, she cannot tell me the month or the year; I discussed CODE STATUS with son and daughter-in-law at the bedside and they want full resuscitative efforts, son then ask if she is not eating or drinking ask about placement of a feeding tube, I stated GI would need to be consulted and evaluate the patient to see if she is a candidate.     In the ER, her CMP showed  S1/S2 without murmurs, rubs or gallops.  Abdomen: Generalized tenderness to palpation. Soft,  non-distended with normal bowel sounds.   Musculoskeletal: passive and active ROM x 4 extremities.  Skin: Skin color, texture, turgor normal.  No rashes or lesions.  Neurologic:  Neurovascularly intact without any gross focal sensory/motor deficits. Cranial nerves: II-XII intact, grossly non-focal.  Psychiatric: Alert and oriented to self, not to location, situation, year. Does not follow basic commands.   Peripheral Pulses: +2 palpable, equal bilaterally     [unfilled]    Microbiology:    Blood culture #1:   Lab Results   Component Value Date/Time    BC  11/13/2024 12:35 AM     No growth 24 hours. No growth 48 hours. No growth at 5 days     Blood culture #2:No results found for: \"BLOODCULT2\"  Organism:  Lab Results   Component Value Date/Time    ORG Pseudomonas aeruginosa 08/08/2024 09:28 AM         Lab Results   Component Value Date/Time    LABGRAM  08/09/2024 11:31 AM     No segmented neutrophils observed. No epithelial cells observed. No bacteria seen.     MRSA culture only:No results found for: \"MRSAC\"  Urine culture: No results found for: \"LABURIN\"  Respiratory culture: No results found for: \"CULTRESP\"  Aerobic and Anaerobic :  Lab Results   Component Value Date/Time    LABAERO No growth-preliminary No growth 08/09/2024 11:31 AM     No results found for: \"LABANAE\"    Urinalysis:      Lab Results   Component Value Date/Time    NITRU NEGATIVE 12/16/2024 04:40 PM    WBCUA  12/16/2024 04:40 PM    BACTERIA FEW 12/16/2024 04:40 PM    RBCUA NONE 12/16/2024 04:40 PM    BLOODU NEGATIVE 12/16/2024 04:40 PM    GLUCOSEU NEGATIVE 12/16/2024 04:40 PM       Radiology:  XR CHEST PORTABLE   Final Result   1. Moderate cardiomegaly. Mildly ectatic and mildly tortuous descending thoracic   aorta.   2. No acute findings. No infiltrates or effusions are seen.            **This report has been created using voice recognition
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
## 2024-12-17 NOTE — PROGRESS NOTES
Comprehensive Nutrition Assessment    Type and Reason for Visit:  Initial, Consult (Poor Appetite/Intake)    Nutrition Recommendations/Plan:   Recommend a Multivitamin daily.  Continue Glucerna TID. Trial Chon BID.  Continue current diet as ordered.  Long term hx/o following with OP Dietitian for DM MNT management      Malnutrition Assessment:  Malnutrition Status:  Moderate malnutrition (12/17/24 1505)    Context:  Acute Illness     Findings of the 6 clinical characteristics of malnutrition:  Energy Intake:  50% or less of estimated energy requirements for 5 or more days (per EMR and family report)  Weight Loss:  No weight loss     Body Fat Loss:  Mild body fat loss Orbital, Triceps, Fat Overlying Ribs   Muscle Mass Loss:  Mild muscle mass loss Temples (temporalis), Clavicles (pectoralis & deltoids)  Fluid Accumulation:  No fluid accumulation Extremities   Strength:  Not Performed    Nutrition Assessment: Pt. moderately malnourished AEB criteria as listed above. At risk for further nutrition compromise r/t admit d/t not eating or drinking in 2 weeks, weakness with physical deconditioning, possible FTT, inability to care for self at home, increased nutrient needs to aid in non-healing wound and underlying medical condition (Hx: HTN, HLD, PAF, COPD, CKD, DM, GERD, Dementia, Former Smoker).        Nutrition Related Findings:    Pt. Report/Treatments/Miscellaneous: Pt seen this afternoon- pt confused and not answering many questions. Pt refused lunch today and did not eat any of it or drink the ONS's on tray. Pt denies any nausea at present. Per EMR, pt was not eating or drinking much of anything over the past 2 weeks. Pt amenable to continue Glucerna TID during LOS and trial Chon BID.  GI Status: No BM yet.  Pertinent Labs: Potassium 3.3  Pertinent Meds: Iron 325, Protonix, Senokot, Vitamin D, IVF   Wound Type: Multiple, Pressure Injury, Stage II (stage II on buttocks; right tibial wound; left tibial wound;

## 2024-12-17 NOTE — PROGRESS NOTES
Patient is refusing medications and meals. Attempted to feed patient and patient yelled, \"no\". Patient states no pain at this time.

## 2024-12-18 ENCOUNTER — HOSPITAL ENCOUNTER (OUTPATIENT)
Dept: WOUND CARE | Age: 89
DRG: 641 | End: 2024-12-18
Attending: INTERNAL MEDICINE
Payer: MEDICARE

## 2024-12-18 PROCEDURE — 6370000000 HC RX 637 (ALT 250 FOR IP): Performed by: NURSE PRACTITIONER

## 2024-12-18 PROCEDURE — 99233 SBSQ HOSP IP/OBS HIGH 50: CPT | Performed by: INTERNAL MEDICINE

## 2024-12-18 PROCEDURE — 1200000000 HC SEMI PRIVATE

## 2024-12-18 PROCEDURE — 92610 EVALUATE SWALLOWING FUNCTION: CPT

## 2024-12-18 RX ORDER — MAGNESIUM SULFATE IN WATER 40 MG/ML
2000 INJECTION, SOLUTION INTRAVENOUS ONCE
Status: DISCONTINUED | OUTPATIENT
Start: 2024-12-18 | End: 2024-12-24 | Stop reason: HOSPADM

## 2024-12-18 RX ADMIN — FERROUS SULFATE TAB 325 MG (65 MG ELEMENTAL FE) 325 MG: 325 (65 FE) TAB at 20:27

## 2024-12-18 RX ADMIN — APIXABAN 5 MG: 5 TABLET, FILM COATED ORAL at 20:27

## 2024-12-18 RX ADMIN — HYDROCODONE BITARTRATE AND ACETAMINOPHEN 2 TABLET: 5; 325 TABLET ORAL at 20:24

## 2024-12-18 RX ADMIN — SENNOSIDES 8.6 MG: 8.6 TABLET, FILM COATED ORAL at 20:27

## 2024-12-18 RX ADMIN — GABAPENTIN 100 MG: 100 CAPSULE ORAL at 20:27

## 2024-12-18 RX ADMIN — DICLOFENAC SODIUM 2 G: 10 GEL TOPICAL at 20:28

## 2024-12-18 ASSESSMENT — PAIN DESCRIPTION - ORIENTATION: ORIENTATION: RIGHT;LEFT

## 2024-12-18 ASSESSMENT — PAIN DESCRIPTION - DESCRIPTORS: DESCRIPTORS: ACHING

## 2024-12-18 ASSESSMENT — PAIN SCALES - GENERAL: PAINLEVEL_OUTOF10: 7

## 2024-12-18 ASSESSMENT — PAIN - FUNCTIONAL ASSESSMENT: PAIN_FUNCTIONAL_ASSESSMENT: ACTIVITIES ARE NOT PREVENTED

## 2024-12-18 ASSESSMENT — PAIN DESCRIPTION - LOCATION: LOCATION: GENERALIZED

## 2024-12-18 NOTE — PLAN OF CARE
Problem: Chronic Conditions and Co-morbidities  Goal: Patient's chronic conditions and co-morbidity symptoms are monitored and maintained or improved  12/18/2024 0658 by Jamel Kumar RN  Outcome: Progressing  Flowsheets (Taken 12/17/2024 2030)  Care Plan - Patient's Chronic Conditions and Co-Morbidity Symptoms are Monitored and Maintained or Improved:   Monitor and assess patient's chronic conditions and comorbid symptoms for stability, deterioration, or improvement   Collaborate with multidisciplinary team to address chronic and comorbid conditions and prevent exacerbation or deterioration   Update acute care plan with appropriate goals if chronic or comorbid symptoms are exacerbated and prevent overall improvement and discharge     Problem: Pain  Goal: Verbalizes/displays adequate comfort level or baseline comfort level  12/18/2024 0658 by Jamel Kumar RN  Outcome: Progressing     Problem: Skin/Tissue Integrity - Adult  Goal: Skin integrity remains intact  12/18/2024 0658 by Jamel Kumar RN  Outcome: Progressing  Flowsheets (Taken 12/17/2024 2030)  Skin Integrity Remains Intact: Monitor for areas of redness and/or skin breakdown     Problem: Skin/Tissue Integrity - Adult  Goal: Incisions, wounds, or drain sites healing without S/S of infection  12/18/2024 0658 by Jamel Kumar RN  Outcome: Progressing  Flowsheets (Taken 12/17/2024 2030)  Incisions, Wounds, or Drain Sites Healing Without Sign and Symptoms of Infection: TWICE DAILY: Assess and document skin integrity     Problem: Musculoskeletal - Adult  Goal: Return mobility to safest level of function  12/18/2024 0658 by Jamel Kumar RN  Outcome: Progressing  Flowsheets (Taken 12/17/2024 2030)  Return Mobility to Safest Level of Function:   Assess patient stability and activity tolerance for standing, transferring and ambulating with or without assistive devices   Assist with transfers and ambulation using safe patient handling equipment as needed    Ensure adequate protection for wounds/incisions during mobilization     Problem: Discharge Planning  Goal: Discharge to home or other facility with appropriate resources  12/18/2024 0658 by Jamel Kumar RN  Outcome: Progressing  Flowsheets (Taken 12/17/2024 2030)  Discharge to home or other facility with appropriate resources:   Identify barriers to discharge with patient and caregiver   Arrange for needed discharge resources and transportation as appropriate   Identify discharge learning needs (meds, wound care, etc)     Problem: Safety - Adult  Goal: Free from fall injury  12/18/2024 0658 by Jamel Kumar, RN  Outcome: Progressing     Problem: Skin/Tissue Integrity  Goal: Absence of new skin breakdown  Description: 1.  Monitor for areas of redness and/or skin breakdown  2.  Assess vascular access sites hourly  3.  Every 4-6 hours minimum:  Change oxygen saturation probe site  4.  Every 4-6 hours:  If on nasal continuous positive airway pressure, respiratory therapy assess nares and determine need for appliance change or resting period.  12/18/2024 0658 by Jamel Kumar, RN  Outcome: Progressing     Problem: Nutrition Deficit:  Goal: Optimize nutritional status  12/18/2024 0658 by Jamel Kumar, RN  Outcome: Progressing   Care plan reviewed with patient.  Patient  verbalize understanding of the plan of care and contribute to goal setting.

## 2024-12-18 NOTE — PLAN OF CARE
Problem: Discharge Planning  Goal: Discharge to home or other facility with appropriate resources  12/18/2024 1032 by Tonya Suazo LSW  Outcome: Progressing     Consult received. Please see SW note dated 12/18.

## 2024-12-18 NOTE — PROGRESS NOTES
Progress note: Infectious diseases    Patient - Jasmina Powers,  Age - 89 y.o.    - 1935      Room Number - 5K-25/025-A   MRN -  [id number]   [facility] # - [id number]  Date of Admission -  2024  9:38 AM    SUBJECTIVE:   Patient has been aggressive per nursing staff.  Has been refusing to eat  OBJECTIVE   VITALS    height is 1.651 m (5' 5\") and weight is 69 kg (152 lb 1.9 oz). Her oral temperature is 98.1 °F (36.7 °C). Her blood pressure is 134/77 and her pulse is 102 (abnormal). Her respiration is 18 and oxygen saturation is 98%.       Wt Readings from Last 3 Encounters:   24 69 kg (152 lb 1.9 oz)   24 63.1 kg (139 lb 1.8 oz)   10/21/24 68 kg (150 lb)       I/O (24 Hours)    Intake/Output Summary (Last 24 hours) at 2024 1255  Last data filed at 2024 0749  Gross per 24 hour   Intake 1557.76 ml   Output 200 ml   Net 1357.76 ml       General Appearance  Awake, alert, chronically sick looking  HEENT - normocephalic, atraumatic, pale conjunctiva,  anicteric sclera  Neck - Supple, no mass  Lungs -  Bilateral   air entry, no rhonchi, no wheeze  Cardiovascular - Heart sounds are normal.     Abdomen - soft, not distended, nontender,   Neurologic -oriented  Skin - No bruising or bleeding  Extremities - open clean stage 3 coccyx wound  Bilateral leg open wound with escar formation.    Has coccyx open stage III wound       MEDICATIONS:      potassium bicarb-citric acid  20 mEq Oral Once    magnesium sulfate  2,000 mg IntraVENous Once    apixaban  5 mg Oral BID    diclofenac sodium  2 g Topical 4x Daily    ferrous sulfate  325 mg Oral BID    gabapentin  100 mg Oral BID    magnesium oxide  400 mg Oral Daily    pantoprazole  40 mg Oral QAM AC    senna  1 tablet Oral BID    Vitamin D  1 tablet Oral Daily    sodium chloride flush  5-40 mL IntraVENous 2 times per day      sodium chloride 50 mL/hr at 24

## 2024-12-18 NOTE — PROGRESS NOTES
[facility]  SPEECH THERAPY  [facility] ONC MED 5K  Clinical Swallow Evaluation    Discharge Recommendations: SNF  DIET ORDER RECOMMENDATIONS AFTER EVALUATION: Soft and bite sized and thin liquids  Strategies:  Full Upright Position, Small Bite/Sip, Limit Distractions, and Monitor for Fatigue     SLP Individual Minutes  Time In: 1442  Time Out: 1451  Minutes: 9  Timed Code Treatment Minutes: 0 Minutes       Date: 2024  Patient Name: Jasmina Powers      CSN: [id number]   : 1935  (89 y.o.)  Gender: female   Referring Physician:  Kaylin Contreras PA-C     Diagnosis: Weakness    History of Present Illness/Injury: History obtained per patient and chart review.    \"89 y.o. female who presented to [facility] with not eating or drinking in 2 weeks; patient has a past medical history of hypertension, hyperlipidemia, PAF, COPD, GERD, dementia and nonhealing wounds of the bilateral lower leg; report was obtained from son and daughter-in-law at bedside, she has been home from [facility] 2 weeks ago today states the first day she was home she did well otherwise after that she did not want to eat, did not want to drink, did not take any medications except for pain medicine, she is normally wheelchair-bound and she is only been up to the wheelchair once otherwise family assist her to pivot to the commode, they state on  she was in the hospital secondary to right leg infection and from there was discharged to [facility].     Patient does not want to be examined, physical exam was very difficult, patient keeps saying \"I just want to go home\", she cannot tell me where she is, she cannot tell me the month or the year; I discussed CODE STATUS with son and daughter-in-law at the bedside and they want full resuscitative efforts, son then ask if she is not eating or drinking ask about placement of a feeding tube, I stated GI would need to be consulted and evaluate the patient to see if  of Education: Needs further instruction and Family not present    PLAN:  Skilled SLP intervention on acute care 1-3 x per week or until goals met and or patient plateaus in function.  Specific interventions for next session may include: dysphagia interventions, cognitive linguistic assessment    PATIENT GOAL:    Did not state.  Will further assess during treatment.    SHORT TERM GOALS:  Short Term Goals  Time Frame for Short Term Goals: 2 weeks  Goal 1: Patient will consume soft and bite sized diet adn thin liquids (advanced textures as indicated) with stable pulmonary status and incorporation of trained compensatory strategies to assist with nutrition/hydration measures  Goal 2: Considerations for an instrumental evaluation should adverse changes be conveyed in direct relation to the safety/efficency of the swallow mechanism.  Goal 3: Patient will complete cognitive linguistic assessment to aide in POC development as clinically indicated    LONG TERM GOALS:  No LTGs established d/t grant Khan MA, CCC-SLP [id number]

## 2024-12-18 NOTE — PROGRESS NOTES
This RN begun trying to get an IV in the patient, Pt began swatting and waving arms frantically  yelling \"no no no\".

## 2024-12-18 NOTE — CARE COORDINATION
DISCHARGE PLANNING EVALUATION  12/18/24, 10:33 AM EST    Reason for Referral: Discharge needs   Decision Maker: Son Jhonny   Current Services: [facility]   New Services Requested: Undecided, home w/HH v. SNF v. Hospice   Family/ Social/ Home environment: Lives at home with son Jhonny and daughter in law who provide total care. Patient reportedly was standing and pivoting to wheelchair or commode at home.   Payment Source: [employer]  Transportation at Discharge: Undetermined   Post-acute (PAC) provider list was provided to patient. Patient was informed of their freedom to choose PAC provider. Discussed and offered to show the patient the relevant PAC Providers quality and resource use measures on Medicare Compare web site via computer based on patient's goals of care and treatment preferences. Questions regarding selection process were answered.      Teach Back Method used with Jhonny regarding care plan and discharge planning  Patient's son verbalized understanding of the plan of care and contribute to goal setting.       Patient preferences and discharge plan: Discharge plan is pending clinical progress at this time. Patient's son Jhonny states that he was told \"we would watch her for 72 hours to see if her strength improves\" and if it does, he will take her back home but will consider SNF if not.     Jhonny is requesting SW call Jorge from palliative care to speak with him. MANN Bailey in room with son during conversation and discussed plan of care for patient, discussed that Dr. Lopez reportedly told son that a PEG tube and home are not appropriate. SW will continue to follow for discharge plans.      Jhonny did make this SW aware that he is angry with the  he spoke with yesterday, who tried to discuss discharge planning with him. He states that she was \"very rude and is trying to kick my mother out\". LANA did update CM Magaly of this, as well as CM manager Katya Busch.     Electronically  Normal

## 2024-12-18 NOTE — PROGRESS NOTES
[facility]  OCCUPATIONAL THERAPY MISSED TREATMENT NOTE  [facility] ONC MED 5K  5K-25/025-A      Date: 2024  Patient Name: Jasmina Powers        CSN: [id number]   : 1935  (89 y.o.)  Gender: female                REASON FOR MISSED TREATMENT:  Attempt x 1, palliative care meeting with Pt. Attempt x 2, Pt eating lunch. Will check back as time allows.

## 2024-12-18 NOTE — PALLIATIVE CARE
focusing more toward comfort care concepts however wants to discuss with her son before making any changes. Updated primary team about Hospice referral for educational purposes to which they agreed upon. Primary Nurse Tara DARNELL updated.     Plan/Follow-Up:  Palliative Care will continue to visit with patient while hospitalized. Will be available tomorrow for visit or follow up with Hospice.        Electronically signed by Torrey Alarcon RN on 12/18/2024 at 10:23 AM             Palliative Care Office: [phone]

## 2024-12-18 NOTE — PROGRESS NOTES
This RN called [employer] and house sup after walking into the patients room, trying to get out bed. While trying to help the patient get back to bed the patient started Swinging at this RN aswell as verbally assaulting everyone in the room. Hospitalist arrived at bedside and orders were placed. Pt refused meds and first set of vitals. Provider notified.

## 2024-12-19 LAB
ANION GAP SERPL CALC-SCNC: 13 MEQ/L (ref 8–16)
BUN SERPL-MCNC: 15 MG/DL (ref 7–22)
CALCIUM SERPL-MCNC: 9.7 MG/DL (ref 8.5–10.5)
CHLORIDE SERPL-SCNC: 105 MEQ/L (ref 98–111)
CO2 SERPL-SCNC: 22 MEQ/L (ref 23–33)
CREAT SERPL-MCNC: 0.5 MG/DL (ref 0.4–1.2)
DEPRECATED RDW RBC AUTO: 46.2 FL (ref 35–45)
ERYTHROCYTE [DISTWIDTH] IN BLOOD BY AUTOMATED COUNT: 17.9 % (ref 11.5–14.5)
GFR SERPL CREATININE-BSD FRML MDRD: 90 ML/MIN/1.73M2
GLUCOSE SERPL-MCNC: 110 MG/DL (ref 70–108)
HCT VFR BLD AUTO: 31.4 % (ref 37–47)
HGB BLD-MCNC: 9.6 GM/DL (ref 12–16)
MCH RBC QN AUTO: 22.6 PG (ref 26–33)
MCHC RBC AUTO-ENTMCNC: 30.6 GM/DL (ref 32.2–35.5)
MCV RBC AUTO: 73.9 FL (ref 81–99)
PLATELET # BLD AUTO: 291 THOU/MM3 (ref 130–400)
PMV BLD AUTO: 9.2 FL (ref 9.4–12.4)
POTASSIUM SERPL-SCNC: 3.5 MEQ/L (ref 3.5–5.2)
RBC # BLD AUTO: 4.25 MILL/MM3 (ref 4.2–5.4)
SODIUM SERPL-SCNC: 140 MEQ/L (ref 135–145)
WBC # BLD AUTO: 7.7 THOU/MM3 (ref 4.8–10.8)

## 2024-12-19 PROCEDURE — 51798 US URINE CAPACITY MEASURE: CPT

## 2024-12-19 PROCEDURE — 85027 COMPLETE CBC AUTOMATED: CPT

## 2024-12-19 PROCEDURE — 99232 SBSQ HOSP IP/OBS MODERATE 35: CPT | Performed by: INTERNAL MEDICINE

## 2024-12-19 PROCEDURE — 92523 SPEECH SOUND LANG COMPREHEN: CPT

## 2024-12-19 PROCEDURE — 6370000000 HC RX 637 (ALT 250 FOR IP): Performed by: NURSE PRACTITIONER

## 2024-12-19 PROCEDURE — 36415 COLL VENOUS BLD VENIPUNCTURE: CPT

## 2024-12-19 PROCEDURE — 1200000000 HC SEMI PRIVATE

## 2024-12-19 PROCEDURE — 80048 BASIC METABOLIC PNL TOTAL CA: CPT

## 2024-12-19 RX ADMIN — APIXABAN 5 MG: 5 TABLET, FILM COATED ORAL at 09:02

## 2024-12-19 RX ADMIN — DICLOFENAC SODIUM 2 G: 10 GEL TOPICAL at 16:48

## 2024-12-19 RX ADMIN — GABAPENTIN 100 MG: 100 CAPSULE ORAL at 09:02

## 2024-12-19 RX ADMIN — FERROUS SULFATE TAB 325 MG (65 MG ELEMENTAL FE) 325 MG: 325 (65 FE) TAB at 09:03

## 2024-12-19 RX ADMIN — DICLOFENAC SODIUM 2 G: 10 GEL TOPICAL at 19:57

## 2024-12-19 RX ADMIN — PANTOPRAZOLE SODIUM 40 MG: 40 TABLET, DELAYED RELEASE ORAL at 06:18

## 2024-12-19 RX ADMIN — SENNOSIDES 8.6 MG: 8.6 TABLET, FILM COATED ORAL at 19:57

## 2024-12-19 RX ADMIN — DICLOFENAC SODIUM 2 G: 10 GEL TOPICAL at 09:03

## 2024-12-19 RX ADMIN — HYDROCODONE BITARTRATE AND ACETAMINOPHEN 2 TABLET: 5; 325 TABLET ORAL at 16:59

## 2024-12-19 RX ADMIN — Medication 400 MG: at 09:02

## 2024-12-19 RX ADMIN — Medication 1000 UNITS: at 09:02

## 2024-12-19 RX ADMIN — APIXABAN 5 MG: 5 TABLET, FILM COATED ORAL at 19:57

## 2024-12-19 RX ADMIN — HYDROCODONE BITARTRATE AND ACETAMINOPHEN 2 TABLET: 5; 325 TABLET ORAL at 06:49

## 2024-12-19 RX ADMIN — FERROUS SULFATE TAB 325 MG (65 MG ELEMENTAL FE) 325 MG: 325 (65 FE) TAB at 19:57

## 2024-12-19 RX ADMIN — GABAPENTIN 100 MG: 100 CAPSULE ORAL at 19:57

## 2024-12-19 ASSESSMENT — PAIN DESCRIPTION - LOCATION
LOCATION: ABDOMEN
LOCATION: LEG;KNEE

## 2024-12-19 ASSESSMENT — PAIN SCALES - WONG BAKER: WONGBAKER_NUMERICALRESPONSE: NO HURT

## 2024-12-19 ASSESSMENT — PAIN SCALES - GENERAL
PAINLEVEL_OUTOF10: 7
PAINLEVEL_OUTOF10: 0

## 2024-12-19 ASSESSMENT — PAIN DESCRIPTION - DESCRIPTORS: DESCRIPTORS: ACHING;DISCOMFORT

## 2024-12-19 ASSESSMENT — PAIN - FUNCTIONAL ASSESSMENT: PAIN_FUNCTIONAL_ASSESSMENT: PREVENTS OR INTERFERES SOME ACTIVE ACTIVITIES AND ADLS

## 2024-12-19 ASSESSMENT — PAIN DESCRIPTION - ORIENTATION: ORIENTATION: RIGHT;LEFT

## 2024-12-19 NOTE — CARE COORDINATION
12/19/24, 1:59 PM EST    DISCHARGE PLANNING EVALUATION     SW met with pt this afternoon, no family at bedside. Pt presents as confused, but does visit with SW.     Call to son Jhonny, confirms plans for Hospice at nursing facility. Son reports preference for [facility], reports would need a private room. SW did ask if they are aware of cost of room and board. Son reports pt has Medicaid as well. SW did ask if he has Medicaid number as SW not seeing this in pt's chart. He reports he does not, however reports SW can call [facility] as reports it was approved there. SW did ask son on second preference. Son reports if [facility] does not have a bed, would want a list of the [place] facilities that do have a bed, reports it would have to be a private room.     SW did call [facility], spoke with Veronica, Medicaid number [id number]. SW did call [facility] [employer] left a voicemail regarding information.     Call to Mandi with [facility], they do have a private room, she is checking to see if they can take Medicaid. SW did make referral, they will review and get back with LANA.     2:48 PM EST  Message from Mandi Pathak, reports after reviewing chart, with behaviors they are unable to accept at this time, reports if pt becomes complaint with care and not having continued behaviors they can look at pt again.

## 2024-12-19 NOTE — PROGRESS NOTES
Progress note: Infectious diseases    Patient - Jasmina Powers,  Age - 89 y.o.    - 1935      Room Number - 5K-25/025-A   MRN -  [id number]   [facility] # - [id number]  Date of Admission -  2024  9:38 AM    SUBJECTIVE:   She has  not been eating   Discussed with son. He wants her to go to [facility].  OBJECTIVE   VITALS    height is 1.651 m (5' 5\") and weight is 69 kg (152 lb 1.9 oz). Her oral temperature is 98.8 °F (37.1 °C). Her blood pressure is 123/80 and her pulse is 96. Her respiration is 16 and oxygen saturation is 100%.       Wt Readings from Last 3 Encounters:   24 69 kg (152 lb 1.9 oz)   24 63.1 kg (139 lb 1.8 oz)   10/21/24 68 kg (150 lb)       I/O (24 Hours)    Intake/Output Summary (Last 24 hours) at 2024 1138  Last data filed at 2024 0634  Gross per 24 hour   Intake 460 ml   Output 0 ml   Net 460 ml       General Appearance  Awake, alert, chronically sick looking  HEENT - normocephalic, atraumatic, pale conjunctiva,  anicteric sclera  Neck - Supple, no mass  Lungs -  Bilateral   air entry, no rhonchi, no wheeze  Cardiovascular - Heart sounds are normal.     Abdomen - soft, not distended, nontender,   Neurologic -oriented  Skin - No bruising or bleeding  Extremities - open clean stage 3 coccyx wound  Bilateral leg open wound with escar formation.    Has coccyx open stage III wound       MEDICATIONS:      potassium bicarb-citric acid  20 mEq Oral Once    magnesium sulfate  2,000 mg IntraVENous Once    apixaban  5 mg Oral BID    diclofenac sodium  2 g Topical 4x Daily    ferrous sulfate  325 mg Oral BID    gabapentin  100 mg Oral BID    magnesium oxide  400 mg Oral Daily    pantoprazole  40 mg Oral QAM AC    senna  1 tablet Oral BID    Vitamin D  1 tablet Oral Daily    sodium chloride flush  5-40 mL IntraVENous 2 times per day      sodium chloride 50 mL/hr at 24 0410    sodium

## 2024-12-19 NOTE — PROGRESS NOTES
Hospitalist Progress Note  Internal Medicine Resident      Patient: Jasmina Powers 89 y.o. female      Unit/Bed: -25/025-A    Admit Date: 12/16/2024      ASSESSMENT AND PLAN  Active Problems  Failure to thrive- patient presented from home with son. Recently discharged home from an extended stay at the nursing facility. Has been refusing to eat, drink or take her medications. Continues to refuse labs and medications in hospital. Has pulled out IV and at times refuses vitals. Son was considering PEG, but was advised against.   Hospice information meeting set up with son. Palliative is continuing to follow.     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 Has extensive soft tissue calcification.   ID following-regular dressing changes    Wound Stage III of coccyx: ID following. Patient refused for dressing changes or moving    Hypokalemic: 12/17/23 K+ was 3.3, patient refusing replacement oral/IV and labs.     Hypomagnesemia: 12/17/24 Mg 1.4. Prescribed 2,000mg replacement. Patient took out IV access and is refusing medications and labs. Unable to determine where current levels are.   Resolved Problems  Positive UA- moderate leukocytes and budding yeast, few bacteria noted. Patient unable to report symptoms. Discussed with ID and since patient afebrile and no leukocytosis to defer treatment at this time.   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 Hb 9.1 and MVC 72.4. On Ferrous sulfate 325mg BID  HLD-not on statin  Paroxysmal Atrial Fibrillation-on Eliquis   COPD  GERD: On Protonix 40mg PO QD   Dementia      LDA: []CVC / []PICC / []Midline / []Carney / []Drains / []Mediport / [x]None  Antibiotics: n/a  Steroids: n/a  Labs (still needed?): [x]Yes / []No  IVF (still needed?): []Yes / [x]No    Level of care: []Step Down / [x]Med-Surg  Bed Status: [x]Inpatient / []Observation  Telemetry: [x]Yes /  []No  PT/OT: [x]Yes / []No    DVT Prophylaxis: [] Lovenox / [] Heparin / [] SCDs / [x] Already on Systemic Anticoagulation / [] None     Expected discharge date:  TBD  Disposition: TBD     Code status: Full Code     ===================================================================    Chief Complaint: failure to thrive  Subjective (past 24 hours): Seen at bedside. Patient continues to refuse labs and food. Today Dr. Lopez managed to feed her mac and cheese. No swallowing issues noted. Unable to recheck labs as patient refuses.     HPI / Hospital Course:  89 y.o. female who presented to [facility] with not eating or drinking in 2 weeks; patient has a past medical history of hypertension, hyperlipidemia, PAF, COPD, GERD, dementia and nonhealing wounds of the bilateral lower leg; report was obtained from son and daughter-in-law at bedside, she has been home from [facility] 2 weeks ago today states the first day she was home she did well otherwise after that she did not want to eat, did not want to drink, did not take any medications except for pain medicine, she is normally wheelchair-bound and she is only been up to the wheelchair once otherwise family assist her to pivot to the commode, they state on August 8 she was in the hospital secondary to right leg infection and from there was discharged to [facility].   In the ER, her CMP showed sodium 134 and CO2 at 22, troponin was noted to be 38 however her troponin from November 13, 2024 was noted to be 55, her albumin is low at 2.8; she has a normal white count and differential, she is anemic along with being microcytic and hypochromic and she is supposed to be taking iron tablets twice daily per home medications; she is being admitted to the hospital service as family is unable to care for her at home.\"  Patient does not want to be examined, physical exam was very difficult, patient keeps saying \"I just want to go home\", she cannot tell me where

## 2024-12-19 NOTE — PROGRESS NOTES
Hospitalist Progress Note  Internal Medicine Resident      Patient: Jasmina Powers 89 y.o. female      Unit/Bed: 5-25/025-A    Admit Date: 12/16/2024      ASSESSMENT AND PLAN  Active Problems  Failure to thrive- patient presented from home with son. Recently discharged home from an extended stay at the nursing facility. Had been refusing to eat, drink or take her medications. Continues to refuse labs and medications in hospital. Has pulled out IV and at times refuses vitals. Son was considering PEG, but was advised against. Seen at bedside eating this morning.    Palliative is continuing to follow. Family still deciding code status.   Family to meet SW/CM to further discuss possible facility placements.    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 Has extensive soft tissue calcification. ID following-regular dressing changes    Wound Stage III of coccyx: ID following. Patient refused for dressing changes or moving    Hypokalemic: 12/18/23 K+ was 3.5. Patient still refusing replacement oral/IV and labs.     Hypomagnesemia: 12/17/24 Mg 1.4. Prescribed 2,000mg replacement, but did not receive due to patient refusal. Has taken out IV access and is refusing medications and labs.     Resolved Problems  Positive UA- moderate leukocytes and budding yeast, few bacteria noted. Patient unable to report symptoms. Discussed with ID and since patient afebrile and no leukocytosis to defer treatment at this time.   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 Hb 9.1 and MVC 72.4. On Ferrous sulfate 325mg BID  HLD-not on statin  Paroxysmal Atrial Fibrillation-on Eliquis   COPD  GERD: On Protonix 40mg PO QD   Dementia      LDA: []CVC / []PICC / []Midline / []Carney / []Drains / []Mediport / [x]None  Antibiotics: n/a  Steroids: n/a  Labs (still needed?): [x]Yes / []No  IVF (still needed?): []Yes / [x]No    Level  keeps saying \"I just want to go home\", she cannot tell me where she is, she cannot tell me the month or the year; CODE STATUS discussed with son with previous provider at the bedside and they want full resuscitative efforts, son then ask if she is not eating or drinking ask about placement of a feeding tube, I stated GI would need to be consulted and evaluate the patient to see if she is a candidate. Patient continues to refuse any treatment. Dr. Lopez managed to get patient to eat mac and cheese. Patient continues to refuse wound care. Has been eating some food.      Medications:    Infusion Medications    sodium chloride 50 mL/hr at 12/17/24 0410    sodium chloride      Scheduled Medications    potassium bicarb-citric acid  20 mEq Oral Once    magnesium sulfate  2,000 mg IntraVENous Once    apixaban  5 mg Oral BID    diclofenac sodium  2 g Topical 4x Daily    ferrous sulfate  325 mg Oral BID    gabapentin  100 mg Oral BID    magnesium oxide  400 mg Oral Daily    pantoprazole  40 mg Oral QAM AC    senna  1 tablet Oral BID    Vitamin D  1 tablet Oral Daily    sodium chloride flush  5-40 mL IntraVENous 2 times per day    PRN Meds: albuterol sulfate HFA, HYDROcodone-acetaminophen, hydrOXYzine pamoate, sodium chloride flush, sodium chloride, potassium chloride **OR** potassium alternative oral replacement **OR** potassium chloride, magnesium sulfate, ondansetron **OR** ondansetron, polyethylene glycol, acetaminophen **OR** acetaminophen    Exam:  /80   Pulse 96   Temp 98.8 °F (37.1 °C) (Oral)   Resp 16   Ht 1.651 m (5' 5\")   Wt 69 kg (152 lb 1.9 oz)   SpO2 100%   BMI 25.31 kg/m²   General: No distress, appears stated age.  Eyes:  PERRL. Conjunctivae/corneas clear.  HENT: Head normal appearing. Nares normal. Oral mucosa moist.  Hearing intact.   Neck: Supple, with full range of motion. Trachea midline.  No gross JVD appreciated.  Respiratory:  Normal effort. Clear to auscultation, without rales or wheezes

## 2024-12-19 NOTE — PROGRESS NOTES
Patient refusing medications and IV placement. Patient had large bowel movement and was tearful during repositioning. Dressing to coccyx and bilateral legs changed.

## 2024-12-19 NOTE — PROGRESS NOTES
communication.    Expressive:  Expressive language skills appear to be grossly intact for basic daily communication.    COGNITION:  O-Lo/30    SWALLOWING:  Current Diet: Soft and bite-sized diet, thin liquids   See clinical swallow evaluation from 2024 for further details - patient with refusal for PO trials this date s/t decreased appetite.         RECOMMENDATIONS/ASSESSMENT:  DIAGNOSTIC IMPRESSIONS:  Patient presents with evidence of a severe cognitive-linguistic impairment characterized by a score of 0/30 on the O-Log with deficits noted with BASIC orientation, problem solving, reasoning, and safety awareness Expressive and receptive language appear WFL for BASIC daily communication. Patient with mild-moderate dysarthria characterized by decreased articulatory precision and blended word boundaries and mild dysphonia characterized by decreased loudness with speech intelligibility approximating 70% at the conversational level. Patient would greatly benefit from continued skilled ST services to address aformentioned deficits in order to make successful return to OF.      Rehabilitation Potential: guarded     EDUCATION:  Learner: Patient  Education:  Reviewed results and recommendations of this evaluation, Reviewed ST goals and Plan of Care, and Education Related to Health Promotion and Wellness  Evaluation of Education: Needs further instruction, No evidence of learning, and Family not present    PLAN:  Skilled SLP intervention on acute care 1-3 x per week or until goals met and or patient plateaus in function.  Specific interventions for next session may include: dietary analysis and functional recall    PATIENT GOAL:    Did not state.  Will further assess during treatment.    SHORT TERM GOALS:  Short Term Goals  Time Frame for Short Term Goals: 2 weeks  Goal 1: Patient will consume soft and bite sized diet adn thin liquids (advanced textures as indicated) with stable pulmonary status and incorporation  of trained compensatory strategies to assist with nutrition/hydration measures  Goal 2: Considerations for an instrumental evaluation should adverse changes be conveyed in direct relation to the safety/efficency of the swallow mechanism.  Goal 3: Patient will complete functional recall (orientation, biographical info, call light) tasks with 60% accuracy given max cues to improve retention of pertinent, novel information r/t daily living requirements.  Goal 4: Patient will complete functional problem solving, verbal/visual reasoning, and safety tasks with 65% accuracy given max cues to enhance success within ADLs/IADLs.  Goal 5: Patient will complete sequencing and thought organization tasks with 65% accuracy given max cues to improve mental flexibility for home scenarios.    LONG TERM GOALS:  No long term goals recommended d/t short BRIAN Tobar M.A., CCC-SLP [id number]

## 2024-12-19 NOTE — PALLIATIVE CARE
Follow Up / Progress Note        Patient:   Jasmina Powers  YOB: 1935  Age:  89 y.o.  Room:  [facility]25/025-  MRN:  [id number]         Family/Patient Discussion:  Met with patients son Jhonny and his wife outside patients room to further discuss plan of care. Jhonny also met with Hospice Liaison Angelia DARNELL and further discussed hospice care concepts.  Hospice educational information packet handed to patient. After meeting with Jhonny he believes what would be best for his mother at this time is locating a facility for his mother to be discharged to and initiating Hospice once their. Jhonny is aware that a code status change will be required in order to receive Hospice benefits and will need to further discuss this conversation with his brother who lives in [place]. Jhonny is planning on meeting SW/CM to further discuss possible facility placements within the area. Patient resting comfortably this morning. Update received by primary nurse that patient continues to refuse IV placement and medications. Patients son updated about this information. Jhonny states he is planning on talking with his brother to clarify code status and this plan that was discussed with Hospice this morning.     Plan/Follow-Up:  Palliative Care will continue to visit with patient while hospitalized. Please contact palliative care if further needs arise.        Electronically signed by Torrey Alarcon RN on 12/19/2024 at 9:41 AM             Palliative Care Office: [phone]

## 2024-12-19 NOTE — PLAN OF CARE
Problem: Chronic Conditions and Co-morbidities  Goal: Patient's chronic conditions and co-morbidity symptoms are monitored and maintained or improved  12/19/2024 0133 by Ernestine Smith RN  Outcome: Progressing  Flowsheets (Taken 12/19/2024 0133)  Care Plan - Patient's Chronic Conditions and Co-Morbidity Symptoms are Monitored and Maintained or Improved: Monitor and assess patient's chronic conditions and comorbid symptoms for stability, deterioration, or improvement     Problem: Pain  Goal: Verbalizes/displays adequate comfort level or baseline comfort level  12/19/2024 0133 by Ernestine Smith RN  Outcome: Progressing  Flowsheets (Taken 12/19/2024 0133)  Verbalizes/displays adequate comfort level or baseline comfort level:   Encourage patient to monitor pain and request assistance   Assess pain using appropriate pain scale   Administer analgesics based on type and severity of pain and evaluate response     Problem: Skin/Tissue Integrity - Adult  Goal: Skin integrity remains intact  12/19/2024 0133 by Ernestine Smith RN  Outcome: Progressing  Flowsheets (Taken 12/19/2024 0133)  Skin Integrity Remains Intact:   Monitor for areas of redness and/or skin breakdown   Assess vascular access sites hourly     Problem: Skin/Tissue Integrity - Adult  Goal: Incisions, wounds, or drain sites healing without S/S of infection  12/19/2024 0133 by Ernestine Smith RN  Outcome: Progressing  Flowsheets (Taken 12/19/2024 0133)  Incisions, Wounds, or Drain Sites Healing Without Sign and Symptoms of Infection: Implement wound care per orders     Problem: Musculoskeletal - Adult  Goal: Return mobility to safest level of function  12/19/2024 0133 by Ernestine Smith RN  Outcome: Progressing  Flowsheets (Taken 12/19/2024 0133)  Return Mobility to Safest Level of Function:   Assess patient stability and activity tolerance for standing, transferring and ambulating with or without assistive devices   Assist with transfers and  ambulation using safe patient handling equipment as needed     Problem: Discharge Planning  Goal: Discharge to home or other facility with appropriate resources  12/19/2024 0133 by Ernestine Smith RN  Outcome: Progressing  Flowsheets (Taken 12/19/2024 0133)  Discharge to home or other facility with appropriate resources:   Identify barriers to discharge with patient and caregiver   Identify discharge learning needs (meds, wound care, etc)     Problem: Safety - Adult  Goal: Free from fall injury  12/19/2024 0133 by Ernestine Smith RN  Outcome: Progressing  Flowsheets (Taken 12/19/2024 0133)  Free From Fall Injury: Instruct family/caregiver on patient safety  Note: Patient bed alarm is on at this time.     Problem: Skin/Tissue Integrity  Goal: Absence of new skin breakdown  Description: 1.  Monitor for areas of redness and/or skin breakdown  2.  Assess vascular access sites hourly  3.  Every 4-6 hours minimum:  Change oxygen saturation probe site  4.  Every 4-6 hours:  If on nasal continuous positive airway pressure, respiratory therapy assess nares and determine need for appliance change or resting period.  12/19/2024 0133 by Ernestine Smith RN  Outcome: Progressing  Note: Patient is being repositioned at this time.     Problem: Nutrition Deficit:  Goal: Optimize nutritional status  12/19/2024 0133 by Ernestine Smith RN  Outcome: Progressing  Flowsheets (Taken 12/19/2024 0133)  Nutrient intake appropriate for improving, restoring, or maintaining nutritional needs:   Assess nutritional status and recommend course of action   Monitor oral intake, labs, and treatment plans     Care plan reviewed with patient.  Patient verbalizes understanding of the plan of care and contributes to goal setting.

## 2024-12-19 NOTE — PROGRESS NOTES
[facility]  PHYSICAL THERAPY MISSED TREATMENT NOTE  STRZ ONC MED 5K    Date: 2024  Patient Name: Jasmina Powers        MRN: [id number]   : 1935  (89 y.o.)  Gender: female   Referring Practitioner: Felicitas Mccormick APRN - CNP            REASON FOR MISSED TREATMENT:  Per RN, patient not appropriate for therapy at this time and planning potential hospice at discharge.  RN requested to discontinue orders at this time.     ADÁN SrivastavaT

## 2024-12-19 NOTE — PLAN OF CARE
Problem: Chronic Conditions and Co-morbidities  Goal: Patient's chronic conditions and co-morbidity symptoms are monitored and maintained or improved  12/18/2024 2006 by Tara Cowan RN  Outcome: Progressing  Flowsheets (Taken 12/17/2024 2030 by Jamel Kumar, RN)  Care Plan - Patient's Chronic Conditions and Co-Morbidity Symptoms are Monitored and Maintained or Improved:   Monitor and assess patient's chronic conditions and comorbid symptoms for stability, deterioration, or improvement   Collaborate with multidisciplinary team to address chronic and comorbid conditions and prevent exacerbation or deterioration   Update acute care plan with appropriate goals if chronic or comorbid symptoms are exacerbated and prevent overall improvement and discharge     Problem: Pain  Goal: Verbalizes/displays adequate comfort level or baseline comfort level  12/18/2024 2006 by Tara Cowan RN  Outcome: Progressing     Problem: Skin/Tissue Integrity - Adult  Goal: Skin integrity remains intact  12/18/2024 2006 by Tara Cowan RN  Outcome: Progressing     Problem: Skin/Tissue Integrity - Adult  Goal: Incisions, wounds, or drain sites healing without S/S of infection  12/18/2024 2006 by Tara Cowan RN  Outcome: Progressing     Problem: Musculoskeletal - Adult  Goal: Return mobility to safest level of function  12/18/2024 2006 by Tara Cowan RN  Outcome: Progressing  Flowsheets (Taken 12/18/2024 2006)  Return Mobility to Safest Level of Function:   Assess patient stability and activity tolerance for standing, transferring and ambulating with or without assistive devices   Assist with transfers and ambulation using safe patient handling equipment as needed     Problem: Discharge Planning  Goal: Discharge to home or other facility with appropriate resources  12/18/2024 2006 by Tara Cowan RN  Outcome: Progressing     Problem: Safety - Adult  Goal: Free from fall injury  12/18/2024 2006 by Camryn  Tara ZAMORA RN  Outcome: Progressing     Problem: Skin/Tissue Integrity  Goal: Absence of new skin breakdown  Description: 1.  Monitor for areas of redness and/or skin breakdown  2.  Assess vascular access sites hourly  3.  Every 4-6 hours minimum:  Change oxygen saturation probe site  4.  Every 4-6 hours:  If on nasal continuous positive airway pressure, respiratory therapy assess nares and determine need for appliance change or resting period.  12/18/2024 2006 by Tara Cowan RN  Outcome: Progressing     Problem: Nutrition Deficit:  Goal: Optimize nutritional status  12/18/2024 2006 by Tara Cowan, RN  Outcome: Progressing  Flowsheets (Taken 12/18/2024 2006)  Nutrient intake appropriate for improving, restoring, or maintaining nutritional needs:   Assess nutritional status and recommend course of action   Monitor oral intake, labs, and treatment plans   Recommend appropriate diets, oral nutritional supplements, and vitamin/mineral supplements   Order, calculate, and assess calorie counts as needed

## 2024-12-19 NOTE — PLAN OF CARE
Problem: Chronic Conditions and Co-morbidities  Goal: Patient's chronic conditions and co-morbidity symptoms are monitored and maintained or improved  12/19/2024 1502 by Aurelia Cruz RN  Outcome: Progressing  Flowsheets (Taken 12/19/2024 1502)  Care Plan - Patient's Chronic Conditions and Co-Morbidity Symptoms are Monitored and Maintained or Improved:   Monitor and assess patient's chronic conditions and comorbid symptoms for stability, deterioration, or improvement   Collaborate with multidisciplinary team to address chronic and comorbid conditions and prevent exacerbation or deterioration     Problem: Pain  Goal: Verbalizes/displays adequate comfort level or baseline comfort level  12/19/2024 1502 by Aurelia Cruz RN  Outcome: Progressing  Flowsheets (Taken 12/19/2024 1502)  Verbalizes/displays adequate comfort level or baseline comfort level:   Encourage patient to monitor pain and request assistance   Assess pain using appropriate pain scale   Administer analgesics based on type and severity of pain and evaluate response   Implement non-pharmacological measures as appropriate and evaluate response     Problem: Skin/Tissue Integrity - Adult  Goal: Skin integrity remains intact  12/19/2024 1502 by Aurelia Cruz RN  Outcome: Progressing  Flowsheets (Taken 12/19/2024 1502)  Skin Integrity Remains Intact: Monitor for areas of redness and/or skin breakdown     Problem: Skin/Tissue Integrity - Adult  Goal: Incisions, wounds, or drain sites healing without S/S of infection  12/19/2024 1502 by Aurelia Cruz RN  Outcome: Progressing  Flowsheets (Taken 12/19/2024 1502)  Incisions, Wounds, or Drain Sites Healing Without Sign and Symptoms of Infection: ADMISSION and DAILY: Assess and document risk factors for pressure ulcer development     Problem: Musculoskeletal - Adult  Goal: Return mobility to safest level of function  12/19/2024 1502 by Aurelia Cruz RN  Outcome: Progressing  Flowsheets (Taken 12/19/2024  and recommend course of action   Monitor oral intake, labs, and treatment plans   Recommend appropriate diets, oral nutritional supplements, and vitamin/mineral supplements   Care plan reviewed with patient.  Patient verbalizes understanding of the plan of care and contributes to goal setting.

## 2024-12-20 LAB
ANION GAP SERPL CALC-SCNC: 11 MEQ/L (ref 8–16)
BACTERIA UR CULT: ABNORMAL
BACTERIA UR CULT: ABNORMAL
BUN SERPL-MCNC: 17 MG/DL (ref 7–22)
CALCIUM SERPL-MCNC: 9.5 MG/DL (ref 8.5–10.5)
CHLORIDE SERPL-SCNC: 104 MEQ/L (ref 98–111)
CO2 SERPL-SCNC: 22 MEQ/L (ref 23–33)
CREAT SERPL-MCNC: 0.5 MG/DL (ref 0.4–1.2)
DEPRECATED RDW RBC AUTO: 45.2 FL (ref 35–45)
ERYTHROCYTE [DISTWIDTH] IN BLOOD BY AUTOMATED COUNT: 17.3 % (ref 11.5–14.5)
GFR SERPL CREATININE-BSD FRML MDRD: 90 ML/MIN/1.73M2
GLUCOSE SERPL-MCNC: 106 MG/DL (ref 70–108)
HCT VFR BLD AUTO: 31.2 % (ref 37–47)
HGB BLD-MCNC: 9.7 GM/DL (ref 12–16)
MAGNESIUM SERPL-MCNC: 1.6 MG/DL (ref 1.6–2.4)
MCH RBC QN AUTO: 23.1 PG (ref 26–33)
MCHC RBC AUTO-ENTMCNC: 31.1 GM/DL (ref 32.2–35.5)
MCV RBC AUTO: 74.3 FL (ref 81–99)
ORGANISM: ABNORMAL
PLATELET # BLD AUTO: 281 THOU/MM3 (ref 130–400)
PMV BLD AUTO: 9.1 FL (ref 9.4–12.4)
POTASSIUM SERPL-SCNC: 3.6 MEQ/L (ref 3.5–5.2)
RBC # BLD AUTO: 4.2 MILL/MM3 (ref 4.2–5.4)
SODIUM SERPL-SCNC: 137 MEQ/L (ref 135–145)
WBC # BLD AUTO: 7.3 THOU/MM3 (ref 4.8–10.8)

## 2024-12-20 PROCEDURE — 51798 US URINE CAPACITY MEASURE: CPT

## 2024-12-20 PROCEDURE — 1200000000 HC SEMI PRIVATE

## 2024-12-20 PROCEDURE — 36415 COLL VENOUS BLD VENIPUNCTURE: CPT

## 2024-12-20 PROCEDURE — 85027 COMPLETE CBC AUTOMATED: CPT

## 2024-12-20 PROCEDURE — 99232 SBSQ HOSP IP/OBS MODERATE 35: CPT | Performed by: INTERNAL MEDICINE

## 2024-12-20 PROCEDURE — 83735 ASSAY OF MAGNESIUM: CPT

## 2024-12-20 PROCEDURE — 6370000000 HC RX 637 (ALT 250 FOR IP): Performed by: INTERNAL MEDICINE

## 2024-12-20 PROCEDURE — 80048 BASIC METABOLIC PNL TOTAL CA: CPT

## 2024-12-20 PROCEDURE — 6370000000 HC RX 637 (ALT 250 FOR IP): Performed by: NURSE PRACTITIONER

## 2024-12-20 RX ORDER — OXYCODONE HYDROCHLORIDE 5 MG/1
10 TABLET ORAL EVERY 6 HOURS PRN
Status: DISCONTINUED | OUTPATIENT
Start: 2024-12-20 | End: 2024-12-24 | Stop reason: HOSPADM

## 2024-12-20 RX ORDER — OXYCODONE HYDROCHLORIDE 5 MG/1
5 TABLET ORAL EVERY 6 HOURS PRN
Status: DISCONTINUED | OUTPATIENT
Start: 2024-12-20 | End: 2024-12-24 | Stop reason: HOSPADM

## 2024-12-20 RX ADMIN — Medication 400 MG: at 08:56

## 2024-12-20 RX ADMIN — Medication 1000 UNITS: at 08:56

## 2024-12-20 RX ADMIN — FERROUS SULFATE TAB 325 MG (65 MG ELEMENTAL FE) 325 MG: 325 (65 FE) TAB at 20:30

## 2024-12-20 RX ADMIN — SENNOSIDES 8.6 MG: 8.6 TABLET, FILM COATED ORAL at 08:55

## 2024-12-20 RX ADMIN — GABAPENTIN 100 MG: 100 CAPSULE ORAL at 08:56

## 2024-12-20 RX ADMIN — APIXABAN 5 MG: 5 TABLET, FILM COATED ORAL at 20:30

## 2024-12-20 RX ADMIN — GABAPENTIN 100 MG: 100 CAPSULE ORAL at 20:30

## 2024-12-20 RX ADMIN — OXYCODONE 10 MG: 5 TABLET ORAL at 22:31

## 2024-12-20 RX ADMIN — DICLOFENAC SODIUM 2 G: 10 GEL TOPICAL at 20:30

## 2024-12-20 RX ADMIN — SENNOSIDES 8.6 MG: 8.6 TABLET, FILM COATED ORAL at 20:30

## 2024-12-20 RX ADMIN — HYDROCODONE BITARTRATE AND ACETAMINOPHEN 2 TABLET: 5; 325 TABLET ORAL at 15:41

## 2024-12-20 RX ADMIN — APIXABAN 5 MG: 5 TABLET, FILM COATED ORAL at 08:56

## 2024-12-20 RX ADMIN — PANTOPRAZOLE SODIUM 40 MG: 40 TABLET, DELAYED RELEASE ORAL at 06:40

## 2024-12-20 RX ADMIN — FERROUS SULFATE TAB 325 MG (65 MG ELEMENTAL FE) 325 MG: 325 (65 FE) TAB at 08:56

## 2024-12-20 RX ADMIN — HYDROCODONE BITARTRATE AND ACETAMINOPHEN 2 TABLET: 5; 325 TABLET ORAL at 08:56

## 2024-12-20 ASSESSMENT — PAIN SCALES - GENERAL
PAINLEVEL_OUTOF10: 8
PAINLEVEL_OUTOF10: 9
PAINLEVEL_OUTOF10: 10

## 2024-12-20 ASSESSMENT — PAIN DESCRIPTION - LOCATION
LOCATION: BUTTOCKS
LOCATION: BACK;LEG

## 2024-12-20 ASSESSMENT — PAIN DESCRIPTION - ORIENTATION: ORIENTATION: LOWER;RIGHT;LEFT;MID

## 2024-12-20 ASSESSMENT — PAIN DESCRIPTION - DESCRIPTORS: DESCRIPTORS: ACHING;DULL;SHARP

## 2024-12-20 NOTE — PALLIATIVE CARE
Follow Up / Progress Note        Patient:   Jasmina Powers  YOB: 1935  Age:  89 y.o.  Room:  [facility]/025-  MRN:  [id number]         Family/Patient Discussion:  Patient resting comfortably this morning. Spoke and received update by primary nurse Philip DARNELL. Patient had a good nights rest. Did receive PRN pain medication this morning and appears to be keeping patients pain controlled. Spoke with patients son Jhonny this morning. Jhonny indicates he did call and talk with his brother regarding their mothers situation and current condition. Jhonny mentioned the need for changing code status if its decided upon to focus on comfort care at this time. Jhonny states their discussion about code was set off course and further clarification is needed before they commit to Hospice at this time. Offered to Jhonny this nurse take on this discussion to assist with clarifying and communicating the need for a code status change if Hospice is desired. Jhonny again, politely requested we give him this afternoon to further speak with his brother about this information as he sees it as a Family Matter. Informed Jhonny that if a code status change is to occur this nurse or staff member will need to receive confirmation between both children of the patient (Enrique Barry). Jhonny verbalized understanding. Updated Jhonny that currently [facility] appears to not be an available facility that is able to accept at this time. Encouraged Jhonny to consider secondary and possibly alternatives if local facilities are not able to accept. Jhonny again verbalized understanding. Jhonny is planning to contact his brother today to discuss code status and will also discuss about alternative choices regarding facilities within the area for his mother to be discharged to. Jhonny states he is planning on visiting today, sometime between 12 -1:30.   Palliative Care will plan to revisit to further discuss

## 2024-12-20 NOTE — CARE COORDINATION
12/20/24, 11:56 AM EST    DISCHARGE PLANNING EVALUATION    [facility]    Call to [facility] [place], spoke with Camelia, they do have private room available and can take Medicaid    Call to [facility], spoke with Lyly, they do have a private room and can take Medicaid. Provided fax number [phone] to make referral.    Call to [facility]-left a voicemail for a call back.    Call to [facility]-no private room for Medicaid pts    12:10 PM EST  SW met with pt's son outside of pt's room, reports he did talk with Torrey with palliative care and suggest checking with [facility] to see if they can reconsider pt again. LANA did discuss other facilities in [facility] that are able to take Medicaid and have private room, provided list for this.     Call to Mandi with [facility] again to consider pt now.    1:04 PM EST  Call back from Mandi with [facility], they no longer have the private long term care bed available now.     Call to [facility], spoke with Sushma, they do have a private room available and can take Medicaid.        1:09 PM EST  SW stopped by pt's room, nurse reports son recently left. Call to son Jhonny, provided updated on [facility] and [facility]. Son would like referral to [facility].     3:02 PM EST  LANA did fax referral to [facility] earlier today, Call to [facility], verified they did receive referral, they are reviewing and will get back with LANA.

## 2024-12-20 NOTE — PROGRESS NOTES
Occupational Therapy  [facility]  OCCUPATIONAL THERAPY MISSED TREATMENT NOTE  [facility] ONC MED 5K  5K-25/025-A      Date: 2024  Patient Name: Jasmina Powres        CSN: [id number]   : 1935  (89 y.o.)  Gender: female              REASON FOR MISSED TREATMENT:    Per PT's note- Per RN, patient not appropriate for therapy at this time and planning potential hospice at discharge.  RN requested to discontinue orders at this time.

## 2024-12-20 NOTE — PALLIATIVE CARE
Follow Up / Progress Note        Patient:   Jasmina Powers  YOB: 1935  Age:  89 y.o.  Room:  [facility]25/025-A  MRN:  [id number]         Family/Patient Discussion:  Patients son called this nurse. Jhonny states his brother has gotten back with him yet regarding the code status discussion and plan on contacting him yet this afternoon. Jhonny is planning on visiting his mother later this evening around 6pm. Informed Jhonny that if a code status change is requested that he can inform the primary nurse or attending physician if this is decided at a later time. Jhonny is aware that if will need to be agreed upon by both him and his brother. Jhonny verbalized understanding. Jhonny has been updated about ECF availablility and has requested the [place] facility at this time. SW attempting to make arrangements.  No further questions voiced.     Plan/Follow-Up:  Palliative Care will continue to offer supportive care while patient is hospitalized.          Electronically signed by Torrey Alarcon RN on 12/20/2024 at 2:16 PM             Palliative Care Office: [phone]       Reason for Call:  Medication or medication refill:    Do you use a [facility]?  Name of the pharmacy and phone number for the current request:  [facility]- [phone]    Name of the medication requested: suspension or solution    Other request: pharmacy calling asking if they can switch the Rx to suspension because they do not have solution.       Can we leave a detailed message on this number? YES    Phone number patient can be reached at: Home number on file [phone] (home)    Best Time: any    Call taken on 9/27/2019 at 1:03 PM by Senait Chris

## 2024-12-20 NOTE — PLAN OF CARE
Problem: Chronic Conditions and Co-morbidities  Goal: Patient's chronic conditions and co-morbidity symptoms are monitored and maintained or improved  12/20/2024 0450 by Ernestine Smith RN  Outcome: Progressing  Flowsheets (Taken 12/20/2024 0450)  Care Plan - Patient's Chronic Conditions and Co-Morbidity Symptoms are Monitored and Maintained or Improved: Monitor and assess patient's chronic conditions and comorbid symptoms for stability, deterioration, or improvement     Problem: Pain  Goal: Verbalizes/displays adequate comfort level or baseline comfort level  12/20/2024 0450 by Ernestine Smith RN  Outcome: Progressing  Flowsheets (Taken 12/20/2024 0450)  Verbalizes/displays adequate comfort level or baseline comfort level:   Encourage patient to monitor pain and request assistance   Assess pain using appropriate pain scale   Administer analgesics based on type and severity of pain and evaluate response     Problem: Skin/Tissue Integrity - Adult  Goal: Skin integrity remains intact  12/20/2024 0450 by Ernestine Smith RN  Outcome: Progressing  Flowsheets (Taken 12/20/2024 0450)  Skin Integrity Remains Intact: Monitor for areas of redness and/or skin breakdown     Problem: Skin/Tissue Integrity - Adult  Goal: Incisions, wounds, or drain sites healing without S/S of infection  12/20/2024 0450 by Ernestine Smith RN  Outcome: Progressing  Flowsheets (Taken 12/20/2024 0450)  Incisions, Wounds, or Drain Sites Healing Without Sign and Symptoms of Infection: Implement wound care per orders     Problem: Musculoskeletal - Adult  Goal: Return mobility to safest level of function  12/20/2024 0450 by Ernestine Smith RN  Outcome: Progressing  Flowsheets (Taken 12/20/2024 0450)  Return Mobility to Safest Level of Function:   Assess patient stability and activity tolerance for standing, transferring and ambulating with or without assistive devices   Assist with transfers and ambulation using safe patient handling

## 2024-12-20 NOTE — PROGRESS NOTES
Comprehensive Nutrition Assessment    Type and Reason for Visit:  Reassess    Nutrition Recommendations/Plan:   Recommend diet as tolerated.  Continue Glucerna ONS TID and Chon BID.  Recommend MVI.  Will monitor need for additional nutrition interventions.      Malnutrition Assessment:  Malnutrition Status:  Moderate malnutrition (12/17/24 1505)    Context:  Acute Illness     Findings of the 6 clinical characteristics of malnutrition:  Energy Intake:  50% or less of estimated energy requirements for 5 or more days (per EMR and family report)  Weight Loss:  No weight loss     Body Fat Loss:  Mild body fat loss Orbital, Triceps, Fat Overlying Ribs   Muscle Mass Loss:  Mild muscle mass loss Temples (temporalis), Clavicles (pectoralis & deltoids)  Fluid Accumulation:  No fluid accumulation Extremities   Strength:  Not Performed    Nutrition Assessment:     Pt. moderately malnourished AEB criteria as listed above. At risk for further nutrition compromise r/t admit d/t not eating or drinking in 2 weeks, weakness with physical deconditioning, possible FTT, inability to care for self at home, increased nutrient needs to aid in non-healing wound and underlying medical condition (Hx: HTN, HLD, PAF, COPD, CKD, DM, GERD, Dementia, Former Smoker).      Nutrition Related Findings:    Pt. Report/Treatments/Miscellaneous:   12/20-pt. Seen - reports appetite is getting better; staff reports pt. Slept through lunch and they have been coming in hourly to offer food to her; reports pt. Eating poorly but is taking her Glucerna well; Chon packets at bedside; awaiting ECF placement  12/17-Pt seen this afternoon- pt confused and not answering many questions. Pt refused lunch today and did not eat any of it or drink the ONS's on tray. Pt denies any nausea at present. Per EMR, pt was not eating or drinking much of anything over the past 2 weeks. Pt amenable to continue Glucerna TID during LOS and trial Chon BID.   GI Status: BM  appropriate  Coordination of Nutrition Care: Continue to monitor while inpatient       Goals:  Goals: PO intake 75% or greater, by next RD assessment  Type of Goal: Continue current goal  Previous Goal Met: Progressing toward Goal(s)    Nutrition Monitoring and Evaluation:      Food/Nutrient Intake Outcomes: Diet Advancement/Tolerance, Food and Nutrient Intake, Supplement Intake  Physical Signs/Symptoms Outcomes: Biochemical Data, Chewing or Swallowing, GI Status, Fluid Status or Edema, Nutrition Focused Physical Findings, Skin, Weight    Discharge Planning:    Continue Oral Nutrition Supplement     Chasity Chamberlain, ADÁN, LD  Contact: [phone]

## 2024-12-20 NOTE — PROGRESS NOTES
Progress note: Infectious diseases    Patient - Jasmina Powers,  Age - 89 y.o.    - 1935      Room Number - 5K-25/025-A   MRN -  [id number]   [facility] # - [id number]  Date of Admission -  2024  9:38 AM    SUBJECTIVE:   No new issues  OBJECTIVE   VITALS    height is 1.651 m (5' 5\") and weight is 69 kg (152 lb 1.9 oz). Her oral temperature is 97.8 °F (36.6 °C). Her blood pressure is 112/62 and her pulse is 95. Her respiration is 16 and oxygen saturation is 98%.       Wt Readings from Last 3 Encounters:   24 69 kg (152 lb 1.9 oz)   24 63.1 kg (139 lb 1.8 oz)   10/21/24 68 kg (150 lb)       I/O (24 Hours)    Intake/Output Summary (Last 24 hours) at 2024 0936  Last data filed at 2024 0427  Gross per 24 hour   Intake 150 ml   Output --   Net 150 ml       General Appearance  chronically sick looking.  HEENT - normocephalic, atraumatic, pale conjunctiva,  anicteric sclera  Neck - Supple, no mass  Lungs -  Bilateral   air entry, no rhonchi, no wheeze  Cardiovascular - Heart sounds are normal.     Abdomen - soft, not distended, non tender.  Neurologic -oriented  Skin - No bruising or bleeding   Bilateral leg open wound with escar formation.    Has coccyx open stage III wound       MEDICATIONS:      potassium bicarb-citric acid  20 mEq Oral Once    magnesium sulfate  2,000 mg IntraVENous Once    apixaban  5 mg Oral BID    diclofenac sodium  2 g Topical 4x Daily    ferrous sulfate  325 mg Oral BID    gabapentin  100 mg Oral BID    magnesium oxide  400 mg Oral Daily    pantoprazole  40 mg Oral QAM AC    senna  1 tablet Oral BID    Vitamin D  1 tablet Oral Daily    sodium chloride flush  5-40 mL IntraVENous 2 times per day      sodium chloride 50 mL/hr at 24 0410    sodium chloride       albuterol sulfate HFA, HYDROcodone-acetaminophen, hydrOXYzine pamoate, sodium chloride flush, sodium chloride,

## 2024-12-20 NOTE — PROGRESS NOTES
Hospitalist Progress Note  Internal Medicine Resident      Patient: Jasmina Powers 89 y.o. female      Unit/Bed: -25/025-A    Admit Date: 12/16/2024      ASSESSMENT AND PLAN  Active Problems  Failure to thrive- patient presented from home with son. Recently discharged home from an extended stay at the nursing facility. Had been refusing to eat, drink or take her medications. Continues to refuse labs and medications in hospital. Has pulled out IV and at times refuses vitals. Son was considering PEG, but was advised against. Seen at bedside eating this morning.    Palliative is continuing to follow. Discussed with family for referral to [facility]. 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 Has extensive soft tissue calcification. ID following-daily dressing changes    Wound Stage III of coccyx: ID following. Patient refused for dressing changes or moving    Resolved Problems  Positive UA- moderate leukocytes and budding yeast, few bacteria noted. Patient unable to report symptoms. Discussed with ID and since patient afebrile and no leukocytosis to defer treatment at this time.   Hypokalemic: 12/20 K+ was 3.6.    Hypomagnesemia: 12/20 Mg 1.6. Prescribed 2,000mg replacement, but did not receive due to patient refusal. Has taken out IV access and is refusing medications and labs.   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 Hb 9.1 and MVC 72.4. On Ferrous sulfate 325mg BID  HLD-not on statin  Paroxysmal Atrial Fibrillation-on Eliquis   COPD  GERD: On Protonix 40mg PO QD   Dementia      LDA: []CVC / []PICC / []Midline / []Carney / []Drains / []Mediport / [x]None  Antibiotics: n/a  Steroids: n/a  Labs (still needed?): [x]Yes / []No  IVF (still needed?): []Yes / [x]No    Level of care: []Step Down / [x]Med-Surg  Bed Status: [x]Inpatient / []Observation  Telemetry: [x]Yes /  []No  PT/OT: [x]Yes / []No    DVT Prophylaxis: [] Lovenox / [] Heparin / [] SCDs / [x] Already on Systemic Anticoagulation / [] None     Expected discharge date:  TBD  Disposition: TBD     Code status: Full Code     ===================================================================    Chief Complaint: failure to thrive  Subjective (past 24 hours):  Seen at bedside. Patient was emotional this morning. Notes coccyx pain. Is urinating. Has been eating more. Awaiting response from placement.     HPI / Hospital Course:  89 y.o. female who presented to [facility] with not eating or drinking in 2 weeks; patient has a past medical history of hypertension, hyperlipidemia, PAF, COPD, GERD, dementia and nonhealing wounds of the bilateral lower leg; report was obtained from son and daughter-in-law at bedside, she has been home from [facility] 2 weeks ago today states the first day she was home she did well otherwise after that she did not want to eat, did not want to drink, did not take any medications except for pain medicine, she is normally wheelchair-bound and she is only been up to the wheelchair once otherwise family assist her to pivot to the commode, they state on August 8 she was in the hospital secondary to right leg infection and from there was discharged to [facility].   In the ER, her CMP showed sodium 134 and CO2 at 22, troponin was noted to be 38 however her troponin from November 13, 2024 was noted to be 55, her albumin is low at 2.8; she has a normal white count and differential, she is anemic along with being microcytic and hypochromic and she is supposed to be taking iron tablets twice daily per home medications; she is being admitted to the hospital service as family is unable to care for her at home.\"  Patient does not want to be examined, physical exam was very difficult, patient keeps saying \"I just want to go home\", she cannot tell me where she is, she cannot tell me the month or  the year; CODE STATUS discussed with son with previous provider at the bedside and they want full resuscitative efforts, son then ask if she is not eating or drinking ask about placement of a feeding tube, I stated GI would need to be consulted and evaluate the patient to see if she is a candidate. Patient continues to refuse any treatment. Dr. Lopez managed to get patient to eat mac and cheese. Patient continues to refuse wound care. Has been eating some food.      Medications:    Infusion Medications    sodium chloride 50 mL/hr at 12/17/24 0410    sodium chloride      Scheduled Medications    potassium bicarb-citric acid  20 mEq Oral Once    magnesium sulfate  2,000 mg IntraVENous Once    apixaban  5 mg Oral BID    diclofenac sodium  2 g Topical 4x Daily    ferrous sulfate  325 mg Oral BID    gabapentin  100 mg Oral BID    magnesium oxide  400 mg Oral Daily    pantoprazole  40 mg Oral QAM AC    senna  1 tablet Oral BID    Vitamin D  1 tablet Oral Daily    sodium chloride flush  5-40 mL IntraVENous 2 times per day    PRN Meds: oxyCODONE **OR** oxyCODONE, albuterol sulfate HFA, hydrOXYzine pamoate, sodium chloride flush, sodium chloride, potassium chloride **OR** potassium alternative oral replacement **OR** potassium chloride, magnesium sulfate, ondansetron **OR** ondansetron, polyethylene glycol, acetaminophen **OR** acetaminophen    Exam:  /76   Pulse (!) 101   Temp 98.2 °F (36.8 °C) (Oral)   Resp 16   Ht 1.651 m (5' 5\")   Wt 69 kg (152 lb 1.9 oz)   SpO2 100%   BMI 25.31 kg/m²   General: No distress, appears stated age.  Eyes:  PERRL. Conjunctivae/corneas clear.  HENT: Head normal appearing. Nares normal. Oral mucosa moist.  Hearing intact.   Neck: Supple, with full range of motion. Trachea midline.  No gross JVD appreciated.  Respiratory:  Normal effort. Clear to auscultation, without rales or wheezes or rhonchi.  Cardiovascular: Normal rate, regular rhythm with normal S1/S2 without murmurs.

## 2024-12-21 LAB
ANION GAP SERPL CALC-SCNC: 11 MEQ/L (ref 8–16)
BUN SERPL-MCNC: 18 MG/DL (ref 7–22)
CALCIUM SERPL-MCNC: 9.4 MG/DL (ref 8.5–10.5)
CHLORIDE SERPL-SCNC: 104 MEQ/L (ref 98–111)
CO2 SERPL-SCNC: 20 MEQ/L (ref 23–33)
CREAT SERPL-MCNC: 0.5 MG/DL (ref 0.4–1.2)
DEPRECATED RDW RBC AUTO: 49.2 FL (ref 35–45)
ERYTHROCYTE [DISTWIDTH] IN BLOOD BY AUTOMATED COUNT: 17.8 % (ref 11.5–14.5)
GFR SERPL CREATININE-BSD FRML MDRD: 90 ML/MIN/1.73M2
GLUCOSE SERPL-MCNC: 85 MG/DL (ref 70–108)
HCT VFR BLD AUTO: 35.8 % (ref 37–47)
HGB BLD-MCNC: 10.2 GM/DL (ref 12–16)
MCH RBC QN AUTO: 22.5 PG (ref 26–33)
MCHC RBC AUTO-ENTMCNC: 28.5 GM/DL (ref 32.2–35.5)
MCV RBC AUTO: 79 FL (ref 81–99)
PLATELET # BLD AUTO: 251 THOU/MM3 (ref 130–400)
PMV BLD AUTO: 8.7 FL (ref 9.4–12.4)
POTASSIUM SERPL-SCNC: 4 MEQ/L (ref 3.5–5.2)
RBC # BLD AUTO: 4.53 MILL/MM3 (ref 4.2–5.4)
SCAN OF BLOOD SMEAR: NORMAL
SODIUM SERPL-SCNC: 135 MEQ/L (ref 135–145)
WBC # BLD AUTO: 9.3 THOU/MM3 (ref 4.8–10.8)

## 2024-12-21 PROCEDURE — 6370000000 HC RX 637 (ALT 250 FOR IP): Performed by: INTERNAL MEDICINE

## 2024-12-21 PROCEDURE — 6370000000 HC RX 637 (ALT 250 FOR IP): Performed by: NURSE PRACTITIONER

## 2024-12-21 PROCEDURE — 1200000000 HC SEMI PRIVATE

## 2024-12-21 PROCEDURE — 85027 COMPLETE CBC AUTOMATED: CPT

## 2024-12-21 PROCEDURE — 80048 BASIC METABOLIC PNL TOTAL CA: CPT

## 2024-12-21 PROCEDURE — 99232 SBSQ HOSP IP/OBS MODERATE 35: CPT | Performed by: INTERNAL MEDICINE

## 2024-12-21 PROCEDURE — 36415 COLL VENOUS BLD VENIPUNCTURE: CPT

## 2024-12-21 RX ADMIN — GABAPENTIN 100 MG: 100 CAPSULE ORAL at 19:23

## 2024-12-21 RX ADMIN — ACETAMINOPHEN 650 MG: 325 TABLET ORAL at 19:30

## 2024-12-21 RX ADMIN — PANTOPRAZOLE SODIUM 40 MG: 40 TABLET, DELAYED RELEASE ORAL at 05:00

## 2024-12-21 RX ADMIN — APIXABAN 5 MG: 5 TABLET, FILM COATED ORAL at 09:01

## 2024-12-21 RX ADMIN — FERROUS SULFATE TAB 325 MG (65 MG ELEMENTAL FE) 325 MG: 325 (65 FE) TAB at 09:01

## 2024-12-21 RX ADMIN — GABAPENTIN 100 MG: 100 CAPSULE ORAL at 09:01

## 2024-12-21 RX ADMIN — OXYCODONE 10 MG: 5 TABLET ORAL at 05:55

## 2024-12-21 RX ADMIN — DICLOFENAC SODIUM 2 G: 10 GEL TOPICAL at 09:01

## 2024-12-21 RX ADMIN — FERROUS SULFATE TAB 325 MG (65 MG ELEMENTAL FE) 325 MG: 325 (65 FE) TAB at 19:23

## 2024-12-21 RX ADMIN — Medication 1000 UNITS: at 09:01

## 2024-12-21 RX ADMIN — SENNOSIDES 8.6 MG: 8.6 TABLET, FILM COATED ORAL at 09:01

## 2024-12-21 RX ADMIN — DICLOFENAC SODIUM 2 G: 10 GEL TOPICAL at 19:23

## 2024-12-21 RX ADMIN — Medication 400 MG: at 09:03

## 2024-12-21 RX ADMIN — DICLOFENAC SODIUM 2 G: 10 GEL TOPICAL at 13:29

## 2024-12-21 RX ADMIN — APIXABAN 5 MG: 5 TABLET, FILM COATED ORAL at 19:23

## 2024-12-21 RX ADMIN — SENNOSIDES 8.6 MG: 8.6 TABLET, FILM COATED ORAL at 19:23

## 2024-12-21 RX ADMIN — OXYCODONE 10 MG: 5 TABLET ORAL at 16:33

## 2024-12-21 ASSESSMENT — PAIN DESCRIPTION - ORIENTATION
ORIENTATION: MID
ORIENTATION: RIGHT;LEFT
ORIENTATION: RIGHT;LEFT

## 2024-12-21 ASSESSMENT — PAIN DESCRIPTION - DESCRIPTORS
DESCRIPTORS: ACHING;DULL;SHARP
DESCRIPTORS: ACHING

## 2024-12-21 ASSESSMENT — PAIN DESCRIPTION - LOCATION
LOCATION: LEG;COCCYX
LOCATION: BUTTOCKS;LEG
LOCATION: LEG

## 2024-12-21 ASSESSMENT — PAIN SCALES - GENERAL
PAINLEVEL_OUTOF10: 6
PAINLEVEL_OUTOF10: 9
PAINLEVEL_OUTOF10: 7
PAINLEVEL_OUTOF10: 9

## 2024-12-21 NOTE — PROGRESS NOTES
Hospitalist Progress Note  Internal Medicine Resident      Patient: Jasmina Powers 89 y.o. female      Unit/Bed: -25/025-A    Admit Date: 12/16/2024      ASSESSMENT AND PLAN  Active Problems  Failure to thrive- patient presented from home with son. Recently discharged home from an extended stay at the nursing facility. Had been refusing to eat, drink or take her medications. Continues to refuse labs and medications in hospital. Has pulled out IV and at times refuses vitals. Son was considering PEG, but was advised against. Seen at bedside eating this morning.    Palliative is continuing to follow. Discussed with family for referral to [facility]. 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 Has extensive soft tissue calcification. ID following-daily dressing changes    Wound Stage III of coccyx: ID following. Having worsening tissue injury due to patient refusal to turn. For foam dressing changes daily.     Resolved Problems  Positive UA- moderate leukocytes and budding yeast, few bacteria noted. Patient unable to report symptoms. Discussed with ID and since patient afebrile and no leukocytosis to defer treatment at this time.   Hypokalemic: 12/20 K+ was 3.6.    Hypomagnesemia: 12/20 Mg 1.6. Prescribed 2,000mg replacement, but did not receive due to patient refusal. Has taken out IV access and is refusing medications and labs.   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 Hb 9.1 and MVC 72.4. On Ferrous sulfate 325mg BID  HLD-not on statin  Paroxysmal Atrial Fibrillation-on Eliquis   COPD  GERD: On Protonix 40mg PO QD   Dementia      LDA: []CVC / []PICC / []Midline / []Carney / []Drains / []Mediport / [x]None  Antibiotics: n/a  Steroids: n/a  Labs (still needed?): [x]Yes / []No  IVF (still needed?): []Yes / [x]No    Level of care: []Step Down /  auscultation, without rales or wheezes or rhonchi.  Cardiovascular: Normal rate, regular rhythm with normal S1/S2 without murmurs.    Abdomen: Soft, non-tender, non-distended with normal bowel sounds.  Musculoskeletal: Bilateral lower limbs wrapped. Pulses were felt. Patient was moving toes on palpation.   Skin: Warm and dry. No rashes or lesions.  Neurologic:  No focal sensory/motor deficits in the upper or lower extremities.  Psychiatric: Not oriented to time, place or date  Capillary Refill: Brisk,< 3 seconds.  Peripheral Pulses: +2 palpable, equal bilaterally.       Labs/Radiology: See chart or assessment above.     Electronically signed by Shira Toth MD on 12/21/2024 at 12:51 PM  Case was discussed with Attending, Dr. Joseph Arias.

## 2024-12-21 NOTE — PLAN OF CARE
Problem: Chronic Conditions and Co-morbidities  Goal: Patient's chronic conditions and co-morbidity symptoms are monitored and maintained or improved  Outcome: Progressing  Flowsheets (Taken 12/21/2024 1625)  Care Plan - Patient's Chronic Conditions and Co-Morbidity Symptoms are Monitored and Maintained or Improved:   Monitor and assess patient's chronic conditions and comorbid symptoms for stability, deterioration, or improvement   Collaborate with multidisciplinary team to address chronic and comorbid conditions and prevent exacerbation or deterioration   Update acute care plan with appropriate goals if chronic or comorbid symptoms are exacerbated and prevent overall improvement and discharge     Problem: Pain  Goal: Verbalizes/displays adequate comfort level or baseline comfort level  12/21/2024 1625 by Kevyn Moreau, RN  Outcome: Progressing  Flowsheets (Taken 12/21/2024 1625)  Verbalizes/displays adequate comfort level or baseline comfort level:   Encourage patient to monitor pain and request assistance   Administer analgesics based on type and severity of pain and evaluate response   Consider cultural and social influences on pain and pain management   Assess pain using appropriate pain scale   Implement non-pharmacological measures as appropriate and evaluate response   Notify Licensed Independent Practitioner if interventions unsuccessful or patient reports new pain     Problem: Skin/Tissue Integrity - Adult  Goal: Skin integrity remains intact  12/21/2024 1625 by Kevyn Moreau, RN  Outcome: Progressing  Flowsheets (Taken 12/21/2024 1625)  Skin Integrity Remains Intact: Monitor for areas of redness and/or skin breakdown     Problem: Skin/Tissue Integrity - Adult  Goal: Incisions, wounds, or drain sites healing without S/S of infection  12/21/2024 1625 by Kevyn Moreau, RN  Outcome: Progressing  Flowsheets (Taken 12/21/2024 1625)  Incisions, Wounds, or Drain Sites Healing Without Sign and Symptoms of  treatment plans   Recommend appropriate diets, oral nutritional supplements, and vitamin/mineral supplements     Problem: Neurosensory - Adult  Goal: Achieves stable or improved neurological status  12/21/2024 1625 by Kevyn Moreau RN  Outcome: Progressing  Flowsheets (Taken 12/21/2024 1625)  Achieves stable or improved neurological status: Assess for and report changes in neurological status     Problem: Cardiovascular - Adult  Goal: Maintains optimal cardiac output and hemodynamic stability  12/21/2024 1625 by Kevyn Moreau, RN  Outcome: Progressing  Flowsheets (Taken 12/21/2024 1625)  Maintains optimal cardiac output and hemodynamic stability:   Monitor blood pressure and heart rate   Monitor urine output and notify Licensed Independent Practitioner for values outside of normal range   Assess for signs of decreased cardiac output     Problem: Genitourinary - Adult  Goal: Absence of urinary retention  12/21/2024 1625 by Kevyn Moreau, RN  Outcome: Progressing  Flowsheets (Taken 12/21/2024 1625)  Absence of urinary retention:   Assess patient’s ability to void and empty bladder   Monitor intake/output and perform bladder scan as needed   Care plan reviewed with patient.  Patient  verbalize understanding of the plan of care and contribute to goal setting.

## 2024-12-21 NOTE — PROGRESS NOTES
Progress note: Infectious diseases    Patient - Jasmina Powers,  Age - 89 y.o.    - 1935      Room Number - 5K-25/025-A   MRN -  [id number]   [facility] # - [id number]  Date of Admission -  2024  9:38 AM    SUBJECTIVE:   She has been refusing wound dressing and turning  OBJECTIVE   VITALS    height is 1.651 m (5' 5\") and weight is 69 kg (152 lb 1.9 oz). Her oral temperature is 98.4 °F (36.9 °C). Her blood pressure is 130/89 and her pulse is 95. Her respiration is 18 and oxygen saturation is 97%.       Wt Readings from Last 3 Encounters:   24 69 kg (152 lb 1.9 oz)   24 63.1 kg (139 lb 1.8 oz)   10/21/24 68 kg (150 lb)       I/O (24 Hours)    Intake/Output Summary (Last 24 hours) at 2024 1206  Last data filed at 2024 1000  Gross per 24 hour   Intake 100 ml   Output --   Net 100 ml       General Appearance  Awake, alert, oriented, chronically sick looking  HEENT - normocephalic, atraumatic, pale conjunctiva,  anicteric sclera  Neck - Supple, no mass  Lungs -  Bilateral   air entry,    Cardiovascular - Heart sounds are normal.     Abdomen - soft, not distended, nontender,   Neurologic -awake  Skin - No bruising or bleeding  Extremities - bilateral leg stable wound.  The coccyx wound is forming a slough on the left side with worsening wound bed      MEDICATIONS:      potassium bicarb-citric acid  20 mEq Oral Once    magnesium sulfate  2,000 mg IntraVENous Once    apixaban  5 mg Oral BID    diclofenac sodium  2 g Topical 4x Daily    ferrous sulfate  325 mg Oral BID    gabapentin  100 mg Oral BID    magnesium oxide  400 mg Oral Daily    pantoprazole  40 mg Oral QAM AC    senna  1 tablet Oral BID    Vitamin D  1 tablet Oral Daily    sodium chloride flush  5-40 mL IntraVENous 2 times per day      sodium chloride 50 mL/hr at 24 0410    sodium chloride       oxyCODONE **OR** oxyCODONE, albuterol  sulfate HFA, hydrOXYzine pamoate, sodium chloride flush, sodium chloride, potassium chloride **OR** potassium alternative oral replacement **OR** potassium chloride, magnesium sulfate, ondansetron **OR** ondansetron, polyethylene glycol, acetaminophen **OR** acetaminophen      LABS:     CBC:   Recent Labs     12/19/24  0453 12/20/24  0514 12/21/24  0517   WBC 7.7 7.3 9.3   HGB 9.6* 9.7* 10.2*    281 251     BMP:    Recent Labs     12/19/24  0453 12/20/24  0514 12/21/24  0517    137 135   K 3.5 3.6 4.0    104 104   CO2 22* 22* 20*   BUN 15 17 18   CREATININE 0.5 0.5 0.5   GLUCOSE 110* 106 85     Calcium:  Recent Labs     12/21/24  0517   CALCIUM 9.4     Ionized Calcium:Invalid input(s): \"IONCA\"  Magnesium:  Recent Labs     12/20/24  0514   MG 1.6        CULTURES:   UA: No results for input(s): \"SPECGRAV\", \"PHUR\", \"COLORU\", \"CLARITYU\", \"MUCUS\", \"PROTEINU\", \"BLOODU\", \"RBCUA\", \"WBCUA\", \"BACTERIA\", \"NITRU\", \"GLUCOSEU\", \"BILIRUBINUR\", \"UROBILINOGEN\", \"KETUA\", \"LABCAST\", \"LABCASTTY\", \"AMORPHOS\" in the last 72 hours.    Invalid input(s): \"CRYSTALS\"  Micro:   Lab Results   Component Value Date/Time    BC  11/13/2024 12:35 AM     No growth 24 hours. No growth 48 hours. 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    Mammogram declined Z53.20    Chronic atrial fibrillation (HCC) I48.20    Hyponatremia E87.1    Cellulitis of right lower extremity L03.115    Physical deconditioning R53.81    Intractable pain R52    Leg wound, right, sequela S81.801S    Other chronic pain G89.29    Palliative care patient Z51.5    Chronic heart failure with preserved ejection fraction (HFpEF) ([facility]) I50.32    Moderate malnutrition ([facility]) E44.0     Calciphylaxis of lower extremity with nonhealing ulcer, bilateral LE ([facility]) E83.59, L97.909    Aortic stenosis, mild to moderate I35.0    Weakness R53.1         ASSESSMENT/PLAN   Failure to thrive  Bilateral leg wound: stable dressing changed  Coccyx wound: there is more tissue injury. She has been refusing to turn. Encourged to turn due to worsening of the wound  Foam dressing daily. Will need close monitoring to due deterioration of the wound  Dementia  Awaiting placement      Rojelio Lopez MD, 12/21/2024 12:06 PM

## 2024-12-21 NOTE — PLAN OF CARE
Problem: Chronic Conditions and Co-morbidities  Goal: Patient's chronic conditions and co-morbidity symptoms are monitored and maintained or improved  12/20/2024 1629 by Puja Black RN  Outcome: Progressing  Flowsheets (Taken 12/20/2024 0450 by Ernestine Smith RN)  Care Plan - Patient's Chronic Conditions and Co-Morbidity Symptoms are Monitored and Maintained or Improved: Monitor and assess patient's chronic conditions and comorbid symptoms for stability, deterioration, or improvement     Problem: Pain  Goal: Verbalizes/displays adequate comfort level or baseline comfort level  12/21/2024 0614 by Lyly Salazar RN  Outcome: Progressing  Flowsheets (Taken 12/20/2024 0450 by Ernestine Smith RN)  Verbalizes/displays adequate comfort level or baseline comfort level:   Encourage patient to monitor pain and request assistance   Assess pain using appropriate pain scale   Administer analgesics based on type and severity of pain and evaluate response  12/20/2024 1629 by Puja Black RN  Outcome: Progressing  Flowsheets (Taken 12/20/2024 0450 by Ernestine Smith RN)  Verbalizes/displays adequate comfort level or baseline comfort level:   Encourage patient to monitor pain and request assistance   Assess pain using appropriate pain scale   Administer analgesics based on type and severity of pain and evaluate response     Problem: Skin/Tissue Integrity - Adult  Goal: Skin integrity remains intact  12/21/2024 0614 by Lyly Salazar RN  Outcome: Progressing  Flowsheets (Taken 12/20/2024 0450 by Ernestine Smith RN)  Skin Integrity Remains Intact: Monitor for areas of redness and/or skin breakdown  12/20/2024 1629 by Puja Black RN  Outcome: Progressing  Flowsheets (Taken 12/20/2024 0450 by Ernestine Smith RN)  Skin Integrity Remains Intact: Monitor for areas of redness and/or skin breakdown  Goal: Incisions, wounds, or drain sites healing without S/S of infection  12/21/2024 0614 by Martin  RN  Outcome: Progressing  Flowsheets (Taken 12/21/2024 0614)  Maintains optimal cardiac output and hemodynamic stability:   Monitor blood pressure and heart rate   Monitor urine output and notify Licensed Independent Practitioner for values outside of normal range   Assess for signs of decreased cardiac output   Administer fluid and/or volume expanders as ordered   Administer vasoactive medications as ordered   For PPHN infants, administer sedation as ordered and minimize all controllable stressors.  12/20/2024 1629 by Puja Black RN  Outcome: Progressing     Problem: Genitourinary - Adult  Goal: Absence of urinary retention  12/21/2024 0614 by Lyly Salazar RN  Outcome: Progressing  Flowsheets (Taken 12/21/2024 0614)  Absence of urinary retention:   Assess patient’s ability to void and empty bladder   Monitor intake/output and perform bladder scan as needed   Place urinary catheter per Licensed Independent Practitioner order if needed   Discuss with Licensed Independent Practitioner  medications to alleviate retention as needed   Discuss catheterization for long term situations as appropriate  12/20/2024 1629 by Puja Black RN  Outcome: Progressing   Care plan reviewed with patient.  Patient verbalized understanding of the plan of care and contribute to goal setting.

## 2024-12-22 VITALS
HEART RATE: 100 BPM | SYSTOLIC BLOOD PRESSURE: 124 MMHG | BODY MASS INDEX: 25.34 KG/M2 | TEMPERATURE: 98.2 F | HEIGHT: 65 IN | WEIGHT: 152.12 LBS | DIASTOLIC BLOOD PRESSURE: 78 MMHG | RESPIRATION RATE: 16 BRPM | OXYGEN SATURATION: 95 %

## 2024-12-22 PROBLEM — S31.000A SACRAL WOUND: Status: ACTIVE | Noted: 2024-12-22

## 2024-12-22 PROBLEM — R62.7 FTT (FAILURE TO THRIVE) IN ADULT: Status: ACTIVE | Noted: 2024-12-22

## 2024-12-22 PROBLEM — F03.90 DEMENTIA (HCC): Status: ACTIVE | Noted: 2024-12-22

## 2024-12-22 LAB
ANION GAP SERPL CALC-SCNC: 13 MEQ/L (ref 8–16)
BUN SERPL-MCNC: 20 MG/DL (ref 7–22)
CALCIUM SERPL-MCNC: 9.3 MG/DL (ref 8.5–10.5)
CHLORIDE SERPL-SCNC: 106 MEQ/L (ref 98–111)
CO2 SERPL-SCNC: 21 MEQ/L (ref 23–33)
CREAT SERPL-MCNC: 0.6 MG/DL (ref 0.4–1.2)
DEPRECATED RDW RBC AUTO: 43.5 FL (ref 35–45)
ERYTHROCYTE [DISTWIDTH] IN BLOOD BY AUTOMATED COUNT: 17.2 % (ref 11.5–14.5)
GFR SERPL CREATININE-BSD FRML MDRD: 86 ML/MIN/1.73M2
GLUCOSE SERPL-MCNC: 107 MG/DL (ref 70–108)
HCT VFR BLD AUTO: 28.7 % (ref 37–47)
HGB BLD-MCNC: 9 GM/DL (ref 12–16)
MCH RBC QN AUTO: 23 PG (ref 26–33)
MCHC RBC AUTO-ENTMCNC: 31.4 GM/DL (ref 32.2–35.5)
MCV RBC AUTO: 73.4 FL (ref 81–99)
PLATELET # BLD AUTO: 285 THOU/MM3 (ref 130–400)
PMV BLD AUTO: 9.4 FL (ref 9.4–12.4)
POTASSIUM SERPL-SCNC: 4 MEQ/L (ref 3.5–5.2)
RBC # BLD AUTO: 3.91 MILL/MM3 (ref 4.2–5.4)
SODIUM SERPL-SCNC: 140 MEQ/L (ref 135–145)
WBC # BLD AUTO: 8.4 THOU/MM3 (ref 4.8–10.8)

## 2024-12-22 PROCEDURE — 80048 BASIC METABOLIC PNL TOTAL CA: CPT

## 2024-12-22 PROCEDURE — 85027 COMPLETE CBC AUTOMATED: CPT

## 2024-12-22 PROCEDURE — 1200000000 HC SEMI PRIVATE

## 2024-12-22 PROCEDURE — 99232 SBSQ HOSP IP/OBS MODERATE 35: CPT | Performed by: INTERNAL MEDICINE

## 2024-12-22 PROCEDURE — 6370000000 HC RX 637 (ALT 250 FOR IP): Performed by: NURSE PRACTITIONER

## 2024-12-22 PROCEDURE — 36415 COLL VENOUS BLD VENIPUNCTURE: CPT

## 2024-12-22 PROCEDURE — 6370000000 HC RX 637 (ALT 250 FOR IP): Performed by: INTERNAL MEDICINE

## 2024-12-22 RX ADMIN — DICLOFENAC SODIUM 2 G: 10 GEL TOPICAL at 19:14

## 2024-12-22 RX ADMIN — OXYCODONE 10 MG: 5 TABLET ORAL at 05:06

## 2024-12-22 RX ADMIN — APIXABAN 5 MG: 5 TABLET, FILM COATED ORAL at 19:14

## 2024-12-22 RX ADMIN — SENNOSIDES 8.6 MG: 8.6 TABLET, FILM COATED ORAL at 07:41

## 2024-12-22 RX ADMIN — OXYCODONE 10 MG: 5 TABLET ORAL at 11:09

## 2024-12-22 RX ADMIN — OXYCODONE 10 MG: 5 TABLET ORAL at 17:24

## 2024-12-22 RX ADMIN — Medication 1000 UNITS: at 07:41

## 2024-12-22 RX ADMIN — GABAPENTIN 100 MG: 100 CAPSULE ORAL at 19:14

## 2024-12-22 RX ADMIN — FERROUS SULFATE TAB 325 MG (65 MG ELEMENTAL FE) 325 MG: 325 (65 FE) TAB at 19:14

## 2024-12-22 RX ADMIN — DICLOFENAC SODIUM 2 G: 10 GEL TOPICAL at 07:41

## 2024-12-22 RX ADMIN — DICLOFENAC SODIUM 2 G: 10 GEL TOPICAL at 13:44

## 2024-12-22 RX ADMIN — GABAPENTIN 100 MG: 100 CAPSULE ORAL at 07:44

## 2024-12-22 RX ADMIN — FERROUS SULFATE TAB 325 MG (65 MG ELEMENTAL FE) 325 MG: 325 (65 FE) TAB at 07:41

## 2024-12-22 RX ADMIN — PANTOPRAZOLE SODIUM 40 MG: 40 TABLET, DELAYED RELEASE ORAL at 05:06

## 2024-12-22 RX ADMIN — APIXABAN 5 MG: 5 TABLET, FILM COATED ORAL at 07:44

## 2024-12-22 RX ADMIN — SENNOSIDES 8.6 MG: 8.6 TABLET, FILM COATED ORAL at 19:14

## 2024-12-22 RX ADMIN — Medication 400 MG: at 07:41

## 2024-12-22 RX ADMIN — DICLOFENAC SODIUM 2 G: 10 GEL TOPICAL at 17:24

## 2024-12-22 ASSESSMENT — PAIN DESCRIPTION - DESCRIPTORS
DESCRIPTORS: ACHING
DESCRIPTORS: SHARP;STABBING
DESCRIPTORS: ACHING

## 2024-12-22 ASSESSMENT — PAIN SCALES - GENERAL
PAINLEVEL_OUTOF10: 5
PAINLEVEL_OUTOF10: 8
PAINLEVEL_OUTOF10: 7
PAINLEVEL_OUTOF10: 8

## 2024-12-22 ASSESSMENT — PAIN DESCRIPTION - LOCATION
LOCATION: BACK;LEG
LOCATION: BACK;LEG
LOCATION: COCCYX;LEG

## 2024-12-22 ASSESSMENT — PAIN DESCRIPTION - ORIENTATION: ORIENTATION: RIGHT;LEFT;MID

## 2024-12-22 NOTE — PLAN OF CARE
Problem: Chronic Conditions and Co-morbidities  Goal: Patient's chronic conditions and co-morbidity symptoms are monitored and maintained or improved  Outcome: Progressing  Flowsheets (Taken 12/22/2024 1504)  Care Plan - Patient's Chronic Conditions and Co-Morbidity Symptoms are Monitored and Maintained or Improved:   Monitor and assess patient's chronic conditions and comorbid symptoms for stability, deterioration, or improvement   Collaborate with multidisciplinary team to address chronic and comorbid conditions and prevent exacerbation or deterioration   Update acute care plan with appropriate goals if chronic or comorbid symptoms are exacerbated and prevent overall improvement and discharge     Problem: Pain  Goal: Verbalizes/displays adequate comfort level or baseline comfort level  Outcome: Progressing  Flowsheets (Taken 12/22/2024 1504)  Verbalizes/displays adequate comfort level or baseline comfort level:   Encourage patient to monitor pain and request assistance   Administer analgesics based on type and severity of pain and evaluate response   Consider cultural and social influences on pain and pain management   Assess pain using appropriate pain scale   Implement non-pharmacological measures as appropriate and evaluate response   Notify Licensed Independent Practitioner if interventions unsuccessful or patient reports new pain     Problem: Skin/Tissue Integrity - Adult  Goal: Skin integrity remains intact  Outcome: Progressing  Flowsheets (Taken 12/22/2024 1504)  Skin Integrity Remains Intact: Monitor for areas of redness and/or skin breakdown     Problem: Skin/Tissue Integrity - Adult  Goal: Incisions, wounds, or drain sites healing without S/S of infection  Outcome: Progressing  Flowsheets (Taken 12/22/2024 1504)  Incisions, Wounds, or Drain Sites Healing Without Sign and Symptoms of Infection:   Implement wound care per orders   TWICE DAILY: Assess and document skin integrity     Problem:  of action   Monitor oral intake, labs, and treatment plans   Recommend appropriate diets, oral nutritional supplements, and vitamin/mineral supplements     Problem: Neurosensory - Adult  Goal: Achieves stable or improved neurological status  Outcome: Progressing  Flowsheets (Taken 12/22/2024 1504)  Achieves stable or improved neurological status: Assess for and report changes in neurological status     Problem: Cardiovascular - Adult  Goal: Maintains optimal cardiac output and hemodynamic stability  Outcome: Progressing  Flowsheets (Taken 12/22/2024 1504)  Maintains optimal cardiac output and hemodynamic stability:   Monitor blood pressure and heart rate   Monitor urine output and notify Licensed Independent Practitioner for values outside of normal range   Assess for signs of decreased cardiac output     Problem: Genitourinary - Adult  Goal: Absence of urinary retention  Outcome: Progressing  Flowsheets (Taken 12/22/2024 1504)  Absence of urinary retention:   Assess patient’s ability to void and empty bladder   Monitor intake/output and perform bladder scan as needed   Care plan reviewed with patient.  Patient verbalize understanding of the plan of care and contribute to goal setting.

## 2024-12-22 NOTE — PROGRESS NOTES
[]Observation  Telemetry: [x]Yes / []No  PT/OT: [x]Yes / []No    DVT Prophylaxis: [] Lovenox / [] Heparin / [] SCDs / [x] Already on Systemic Anticoagulation / [] None     Expected discharge date:  TBD  Disposition: Nursing home  Code status: Full Code     ===================================================================    Chief Complaint: Failure to thrive  Subjective (past 24 hours):  Seen at bedside this morning. No acute concerns. Very happy this morning. Has been eating more. For dressing change this morning. Awaiting response from [facility] for placement.     HPI / Hospital Course:  89 y.o. female who presented to [facility] with not eating or drinking in 2 weeks; patient has a past medical history of hypertension, hyperlipidemia, PAF, COPD, GERD, dementia and nonhealing wounds of the bilateral lower leg; report was obtained from son and daughter-in-law at bedside, she has been home from [facility] 2 weeks ago today states the first day she was home she did well otherwise after that she did not want to eat, did not want to drink, did not take any medications except for pain medicine, she is normally wheelchair-bound and she is only been up to the wheelchair once otherwise family assist her to pivot to the commode, they state on August 8 she was in the hospital secondary to right leg infection and from there was discharged to [facility].   In the ER, her CMP showed sodium 134 and CO2 at 22, troponin was noted to be 38 however her troponin from November 13, 2024 was noted to be 55, her albumin is low at 2.8; she has a normal white count and differential, she is anemic along with being microcytic and hypochromic and she is supposed to be taking iron tablets twice daily per home medications; she is being admitted to the hospital service as family is unable to care for her at home.\"  Patient does not want to be examined, physical exam was very difficult, patient keeps saying \"I just want to  go home\", she cannot tell me where she is, she cannot tell me the month or the year; CODE STATUS discussed with son with previous provider at the bedside and they want full resuscitative efforts, son then ask if she is not eating or drinking ask about placement of a feeding tube, I stated GI would need to be consulted and evaluate the patient to see if she is a candidate. Patient continues to refuse any treatment. Dr. Lopez managed to get patient to eat mac and cheese. Patient continues to refuse wound care. Has been eating some food.      Medications:    Infusion Medications    sodium chloride 50 mL/hr at 12/17/24 0410    sodium chloride      Scheduled Medications    potassium bicarb-citric acid  20 mEq Oral Once    magnesium sulfate  2,000 mg IntraVENous Once    apixaban  5 mg Oral BID    diclofenac sodium  2 g Topical 4x Daily    ferrous sulfate  325 mg Oral BID    gabapentin  100 mg Oral BID    magnesium oxide  400 mg Oral Daily    pantoprazole  40 mg Oral QAM AC    senna  1 tablet Oral BID    Vitamin D  1 tablet Oral Daily    sodium chloride flush  5-40 mL IntraVENous 2 times per day    PRN Meds: oxyCODONE **OR** oxyCODONE, albuterol sulfate HFA, hydrOXYzine pamoate, sodium chloride flush, sodium chloride, potassium chloride **OR** potassium alternative oral replacement **OR** potassium chloride, magnesium sulfate, ondansetron **OR** ondansetron, polyethylene glycol, acetaminophen **OR** acetaminophen    Exam:  /68   Pulse 97   Temp 98.2 °F (36.8 °C) (Oral)   Resp 16   Ht 1.651 m (5' 5\")   Wt 69 kg (152 lb 1.9 oz)   SpO2 100%   BMI 25.31 kg/m²   Wound imaging per ID note seen on 12/21.   General: No distress, appears stated age.  Eyes:  PERRL. Conjunctivae/corneas clear.  HENT: Head normal appearing. Nares normal. Oral mucosa moist.  Hearing intact.   Neck: Supple, with full range of motion. Trachea midline.  No gross JVD appreciated.  Respiratory:  Normal effort. Clear to auscultation, without

## 2024-12-22 NOTE — PLAN OF CARE
Problem: Chronic Conditions and Co-morbidities  Goal: Patient's chronic conditions and co-morbidity symptoms are monitored and maintained or improved  12/21/2024 1625 by Kevyn Moreau RN  Outcome: Progressing  Flowsheets (Taken 12/21/2024 1625)  Care Plan - Patient's Chronic Conditions and Co-Morbidity Symptoms are Monitored and Maintained or Improved:   Monitor and assess patient's chronic conditions and comorbid symptoms for stability, deterioration, or improvement   Collaborate with multidisciplinary team to address chronic and comorbid conditions and prevent exacerbation or deterioration   Update acute care plan with appropriate goals if chronic or comorbid symptoms are exacerbated and prevent overall improvement and discharge     Problem: Pain  Goal: Verbalizes/displays adequate comfort level or baseline comfort level  12/21/2024 2133 by Lyly Salazar RN  Outcome: Progressing  Flowsheets (Taken 12/21/2024 1625 by Kevyn Moreau, RN)  Verbalizes/displays adequate comfort level or baseline comfort level:   Encourage patient to monitor pain and request assistance   Administer analgesics based on type and severity of pain and evaluate response   Consider cultural and social influences on pain and pain management   Assess pain using appropriate pain scale   Implement non-pharmacological measures as appropriate and evaluate response   Notify Licensed Independent Practitioner if interventions unsuccessful or patient reports new pain  12/21/2024 1625 by Kevyn Moreau RN  Outcome: Progressing  Flowsheets (Taken 12/21/2024 1625)  Verbalizes/displays adequate comfort level or baseline comfort level:   Encourage patient to monitor pain and request assistance   Administer analgesics based on type and severity of pain and evaluate response   Consider cultural and social influences on pain and pain management   Assess pain using appropriate pain scale   Implement non-pharmacological measures as appropriate and evaluate

## 2024-12-23 LAB
ANION GAP SERPL CALC-SCNC: 11 MEQ/L (ref 8–16)
BUN SERPL-MCNC: 18 MG/DL (ref 7–22)
CALCIUM SERPL-MCNC: 9.2 MG/DL (ref 8.5–10.5)
CHLORIDE SERPL-SCNC: 103 MEQ/L (ref 98–111)
CO2 SERPL-SCNC: 24 MEQ/L (ref 23–33)
CREAT SERPL-MCNC: 0.5 MG/DL (ref 0.4–1.2)
DEPRECATED RDW RBC AUTO: 44.5 FL (ref 35–45)
ERYTHROCYTE [DISTWIDTH] IN BLOOD BY AUTOMATED COUNT: 17.2 % (ref 11.5–14.5)
GFR SERPL CREATININE-BSD FRML MDRD: 90 ML/MIN/1.73M2
GLUCOSE SERPL-MCNC: 104 MG/DL (ref 70–108)
HCT VFR BLD AUTO: 28.1 % (ref 37–47)
HGB BLD-MCNC: 8.8 GM/DL (ref 12–16)
MCH RBC QN AUTO: 23 PG (ref 26–33)
MCHC RBC AUTO-ENTMCNC: 31.3 GM/DL (ref 32.2–35.5)
MCV RBC AUTO: 73.6 FL (ref 81–99)
PLATELET # BLD AUTO: 293 THOU/MM3 (ref 130–400)
PMV BLD AUTO: 9.2 FL (ref 9.4–12.4)
POTASSIUM SERPL-SCNC: 3.8 MEQ/L (ref 3.5–5.2)
RBC # BLD AUTO: 3.82 MILL/MM3 (ref 4.2–5.4)
SODIUM SERPL-SCNC: 138 MEQ/L (ref 135–145)
WBC # BLD AUTO: 7.5 THOU/MM3 (ref 4.8–10.8)

## 2024-12-23 PROCEDURE — 1200000000 HC SEMI PRIVATE

## 2024-12-23 PROCEDURE — 6370000000 HC RX 637 (ALT 250 FOR IP): Performed by: NURSE PRACTITIONER

## 2024-12-23 PROCEDURE — 36415 COLL VENOUS BLD VENIPUNCTURE: CPT

## 2024-12-23 PROCEDURE — 99232 SBSQ HOSP IP/OBS MODERATE 35: CPT | Performed by: INTERNAL MEDICINE

## 2024-12-23 PROCEDURE — 6370000000 HC RX 637 (ALT 250 FOR IP): Performed by: INTERNAL MEDICINE

## 2024-12-23 PROCEDURE — 80048 BASIC METABOLIC PNL TOTAL CA: CPT

## 2024-12-23 PROCEDURE — 85027 COMPLETE CBC AUTOMATED: CPT

## 2024-12-23 RX ADMIN — DICLOFENAC SODIUM 2 G: 10 GEL TOPICAL at 17:10

## 2024-12-23 RX ADMIN — APIXABAN 5 MG: 5 TABLET, FILM COATED ORAL at 08:23

## 2024-12-23 RX ADMIN — DICLOFENAC SODIUM 2 G: 10 GEL TOPICAL at 13:39

## 2024-12-23 RX ADMIN — DICLOFENAC SODIUM 2 G: 10 GEL TOPICAL at 19:26

## 2024-12-23 RX ADMIN — OXYCODONE 10 MG: 5 TABLET ORAL at 12:56

## 2024-12-23 RX ADMIN — GABAPENTIN 100 MG: 100 CAPSULE ORAL at 08:23

## 2024-12-23 RX ADMIN — DICLOFENAC SODIUM 2 G: 10 GEL TOPICAL at 08:24

## 2024-12-23 RX ADMIN — APIXABAN 5 MG: 5 TABLET, FILM COATED ORAL at 19:26

## 2024-12-23 RX ADMIN — Medication 400 MG: at 08:23

## 2024-12-23 RX ADMIN — FERROUS SULFATE TAB 325 MG (65 MG ELEMENTAL FE) 325 MG: 325 (65 FE) TAB at 19:26

## 2024-12-23 RX ADMIN — Medication 1000 UNITS: at 08:23

## 2024-12-23 RX ADMIN — PANTOPRAZOLE SODIUM 40 MG: 40 TABLET, DELAYED RELEASE ORAL at 05:57

## 2024-12-23 RX ADMIN — SENNOSIDES 8.6 MG: 8.6 TABLET, FILM COATED ORAL at 19:26

## 2024-12-23 RX ADMIN — FERROUS SULFATE TAB 325 MG (65 MG ELEMENTAL FE) 325 MG: 325 (65 FE) TAB at 08:23

## 2024-12-23 RX ADMIN — GABAPENTIN 100 MG: 100 CAPSULE ORAL at 19:26

## 2024-12-23 RX ADMIN — SENNOSIDES 8.6 MG: 8.6 TABLET, FILM COATED ORAL at 08:23

## 2024-12-23 ASSESSMENT — PAIN DESCRIPTION - LOCATION: LOCATION: LEG;BUTTOCKS

## 2024-12-23 ASSESSMENT — PAIN DESCRIPTION - DESCRIPTORS: DESCRIPTORS: ACHING

## 2024-12-23 ASSESSMENT — PAIN SCALES - GENERAL: PAINLEVEL_OUTOF10: 8

## 2024-12-23 NOTE — PROGRESS NOTES
Progress note: Infectious diseases    Patient - Jasmina Powers,  Age - 89 y.o.    - 1935      Room Number - 5K-25/025-A   MRN -  [id number]   [facility] # - [id number]  Date of Admission -  2024  9:38 AM    SUBJECTIVE:   She has no new complaints  Family have decided to transition care to comfort care measures.  Awaiting placement to [facility]  OBJECTIVE   VITALS    height is 1.651 m (5' 5\") and weight is 69 kg (152 lb 1.9 oz). Her oral temperature is 98.7 °F (37.1 °C). Her blood pressure is 100/70 and her pulse is 88. Her respiration is 16 and oxygen saturation is 99%.       Wt Readings from Last 3 Encounters:   24 69 kg (152 lb 1.9 oz)   24 63.1 kg (139 lb 1.8 oz)   10/21/24 68 kg (150 lb)       I/O (24 Hours)    Intake/Output Summary (Last 24 hours) at 2024 1516  Last data filed at 2024 1451  Gross per 24 hour   Intake 560 ml   Output --   Net 560 ml       General Appearance  Awake, alert, oriented, chronically sick looking  HEENT - normocephalic, atraumatic, pale conjunctiva,  anicteric sclera  Neck - Supple, no mass  Lungs -  Bilateral   air entry,    Cardiovascular - Heart sounds are normal.     Abdomen - soft, not distended, nontender,   Neurologic -awake  Skin - No bruising or bleeding  Extremities - bilateral leg wound and coccyx wound       MEDICATIONS:      potassium bicarb-citric acid  20 mEq Oral Once    magnesium sulfate  2,000 mg IntraVENous Once    apixaban  5 mg Oral BID    diclofenac sodium  2 g Topical 4x Daily    ferrous sulfate  325 mg Oral BID    gabapentin  100 mg Oral BID    magnesium oxide  400 mg Oral Daily    pantoprazole  40 mg Oral QAM AC    senna  1 tablet Oral BID    Vitamin D  1 tablet Oral Daily    sodium chloride flush  5-40 mL IntraVENous 2 times per day      sodium chloride 50 mL/hr at 24 0410    sodium chloride       oxyCODONE **OR** oxyCODONE, albuterol

## 2024-12-23 NOTE — CARE COORDINATION
12/23/24, 8:47 AM EST    DISCHARGE PLANNING EVALUATION    Call to [facility], left a voicemail for a call back.     9:31 AM EST  Call back from Lyly with [facility], reports once code status if address they can accept pt. [facility]NA did update provider and palliative care nurse. They will follow up with family.     1:53 PM EST  Call from Torrey with palliative care provider, plans for DNR CC, family asking to discharge to facility tomorrow.     [facility] did call [facility], transport set for tomorrow at 11:30am. [facility]NA faxed transport paperwork, copy placed on chart. [facility] did talk with Giovana with Hospice, updated on transport time tomorrow. Call to Lyly with [facility], aware of plans for discharge tomorrow, [facility] left a voicemail regarding transport time tomorrow.       Call to pt's son Jhonny, updated on transport time for tomorrow.

## 2024-12-23 NOTE — PALLIATIVE CARE
Follow Up / Progress Note        Patient:   Jasmina Powers  YOB: 1935  Age:  89 y.o.  Room:  [facility]  MRN:  [id number]         Family/Patient Discussion:  Update received by primary nurse Kevyn DARNELL. Chart review complete. Patient appears to be resting comfortably this morning, did not disturb. Called and spoke with patients son Jhonny. Jhonny indicates that he has been feeling sick the last couple of days and at this time isn't planning on visiting due to how he is feeling. Jhonny is aware that an accepting facility has been identified and has room availability if code status is addressed prior to discharge. Jhonny aware of this information and voiced no further questions. Asked jhonny if this writer could call and speak with his brother about patients condition and discuss goals of care/code status. Jhonny is in agreement with this plan at this time.        This writer called and spoke with patients other son Enrique that lives in [place]. Updated Enrique about his mothers current condition and situation. Discussed that patient has been refusing to eat and at times medical treatments. Discussed that some of the patients medical conditions will progressively worsen with time. Enrique is aware of his mothers conditions and has been in contact with his brother Jhonny for regular updates. Discussed with Enrique that if patient continues to refuse treatments, medications, meals etc. That it may be her way of expressing she is tired and that further aggressive treatments may be burdensome for her at this time. Informed Enrique about the difference between full code, DNRCCA and DNRCC. Enrique is aware that if Hospice is to accept patient at the facility a code status change will be needed. Enrique is aware that as a DNRCC care would be focused more towards comfort care and quality of life. Enrique verbalized understanding and voiced he was familiar with Hospice care concepts. Enrique has

## 2024-12-23 NOTE — PROGRESS NOTES
Hospitalist Progress Note  Internal Medicine Resident      Patient: Jasmina Powers 89 y.o. female      Unit/Bed: -25/025-A    Admit Date: 12/16/2024      ASSESSMENT AND PLAN  Active Problems  Failure to thrive- patient presented from home with son. Recently discharged home from an extended stay at the nursing facility. Had been refusing to eat, drink or take her medications. Continues to refuse labs and medications in hospital. Has pulled out IV and at times refuses vitals. Son was considering PEG, but was advised against. Has been eating/drinking regularly  Palliative following. Awaiting response from [facility], they need code status to be changed before accepting the Patient, Ongoing discussions with 2 sons regarding code status  Encourage oral intake, was eating and enjoying breakfast this morning.     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 Has extensive soft tissue calcification. ID following-daily dressing changes    Wound Stage III of coccyx: ID following. Having worsening tissue injury due to patient refusal to turn. For foam dressing changes daily.     Resolved Problems  Positive UA- moderate leukocytes and budding yeast, few bacteria noted. Patient unable to report symptoms. Discussed with ID and since patient afebrile and no leukocytosis to defer treatment at this time.   Hypokalemic: 12/20 K+ was 3.6.    Hypomagnesemia: 12/20 Mg 1.6. Prescribed 2,000mg replacement, but did not receive due to patient refusal. Has taken out IV access and is refusing medications and labs.   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 Hb 9.1 and MVC 72.4. On Ferrous sulfate 325mg BID  HLD-not on statin  Paroxysmal Atrial Fibrillation-on Eliquis   COPD  GERD: On Protonix 40mg PO QD   Dementia      LDA: []CVC / []PICC / []Midline / []Carney / []Drains / []Mediport / [x]None  Antibiotics: n/a  Steroids:

## 2024-12-23 NOTE — PLAN OF CARE
Problem: Chronic Conditions and Co-morbidities  Goal: Patient's chronic conditions and co-morbidity symptoms are monitored and maintained or improved  12/22/2024 1504 by Kevyn Moreau RN  Outcome: Progressing  Flowsheets (Taken 12/22/2024 1504)  Care Plan - Patient's Chronic Conditions and Co-Morbidity Symptoms are Monitored and Maintained or Improved:   Monitor and assess patient's chronic conditions and comorbid symptoms for stability, deterioration, or improvement   Collaborate with multidisciplinary team to address chronic and comorbid conditions and prevent exacerbation or deterioration   Update acute care plan with appropriate goals if chronic or comorbid symptoms are exacerbated and prevent overall improvement and discharge     Problem: Pain  Goal: Verbalizes/displays adequate comfort level or baseline comfort level  12/23/2024 0317 by Lyly Salazar RN  Outcome: Progressing  Flowsheets (Taken 12/22/2024 1504 by Kevyn Moreau, RN)  Verbalizes/displays adequate comfort level or baseline comfort level:   Encourage patient to monitor pain and request assistance   Administer analgesics based on type and severity of pain and evaluate response   Consider cultural and social influences on pain and pain management   Assess pain using appropriate pain scale   Implement non-pharmacological measures as appropriate and evaluate response   Notify Licensed Independent Practitioner if interventions unsuccessful or patient reports new pain  12/22/2024 1504 by Kevyn Moreau RN  Outcome: Progressing  Flowsheets (Taken 12/22/2024 1504)  Verbalizes/displays adequate comfort level or baseline comfort level:   Encourage patient to monitor pain and request assistance   Administer analgesics based on type and severity of pain and evaluate response   Consider cultural and social influences on pain and pain management   Assess pain using appropriate pain scale   Implement non-pharmacological measures as appropriate and evaluate  breakdown  2.  Assess vascular access sites hourly  3.  Every 4-6 hours minimum:  Change oxygen saturation probe site  4.  Every 4-6 hours:  If on nasal continuous positive airway pressure, respiratory therapy assess nares and determine need for appliance change or resting period.  12/22/2024 1504 by Kevyn Moreau RN  Outcome: Progressing     Problem: Nutrition Deficit:  Goal: Optimize nutritional status  12/22/2024 1504 by Kevyn Moreau RN  Outcome: Progressing  Flowsheets (Taken 12/22/2024 1504)  Nutrient intake appropriate for improving, restoring, or maintaining nutritional needs:   Assess nutritional status and recommend course of action   Monitor oral intake, labs, and treatment plans   Recommend appropriate diets, oral nutritional supplements, and vitamin/mineral supplements     Problem: Neurosensory - Adult  Goal: Achieves stable or improved neurological status  12/23/2024 0317 by Lyly Salazar RN  Outcome: Progressing  Flowsheets (Taken 12/22/2024 1504 by Kevyn Moreau RN)  Achieves stable or improved neurological status: Assess for and report changes in neurological status  12/22/2024 1504 by Kevyn Moreau RN  Outcome: Progressing  Flowsheets (Taken 12/22/2024 1504)  Achieves stable or improved neurological status: Assess for and report changes in neurological status     Problem: Cardiovascular - Adult  Goal: Maintains optimal cardiac output and hemodynamic stability  12/23/2024 0317 by Lyly Salazar RN  Outcome: Progressing  Flowsheets (Taken 12/22/2024 1504 by Kevyn Moreau RN)  Maintains optimal cardiac output and hemodynamic stability:   Monitor blood pressure and heart rate   Monitor urine output and notify Licensed Independent Practitioner for values outside of normal range   Assess for signs of decreased cardiac output  12/22/2024 1504 by Kevyn Moreau RN  Outcome: Progressing  Flowsheets (Taken 12/22/2024 1504)  Maintains optimal cardiac output and hemodynamic stability:   Monitor blood

## 2024-12-23 NOTE — PALLIATIVE CARE
Follow Up / Progress Note        Patient:   Jasmina Powers  YOB: 1935  Age:  89 y.o.  Room:  [facility]25/025-  MRN:  [id number]         Family/Patient Discussion:  Called and spoke with patients son Enrique regarding the discussion this morning about goals of care and code status. Enrique states that he is in agreement to change code status to DNRCC.  Enrique is also in agreement with focusing on comfort care and sign patient up with Hospice once at the [facility] facility. Enrique voiced that making this decision is very challenging for him, however uncerstands that this opens up opportunity for focus solely on quality of life which he believes is what his mother deserves. Much emotional support provided. No further questions voiced at this time.   Called patients other son Jhonny and he has requested that we transport patient tomorrow at the earliest, considering how he is feeling today. Informed Fanta STARK about update of code status change and request for transport for tomorrow. Update sent to attending physician. Request to update patients code status to reflect family care preferences within EPIC received. Code status updated within EPIC from Full Code to DNRCC. Will make copies of DNR form once physician has signed. Copies will be made and placed on chart and sent to Medical Records to be scanned into EPIC chart. Updated primary nurse Kevyn DARNELL.     Plan/Follow-Up:  Palliative Care will continue to follow while patient is hospitalized. Please call palliative care if further needs arise.        Electronically signed by Torrey Alarcon RN on 12/23/2024 at 1:45 PM             Palliative Care Office: [phone]

## 2024-12-23 NOTE — DISCHARGE INSTR - COC
Continuity of Care Form    Patient Name: Jasmina Powers   :  1935  MRN:  [id number]    Admit date:  2024  Discharge date:  2024      Code Status Order: DNR-CC   Advance Directives:   Advance Care Flowsheet Documentation             Admitting Physician:  No admitting provider for patient encounter.  PCP: Dimas Watts APRN - CNP    Discharging Nurse: Berenice DARNELL   Discharging Hospital Unit/Room#: 5K-25/025-A  Discharging Unit Phone Number: [phone]    Emergency Contact:   Extended Emergency Contact Information  Primary Emergency Contact: Jhonny Powers  Address: [address] [place] [address]  Mobile Phone: [phone]  Relation: Child   needed? No  Secondary Emergency Contact: Enrique Mathews   [place]  Mobile Phone: [phone]  Relation: Child   needed? No    Past Surgical History:  History reviewed. No pertinent surgical history.    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    Mammogram declined Z53.20    Chronic atrial fibrillation ([facility]) I48.20    Hyponatremia E87.1    Cellulitis of right lower extremity L03.115    Physical deconditioning R53.81    Intractable pain R52    Leg wound, right, sequela S81.801S    Other chronic pain G89.29    Palliative care patient Z51.5    Chronic heart failure with preserved ejection fraction (HFpEF) ([facility]) I50.32    Moderate malnutrition  gauze;Xeroform 12/24/24 0823   Dressing Change Due 11/19/24 12/18/24 2011   Wound Assessment Other (Comment) 12/20/24 0840   Drainage Amount None (dry) 12/24/24 0823   Drainage Description Serosanguinous;Yellow 12/18/24 0816   Odor None 12/24/24 0823   Nancy-wound Assessment Dry/flaky;Hyperpigmented 12/18/24 0816   Margins Attached edges 12/18/24 0816   Wound Thickness Description not for Pressure Injury Full thickness 12/18/24 0816   Number of days: 61       Wound 11/13/24 Tibial Posterior;Right (Active)   Wound Etiology Venous 12/20/24 0840   Dressing Status Clean;Dry;Intact 12/24/24 0823   Wound Cleansed Cleansed with saline 12/23/24 1603   Dressing/Treatment ABD;Ace wrap;Xeroform;Roll gauze 12/24/24 0823   Dressing Change Due 12/20/24 12/21/24 0857   Wound Assessment Other (Comment) 12/20/24 0840   Drainage Amount None (dry) 12/24/24 0823   Drainage Description Serosanguinous;Yellow 12/18/24 0816   Odor None 12/24/24 0823   Nancy-wound Assessment Dry/flaky;Hyperpigmented 12/18/24 0816   Margins Attached edges 12/18/24 0816   Wound Thickness Description not for Pressure Injury Full thickness 12/18/24 0816   Number of days: 41       Wound 12/16/24 Buttocks Posterior (Active)   Wound Etiology Pressure Stage 2 12/20/24 0840   Dressing Status Dry;Clean;Intact 12/24/24 0823   Wound Cleansed Cleansed with saline 12/22/24 1921   Dressing/Treatment Foam 12/24/24 0823   Dressing Change Due 12/20/24 12/21/24 0857   Wound Assessment Other (Comment) 12/20/24 0840   Drainage Amount Other (Comment) 12/18/24 0816   Drainage Description Serosanguinous 12/18/24 0816   Odor None 12/18/24 0816   Number of days: 8        Elimination:  Continence:   Bowel: No  Bladder: No  Urinary Catheter: None   Colostomy/Ileostomy/Ileal Conduit: No       Date of Last BM: 12/23/2024    Intake/Output Summary (Last 24 hours) at 12/24/2024 0827  Last data filed at 12/23/2024 1451  Gross per 24 hour   Intake 560 ml   Output 250 ml   Net 310 ml     I/O  Well developed Well developed

## 2024-12-24 VITALS
DIASTOLIC BLOOD PRESSURE: 65 MMHG | WEIGHT: 152.12 LBS | HEART RATE: 83 BPM | HEIGHT: 65 IN | RESPIRATION RATE: 18 BRPM | SYSTOLIC BLOOD PRESSURE: 101 MMHG | BODY MASS INDEX: 25.34 KG/M2 | TEMPERATURE: 98.4 F | OXYGEN SATURATION: 100 %

## 2024-12-24 PROCEDURE — 6370000000 HC RX 637 (ALT 250 FOR IP): Performed by: INTERNAL MEDICINE

## 2024-12-24 PROCEDURE — 6370000000 HC RX 637 (ALT 250 FOR IP): Performed by: NURSE PRACTITIONER

## 2024-12-24 PROCEDURE — 99238 HOSP IP/OBS DSCHRG MGMT 30/<: CPT | Performed by: INTERNAL MEDICINE

## 2024-12-24 RX ORDER — ACETAMINOPHEN 500 MG
1000 TABLET ORAL EVERY 8 HOURS PRN
DISCHARGE
Start: 2024-12-24

## 2024-12-24 RX ORDER — OXYCODONE HYDROCHLORIDE 5 MG/1
5-10 TABLET ORAL EVERY 6 HOURS PRN
Qty: 15 TABLET | Refills: 0 | Status: SHIPPED | OUTPATIENT
Start: 2024-12-24 | End: 2024-12-27

## 2024-12-24 RX ORDER — HYDROCODONE BITARTRATE AND ACETAMINOPHEN 5; 325 MG/1; MG/1
2 TABLET ORAL EVERY 6 HOURS PRN
Qty: 120 TABLET | Refills: 0 | Status: CANCELLED | OUTPATIENT
Start: 2024-12-24 | End: 2025-01-23

## 2024-12-24 RX ORDER — GABAPENTIN 100 MG/1
100 CAPSULE ORAL 2 TIMES DAILY
DISCHARGE
Start: 2024-12-24 | End: 2025-03-24

## 2024-12-24 RX ORDER — AMMONIUM LACTATE 12 G/100G
LOTION TOPICAL PRN
DISCHARGE
Start: 2024-12-24

## 2024-12-24 RX ORDER — HYDROXYZINE PAMOATE 25 MG/1
25 CAPSULE ORAL 3 TIMES DAILY PRN
DISCHARGE
Start: 2024-12-24

## 2024-12-24 RX ORDER — FLUTICASONE FUROATE, UMECLIDINIUM BROMIDE AND VILANTEROL TRIFENATATE 200; 62.5; 25 UG/1; UG/1; UG/1
1 POWDER RESPIRATORY (INHALATION) DAILY
DISCHARGE
Start: 2024-12-24

## 2024-12-24 RX ORDER — MONTELUKAST SODIUM 10 MG/1
10 TABLET ORAL NIGHTLY
Status: CANCELLED | DISCHARGE
Start: 2024-12-24

## 2024-12-24 RX ORDER — FLUTICASONE PROPIONATE 50 MCG
1 SPRAY, SUSPENSION (ML) NASAL DAILY
Status: CANCELLED | DISCHARGE
Start: 2024-12-24

## 2024-12-24 RX ORDER — METOPROLOL TARTRATE 25 MG/1
25 TABLET, FILM COATED ORAL 2 TIMES DAILY
DISCHARGE
Start: 2024-12-24

## 2024-12-24 RX ORDER — OMEPRAZOLE 40 MG/1
CAPSULE, DELAYED RELEASE ORAL
DISCHARGE
Start: 2024-12-24

## 2024-12-24 RX ORDER — AMLODIPINE BESYLATE 10 MG/1
5 TABLET ORAL DAILY
Status: CANCELLED | DISCHARGE
Start: 2024-12-24

## 2024-12-24 RX ORDER — HYDROCODONE BITARTRATE AND ACETAMINOPHEN 5; 325 MG/1; MG/1
2 TABLET ORAL EVERY 6 HOURS PRN
Status: CANCELLED | DISCHARGE
Start: 2024-12-24 | End: 2025-01-23

## 2024-12-24 RX ORDER — BLOOD SUGAR DIAGNOSTIC
STRIP MISCELLANEOUS
DISCHARGE
Start: 2024-12-24

## 2024-12-24 RX ADMIN — FERROUS SULFATE TAB 325 MG (65 MG ELEMENTAL FE) 325 MG: 325 (65 FE) TAB at 08:32

## 2024-12-24 RX ADMIN — OXYCODONE 10 MG: 5 TABLET ORAL at 04:51

## 2024-12-24 RX ADMIN — GABAPENTIN 100 MG: 100 CAPSULE ORAL at 08:32

## 2024-12-24 RX ADMIN — PANTOPRAZOLE SODIUM 40 MG: 40 TABLET, DELAYED RELEASE ORAL at 05:04

## 2024-12-24 RX ADMIN — APIXABAN 5 MG: 5 TABLET, FILM COATED ORAL at 08:32

## 2024-12-24 RX ADMIN — DICLOFENAC SODIUM 2 G: 10 GEL TOPICAL at 08:31

## 2024-12-24 RX ADMIN — OXYCODONE 10 MG: 5 TABLET ORAL at 10:46

## 2024-12-24 RX ADMIN — Medication 1000 UNITS: at 08:32

## 2024-12-24 RX ADMIN — Medication 400 MG: at 08:31

## 2024-12-24 RX ADMIN — SENNOSIDES 8.6 MG: 8.6 TABLET, FILM COATED ORAL at 08:31

## 2024-12-24 ASSESSMENT — PAIN DESCRIPTION - ORIENTATION
ORIENTATION: MID
ORIENTATION: RIGHT;LEFT;MID

## 2024-12-24 ASSESSMENT — PAIN DESCRIPTION - LOCATION
LOCATION: BUTTOCKS;LEG
LOCATION: BACK

## 2024-12-24 ASSESSMENT — PAIN DESCRIPTION - DESCRIPTORS
DESCRIPTORS: ACHING
DESCRIPTORS: SHARP

## 2024-12-24 ASSESSMENT — PAIN SCALES - GENERAL: PAINLEVEL_OUTOF10: 9

## 2024-12-24 ASSESSMENT — PAIN - FUNCTIONAL ASSESSMENT: PAIN_FUNCTIONAL_ASSESSMENT: PREVENTS OR INTERFERES SOME ACTIVE ACTIVITIES AND ADLS

## 2024-12-24 NOTE — CARE COORDINATION
12/24/24, 10:52 AM EST    Patient goals/plan/ treatment preferences discussed by  and .  Patient goals/plan/ treatment preferences reviewed with patient/ family.  Patient/ family verbalize understanding of discharge plan and are in agreement with goal/plan/treatment preferences.  Understanding was demonstrated using the teach back method.  AVS provided by RN at time of discharge, which includes all necessary medical information pertaining to the patients current course of illness, treatment, post-discharge goals of care, and treatment preferences.     Services At/After Discharge: Long Term Care, Hospice, and In ambulance [facility], [facility], [facility]     faxed AVS, MAR, and Rx to [facility]. Nurse will call report.

## 2024-12-24 NOTE — PLAN OF CARE
Problem: Chronic Conditions and Co-morbidities  Goal: Patient's chronic conditions and co-morbidity symptoms are monitored and maintained or improved  Outcome: Progressing  Flowsheets (Taken 12/24/2024 1207)  Care Plan - Patient's Chronic Conditions and Co-Morbidity Symptoms are Monitored and Maintained or Improved:   Monitor and assess patient's chronic conditions and comorbid symptoms for stability, deterioration, or improvement   Collaborate with multidisciplinary team to address chronic and comorbid conditions and prevent exacerbation or deterioration   Update acute care plan with appropriate goals if chronic or comorbid symptoms are exacerbated and prevent overall improvement and discharge     Problem: Pain  Goal: Verbalizes/displays adequate comfort level or baseline comfort level  12/24/2024 1207 by Shiela De La Cruz RN  Outcome: Progressing  Flowsheets (Taken 12/24/2024 1207)  Verbalizes/displays adequate comfort level or baseline comfort level:   Encourage patient to monitor pain and request assistance   Administer analgesics based on type and severity of pain and evaluate response   Assess pain using appropriate pain scale   Implement non-pharmacological measures as appropriate and evaluate response     Problem: Skin/Tissue Integrity - Adult  Goal: Skin integrity remains intact  12/24/2024 1207 by Shiela De La Cruz RN  Outcome: Progressing  Flowsheets (Taken 12/24/2024 1207)  Skin Integrity Remains Intact:   Monitor for areas of redness and/or skin breakdown   Assess vascular access sites hourly   Every 4-6 hours minimum: Change oxygen saturation probe site     Problem: Musculoskeletal - Adult  Goal: Return mobility to safest level of function  12/24/2024 1207 by Shiela De La Cruz RN  Outcome: Progressing  Flowsheets (Taken 12/24/2024 1207)  Return Mobility to Safest Level of Function:   Assess patient stability and activity tolerance for standing, transferring and ambulating with or without

## 2024-12-24 NOTE — PLAN OF CARE
Problem: Pain  Goal: Verbalizes/displays adequate comfort level or baseline comfort level  Outcome: Progressing  Flowsheets (Taken 12/22/2024 1504 by Kevyn Moreau, RN)  Verbalizes/displays adequate comfort level or baseline comfort level:   Encourage patient to monitor pain and request assistance   Administer analgesics based on type and severity of pain and evaluate response   Consider cultural and social influences on pain and pain management   Assess pain using appropriate pain scale   Implement non-pharmacological measures as appropriate and evaluate response   Notify Licensed Independent Practitioner if interventions unsuccessful or patient reports new pain     Problem: Skin/Tissue Integrity - Adult  Goal: Skin integrity remains intact  Outcome: Progressing  Flowsheets (Taken 12/22/2024 1504 by Kevyn Moreau, RN)  Skin Integrity Remains Intact: Monitor for areas of redness and/or skin breakdown  Goal: Incisions, wounds, or drain sites healing without S/S of infection  Outcome: Progressing  Flowsheets (Taken 12/22/2024 1504 by Kevyn Moreau, RN)  Incisions, Wounds, or Drain Sites Healing Without Sign and Symptoms of Infection:   Implement wound care per orders   TWICE DAILY: Assess and document skin integrity     Problem: Musculoskeletal - Adult  Goal: Return mobility to safest level of function  Outcome: Progressing  Flowsheets (Taken 12/22/2024 1504 by Kevyn Moreau, RN)  Return Mobility to Safest Level of Function:   Assess patient stability and activity tolerance for standing, transferring and ambulating with or without assistive devices   Assist with transfers and ambulation using safe patient handling equipment as needed   Ensure adequate protection for wounds/incisions during mobilization     Problem: Discharge Planning  Goal: Discharge to home or other facility with appropriate resources  Outcome: Progressing  Flowsheets (Taken 12/22/2024 1504 by Kevyn Moreau, RN)  Discharge to home or other facility with

## 2024-12-26 ENCOUNTER — TELEPHONE (OUTPATIENT)
Dept: WOUND CARE | Age: 89
End: 2024-12-26

## 2024-12-26 NOTE — TELEPHONE ENCOUNTER
Called [facility] to ask if patient needs to be seen in office, no answer, voicemail left.

## 2024-12-30 NOTE — PROGRESS NOTES
Hospice Certification of Terminal Illness      89-YEAR-OLD WOMAN WITH TERMINAL DIAGNOSIS OF NUTRITIONAL DEFICIENCY.  SHE WAS BROUGHT TO THE HOSPITAL BY HER FAMILY WITH FAILURE TO THRIVE AND NO INTAKE FOR 2 WEEKS SINCE SHE HAD BEEN HOME FROM THE NURSING HOME.  ADDITIONALLY SHE HAD ONLY BEEN OUT OF BED ONCE IN THAT PERIOD OF TIME, AND WAS REFUSING TO TAKE MEDICATIONS EXCEPT FOR PAIN PILLS.  SHE WAS NOTED TO HAVE MULTIPLE ONGOING AREAS OF SKIN BREAKDOWN WITH 3 AREAS ON HER LEGS AND A NEW STAGE II ON HER COCCYX.  DURING THE HOSPITALIZATION THE PATIENT WAS REFUSING TURNS AND WOUND CARE AS WELL AS MEDICATIONS.  PALLIATIVE MEDICINE MET WITH THE PATIENT'S SON AND HE NOTED SHE HAD BEEN DECLINING FOR 6 TO 12 MONTHS.  HE SUBSEQUENTLY FELT THAT SHE NO LONGER WANTED AGGRESSIVE TYPE TREATMENT AND WANTED TO BE KEPT COMFORTABLE SO SHE WAS TRANSITIONED TO COMFORT FOCUSED CARE WITH HOSPICE AND TO RECEIVE CARE AT A LOCAL FACILITY.    PPS 30%  PRIOR TO RETURNING HOME SHE WAS UP IN WHEELCHAIR DAILY.  THIS IS DECLINED FROM SEPT WHEN SHE WAS ABLE TO AMBULATE WITH A WALKER.    WEIGHT 152 POUNDS ON 12-16 WHICH WAS NOTED TO BE DECLINED FROM 171 POUNDS IN JULY 2024.    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    Doreen Rodarte MD  Board Certified Hospice and Palliative Medicine  Hospice Medical Director Certified

## 2024-12-31 ENCOUNTER — TELEPHONE (OUTPATIENT)
Dept: WOUND CARE | Age: 89
End: 2024-12-31

## 2024-12-31 NOTE — TELEPHONE ENCOUNTER
----- Message from Dr. Doreen Rodarte MD sent at 12/30/2024 11:34 AM EST -----  Regarding: Mrs. Powers is on hospice.  Just letting you know, no longer trying to heal wound, comfort focused care.    Dr. Rodarte   No

## 2025-01-08 NOTE — PROGRESS NOTES
Physician Progress Note      PATIENT:               SRUTHI MACK  [facility] #:                  [id number]  :                       1935  ADMIT DATE:       2024 9:38 AM  DISCH DATE:        2024 12:13 PM  RESPONDING  PROVIDER #:        Joseph Arias DO          QUERY TEXT:    Dr Arias, Dr Toth,    Pt admitted with Weakness with physical deconditioning/debility likely   secondary to numerous comorbid medical conditions and advanced age.  She is   normally wheelchair-bound. Noted FTT. Son was considering PEG, but was advised   against. Has been eating/drinking regularly. If possible, please document in   the progress notes and discharge summary if you are evaluating and / or   treating any of the following:    The medical record reflects the following:  Risk Factors: Recently discharged home from an extended stay at the nursing   facility.  Clinical Indicators: Weakness with physical deconditioning/debility likely   secondary to numerous comorbid medical conditions and advanced age.  She is   normally wheelchair-bound. Noted FTT. Son was considering PEG, but was advised   against. Has been eating/drinking regularly.  Treatment: Family have decided to transition care to comfort care measures.   Awaiting placement to ECF  Options provided:  -- Weakness with physical deconditioning/debility related to Age Related   Physical Debility and FTT.  -- Weakness with physical deconditioning/debility related to FTT., Please   document other cause.  -- This patient has Weakness with physical deconditioning/debility related to   Frailty  -- Other - I will add my own diagnosis  -- Disagree - Not applicable / Not valid  -- Disagree - Clinically unable to determine / Unknown  -- Refer to Clinical Documentation Reviewer    PROVIDER RESPONSE TEXT:    This patient has Weakness with physical deconditioning/debility related to Age   Related Physical Debility and FTT.    Query created by: Adilene Guerrier on 2024

## 2025-01-09 ENCOUNTER — TELEPHONE (OUTPATIENT)
Dept: WOUND CARE | Age: 89
End: 2025-01-09

## 2025-01-09 NOTE — TELEPHONE ENCOUNTER
LDA/ episode removed. Patient comfort care, not following for wound care.   The patient is a 48y Male complaining of flu-like symptoms.

## 2025-01-30 NOTE — PROGRESS NOTES
Pt agreeable to taking some medications. See MAR. Pt still refusing dressing change at this time. Able to get a a complete set of vitals and check brief. Will continue to monitor.    Nsaids Pregnancy And Lactation Text: These medications are considered safe up to 30 weeks gestation. It is excreted in breast milk. Cellcept Pregnancy And Lactation Text: This medication is Pregnancy Category D and isn't considered safe during pregnancy. It is unknown if this medication is excreted in breast milk. Cibinqo Counseling: I discussed with the patient the risks of Cibinqo therapy including but not limited to common cold, nausea, headache, cold sores, increased blood CPK levels, dizziness, UTIs, fatigue, acne, and vomitting. Live vaccines should be avoided.  This medication has been linked to serious infections; higher rate of mortality; malignancy and lymphoproliferative disorders; major adverse cardiovascular events; thrombosis; thrombocytopenia and lymphopenia; lipid elevations; and retinal detachment. Winlevi Counseling:  I discussed with the patient the risks of topical clascoterone including but not limited to erythema, scaling, itching, and stinging. Patient voiced their understanding. Ivermectin Counseling:  Patient instructed to take medication on an empty stomach with a full glass of water.  Patient informed of potential adverse effects including but not limited to nausea, diarrhea, dizziness, itching, and swelling of the extremities or lymph nodes.  The patient verbalized understanding of the proper use and possible adverse effects of ivermectin.  All of the patient's questions and concerns were addressed. Use Enhanced Medication Counseling?: No Doxycycline Pregnancy And Lactation Text: This medication is Pregnancy Category D and not consider safe during pregnancy. It is also excreted in breast milk but is considered safe for shorter treatment courses. Soolantra Counseling: I discussed with the patients the risks of topial Soolantra. This is a medicine which decreases the number of mites and inflammation in the skin. You experience burning, stinging, eye irritation or allergic reactions.  Please call our office if you develop any problems from using this medication. Arava Pregnancy And Lactation Text: This medication is Pregnancy Category X and is absolutely contraindicated during pregnancy. It is unknown if it is excreted in breast milk. Klisyri Counseling:  I discussed with the patient the risks of Klisyri including but not limited to erythema, scaling, itching, weeping, crusting, and pain. Cosentyx Pregnancy And Lactation Text: This medication is Pregnancy Category B and is considered safe during pregnancy. It is unknown if this medication is excreted in breast milk. Libtayo Counseling- I discussed with the patient the risks of Libtayo including but not limited to nausea, vomiting, diarrhea, and bone or muscle pain.  The patient verbalized understanding of the proper use and possible adverse effects of Libtayo.  All of the patient's questions and concerns were addressed. Finasteride Pregnancy And Lactation Text: This medication is absolutely contraindicated during pregnancy. It is unknown if it is excreted in breast milk. Cimetidine Counseling:  I discussed with the patient the risks of Cimetidine including but not limited to gynecomastia, headache, diarrhea, nausea, drowsiness, arrhythmias, pancreatitis, skin rashes, psychosis, bone marrow suppression and kidney toxicity. Benzoyl Peroxide Pregnancy And Lactation Text: This medication is Pregnancy Category C. It is unknown if benzoyl peroxide is excreted in breast milk. Olanzapine Counseling- I discussed with the patient the common side effects of olanzapine including but are not limited to: lack of energy, dry mouth, increased appetite, sleepiness, tremor, constipation, dizziness, changes in behavior, or restlessness.  Explained that teenagers are more likely to experience headaches, abdominal pain, pain in the arms or legs, tiredness, and sleepiness.  Serious side effects include but are not limited: increased risk of death in elderly patients who are confused, have memory loss, or dementia-related psychosis; hyperglycemia; increased cholesterol and triglycerides; and weight gain. Cyclophosphamide Counseling:  I discussed with the patient the risks of cyclophosphamide including but not limited to hair loss, hormonal abnormalities, decreased fertility, abdominal pain, diarrhea, nausea and vomiting, bone marrow suppression and infection. The patient understands that monitoring is required while taking this medication. Siliq Counseling:  I discussed with the patient the risks of Siliq including but not limited to new or worsening depression, suicidal thoughts and behavior, immunosuppression, malignancy, posterior leukoencephalopathy syndrome, and serious infections.  The patient understands that monitoring is required including a PPD at baseline and must alert us or the primary physician if symptoms of infection or other concerning signs are noted. There is also a special program designed to monitor depression which is required with Siliq. Winlevi Pregnancy And Lactation Text: This medication is considered safe during pregnancy and breastfeeding. Ivermectin Pregnancy And Lactation Text: This medication is Pregnancy Category C and it isn't known if it is safe during pregnancy. It is also excreted in breast milk. Cibinqo Pregnancy And Lactation Text: It is unknown if this medication will adversely affect pregnancy or breast feeding.  You should not take this medication if you are currently pregnant or planning a pregnancy or while breastfeeding. Erythromycin Counseling:  I discussed with the patient the risks of erythromycin including but not limited to GI upset, allergic reaction, drug rash, diarrhea, increase in liver enzymes, and yeast infections. Soolantra Pregnancy And Lactation Text: This medication is Pregnancy Category C. This medication is considered safe during breast feeding. Clofazimine Counseling:  I discussed with the patient the risks of clofazimine including but not limited to skin and eye pigmentation, liver damage, nausea/vomiting, gastrointestinal bleeding and allergy. Dupixent Counseling: I discussed with the patient the risks of dupilumab including but not limited to eye infection and irritation, cold sores, injection site reactions, worsening of asthma, allergic reactions and increased risk of parasitic infection.  Live vaccines should be avoided while taking dupilumab. Dupilumab will also interact with certain medications such as warfarin and cyclosporine. The patient understands that monitoring is required and they must alert us or the primary physician if symptoms of infection or other concerning signs are noted. Libtayo Pregnancy And Lactation Text: This medication is contraindicated in pregnancy and when breast feeding. Birth Control Pills Counseling: Birth Control Pill Counseling: I discussed with the patient the potential side effects of OCPs including but not limited to increased risk of stroke, heart attack, thrombophlebitis, deep venous thrombosis, hepatic adenomas, breast changes, GI upset, headaches, and depression.  The patient verbalized understanding of the proper use and possible adverse effects of OCPs. All of the patient's questions and concerns were addressed. Klisyri Pregnancy And Lactation Text: It is unknown if this medication can harm a developing fetus or if it is excreted in breast milk. Cimetidine Pregnancy And Lactation Text: This medication is Pregnancy Category B and is considered safe during pregnancy. It is also excreted in breast milk and breast feeding isn't recommended. Siliq Pregnancy And Lactation Text: The risk during pregnancy and breastfeeding is uncertain with this medication. VTAMA Counseling: I discussed with the patient that VTAMA is not for use in the eyes, mouth or mouth. They should call the office if they develop any signs of allergic reactions to VTAMA. The patient verbalized understanding of the proper use and possible adverse effects of VTAMA.  All of the patient's questions and concerns were addressed. Olanzapine Pregnancy And Lactation Text: This medication is pregnancy category C.   There are no adequate and well controlled trials with olanzapine in pregnant females.  Olanzapine should be used during pregnancy only if the potential benefit justifies the potential risk to the fetus.   In a study in lactating healthy women, olanzapine was excreted in breast milk.  It is recommended that women taking olanzapine should not breast feed. Cyclophosphamide Pregnancy And Lactation Text: This medication is Pregnancy Category D and it isn't considered safe during pregnancy. This medication is excreted in breast milk. Carac Counseling:  I discussed with the patient the risks of Carac including but not limited to erythema, scaling, itching, weeping, crusting, and pain. Litfulo Counseling: I discussed with the patient the risks of Litfulo therapy including but not limited to upper respiratory tract infections, shingles, cold sores, and nausea. Live vaccines should be avoided.  This medication has been linked to serious infections; higher rate of mortality; malignancy and lymphoproliferative disorders; major adverse cardiovascular events; thrombosis; gastrointestinal perforations; neutropenia; lymphopenia; anemia; liver enzyme elevations; and lipid elevations. Topical Retinoid counseling:  Patient advised to apply a pea-sized amount only at bedtime and wait 30 minutes after washing their face before applying.  If too drying, patient may add a non-comedogenic moisturizer. The patient verbalized understanding of the proper use and possible adverse effects of retinoids.  All of the patient's questions and concerns were addressed. Dupixent Pregnancy And Lactation Text: This medication likely crosses the placenta but the risk for the fetus is uncertain. This medication is excreted in breast milk. Erythromycin Pregnancy And Lactation Text: This medication is Pregnancy Category B and is considered safe during pregnancy. It is also excreted in breast milk. Odomzo Counseling- I discussed with the patient the risks of Odomzo including but not limited to nausea, vomiting, diarrhea, constipation, weight loss, changes in the sense of taste, decreased appetite, muscle spasms, and hair loss.  The patient verbalized understanding of the proper use and possible adverse effects of Odomzo.  All of the patient's questions and concerns were addressed. Ozempic Counseling: I reviewed the possible side effects including: thyroid tumors, kidney disease, gallbladder disease, abdominal pain, constipation, diarrhea, nausea, vomiting and pancreatitis. Do not take this medication if you have a history or family history of multiple endocrine neoplasia syndrome type 2. Side effects reviewed, pt to contact office should one occur. Clofazimine Pregnancy And Lactation Text: This medication is Pregnancy Category C and isn't considered safe during pregnancy. It is excreted in breast milk. Minoxidil Counseling: Minoxidil is a topical medication which can increase blood flow where it is applied. It is uncertain how this medication increases hair growth. Side effects are uncommon and include stinging and allergic reactions. Birth Control Pills Pregnancy And Lactation Text: This medication should be avoided if pregnant and for the first 30 days post-partum. Cyclosporine Counseling:  I discussed with the patient the risks of cyclosporine including but not limited to hypertension, gingival hyperplasia,myelosuppression, immunosuppression, liver damage, kidney damage, neurotoxicity, lymphoma, and serious infections. The patient understands that monitoring is required including baseline blood pressure, CBC, CMP, lipid panel and uric acid, and then 1-2 times monthly CMP and blood pressure. Simlandi Counseling:  I discussed with the patient the risks of adalimumab including but not limited to myelosuppression, immunosuppression, autoimmune hepatitis, demyelinating diseases, lymphoma, and serious infections.  The patient understands that monitoring is required including a PPD at baseline and must alert us or the primary physician if symptoms of infection or other concerning signs are noted. Carac Pregnancy And Lactation Text: This medication is Pregnancy Category X and contraindicated in pregnancy and in women who may become pregnant. It is unknown if this medication is excreted in breast milk. Oral Minoxidil Counseling- I discussed with the patient the risks of oral minoxidil including but not limited to shortness of breath, swelling of the feet or ankles, dizziness, lightheadedness, unwanted hair growth and allergic reaction.  The patient verbalized understanding of the proper use and possible adverse effects of oral minoxidil.  All of the patient's questions and concerns were addressed. Doxepin Counseling:  Patient advised that the medication is sedating and not to drive a car after taking this medication. Patient informed of potential adverse effects including but not limited to dry mouth, urinary retention, and blurry vision.  The patient verbalized understanding of the proper use and possible adverse effects of doxepin.  All of the patient's questions and concerns were addressed. Vtama Pregnancy And Lactation Text: It is unknown if this medication can cause problems during pregnancy and breastfeeding. Litfulo Pregnancy And Lactation Text: Based on animal studies, Lifulo may cause embryo-fetal harm when administered to pregnant women.  The medication should not be used in pregnancy.  Breastfeeding is not recommended during treatment. Metronidazole Counseling:  I discussed with the patient the risks of metronidazole including but not limited to seizures, nausea/vomiting, a metallic taste in the mouth, nausea/vomiting and severe allergy. Topical Retinoid Pregnancy And Lactation Text: This medication is Pregnancy Category C. It is unknown if this medication is excreted in breast milk. Ebglyss Counseling: I discussed with the patient the risks of lebrikizumab including but not limited to eye inflammation and irritation, cold sores, injection site reactions, allergic reactions and increased risk of parasitic infection. The patient understands that monitoring is required and they must alert us or the primary physician if symptoms of infection or other concerning signs are noted. Minoxidil Pregnancy And Lactation Text: This medication has not been assigned a Pregnancy Risk Category but animal studies failed to show danger with the topical medication. It is unknown if the medication is excreted in breast milk. Colchicine Counseling:  Patient counseled regarding adverse effects including but not limited to stomach upset (nausea, vomiting, stomach pain, or diarrhea).  Patient instructed to limit alcohol consumption while taking this medication.  Colchicine may reduce blood counts especially with prolonged use.  The patient understands that monitoring of kidney function and blood counts may be required, especially at baseline. The patient verbalized understanding of the proper use and possible adverse effects of colchicine.  All of the patient's questions and concerns were addressed. Acitretin Counseling:  I discussed with the patient the risks of acitretin including but not limited to hair loss, dry lips/skin/eyes, liver damage, hyperlipidemia, depression/suicidal ideation, photosensitivity.  Serious rare side effects can include but are not limited to pancreatitis, pseudotumor cerebri, bony changes, clot formation/stroke/heart attack.  Patient understands that alcohol is contraindicated since it can result in liver toxicity and significantly prolong the elimination of the drug by many years. Itraconazole Counseling:  I discussed with the patient the risks of itraconazole including but not limited to liver damage, nausea/vomiting, neuropathy, and severe allergy.  The patient understands that this medication is best absorbed when taken with acidic beverages such as non-diet cola or ginger ale.  The patient understands that monitoring is required including baseline LFTs and repeat LFTs at intervals.  The patient understands that they are to contact us or the primary physician if concerning signs are noted. Spironolactone Counseling: Patient advised regarding risks of diarrhea, abdominal pain, hyperkalemia, birth defects (for female patients), liver toxicity and renal toxicity. The patient may need blood work to monitor liver and kidney function and potassium levels while on therapy. The patient verbalized understanding of the proper use and possible adverse effects of spironolactone.  All of the patient's questions and concerns were addressed. Oxempic Pregnancy And Lactation Text: The fetal risk of this medication is unknown and taking while pregnant is not recommended. It is unknown if this medication is present in breast milk. Oral Minoxidil Pregnancy And Lactation Text: This medication should only be used when clearly needed if you are pregnant, attempting to become pregnant or breast feeding. Cyclosporine Pregnancy And Lactation Text: This medication is Pregnancy Category C and it isn't know if it is safe during pregnancy. This medication is excreted in breast milk. Calcipotriene Counseling:  I discussed with the patient the risks of calcipotriene including but not limited to erythema, scaling, itching, and irritation. Doxepin Pregnancy And Lactation Text: This medication is Pregnancy Category C and it isn't known if it is safe during pregnancy. It is also excreted in breast milk and breast feeding isn't recommended. Olumiant Counseling: I discussed with the patient the risks of Olumiant therapy including but not limited to upper respiratory tract infections, shingles, cold sores, and nausea. Live vaccines should be avoided.  This medication has been linked to serious infections; higher rate of mortality; malignancy and lymphoproliferative disorders; major adverse cardiovascular events; thrombosis; gastrointestinal perforations; neutropenia; lymphopenia; anemia; liver enzyme elevations; and lipid elevations. Ebglyss Pregnancy And Lactation Text: This medication likely crosses the placenta but the risk for the fetus is uncertain. It is unknown if this medication is excreted in breast milk. Zoryve Counseling:  I discussed with the patient that Zoryve is not for use in the eyes, mouth or vagina. The most commonly reported side effects include diarrhea, headache, insomnia, application site pain, upper respiratory tract infections, and urinary tract infections.  All of the patient's questions and concerns were addressed. Metronidazole Pregnancy And Lactation Text: This medication is Pregnancy Category B and considered safe during pregnancy.  It is also excreted in breast milk. Acitretin Pregnancy And Lactation Text: This medication is Pregnancy Category X and should not be given to women who are pregnant or may become pregnant in the future. This medication is excreted in breast milk. Tazorac Counseling:  Patient advised that medication is irritating and drying.  Patient may need to apply sparingly and wash off after an hour before eventually leaving it on overnight.  The patient verbalized understanding of the proper use and possible adverse effects of tazorac.  All of the patient's questions and concerns were addressed. Mirvaso Counseling: Mirvaso is a topical medication which can decrease superficial blood flow where applied. Side effects are uncommon and include stinging, redness and allergic reactions. Itraconazole Pregnancy And Lactation Text: This medication is Pregnancy Category C and it isn't know if it is safe during pregnancy. It is also excreted in breast milk. Bimzelx Counseling:  I discussed with the patient the risks of Bimzelx including but not limited to depression, immunosuppression, allergic reactions and infections.  The patient understands that monitoring is required including a PPD at baseline and must alert us or the primary physician if symptoms of infection or other concerning signs are noted. Cephalexin Counseling: I counseled the patient regarding use of cephalexin as an antibiotic for prophylactic and/or therapeutic purposes. Cephalexin (commonly prescribed under brand name Keflex) is a cephalosporin antibiotic which is active against numerous classes of bacteria, including most skin bacteria. Side effects may include nausea, diarrhea, gastrointestinal upset, rash, hives, yeast infections, and in rare cases, hepatitis, kidney disease, seizures, fever, confusion, neurologic symptoms, and others. Patients with severe allergies to penicillin medications are cautioned that there is about a 10% incidence of cross-reactivity with cephalosporins. When possible, patients with penicillin allergies should use alternatives to cephalosporins for antibiotic therapy. Dutasteride Male Counseling: Dustasteride Counseling:  I discussed with the patient the risks of use of dutasteride including but not limited to decreased libido, decreased ejaculate volume, and gynecomastia. Women who can become pregnant should not handle medication.  All of the patient's questions and concerns were addressed. Tetracycline Pregnancy And Lactation Text: This medication is Pregnancy Category D and not consider safe during pregnancy. It is also excreted in breast milk. Low Dose Naltrexone Pregnancy And Lactation Text: Naltrexone is pregnancy category C.  There have been no adequate and well-controlled studies in pregnant women.  It should be used in pregnancy only if the potential benefit justifies the potential risk to the fetus.   Limited data indicates that naltrexone is minimally excreted into breastmilk. Tremfya Counseling: I discussed with the patient the risks of guselkumab including but not limited to immunosuppression, serious infections, and drug reactions.  The patient understands that monitoring is required including a PPD at baseline and must alert us or the primary physician if symptoms of infection or other concerning signs are noted. Topical Sulfur Applications Counseling: Topical Sulfur Counseling: Patient counseled that this medication may cause skin irritation or allergic reactions.  In the event of skin irritation, the patient was advised to reduce the amount of the drug applied or use it less frequently.   The patient verbalized understanding of the proper use and possible adverse effects of topical sulfur application.  All of the patient's questions and concerns were addressed. Cephalexin Pregnancy And Lactation Text: This medication is Pregnancy Category B and considered safe during pregnancy.  It is also excreted in breast milk but can be used safely for shorter doses. Rhofade Counseling: Rhofade is a topical medication which can decrease superficial blood flow where applied. Side effects are uncommon and include stinging, redness and allergic reactions. Bimzelx Pregnancy And Lactation Text: This medication crosses the placenta and the safety is uncertain during pregnancy. It is unknown if this medication is present in breast milk. Hydroquinone Counseling:  Patient advised that medication may result in skin irritation, lightening (hypopigmentation), dryness, and burning.  In the event of skin irritation, the patient was advised to reduce the amount of the drug applied or use it less frequently.  Rarely, spots that are treated with hydroquinone can become darker (pseudoochronosis).  Should this occur, patient instructed to stop medication and call the office. The patient verbalized understanding of the proper use and possible adverse effects of hydroquinone.  All of the patient's questions and concerns were addressed. Tranexamic Acid Counseling:  Patient advised of the small risk of bleeding problems with tranexamic acid. They were also instructed to call if they developed any nausea, vomiting or diarrhea. All of the patient's questions and concerns were addressed. Nemluvio Counseling: I discussed with the patient the risks of nemolizumab including but not limited to headache, gastrointestinal complaints, nasopharyngitis, musculoskeletal complaints, injection site reactions, and allergic reactions. The patient understands that monitoring is required and they must alert us or the primary physician if any side effects are noted. Dutasteride Female Counseling: Dutasteride Counseling:  I discussed with the patient the risks of use of dutasteride including but not limited to decreased libido and sexual dysfunction. Explained the teratogenic nature of the medication and stressed the importance of not getting pregnant during treatment. All of the patient's questions and concerns were addressed. Topical Sulfur Applications Pregnancy And Lactation Text: This medication is considered safe during pregnancy and breast feeding secondary to limited systemic absorption. Niacinamide Counseling: I recommended taking niacin or niacinamide, also know as vitamin B3, twice daily. Recent evidence suggests that taking vitamin B3 (500 mg twice daily) can reduce the risk of actinic keratoses and non-melanoma skin cancers. Side effects of vitamin B3 include flushing and headache. Azathioprine Counseling:  I discussed with the patient the risks of azathioprine including but not limited to myelosuppression, immunosuppression, hepatotoxicity, lymphoma, and infections.  The patient understands that monitoring is required including baseline LFTs, Creatinine, possible TPMP genotyping and weekly CBCs for the first month and then every 2 weeks thereafter.  The patient verbalized understanding of the proper use and possible adverse effects of azathioprine.  All of the patient's questions and concerns were addressed. Cantharidin Pregnancy And Lactation Text: This medication has not been proven safe during pregnancy. It is unknown if this medication is excreted in breast milk. Rhofade Pregnancy And Lactation Text: This medication has not been assigned a Pregnancy Risk Category. It is unknown if the medication is excreted in breast milk. Clindamycin Counseling: I counseled the patient regarding use of clindamycin as an antibiotic for prophylactic and/or therapeutic purposes. Clindamycin is active against numerous classes of bacteria, including skin bacteria. Side effects may include nausea, diarrhea, gastrointestinal upset, rash, hives, yeast infections, and in rare cases, colitis. Tranexamic Acid Pregnancy And Lactation Text: It is unknown if this medication is safe during pregnancy or breast feeding. Cimzia Counseling:  I discussed with the patient the risks of Cimzia including but not limited to immunosuppression, allergic reactions and infections.  The patient understands that monitoring is required including a PPD at baseline and must alert us or the primary physician if symptoms of infection or other concerning signs are noted. Nemluvio Pregnancy And Lactation Text: It is not known if Nemluvio causes fetal harm or is present in breast milk. Please proceed with caution if patients who are pregnant or breastfeeding. Dutasteride Pregnancy And Lactation Text: This medication is absolutely contraindicated in women, especially during pregnancy and breast feeding. Feminization of male fetuses is possible if taking while pregnant. Niacinamide Pregnancy And Lactation Text: These medications are considered safe during pregnancy. Wartpeel Counseling:  I discussed with the patient the risks of Wartpeel including but not limited to erythema, scaling, itching, weeping, crusting, and pain. Solaraze Counseling:  I discussed with the patient the risks of Solaraze including but not limited to erythema, scaling, itching, weeping, crusting, and pain. Cimzia Pregnancy And Lactation Text: This medication crosses the placenta but can be considered safe in certain situations. Cimzia may be excreted in breast milk. Clindamycin Pregnancy And Lactation Text: This medication can be used in pregnancy if certain situations. Clindamycin is also present in breast milk. Albendazole Counseling:  I discussed with the patient the risks of albendazole including but not limited to cytopenia, kidney damage, nausea/vomiting and severe allergy.  The patient understands that this medication is being used in an off-label manner. Valtrex Counseling: I discussed with the patient the risks of valacyclovir including but not limited to kidney damage, nausea, vomiting and severe allergy.  The patient understands that if the infection seems to be worsening or is not improving, they are to call. Xolair Counseling:  Patient informed of potential adverse effects including but not limited to fever, muscle aches, rash and allergic reactions.  The patient verbalized understanding of the proper use and possible adverse effects of Xolair.  All of the patient's questions and concerns were addressed. Imiquimod Counseling:  I discussed with the patient the risks of imiquimod including but not limited to erythema, scaling, itching, weeping, crusting, and pain.  Patient understands that the inflammatory response to imiquimod is variable from person to person and was educated regarded proper titration schedule.  If flu-like symptoms develop, patient knows to discontinue the medication and contact us. Finasteride Male Counseling: Finasteride Counseling:  I discussed with the patient the risks of use of finasteride including but not limited to decreased libido, decreased ejaculate volume, gynecomastia, and depression. Women should not handle medication.  All of the patient's questions and concerns were addressed. Rituxan Counseling:  I discussed with the patient the risks of Rituxan infusions. Side effects can include infusion reactions, severe drug rashes including mucocutaneous reactions, reactivation of latent hepatitis and other infections and rarely progressive multifocal leukoencephalopathy.  All of the patient's questions and concerns were addressed. Cellcept Counseling:  I discussed with the patient the risks of mycophenolate mofetil including but not limited to infection/immunosuppression, GI upset, hypokalemia, hypercholesterolemia, bone marrow suppression, lymphoproliferative disorders, malignancy, GI ulceration/bleed/perforation, colitis, interstitial lung disease, kidney failure, progressive multifocal leukoencephalopathy, and birth defects.  The patient understands that monitoring is required including a baseline creatinine and regular CBC testing. In addition, patient must alert us immediately if symptoms of infection or other concerning signs are noted. Nsaids Counseling: NSAID Counseling: I discussed with the patient that NSAIDs should be taken with food. Prolonged use of NSAIDs can result in the development of stomach ulcers.  Patient advised to stop taking NSAIDs if abdominal pain occurs.  The patient verbalized understanding of the proper use and possible adverse effects of NSAIDs.  All of the patient's questions and concerns were addressed. Doxycycline Counseling:  Patient counseled regarding possible photosensitivity and increased risk for sunburn.  Patient instructed to avoid sunlight, if possible.  When exposed to sunlight, patients should wear protective clothing, sunglasses, and sunscreen.  The patient was instructed to call the office immediately if the following severe adverse effects occur:  hearing changes, easy bruising/bleeding, severe headache, or vision changes.  The patient verbalized understanding of the proper use and possible adverse effects of doxycycline.  All of the patient's questions and concerns were addressed. Xolair Pregnancy And Lactation Text: This medication is Pregnancy Category B and is considered safe during pregnancy. This medication is excreted in breast milk. Solaraze Pregnancy And Lactation Text: This medication is Pregnancy Category B and is considered safe. There is some data to suggest avoiding during the third trimester. It is unknown if this medication is excreted in breast milk. Cosentyx Counseling:  I discussed with the patient the risks of Cosentyx including but not limited to worsening of Crohn's disease, immunosuppression, allergic reactions and infections.  The patient understands that monitoring is required including a PPD at baseline and must alert us or the primary physician if symptoms of infection or other concerning signs are noted. Arava Counseling:  Patient counseled regarding adverse effects of Arava including but not limited to nausea, vomiting, abnormalities in liver function tests. Patients may develop mouth sores, rash, diarrhea, and abnormalities in blood counts. The patient understands that monitoring is required including LFTs and blood counts.  There is a rare possibility of scarring of the liver and lung problems that can occur when taking methotrexate. Persistent nausea, loss of appetite, pale stools, dark urine, cough, and shortness of breath should be reported immediately. Patient advised to discontinue Arava treatment and consult with a physician prior to attempting conception. The patient will have to undergo a treatment to eliminate Arava from the body prior to conception. Valtrex Pregnancy And Lactation Text: this medication is Pregnancy Category B and is considered safe during pregnancy. This medication is not directly found in breast milk but it's metabolite acyclovir is present. Finasteride Female Counseling: Finasteride Counseling:  I discussed with the patient the risks of use of finasteride including but not limited to decreased libido and sexual dysfunction. Explained the teratogenic nature of the medication and stressed the importance of not getting pregnant during treatment. All of the patient's questions and concerns were addressed. Rituxan Pregnancy And Lactation Text: This medication is Pregnancy Category C and it isn't know if it is safe during pregnancy. It is unknown if this medication is excreted in breast milk but similar antibodies are known to be excreted. Benzoyl Peroxide Counseling: Patient counseled that medicine may cause skin irritation and bleach clothing.  In the event of skin irritation, the patient was advised to reduce the amount of the drug applied or use it less frequently.   The patient verbalized understanding of the proper use and possible adverse effects of benzoyl peroxide.  All of the patient's questions and concerns were addressed. Hyrimoz Counseling:  I discussed with the patient the risks of adalimumab including but not limited to myelosuppression, immunosuppression, autoimmune hepatitis, demyelinating diseases, lymphoma, and serious infections.  The patient understands that monitoring is required including a PPD at baseline and must alert us or the primary physician if symptoms of infection or other concerning signs are noted. Rifampin Counseling: I discussed with the patient the risks of rifampin including but not limited to liver damage, kidney damage, red-orange body fluids, nausea/vomiting and severe allergy. Glycopyrrolate Counseling:  I discussed with the patient the risks of glycopyrrolate including but not limited to skin rash, drowsiness, dry mouth, difficulty urinating, and blurred vision. Topical Ketoconazole Pregnancy And Lactation Text: This medication is Pregnancy Category B and is considered safe during pregnancy. It is unknown if it is excreted in breast milk. High Dose Vitamin A Counseling: Side effects reviewed, pt to contact office should one occur. Griseofulvin Pregnancy And Lactation Text: This medication is Pregnancy Category X and is known to cause serious birth defects. It is unknown if this medication is excreted in breast milk but breast feeding should be avoided. Spevigo Pregnancy And Lactation Text: The risk during pregnancy and breastfeeding is uncertain with this medication. This medication does cross the placenta. It is unknown if this medication is found in breast milk. Erivedge Counseling- I discussed with the patient the risks of Erivedge including but not limited to nausea, vomiting, diarrhea, constipation, weight loss, changes in the sense of taste, decreased appetite, muscle spasms, and hair loss.  The patient verbalized understanding of the proper use and possible adverse effects of Erivedge.  All of the patient's questions and concerns were addressed. Azithromycin Counseling:  I discussed with the patient the risks of azithromycin including but not limited to GI upset, allergic reaction, drug rash, diarrhea, and yeast infections. Drysol Pregnancy And Lactation Text: This medication is considered safe during pregnancy and breast feeding. Xeljanz Counseling: I discussed with the patient the risks of Xeljanz therapy including increased risk of infection, liver issues, headache, diarrhea, or cold symptoms. Live vaccines should be avoided. They were instructed to call if they have any problems. Propranolol Pregnancy And Lactation Text: This medication is Pregnancy Category C and it isn't known if it is safe during pregnancy. It is excreted in breast milk. Rifampin Pregnancy And Lactation Text: This medication is Pregnancy Category C and it isn't know if it is safe during pregnancy. It is also excreted in breast milk and should not be used if you are breast feeding. Topical Metronidazole Counseling: Metronidazole is a topical antibiotic medication. You may experience burning, stinging, redness, or allergic reactions.  Please call our office if you develop any problems from using this medication. High Dose Vitamin A Pregnancy And Lactation Text: High dose vitamin A therapy is contraindicated during pregnancy and breast feeding. Glycopyrrolate Pregnancy And Lactation Text: This medication is Pregnancy Category B and is considered safe during pregnancy. It is unknown if it is excreted breast milk. Zepbound Counseling: I reviewed the possible side effects including: thyroid tumors, kidney disease, gallbladder disease, abdominal pain, constipation, diarrhea, nausea, vomiting and pancreatitis. Do not take this medication if you have a history or family history of multiple endocrine neoplasia syndrome type 2. Side effects reviewed, pt to contact office should one occur. Protopic Counseling: Patient may experience a mild burning sensation during topical application. Protopic is not approved in children less than 2 years of age. There have been case reports of hematologic and skin malignancies in patients using topical calcineurin inhibitors although causality is questionable. Azithromycin Pregnancy And Lactation Text: This medication is considered safe during pregnancy and is also secreted in breast milk. Stelara Counseling:  I discussed with the patient the risks of ustekinumab including but not limited to immunosuppression, malignancy, posterior leukoencephalopathy syndrome, and serious infections.  The patient understands that monitoring is required including a PPD at baseline and must alert us or the primary physician if symptoms of infection or other concerning signs are noted. Xelyohannesz Pregnancy And Lactation Text: This medication is Pregnancy Category D and is not considered safe during pregnancy.  The risk during breast feeding is also uncertain. Elidel Counseling: Patient may experience a mild burning sensation during topical application. Elidel is not approved in children less than 2 years of age. There have been case reports of hematologic and skin malignancies in patients using topical calcineurin inhibitors although causality is questionable. SSKI Counseling:  I discussed with the patient the risks of SSKI including but not limited to thyroid abnormalities, metallic taste, GI upset, fever, headache, acne, arthralgias, paraesthesias, lymphadenopathy, easy bleeding, arrhythmias, and allergic reaction. Topical Metronidazole Pregnancy And Lactation Text: This medication is Pregnancy Category B and considered safe during pregnancy.  It is also considered safe to use while breastfeeding. Sarecycline Counseling: Patient advised regarding possible photosensitivity and discoloration of the teeth, skin, lips, tongue and gums.  Patient instructed to avoid sunlight, if possible.  When exposed to sunlight, patients should wear protective clothing, sunglasses, and sunscreen.  The patient was instructed to call the office immediately if the following severe adverse effects occur:  hearing changes, easy bruising/bleeding, severe headache, or vision changes.  The patient verbalized understanding of the proper use and possible adverse effects of sarecycline.  All of the patient's questions and concerns were addressed. Hydroxychloroquine Counseling:  I discussed with the patient that a baseline ophthalmologic exam is needed at the start of therapy and every year thereafter while on therapy. A CBC may also be warranted for monitoring.  The side effects of this medication were discussed with the patient, including but not limited to agranulocytosis, aplastic anemia, seizures, rashes, retinopathy, and liver toxicity. Patient instructed to call the office should any adverse effect occur.  The patient verbalized understanding of the proper use and possible adverse effects of Plaquenil.  All the patient's questions and concerns were addressed. Ilumya Counseling: I discussed with the patient the risks of tildrakizumab including but not limited to immunosuppression, malignancy, posterior leukoencephalopathy syndrome, and serious infections.  The patient understands that monitoring is required including a PPD at baseline and must alert us or the primary physician if symptoms of infection or other concerning signs are noted. Bactrim Counseling:  I discussed with the patient the risks of sulfa antibiotics including but not limited to GI upset, allergic reaction, drug rash, diarrhea, dizziness, photosensitivity, and yeast infections.  Rarely, more serious reactions can occur including but not limited to aplastic anemia, agranulocytosis, methemoglobinemia, blood dyscrasias, liver or kidney failure, lung infiltrates or desquamative/blistering drug rashes. Adbry Counseling: I discussed with the patient the risks of tralokinumab including but not limited to eye infection and irritation, cold sores, injection site reactions, worsening of asthma, allergic reactions and increased risk of parasitic infection.  Live vaccines should be avoided while taking tralokinumab. The patient understands that monitoring is required and they must alert us or the primary physician if symptoms of infection or other concerning signs are noted. Protopic Pregnancy And Lactation Text: This medication is Pregnancy Category C. It is unknown if this medication is excreted in breast milk when applied topically. Sski Pregnancy And Lactation Text: This medication is Pregnancy Category D and isn't considered safe during pregnancy. It is excreted in breast milk. Topical Steroids Counseling: I discussed with the patient that prolonged use of topical steroids can result in the increased appearance of superficial blood vessels (telangiectasias), lightening (hypopigmentation) and thinning of the skin (atrophy).  Patient understands to avoid using high potency steroids in skin folds, the groin or the face.  The patient verbalized understanding of the proper use and possible adverse effects of topical steroids.  All of the patient's questions and concerns were addressed. Hydroxychloroquine Pregnancy And Lactation Text: This medication has been shown to cause fetal harm but it isn't assigned a Pregnancy Risk Category. There are small amounts excreted in breast milk. Qbrexza Counseling:  I discussed with the patient the risks of Qbrexza including but not limited to headache, mydriasis, blurred vision, dry eyes, nasal dryness, dry mouth, dry throat, dry skin, urinary hesitation, and constipation.  Local skin reactions including erythema, burning, stinging, and itching can also occur. Bactrim Pregnancy And Lactation Text: This medication is Pregnancy Category D and is known to cause fetal risk.  It is also excreted in breast milk. Taltz Counseling: I discussed with the patient the risks of ixekizumab including but not limited to immunosuppression, serious infections, worsening of inflammatory bowel disease and drug reactions.  The patient understands that monitoring is required including a PPD at baseline and must alert us or the primary physician if symptoms of infection or other concerning signs are noted. Thalidomide Counseling: I discussed with the patient the risks of thalidomide including but not limited to birth defects, anxiety, weakness, chest pain, dizziness, cough and severe allergy. Adbry Pregnancy And Lactation Text: It is unknown if this medication will adversely affect pregnancy or breast feeding. Eucrisa Counseling: Patient may experience a mild burning sensation during topical application. Eucrisa is not approved in children less than 3 months of age. Infliximab Counseling:  I discussed with the patient the risks of infliximab including but not limited to myelosuppression, immunosuppression, autoimmune hepatitis, demyelinating diseases, lymphoma, and serious infections.  The patient understands that monitoring is required including a PPD at baseline and must alert us or the primary physician if symptoms of infection or other concerning signs are noted. Tetracycline Counseling: Patient counseled regarding possible photosensitivity and increased risk for sunburn.  Patient instructed to avoid sunlight, if possible.  When exposed to sunlight, patients should wear protective clothing, sunglasses, and sunscreen.  The patient was instructed to call the office immediately if the following severe adverse effects occur:  hearing changes, easy bruising/bleeding, severe headache, or vision changes.  The patient verbalized understanding of the proper use and possible adverse effects of tetracycline.  All of the patient's questions and concerns were addressed. Patient understands to avoid pregnancy while on therapy due to potential birth defects. Topical Steroids Applications Pregnancy And Lactation Text: Most topical steroids are considered safe to use during pregnancy and lactation.  Any topical steroid applied to the breast or nipple should be washed off before breastfeeding. Low Dose Naltrexone Counseling- I discussed with the patient the potential risks and side effects of low dose naltrexone including but not limited to: more vivid dreams, headaches, nausea, vomiting, abdominal pain, fatigue, dizziness, and anxiety. Qbrexza Pregnancy And Lactation Text: There is no available data on Qbrexza use in pregnant women.  There is no available data on Qbrexza use in lactation. Saxenda Counseling: I reviewed the possible side effects including: thyroid tumors, kidney disease, gallbladder disease, abdominal pain, constipation, diarrhea, nausea, vomiting and pancreatitis. Do not take this medication if you have a history or family history of multiple endocrine neoplasia syndrome type 2. Side effects reviewed, pt to contact office should one occur. Spironolactone Pregnancy And Lactation Text: This medication can cause feminization of the male fetus and should be avoided during pregnancy. The active metabolite is also found in breast milk. Azelaic Acid Counseling: Patient counseled that medicine may cause skin irritation and to avoid applying near the eyes.  In the event of skin irritation, the patient was advised to reduce the amount of the drug applied or use it less frequently.   The patient verbalized understanding of the proper use and possible adverse effects of azelaic acid.  All of the patient's questions and concerns were addressed. Aklief counseling:  Patient advised to apply a pea-sized amount only at bedtime and wait 30 minutes after washing their face before applying.  If too drying, patient may add a non-comedogenic moisturizer.  The most commonly reported side effects including irritation, redness, scaling, dryness, stinging, burning, itching, and increased risk of sunburn.  The patient verbalized understanding of the proper use and possible adverse effects of retinoids.  All of the patient's questions and concerns were addressed. Otezla Counseling: The side effects of Otezla were discussed with the patient, including but not limited to worsening or new depression, weight loss, diarrhea, nausea, upper respiratory tract infection, and headache. Patient instructed to call the office should any adverse effect occur.  The patient verbalized understanding of the proper use and possible adverse effects of Otezla.  All the patient's questions and concerns were addressed. Calcipotriene Pregnancy And Lactation Text: The use of this medication during pregnancy or lactation is not recommended as there is insufficient data. Hydroxyzine Counseling: Patient advised that the medication is sedating and not to drive a car after taking this medication.  Patient informed of potential adverse effects including but not limited to dry mouth, urinary retention, and blurry vision.  The patient verbalized understanding of the proper use and possible adverse effects of hydroxyzine.  All of the patient's questions and concerns were addressed. Methotrexate Counseling:  Patient counseled regarding adverse effects of methotrexate including but not limited to nausea, vomiting, abnormalities in liver function tests. Patients may develop mouth sores, rash, diarrhea, and abnormalities in blood counts. The patient understands that monitoring is required including LFT's and blood counts.  There is a rare possibility of scarring of the liver and lung problems that can occur when taking methotrexate. Persistent nausea, loss of appetite, pale stools, dark urine, cough, and shortness of breath should be reported immediately. Patient advised to discontinue methotrexate treatment at least three months before attempting to become pregnant.  I discussed the need for folate supplements while taking methotrexate.  These supplements can decrease side effects during methotrexate treatment. The patient verbalized understanding of the proper use and possible adverse effects of methotrexate.  All of the patient's questions and concerns were addressed. Simponi Counseling:  I discussed with the patient the risks of golimumab including but not limited to myelosuppression, immunosuppression, autoimmune hepatitis, demyelinating diseases, lymphoma, and serious infections.  The patient understands that monitoring is required including a PPD at baseline and must alert us or the primary physician if symptoms of infection or other concerning signs are noted. Olumiant Pregnancy And Lactation Text: Based on animal studies, Olumiant may cause embryo-fetal harm when administered to pregnant women.  The medication should not be used in pregnancy.  Breastfeeding is not recommended during treatment. Enbrel Counseling:  I discussed with the patient the risks of etanercept including but not limited to myelosuppression, immunosuppression, autoimmune hepatitis, demyelinating diseases, lymphoma, and infections.  The patient understands that monitoring is required including a PPD at baseline and must alert us or the primary physician if symptoms of infection or other concerning signs are noted. Minocycline Counseling: Patient advised regarding possible photosensitivity and discoloration of the teeth, skin, lips, tongue and gums.  Patient instructed to avoid sunlight, if possible.  When exposed to sunlight, patients should wear protective clothing, sunglasses, and sunscreen.  The patient was instructed to call the office immediately if the following severe adverse effects occur:  hearing changes, easy bruising/bleeding, severe headache, or vision changes.  The patient verbalized understanding of the proper use and possible adverse effects of minocycline.  All of the patient's questions and concerns were addressed. Dapsone Counseling: I discussed with the patient the risks of dapsone including but not limited to hemolytic anemia, agranulocytosis, rashes, methemoglobinemia, kidney failure, peripheral neuropathy, headaches, GI upset, and liver toxicity.  Patients who start dapsone require monitoring including baseline LFTs and weekly CBCs for the first month, then every month thereafter.  The patient verbalized understanding of the proper use and possible adverse effects of dapsone.  All of the patient's questions and concerns were addressed. Tazorac Pregnancy And Lactation Text: This medication is not safe during pregnancy. It is unknown if this medication is excreted in breast milk. Bexarotene Counseling:  I discussed with the patient the risks of bexarotene including but not limited to hair loss, dry lips/skin/eyes, liver abnormalities, hyperlipidemia, pancreatitis, depression/suicidal ideation, photosensitivity, drug rash/allergic reactions, hypothyroidism, anemia, leukopenia, infection, cataracts, and teratogenicity.  Patient understands that they will need regular blood tests to check lipid profile, liver function tests, white blood cell count, thyroid function tests and pregnancy test if applicable. Ketoconazole Counseling:   Patient counseled regarding improving absorption with orange juice.  Adverse effects include but are not limited to breast enlargement, headache, diarrhea, nausea, upset stomach, liver function test abnormalities, taste disturbance, and stomach pain.  There is a rare possibility of liver failure that can occur when taking ketoconazole. The patient understands that monitoring of LFTs may be required, especially at baseline. The patient verbalized understanding of the proper use and possible adverse effects of ketoconazole.  All of the patient's questions and concerns were addressed. Cantharidin Counseling:  I discussed with the patient the risks of Cantharidin including but not limited to pain, redness, burning, itching, and blistering. Hydroxyzine Pregnancy And Lactation Text: This medication is not safe during pregnancy and should not be taken. It is also excreted in breast milk and breast feeding isn't recommended. Detail Level: Simple Zyclara Counseling:  I discussed with the patient the risks of imiquimod including but not limited to erythema, scaling, itching, weeping, crusting, and pain.  Patient understands that the inflammatory response to imiquimod is variable from person to person and was educated regarded proper titration schedule.  If flu-like symptoms develop, patient knows to discontinue the medication and contact us. Methotrexate Pregnancy And Lactation Text: This medication is Pregnancy Category X and is known to cause fetal harm. This medication is excreted in breast milk. Aklief Pregnancy And Lactation Text: It is unknown if this medication is safe to use during pregnancy.  It is unknown if this medication is excreted in breast milk.  Breastfeeding women should use the topical cream on the smallest area of the skin for the shortest time needed while breastfeeding.  Do not apply to nipple and areola. Otezla Pregnancy And Lactation Text: This medication is Pregnancy Category C and it isn't known if it is safe during pregnancy. It is unknown if it is excreted in breast milk. Rinvoq Counseling: I discussed with the patient the risks of Rinvoq therapy including but not limited to upper respiratory tract infections, shingles, cold sores, bronchitis, nausea, cough, fever, acne, and headache. Live vaccines should be avoided.  This medication has been linked to serious infections; higher rate of mortality; malignancy and lymphoproliferative disorders; major adverse cardiovascular events; thrombosis; thrombocytopenia, anemia, and neutropenia; lipid elevations; liver enzyme elevations; and gastrointestinal perforations. Topical Clindamycin Counseling: Patient counseled that this medication may cause skin irritation or allergic reactions.  In the event of skin irritation, the patient was advised to reduce the amount of the drug applied or use it less frequently.   The patient verbalized understanding of the proper use and possible adverse effects of clindamycin.  All of the patient's questions and concerns were addressed. Bexarotene Pregnancy And Lactation Text: This medication is Pregnancy Category X and should not be given to women who are pregnant or may become pregnant. This medication should not be used if you are breast feeding. Opioid Counseling: I discussed with the patient the potential side effects of opioids including but not limited to addiction, altered mental status, and depression. I stressed avoiding alcohol, benzodiazepines, muscle relaxants and sleep aids unless specifically okayed by a physician. The patient verbalized understanding of the proper use and possible adverse effects of opioids. All of the patient's questions and concerns were addressed. They were instructed to flush the remaining pills down the toilet if they did not need them for pain. Fluconazole Counseling:  Patient counseled regarding adverse effects of fluconazole including but not limited to headache, diarrhea, nausea, upset stomach, liver function test abnormalities, taste disturbance, and stomach pain.  There is a rare possibility of liver failure that can occur when taking fluconazole.  The patient understands that monitoring of LFTs and kidney function test may be required, especially at baseline. The patient verbalized understanding of the proper use and possible adverse effects of fluconazole.  All of the patient's questions and concerns were addressed. Semaglutide Counseling: I reviewed the possible side effects including: thyroid tumors, kidney disease, gallbladder disease, abdominal pain, constipation, diarrhea, nausea, vomiting and pancreatitis. Do not take this medication if you have a history or family history of multiple endocrine neoplasia syndrome type 2. Side effects reviewed, pt to contact office should one occur. Dapsone Pregnancy And Lactation Text: This medication is Pregnancy Category C and is not considered safe during pregnancy or breast feeding. Ketoconazole Pregnancy And Lactation Text: This medication is Pregnancy Category C and it isn't know if it is safe during pregnancy. It is also excreted in breast milk and breast feeding isn't recommended. Opzelura Counseling:  I discussed with the patient the risks of Opzelura including but not limited to nasopharngitis, bronchitis, ear infection, eosinophila, hives, diarrhea, folliculitis, tonsillitis, and rhinorrhea.  Taken orally, this medication has been linked to serious infections; higher rate of mortality; malignancy and lymphoproliferative disorders; major adverse cardiovascular events; thrombosis; thrombocytopenia, anemia, and neutropenia; and lipid elevations. Prednisone Counseling:  I discussed with the patient the risks of prolonged use of prednisone including but not limited to weight gain, insomnia, osteoporosis, mood changes, diabetes, susceptibility to infection, glaucoma and high blood pressure.  In cases where prednisone use is prolonged, patients should be monitored with blood pressure checks, serum glucose levels and an eye exam.  Additionally, the patient may need to be placed on GI prophylaxis, PCP prophylaxis, and calcium and vitamin D supplementation and/or a bisphosphonate.  The patient verbalized understanding of the proper use and the possible adverse effects of prednisone.  All of the patient's questions and concerns were addressed. Skyrizi Counseling: I discussed with the patient the risks of risankizumab-rzaa including but not limited to immunosuppression, and serious infections.  The patient understands that monitoring is required including a PPD at baseline and must alert us or the primary physician if symptoms of infection or other concerning signs are noted. 5-Fu Counseling: 5-Fluorouracil Counseling:  I discussed with the patient the risks of 5-fluorouracil including but not limited to erythema, scaling, itching, weeping, crusting, and pain. Oxybutynin Counseling:  I discussed with the patient the risks of oxybutynin including but not limited to skin rash, drowsiness, dry mouth, difficulty urinating, and blurred vision. Quinolones Counseling:  I discussed with the patient the risks of fluoroquinolones including but not limited to GI upset, allergic reaction, drug rash, diarrhea, dizziness, photosensitivity, yeast infections, liver function test abnormalities, tendonitis/tendon rupture. Opioid Pregnancy And Lactation Text: These medications can lead to premature delivery and should be avoided during pregnancy. These medications are also present in breast milk in small amounts. Rinvoq Pregnancy And Lactation Text: Based on animal studies, Rinvoq may cause embryo-fetal harm when administered to pregnant women.  The medication should not be used in pregnancy.  Breastfeeding is not recommended during treatment and for 6 days after the last dose. Humira Counseling:  I discussed with the patient the risks of adalimumab including but not limited to myelosuppression, immunosuppression, autoimmune hepatitis, demyelinating diseases, lymphoma, and serious infections.  The patient understands that monitoring is required including a PPD at baseline and must alert us or the primary physician if symptoms of infection or other concerning signs are noted. Gabapentin Counseling: I discussed with the patient the risks of gabapentin including but not limited to dizziness, somnolence, fatigue and ataxia. Isotretinoin Counseling: Patient should get monthly blood tests, not donate blood, not drive at night if vision affected, not share medication, and not undergo elective surgery for 6 months after tx completed. Side effects reviewed, pt to contact office should one occur. Terbinafine Counseling: Patient counseling regarding adverse effects of terbinafine including but not limited to headache, diarrhea, rash, upset stomach, liver function test abnormalities, itching, taste/smell disturbance, nausea, abdominal pain, and flatulence.  There is a rare possibility of liver failure that can occur when taking terbinafine.  The patient understands that a baseline LFT and kidney function test may be required. The patient verbalized understanding of the proper use and possible adverse effects of terbinafine.  All of the patient's questions and concerns were addressed. Opzelura Pregnancy And Lactation Text: There is insufficient data to evaluate drug-associated risk for major birth defects, miscarriage, or other adverse maternal or fetal outcomes.  There is a pregnancy registry that monitors pregnancy outcomes in pregnant persons exposed to the medication during pregnancy.  It is unknown if this medication is excreted in breast milk.  Do not breastfeed during treatment and for about 4 weeks after the last dose. Sotyktu Counseling:  I discussed the most common side effects of Sotyktu including: common cold, sore throat, sinus infections, cold sores, canker sores, folliculitis, and acne.  I also discussed more serious side effects of Sotyktu including but not limited to: serious allergic reactions; increased risk for infections such as TB; cancers such as lymphomas; rhabdomyolysis and elevated CPK; and elevated triglycerides and liver enzymes.  Isotretinoin Pregnancy And Lactation Text: This medication is Pregnancy Category X and is considered extremely dangerous during pregnancy. It is unknown if it is excreted in breast milk. Topical Ketoconazole Counseling: Patient counseled that this medication may cause skin irritation or allergic reactions.  In the event of skin irritation, the patient was advised to reduce the amount of the drug applied or use it less frequently.   The patient verbalized understanding of the proper use and possible adverse effects of ketoconazole.  All of the patient's questions and concerns were addressed. Griseofulvin Counseling:  I discussed with the patient the risks of griseofulvin including but not limited to photosensitivity, cytopenia, liver damage, nausea/vomiting and severe allergy.  The patient understands that this medication is best absorbed when taken with a fatty meal (e.g., ice cream or french fries). Picato Counseling:  I discussed with the patient the risks of Picato including but not limited to erythema, scaling, itching, weeping, crusting, and pain. Wegovy Counseling: I reviewed the possible side effects including: thyroid tumors, kidney disease, gallbladder disease, abdominal pain, constipation, diarrhea, nausea, vomiting and pancreatitis. Do not take this medication if you have a history or family history of multiple endocrine neoplasia syndrome type 2. Side effects reviewed, pt to contact office should one occur. Spevigo Counseling: I discussed with the patient the risks of Spevigo including but not limited to fatigue, nasuea, vomiting, headache, pruritus, urinary tract infection, an infusion related reactions.  The patient understands that monitoring is required including screening for tuberculosis at baseline and yearly screening thereafter while continuing Spevigo therapy. They should contact us if symptoms of infection or other concerning signs are noted. Propranolol Counseling:  I discussed with the patient the risks of propranolol including but not limited to low heart rate, low blood pressure, low blood sugar, restlessness and increased cold sensitivity. They should call the office if they experience any of these side effects. Drysol Counseling:  I discussed with the patient the risks of drysol/aluminum chloride including but not limited to skin rash, itching, irritation, burning. Sotyktu Pregnancy And Lactation Text: There is insufficient data to evaluate whether or not Sotyktu is safe to use during pregnancy.   It is not known if Sotyktu passes into breast milk and whether or not it is safe to use when breastfeeding.

## 2025-07-08 NOTE — CARE COORDINATION
Spoke with pt appt scheduled  Future Appointments   Date Time Provider Department Center   2024  3:20 PM Dimas Watts APRN - CNP [facility] [place]   2024  1:00 PM Erinn King RPH [facility] [place]   2024  1:00 PM Asia Mao RD, LD [facility] [place]   2024  1:00 PM Emperatriz Onofre MD N [facility] [place]   2024  2:40 PM Dimas Watts APRN - CNP [facility] [place]      Care Transitions Initial Follow Up Call    Outreach made within 2 business days of discharge: Yes    Patient: Jasmina Powers Patient : 1935   MRN: [id number]  Reason for Admission: There are no discharge diagnoses documented for the most recent discharge.  Discharge Date: 24       Spoke with: Jasmina    Discharge department/facility: [facility] Interactive Patient Contact:  Was patient able to fill all prescriptions: Yes  Was patient instructed to bring all medications to the follow-up visit: Yes  Is patient taking all medications as directed in the discharge summary? Yes  Does patient understand their discharge instructions: Yes  Does patient have questions or concerns that need addressed prior to 7-14 day follow up office visit: no    Scheduled appointment with PCP within 7-14 days    Follow Up  Future Appointments   Date Time Provider Department Center   2024  3:20 PM Dimas Watts APRN - CNP [facility] [place]   2024  1:00 PM Erinn King RPH [facility] [place]   2024  1:00 PM Asia Mao RD, LD [facility] [place]   2024  1:00 PM Emperatriz Onofre MD N [facility] [place]   2024  2:40 PM Dimas Watts APRN - CNP [facility] [place]       Karina Topete LPN    show